# Patient Record
Sex: FEMALE | Race: BLACK OR AFRICAN AMERICAN | NOT HISPANIC OR LATINO | Employment: UNEMPLOYED | URBAN - METROPOLITAN AREA
[De-identification: names, ages, dates, MRNs, and addresses within clinical notes are randomized per-mention and may not be internally consistent; named-entity substitution may affect disease eponyms.]

---

## 2018-10-28 ENCOUNTER — HOSPITAL ENCOUNTER (EMERGENCY)
Facility: HOSPITAL | Age: 28
Discharge: HOME | End: 2018-10-28
Attending: EMERGENCY MEDICINE

## 2018-10-28 VITALS
BODY MASS INDEX: 53.92 KG/M2 | HEIGHT: 62 IN | OXYGEN SATURATION: 98 % | TEMPERATURE: 98 F | DIASTOLIC BLOOD PRESSURE: 72 MMHG | WEIGHT: 293 LBS | RESPIRATION RATE: 16 BRPM | SYSTOLIC BLOOD PRESSURE: 140 MMHG | HEART RATE: 86 BPM

## 2018-10-28 DIAGNOSIS — R06.00 DYSPNEA, UNSPECIFIED TYPE: ICD-10-CM

## 2018-10-28 DIAGNOSIS — R05.9 COUGH: ICD-10-CM

## 2018-10-28 DIAGNOSIS — K04.7 DENTAL ABSCESS: ICD-10-CM

## 2018-10-28 DIAGNOSIS — R60.9 EDEMA, UNSPECIFIED TYPE: Primary | ICD-10-CM

## 2018-10-28 LAB
ALBUMIN SERPL BCP-MCNC: 3.4 G/DL
ALP SERPL-CCNC: 61 U/L
ALT SERPL W/O P-5'-P-CCNC: 27 U/L
ANION GAP SERPL CALC-SCNC: 12 MMOL/L
AST SERPL-CCNC: 33 U/L
B-HCG UR QL: NEGATIVE
BASOPHILS # BLD AUTO: 0.03 K/UL
BASOPHILS NFR BLD: 0.3 %
BILIRUB SERPL-MCNC: 0.6 MG/DL
BNP SERPL-MCNC: 85 PG/ML
BUN SERPL-MCNC: 10 MG/DL
CALCIUM SERPL-MCNC: 8.9 MG/DL
CHLORIDE SERPL-SCNC: 99 MMOL/L
CO2 SERPL-SCNC: 25 MMOL/L
CREAT SERPL-MCNC: 0.7 MG/DL
DIFFERENTIAL METHOD: ABNORMAL
EOSINOPHIL # BLD AUTO: 0.1 K/UL
EOSINOPHIL NFR BLD: 1.2 %
ERYTHROCYTE [DISTWIDTH] IN BLOOD BY AUTOMATED COUNT: 16.4 %
EST. GFR  (AFRICAN AMERICAN): >60 ML/MIN/1.73 M^2
EST. GFR  (NON AFRICAN AMERICAN): >60 ML/MIN/1.73 M^2
GLUCOSE SERPL-MCNC: 146 MG/DL
HCT VFR BLD AUTO: 42.6 %
HGB BLD-MCNC: 13.1 G/DL
IMM GRANULOCYTES # BLD AUTO: 0.04 K/UL
IMM GRANULOCYTES NFR BLD AUTO: 0.5 %
LYMPHOCYTES # BLD AUTO: 1.7 K/UL
LYMPHOCYTES NFR BLD: 19.3 %
MCH RBC QN AUTO: 24.7 PG
MCHC RBC AUTO-ENTMCNC: 30.8 G/DL
MCV RBC AUTO: 80 FL
MONOCYTES # BLD AUTO: 0.8 K/UL
MONOCYTES NFR BLD: 8.8 %
NEUTROPHILS # BLD AUTO: 6 K/UL
NEUTROPHILS NFR BLD: 69.9 %
NRBC BLD-RTO: 0 /100 WBC
PLATELET # BLD AUTO: 343 K/UL
PMV BLD AUTO: 10.3 FL
POTASSIUM SERPL-SCNC: 3.9 MMOL/L
PROT SERPL-MCNC: 6.8 G/DL
RBC # BLD AUTO: 5.3 M/UL
SODIUM SERPL-SCNC: 136 MMOL/L
WBC # BLD AUTO: 8.62 K/UL

## 2018-10-28 PROCEDURE — 71046 X-RAY EXAM CHEST 2 VIEWS: CPT | Mod: TC,FY

## 2018-10-28 PROCEDURE — 81025 URINE PREGNANCY TEST: CPT

## 2018-10-28 PROCEDURE — 99284 EMERGENCY DEPT VISIT MOD MDM: CPT | Mod: 25

## 2018-10-28 PROCEDURE — 83880 ASSAY OF NATRIURETIC PEPTIDE: CPT

## 2018-10-28 PROCEDURE — 94640 AIRWAY INHALATION TREATMENT: CPT

## 2018-10-28 PROCEDURE — 80053 COMPREHEN METABOLIC PANEL: CPT

## 2018-10-28 PROCEDURE — 71046 X-RAY EXAM CHEST 2 VIEWS: CPT | Mod: 26,,, | Performed by: RADIOLOGY

## 2018-10-28 PROCEDURE — 85025 COMPLETE CBC W/AUTO DIFF WBC: CPT

## 2018-10-28 PROCEDURE — 25000242 PHARM REV CODE 250 ALT 637 W/ HCPCS: Performed by: NURSE PRACTITIONER

## 2018-10-28 RX ORDER — FUROSEMIDE 20 MG/1
20 TABLET ORAL DAILY
Qty: 14 TABLET | Refills: 0 | Status: SHIPPED | OUTPATIENT
Start: 2018-10-28 | End: 2019-06-23

## 2018-10-28 RX ORDER — AMOXICILLIN 875 MG/1
875 TABLET, FILM COATED ORAL 2 TIMES DAILY
Qty: 14 TABLET | Refills: 0 | Status: SHIPPED | OUTPATIENT
Start: 2018-10-28 | End: 2019-06-23

## 2018-10-28 RX ORDER — IPRATROPIUM BROMIDE AND ALBUTEROL SULFATE 2.5; .5 MG/3ML; MG/3ML
3 SOLUTION RESPIRATORY (INHALATION)
Status: COMPLETED | OUTPATIENT
Start: 2018-10-28 | End: 2018-10-28

## 2018-10-28 RX ADMIN — IPRATROPIUM BROMIDE AND ALBUTEROL SULFATE 3 ML: .5; 3 SOLUTION RESPIRATORY (INHALATION) at 01:10

## 2018-10-28 NOTE — ED PROVIDER NOTES
"CHIEF COMPLAINT  Chief Complaint   Patient presents with    Dental Pain    Back Pain    generalized edema     seen at [facility] [place] last week told she was ok, seen her MD this past week, she was not concerned       HPI  Michelle Mendoza a 28 y.o. female who presents who presents to the ED with multiple complaints.  has been having "fluid build up all over me" went to [facility] last week. [facility] also saw her PCP. Was given steroids and a breathing treatment.Does have a history of Asthma.  [facility] is seeing PCP for back pain and an Xray has been ordered  Also thinks she has a dental abscess in a top back tooth.      CURRENT MEDICATIONS  No current facility-administered medications on file prior to encounter.      Current Outpatient Medications on File Prior to Encounter   Medication Sig Dispense Refill    albuterol sulfate (VENTOLIN INHL) Inhale into the lungs.         ALLERGIES  Review of patient's allergies indicates:  No Known Allergies      There is no immunization history on file for this patient.    PAST MEDICAL HISTORY  Past Medical History:   Diagnosis Date    Asthma     Back pain        SURGICAL HISTORY  Past Surgical History:   Procedure Laterality Date    APPENDECTOMY       SECTION         SOCIAL HISTORY  Social History     Socioeconomic History    Marital status: Single     Spouse name: Not on file    Number of children: Not on file    Years of education: Not on file    Highest education level: Not on file   Social Needs    Financial resource strain: Not on file    Food insecurity - worry: Not on file    Food insecurity - inability: Not on file    Transportation needs - medical: Not on file    Transportation needs - non-medical: Not on file   Occupational History    Not on file   Tobacco Use    Smoking status: Current Every Day Smoker   Substance and Sexual Activity    Alcohol use: Yes     Frequency: Never     Comment: occ    Drug use: No    Sexual activity: Yes     Birth " "control/protection: None   Other Topics Concern    Not on file   Social History Narrative    Not on file       FAMILY HISTORY  History reviewed. No pertinent family history.    REVIEW OF SYSTEMS  Constitutional: No fever, chills, or weakness.  Eyes: No redness, pain, or discharge  HENT: No ear pain, no headache, no rhinorrhea, no throat pain, + dental pain  Respiratory: + cough, + wheezing + shortness of breath  Cardiovascular: No chest pain, palpitations or edema  GI: No abdominal pain, nausea, vomiting or diarrhea  Gu: No dysuria, no hematuria, or discharge  Musculoskeletal: No pain, full range of motion. Good sensation  Skin: No rash or abrasion  Neurologic: No focal weakness or sensory changes.  All systems otherwise negative except as noted in the Review of Systems and History of Present Illness      PHYSICAL EXAM  Reviewed Triage Note  VITAL SIGNS:   Patient Vitals for the past 24 hrs:   BP Temp Temp src Pulse Resp SpO2 Height Weight   10/28/18 1340 (!) 140/72 -- -- 86 16 98 % -- --   10/28/18 1322 -- 98.2 °F (36.8 °C) Oral -- -- -- -- --   10/28/18 1104 137/64 97.9 °F (36.6 °C) Oral 92 (!) 22 96 % 5' 2" (1.575 m) (!) 154.2 kg (340 lb)     Constitutional: Well developed, well nourished, Alert and oriented x3, No acute distress, non-toxic appearance.  HENT: Normocephalic, Atraumatic, Bilateral external ears normal, external nose negative, + right upper posterior gum red/erythematous. + swelling, tender to palpation  Eyes: PERRL, EOMI, Conjunctiva normal, No discharge.  Neck: Normal range of motion, no tenderness, supple, no carotid bruits  Respiratory: Bilateral scattered wheezes, no rales, no rhonchi.   Cardiovascular: Normal heart rate, normal rhythm, no murmurs, no rubs, no gallops.  Gi: Bowel sounds normal, soft, no tenderness, non-distended, no masses, no pulsatile masses.  Musculoskeletal: Trace edema bilateral LE. No tenderness, no cyanosis, no clubbing. Good range of motion in all major joints. No " tenderness to palpation or major deformities noted.   Integument: Warm, Dry, No erythema, no rash  Neurologic: Normal motor function, normal sensory function. No focal deficits noted. Intact distal pulses  Psychiatric: Affect normal, judgment normal, mood normal      LABS  Pertinent labs reviewed. (see chart for details)  Labs Reviewed   COMPREHENSIVE METABOLIC PANEL - Abnormal; Notable for the following components:       Result Value    Glucose 146 (*)     Albumin 3.4 (*)     All other components within normal limits   CBC W/ AUTO DIFFERENTIAL - Abnormal; Notable for the following components:    MCV 80 (*)     MCH 24.7 (*)     MCHC 30.8 (*)     RDW 16.4 (*)     All other components within normal limits   PREGNANCY TEST, URINE RAPID   B-TYPE NATRIURETIC PEPTIDE       RADIOLOGY  X-Ray Chest PA And Lateral   Final Result      Grossly no evidence of acute cardiopulmonary disease         Electronically signed by: Rober Buenrostro MD   Date:    10/28/2018   Time:    13:44        Images viewed    PROCEDURE  Procedures      ED COURSE & MEDICAL DECISION MAKING     MDM       Physical exam findings discussed with patient. No acute emergent medical condition identified at this time to warrant further testing. Will dispo home with instructions to follow up with PCP as needed, return to the ED for worsening condition. Pt agrees with plan of care.     DISPOSITION  Patient discharged in stable condition 10/28/2018  2:51 PM      CLINICAL IMPRESSION:  The primary encounter diagnosis was Edema, unspecified type. Diagnoses of Cough, Dyspnea, unspecified type, and Dental abscess were also pertinent to this visit.    Patient advised to follow-up with your PCP within 3 days for BP re-check if Blood Pressure was >120/80 without history of hypertension.         GABBI Beckford  10/28/18 6941

## 2018-10-28 NOTE — ED NOTES
Duo neb per resp. Patient education completed on diet needs and low back pain related to excess . Patient reverberates diet ideas. Follow up with her primary on dietary needs discussed.

## 2019-06-23 ENCOUNTER — HOSPITAL ENCOUNTER (EMERGENCY)
Facility: HOSPITAL | Age: 29
Discharge: HOME | End: 2019-06-23
Attending: INTERNAL MEDICINE
Payer: MEDICAID

## 2019-06-23 VITALS
SYSTOLIC BLOOD PRESSURE: 117 MMHG | DIASTOLIC BLOOD PRESSURE: 64 MMHG | TEMPERATURE: 98 F | HEIGHT: 62 IN | WEIGHT: 293 LBS | BODY MASS INDEX: 53.92 KG/M2 | RESPIRATION RATE: 20 BRPM | OXYGEN SATURATION: 96 % | HEART RATE: 101 BPM

## 2019-06-23 DIAGNOSIS — J45.31 MILD PERSISTENT ASTHMATIC BRONCHITIS WITH ACUTE EXACERBATION: Primary | ICD-10-CM

## 2019-06-23 PROCEDURE — 96372 THER/PROPH/DIAG INJ SC/IM: CPT

## 2019-06-23 PROCEDURE — 63600175 PHARM REV CODE 636 W HCPCS: Performed by: INTERNAL MEDICINE

## 2019-06-23 PROCEDURE — 99284 EMERGENCY DEPT VISIT MOD MDM: CPT | Mod: 25

## 2019-06-23 RX ORDER — AZITHROMYCIN 1 G/1
1 POWDER, FOR SUSPENSION ORAL ONCE
Qty: 1 PACKET | Refills: 0 | Status: SHIPPED | OUTPATIENT
Start: 2019-06-23 | End: 2019-06-23

## 2019-06-23 RX ORDER — BENZONATATE 100 MG/1
100 CAPSULE ORAL 3 TIMES DAILY PRN
Qty: 20 CAPSULE | Refills: 0 | Status: SHIPPED | OUTPATIENT
Start: 2019-06-23 | End: 2019-07-03

## 2019-06-23 RX ORDER — DEXAMETHASONE SODIUM PHOSPHATE 100 MG/10ML
10 INJECTION INTRAMUSCULAR; INTRAVENOUS
Status: COMPLETED | OUTPATIENT
Start: 2019-06-23 | End: 2019-06-23

## 2019-06-23 RX ADMIN — DEXAMETHASONE SODIUM PHOSPHATE 10 MG: 10 INJECTION INTRAMUSCULAR; INTRAVENOUS at 04:06

## 2019-06-23 NOTE — ED PROVIDER NOTES
Encounter Date: 2019       History     Chief Complaint   Patient presents with    Cough    Nasal Congestion    Headache     Patient with cough cold congestion and wheezing x1 week.  One to get checked out while her stuck it was here        Review of patient's allergies indicates:  No Known Allergies  Past Medical History:   Diagnosis Date    Asthma     Back pain     Diabetes mellitus     Morbid obesity      Past Surgical History:   Procedure Laterality Date    APPENDECTOMY       SECTION       History reviewed. No pertinent family history.  Social History     Tobacco Use    Smoking status: Current Every Day Smoker   Substance Use Topics    Alcohol use: Yes     Frequency: Never     Comment: occ    Drug use: No     Review of Systems   Constitutional: Negative for fever.   HENT: Positive for congestion, postnasal drip, rhinorrhea and sore throat.    Respiratory: Negative for shortness of breath.    Cardiovascular: Negative for chest pain.   Gastrointestinal: Negative for nausea.   Genitourinary: Negative for dysuria.   Musculoskeletal: Negative for back pain.   Skin: Negative for rash.   Neurological: Negative for weakness.   Hematological: Does not bruise/bleed easily.   All other systems reviewed and are negative.      Physical Exam     Initial Vitals [19 1536]   BP Pulse Resp Temp SpO2   117/64 101 20 98.1 °F (36.7 °C) 96 %      MAP       --         Physical Exam    Nursing note and vitals reviewed.  Constitutional: Vital signs are normal. She appears well-developed and well-nourished. She is active and cooperative.   HENT:   Head: Normocephalic and atraumatic.   Eyes: Conjunctivae, EOM and lids are normal. Pupils are equal, round, and reactive to light. Lids are everted and swept, no foreign bodies found.   Neck: Trachea normal, normal range of motion and full passive range of motion without pain. Neck supple.   Cardiovascular: Normal rate, regular rhythm, S1 normal, S2 normal, normal  heart sounds, intact distal pulses and normal pulses.  No extrasystoles are present.    Pulmonary/Chest: She has wheezes. She has rhonchi.   Abdominal: Soft. Normal appearance and bowel sounds are normal.   Musculoskeletal: Normal range of motion.   Neurological: She is alert. She has normal reflexes. GCS eye subscore is 4. GCS verbal subscore is 5. GCS motor subscore is 6.   Skin: Skin is warm, dry and intact. Capillary refill takes less than 2 seconds.   Psychiatric: She has a normal mood and affect. Her speech is normal and behavior is normal. Cognition and memory are normal.         ED Course   Procedures  Labs Reviewed - No data to display       Imaging Results    None          Medical Decision Making:   ED Management:  Patient with wheezing and rhonchi compatible with asthmatic bronchitis.  Given a shot of Decadron.  Z-Tej and Tessalon.                      Clinical Impression:       ICD-10-CM ICD-9-CM   1. Mild persistent asthmatic bronchitis with acute exacerbation J45.31 493.92         Disposition:   Disposition: Discharged  Condition: Stable                        Barber Upton MD  06/23/19 [id number]

## 2019-10-28 ENCOUNTER — HOSPITAL ENCOUNTER (EMERGENCY)
Facility: HOSPITAL | Age: 29
Discharge: HOME | End: 2019-10-28
Attending: FAMILY MEDICINE
Payer: MEDICAID

## 2019-10-28 VITALS
HEART RATE: 79 BPM | HEIGHT: 62 IN | WEIGHT: 293 LBS | TEMPERATURE: 99 F | RESPIRATION RATE: 16 BRPM | OXYGEN SATURATION: 95 % | BODY MASS INDEX: 53.92 KG/M2 | DIASTOLIC BLOOD PRESSURE: 107 MMHG | SYSTOLIC BLOOD PRESSURE: 149 MMHG

## 2019-10-28 DIAGNOSIS — R05.9 COUGH: ICD-10-CM

## 2019-10-28 DIAGNOSIS — J40 BRONCHITIS: Primary | ICD-10-CM

## 2019-10-28 LAB
DEPRECATED S PYO AG THROAT QL EIA: NEGATIVE
POCT GLUCOSE: 115 MG/DL (ref 70–110)

## 2019-10-28 PROCEDURE — 82962 GLUCOSE BLOOD TEST: CPT | Mod: 59

## 2019-10-28 PROCEDURE — 71046 X-RAY EXAM CHEST 2 VIEWS: CPT | Mod: 26,,, | Performed by: RADIOLOGY

## 2019-10-28 PROCEDURE — 87880 STREP A ASSAY W/OPTIC: CPT

## 2019-10-28 PROCEDURE — 99284 EMERGENCY DEPT VISIT MOD MDM: CPT | Mod: 25

## 2019-10-28 PROCEDURE — 71046 X-RAY EXAM CHEST 2 VIEWS: CPT | Mod: TC,FY

## 2019-10-28 PROCEDURE — 71046 XR CHEST PA AND LATERAL: ICD-10-PCS | Mod: 26,,, | Performed by: RADIOLOGY

## 2019-10-28 PROCEDURE — 87081 CULTURE SCREEN ONLY: CPT

## 2019-10-28 RX ORDER — AZITHROMYCIN 250 MG/1
250 TABLET, FILM COATED ORAL DAILY
Qty: 6 TABLET | Refills: 0 | Status: ON HOLD | OUTPATIENT
Start: 2019-10-28 | End: 2020-07-14 | Stop reason: HOSPADM

## 2019-10-28 RX ORDER — ALBUTEROL SULFATE 90 UG/1
1-2 AEROSOL, METERED RESPIRATORY (INHALATION) EVERY 6 HOURS PRN
Qty: 1 INHALER | Refills: 0 | Status: SHIPPED | OUTPATIENT
Start: 2019-10-28 | End: 2020-10-27

## 2019-10-28 NOTE — ED PROVIDER NOTES
Encounter Date: 10/28/2019       History   No chief complaint on file.    Michelle Wren is a 29 y.o female with PMHx including asthma, back pain, DM and morbid obesity. She presents to ED with complaint of cough, wheezing and body aches x 1 week    She is unsure of fever    No abdominal pain. No N/V/D. She is tolerating fluids well    Son with similar symptoms    She is a smoker    No fever, chills, chest pain, dysnea or rash        Review of patient's allergies indicates:  No Known Allergies  Past Medical History:   Diagnosis Date    Asthma     Back pain     Diabetes mellitus     Morbid obesity      Past Surgical History:   Procedure Laterality Date    APPENDECTOMY       SECTION       No family history on file.  Social History     Tobacco Use    Smoking status: Current Every Day Smoker   Substance Use Topics    Alcohol use: Yes     Frequency: Never     Comment: occ    Drug use: No     Review of Systems   Constitutional: Negative.  Negative for fever.   HENT: Positive for sore throat. Negative for congestion, ear discharge, ear pain, sinus pressure, sinus pain and sneezing.    Eyes: Negative.    Respiratory: Positive for cough and wheezing.    Cardiovascular: Negative.  Negative for chest pain.   Gastrointestinal: Negative.  Negative for nausea.   Endocrine: Negative.    Genitourinary: Negative.  Negative for dysuria.   Musculoskeletal: Negative.  Negative for back pain.   Skin: Negative for rash.   Allergic/Immunologic: Negative.    Neurological: Positive for headaches. Negative for weakness.   Hematological: Negative.  Does not bruise/bleed easily.   Psychiatric/Behavioral: Negative.    All other systems reviewed and are negative.      Physical Exam     Initial Vitals   BP Pulse Resp Temp SpO2   -- -- -- -- --      MAP       --         Physical Exam    Nursing note and vitals reviewed.  Constitutional: She appears well-developed and well-nourished.   HENT:   Head: Normocephalic.   Eyes:  Conjunctivae are normal.   Neck: Normal range of motion.   Cardiovascular: Normal rate.   Pulmonary/Chest: Breath sounds normal.   Musculoskeletal: Normal range of motion.   Neurological: She is alert and oriented to person, place, and time. GCS score is 15. GCS eye subscore is 4. GCS verbal subscore is 5. GCS motor subscore is 6.   Skin: Skin is warm. Capillary refill takes less than 2 seconds.   Psychiatric: She has a normal mood and affect. Her behavior is normal. Judgment and thought content normal.         ED Course   Procedures  Labs Reviewed - No data to display       Imaging Results    None          Medical Decision Making:   Initial Assessment:   Patient with complaint of cough, wheezing and body aches x 1 week    She is unsure of fever    No abdominal pain. No N/V/D. She is tolerating fluids well    Son with similar symptoms    She is a smoker    Differential Diagnosis:   URI, sinusitis, bronchitis, asthma exacerbation, pneumonia  ED Management:  CXR normal    Strep negative    Discussed physical exam findings with patient  No acute emergent medical condition identified at this time to warrant further testing/diagnostics  At this time, I believe the patient is clinically stable for discharge.   Patient to follow up with PCP in 1-2 days.  The patient acknowledges that close follow up with a MD is required after all ER visits  Pt given instructions; take all medications prescribed in the ER as directed.   Patient agrees to comply with all instruction and direction given in the ER  Pt agrees to return to ER if any symptoms reoccur                               Clinical Impression:       ICD-10-CM ICD-9-CM   1. Bronchitis J40 490   2. Cough R05 786.2                                Bruna Oakes NP  10/28/19 6914

## 2019-10-31 LAB — BACTERIA THROAT CULT: NORMAL

## 2020-06-22 ENCOUNTER — HOSPITAL ENCOUNTER (INPATIENT)
Facility: HOSPITAL | Age: 30
LOS: 1 days | Discharge: ANOTHER HOSPITAL | End: 2020-06-23
Attending: EMERGENCY MEDICINE | Admitting: FAMILY MEDICINE
Payer: MEDICAID

## 2020-06-22 DIAGNOSIS — E66.2 PICKWICKIAN SYNDROME: ICD-10-CM

## 2020-06-22 DIAGNOSIS — R09.02 HYPOXIA: ICD-10-CM

## 2020-06-22 DIAGNOSIS — J96.02 ACUTE RESPIRATORY FAILURE WITH HYPERCAPNIA: ICD-10-CM

## 2020-06-22 DIAGNOSIS — E66.01 MORBID OBESITY: ICD-10-CM

## 2020-06-22 DIAGNOSIS — J96.22 ACUTE ON CHRONIC RESPIRATORY FAILURE WITH HYPERCAPNIA: Primary | ICD-10-CM

## 2020-06-22 DIAGNOSIS — R06.02 SOB (SHORTNESS OF BREATH): ICD-10-CM

## 2020-06-22 PROBLEM — R73.01 ELEVATED FASTING GLUCOSE: Status: ACTIVE | Noted: 2020-06-22

## 2020-06-22 PROBLEM — I51.7 CARDIOMEGALY: Status: ACTIVE | Noted: 2020-06-22

## 2020-06-22 PROBLEM — J45.20 MILD INTERMITTENT ASTHMA: Status: ACTIVE | Noted: 2020-06-22

## 2020-06-22 PROBLEM — E66.813 CLASS 3 SEVERE OBESITY WITH SERIOUS COMORBIDITY AND BODY MASS INDEX (BMI) OF 60.0 TO 69.9 IN ADULT: Status: ACTIVE | Noted: 2020-06-22

## 2020-06-22 LAB
ALBUMIN SERPL BCP-MCNC: 3.4 G/DL (ref 3.5–5.2)
ALLENS TEST: ABNORMAL
ALP SERPL-CCNC: 66 U/L (ref 55–135)
ALT SERPL W/O P-5'-P-CCNC: 19 U/L (ref 10–44)
ANION GAP SERPL CALC-SCNC: 7 MMOL/L (ref 8–16)
AST SERPL-CCNC: 20 U/L (ref 10–40)
BASOPHILS # BLD AUTO: 0.02 K/UL (ref 0–0.2)
BASOPHILS NFR BLD: 0.2 % (ref 0–1.9)
BILIRUB SERPL-MCNC: 0.6 MG/DL (ref 0.1–1)
BNP SERPL-MCNC: 103 PG/ML (ref 0–99)
BUN SERPL-MCNC: 19 MG/DL (ref 6–20)
CALCIUM SERPL-MCNC: 8.6 MG/DL (ref 8.7–10.5)
CHLORIDE SERPL-SCNC: 100 MMOL/L (ref 95–110)
CO2 SERPL-SCNC: 31 MMOL/L (ref 23–29)
CREAT SERPL-MCNC: 0.7 MG/DL (ref 0.5–1.4)
DELSYS: ABNORMAL
DIFFERENTIAL METHOD: ABNORMAL
EOSINOPHIL # BLD AUTO: 0 K/UL (ref 0–0.5)
EOSINOPHIL NFR BLD: 0.3 % (ref 0–8)
EP: 6
ERYTHROCYTE [DISTWIDTH] IN BLOOD BY AUTOMATED COUNT: 16.2 % (ref 11.5–14.5)
ERYTHROCYTE [SEDIMENTATION RATE] IN BLOOD BY WESTERGREN METHOD: 20 MM/H
EST. GFR  (AFRICAN AMERICAN): >60 ML/MIN/1.73 M^2
EST. GFR  (NON AFRICAN AMERICAN): >60 ML/MIN/1.73 M^2
FIO2: 100
FIO2: 21
FIO2: 50
FLOW: 70
GLUCOSE SERPL-MCNC: 135 MG/DL (ref 70–110)
HCO3 UR-SCNC: 28.2 MMOL/L (ref 24–28)
HCO3 UR-SCNC: 31.7 MMOL/L (ref 24–28)
HCO3 UR-SCNC: 36.2 MMOL/L (ref 24–28)
HCT VFR BLD AUTO: 43 % (ref 37–48.5)
HGB BLD-MCNC: 13.1 G/DL (ref 12–16)
IMM GRANULOCYTES # BLD AUTO: 0.08 K/UL (ref 0–0.04)
IMM GRANULOCYTES NFR BLD AUTO: 0.7 % (ref 0–0.5)
IP: 15
LYMPHOCYTES # BLD AUTO: 2 K/UL (ref 1–4.8)
LYMPHOCYTES NFR BLD: 17.3 % (ref 18–48)
MCH RBC QN AUTO: 25.9 PG (ref 27–31)
MCHC RBC AUTO-ENTMCNC: 30.5 G/DL (ref 32–36)
MCV RBC AUTO: 85 FL (ref 82–98)
MODE: ABNORMAL
MONOCYTES # BLD AUTO: 1.2 K/UL (ref 0.3–1)
MONOCYTES NFR BLD: 10.3 % (ref 4–15)
NEUTROPHILS # BLD AUTO: 8.3 K/UL (ref 1.8–7.7)
NEUTROPHILS NFR BLD: 71.2 % (ref 38–73)
NRBC BLD-RTO: 0 /100 WBC
PCO2 BLDA: 56.7 MMHG (ref 35–45)
PCO2 BLDA: 61.1 MMHG (ref 35–45)
PCO2 BLDA: 83.6 MMHG (ref 35–45)
PEEP: 12
PH SMN: 7.24 [PH] (ref 7.35–7.45)
PH SMN: 7.3 [PH] (ref 7.35–7.45)
PH SMN: 7.32 [PH] (ref 7.35–7.45)
PIP: 43
PLATELET # BLD AUTO: 313 K/UL (ref 150–350)
PMV BLD AUTO: 10.6 FL (ref 9.2–12.9)
PO2 BLDA: 111 MMHG (ref 80–100)
PO2 BLDA: 53 MMHG (ref 80–100)
PO2 BLDA: 84 MMHG (ref 80–100)
POC BE: 2 MMOL/L
POC BE: 6 MMOL/L
POC BE: 9 MMOL/L
POC SATURATED O2: 83 % (ref 95–100)
POC SATURATED O2: 95 % (ref 95–100)
POC SATURATED O2: 97 % (ref 95–100)
POC TCO2: 30 MMOL/L (ref 23–27)
POC TCO2: 34 MMOL/L (ref 23–27)
POC TCO2: 39 MMOL/L (ref 23–27)
POCT GLUCOSE: 143 MG/DL (ref 70–110)
POTASSIUM SERPL-SCNC: 4.2 MMOL/L (ref 3.5–5.1)
PROT SERPL-MCNC: 7.4 G/DL (ref 6–8.4)
PS: 10
RBC # BLD AUTO: 5.06 M/UL (ref 4–5.4)
SAMPLE: ABNORMAL
SARS-COV-2 RDRP RESP QL NAA+PROBE: NEGATIVE
SITE: ABNORMAL
SODIUM SERPL-SCNC: 138 MMOL/L (ref 136–145)
SP02: 97
TROPONIN I SERPL DL<=0.01 NG/ML-MCNC: 0.03 NG/ML (ref 0.02–0.5)
VT: 550
WBC # BLD AUTO: 11.68 K/UL (ref 3.9–12.7)

## 2020-06-22 PROCEDURE — 80053 COMPREHEN METABOLIC PANEL: CPT

## 2020-06-22 PROCEDURE — 85025 COMPLETE CBC W/AUTO DIFF WBC: CPT

## 2020-06-22 PROCEDURE — 20000000 HC ICU ROOM

## 2020-06-22 PROCEDURE — 94660 CPAP INITIATION&MGMT: CPT

## 2020-06-22 PROCEDURE — 25500020 PHARM REV CODE 255: Performed by: FAMILY MEDICINE

## 2020-06-22 PROCEDURE — 27000190 HC CPAP FULL FACE MASK W/VALVE

## 2020-06-22 PROCEDURE — 83880 ASSAY OF NATRIURETIC PEPTIDE: CPT

## 2020-06-22 PROCEDURE — 36600 WITHDRAWAL OF ARTERIAL BLOOD: CPT

## 2020-06-22 PROCEDURE — 99900026 HC AIRWAY MAINTENANCE (STAT)

## 2020-06-22 PROCEDURE — 25000003 PHARM REV CODE 250: Performed by: FAMILY MEDICINE

## 2020-06-22 PROCEDURE — 82803 BLOOD GASES ANY COMBINATION: CPT

## 2020-06-22 PROCEDURE — 99223 1ST HOSP IP/OBS HIGH 75: CPT | Mod: ,,, | Performed by: FAMILY MEDICINE

## 2020-06-22 PROCEDURE — 94640 AIRWAY INHALATION TREATMENT: CPT

## 2020-06-22 PROCEDURE — 82962 GLUCOSE BLOOD TEST: CPT

## 2020-06-22 PROCEDURE — 25000242 PHARM REV CODE 250 ALT 637 W/ HCPCS

## 2020-06-22 PROCEDURE — 31500 INSERT EMERGENCY AIRWAY: CPT

## 2020-06-22 PROCEDURE — 94761 N-INVAS EAR/PLS OXIMETRY MLT: CPT

## 2020-06-22 PROCEDURE — 94002 VENT MGMT INPAT INIT DAY: CPT

## 2020-06-22 PROCEDURE — 93005 ELECTROCARDIOGRAM TRACING: CPT

## 2020-06-22 PROCEDURE — 71045 X-RAY EXAM CHEST 1 VIEW: CPT | Mod: 26,,, | Performed by: RADIOLOGY

## 2020-06-22 PROCEDURE — 96374 THER/PROPH/DIAG INJ IV PUSH: CPT | Mod: 59

## 2020-06-22 PROCEDURE — 63600175 PHARM REV CODE 636 W HCPCS

## 2020-06-22 PROCEDURE — 84484 ASSAY OF TROPONIN QUANT: CPT

## 2020-06-22 PROCEDURE — 71045 X-RAY EXAM CHEST 1 VIEW: CPT | Mod: TC,FY

## 2020-06-22 PROCEDURE — 71045 XR CHEST AP PORTABLE: ICD-10-PCS | Mod: 26,,, | Performed by: RADIOLOGY

## 2020-06-22 PROCEDURE — 99900035 HC TECH TIME PER 15 MIN (STAT)

## 2020-06-22 PROCEDURE — 99291 CRITICAL CARE FIRST HOUR: CPT | Mod: 25

## 2020-06-22 PROCEDURE — U0002 COVID-19 LAB TEST NON-CDC: HCPCS

## 2020-06-22 PROCEDURE — 63600175 PHARM REV CODE 636 W HCPCS: Performed by: EMERGENCY MEDICINE

## 2020-06-22 PROCEDURE — 94644 CONT INHLJ TX 1ST HOUR: CPT

## 2020-06-22 PROCEDURE — 27000221 HC OXYGEN, UP TO 24 HOURS

## 2020-06-22 PROCEDURE — 99223 PR INITIAL HOSPITAL CARE,LEVL III: ICD-10-PCS | Mod: ,,, | Performed by: FAMILY MEDICINE

## 2020-06-22 PROCEDURE — 25000242 PHARM REV CODE 250 ALT 637 W/ HCPCS: Performed by: EMERGENCY MEDICINE

## 2020-06-22 PROCEDURE — 71045 X-RAY EXAM CHEST 1 VIEW: CPT | Mod: 26,77,, | Performed by: RADIOLOGY

## 2020-06-22 PROCEDURE — 96375 TX/PRO/DX INJ NEW DRUG ADDON: CPT | Mod: 59

## 2020-06-22 PROCEDURE — 71045 XR CHEST AP PORTABLE: ICD-10-PCS | Mod: 26,77,, | Performed by: RADIOLOGY

## 2020-06-22 PROCEDURE — 63600175 PHARM REV CODE 636 W HCPCS: Performed by: FAMILY MEDICINE

## 2020-06-22 RX ORDER — PROPOFOL 10 MG/ML
200 VIAL (ML) INTRAVENOUS ONCE
Status: COMPLETED | OUTPATIENT
Start: 2020-06-22 | End: 2020-06-22

## 2020-06-22 RX ORDER — IPRATROPIUM BROMIDE AND ALBUTEROL SULFATE 2.5; .5 MG/3ML; MG/3ML
3 SOLUTION RESPIRATORY (INHALATION)
Status: COMPLETED | OUTPATIENT
Start: 2020-06-22 | End: 2020-06-22

## 2020-06-22 RX ORDER — SUCCINYLCHOLINE CHLORIDE 20 MG/ML
200 INJECTION INTRAMUSCULAR; INTRAVENOUS ONCE
Status: COMPLETED | OUTPATIENT
Start: 2020-06-22 | End: 2020-06-22

## 2020-06-22 RX ORDER — FENTANYL CITRATE-0.9 % NACL/PF 10 MCG/ML
PLASTIC BAG, INJECTION (ML) INTRAVENOUS CONTINUOUS
Status: DISCONTINUED | OUTPATIENT
Start: 2020-06-22 | End: 2020-06-23 | Stop reason: HOSPADM

## 2020-06-22 RX ORDER — METHYLPREDNISOLONE SOD SUCC 125 MG
125 VIAL (EA) INJECTION
Status: COMPLETED | OUTPATIENT
Start: 2020-06-22 | End: 2020-06-22

## 2020-06-22 RX ORDER — IPRATROPIUM BROMIDE AND ALBUTEROL SULFATE 2.5; .5 MG/3ML; MG/3ML
3 SOLUTION RESPIRATORY (INHALATION) EVERY 4 HOURS
Status: DISCONTINUED | OUTPATIENT
Start: 2020-06-22 | End: 2020-06-23 | Stop reason: HOSPADM

## 2020-06-22 RX ORDER — PROPOFOL 10 MG/ML
VIAL (ML) INTRAVENOUS
Status: DISPENSED
Start: 2020-06-22 | End: 2020-06-23

## 2020-06-22 RX ORDER — MIDAZOLAM HYDROCHLORIDE 5 MG/ML
5 INJECTION INTRAMUSCULAR; INTRAVENOUS ONCE
Status: DISCONTINUED | OUTPATIENT
Start: 2020-06-22 | End: 2020-06-23 | Stop reason: HOSPADM

## 2020-06-22 RX ORDER — MIDAZOLAM HYDROCHLORIDE 1 MG/ML
INJECTION, SOLUTION INTRAMUSCULAR; INTRAVENOUS
Status: DISPENSED
Start: 2020-06-22 | End: 2020-06-23

## 2020-06-22 RX ORDER — SODIUM CHLORIDE 0.9 % (FLUSH) 0.9 %
10 SYRINGE (ML) INJECTION
Status: DISCONTINUED | OUTPATIENT
Start: 2020-06-22 | End: 2020-06-23 | Stop reason: HOSPADM

## 2020-06-22 RX ORDER — FUROSEMIDE 10 MG/ML
40 INJECTION INTRAMUSCULAR; INTRAVENOUS DAILY
Status: DISCONTINUED | OUTPATIENT
Start: 2020-06-23 | End: 2020-06-23 | Stop reason: HOSPADM

## 2020-06-22 RX ORDER — FUROSEMIDE 10 MG/ML
40 INJECTION INTRAMUSCULAR; INTRAVENOUS
Status: COMPLETED | OUTPATIENT
Start: 2020-06-22 | End: 2020-06-22

## 2020-06-22 RX ORDER — ENOXAPARIN SODIUM 100 MG/ML
40 INJECTION SUBCUTANEOUS EVERY 24 HOURS
Status: DISCONTINUED | OUTPATIENT
Start: 2020-06-22 | End: 2020-06-23

## 2020-06-22 RX ORDER — PROPOFOL 10 MG/ML
5 INJECTION, EMULSION INTRAVENOUS CONTINUOUS
Status: DISCONTINUED | OUTPATIENT
Start: 2020-06-22 | End: 2020-06-23 | Stop reason: HOSPADM

## 2020-06-22 RX ORDER — SUCCINYLCHOLINE CHLORIDE 20 MG/ML
INJECTION INTRAMUSCULAR; INTRAVENOUS
Status: DISPENSED
Start: 2020-06-22 | End: 2020-06-23

## 2020-06-22 RX ORDER — PROPOFOL 10 MG/ML
INJECTION, EMULSION INTRAVENOUS
Status: COMPLETED
Start: 2020-06-22 | End: 2020-06-22

## 2020-06-22 RX ORDER — MORPHINE SULFATE 4 MG/ML
2 INJECTION, SOLUTION INTRAMUSCULAR; INTRAVENOUS EVERY 4 HOURS PRN
Status: DISCONTINUED | OUTPATIENT
Start: 2020-06-22 | End: 2020-06-23 | Stop reason: HOSPADM

## 2020-06-22 RX ORDER — METHYLPREDNISOLONE SOD SUCC 125 MG
125 VIAL (EA) INJECTION EVERY 8 HOURS
Status: DISCONTINUED | OUTPATIENT
Start: 2020-06-22 | End: 2020-06-23 | Stop reason: HOSPADM

## 2020-06-22 RX ORDER — BUDESONIDE 0.25 MG/2ML
INHALANT ORAL
Status: COMPLETED
Start: 2020-06-22 | End: 2020-06-22

## 2020-06-22 RX ORDER — IPRATROPIUM BROMIDE AND ALBUTEROL SULFATE 2.5; .5 MG/3ML; MG/3ML
SOLUTION RESPIRATORY (INHALATION)
Status: DISPENSED
Start: 2020-06-22 | End: 2020-06-22

## 2020-06-22 RX ORDER — BUDESONIDE 0.25 MG/2ML
0.25 INHALANT ORAL DAILY
Status: DISCONTINUED | OUTPATIENT
Start: 2020-06-23 | End: 2020-06-23 | Stop reason: HOSPADM

## 2020-06-22 RX ORDER — IPRATROPIUM BROMIDE AND ALBUTEROL SULFATE 2.5; .5 MG/3ML; MG/3ML
SOLUTION RESPIRATORY (INHALATION)
Status: DISPENSED
Start: 2020-06-22 | End: 2020-06-23

## 2020-06-22 RX ADMIN — BUDESONIDE 0.25 MG: 0.25 INHALANT RESPIRATORY (INHALATION) at 05:06

## 2020-06-22 RX ADMIN — IPRATROPIUM BROMIDE AND ALBUTEROL SULFATE 3 ML: .5; 3 SOLUTION RESPIRATORY (INHALATION) at 09:06

## 2020-06-22 RX ADMIN — METHYLPREDNISOLONE SODIUM SUCCINATE 125 MG: 125 INJECTION, POWDER, FOR SOLUTION INTRAMUSCULAR; INTRAVENOUS at 09:06

## 2020-06-22 RX ADMIN — PROPOFOL 200 MG: 10 INJECTION, EMULSION INTRAVENOUS at 04:06

## 2020-06-22 RX ADMIN — ENOXAPARIN SODIUM 40 MG: 100 INJECTION SUBCUTANEOUS at 05:06

## 2020-06-22 RX ADMIN — MORPHINE SULFATE 2 MG: 4 INJECTION INTRAVENOUS at 02:06

## 2020-06-22 RX ADMIN — PROPOFOL 30 MCG/KG/MIN: 10 INJECTION, EMULSION INTRAVENOUS at 11:06

## 2020-06-22 RX ADMIN — SULFUR HEXAFLUORIDE 5 ML: KIT at 03:06

## 2020-06-22 RX ADMIN — PROPOFOL: 10 INJECTION, EMULSION INTRAVENOUS at 05:06

## 2020-06-22 RX ADMIN — IPRATROPIUM BROMIDE AND ALBUTEROL SULFATE 3 ML: .5; 3 SOLUTION RESPIRATORY (INHALATION) at 11:06

## 2020-06-22 RX ADMIN — IPRATROPIUM BROMIDE AND ALBUTEROL SULFATE 3 ML: .5; 3 SOLUTION RESPIRATORY (INHALATION) at 05:06

## 2020-06-22 RX ADMIN — FUROSEMIDE 40 MG: 10 INJECTION, SOLUTION INTRAMUSCULAR; INTRAVENOUS at 09:06

## 2020-06-22 RX ADMIN — Medication 2500 MCG: at 07:06

## 2020-06-22 RX ADMIN — PROPOFOL 50 MCG/KG/MIN: 10 INJECTION, EMULSION INTRAVENOUS at 07:06

## 2020-06-22 RX ADMIN — PROPOFOL 50 MCG/KG/MIN: 10 INJECTION, EMULSION INTRAVENOUS at 05:06

## 2020-06-22 RX ADMIN — SUCCINYLCHOLINE CHLORIDE 200 MG: 20 INJECTION, SOLUTION INTRAMUSCULAR; INTRAVENOUS; PARENTERAL at 04:06

## 2020-06-22 NOTE — ASSESSMENT & PLAN NOTE
Admit to ICU with continuous oxygen monitoring  Likely secondary to obesity hypoventilation, no signs of infiltrate on chest x-ray.  She does have a history of asthma as well  continuous BiPAP for next 4 hr, repeat ABG then  Initial ABG with hypercapnia and hypoxia  DuoNebs q.4 hours scheduled  Solu-Medrol 125 mg IV x1 in the ER, will continue with Solu-Medrol IV 80 mg daily  Echo ordered

## 2020-06-22 NOTE — ASSESSMENT & PLAN NOTE
On CPAP at home, reports compliance  Will continue BiPAP on admission to ICU  BiPAP at night  Repeat ABG after 4 hr on BiPAP

## 2020-06-22 NOTE — NURSING
Patient taken off ventilator and manually ventilated to attempt to improve 02 sats. Dr. Shafer at bedside.

## 2020-06-22 NOTE — ED TRIAGE NOTES
"To er with c/o diff "catching my breath " pt is obese increasing sob over the last few days. Unable to walk for more than 10 mins.  Also hx of sleep apnea  "

## 2020-06-22 NOTE — NURSING
PT. Intubated at this time. Very difficult intubation, due to pt. Anatomy, pt. o2 sats. In the low eighties.  Otherwise hemodynamically stable. Will continue to closely monitor.

## 2020-06-22 NOTE — PLAN OF CARE
Problem: Noninvasive Ventilation Acute  Goal: Effective Unassisted Ventilation and Oxygenation  Outcome: Ongoing, Progressing

## 2020-06-22 NOTE — ASSESSMENT & PLAN NOTE
Patient with peripheral edema, elevated BNP though obesity could be a contributing factor to this.  Echo ordered.  Will follow-up  IV Lasix 40 mg x1 in ER  Continue IV Lasix 40 mg daily

## 2020-06-22 NOTE — HPI
Patient is a 30-year-old female with past medical history of asthma,?Diabetes, severe obesity who presents to the ER with worsening shortness of breath.  Patient reports that she has been feeling short of breath over the last 2-3 days but today has been unable to catch her breath.  She uses a CPAP nightly at home and reports compliance.  Denies any new fever cough.  She also reports increased swelling in her legs.  Today her shortness of breath became more severe so she came to the ER.  History obtained from ER physician and ER records as patient is on continuous BiPAP.  A patient agrees with history.  In the ER initial ABG 7.323/61/53/32 on room air, WBC within normal limits, like true lytes within normal limits.  She was given 125 mg IV Solu-Medrol, 3 DuoNeb treatments and IV 40 mg Lasix.  Hospital team called for admission for acute respiratory failure with hypoxia and hypercapnia

## 2020-06-22 NOTE — SUBJECTIVE & OBJECTIVE
Past Medical History:   Diagnosis Date    Asthma     Back pain     Diabetes mellitus     Morbid obesity     Obesity        Past Surgical History:   Procedure Laterality Date    APPENDECTOMY       SECTION         Review of patient's allergies indicates:  No Known Allergies    No current facility-administered medications on file prior to encounter.      Current Outpatient Medications on File Prior to Encounter   Medication Sig    albuterol (PROVENTIL/VENTOLIN HFA) 90 mcg/actuation inhaler Inhale 1-2 puffs into the lungs every 6 (six) hours as needed for Wheezing. Rescue    azithromycin (Z-JOSE ALBERTO) 250 MG tablet Take 1 tablet (250 mg total) by mouth once daily. Take first 2 tablets together, then 1 every day until finished.    metformin HCl (METFORMIN ORAL) Take by mouth.     Family History     None        Tobacco Use    Smoking status: Current Every Day Smoker   Substance and Sexual Activity    Alcohol use: Yes     Frequency: Never     Comment: occ    Drug use: No    Sexual activity: Yes     Birth control/protection: None     Review of Systems   Unable to perform ROS: Acuity of condition   Respiratory: Positive for shortness of breath.     Patient requiring continuous BiPAP  Objective:     Vital Signs (Most Recent):  Temp: 99.1 °F (37.3 °C) (20 0810)  Pulse: 86 (20 1046)  Resp: 15 (20 1046)  BP: (!) 135/93 (20 1046)  SpO2: 96 % (20 1046) Vital Signs (24h Range):  Temp:  [99.1 °F (37.3 °C)] 99.1 °F (37.3 °C)  Pulse:  [80-95] 86  Resp:  [14-30] 15  SpO2:  [92 %-100 %] 96 %  BP: (132-175)/() 135/93     Weight: (!) 153.3 kg (338 lb)  Body mass index is 61.82 kg/m².    Physical Exam  Vitals signs reviewed.   Constitutional:       Appearance: She is obese. She is ill-appearing. She is not toxic-appearing or diaphoretic.      Comments: On continuous BiPAP   HENT:      Head: Normocephalic and atraumatic.      Right Ear: External ear normal.      Left Ear: External ear  normal.      Nose: Nose normal.      Mouth/Throat:      Mouth: Mucous membranes are dry.   Eyes:      Conjunctiva/sclera: Conjunctivae normal.   Neck:      Comments: Full  Cardiovascular:      Rate and Rhythm: Normal rate.      Comments: Distant heart sounds, distal pulses intact  Pulmonary:      Effort: Respiratory distress present.      Breath sounds: No stridor.      Comments: Increased work of breathing, moderate air movement, no on BiPAP  Abdominal:      General: Bowel sounds are normal. There is distension.      Tenderness: There is no abdominal tenderness. There is no guarding.   Musculoskeletal:      Right lower leg: Edema present.      Left lower leg: Edema present.      Comments: 2+ pitting edema bilateral lower extremities to the knee   Skin:     General: Skin is warm and dry.   Neurological:      Comments: Unable to assess but she is answering questions appropriately             Significant Labs:   Recent Lab Results       06/22/20  0858   06/22/20  0836   06/22/20  0835   06/22/20  0830        Albumin 3.4           Alkaline Phosphatase 66           Allens Test   Pass         ALT 19           Anion Gap 7           AST 20           Baso # 0.02           Basophil% 0.2           BILIRUBIN TOTAL 0.6  Comment:  For infants and newborns, interpretation of results should be based  on gestational age, weight and in agreement with clinical  observations.  Premature Infant recommended reference ranges:  Up to 24 hours.............<8.0 mg/dL  Up to 48 hours............<12.0 mg/dL  3-5 days..................<15.0 mg/dL  6-29 days.................<15.0 mg/dL               Comment:  Values of less than 100 pg/ml are consistent with non-CHF populations.           Site   LR         BUN, Bld 19           Calcium 8.6           Chloride 100           CO2 31           Creatinine 0.7           DelSys   Room Air         Differential Method Automated           eGFR if  >60.0           eGFR if non African  American >60.0  Comment:  Calculation used to obtain the estimated glomerular filtration  rate (eGFR) is the CKD-EPI equation.              Eos # 0.0           Eosinophil% 0.3           FiO2   21         Glucose 135           Gran # (ANC) 8.3           Gran% 71.2           Hematocrit 43.0           Hemoglobin 13.1           Immature Grans (Abs) 0.08  Comment:  Mild elevation in immature granulocytes is non specific and   can be seen in a variety of conditions including stress response,   acute inflammation, trauma and pregnancy. Correlation with other   laboratory and clinical findings is essential.             Immature Granulocytes 0.7           Lymph # 2.0           Lymph% 17.3           MCH 25.9           MCHC 30.5           MCV 85           Mode   SPONT         Mono # 1.2           Mono% 10.3           MPV 10.6           nRBC 0           Platelets 313           POC BE   6         POC HCO3   31.7         POC PCO2   61.1         POC PH   7.323         POC PO2   53         POC SATURATED O2   83         POC TCO2   34         POCT Glucose       143     Potassium 4.2           PROTEIN TOTAL 7.4           RBC 5.06           RDW 16.2           Sample   ARTERIAL         SARS-CoV-2 RNA, Amplification, Qual     Negative  Comment:  This test utilizes isothermal nucleic acid amplification   technology to detect the SARS-CoV-2 RdRp nucleic acid segment.   The analytical sensitivity (limit of detection) is 125 genome   equivalents/mL.   A POSITIVE result implies infection with the SARS-CoV-2 virus;  the patient is presumed to be contagious.    A NEGATIVE result means that SARS-CoV-2 nucleic acids are not  present above the limit of detection. A NEGATIVE result should be   treated as presumptive. It does not rule out the possibility of   COVID-19 and should not be the sole basis for treatment decisions.   If COVID-19 is strongly suspected based on clinical and exposure   history, re-testing using an alternate molecular assay  should be   considered.   This test is only for use under the Food and Drug   Administration s Emergency Use Authorization (EUA).   Commercial kits are provided by [employer].   Performance characteristics of the EUA have been independently  verified by [facility] Department of  Pathology and Laboratory Medicine.   _________________________________________________________________  The ID NOW COVID-19 Letter of Authorization, along with the   authorized Fact Sheet for Healthcare Providers, the authorized Fact  Sheet for Patients, and authorized labeling are available on the FDA   website:  www.fda.gov/MedicalDevices/Safety/EmergencySituations/div235131.htm         Sodium 138           Troponin I 0.03           WBC 11.68               All pertinent labs within the past 24 hours have been reviewed.    Significant Imaging: I have reviewed all pertinent imaging results/findings within the past 24 hours.

## 2020-06-22 NOTE — H&P
[facility] - ED Hospital Medicine  History & Physical    Patient Name: Michelle Wren  MRN: [id number]  Admission Date: 2020  Attending Physician: Bruna Shafer MD   Primary Care Provider: Primary Doctor No         Patient information was obtained from patient and ER records.     Subjective:     Principal Problem:Acute on chronic respiratory failure with hypercapnia    Chief Complaint:   Chief Complaint   Patient presents with    cant catch breath        HPI: Patient is a 30-year-old female with past medical history of asthma,?Diabetes, severe obesity who presents to the ER with worsening shortness of breath.  Patient reports that she has been feeling short of breath over the last 2-3 days but today has been unable to catch her breath.  She uses a CPAP nightly at home and reports compliance.  Denies any new fever cough.  She also reports increased swelling in her legs.  Today her shortness of breath became more severe so she came to the ER.  History obtained from ER physician and ER records as patient is on continuous BiPAP.  A patient agrees with history.  In the ER initial ABG 7.323/61/53/32 on room air, WBC within normal limits, like true lytes within normal limits.  She was given 125 mg IV Solu-Medrol, 3 DuoNeb treatments and IV 40 mg Lasix.  Hospital team called for admission for acute respiratory failure with hypoxia and hypercapnia      Past Medical History:   Diagnosis Date    Asthma     Back pain     Diabetes mellitus     Morbid obesity     Obesity        Past Surgical History:   Procedure Laterality Date    APPENDECTOMY       SECTION         Review of patient's allergies indicates:  No Known Allergies    No current facility-administered medications on file prior to encounter.      Current Outpatient Medications on File Prior to Encounter   Medication Sig    albuterol (PROVENTIL/VENTOLIN HFA) 90 mcg/actuation inhaler Inhale 1-2 puffs into the lungs every 6  (six) hours as needed for Wheezing. Rescue    azithromycin (Z-JOSE ALBERTO) 250 MG tablet Take 1 tablet (250 mg total) by mouth once daily. Take first 2 tablets together, then 1 every day until finished.    metformin HCl (METFORMIN ORAL) Take by mouth.     Family History     None        Tobacco Use    Smoking status: Current Every Day Smoker   Substance and Sexual Activity    Alcohol use: Yes     Frequency: Never     Comment: occ    Drug use: No    Sexual activity: Yes     Birth control/protection: None     Review of Systems   Unable to perform ROS: Acuity of condition   Respiratory: Positive for shortness of breath.     Patient requiring continuous BiPAP  Objective:     Vital Signs (Most Recent):  Temp: 99.1 °F (37.3 °C) (06/22/20 0810)  Pulse: 86 (06/22/20 1046)  Resp: 15 (06/22/20 1046)  BP: (!) 135/93 (06/22/20 1046)  SpO2: 96 % (06/22/20 1046) Vital Signs (24h Range):  Temp:  [99.1 °F (37.3 °C)] 99.1 °F (37.3 °C)  Pulse:  [80-95] 86  Resp:  [14-30] 15  SpO2:  [92 %-100 %] 96 %  BP: (132-175)/() 135/93     Weight: (!) 153.3 kg (338 lb)  Body mass index is 61.82 kg/m².    Physical Exam  Vitals signs reviewed.   Constitutional:       Appearance: She is obese. She is ill-appearing. She is not toxic-appearing or diaphoretic.      Comments: On continuous BiPAP   HENT:      Head: Normocephalic and atraumatic.      Right Ear: External ear normal.      Left Ear: External ear normal.      Nose: Nose normal.      Mouth/Throat:      Mouth: Mucous membranes are dry.   Eyes:      Conjunctiva/sclera: Conjunctivae normal.   Neck:      Comments: Full  Cardiovascular:      Rate and Rhythm: Normal rate.      Comments: Distant heart sounds, distal pulses intact  Pulmonary:      Effort: Respiratory distress present.      Breath sounds: No stridor.      Comments: Increased work of breathing, moderate air movement, no on BiPAP  Abdominal:      General: Bowel sounds are normal. There is distension.      Tenderness: There is no  abdominal tenderness. There is no guarding.   Musculoskeletal:      Right lower leg: Edema present.      Left lower leg: Edema present.      Comments: 2+ pitting edema bilateral lower extremities to the knee   Skin:     General: Skin is warm and dry.   Neurological:      Comments: Unable to assess but she is answering questions appropriately             Significant Labs:   Recent Lab Results       06/22/20  0858   06/22/20  0836   06/22/20  0835   06/22/20  0830        Albumin 3.4           Alkaline Phosphatase 66           Allens Test   Pass         ALT 19           Anion Gap 7           AST 20           Baso # 0.02           Basophil% 0.2           BILIRUBIN TOTAL 0.6  Comment:  For infants and newborns, interpretation of results should be based  on gestational age, weight and in agreement with clinical  observations.  Premature Infant recommended reference ranges:  Up to 24 hours.............<8.0 mg/dL  Up to 48 hours............<12.0 mg/dL  3-5 days..................<15.0 mg/dL  6-29 days.................<15.0 mg/dL               Comment:  Values of less than 100 pg/ml are consistent with non-CHF populations.           Site   LR         BUN, Bld 19           Calcium 8.6           Chloride 100           CO2 31           Creatinine 0.7           DelSys   Room Air         Differential Method Automated           eGFR if  >60.0           eGFR if non  >60.0  Comment:  Calculation used to obtain the estimated glomerular filtration  rate (eGFR) is the CKD-EPI equation.              Eos # 0.0           Eosinophil% 0.3           FiO2   21         Glucose 135           Gran # (ANC) 8.3           Gran% 71.2           Hematocrit 43.0           Hemoglobin 13.1           Immature Grans (Abs) 0.08  Comment:  Mild elevation in immature granulocytes is non specific and   can be seen in a variety of conditions including stress response,   acute inflammation, trauma and pregnancy. Correlation  with other   laboratory and clinical findings is essential.             Immature Granulocytes 0.7           Lymph # 2.0           Lymph% 17.3           MCH 25.9           MCHC 30.5           MCV 85           Mode   SPONT         Mono # 1.2           Mono% 10.3           MPV 10.6           nRBC 0           Platelets 313           POC BE   6         POC HCO3   31.7         POC PCO2   61.1         POC PH   7.323         POC PO2   53         POC SATURATED O2   83         POC TCO2   34         POCT Glucose       143     Potassium 4.2           PROTEIN TOTAL 7.4           RBC 5.06           RDW 16.2           Sample   ARTERIAL         SARS-CoV-2 RNA, Amplification, Qual     Negative  Comment:  This test utilizes isothermal nucleic acid amplification   technology to detect the SARS-CoV-2 RdRp nucleic acid segment.   The analytical sensitivity (limit of detection) is 125 genome   equivalents/mL.   A POSITIVE result implies infection with the SARS-CoV-2 virus;  the patient is presumed to be contagious.    A NEGATIVE result means that SARS-CoV-2 nucleic acids are not  present above the limit of detection. A NEGATIVE result should be   treated as presumptive. It does not rule out the possibility of   COVID-19 and should not be the sole basis for treatment decisions.   If COVID-19 is strongly suspected based on clinical and exposure   history, re-testing using an alternate molecular assay should be   considered.   This test is only for use under the Food and Drug   Administration s Emergency Use Authorization (EUA).   Commercial kits are provided by [employer].   Performance characteristics of the EUA have been independently  verified by [facility] Department of  Pathology and Laboratory Medicine.   _________________________________________________________________  The ID NOW COVID-19 Letter of Authorization, along with the   authorized Fact Sheet for Healthcare Providers, the authorized Fact  Sheet for  Patients, and authorized labeling are available on the FDA   website:  www.fda.gov/MedicalDevices/Safety/EmergencySituations/kjj816093.htm         Sodium 138           Troponin I 0.03           WBC 11.68               All pertinent labs within the past 24 hours have been reviewed.    Significant Imaging: I have reviewed all pertinent imaging results/findings within the past 24 hours.    Assessment/Plan:     * Acute on chronic respiratory failure with hypercapnia  Admit to ICU with continuous oxygen monitoring  Likely secondary to obesity hypoventilation, no signs of infiltrate on chest x-ray.  She does have a history of asthma as well  continuous BiPAP for next 4 hr, repeat ABG then  Initial ABG with hypercapnia and hypoxia  DuoNebs q.4 hours scheduled  Solu-Medrol 125 mg IV x1 in the ER, will continue with Solu-Medrol IV 80 mg daily  Echo ordered        Mild intermittent asthma  Will continue DuoNebs q.4 hours scheduled  Continuous oxygen monitoring  She has possible features of asthma exacerbation though clinical picture is more consistent with obesity hypoventilation      Elevated fasting glucose  Follow-up A1c  Possible history of prediabetes?  Monitor glucose while inpatient      Cardiomegaly  Patient with peripheral edema, elevated BNP though obesity could be a contributing factor to this.  Echo ordered.  Will follow-up  IV Lasix 40 mg x1 in ER  Continue IV Lasix 40 mg daily      Obesity hypoventilation syndrome  On CPAP at home, reports compliance  Will continue BiPAP on admission to ICU  BiPAP at night  Repeat ABG after 4 hr on BiPAP      VTE Risk Mitigation (From admission, onward)         Ordered     enoxaparin injection 40 mg  Every 24 hours      06/22/20 1107     IP VTE HIGH RISK PATIENT  Once      06/22/20 1107     Place sequential compression device  Until discontinued      06/22/20 1107                   Bruna Shafer MD  Department of Hospital Medicine   [facility] - ED

## 2020-06-22 NOTE — ED PROVIDER NOTES
Encounter Date: 2020       History     Chief Complaint   Patient presents with    cant catch breath     30-year-old female with history of morbid obesity, asthma, chronic low back pain, borderline diabetes mellitus presents complaining of 2-3 days of gradual onset of shortness of breath.  Mostly with exertion.  Some with laying down flat.  Patient does have a history of sleep apnea and uses a CPAP nightly.  Denies chest pain or pain with breathing.  Denies fever.  Denies cough.  No other viral syndrome symptoms.  Patient reports she feels swollen to her feet legs abdomen and hands.  Chart review shows several visits for similar complaints dating back to 2018.  She currently only takes metformin.        Review of patient's allergies indicates:  No Known Allergies  Past Medical History:   Diagnosis Date    Asthma     Back pain     Diabetes mellitus     Morbid obesity     Obesity      Past Surgical History:   Procedure Laterality Date    APPENDECTOMY       SECTION       History reviewed. No pertinent family history.  Social History     Tobacco Use    Smoking status: Current Every Day Smoker   Substance Use Topics    Alcohol use: Yes     Frequency: Never     Comment: occ    Drug use: No     Review of Systems   Constitutional: Negative for fever.   HENT: Negative for sore throat.    Eyes: Negative for visual disturbance.   Respiratory: Positive for shortness of breath and wheezing. Negative for cough, choking, chest tightness and stridor.    Cardiovascular: Positive for leg swelling. Negative for chest pain and palpitations.   Gastrointestinal: Negative for abdominal pain, nausea and vomiting.   Genitourinary: Negative for difficulty urinating.   Musculoskeletal: Negative for back pain.   Skin: Negative for rash.   Neurological: Negative for light-headedness, numbness and headaches.       Physical Exam     Initial Vitals [20 0810]   BP Pulse Resp Temp SpO2   (!) 160/90 95 (!) 30  99.1 °F (37.3 °C) (!) 92 %      MAP       --         Physical Exam    Nursing note and vitals reviewed.  Constitutional: She appears well-developed and well-nourished.   Eyes: EOM are normal. Pupils are equal, round, and reactive to light.   Neck: Normal range of motion. Neck supple. No thyromegaly present. No JVD present.   Cardiovascular: Normal rate, regular rhythm, normal heart sounds and intact distal pulses. Exam reveals no gallop and no friction rub.    No murmur heard.  Pulmonary/Chest: Breath sounds normal.   Mild tachypnea and accessory muscle   Abdominal: Soft. Bowel sounds are normal. There is no abdominal tenderness.   Musculoskeletal: Normal range of motion. Edema present. No tenderness.      Comments: Brawny nonpitting edema to bilateral lower extremities.   Neurological: She is alert and oriented to person, place, and time. She has normal strength. GCS score is 15. GCS eye subscore is 4. GCS verbal subscore is 5. GCS motor subscore is 6.   Skin: Skin is warm and dry.         ED Course   Critical Care    Date/Time: 6/22/2020 10:59 AM  Performed by: Cristo Grier MD  Authorized by: Cristo Grier MD   Direct patient critical care time: 20 minutes  Additional history critical care time: 10 minutes  Ordering / reviewing critical care time: 10 minutes  Documentation critical care time: 10 minutes  Consulting other physicians critical care time: 5 minutes  Other critical care time: 8 (admission) minutes  Total critical care time (exclusive of procedural time) : 63 minutes  Critical care time was exclusive of separately billable procedures and treating other patients and teaching time.  Critical care was necessary to treat or prevent imminent or life-threatening deterioration of the following conditions: respiratory failure.  Critical care was time spent personally by me on the following activities: development of treatment plan with patient or surrogate, discussions with consultants,  interpretation of cardiac output measurements, evaluation of patient's response to treatment, examination of patient, obtaining history from patient or surrogate, ordering and performing treatments and interventions, ordering and review of laboratory studies, ordering and review of radiographic studies, pulse oximetry, re-evaluation of patient's condition, review of old charts and ventilator management.    Intubation    Date/Time: 6/22/2020 5:30 PM  Performed by: Cristo Grier MD  Authorized by: Bruna Shafer MD   Consent Done: Emergent Situation  Indications: respiratory failure  Intubation method: direct  Patient status: paralyzed (RSI)  Preoxygenation: bag valve mask  Sedatives: propofol  Paralytic: succinylcholine  Laryngoscope size: Mac 3  Tube size: 7.5 mm  Tube type: cuffed  Number of attempts: 1  Cricoid pressure: no  Cords visualized: no  Post-procedure assessment: chest rise and CO2 detector  Breath sounds: equal  Cuff inflated: yes  ETT to lip: 23 cm  Tube secured with: ETT lord  Chest x-ray interpreted by radiologist.  Chest x-ray findings: endotracheal tube in appropriate position  Patient tolerance: Patient tolerated the procedure well with no immediate complications  Comments: Called to the floor, patient failing bipap with CO2 rising. Patient verbally states consent for intubation. Attempted to visualize with Glide scope but too much redundant tissue to navigated. Quickly pulled and patient intubated on first attempt with paz 3 blade, however Patient desaturated extremely quickly with SaO2 reaching 45% within one minute after paralytic push.  Easily bagged back up to greater than 90% after intubation.         Labs Reviewed   CBC W/ AUTO DIFFERENTIAL - Abnormal; Notable for the following components:       Result Value    Mean Corpuscular Hemoglobin 25.9 (*)     Mean Corpuscular Hemoglobin Conc 30.5 (*)     RDW 16.2 (*)     Immature Granulocytes 0.7 (*)     Gran # (ANC) 8.3 (*)      Immature Grans (Abs) 0.08 (*)     Mono # 1.2 (*)     Lymph% 17.3 (*)     All other components within normal limits   COMPREHENSIVE METABOLIC PANEL - Abnormal; Notable for the following components:    CO2 31 (*)     Glucose 135 (*)     Calcium 8.6 (*)     Albumin 3.4 (*)     Anion Gap 7 (*)     All other components within normal limits   B-TYPE NATRIURETIC PEPTIDE - Abnormal; Notable for the following components:     (*)     All other components within normal limits   POCT GLUCOSE - Abnormal; Notable for the following components:    POCT Glucose 143 (*)     All other components within normal limits   ISTAT PROCEDURE - Abnormal; Notable for the following components:    POC PH 7.323 (*)     POC PCO2 61.1 (*)     POC PO2 53 (*)     POC HCO3 31.7 (*)     POC SATURATED O2 83 (*)     POC TCO2 34 (*)     All other components within normal limits   TROPONIN I   SARS-COV-2 RNA AMPLIFICATION, QUAL   HEMOGLOBIN A1C     EKG Readings: (Independently Interpreted)   Initial Reading: No STEMI. Rhythm: Normal Sinus Rhythm. Heart Rate: 86. Ectopy: No Ectopy. Conduction: Normal. ST Segments: Normal ST Segments. T Waves: Normal.     ECG Results          EKG 12-lead (Final result)  Result time 06/23/20 10:10:48    Final result by Interface, Lab In [facility] (06/23/20 10:10:48)                 Narrative:    Test Reason : R06.02,    Vent. Rate : 086 BPM     Atrial Rate : 086 BPM     P-R Int : 154 ms          QRS Dur : 076 ms      QT Int : 374 ms       P-R-T Axes : 045 017 043 degrees     QTc Int : 447 ms    Normal sinus rhythm  Normal ECG  No previous ECGs available  Confirmed by Haider Fang MD ([id number]) on 6/23/2020 10:10:37 AM    Referred By: [email or web address]   SELF           Confirmed By:Haider Fang MD                            Imaging Results          X-Ray Chest AP Portable (Final result)  Result time 06/22/20 09:01:43    Final result by Cyrus Phillips Jr., MD (06/22/20 09:01:43)                 Impression:      Mild cardiomegaly.   Hypoventilation.      Electronically signed by: Cyrus Phillips MD  Date:    06/22/2020  Time:    09:01             Narrative:    EXAMINATION:  XR CHEST AP PORTABLE    CLINICAL HISTORY:  SOB;    TECHNIQUE:  Single frontal view of the chest was performed.    COMPARISON:  Chest of October 28, 2019.    FINDINGS:  There is hypoventilation on this portable film. There is mild cardiomegaly. An intrapulmonary mass or infiltrate is not seen. No pneumothorax is pleural effusion is noted.                              X-Rays:   Independently Interpreted Readings:   Chest X-Ray: No infiltrates. Cardiomegaly present. No pulmonary edema     Patient has combination reactive airway disease, Pickwickian syndrome, and possible underlying undiagnosed diastolic heart failure.  Or presented hypoxic and hypercapnic.  When patient fell asleep her oxygen saturations dropped into 40s.  Will admit on BiPAP, nebulizer treatments and steroids, diuretics.  Will obtain a 2D cardiac echo.    e                             Clinical Impression:       ICD-10-CM ICD-9-CM   1. Acute on chronic respiratory failure with hypercapnia  J96.22 518.84   2. SOB (shortness of breath)  R06.02 786.05   3. Hypoxia  R09.02 799.02   4. Acute respiratory failure with hypercapnia  J96.02 518.81   5. Morbid obesity  E66.01 278.01   6. Pickwickian syndrome  E66.2 278.03             ED Disposition Condition    Admit                           Cristo Grier MD  06/22/20 1059       Cristo Grier MD  06/23/20 0550

## 2020-06-22 NOTE — ASSESSMENT & PLAN NOTE
Will continue DuoNebs q.4 hours scheduled  Continuous oxygen monitoring  She has possible features of asthma exacerbation though clinical picture is more consistent with obesity hypoventilation

## 2020-06-22 NOTE — NURSING
Patient oxygen level is improving at this time. Pt. 02 SATS. 96% AT THIS TIME. HEMODYNAMICALLY STABLE.

## 2020-06-22 NOTE — NURSING
Pt. Arrived from the ER to ICU, via stretcher. Pt. Was able to ambulate to the bathroom with supplemental oxygen, with mild resp. Distress. Pt. Was then placed on bipap, while resting comfortably in the bed. Vitals wnl, call bell; within reach. Dr. Shafer notified. Will continue to closely monitor.

## 2020-06-23 ENCOUNTER — ANESTHESIA EVENT (OUTPATIENT)
Dept: INTENSIVE CARE | Facility: HOSPITAL | Age: 30
End: 2020-06-23
Payer: MEDICAID

## 2020-06-23 ENCOUNTER — HOSPITAL ENCOUNTER (INPATIENT)
Facility: HOSPITAL | Age: 30
LOS: 21 days | Discharge: HOME | End: 2020-07-14
Attending: INTERNAL MEDICINE | Admitting: INTERNAL MEDICINE
Payer: MEDICAID

## 2020-06-23 ENCOUNTER — ANESTHESIA (OUTPATIENT)
Dept: INTENSIVE CARE | Facility: HOSPITAL | Age: 30
End: 2020-06-23

## 2020-06-23 ENCOUNTER — ANESTHESIA (OUTPATIENT)
Dept: INTENSIVE CARE | Facility: HOSPITAL | Age: 30
End: 2020-06-23
Payer: MEDICAID

## 2020-06-23 ENCOUNTER — ANESTHESIA EVENT (OUTPATIENT)
Dept: INTENSIVE CARE | Facility: HOSPITAL | Age: 30
End: 2020-06-23

## 2020-06-23 VITALS
SYSTOLIC BLOOD PRESSURE: 109 MMHG | OXYGEN SATURATION: 96 % | WEIGHT: 293 LBS | HEART RATE: 92 BPM | TEMPERATURE: 99 F | BODY MASS INDEX: 53.92 KG/M2 | RESPIRATION RATE: 19 BRPM | HEIGHT: 62 IN | DIASTOLIC BLOOD PRESSURE: 57 MMHG

## 2020-06-23 DIAGNOSIS — J15.20 STAPHYLOCOCCAL PNEUMONIA: ICD-10-CM

## 2020-06-23 DIAGNOSIS — J96.91 RESPIRATORY FAILURE WITH HYPOXIA: ICD-10-CM

## 2020-06-23 DIAGNOSIS — J96.01 ACUTE RESPIRATORY FAILURE WITH HYPOXIA AND HYPERCARBIA: Primary | ICD-10-CM

## 2020-06-23 DIAGNOSIS — I27.20 PULMONARY HYPERTENSION: ICD-10-CM

## 2020-06-23 DIAGNOSIS — J96.02 ACUTE RESPIRATORY FAILURE WITH HYPOXIA AND HYPERCARBIA: Primary | ICD-10-CM

## 2020-06-23 DIAGNOSIS — E66.01 MORBID OBESITY: ICD-10-CM

## 2020-06-23 DIAGNOSIS — R65.20 SEVERE SEPSIS: ICD-10-CM

## 2020-06-23 DIAGNOSIS — G47.33 OBSTRUCTIVE SLEEP APNEA SYNDROME: ICD-10-CM

## 2020-06-23 DIAGNOSIS — A41.9 SEVERE SEPSIS: ICD-10-CM

## 2020-06-23 DIAGNOSIS — E66.2 OBESITY HYPOVENTILATION SYNDROME: ICD-10-CM

## 2020-06-23 PROBLEM — F17.200 NICOTINE DEPENDENCE: Status: ACTIVE | Noted: 2020-06-23

## 2020-06-23 PROBLEM — J18.9 BILATERAL PNEUMONIA: Status: ACTIVE | Noted: 2020-06-23

## 2020-06-23 LAB
ALBUMIN SERPL BCP-MCNC: 3.4 G/DL (ref 3.5–5.2)
ALLENS TEST: ABNORMAL
ALP SERPL-CCNC: 56 U/L (ref 55–135)
ALT SERPL W/O P-5'-P-CCNC: 18 U/L (ref 10–44)
ANION GAP SERPL CALC-SCNC: 11 MMOL/L (ref 8–16)
AORTIC ROOT ANNULUS: 3.37 CM
AORTIC VALVE CUSP SEPERATION: 1.6 CM
AST SERPL-CCNC: 18 U/L (ref 10–40)
AV INDEX (PROSTH): 0.88
AV MEAN GRADIENT: 5 MMHG
AV PEAK GRADIENT: 9 MMHG
AV VALVE AREA: 3.45 CM2
AV VELOCITY RATIO: 0.87
BASOPHILS NFR BLD: 0 % (ref 0–1.9)
BILIRUB SERPL-MCNC: 0.6 MG/DL (ref 0.1–1)
BSA FOR ECHO PROCEDURE: 2.68 M2
BUN SERPL-MCNC: 21 MG/DL (ref 6–20)
CALCIUM SERPL-MCNC: 8.8 MG/DL (ref 8.7–10.5)
CHLORIDE SERPL-SCNC: 96 MMOL/L (ref 95–110)
CO2 SERPL-SCNC: 30 MMOL/L (ref 23–29)
CREAT SERPL-MCNC: 0.8 MG/DL (ref 0.5–1.4)
CV ECHO LV RWT: 0.44 CM
D DIMER PPP FEU-MCNC: 176 NG/ML (ref 100–400)
DELSYS: ABNORMAL
DIFFERENTIAL METHOD: ABNORMAL
DOP CALC AO PEAK VEL: 1.5 M/S
DOP CALC AO VTI: 32.43 CM
DOP CALC LVOT AREA: 3.9 CM2
DOP CALC LVOT DIAMETER: 2.24 CM
DOP CALC LVOT PEAK VEL: 1.31 M/S
DOP CALC LVOT STROKE VOLUME: 111.94 CM3
DOP CALCLVOT PEAK VEL VTI: 28.42 CM
E WAVE DECELERATION TIME: 207.3 MSEC
E/A RATIO: 1.26
E/E' RATIO: 12.32 M/S
ECHO LV POSTERIOR WALL: 1.05 CM (ref 0.6–1.1)
EOSINOPHIL NFR BLD: 0 % (ref 0–8)
ERYTHROCYTE [DISTWIDTH] IN BLOOD BY AUTOMATED COUNT: 16.4 % (ref 11.5–14.5)
ERYTHROCYTE [SEDIMENTATION RATE] IN BLOOD BY WESTERGREN METHOD: 20 MM/H
EST. GFR  (AFRICAN AMERICAN): >60 ML/MIN/1.73 M^2
EST. GFR  (NON AFRICAN AMERICAN): >60 ML/MIN/1.73 M^2
ESTIMATED AVG GLUCOSE: 131 MG/DL (ref 68–131)
FIO2: 100
FLOW: 70
FRACTIONAL SHORTENING: 39 % (ref 28–44)
GLUCOSE SERPL-MCNC: 201 MG/DL (ref 70–110)
HBA1C MFR BLD HPLC: 6.2 % (ref 4.5–6.2)
HCO3 UR-SCNC: 33.1 MMOL/L (ref 24–28)
HCO3 UR-SCNC: 33.4 MMOL/L (ref 24–28)
HCO3 UR-SCNC: 33.5 MMOL/L (ref 24–28)
HCO3 UR-SCNC: 34.4 MMOL/L (ref 24–28)
HCT VFR BLD AUTO: 43.6 % (ref 37–48.5)
HGB BLD-MCNC: 13.5 G/DL (ref 12–16)
IMM GRANULOCYTES # BLD AUTO: ABNORMAL K/UL (ref 0–0.04)
IMM GRANULOCYTES NFR BLD AUTO: ABNORMAL % (ref 0–0.5)
INTERVENTRICULAR SEPTUM: 0.97 CM (ref 0.6–1.1)
IVRT: 87.54 MSEC
LA MAJOR: 4.27 CM
LA MINOR: 3.08 CM
LACTATE SERPL-SCNC: 2 MMOL/L (ref 0.5–2.2)
LACTATE SERPL-SCNC: 2.4 MMOL/L (ref 0.5–2.2)
LEFT ATRIUM SIZE: 4.32 CM
LEFT INTERNAL DIMENSION IN SYSTOLE: 2.91 CM (ref 2.1–4)
LEFT VENTRICLE DIASTOLIC VOLUME INDEX: 42.49 ML/M2
LEFT VENTRICLE DIASTOLIC VOLUME: 104.61 ML
LEFT VENTRICLE MASS INDEX: 69 G/M2
LEFT VENTRICLE SYSTOLIC VOLUME INDEX: 13.2 ML/M2
LEFT VENTRICLE SYSTOLIC VOLUME: 32.39 ML
LEFT VENTRICULAR INTERNAL DIMENSION IN DIASTOLE: 4.74 CM (ref 3.5–6)
LEFT VENTRICULAR MASS: 169.01 G
LV LATERAL E/E' RATIO: 11.7 M/S
LV SEPTAL E/E' RATIO: 13 M/S
LYMPHOCYTES NFR BLD: 3 % (ref 18–48)
MCH RBC QN AUTO: 25.9 PG (ref 27–31)
MCHC RBC AUTO-ENTMCNC: 31 G/DL (ref 32–36)
MCV RBC AUTO: 84 FL (ref 82–98)
METAMYELOCYTES NFR BLD MANUAL: 1 %
MODE: ABNORMAL
MONOCYTES NFR BLD: 3 % (ref 4–15)
MV PEAK A VEL: 0.93 M/S
MV PEAK E VEL: 1.17 M/S
MV PEAK GRADIENT: 12 MMHG
MV STENOSIS PRESSURE HALF TIME: 60.12 MS
MV VALVE AREA P 1/2 METHOD: 3.66 CM2
NEUTROPHILS NFR BLD: 83 % (ref 38–73)
NEUTS BAND NFR BLD MANUAL: 10 %
NRBC BLD-RTO: 0 /100 WBC
PCO2 BLDA: 45.5 MMHG (ref 35–45)
PCO2 BLDA: 47.5 MMHG (ref 35–45)
PCO2 BLDA: 48.3 MMHG (ref 35–45)
PCO2 BLDA: 51.2 MMHG (ref 35–45)
PEEP: 12
PEEP: 15
PH SMN: 7.42 [PH] (ref 7.35–7.45)
PH SMN: 7.45 [PH] (ref 7.35–7.45)
PH SMN: 7.47 [PH] (ref 7.35–7.45)
PH SMN: 7.47 [PH] (ref 7.35–7.45)
PIP: 39
PIP: 39
PIP: 40
PIP: 43
PISA MRMAX VEL: 0.02 M/S
PISA TR MAX VEL: 1.36 M/S
PLATELET # BLD AUTO: 341 K/UL (ref 150–350)
PLATELET BLD QL SMEAR: ABNORMAL
PMV BLD AUTO: 10.8 FL (ref 9.2–12.9)
PO2 BLDA: 62 MMHG (ref 80–100)
PO2 BLDA: 63 MMHG (ref 80–100)
PO2 BLDA: 65 MMHG (ref 80–100)
PO2 BLDA: 70 MMHG (ref 80–100)
POC BE: 10 MMOL/L
POC BE: 11 MMOL/L
POC BE: 9 MMOL/L
POC BE: 9 MMOL/L
POC SATURATED O2: 92 % (ref 95–100)
POC SATURATED O2: 95 % (ref 95–100)
POC TCO2: 35 MMOL/L (ref 23–27)
POC TCO2: 36 MMOL/L (ref 23–27)
POCT GLUCOSE: 161 MG/DL (ref 70–110)
POTASSIUM SERPL-SCNC: 4 MMOL/L (ref 3.5–5.1)
PROCALCITONIN SERPL IA-MCNC: 0.02 NG/ML
PROT SERPL-MCNC: 7.4 G/DL (ref 6–8.4)
PS: 10
PS: 10
PV MEAN GRADIENT: 2 MMHG
PV PEAK VELOCITY: 0.93 CM/S
RA MAJOR: 4.38 CM
RA WIDTH: 2 CM
RBC # BLD AUTO: 5.22 M/UL (ref 4–5.4)
RIGHT VENTRICULAR END-DIASTOLIC DIMENSION: 3.15 CM
SAMPLE: ABNORMAL
SITE: ABNORMAL
SODIUM SERPL-SCNC: 137 MMOL/L (ref 136–145)
SP02: 97
SP02: 99
SP02: 99
TDI LATERAL: 0.1 M/S
TDI SEPTAL: 0.09 M/S
TDI: 0.1 M/S
TR MAX PG: 7 MMHG
TRICUSPID ANNULAR PLANE SYSTOLIC EXCURSION: 2.42 CM
VT: 550
WBC # BLD AUTO: 23.88 K/UL (ref 3.9–12.7)

## 2020-06-23 PROCEDURE — 25000242 PHARM REV CODE 250 ALT 637 W/ HCPCS: Performed by: EMERGENCY MEDICINE

## 2020-06-23 PROCEDURE — 94003 VENT MGMT INPAT SUBQ DAY: CPT

## 2020-06-23 PROCEDURE — 87186 SC STD MICRODIL/AGAR DIL: CPT

## 2020-06-23 PROCEDURE — 25000003 PHARM REV CODE 250: Performed by: NURSE PRACTITIONER

## 2020-06-23 PROCEDURE — 25000003 PHARM REV CODE 250: Performed by: FAMILY MEDICINE

## 2020-06-23 PROCEDURE — 87449 NOS EACH ORGANISM AG IA: CPT

## 2020-06-23 PROCEDURE — 36556 INSERT NON-TUNNEL CV CATH: CPT | Mod: 59,,, | Performed by: ANESTHESIOLOGY

## 2020-06-23 PROCEDURE — 63600175 PHARM REV CODE 636 W HCPCS: Performed by: FAMILY MEDICINE

## 2020-06-23 PROCEDURE — 36600 WITHDRAWAL OF ARTERIAL BLOOD: CPT

## 2020-06-23 PROCEDURE — 83605 ASSAY OF LACTIC ACID: CPT | Mod: 91

## 2020-06-23 PROCEDURE — 63600175 PHARM REV CODE 636 W HCPCS: Performed by: INTERNAL MEDICINE

## 2020-06-23 PROCEDURE — 87040 BLOOD CULTURE FOR BACTERIA: CPT

## 2020-06-23 PROCEDURE — 82803 BLOOD GASES ANY COMBINATION: CPT

## 2020-06-23 PROCEDURE — 99900035 HC TECH TIME PER 15 MIN (STAT)

## 2020-06-23 PROCEDURE — 36415 COLL VENOUS BLD VENIPUNCTURE: CPT

## 2020-06-23 PROCEDURE — 84145 PROCALCITONIN (PCT): CPT | Mod: 91

## 2020-06-23 PROCEDURE — 87070 CULTURE OTHR SPECIMN AEROBIC: CPT

## 2020-06-23 PROCEDURE — 83036 HEMOGLOBIN GLYCOSYLATED A1C: CPT

## 2020-06-23 PROCEDURE — 99900026 HC AIRWAY MAINTENANCE (STAT)

## 2020-06-23 PROCEDURE — 36556 INSERT NON-TUNNEL CV CATH: CPT

## 2020-06-23 PROCEDURE — 20000000 HC ICU ROOM

## 2020-06-23 PROCEDURE — 25000242 PHARM REV CODE 250 ALT 637 W/ HCPCS: Performed by: FAMILY MEDICINE

## 2020-06-23 PROCEDURE — 84145 PROCALCITONIN (PCT): CPT

## 2020-06-23 PROCEDURE — 63600175 PHARM REV CODE 636 W HCPCS: Performed by: NURSE PRACTITIONER

## 2020-06-23 PROCEDURE — 27200966 HC CLOSED SUCTION SYSTEM

## 2020-06-23 PROCEDURE — 87077 CULTURE AEROBIC IDENTIFY: CPT

## 2020-06-23 PROCEDURE — 25000003 PHARM REV CODE 250: Performed by: INTERNAL MEDICINE

## 2020-06-23 PROCEDURE — 63600175 PHARM REV CODE 636 W HCPCS

## 2020-06-23 PROCEDURE — 80053 COMPREHEN METABOLIC PANEL: CPT

## 2020-06-23 PROCEDURE — 94761 N-INVAS EAR/PLS OXIMETRY MLT: CPT

## 2020-06-23 PROCEDURE — 99239 PR HOSPITAL DISCHARGE DAY,>30 MIN: ICD-10-PCS | Mod: ,,, | Performed by: FAMILY MEDICINE

## 2020-06-23 PROCEDURE — 94002 VENT MGMT INPAT INIT DAY: CPT

## 2020-06-23 PROCEDURE — 85027 COMPLETE CBC AUTOMATED: CPT

## 2020-06-23 PROCEDURE — 99239 HOSP IP/OBS DSCHRG MGMT >30: CPT | Mod: ,,, | Performed by: FAMILY MEDICINE

## 2020-06-23 PROCEDURE — 94640 AIRWAY INHALATION TREATMENT: CPT

## 2020-06-23 PROCEDURE — 85379 FIBRIN DEGRADATION QUANT: CPT

## 2020-06-23 PROCEDURE — 36556 CENTRAL LINE: ICD-10-PCS | Mod: 59,,, | Performed by: ANESTHESIOLOGY

## 2020-06-23 PROCEDURE — 25000242 PHARM REV CODE 250 ALT 637 W/ HCPCS

## 2020-06-23 PROCEDURE — 27000221 HC OXYGEN, UP TO 24 HOURS

## 2020-06-23 PROCEDURE — 85007 BL SMEAR W/DIFF WBC COUNT: CPT

## 2020-06-23 PROCEDURE — 83605 ASSAY OF LACTIC ACID: CPT

## 2020-06-23 PROCEDURE — 87205 SMEAR GRAM STAIN: CPT

## 2020-06-23 RX ORDER — LEVOFLOXACIN 5 MG/ML
750 INJECTION, SOLUTION INTRAVENOUS
Status: DISCONTINUED | OUTPATIENT
Start: 2020-06-24 | End: 2020-06-26

## 2020-06-23 RX ORDER — ENOXAPARIN SODIUM 100 MG/ML
40 INJECTION SUBCUTANEOUS EVERY 12 HOURS
Status: DISCONTINUED | OUTPATIENT
Start: 2020-06-24 | End: 2020-06-26

## 2020-06-23 RX ORDER — INSULIN ASPART 100 [IU]/ML
1-10 INJECTION, SOLUTION INTRAVENOUS; SUBCUTANEOUS EVERY 6 HOURS PRN
Status: DISCONTINUED | OUTPATIENT
Start: 2020-06-23 | End: 2020-07-14 | Stop reason: HOSPADM

## 2020-06-23 RX ORDER — PROPOFOL 10 MG/ML
INJECTION, EMULSION INTRAVENOUS
Status: COMPLETED
Start: 2020-06-23 | End: 2020-06-23

## 2020-06-23 RX ORDER — IPRATROPIUM BROMIDE AND ALBUTEROL SULFATE 2.5; .5 MG/3ML; MG/3ML
SOLUTION RESPIRATORY (INHALATION)
Status: COMPLETED
Start: 2020-06-23 | End: 2020-06-23

## 2020-06-23 RX ORDER — GLUCAGON 1 MG
1 KIT INJECTION
Status: DISCONTINUED | OUTPATIENT
Start: 2020-06-23 | End: 2020-07-14 | Stop reason: HOSPADM

## 2020-06-23 RX ORDER — IPRATROPIUM BROMIDE AND ALBUTEROL SULFATE 2.5; .5 MG/3ML; MG/3ML
3 SOLUTION RESPIRATORY (INHALATION)
Status: DISCONTINUED | OUTPATIENT
Start: 2020-06-23 | End: 2020-06-23 | Stop reason: HOSPADM

## 2020-06-23 RX ORDER — PANTOPRAZOLE SODIUM 40 MG/10ML
40 INJECTION, POWDER, LYOPHILIZED, FOR SOLUTION INTRAVENOUS DAILY
Status: DISCONTINUED | OUTPATIENT
Start: 2020-06-24 | End: 2020-06-24

## 2020-06-23 RX ORDER — PROPOFOL 10 MG/ML
5 INJECTION, EMULSION INTRAVENOUS CONTINUOUS
Status: DISCONTINUED | OUTPATIENT
Start: 2020-06-23 | End: 2020-06-26

## 2020-06-23 RX ORDER — SODIUM CHLORIDE 9 MG/ML
INJECTION, SOLUTION INTRAVENOUS CONTINUOUS
Status: DISCONTINUED | OUTPATIENT
Start: 2020-06-23 | End: 2020-06-23 | Stop reason: HOSPADM

## 2020-06-23 RX ORDER — IPRATROPIUM BROMIDE AND ALBUTEROL SULFATE 2.5; .5 MG/3ML; MG/3ML
3 SOLUTION RESPIRATORY (INHALATION) EVERY 4 HOURS
Status: DISCONTINUED | OUTPATIENT
Start: 2020-06-24 | End: 2020-07-11

## 2020-06-23 RX ORDER — BUDESONIDE 0.25 MG/2ML
0.25 INHALANT ORAL DAILY
Status: DISCONTINUED | OUTPATIENT
Start: 2020-06-24 | End: 2020-07-04

## 2020-06-23 RX ORDER — FUROSEMIDE 10 MG/ML
40 INJECTION INTRAMUSCULAR; INTRAVENOUS DAILY
Status: DISCONTINUED | OUTPATIENT
Start: 2020-06-24 | End: 2020-06-24

## 2020-06-23 RX ORDER — SODIUM CHLORIDE 9 MG/ML
INJECTION, SOLUTION INTRAVENOUS CONTINUOUS
Status: DISCONTINUED | OUTPATIENT
Start: 2020-06-23 | End: 2020-06-27

## 2020-06-23 RX ORDER — LEVOFLOXACIN 5 MG/ML
750 INJECTION, SOLUTION INTRAVENOUS
Status: DISCONTINUED | OUTPATIENT
Start: 2020-06-23 | End: 2020-06-23 | Stop reason: HOSPADM

## 2020-06-23 RX ORDER — ENOXAPARIN SODIUM 100 MG/ML
1 INJECTION SUBCUTANEOUS
Status: DISCONTINUED | OUTPATIENT
Start: 2020-06-23 | End: 2020-06-23

## 2020-06-23 RX ORDER — SODIUM CHLORIDE 0.9 % (FLUSH) 0.9 %
10 SYRINGE (ML) INJECTION
Status: DISCONTINUED | OUTPATIENT
Start: 2020-06-23 | End: 2020-07-14 | Stop reason: HOSPADM

## 2020-06-23 RX ORDER — PROPOFOL 10 MG/ML
INJECTION, EMULSION INTRAVENOUS
Status: DISCONTINUED
Start: 2020-06-23 | End: 2020-06-23 | Stop reason: HOSPADM

## 2020-06-23 RX ORDER — METHYLPREDNISOLONE SOD SUCC 125 MG
125 VIAL (EA) INJECTION EVERY 8 HOURS
Status: DISCONTINUED | OUTPATIENT
Start: 2020-06-23 | End: 2020-06-24

## 2020-06-23 RX ADMIN — SODIUM CHLORIDE: 0.9 INJECTION, SOLUTION INTRAVENOUS at 08:06

## 2020-06-23 RX ADMIN — PROPOFOL 5 MCG/KG/MIN: 10 INJECTION, EMULSION INTRAVENOUS at 05:06

## 2020-06-23 RX ADMIN — IPRATROPIUM BROMIDE AND ALBUTEROL SULFATE 3 ML: .5; 3 SOLUTION RESPIRATORY (INHALATION) at 05:06

## 2020-06-23 RX ADMIN — PROPOFOL 50 MCG/KG/MIN: 10 INJECTION, EMULSION INTRAVENOUS at 10:06

## 2020-06-23 RX ADMIN — Medication 80 MCG/HR: at 09:06

## 2020-06-23 RX ADMIN — VANCOMYCIN HYDROCHLORIDE 2000 MG: 1 INJECTION, POWDER, LYOPHILIZED, FOR SOLUTION INTRAVENOUS at 11:06

## 2020-06-23 RX ADMIN — LEVOFLOXACIN 750 MG: 750 INJECTION, SOLUTION INTRAVENOUS at 01:06

## 2020-06-23 RX ADMIN — PROPOFOL 50 MCG/KG/MIN: 10 INJECTION, EMULSION INTRAVENOUS at 03:06

## 2020-06-23 RX ADMIN — PROPOFOL 40 MCG/KG/MIN: 10 INJECTION, EMULSION INTRAVENOUS at 07:06

## 2020-06-23 RX ADMIN — SODIUM CHLORIDE: 0.9 INJECTION, SOLUTION INTRAVENOUS at 04:06

## 2020-06-23 RX ADMIN — Medication 80 MCG/HR: at 08:06

## 2020-06-23 RX ADMIN — ENOXAPARIN SODIUM 160 MG: 80 INJECTION SUBCUTANEOUS at 11:06

## 2020-06-23 RX ADMIN — IPRATROPIUM BROMIDE AND ALBUTEROL SULFATE 3 ML: .5; 3 SOLUTION RESPIRATORY (INHALATION) at 03:06

## 2020-06-23 RX ADMIN — IPRATROPIUM BROMIDE AND ALBUTEROL SULFATE 3 ML: .5; 3 SOLUTION RESPIRATORY (INHALATION) at 07:06

## 2020-06-23 RX ADMIN — FENTANYL CITRATE 80 MCG/HR: 50 INJECTION INTRAVENOUS at 09:06

## 2020-06-23 RX ADMIN — PROPOFOL 50 MCG/KG/MIN: 10 INJECTION, EMULSION INTRAVENOUS at 01:06

## 2020-06-23 RX ADMIN — PROPOFOL 5 MCG/KG/MIN: 10 INJECTION, EMULSION INTRAVENOUS at 02:06

## 2020-06-23 RX ADMIN — BUDESONIDE 0.25 MG: 0.25 INHALANT RESPIRATORY (INHALATION) at 07:06

## 2020-06-23 RX ADMIN — FUROSEMIDE 40 MG: 10 INJECTION, SOLUTION INTRAMUSCULAR; INTRAVENOUS at 08:06

## 2020-06-23 RX ADMIN — PROPOFOL 30 MCG/KG/MIN: 10 INJECTION, EMULSION INTRAVENOUS at 02:06

## 2020-06-23 RX ADMIN — VANCOMYCIN HYDROCHLORIDE 1500 MG: 1 INJECTION, POWDER, LYOPHILIZED, FOR SOLUTION INTRAVENOUS at 01:06

## 2020-06-23 RX ADMIN — PROPOFOL 30 MCG/KG/MIN: 10 INJECTION, EMULSION INTRAVENOUS at 01:06

## 2020-06-23 RX ADMIN — PIPERACILLIN AND TAZOBACTAM 4.5 G: 4; .5 INJECTION, POWDER, LYOPHILIZED, FOR SOLUTION INTRAVENOUS; PARENTERAL at 01:06

## 2020-06-23 RX ADMIN — METHYLPREDNISOLONE SODIUM SUCCINATE 125 MG: 125 INJECTION, POWDER, FOR SOLUTION INTRAMUSCULAR; INTRAVENOUS at 08:06

## 2020-06-23 RX ADMIN — PROPOFOL 5 MCG/KG/MIN: 10 INJECTION, EMULSION INTRAVENOUS at 04:06

## 2020-06-23 RX ADMIN — PROPOFOL 50 MCG/KG/MIN: 10 INJECTION, EMULSION INTRAVENOUS at 11:06

## 2020-06-23 RX ADMIN — PROPOFOL 50 MCG/KG/MIN: 10 INJECTION, EMULSION INTRAVENOUS at 06:06

## 2020-06-23 RX ADMIN — PROPOFOL 50 MCG/KG/MIN: 10 INJECTION, EMULSION INTRAVENOUS at 08:06

## 2020-06-23 RX ADMIN — PIPERACILLIN AND TAZOBACTAM 4.5 G: 4; .5 INJECTION, POWDER, FOR SOLUTION INTRAVENOUS at 09:06

## 2020-06-23 RX ADMIN — PROPOFOL 50 MCG/KG/MIN: 10 INJECTION, EMULSION INTRAVENOUS at 05:06

## 2020-06-23 RX ADMIN — METHYLPREDNISOLONE SODIUM SUCCINATE 125 MG: 125 INJECTION, POWDER, FOR SOLUTION INTRAMUSCULAR; INTRAVENOUS at 09:06

## 2020-06-23 RX ADMIN — METHYLPREDNISOLONE SODIUM SUCCINATE 125 MG: 125 INJECTION, POWDER, FOR SOLUTION INTRAMUSCULAR; INTRAVENOUS at 01:06

## 2020-06-23 NOTE — PLAN OF CARE
Outside Transfer Acceptance Note        Patients name/MRN:   Michelle Wren, MRN: [id number]      Referring Physician or Mid-Level provider giving report:  Bruna Shafer MD       Referral Facility: [facility]     Date/Time of Acceptance:     6/23/20 at 12pm     Accepting Physician for admission to hospital: KEL Medina MD ()     Accepting facility:     [facility] ICU     Consulting Physicians from [facility] involved in case:    Keegan Gentile MD, Pulmonary at [facility]  Dr. Barrios, Hospitalist at [facility]    Reason for transfer request:      COVID NEGATIVE 6/22/20 AT [place]    Ms. Wren is a 29yo lady with a past medical history of asthma, DM2, severe obesity, who came to the ED on with complaints of 2-3 days of SOB.  She routinely uses CPAP at night at home (got this from a friend, so unsure of settings).  When she arrived in the ED she had an ABG 7.323/61/53/32 on room air.  At that time she was treated with 125 mg IV Solu-Medrol, 3 DuoNeb treatments and IV 40 mg Lasix.  She was immediately placed on BiPAP and admitted to the MICU.  CXR at admit showed there is hypoventilation on this portable film. There is mild cardiomegaly. An intrapulmonary mass or infiltrate is not seen. No pneumothorax is pleural effusion is noted.  Unfortunately, she developed saturations in the 80s and had to be intubated on 6/22 around 7pm.      Today she appears worse.  She was saturating in the mid-90s.  Today her D-dimer is negative.  Her ABG today shows 7.46/47.5/62/34/92% on FIO2 100%, SIMV 20, , PiP 39, PEEP 12.  Dr. Gentile recommended increasing the PEEP some on consult.     She no arterial line in place.  She has a right IJ central line in place.    To Do List upon arrival:     Consult Pulmonary (Dr. Gentile aware of her)  NS CT felt they would likely be able to accommodate her weight, if CT was deemed necessary    Vital signs:   /69   Pulse 98   Temp 98.8 °F (37.1 °C) (Axillary)   Resp  "20   Ht 5' 2" (1.575 m)   Wt (!) 164.6 kg (362 lb 12.8 oz)   SpO2 95%   Breastfeeding No   BMI 66.36 kg/m²    Last 24 hours:  Temp:  [98.2 °F (36.8 °C)-98.8 °F (37.1 °C)] 98.8 °F (37.1 °C)  Pulse:  [] 98  Resp:  [12-27] 20  SpO2:  [86 %-99 %] 95 %  BP: ()/(40-94) 122/69    Past Medical History:   Diagnosis Date    Asthma     Back pain     Diabetes mellitus     Morbid obesity     Obesity      Review of patient's allergies indicates:  No Known Allergies    Current Facility-Administered Medications:     albuterol-ipratropium 2.5 mg-0.5 mg/3 mL nebulizer solution 3 mL, 3 mL, Nebulization, Q4H     albuterol-ipratropium 2.5 mg-0.5 mg/3 mL nebulizer solution 3 mL, 3 mL, Nebulization, Q2H PRN     budesonide nebulizer solution 0.25 mg, 0.25 mg, Nebulization, Daily     fentaNYL 2500 mcg in 0.9% sodium chloride 250 mL infusion premix (titrating),Intravenous     furosemide injection 40 mg, 40 mg, Intravenous, Daily, Bruna Shafer MD     methylPREDNISolone sodium succinate injection 125 mg, 125 mg, Intravenous, Q8H     midazolam (VERSED) 5 mg/mL injection 5 mg, 5 mg, Intravenous, Once     morphine injection 2 mg, 2 mg, Intravenous, Q4H PRN     propofol (DIPRIVAN) 10 mg/mL infusion, 5 mcg/kg/min, Intravenous, Continuous     sodium chloride 0.9% flush 10 mL, 10 mL, Intravenous, PRN     LABS:  Recent Results (from the past 24 hour(s))   Echo Color Flow Doppler? Yes    Collection Time: 06/22/20  3:11 PM   Result Value Ref Range    AV mean gradient 5 mmHg    Ao peak ravi 1.50 m/s    Ao VTI 32.43 cm    IVRT 87.54 msec    IVS 0.97 0.6 - 1.1 cm    LA size 4.32 cm    Left Atrium Major Axis 4.27 cm    Left Atrium Minor Axis 3.08 cm    LVIDD 4.74 3.5 - 6.0 cm    LVIDS 2.91 2.1 - 4.0 cm    LVOT diameter 2.24 cm    LVOT peak VTI 28.42 cm    PW 1.05 0.6 - 1.1 cm    MV Peak A Ravi 0.93 m/s    E wave decelartion time 207.30 msec    MV Peak E Ravi 1.17 m/s    RA Major [place] 4.38 cm    RVDD 3.15 cm    TAPSE 2.42 " cm    TR Max Juan Jose 1.36 m/s    Ao root annulus 3.37 cm    AORTIC VALVE CUSP SEPERATION 1.60 cm    PV PEAK VELOCITY 0.93 cm/s    MV stenosis pressure 1/2 time 60.12 ms    LV Diastolic Volume 104.61 mL    LV Systolic Volume 32.39 mL    LVOT peak juan jose 1.31 m/s    Mr max juan jose 0.02 m/s    FS 39 %    LV mass 169.01 g    Left Ventricle Relative Wall Thickness 0.44 cm    AV valve area 3.45 cm2    AV Velocity Ratio 0.87     AV index (prosthetic) 0.88     MV valve area p 1/2 method 3.66 cm2    E/A ratio 1.26     LVOT area 3.9 cm2    LVOT stroke volume 111.94 cm3    AV peak gradient 9 mmHg    LV Systolic Volume Index 13.2 mL/m2    LV Diastolic Volume Index 42.49 mL/m2    LV Mass Index 69 g/m2    Triscuspid Valve Regurgitation Peak Gradient 7 mmHg    BSA 2.68 m2    TDI SEPTAL 0.09 m/s    LV LATERAL E/E' RATIO 11.70 m/s    LV SEPTAL E/E' RATIO 13.00 m/s    TDI LATERAL 0.10 m/s    Mean e' 0.10 m/s    MV peak gradient 12 mmHg    PV mean gradient 2 mmHg    E/E' ratio 12.32 m/s    RA Width 2.00 cm   ISTAT PROCEDURE    Collection Time: 06/22/20  3:36 PM   Result Value Ref Range    POC PH 7.244 (LL) 7.35 - 7.45    POC PCO2 83.6 (HH) 35 - 45 mmHg    POC PO2 111 (H) 80 - 100 mmHg    POC HCO3 36.2 (H) 24 - 28 mmol/L    POC BE 9 -2 to 2 mmol/L    POC SATURATED O2 97 95 - 100 %    POC TCO2 39 (H) 23 - 27 mmol/L    Sample ARTERIAL     Site LR     Allens Test Pass     DelSys CPAP/BiPAP     Mode BiPAP     FiO2 50     IP 15     EP 6    ISTAT PROCEDURE    Collection Time: 06/22/20  8:03 PM   Result Value Ref Range    POC PH 7.305 (L) 7.35 - 7.45    POC PCO2 56.7 (HH) 35 - 45 mmHg    POC PO2 84 80 - 100 mmHg    POC HCO3 28.2 (H) 24 - 28 mmol/L    POC BE 2 -2 to 2 mmol/L    POC SATURATED O2 95 95 - 100 %    POC TCO2 30 (H) 23 - 27 mmol/L    Rate 20     Sample ARTERIAL     Site RR     Allens Test Pass     DelSys Adult Vent     Mode SIMV     Vt 550     PEEP 12     PS 10     Flow 70     PiP 43     FiO2 100     Sp02 97    ISTAT PROCEDURE    Collection  Time: 06/23/20  4:46 AM   Result Value Ref Range    POC PH 7.474 (H) 7.35 - 7.45    POC PCO2 45.5 (H) 35 - 45 mmHg    POC PO2 70 (L) 80 - 100 mmHg    POC HCO3 33.4 (H) 24 - 28 mmol/L    POC BE 10 -2 to 2 mmol/L    POC SATURATED O2 95 95 - 100 %    POC TCO2 35 (H) 23 - 27 mmol/L    Rate 20     Sample ARTERIAL     Site RR     Allens Test Pass     DelSys Adult Vent     Mode SIMV     Vt 550     PEEP 12     PS 10     Flow 70     PiP 43     FiO2 100    CBC auto differential    Collection Time: 06/23/20  7:15 AM   Result Value Ref Range    WBC 23.88 (H) 3.90 - 12.70 K/uL    RBC 5.22 4.00 - 5.40 M/uL    Hemoglobin 13.5 12.0 - 16.0 g/dL    Hematocrit 43.6 37.0 - 48.5 %    Mean Corpuscular Volume 84 82 - 98 fL    Mean Corpuscular Hemoglobin 25.9 (L) 27.0 - 31.0 pg    Mean Corpuscular Hemoglobin Conc 31.0 (L) 32.0 - 36.0 g/dL    RDW 16.4 (H) 11.5 - 14.5 %    Platelets 341 150 - 350 K/uL    MPV 10.8 9.2 - 12.9 fL    Immature Granulocytes Test Not Performed 0.0 - 0.5 %    Immature Grans (Abs) Test Not Performed 0.00 - 0.04 K/uL    nRBC 0 0 /100 WBC    Gran% 83.0 (H) 38.0 - 73.0 %    Lymph% 3.0 (L) 18.0 - 48.0 %    Mono% 3.0 (L) 4.0 - 15.0 %    Eosinophil% 0.0 0.0 - 8.0 %    Basophil% 0.0 0.0 - 1.9 %    Bands 10.0 %    Metamyelocytes 1.0 %    Platelet Estimate Appears normal     Differential Method Manual    Comprehensive metabolic panel    Collection Time: 06/23/20  7:15 AM   Result Value Ref Range    Sodium 137 136 - 145 mmol/L    Potassium 4.0 3.5 - 5.1 mmol/L    Chloride 96 95 - 110 mmol/L    CO2 30 (H) 23 - 29 mmol/L    Glucose 201 (H) 70 - 110 mg/dL    BUN, Bld 21 (H) 6 - 20 mg/dL    Creatinine 0.8 0.5 - 1.4 mg/dL    Calcium 8.8 8.7 - 10.5 mg/dL    Total Protein 7.4 6.0 - 8.4 g/dL    Albumin 3.4 (L) 3.5 - 5.2 g/dL    Total Bilirubin 0.6 0.1 - 1.0 mg/dL    Alkaline Phosphatase 56 55 - 135 U/L    AST 18 10 - 40 U/L    ALT 18 10 - 44 U/L    Anion Gap 11 8 - 16 mmol/L    eGFR if African American >60.0 >60 mL/min/1.73 m^2     eGFR if non African American >60.0 >60 mL/min/1.73 m^2   Hemoglobin A1C    Collection Time: 06/23/20  7:15 AM   Result Value Ref Range    Hemoglobin A1C 6.2 4.5 - 6.2 %    Estimated Avg Glucose 131 68 - 131 mg/dL   D dimer, quantitative    Collection Time: 06/23/20  7:15 AM   Result Value Ref Range    D-Dimer 176 100 - 400 ng/mL   ISTAT PROCEDURE    Collection Time: 06/23/20  8:47 AM   Result Value Ref Range    POC PH 7.418 7.35 - 7.45    POC PCO2 51.2 (H) 35 - 45 mmHg    POC PO2 65 (L) 80 - 100 mmHg    POC HCO3 33.1 (H) 24 - 28 mmol/L    POC BE 9 -2 to 2 mmol/L    POC SATURATED O2 92 (L) 95 - 100 %    POC TCO2 35 (H) 23 - 27 mmol/L    Rate 20     Sample ARTERIAL     Site RB     Allens Test Pass     DelSys Adult Vent     Mode SIMV     Vt 550     PEEP 12     Flow 70     PiP 39     FiO2 100     Sp02 99    ISTAT PROCEDURE    Collection Time: 06/23/20  9:44 AM   Result Value Ref Range    POC PH 7.467 (H) 7.35 - 7.45    POC PCO2 47.5 (H) 35 - 45 mmHg    POC PO2 62 (L) 80 - 100 mmHg    POC HCO3 34.4 (H) 24 - 28 mmol/L    POC BE 11 -2 to 2 mmol/L    POC SATURATED O2 92 (L) 95 - 100 %    POC TCO2 36 (H) 23 - 27 mmol/L    Rate 20     Sample ARTERIAL     Site RB     Allens Test Pass     DelSys Adult Vent     Mode SIMV     Vt 550     PEEP 12     Flow 70     PiP 39     FiO2 100     Sp02 99    Lactic acid, plasma    Collection Time: 06/23/20 12:46 PM   Result Value Ref Range    Lactate (Lactic Acid) 2.4 (H) 0.5 - 2.2 mmol/L      KEL Medina MD  Department of Hospital Medicine  [facility]/   [phone]

## 2020-06-23 NOTE — PLAN OF CARE
Pt tolerating vent and iv drips.  Labs sent but were hemolyzed.   Will try to get central line this am st on tele, still on 100% o2 via ventilator.

## 2020-06-23 NOTE — NURSING
Spoke with Dr. Shafer re: PEPE's  Pt to remain on settings as presently on.  Will continue to monitolr

## 2020-06-23 NOTE — DISCHARGE SUMMARY
[facility] - ICU  Critical Care - Medicine  Discharge Summary      Patient Name: Michelle Wren  MRN: [id number]  Admission Date: 6/22/2020  Hospital Length of Stay: 1 days  Discharge Date and Time:  06/23/2020 3:38 PM  Attending Physician: Bruna Shafer MD   Discharging Provider: Bruna Shafer MD  Primary Care Provider: Primary Doctor No  Reason for Admission: Acute respiratory failure    HPI:   Patient is a 30-year-old female with past medical history of asthma,?Diabetes, severe obesity who presents to the ER with worsening shortness of breath.  Patient reports that she has been feeling short of breath over the last 2-3 days but today has been unable to catch her breath.  She uses a CPAP nightly at home and reports compliance.  Denies any new fever cough.  She also reports increased swelling in her legs.  Today her shortness of breath became more severe so she came to the ER.  History obtained from ER physician and ER records as patient is on continuous BiPAP.  A patient agrees with history.  In the ER initial ABG 7.323/61/53/32 on room air, WBC within normal limits, like true lytes within normal limits.  She was given 125 mg IV Solu-Medrol, 3 DuoNeb treatments and IV 40 mg Lasix.  Hospital team called for admission for acute respiratory failure with hypoxia and hypercapnia        Indwelling Lines/Drains at Time of Discharge:   Lines/Drains/Airways     Central Venous Catheter Line            Percutaneous Central Line Insertion/Assessment - Triple Lumen  06/23/20 1138 less than 1 day          Drain                 Urethral Catheter 06/22/20 1830 Latex 16 Fr. less than 1 day          Airway                 Airway - Non-Surgical 06/22/20 1625 Endotracheal Tube less than 1 day                Hospital Course:  Patient was admitted and continued on BiPAP for approximately 5 hr.  Repeat ABG at that time showed worsening acidosis and hypercapnia though improved oxygenation.  At that time  she was becoming more altered and somnolent and the decision was made to intubate for worsening hypercapnia.  She had a right mainstem intubation with complications of transient hypoxia on intubation.  Tube was repositioned appropriately though repeat ABG in a.m. showed worsening hypoxemia.  ABG was reordered as patient had been on IMV 20, peep 12, FiO2 100% overnight, tidal volume 550 and was having 100% O2 saturations on pulse ox so we presumed her oxygenation would have improved.  On repeat ABG, she again had worsening hypoxemia.  D-dimer ordered for concern for PE, returned low normal.  Chest x-ray not suggestive of infiltrates.  She was started on empiric antibiotics.  Lactic acid and procalcitonin ordered.  On admission, echo was ordered which showed normal heart function.  She was given Lasix IV for peripheral edema.  She maintained her blood pressure and has not required pressors throughout her hospital course.  Due to worsening hypoxia without apparent cause, transfer was initiated for higher level of care.  Patient accepted at [place].  After speaking with pulmonology, we increased PEEP to 15 for transfer.  Central line placed in the right IJ.  We appreciate the recommendations of the consulting Pulmonologist and Critical care physicians in assisting with this transfer.        Consults (From admission, onward)        Status Ordering Provider     Pharmacy to dose Vancomycin consult  Once     Provider:  (Not yet assigned)    Acknowledged AZEB MERCEDES          Significant Labs:  A1C:   Recent Labs   Lab 06/23/20  0715   HGBA1C 6.2     ABGs:   Recent Labs   Lab 06/23/20  0944   PH 7.467*   PCO2 47.5*   HCO3 34.4*   POCSATURATED 92*   BE 11     BMP:   Recent Labs   Lab 06/23/20  0715   *      K 4.0   CL 96   CO2 30*   BUN 21*   CREATININE 0.8   CALCIUM 8.8     CMP:   Recent Labs   Lab 06/22/20  0858 06/23/20  0715    137   K 4.2 4.0    96   CO2 31* 30*   * 201*   BUN  19 21*   CREATININE 0.7 0.8   CALCIUM 8.6* 8.8   PROT 7.4 7.4   ALBUMIN 3.4* 3.4*   BILITOT 0.6 0.6   ALKPHOS 66 56   AST 20 18   ALT 19 18   ANIONGAP 7* 11   EGFRNONAA >60.0 >60.0     Cardiac Markers: No results for input(s): CKMB, TROPONINT, MYOGLOBIN in the last 48 hours.  Coagulation: No results for input(s): PT, INR, APTT in the last 48 hours.  Lactic Acid:   Recent Labs   Lab 06/23/20  1246   LACTATE 2.4*     POCT Glucose:   Recent Labs   Lab 06/22/20  0830   POCTGLUCOSE 143*     Troponin:   Recent Labs   Lab 06/22/20  0858   TROPONINI 0.03     D-dimer normal    Urine Studies: No results for input(s): COLORU, APPEARANCEUA, PHUR, SPECGRAV, PROTEINUA, GLUCUA, KETONESU, BILIRUBINUA, OCCULTUA, NITRITE, UROBILINOGEN, LEUKOCYTESUR, RBCUA, WBCUA, BACTERIA, SQUAMEPITHEL, HYALINECASTS in the last 48 hours.    Invalid input(s): WRIGHTSUR  All pertinent labs within the past 24 hours have been reviewed.    Significant Imaging:  X-Ray Chest 1 View  Order: [id number]  Status:  Final result   Visible to patient:  No (not released)   Next appt:  None  Details    Reading Physician Reading Date Result Priority   Navin Mortensen MD  [phone] 6/23/2020 STAT      Narrative & Impression     EXAMINATION:  XR CHEST 1 VIEW     CLINICAL HISTORY:  LINE PLACEMENT;     TECHNIQUE:  Single frontal view of the chest was performed.     COMPARISON:  06/23/2020.     FINDINGS:  There is persistent pulmonary hypoinflation.  There are bilateral pulmonary infiltrates which are stable to slightly increased over the interval likely representing pulmonary edema.  Small bilateral pleural effusions with bibasilar dependent atelectasis.     Heart size is enlarged.  Mediastinal contours unremarkable.  Trachea midline.     Endotracheal tube remains in satisfactory position.  Interval placement of right IJ catheter which terminates in the distal SVC.     Impression:     1. Pulmonary hypoinflation.  2. Findings consistent with congestive heart failure  which is stable to slightly increased over the interval with small bilateral pleural effusions.  3. Satisfactory indwelling life-support devices.             I have reviewed all pertinent imaging results/findings within the past 24 hours.    Pending Diagnostic Studies:     Procedure Component Value Units Date/Time    Procalcitonin [[id number]] Collected: 06/23/20 1246    Order Status: Sent Lab Status: In process Updated: 06/23/20 1255    Specimen: Blood         Final Active Diagnoses:    Diagnosis Date Noted POA    PRINCIPAL PROBLEM:  Acute on chronic respiratory failure with hypercapnia [J96.22] 06/22/2020 Yes    Class 3 severe obesity with serious comorbidity and body mass index (BMI) of 60.0 to 69.9 in adult [E66.01, Z68.44] 06/22/2020 Not Applicable    Obesity hypoventilation syndrome [E66.2] 06/22/2020 Yes    Cardiomegaly [I51.7] 06/22/2020 Yes    Elevated fasting glucose [R73.01] 06/22/2020 Yes    Mild intermittent asthma [J45.20] 06/22/2020 Yes      Problems Resolved During this Admission:       Discharged Condition: critical    Disposition: Discharged to Other Facility [facility] for higher level of care    Follow Up:    Patient Instructions:   No discharge procedures on file.  Medications:  Transfer Medications (for Discharge Readmit only):   Current Facility-Administered Medications   Medication Dose Route Frequency Provider Last Rate Last Dose    albuterol-ipratropium 2.5 mg-0.5 mg/3 mL nebulizer solution 3 mL  3 mL Nebulization Q4H Cristo Grier MD   3 mL at 06/23/20 1536    albuterol-ipratropium 2.5 mg-0.5 mg/3 mL nebulizer solution 3 mL  3 mL Nebulization Q2H PRN Bruna Shafer MD        budesonide nebulizer solution 0.25 mg  0.25 mg Nebulization Daily Bruna Shafer MD   0.25 mg at 06/23/20 0729    fentaNYL 2500 mcg in 0.9% sodium chloride 250 mL infusion premix (titrating)   Intravenous Continuous Bruna Shafer MD 8 mL/hr at 06/23/20 0800 80 mcg/hr at 06/23/20  0800    furosemide injection 40 mg  40 mg Intravenous Daily Bruna Shafer MD   40 mg at 06/23/20 0841    levoFLOXacin 750 mg/150 mL IVPB 750 mg  750 mg Intravenous Q24H Bruna Shafer  mL/hr at 06/23/20 1355 750 mg at 06/23/20 1355    methylPREDNISolone sodium succinate injection 125 mg  125 mg Intravenous Q8H Bruna Shafer MD   125 mg at 06/23/20 1355    midazolam (VERSED) 5 mg/mL injection 5 mg  5 mg Intravenous Once Bruna Shafer MD        morphine injection 2 mg  2 mg Intravenous Q4H PRN Bruna Shafer MD   2 mg at 06/22/20 1422    piperacillin-tazobactam 4.5 g in dextrose 5 % 100 mL IVPB (ready to mix system)  4.5 g Intravenous Q8H Bruna Shafer MD 25 mL/hr at 06/23/20 1355 4.5 g at 06/23/20 1355    propofol (DIPRIVAN) 10 mg/mL infusion  5 mcg/kg/min Intravenous Continuous Bruna Shafer MD 49.4 mL/hr at 06/23/20 1516 50 mcg/kg/min at 06/23/20 1516    sodium chloride 0.9% flush 10 mL  10 mL Intravenous PRN Cristo Grier MD        vancomycin (VANCOCIN) 1,500 mg in dextrose 5 % 250 mL IVPB  1,500 mg Intravenous Q8H Bruna Shafer .7 mL/hr at 06/23/20 1355 1,500 mg at 06/23/20 1355    vancomycin - pharmacy to dose   Intravenous pharmacy to manage frequency MD Bruna Gomez MD  Critical Care - Medicine  [facility] - ICU

## 2020-06-23 NOTE — PLAN OF CARE
06/23/20 1311   Discharge Assessment   Assessment Type Discharge Planning Assessment   Confirmed/corrected address and phone number on facesheet? Yes   Assessment information obtained from? Other  (Pts sister in law provided information obtained. Pt is on vent)   Expected Length of Stay (days) 4   Prior to hospitilization cognitive status: Alert/Oriented   Prior to hospitalization functional status: Independent   Current cognitive status: Unable to Assess   Current Functional Status:   (Unable to assess)   Facility Arrived From: Home   Lives With child(selena), dependent;parent(s)  (Pt living with her brother's father that she considers her father.)   Is patient able to care for self after discharge? Unable to determine at this time (comments)  (Pt on ventilator and not medically stable at this time.)   Who are your caregiver(s) and their phone number(s)? Pt caring for self prior to discharge. Brother is Francisco [phone]; Sister in law Alexandra [phone]   Patient's perception of discharge disposition   (Unable to determine at this time.)   Readmission Within the Last 30 Days no previous admission in last 30 days   Patient currently being followed by outpatient case management? No   Patient currently receives any other outside agency services? No   Equipment Currently Used at Home CPAP  (CPAP is only DME known by the sister in law)   Do you have any problems affording any of your prescribed medications? No  (Medicaid has provided all medications thus far)   Is the patient taking medications as prescribed? yes   Does the patient have transportation home? Yes   Transportation Anticipated car, drives self;family or friend will provide   Dialysis Name and Scheduled days n/a   Does the patient receive services at the [facility]? No   Discharge Plan A Other  (Unable to determine at this time)   DME Needed Upon Discharge  other (see comments)  (Unable to determine at this time.)   Patient/Family in Agreement with  Plan unable to assess     Pt is on ventilator. Unable to contact pts brother for assessment. Pts sister in law provided information documented above. 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 Family reports pt has this same situation approx. one year ago and was in [facility] for 3 weeks. She recently has issues with sleep apnea, but was able to obtain an CPAP machine and was doing well.     Sister in law reports a cousin has the pts 6 year old child at this time. They are planning to get the child this day and care for him while to pt is ill.     SW will follow pt during this admission and assist as appropriate with discharge planning.

## 2020-06-23 NOTE — PROGRESS NOTES
Pharmacokinetic Initial Assessment: IV Vancomycin    Assessment/Plan:    Initiate intravenous vancomycin with loading dose of 1500 mg once followed by a maintenance dose of vancomycin 1500 mg IV every 8 hours  Desired empiric serum trough concentration is 15 to 20 mcg/mL  Draw vancomycin trough level 30 min prior to fourth dose on 06/24/2020 at approximately 1330  Pharmacy will continue to follow and monitor vancomycin.      Please contact pharmacy at extension [phone] with any questions regarding this assessment.     Thank you for the consult,   Tom Ignacio       Patient brief summary:  Michelle Wren is a 30 y.o. female initiated on antimicrobial therapy with IV Vancomycin for treatment of suspected lower respiratory infection    Drug Allergies:   Review of patient's allergies indicates:  No Known Allergies    Actual Body Weight:   160.6 kg    Renal Function:   Estimated Creatinine Clearance: 153.1 mL/min (based on SCr of 0.8 mg/dL).,     Dialysis Method (if applicable):  N/A    CBC (last 72 hours):  Recent Labs   Lab Result Units 06/22/20  0858 06/23/20  0715   WBC K/uL 11.68 23.88*   Hemoglobin g/dL 13.1 13.5   Hemoglobin A1C %  --  6.2   Hematocrit % 43.0 43.6   Platelets K/uL 313 341   Gran% % 71.2 83.0*   Lymph% % 17.3* 3.0*   Mono% % 10.3 3.0*   Eosinophil% % 0.3 0.0   Basophil% % 0.2 0.0   Differential Method  Automated Manual       Metabolic Panel (last 72 hours):  Recent Labs   Lab Result Units 06/22/20  0858 06/23/20  0715   Sodium mmol/L 138 137   Potassium mmol/L 4.2 4.0   Chloride mmol/L 100 96   CO2 mmol/L 31* 30*   Glucose mg/dL 135* 201*   BUN, Bld mg/dL 19 21*   Creatinine mg/dL 0.7 0.8   Albumin g/dL 3.4* 3.4*   Total Bilirubin mg/dL 0.6 0.6   Alkaline Phosphatase U/L 66 56   AST U/L 20 18   ALT U/L 19 18       Drug levels (last 3 results):  No results for input(s): VANCOMYCINRA, VANCOMYCINPE, VANCOMYCINTR in the last 72 hours.    Microbiologic Results:  Microbiology Results (last 7 days)        ** No results found for the last 168 hours. **

## 2020-06-24 PROBLEM — E11.9 TYPE 2 DIABETES MELLITUS: Status: ACTIVE | Noted: 2020-06-24

## 2020-06-24 LAB
ALBUMIN SERPL BCP-MCNC: 2.7 G/DL (ref 3.5–5.2)
ALLENS TEST: ABNORMAL
ALP SERPL-CCNC: 59 U/L (ref 55–135)
ALT SERPL W/O P-5'-P-CCNC: 15 U/L (ref 10–44)
ANION GAP SERPL CALC-SCNC: 10 MMOL/L (ref 8–16)
AST SERPL-CCNC: 17 U/L (ref 10–40)
BASOPHILS # BLD AUTO: 0.03 K/UL (ref 0–0.2)
BASOPHILS NFR BLD: 0.1 % (ref 0–1.9)
BILIRUB SERPL-MCNC: 0.3 MG/DL (ref 0.1–1)
BUN SERPL-MCNC: 17 MG/DL (ref 6–20)
CALCIUM SERPL-MCNC: 9 MG/DL (ref 8.7–10.5)
CHLORIDE SERPL-SCNC: 99 MMOL/L (ref 95–110)
CO2 SERPL-SCNC: 31 MMOL/L (ref 23–29)
CREAT SERPL-MCNC: 0.9 MG/DL (ref 0.5–1.4)
DELSYS: ABNORMAL
DIFFERENTIAL METHOD: ABNORMAL
EOSINOPHIL # BLD AUTO: 0 K/UL (ref 0–0.5)
EOSINOPHIL NFR BLD: 0 % (ref 0–8)
ERYTHROCYTE [DISTWIDTH] IN BLOOD BY AUTOMATED COUNT: 16.5 % (ref 11.5–14.5)
ERYTHROCYTE [SEDIMENTATION RATE] IN BLOOD BY WESTERGREN METHOD: 20 MM/H
ERYTHROCYTE [SEDIMENTATION RATE] IN BLOOD BY WESTERGREN METHOD: 30 MM/H
ERYTHROCYTE [SEDIMENTATION RATE] IN BLOOD BY WESTERGREN METHOD: 30 MM/H
EST. GFR  (AFRICAN AMERICAN): >60 ML/MIN/1.73 M^2
EST. GFR  (NON AFRICAN AMERICAN): >60 ML/MIN/1.73 M^2
ETCO2: 40
ETCO2: 40
FIO2: 100
FIO2: 70
FIO2: 80
FLOW: 70
GLUCOSE SERPL-MCNC: 154 MG/DL (ref 70–110)
HCO3 UR-SCNC: 33.8 MMOL/L (ref 24–28)
HCO3 UR-SCNC: 33.9 MMOL/L (ref 24–28)
HCO3 UR-SCNC: 34 MMOL/L (ref 24–28)
HCT VFR BLD AUTO: 41.6 % (ref 37–48.5)
HGB BLD-MCNC: 12.7 G/DL (ref 12–16)
IMM GRANULOCYTES # BLD AUTO: 0.35 K/UL (ref 0–0.04)
IMM GRANULOCYTES NFR BLD AUTO: 1.3 % (ref 0–0.5)
LYMPHOCYTES # BLD AUTO: 1 K/UL (ref 1–4.8)
LYMPHOCYTES NFR BLD: 3.9 % (ref 18–48)
MAGNESIUM SERPL-MCNC: 2.2 MG/DL (ref 1.6–2.6)
MCH RBC QN AUTO: 25.6 PG (ref 27–31)
MCHC RBC AUTO-ENTMCNC: 30.5 G/DL (ref 32–36)
MCV RBC AUTO: 84 FL (ref 82–98)
MODE: ABNORMAL
MONOCYTES # BLD AUTO: 1.9 K/UL (ref 0.3–1)
MONOCYTES NFR BLD: 7.2 % (ref 4–15)
NEUTROPHILS # BLD AUTO: 22.8 K/UL (ref 1.8–7.7)
NEUTROPHILS NFR BLD: 87.5 % (ref 38–73)
NRBC BLD-RTO: 0 /100 WBC
PCO2 BLDA: 43.2 MMHG (ref 35–45)
PCO2 BLDA: 49.5 MMHG (ref 35–45)
PCO2 BLDA: 52.5 MMHG (ref 35–45)
PEEP: 12
PEEP: 17
PEEP: 17
PH SMN: 7.42 [PH] (ref 7.35–7.45)
PH SMN: 7.44 [PH] (ref 7.35–7.45)
PH SMN: 7.5 [PH] (ref 7.35–7.45)
PHOSPHATE SERPL-MCNC: 2.6 MG/DL (ref 2.7–4.5)
PIP: 37
PLATELET # BLD AUTO: 353 K/UL (ref 150–350)
PMV BLD AUTO: 11.4 FL (ref 9.2–12.9)
PO2 BLDA: 48 MMHG (ref 80–100)
PO2 BLDA: 54 MMHG (ref 80–100)
PO2 BLDA: 57 MMHG (ref 80–100)
POC BE: 10 MMOL/L
POC BE: 11 MMOL/L
POC BE: 9 MMOL/L
POC SATURATED O2: 84 % (ref 95–100)
POC SATURATED O2: 88 % (ref 95–100)
POC SATURATED O2: 91 % (ref 95–100)
POC TCO2: 35 MMOL/L (ref 23–27)
POC TCO2: 35 MMOL/L (ref 23–27)
POC TCO2: 36 MMOL/L (ref 23–27)
POCT GLUCOSE: 128 MG/DL (ref 70–110)
POCT GLUCOSE: 137 MG/DL (ref 70–110)
POCT GLUCOSE: 149 MG/DL (ref 70–110)
POCT GLUCOSE: 155 MG/DL (ref 70–110)
POCT GLUCOSE: 180 MG/DL (ref 70–110)
POTASSIUM SERPL-SCNC: 4.4 MMOL/L (ref 3.5–5.1)
PROCALCITONIN SERPL IA-MCNC: 0.02 NG/ML
PROT SERPL-MCNC: 6.6 G/DL (ref 6–8.4)
PS: 10
RBC # BLD AUTO: 4.96 M/UL (ref 4–5.4)
SAMPLE: ABNORMAL
SITE: ABNORMAL
SODIUM SERPL-SCNC: 140 MMOL/L (ref 136–145)
SP02: 93
SP02: 97
SP02: 97
VANCOMYCIN TROUGH SERPL-MCNC: 11.2 UG/ML (ref 10–22)
VT: 400
VT: 400
VT: 550
WBC # BLD AUTO: 26.11 K/UL (ref 3.9–12.7)

## 2020-06-24 PROCEDURE — 36600 WITHDRAWAL OF ARTERIAL BLOOD: CPT

## 2020-06-24 PROCEDURE — 84100 ASSAY OF PHOSPHORUS: CPT

## 2020-06-24 PROCEDURE — 99291 PR CRITICAL CARE, E/M 30-74 MINUTES: ICD-10-PCS | Mod: ,,, | Performed by: INTERNAL MEDICINE

## 2020-06-24 PROCEDURE — U0003 INFECTIOUS AGENT DETECTION BY NUCLEIC ACID (DNA OR RNA); SEVERE ACUTE RESPIRATORY SYNDROME CORONAVIRUS 2 (SARS-COV-2) (CORONAVIRUS DISEASE [COVID-19]), AMPLIFIED PROBE TECHNIQUE, MAKING USE OF HIGH THROUGHPUT TECHNOLOGIES AS DESCRIBED BY CMS-2020-01-R: HCPCS

## 2020-06-24 PROCEDURE — 80202 ASSAY OF VANCOMYCIN: CPT

## 2020-06-24 PROCEDURE — 94761 N-INVAS EAR/PLS OXIMETRY MLT: CPT

## 2020-06-24 PROCEDURE — 94770 HC EXHALED C02 TEST: CPT

## 2020-06-24 PROCEDURE — 25000242 PHARM REV CODE 250 ALT 637 W/ HCPCS: Performed by: NURSE PRACTITIONER

## 2020-06-24 PROCEDURE — 20000000 HC ICU ROOM

## 2020-06-24 PROCEDURE — 99291 CRITICAL CARE FIRST HOUR: CPT | Mod: ,,, | Performed by: INTERNAL MEDICINE

## 2020-06-24 PROCEDURE — 82803 BLOOD GASES ANY COMBINATION: CPT

## 2020-06-24 PROCEDURE — 94640 AIRWAY INHALATION TREATMENT: CPT

## 2020-06-24 PROCEDURE — C9113 INJ PANTOPRAZOLE SODIUM, VIA: HCPCS | Performed by: INTERNAL MEDICINE

## 2020-06-24 PROCEDURE — 83735 ASSAY OF MAGNESIUM: CPT

## 2020-06-24 PROCEDURE — 25000003 PHARM REV CODE 250: Performed by: NURSE PRACTITIONER

## 2020-06-24 PROCEDURE — 63600175 PHARM REV CODE 636 W HCPCS: Performed by: INTERNAL MEDICINE

## 2020-06-24 PROCEDURE — 36415 COLL VENOUS BLD VENIPUNCTURE: CPT

## 2020-06-24 PROCEDURE — 36430 TRANSFUSION BLD/BLD COMPNT: CPT

## 2020-06-24 PROCEDURE — 94003 VENT MGMT INPAT SUBQ DAY: CPT

## 2020-06-24 PROCEDURE — 99900035 HC TECH TIME PER 15 MIN (STAT)

## 2020-06-24 PROCEDURE — 37799 UNLISTED PX VASCULAR SURGERY: CPT

## 2020-06-24 PROCEDURE — 63600175 PHARM REV CODE 636 W HCPCS: Performed by: NURSE PRACTITIONER

## 2020-06-24 PROCEDURE — 27000221 HC OXYGEN, UP TO 24 HOURS

## 2020-06-24 PROCEDURE — 97802 MEDICAL NUTRITION INDIV IN: CPT

## 2020-06-24 PROCEDURE — 25000003 PHARM REV CODE 250: Performed by: INTERNAL MEDICINE

## 2020-06-24 PROCEDURE — 80053 COMPREHEN METABOLIC PANEL: CPT

## 2020-06-24 PROCEDURE — 85025 COMPLETE CBC W/AUTO DIFF WBC: CPT

## 2020-06-24 PROCEDURE — 99900026 HC AIRWAY MAINTENANCE (STAT)

## 2020-06-24 RX ORDER — METHYLPREDNISOLONE SOD SUCC 125 MG
80 VIAL (EA) INJECTION DAILY
Status: DISCONTINUED | OUTPATIENT
Start: 2020-06-25 | End: 2020-06-26

## 2020-06-24 RX ORDER — PANTOPRAZOLE SODIUM 40 MG/10ML
40 INJECTION, POWDER, LYOPHILIZED, FOR SOLUTION INTRAVENOUS DAILY
Status: DISCONTINUED | OUTPATIENT
Start: 2020-06-24 | End: 2020-07-12

## 2020-06-24 RX ADMIN — PANTOPRAZOLE SODIUM 40 MG: 40 INJECTION, POWDER, LYOPHILIZED, FOR SOLUTION INTRAVENOUS at 09:06

## 2020-06-24 RX ADMIN — PROPOFOL 50 MCG/KG/MIN: 10 INJECTION, EMULSION INTRAVENOUS at 09:06

## 2020-06-24 RX ADMIN — LEVOFLOXACIN 750 MG: 750 INJECTION, SOLUTION INTRAVENOUS at 04:06

## 2020-06-24 RX ADMIN — SODIUM CHLORIDE: 0.9 INJECTION, SOLUTION INTRAVENOUS at 01:06

## 2020-06-24 RX ADMIN — PROPOFOL 50 MCG/KG/MIN: 10 INJECTION, EMULSION INTRAVENOUS at 06:06

## 2020-06-24 RX ADMIN — IPRATROPIUM BROMIDE AND ALBUTEROL SULFATE 3 ML: .5; 3 SOLUTION RESPIRATORY (INHALATION) at 12:06

## 2020-06-24 RX ADMIN — IPRATROPIUM BROMIDE AND ALBUTEROL SULFATE 3 ML: .5; 3 SOLUTION RESPIRATORY (INHALATION) at 04:06

## 2020-06-24 RX ADMIN — BUDESONIDE 0.25 MG: 0.25 SUSPENSION RESPIRATORY (INHALATION) at 08:06

## 2020-06-24 RX ADMIN — PIPERACILLIN AND TAZOBACTAM 4.5 G: 4; .5 INJECTION, POWDER, FOR SOLUTION INTRAVENOUS at 05:06

## 2020-06-24 RX ADMIN — ENOXAPARIN SODIUM 40 MG: 40 INJECTION SUBCUTANEOUS at 08:06

## 2020-06-24 RX ADMIN — IPRATROPIUM BROMIDE AND ALBUTEROL SULFATE 3 ML: .5; 3 SOLUTION RESPIRATORY (INHALATION) at 03:06

## 2020-06-24 RX ADMIN — IPRATROPIUM BROMIDE AND ALBUTEROL SULFATE 3 ML: .5; 3 SOLUTION RESPIRATORY (INHALATION) at 11:06

## 2020-06-24 RX ADMIN — INSULIN ASPART 1 UNITS: 100 INJECTION, SOLUTION INTRAVENOUS; SUBCUTANEOUS at 12:06

## 2020-06-24 RX ADMIN — IPRATROPIUM BROMIDE AND ALBUTEROL SULFATE 3 ML: .5; 3 SOLUTION RESPIRATORY (INHALATION) at 08:06

## 2020-06-24 RX ADMIN — PROPOFOL 5 MCG/KG/MIN: 10 INJECTION, EMULSION INTRAVENOUS at 10:06

## 2020-06-24 RX ADMIN — PROPOFOL 50 MCG/KG/MIN: 10 INJECTION, EMULSION INTRAVENOUS at 03:06

## 2020-06-24 RX ADMIN — PROPOFOL 50 MCG/KG/MIN: 10 INJECTION, EMULSION INTRAVENOUS at 11:06

## 2020-06-24 RX ADMIN — METHYLPREDNISOLONE SODIUM SUCCINATE 125 MG: 125 INJECTION, POWDER, FOR SOLUTION INTRAMUSCULAR; INTRAVENOUS at 05:06

## 2020-06-24 RX ADMIN — PROPOFOL 50 MCG/KG/MIN: 10 INJECTION, EMULSION INTRAVENOUS at 04:06

## 2020-06-24 RX ADMIN — PROPOFOL 50 MCG/KG/MIN: 10 INJECTION, EMULSION INTRAVENOUS at 01:06

## 2020-06-24 RX ADMIN — VANCOMYCIN HYDROCHLORIDE 2000 MG: 1 INJECTION, POWDER, LYOPHILIZED, FOR SOLUTION INTRAVENOUS at 12:06

## 2020-06-24 RX ADMIN — ENOXAPARIN SODIUM 40 MG: 40 INJECTION SUBCUTANEOUS at 09:06

## 2020-06-24 RX ADMIN — IPRATROPIUM BROMIDE AND ALBUTEROL SULFATE 3 ML: .5; 3 SOLUTION RESPIRATORY (INHALATION) at 07:06

## 2020-06-24 RX ADMIN — SODIUM PHOSPHATE, MONOBASIC, MONOHYDRATE 30 MMOL: 276; 142 INJECTION, SOLUTION INTRAVENOUS at 07:06

## 2020-06-24 RX ADMIN — PROPOFOL 50 MCG/KG/MIN: 10 INJECTION, EMULSION INTRAVENOUS at 02:06

## 2020-06-24 RX ADMIN — INSULIN ASPART 2 UNITS: 100 INJECTION, SOLUTION INTRAVENOUS; SUBCUTANEOUS at 05:06

## 2020-06-24 RX ADMIN — PROPOFOL 50 MCG/KG/MIN: 10 INJECTION, EMULSION INTRAVENOUS at 07:06

## 2020-06-24 RX ADMIN — PIPERACILLIN AND TAZOBACTAM 4.5 G: 4; .5 INJECTION, POWDER, FOR SOLUTION INTRAVENOUS at 04:06

## 2020-06-24 RX ADMIN — PROPOFOL 50 MCG/KG/MIN: 10 INJECTION, EMULSION INTRAVENOUS at 05:06

## 2020-06-24 RX ADMIN — PIPERACILLIN AND TAZOBACTAM 4.5 G: 4; .5 INJECTION, POWDER, FOR SOLUTION INTRAVENOUS at 09:06

## 2020-06-24 RX ADMIN — PROPOFOL 50 MCG/KG/MIN: 10 INJECTION, EMULSION INTRAVENOUS at 12:06

## 2020-06-24 NOTE — ASSESSMENT & PLAN NOTE
This patient does have evidence of infective focus  My overall impression is severe sepsis.  Antibiotics given-   Antibiotics (From admission, onward)    Start     Stop Route Frequency Ordered    06/24/20 1400  levoFLOXacin 750 mg/150 mL IVPB 750 mg      -- IV Every 24 hours (non-standard times) 06/23/20 2045 06/24/20 0000  vancomycin (VANCOCIN) 2,000 mg in dextrose 5 % 500 mL IVPB      -- IV Every 12 hours (non-standard times) 06/23/20 2129 06/23/20 2200  piperacillin-tazobactam 4.5 g in dextrose 5 % 100 mL IVPB (ready to mix system)      -- IV Every 8 hours (non-standard times) 06/23/20 2045 06/23/20 2144  vancomycin - pharmacy to dose  (vancomycin IVPB)      -- IV pharmacy to manage frequency 06/23/20 2045          Severe Sepsis only-  Latest labs reviewed, they are-  Recent Labs   Lab 06/23/20  0715   BILITOT 0.6     Recent Labs   Lab 06/23/20  1246 06/23/20  2201   LACTATE 2.4* 2.0     Recent Labs   Lab 06/23/20 2152   PH 7.449   PO2 63*   PCO2 48.3*   HCO3 33.5*   BE 9       Organ dysfunction indicated by Acute respiratory failure      Temp Readings from Last 1 Encounters:   06/23/20 98.7 °F (37.1 °C) (Axillary)     BP Readings from Last 1 Encounters:   06/23/20 (!) 140/89     Pulse Readings from Last 1 Encounters:   06/23/20 85

## 2020-06-24 NOTE — ASSESSMENT & PLAN NOTE
Body mass index is 66.53 kg/m². Morbid obesity complicates all aspects of disease management from diagnostic modalities to treatment. Weight loss encouraged and health benefits explained to patient.

## 2020-06-24 NOTE — HPI
Michelle Wren is a 31 y/o female with a past medical history significant for asthma, type 2 diabetes, and severe obesity who is transferred from [facility] to [facility] for pulmonary consultation.  Patient presented to the ED at [facility] yesterday with complaints of 2-3 days of shortness of breath.  It is reported that she uses CPAP at night but she got this from a friend so unsure of settings.  She was placed on BiPAP and given IV Solu-Medrol, bronchodilator treatments, and IV Lasix.  She was admitted to the ICU at [facility].  Patient continued to decline and underwent intubation yesterday around 7:00 p.m.  per report, today patient appeared to be worse.  Her saturations were in the mid 90s on FiO2 of 100%.  A D-dimer was checked and was negative.  Per review of labs which were drawn earlier today, her WBC count was 23,000 and her lactic acid was 2.4.  She was placed on IV Zosyn and IV vancomycin.  Upon arrival to [facility], patient is in no acute distress.  She is intubated and sedated.  Vital signs are stable.  Lactic acid will be repeated now as patient meets sepsis criteria and will check a procalcitonin.  She will be monitored closely in the ICU.

## 2020-06-24 NOTE — CONSULTS
"  [facility]  Adult Nutrition  Consult Note    SUMMARY    Intervention: collaboration with care providers    Recommendation:   1) Advance diet to goal of DM 1500 kcal when extubated     2) If diet not advanced in < 5 days consider nutrition support Peptamen VHP trickel feed @ 10 ml/hr ( until propofol weaning) then advancing to goal of 55 ml/hr + min 30 ml flush q 4 hr   ( 1320 kcal ( 100% EEN), 121 g protein ( 93% EPN), and 1108 ml free water)     3) Weigh pt weekly   4) Nutrition education on diabetic diet once pt appropriate    Goals: 1) PO diet advanced or nutrition support initiates in < 5 days  Nutrition Goal Status: new  Communication of RD Recs: reviewed with physician(POC, sticky note)    Reason for Assessment    Reason For Assessment: consult  Diagnosis: (severe sepsis)  Relevant Medical History: asthma, DM2, severe obesity  Interdisciplinary Rounds: attended    General Information Comments: 29 y/o female admitted with sepsis, bilateral pneumonia. + vent, on high dose propofol no pressor. NFPE done 6/24/20, no wasting seen pt appears obese. NPO x 1 day. Glucose WNL, of note with hx. of DM 2 on steriods.    Nutrition Discharge Planning: to be determined- DM 1500 kcal diet    Nutrition Risk Screen    Nutrition Risk Screen: no indicators present    Nutrition/Diet History    Typical Food/Fluid Intake: unable to obtain  Spiritual, Cultural Beliefs, [employer] Practices, Values that Affect Care: no  Food Allergies: NKFA  Factors Affecting Nutritional Intake: NPO, on mechanical ventilation    Anthropometrics    Temp: 98.7 °F (37.1 °C)  Height Method: Stated  Height: 5' 2"  Height (inches): 62 in  Weight Method: Bed Scale  Weight: (!) 165 kg (363 lb 12.1 oz)  Weight (lb): (!) 363.76 lb  Ideal Body Weight (IBW), Female: 110 lb  % Ideal Body Weight, Female (lb): 330.69 %  BMI (Calculated): 66.5  BMI Grade: greater than 40 - morbid obesity       Lab/Procedures/Meds    Pertinent Labs Reviewed: " reviewed  BMP  Lab Results   Component Value Date     06/24/2020    K 4.4 06/24/2020    CL 99 06/24/2020    CO2 31 (H) 06/24/2020    BUN 17 06/24/2020    CREATININE 0.9 06/24/2020    CALCIUM 9.0 06/24/2020    ANIONGAP 10 06/24/2020    ESTGFRAFRICA >60 06/24/2020    EGFRNONAA >60 06/24/2020     POCT Glucose   Date Value Ref Range Status   06/24/2020 149 (H) 70 - 110 mg/dL Final   06/24/2020 180 (H) 70 - 110 mg/dL Final   06/23/2020 161 (H) 70 - 110 mg/dL Final   06/22/2020 143 (H) 70 - 110 mg/dL Final     Lab Results   Component Value Date    HGBA1C 6.2 06/23/2020       Pertinent Medications Reviewed: reviewed  Pertinent Medications Comments: methylprednisolone, NS @ 75 ml/hr, propofol @ 49.4 ml/hr    Estimated/Assessed Needs    Weight Used For Calorie Calculations: (!) 165 kg (363 lb 12.1 oz)  Energy Calorie Requirements (kcal): 22-25 kcal/kg ( IBW, BMI > 50 kg/m2) = 4547-7326 kcal  Energy Need Method: Kcal/kg  Protein Requirements: 130 g protein  Weight Used For Protein Calculations: 52.2 kg (115 lb 1.3 oz)(IBW ( 2.5 g protein/kg))  Fluid Requirements (mL): 1300 ml or per MD  Estimated Fluid Requirement Method: RDA Method  CHO Requirement: 153-170 g      Nutrition Prescription Ordered    Current Diet Order: NPO x 1 day    Evaluation of Received Nutrient/Fluid Intake    Energy Calories Required: not meeting needs  Protein Required: meeting needs  Fluid Required: not meeting needs  Total Fluid Intake (mL/kg): NS @ 75 ml/hr  Tolerance: other (see comments)(NPO)  % Intake of Estimated Energy Needs: 0%  % Meal Intake: 0%    Nutrition Risk    Level of Risk/Frequency of Follow-up: moderate 2 x weekly    Assessment and Plan    Inadequate energy intake  R/t NPO  As evidenced by PO intakes < 50% EEN x 1-2 days  Intervention: above  new     Altered nutrition related lab values  R/t medication side effects and altered absorption of nutrients  As evidenced by elevated A1C and glucose  Intervention: above  new    Monitor  and Evaluation    Food and Nutrient Intake: energy intake, food and beverage intake  Food and Nutrient Adminstration: diet order  Anthropometric Measurements: weight  Biochemical Data, Medical Tests and Procedures: electrolyte and renal panel, gastrointestinal profile, glucose/endocrine profile  Nutrition-Focused Physical Findings: overall appearance     Malnutrition Assessment     Skin (Micronutrient): (Maxi = 14)   Micronutrient Evaluation: no deficiencies               Edema (Fluid Accumulation): (2-3)             Nutrition Follow-Up    RD Follow-up?: Yes

## 2020-06-24 NOTE — ASSESSMENT & PLAN NOTE
IV zosyn, IV vancomycin-de-escalate as appropriate  Blood and sputum cultures-follow  Monitor clinical response

## 2020-06-24 NOTE — ASSESSMENT & PLAN NOTE
Body mass index is 66.41 kg/m². Morbid obesity complicates all aspects of disease management from diagnostic modalities to treatment. Weight loss encouraged and health benefits explained to patient.

## 2020-06-24 NOTE — SUBJECTIVE & OBJECTIVE
Past Medical History:   Diagnosis Date    Asthma     Back pain     Diabetes mellitus     Morbid obesity     Obesity        Past Surgical History:   Procedure Laterality Date    APPENDECTOMY       SECTION         Review of patient's allergies indicates:  No Known Allergies    Current Facility-Administered Medications on File Prior to Encounter   Medication    [] albuterol-ipratropium (DUO-NEB) 2.5 mg-0.5 mg/3 mL nebulizer solution    [COMPLETED] albuterol-ipratropium (DUO-NEB) 2.5 mg-0.5 mg/3 mL nebulizer solution    [] midazolam (VERSED) 1 mg/mL injection    [] succinylcholine (ANECTINE) 20 mg/mL injection    [] succinylcholine (ANECTINE) 20 mg/mL injection    [DISCONTINUED] 0.9%  NaCl infusion    [DISCONTINUED] albuterol-ipratropium 2.5 mg-0.5 mg/3 mL nebulizer solution 3 mL    [DISCONTINUED] albuterol-ipratropium 2.5 mg-0.5 mg/3 mL nebulizer solution 3 mL    [DISCONTINUED] budesonide nebulizer solution 0.25 mg    [DISCONTINUED] enoxaparin injection 160 mg    [DISCONTINUED] enoxaparin injection 40 mg    [DISCONTINUED] fentaNYL 2500 mcg in 0.9% sodium chloride 250 mL infusion premix (titrating)    [DISCONTINUED] furosemide injection 40 mg    [DISCONTINUED] levoFLOXacin 750 mg/150 mL IVPB 750 mg    [DISCONTINUED] methylPREDNISolone sodium succinate injection 125 mg    [DISCONTINUED] midazolam (VERSED) 5 mg/mL injection 5 mg    [DISCONTINUED] morphine injection 2 mg    [DISCONTINUED] piperacillin-tazobactam 4.5 g in dextrose 5 % 100 mL IVPB (ready to mix system)    [DISCONTINUED] propofol (DIPRIVAN) 10 mg/mL infusion    [DISCONTINUED] propofoL (DIPRIVAN) 10 mg/mL infusion    [DISCONTINUED] sodium chloride 0.9% flush 10 mL    [DISCONTINUED] vancomycin (VANCOCIN) 1,500 mg in dextrose 5 % 250 mL IVPB    [DISCONTINUED] vancomycin - pharmacy to dose     Current Outpatient Medications on File Prior to Encounter   Medication Sig    albuterol  (PROVENTIL/VENTOLIN HFA) 90 mcg/actuation inhaler Inhale 1-2 puffs into the lungs every 6 (six) hours as needed for Wheezing. Rescue    azithromycin (Z-JOSE ALBERTO) 250 MG tablet Take 1 tablet (250 mg total) by mouth once daily. Take first 2 tablets together, then 1 every day until finished.    metformin HCl (METFORMIN ORAL) Take by mouth.     Family History     Family history is unknown by patient.        Tobacco Use    Smoking status: Current Every Day Smoker   Substance and Sexual Activity    Alcohol use: Yes     Frequency: Never     Comment: occ    Drug use: No    Sexual activity: Yes     Birth control/protection: None     Review of Systems   Unable to perform ROS: Intubated     Objective:     Vital Signs (Most Recent):  Temp: 98.7 °F (37.1 °C) (06/23/20 2024)  Pulse: 85 (06/23/20 2215)  Resp: 20 (06/23/20 2215)  BP: (!) 140/89 (06/23/20 2024)  SpO2: 97 % (06/23/20 2215) Vital Signs (24h Range):  Temp:  [98.2 °F (36.8 °C)-98.8 °F (37.1 °C)] 98.7 °F (37.1 °C)  Pulse:  [] 85  Resp:  [19-34] 20  SpO2:  [91 %-99 %] 97 %  BP: (106-140)/(52-89) 140/89     Weight: (!) 164.7 kg (363 lb 1.6 oz)  Body mass index is 66.41 kg/m².    Physical Exam  Vitals signs and nursing note reviewed.   Constitutional:       Appearance: She is well-developed. She is obese.      Interventions: She is sedated and intubated.   HENT:      Head: Normocephalic and atraumatic.   Eyes:      Conjunctiva/sclera: Conjunctivae normal.      Pupils: Pupils are equal, round, and reactive to light.   Neck:      Musculoskeletal: Normal range of motion and neck supple.   Cardiovascular:      Rate and Rhythm: Normal rate and regular rhythm.      Pulses: Normal pulses.      Heart sounds: Normal heart sounds.   Pulmonary:      Effort: Pulmonary effort is normal. She is intubated.      Breath sounds: Decreased breath sounds present.   Abdominal:      General: Bowel sounds are normal.      Palpations: Abdomen is soft. There is no mass.      Tenderness:  There is no abdominal tenderness.   Genitourinary:     Comments: Vazquez catheter in place    Musculoskeletal:         General: No swelling or deformity.   Skin:     General: Skin is warm and dry.      Capillary Refill: Capillary refill takes 2 to 3 seconds.   Neurological:      Comments: Unable to assess as pt is on mechanical ventilation with sedation     Psychiatric:      Comments: Sedated            CRANIAL NERVES     CN III, IV, VI   Pupils are equal, round, and reactive to light.       Significant Labs:   CBC:   Recent Labs   Lab 06/22/20  0858 06/23/20  0715   WBC 11.68 23.88*   HGB 13.1 13.5   HCT 43.0 43.6    341     CMP:   Recent Labs   Lab 06/22/20  0858 06/23/20  0715    137   K 4.2 4.0    96   CO2 31* 30*   * 201*   BUN 19 21*   CREATININE 0.7 0.8   CALCIUM 8.6* 8.8   PROT 7.4 7.4   ALBUMIN 3.4* 3.4*   BILITOT 0.6 0.6   ALKPHOS 66 56   AST 20 18   ALT 19 18   ANIONGAP 7* 11   EGFRNONAA >60.0 >60.0     Lactic Acid:   Recent Labs   Lab 06/23/20  1246 06/23/20  2201   LACTATE 2.4* 2.0       Significant Imaging: CXR:   1. Pulmonary hypoinflation.  2. Findings consistent with congestive heart failure which is stable to slightly increased over the interval with small bilateral pleural effusions.  3. Satisfactory indwelling life-support devices.

## 2020-06-24 NOTE — EICU
EICU ADMIT NOTE:    HISTORY: Intubated. Acute resp failure    CAMERA ASSESSMENT: Yes     TELEMETRY: Reviewed     NOTES: Reviewed    LABS: Reviewed     IMAGING: Personally reviewed.     DISCUSSED with bedside nurse    ASSESSMENT AND PLAN:    -- Acute Respiratory Failure. Severe hypoxia. Multifactorial, pulmonary edema? Pneumonia cannot be ruled out. Underlying Asthma Obesity hypoventilation syndrome.  Vent settings reviewed. Hypercapnia is better. Follow ABG. FiO2 is 100%. PEEP 15 cms. Continue Abx. Lasix as tolerated. Follow ABG. Follow cultures. Not stable to go for CT  --Continue steroids and bronchodilators  --Follow lactic acid  --Supportive care    Prophylaxis  - DVT:  SQ Lovenox  - Stress Ulcer:  PPI      Mynor Gonzalez MD  [facility]  [phone]

## 2020-06-24 NOTE — NURSING
[facility] present for transport; attempted to call family, no answer. Message left on brother's phone ([phone]).

## 2020-06-24 NOTE — EICU
Rounding (Video Assessment):  No    Intervention Initiated From:  Bedside    Rosalie Communicated with Bedside Nurse regarding:  Respiratory    Nurse Notified:  No    Doctor Notified:  Yes    Comments: Resp therapist elert to give md abg results. Informed dr. Gonzalez. md is going into room now.   22:08-resp therapist elert again to clarify order placed by Dr. Gonzalez. Informed Dr. Gonzalez. He is going into room now.

## 2020-06-24 NOTE — CARE UPDATE
06/24/20 0809   Patient Assessment/Suction   Level of Consciousness (AVPU) responds to pain   THANG Breath Sounds wheezes, expiratory   LLL Breath Sounds diminished   RUL Breath Sounds clear   RML Breath Sounds diminished   RLL Breath Sounds diminished   Secretions Amount scant   Secretions Color white   Secretions Characteristics thick   PRE-TX-O2   O2 Device (Oxygen Therapy) ventilator   $ Is the patient on Low Flow Oxygen? Yes   Oxygen Concentration (%) 100   SpO2 98 %   Pulse Oximetry Type Continuous   $ Pulse Oximetry - Multiple Charge Pulse Oximetry - Multiple   Pulse 69   Resp 20   Aerosol Therapy   $ Aerosol Therapy Charges Aerosol Treatment   Respiratory Treatment Status (SVN) given   Treatment Route (SVN) in-line   Patient Position (SVN) semi-Howard's   Post Treatment Assessment (SVN) breath sounds unchanged   Signs of Intolerance (SVN) none   Breath Sounds Post-Respiratory Treatment   Throughout All Fields Post-Treatment All Fields   Throughout All Fields Post-Treatment no change   Post-treatment Heart Rate (beats/min) 70   Post-treatment Resp Rate (breaths/min) 28        Airway - Non-Surgical 06/22/20 1625 Endotracheal Tube   Placement Date/Time: 06/22/20 1625   Present Prior to Hospital Arrival?: No  Method of Intubation: Direct laryngoscopy  Inserted by: MD  Airway Device: Endotracheal Tube  Mask Ventilation: Easy  Blade: Ramon #3  Airway Device Size: 7.5  Style: Cuffed...   Secured at 25 cm   Measured At Lips   Secured Location Center   Bite Block center   Status Intact;Secured;Patent   Vent Select   Conventional Vent Y   $ Ventilator Subsequent 1   Charged w/in last 24h YES   Preset Conventional Ventilator Settings   Vent Type    Ventilation Type VC   Vent Mode SIMV   Humidity HME   Set Rate 20 BPM   Vt Set 550 mL   PEEP/CPAP 12 cmH20   Pressure Support 10 cmH20   Waveform RAMP   Peak Flow 70 L/min   Set Inspiratory Pressure 0 cmH20   Insp Time 0 Sec(s)   Plateau Set/Insp. Hold (sec) 0   Insp  Rise Time  100 %   Trigger Sensitivity Flow/I-Trigger 2 L/min   P High 0 cm H2O   P Low 0 cm H2O   T High 0 sec   T Low 0 sec   Patient Ventilator Parameters   Resp Rate Total 20 br/min   Peak Airway Pressure 37 cmH2O   Mean Airway Pressure 19 cmH20   Plateau Pressure 0 cmH20   Exhaled Vt 589 mL   Total Ve 11.8 mL   Spont Ve 0 L   I:E Ratio Measured 1:2.50   Conventional Ventilator Alarms   Alarms On Y   Ve High Alarm 24 L/min   Resp Rate High Alarm 40 br/min   Press High Alarm 50 cmH2O   Apnea Rate 20   Apnea Volume (mL) 550 mL   Apnea Oxygen Concentration  100   Apnea Flow Rate (L/min) 70   T Apnea 20 sec(s)   Ready to Wean/Extubation Screen   FIO2<=50 (chart decimal) (!) 1   MV<16L (chart vol.) 11.8   PEEP <=8 (chart #) (!) 12   Ready to Wean Parameters   F/VT Ratio<105 (RSBI) (!) 33.96   , Duo Q4, Pulmicort QD, tolerated tx well, vitals as charted, Dr Gentile changed vent settings, will get ABG around 1000.

## 2020-06-24 NOTE — ASSESSMENT & PLAN NOTE
Patient with Hypercapnic and Hypoxic Respiratory failure which is Acute.  she is not on home oxygen. Supplemental ventilation was provided and noted- Vent Mode: SIMV  Oxygen Concentration (%):  [100] 100  Resp Rate Total:  [19 br/min-27 br/min] 20 br/min  Vt Set:  [550 mL] 550 mL  PEEP/CPAP:  [12 cmH20-15 cmH20] 12 cmH20  Pressure Support:  [10 cmH20] 10 cmH20  Mean Airway Pressure:  [19 ycD15-50 cmH20] 19 cmH20 and oxygen saturations 97%. Differential diagnosis includes - COPD, CHF, Obesity Hypoventilation, Interstitial lung disease and Pneumonia Labs and images were reviewed. Will treat underlying causes.   Consult pulmonology.

## 2020-06-24 NOTE — SUBJECTIVE & OBJECTIVE
Interval History:  Patient is in intensive care unit with respiratory failure, on mechanical ventilation after getting transferred from [place], [place].  Presently requiring PEEP 12 cm of water.  Patient is afebrile.  No acute distress noted.    Review of Systems   Unable to perform ROS: Intubated     Objective:     Vital Signs (Most Recent):  Temp: 98.7 °F (37.1 °C) (06/24/20 0745)  Pulse: 69 (06/24/20 0809)  Resp: 20 (06/24/20 0809)  BP: (!) 108/56 (06/24/20 0745)  SpO2: 98 % (06/24/20 0809) Vital Signs (24h Range):  Temp:  [98.2 °F (36.8 °C)-99.1 °F (37.3 °C)] 98.7 °F (37.1 °C)  Pulse:  [] 69  Resp:  [19-34] 20  SpO2:  [95 %-98 %] 98 %  BP: (102-140)/(51-89) 108/56     Weight: (!) 165 kg (363 lb 12.1 oz)  Body mass index is 66.53 kg/m².    Intake/Output Summary (Last 24 hours) at 6/24/2020 0829  Last data filed at 6/24/2020 0758  Gross per 24 hour   Intake 2061.27 ml   Output 2325 ml   Net -263.73 ml      Physical Exam  Vitals signs and nursing note reviewed.   Constitutional:       Appearance: She is well-developed. She is obese.      Interventions: She is sedated and intubated.   HENT:      Head: Normocephalic and atraumatic.   Eyes:      Conjunctiva/sclera: Conjunctivae normal.      Pupils: Pupils are equal, round, and reactive to light.   Neck:      Musculoskeletal: Normal range of motion and neck supple.   Cardiovascular:      Rate and Rhythm: Normal rate and regular rhythm.      Pulses: Normal pulses.      Heart sounds: Normal heart sounds.   Pulmonary:      Effort: Pulmonary effort is normal. She is intubated.      Breath sounds: Decreased breath sounds present.   Abdominal:      General: Bowel sounds are normal.      Palpations: Abdomen is soft. There is no mass.      Tenderness: There is no abdominal tenderness.   Genitourinary:     Comments: Vazquez catheter in place    Musculoskeletal:         General: No swelling or deformity.   Skin:     General: Skin is warm and dry.       Capillary Refill: Capillary refill takes 2 to 3 seconds.   Neurological:      Comments: Unable to assess as pt is on mechanical ventilation with sedation     Psychiatric:      Comments: Sedated          Significant Labs:   CBC:   Recent Labs   Lab 06/22/20  0858 06/23/20  0715 06/24/20  0423   WBC 11.68 23.88* 26.11*   HGB 13.1 13.5 12.7   HCT 43.0 43.6 41.6    341 353*     CMP:   Recent Labs   Lab 06/22/20  0858 06/23/20  0715 06/24/20  0423    137 140   K 4.2 4.0 4.4    96 99   CO2 31* 30* 31*   * 201* 154*   BUN 19 21* 17   CREATININE 0.7 0.8 0.9   CALCIUM 8.6* 8.8 9.0   PROT 7.4 7.4 6.6   ALBUMIN 3.4* 3.4* 2.7*   BILITOT 0.6 0.6 0.3   ALKPHOS 66 56 59   AST 20 18 17   ALT 19 18 15   ANIONGAP 7* 11 10   EGFRNONAA >60.0 >60.0 >60     Lactic Acid:   Recent Labs   Lab 06/23/20  1246 06/23/20 2201   LACTATE 2.4* 2.0     Microbiology Results (last 7 days)     Procedure Component Value Units Date/Time    Culture, Respiratory with Gram Stain [[id number]] Collected: 06/23/20 2152    Order Status: Completed Specimen: Respiratory from Tracheal Aspirate Updated: 06/24/20 0626     Gram Stain (Respiratory) <10 epithelial cells per low power field.     Gram Stain (Respiratory) Few WBC's     Gram Stain (Respiratory) Rare Gram positive cocci    Blood culture [[id number]] Collected: 06/23/20 2201    Order Status: Sent Specimen: Blood Updated: 06/24/20 0107        Significant Imaging:   ECHO:  · Concentric left ventricular remodeling.  · Normal left ventricular systolic function. The estimated ejection fraction is 69%.  · Normal LV diastolic function.  · No wall motion abnormalities.  · Normal right ventricular systolic function.  · Mild right atrial enlargement.  · Trivial pericardial effusion.  · Mild left atrial enlargement.    CXR: Mild cardiomegaly.  Hypoventilation.    CXR:   1. Interval placement of endotracheal tube with the tip in the proximal right mainstem bronchus.  This should be withdrawn  several cm.  2. The study is motion degraded.  Indistinctness of the left hemidiaphragm could be related to patient motion with atelectasis and/or pleural effusion not completely excluded.    CXR:  1. Limited evaluation secondary to technique.  2. Findings suspicious for congestive heart failure which does not appear significantly changed over the interval.  3. Small bilateral pleural effusions with bibasilar dependent atelectasis.  4. Endotracheal tube.    CXR:  Stable positioning of support devices.  Unchanged bibasilar consolidation/atelectasis.

## 2020-06-24 NOTE — ASSESSMENT & PLAN NOTE
This patient does have evidence of infective focus  My overall impression is severe sepsis.  Antibiotics given-   Antibiotics (From admission, onward)    Start     Stop Route Frequency Ordered    06/24/20 1400  levoFLOXacin 750 mg/150 mL IVPB 750 mg      -- IV Every 24 hours (non-standard times) 06/23/20 2045 06/24/20 0000  vancomycin (VANCOCIN) 2,000 mg in dextrose 5 % 500 mL IVPB      -- IV Every 12 hours (non-standard times) 06/23/20 2129 06/23/20 2200  piperacillin-tazobactam 4.5 g in dextrose 5 % 100 mL IVPB (ready to mix system)      -- IV Every 8 hours (non-standard times) 06/23/20 2045 06/23/20 2144  vancomycin - pharmacy to dose  (vancomycin IVPB)      -- IV pharmacy to manage frequency 06/23/20 2045          Severe Sepsis only-  Latest labs reviewed, they are-  Recent Labs   Lab 06/24/20  0423   BILITOT 0.3     Recent Labs   Lab 06/23/20  1246 06/23/20  2201   LACTATE 2.4* 2.0     Recent Labs   Lab 06/24/20  0511   PH 7.502*   PO2 57*   PCO2 43.2   HCO3 33.8*   BE 11       Organ dysfunction indicated by Acute respiratory failure      Temp Readings from Last 1 Encounters:   06/24/20 98.7 °F (37.1 °C) (Axillary)     BP Readings from Last 1 Encounters:   06/24/20 (!) 108/56     Pulse Readings from Last 1 Encounters:   06/24/20 69

## 2020-06-24 NOTE — PLAN OF CARE
Able to wean pt's Peep to 12. Vent setting SIMV 20    FIO2 100%  Peep 12.Consult to Dr Gentile will be called this am. On propofol at 50mcg/ Fentanyl at 80mcg. Does respond when talked to. Able to nod. Oral care done. Lungs with decrease BS bilateral. HR 76. Sinus. Vazquez output large amount. Temp 99.1 ax. Sputum asp sent to lab last night.On soft wrist restraint. Safety and fall prevention maintain.

## 2020-06-24 NOTE — RESPIRATORY THERAPY
06/23/20 2013   PRE-TX-O2   Oxygen Concentration (%) 100   Pulse 84   Resp (!) 34   Vent Select   Charged w/in last 24h NO   Preset Conventional Ventilator Settings   Ventilation Type VC   Vent Mode SIMV   Set Rate 20 BPM   Vt Set 550 mL   PEEP/CPAP 15 cmH20   Pressure Support 10 cmH20   Waveform RAMP   Peak Flow 70 L/min   Set Inspiratory Pressure 0 cmH20   Insp Time 0 Sec(s)   Plateau Set/Insp. Hold (sec) 0   Insp Rise Time  100 %   Trigger Sensitivity Flow/I-Trigger 3 L/min   P High 0 cm H2O   P Low 0 cm H2O   T High 0 sec   T Low 0 sec   Patient Ventilator Parameters   Resp Rate Total 20 br/min   Peak Airway Pressure 47 cmH2O   Mean Airway Pressure 23 cmH20   Plateau Pressure 0 cmH20   Exhaled Vt 565 mL   Total Ve 9.48 mL   Spont Ve 0 L   I:E Ratio Measured 1:2.50   Conventional Ventilator Alarms   Ve High Alarm 22 L/min   Resp Rate High Alarm 0 br/min   Press High Alarm 50 cmH2O   Apnea Rate 20   Apnea Volume (mL) 550 mL   Apnea Oxygen Concentration  100   Apnea Flow Rate (L/min) 70   T Apnea 20 sec(s)   Ready to Wean/Extubation Screen   FIO2<=50 (chart decimal) (!) 1   MV<16L (chart vol.) 9.48   PEEP <=8 (chart #) (!) 15   Ready to Wean Parameters   F/VT Ratio<105 (RSBI) (!) 60.18   PT.  Arrived from [place] intubated with a 7.5 ett, 25 at the lips. She was placed on a vent with the settings reported from previous hospital

## 2020-06-24 NOTE — ASSESSMENT & PLAN NOTE
Not on chronic medication.  A1c 6.2%.  Accuchecks ac/hs with SSI coverage as needed.  Nutrition consult as pt NPO on vent.

## 2020-06-24 NOTE — ASSESSMENT & PLAN NOTE
Patient with peripheral edema, mildly elevated BNP.  IV lasix was initiated at [facility].    Echo was completed today with results as follows:  · Concentric left ventricular remodeling.  · Normal left ventricular systolic function. The estimated ejection fraction is 69%.  · Normal LV diastolic function.  · No wall motion abnormalities.  · Normal right ventricular systolic function.  · Mild right atrial enlargement.  · Trivial pericardial effusion.  · Mild left atrial enlargement.    IV lasix discontinued as no indication of heart failure.

## 2020-06-24 NOTE — ASSESSMENT & PLAN NOTE
Patient with Hypercapnic and Hypoxic Respiratory failure which is Acute.  she is not on home oxygen. Supplemental ventilation was provided and noted- Vent Mode: SIMV  Oxygen Concentration (%):  [100] 100  Resp Rate Total:  [20 br/min-23 br/min] 20 br/min  Vt Set:  [550 mL] 550 mL  PEEP/CPAP:  [12 cmH20-15 cmH20] 12 cmH20  Pressure Support:  [10 cmH20] 10 cmH20  Mean Airway Pressure:  [19 ozG05-29 cmH20] 19 cmH20 and oxygen saturations 97%. Differential diagnosis includes - COPD, CHF, Obesity Hypoventilation, Interstitial lung disease and Pneumonia Labs and images were reviewed. Will treat underlying causes.   Follow pulmonary recommendations.

## 2020-06-24 NOTE — H&P
[facility] Hospital Medicine  History & Physical    Patient Name: Michelle Wren  MRN: [id number]  Admission Date: 2020  Attending Physician: Peg Stewart MD   Primary Care Provider: Primary Doctor No         Patient information was obtained from past medical records and ER records.     Subjective:     Principal Problem:Severe sepsis    Chief Complaint:   Chief Complaint   Patient presents with    Shortness of Breath        HPI: Michelle Wren is a 31 y/o female with a past medical history significant for asthma, type 2 diabetes, and severe obesity who is transferred from [facility] to [facility] for pulmonary consultation.  Patient presented to the ED at [facility] yesterday with complaints of 2-3 days of shortness of breath.  It is reported that she uses CPAP at night but she got this from a friend so unsure of settings.  She was placed on BiPAP and given IV Solu-Medrol, bronchodilator treatments, and IV Lasix.  She was admitted to the ICU at [facility].  Patient continued to decline and underwent intubation yesterday around 7:00 p.m.  per report, today patient appeared to be worse.  Her saturations were in the mid 90s on FiO2 of 100%.  A D-dimer was checked and was negative.  Per review of labs which were drawn earlier today, her WBC count was 23,000 and her lactic acid was 2.4.  She was placed on IV Zosyn and IV vancomycin.  Upon arrival to [facility], patient is in no acute distress.  She is intubated and sedated.  Vital signs are stable.  Lactic acid will be repeated now as patient meets sepsis criteria and will check a procalcitonin.  She will be monitored closely in the ICU.           Past Medical History:   Diagnosis Date    Asthma     Back pain     Diabetes mellitus     Morbid obesity     Obesity        Past Surgical History:   Procedure Laterality Date    APPENDECTOMY       SECTION         Review of patient's allergies indicates:  No  Known Allergies    Current Facility-Administered Medications on File Prior to Encounter   Medication    [] albuterol-ipratropium (DUO-NEB) 2.5 mg-0.5 mg/3 mL nebulizer solution    [COMPLETED] albuterol-ipratropium (DUO-NEB) 2.5 mg-0.5 mg/3 mL nebulizer solution    [] midazolam (VERSED) 1 mg/mL injection    [] succinylcholine (ANECTINE) 20 mg/mL injection    [] succinylcholine (ANECTINE) 20 mg/mL injection    [DISCONTINUED] 0.9%  NaCl infusion    [DISCONTINUED] albuterol-ipratropium 2.5 mg-0.5 mg/3 mL nebulizer solution 3 mL    [DISCONTINUED] albuterol-ipratropium 2.5 mg-0.5 mg/3 mL nebulizer solution 3 mL    [DISCONTINUED] budesonide nebulizer solution 0.25 mg    [DISCONTINUED] enoxaparin injection 160 mg    [DISCONTINUED] enoxaparin injection 40 mg    [DISCONTINUED] fentaNYL 2500 mcg in 0.9% sodium chloride 250 mL infusion premix (titrating)    [DISCONTINUED] furosemide injection 40 mg    [DISCONTINUED] levoFLOXacin 750 mg/150 mL IVPB 750 mg    [DISCONTINUED] methylPREDNISolone sodium succinate injection 125 mg    [DISCONTINUED] midazolam (VERSED) 5 mg/mL injection 5 mg    [DISCONTINUED] morphine injection 2 mg    [DISCONTINUED] piperacillin-tazobactam 4.5 g in dextrose 5 % 100 mL IVPB (ready to mix system)    [DISCONTINUED] propofol (DIPRIVAN) 10 mg/mL infusion    [DISCONTINUED] propofoL (DIPRIVAN) 10 mg/mL infusion    [DISCONTINUED] sodium chloride 0.9% flush 10 mL    [DISCONTINUED] vancomycin (VANCOCIN) 1,500 mg in dextrose 5 % 250 mL IVPB    [DISCONTINUED] vancomycin - pharmacy to dose     Current Outpatient Medications on File Prior to Encounter   Medication Sig    albuterol (PROVENTIL/VENTOLIN HFA) 90 mcg/actuation inhaler Inhale 1-2 puffs into the lungs every 6 (six) hours as needed for Wheezing. Rescue    azithromycin (Z-JOSE ALBERTO) 250 MG tablet Take 1 tablet (250 mg total) by mouth once daily. Take first 2 tablets together, then 1 every day until finished.     metformin HCl (METFORMIN ORAL) Take by mouth.     Family History     Family history is unknown by patient.        Tobacco Use    Smoking status: Current Every Day Smoker   Substance and Sexual Activity    Alcohol use: Yes     Frequency: Never     Comment: occ    Drug use: No    Sexual activity: Yes     Birth control/protection: None     Review of Systems   Unable to perform ROS: Intubated     Objective:     Vital Signs (Most Recent):  Temp: 98.7 °F (37.1 °C) (06/23/20 2024)  Pulse: 85 (06/23/20 2215)  Resp: 20 (06/23/20 2215)  BP: (!) 140/89 (06/23/20 2024)  SpO2: 97 % (06/23/20 2215) Vital Signs (24h Range):  Temp:  [98.2 °F (36.8 °C)-98.8 °F (37.1 °C)] 98.7 °F (37.1 °C)  Pulse:  [] 85  Resp:  [19-34] 20  SpO2:  [91 %-99 %] 97 %  BP: (106-140)/(52-89) 140/89     Weight: (!) 164.7 kg (363 lb 1.6 oz)  Body mass index is 66.41 kg/m².    Physical Exam  Vitals signs and nursing note reviewed.   Constitutional:       Appearance: She is well-developed. She is obese.      Interventions: She is sedated and intubated.   HENT:      Head: Normocephalic and atraumatic.   Eyes:      Conjunctiva/sclera: Conjunctivae normal.      Pupils: Pupils are equal, round, and reactive to light.   Neck:      Musculoskeletal: Normal range of motion and neck supple.   Cardiovascular:      Rate and Rhythm: Normal rate and regular rhythm.      Pulses: Normal pulses.      Heart sounds: Normal heart sounds.   Pulmonary:      Effort: Pulmonary effort is normal. She is intubated.      Breath sounds: Decreased breath sounds present.   Abdominal:      General: Bowel sounds are normal.      Palpations: Abdomen is soft. There is no mass.      Tenderness: There is no abdominal tenderness.   Genitourinary:     Comments: Vazquez catheter in place    Musculoskeletal:         General: No swelling or deformity.   Skin:     General: Skin is warm and dry.      Capillary Refill: Capillary refill takes 2 to 3 seconds.   Neurological:      Comments: Unable  to assess as pt is on mechanical ventilation with sedation     Psychiatric:      Comments: Sedated            CRANIAL NERVES     CN III, IV, VI   Pupils are equal, round, and reactive to light.       Significant Labs:   CBC:   Recent Labs   Lab 06/22/20  0858 06/23/20  0715   WBC 11.68 23.88*   HGB 13.1 13.5   HCT 43.0 43.6    341     CMP:   Recent Labs   Lab 06/22/20  0858 06/23/20  0715    137   K 4.2 4.0    96   CO2 31* 30*   * 201*   BUN 19 21*   CREATININE 0.7 0.8   CALCIUM 8.6* 8.8   PROT 7.4 7.4   ALBUMIN 3.4* 3.4*   BILITOT 0.6 0.6   ALKPHOS 66 56   AST 20 18   ALT 19 18   ANIONGAP 7* 11   EGFRNONAA >60.0 >60.0     Lactic Acid:   Recent Labs   Lab 06/23/20  1246 06/23/20  2201   LACTATE 2.4* 2.0       Significant Imaging: CXR:   1. Pulmonary hypoinflation.  2. Findings consistent with congestive heart failure which is stable to slightly increased over the interval with small bilateral pleural effusions.  3. Satisfactory indwelling life-support devices.    Assessment/Plan:     * Severe sepsis  This patient does have evidence of infective focus  My overall impression is severe sepsis.  Antibiotics given-   Antibiotics (From admission, onward)    Start     Stop Route Frequency Ordered    06/24/20 1400  levoFLOXacin 750 mg/150 mL IVPB 750 mg      -- IV Every 24 hours (non-standard times) 06/23/20 2045 06/24/20 0000  vancomycin (VANCOCIN) 2,000 mg in dextrose 5 % 500 mL IVPB      -- IV Every 12 hours (non-standard times) 06/23/20 2129 06/23/20 2200  piperacillin-tazobactam 4.5 g in dextrose 5 % 100 mL IVPB (ready to mix system)      -- IV Every 8 hours (non-standard times) 06/23/20 2045 06/23/20 2144  vancomycin - pharmacy to dose  (vancomycin IVPB)      -- IV pharmacy to manage frequency 06/23/20 2045          Severe Sepsis only-  Latest labs reviewed, they are-  Recent Labs   Lab 06/23/20  0715   BILITOT 0.6     Recent Labs   Lab 06/23/20  1246 06/23/20  2201   LACTATE 2.4*  2.0     Recent Labs   Lab 06/23/20  2152   PH 7.449   PO2 63*   PCO2 48.3*   HCO3 33.5*   BE 9       Organ dysfunction indicated by Acute respiratory failure      Temp Readings from Last 1 Encounters:   06/23/20 98.7 °F (37.1 °C) (Axillary)     BP Readings from Last 1 Encounters:   06/23/20 (!) 140/89     Pulse Readings from Last 1 Encounters:   06/23/20 85           Acute respiratory failure with hypoxia and hypercarbia  Patient with Hypercapnic and Hypoxic Respiratory failure which is Acute.  she is not on home oxygen. Supplemental ventilation was provided and noted- Vent Mode: SIMV  Oxygen Concentration (%):  [100] 100  Resp Rate Total:  [19 br/min-27 br/min] 20 br/min  Vt Set:  [550 mL] 550 mL  PEEP/CPAP:  [12 cmH20-15 cmH20] 12 cmH20  Pressure Support:  [10 cmH20] 10 cmH20  Mean Airway Pressure:  [19 ieS66-76 cmH20] 19 cmH20 and oxygen saturations 97%. Differential diagnosis includes - COPD, CHF, Obesity Hypoventilation, Interstitial lung disease and Pneumonia Labs and images were reviewed. Will treat underlying causes.   Consult pulmonology.      Bilateral pneumonia  IV zosyn, IV vancomycin-de-escalate as appropriate  Blood and sputum cultures-follow  Monitor clinical response      Nicotine dependence  Health hazards associated with cigarette smoking should be reviewed with patient and cessation encouraged when pt is able to participate.       Mild intermittent asthma  Chronic; rescue inhaler only noted to home meds, no controller.  Continue bronchodilators q4h.  Currently intubated on mechanical ventilation.  Pulmonary consult.      Cardiomegaly  Patient with peripheral edema, mildly elevated BNP.  IV lasix was initiated at [facility].    Echo was completed today with results as follows:  · Concentric left ventricular remodeling.  · Normal left ventricular systolic function. The estimated ejection fraction is 69%.  · Normal LV diastolic function.  · No wall motion abnormalities.  · Normal right ventricular systolic  function.  · Mild right atrial enlargement.  · Trivial pericardial effusion.  · Mild left atrial enlargement.    IV lasix discontinued as no indication of heart failure.    Obesity hypoventilation syndrome  On cpap at home.  Currently requiring IPPV.        Class 3 severe obesity with serious comorbidity and body mass index (BMI) of 60.0 to 69.9 in adult  Body mass index is 66.41 kg/m². Morbid obesity complicates all aspects of disease management from diagnostic modalities to treatment. Weight loss encouraged and health benefits explained to patient.    Type 2 diabetes mellitus  Not on chronic medication.  A1c 6.2%.  Accuchecks ac/hs with SSI coverage as needed.  Nutrition consult as pt NPO on vent.        VTE Risk Mitigation (From admission, onward)         Ordered     enoxaparin injection 40 mg  Every 12 hours      06/23/20 2327     IP VTE HIGH RISK PATIENT  Once      06/23/20 2055     Place sequential compression device  Until discontinued      06/23/20 2055              Critical care time spent on the evaluation and treatment of severe organ dysfunction, review of pertinent labs and imaging studies, discussions with consulting providers and discussions with patient/family: 55 minutes.     GABBI Davis  Department of Hospital Medicine   [facility]

## 2020-06-24 NOTE — CONSULTS
"  2020      Admit Date: 2020  Michelle Wren  New Patient Consult    Chief Complaint   Patient presents with    Shortness of Breath       History of Present Illness: poor history, uses cpap at home?   Pt presented to [place] with cough/wheezes2019, 2019 - then presented  with resp failure.  Pt was intubated after failing niv.  Pt reported increase sob 2-3 days pta with increased leg swelling.  rm air pao2 was  53 with co2 61- abg just 7 hrs later had 02 111 with niv and co2 84 on 50%, pt was intubated with right mainstem on initial cxr, subsequently had need for high 02, tidal vol was 550 (400 was 8 cc/kg pbw)- peep was between 12-15 with need for 100%.  Pt was dosed steroids with procal 0.02, d dimer was wnl, wbc initially 11.7 going to 24-26 post steroids.  Echo  was good.      Pt transported to [facility] yest pm.  Pt on lovenox 40 bid.        PFSH:  Past Medical History:   Diagnosis Date    Asthma     Back pain     Diabetes mellitus     Morbid obesity     Obesity      Past Surgical History:   Procedure Laterality Date    APPENDECTOMY       SECTION       Social History     Tobacco Use    Smoking status: Current Every Day Smoker   Substance Use Topics    Alcohol use: Yes     Frequency: Never     Comment: occ    Drug use: No     Family History   Family history unknown: Yes     Review of patient's allergies indicates:  No Known Allergies       Review of Systems:  due to neurologic status/impairments a Review of Systems could not be obtained       Exam:Comprehensive exam done. BP (!) 108/56   Pulse 69   Temp 98.7 °F (37.1 °C) (Axillary)   Resp 20   Ht 5' 2" (1.575 m)   Wt (!) 165 kg (363 lb 12.1 oz)   SpO2 98%   Breastfeeding No   BMI 66.53 kg/m²   Exam included Vitals as listed, and patient's appearance and affect and alertness and mood, oral exam for yeast and hygiene and pharynx lesions and Mallapatti (M) score, neck with inspection for jvd and masses and " thyroid abnormalities and lymph nodes (supraclavicular and infraclavicular nodes also examined and noted if abn), chest exam included symmetry and effort and fremitus and percussion and auscultation, cardiac exam included rhythm and gallops and murmur and rubs and jvd and edema, abdominal exam for mass and hepatosplenomegaly and tenderness and hernias and bowel sounds, Musculoskeletal exam with muscle tone and posture and mobility/gait and  strenght, and skin for rashes and cyanosis and pallor and turgor, extremity for clubbing.  Findings were normal except as listed below:   morbid obese, oral et tube, chest is symmetric, no distress, normal percussion, normal fremitus and decreased breath sounds, no edema, distent cor.      Radiographs reviewed: view by direct vision initial cxr clear, right jeff stem with intubation, vague ggo/infiltrates/atelectasis  Results for orders placed during the hospital encounter of 06/22/20   X-Ray Chest 1 View    Narrative EXAMINATION:  XR CHEST 1 VIEW    CLINICAL HISTORY:  LINE PLACEMENT;    TECHNIQUE:  Single frontal view of the chest was performed.    COMPARISON:  06/23/2020.    FINDINGS:  There is persistent pulmonary hypoinflation.  There are bilateral pulmonary infiltrates which are stable to slightly increased over the interval likely representing pulmonary edema.  Small bilateral pleural effusions with bibasilar dependent atelectasis.    Heart size is enlarged.  Mediastinal contours unremarkable.  Trachea midline.    Endotracheal tube remains in satisfactory position.  Interval placement of right IJ catheter which terminates in the distal SVC.      Impression 1. Pulmonary hypoinflation.  2. Findings consistent with congestive heart failure which is stable to slightly increased over the interval with small bilateral pleural effusions.  3. Satisfactory indwelling life-support devices.      Electronically signed by: Navin Mortensen  Date:    06/23/2020  Time:    11:58    ]    Labs     Recent Labs   Lab 06/24/20  0423   WBC 26.11*   HGB 12.7   HCT 41.6   *     Recent Labs   Lab 06/23/20 2201 06/24/20  0423   NA  --  140   K  --  4.4   CL  --  99   CO2  --  31*   BUN  --  17   CREATININE  --  0.9   GLU  --  154*   CALCIUM  --  9.0   MG  --  2.2   PHOS  --  2.6*   AST  --  17   ALT  --  15   ALKPHOS  --  59   BILITOT  --  0.3   PROT  --  6.6   ALBUMIN  --  2.7*   PROCAL 0.02  --    LACTATE 2.0  --      Recent Labs   Lab 06/24/20  0511   PH 7.502*   PCO2 43.2   PO2 57*   HCO3 33.8*     Microbiology Results (last 7 days)     Procedure Component Value Units Date/Time    Culture, Respiratory with Gram Stain [[id number]] Collected: 06/23/20 2152    Order Status: Completed Specimen: Respiratory from Tracheal Aspirate Updated: 06/24/20 0626     Gram Stain (Respiratory) <10 epithelial cells per low power field.     Gram Stain (Respiratory) Few WBC's     Gram Stain (Respiratory) Rare Gram positive cocci    Blood culture [[id number]] Collected: 06/23/20 2201    Order Status: Sent Specimen: Blood Updated: 06/24/20 0107          Impression:  Active Hospital Problems    Diagnosis  POA    *Severe sepsis [A41.9, R65.20]  Yes    Type 2 diabetes mellitus [E11.9]  Yes    Nicotine dependence [F17.200]  Yes    Bilateral pneumonia [J18.9]  Yes    Acute respiratory failure with hypoxia and hypercarbia [J96.01, J96.02]  Yes    Obesity hypoventilation syndrome [E66.2]  Yes    Mild intermittent asthma [J45.20]  Yes    Class 3 severe obesity with serious comorbidity and body mass index (BMI) of 60.0 to 69.9 in adult [E66.01, Z68.44]  Not Applicable    Cardiomegaly [I51.7]  Yes      Resolved Hospital Problems   No resolved problems to display.               Plan: dx not clear.  Will do cta in am if stable.   Continue support and abx/asthma rx.  [place] guarded.    The following were evaluated and adjusted as needed: mechanical ventilator settings and weaning status, sedation and neurologic  status, antibiotics, hemodynamics, support tubes and access lines and invasive monitoring, acid base balance and oxygenation needs and input and output and renal status       Critical Care  - THE PATIENT HAS A HIGH PROBABILITY OF IMMINENT OR LIFE THREATENING DETERIORATION.  Over 50%time of encounter was in direct care at bedside.  Time was 30 to 74 minutes required for patient care.  Time needed for all of the above totaled 45 minutes.

## 2020-06-24 NOTE — NURSING
Arrived from [facility]  Accompanied by Gwyn NORRIS. Pt intubated with Propofol at 50mcg, Fentanyl at 80mcg and NS at 75ml. ET 7.5 at 25cm lip. Vent setting SIMV 20  PS10 Peep15 FIO2 100%.Nodded when talked to. Explain to her that she's transferred to [facility] in [place]. OGT #16 placed without difficulty. Placement verified. Bile color output obtain. Vazquez clear dana urine.

## 2020-06-24 NOTE — PLAN OF CARE
Intervention: collaboration with care providers     Recommendation:   1) Advance diet to goal of DM 1500 kcal when extubated      2) If diet not advanced in < 5 days consider nutrition support Peptamen VHP trickel feed @ 10 ml/hr ( until propofol weaning) then advancing to goal of 55 ml/hr + min 30 ml flush q 4 hr   ( 1320 kcal ( 100% EEN), 121 g protein ( 93% EPN), and 1108 ml free water)      3) Weigh pt weekly   4) Nutrition education on diabetic diet once pt appropriate     Goals: 1) PO diet advanced or nutrition support initiates in < 5 days  Nutrition Goal Status: new  Communication of RD Recs: reviewed with physician(POC, sticky note)

## 2020-06-24 NOTE — ASSESSMENT & PLAN NOTE
Patient with peripheral edema, mildly elevated BNP.  IV lasix was initiated at [facility].    Echo was completed today with results as follows:  · Concentric left ventricular remodeling.  · Normal left ventricular systolic function. The estimated ejection fraction is 69%.  · Normal LV diastolic function.  · No wall motion abnormalities.  · Normal right ventricular systolic function.  · Mild right atrial enlargement.  · Trivial pericardial effusion.  · Mild left atrial enlargement.    IV lasix discontinued as no indication of heart failure.

## 2020-06-24 NOTE — PROGRESS NOTES
[facility] Hospital Medicine  Progress Note    Patient Name: Michelle Wren  MRN: [id number]  Patient Class: IP- Inpatient   Admission Date: 6/23/2020  Length of Stay: 1 days  Attending Physician: Adriana Barrios MD  Primary Care Provider: Primary Doctor No        Subjective:     Principal Problem:Severe sepsis    HPI:  Michelle Wren is a 29 y/o female with a past medical history significant for asthma, type 2 diabetes, and severe obesity who is transferred from [facility] to [facility] for pulmonary consultation.  Patient presented to the ED at [facility] yesterday with complaints of 2-3 days of shortness of breath.  It is reported that she uses CPAP at night but she got this from a friend so unsure of settings.  She was placed on BiPAP and given IV Solu-Medrol, bronchodilator treatments, and IV Lasix.  She was admitted to the ICU at [facility].  Patient continued to decline and underwent intubation yesterday around 7:00 p.m.  per report, today patient appeared to be worse.  Her saturations were in the mid 90s on FiO2 of 100%.  A D-dimer was checked and was negative.  Per review of labs which were drawn earlier today, her WBC count was 23,000 and her lactic acid was 2.4.  She was placed on IV Zosyn and IV vancomycin.  Upon arrival to [facility], patient is in no acute distress.  She is intubated and sedated.  Vital signs are stable.  Lactic acid will be repeated now as patient meets sepsis criteria and will check a procalcitonin.  She will be monitored closely in the ICU.           Overview/Hospital Course:  No notes on file    Interval History:  Patient is in intensive care unit with respiratory failure, on mechanical ventilation after getting transferred from [place], [place].  Presently requiring PEEP 12 cm of water.  Patient is afebrile.  No acute distress noted.  COVID-19 droplet/airborne/contact precautions underway.  Repeat COVID -19  results pending.    Review of Systems   Unable to perform ROS: Intubated     Objective:     Vital Signs (Most Recent):  Temp: 98.7 °F (37.1 °C) (06/24/20 0745)  Pulse: 69 (06/24/20 0809)  Resp: 20 (06/24/20 0809)  BP: (!) 108/56 (06/24/20 0745)  SpO2: 98 % (06/24/20 0809) Vital Signs (24h Range):  Temp:  [98.2 °F (36.8 °C)-99.1 °F (37.3 °C)] 98.7 °F (37.1 °C)  Pulse:  [] 69  Resp:  [19-34] 20  SpO2:  [95 %-98 %] 98 %  BP: (102-140)/(51-89) 108/56     Weight: (!) 165 kg (363 lb 12.1 oz)  Body mass index is 66.53 kg/m².    Intake/Output Summary (Last 24 hours) at 6/24/2020 0829  Last data filed at 6/24/2020 0758  Gross per 24 hour   Intake 2061.27 ml   Output 2325 ml   Net -263.73 ml      Physical Exam  Vitals signs and nursing note reviewed.   Constitutional:       Appearance: She is well-developed. She is obese.      Interventions: She is sedated and intubated.   HENT:      Head: Normocephalic and atraumatic.   Eyes:      Conjunctiva/sclera: Conjunctivae normal.      Pupils: Pupils are equal, round, and reactive to light.   Neck:      Musculoskeletal: Normal range of motion and neck supple.   Cardiovascular:      Rate and Rhythm: Normal rate and regular rhythm.      Pulses: Normal pulses.      Heart sounds: Normal heart sounds.   Pulmonary:      Effort: Pulmonary effort is normal. She is intubated.      Breath sounds: Decreased breath sounds present.   Abdominal:      General: Bowel sounds are normal.      Palpations: Abdomen is soft. There is no mass.      Tenderness: There is no abdominal tenderness.   Genitourinary:     Comments: Vazquez catheter in place    Musculoskeletal:         General: No swelling or deformity.   Skin:     General: Skin is warm and dry.      Capillary Refill: Capillary refill takes 2 to 3 seconds.   Neurological:      Comments: Unable to assess as pt is on mechanical ventilation with sedation     Psychiatric:      Comments: Sedated          Significant Labs:   CBC:   Recent Labs   Lab  06/22/20  0858 06/23/20  0715 06/24/20  0423   WBC 11.68 23.88* 26.11*   HGB 13.1 13.5 12.7   HCT 43.0 43.6 41.6    341 353*     CMP:   Recent Labs   Lab 06/22/20  0858 06/23/20  0715 06/24/20  0423    137 140   K 4.2 4.0 4.4    96 99   CO2 31* 30* 31*   * 201* 154*   BUN 19 21* 17   CREATININE 0.7 0.8 0.9   CALCIUM 8.6* 8.8 9.0   PROT 7.4 7.4 6.6   ALBUMIN 3.4* 3.4* 2.7*   BILITOT 0.6 0.6 0.3   ALKPHOS 66 56 59   AST 20 18 17   ALT 19 18 15   ANIONGAP 7* 11 10   EGFRNONAA >60.0 >60.0 >60     Lactic Acid:   Recent Labs   Lab 06/23/20  1246 06/23/20 2201   LACTATE 2.4* 2.0     Microbiology Results (last 7 days)     Procedure Component Value Units Date/Time    Culture, Respiratory with Gram Stain [[id number]] Collected: 06/23/20 2152    Order Status: Completed Specimen: Respiratory from Tracheal Aspirate Updated: 06/24/20 0626     Gram Stain (Respiratory) <10 epithelial cells per low power field.     Gram Stain (Respiratory) Few WBC's     Gram Stain (Respiratory) Rare Gram positive cocci    Blood culture [[id number]] Collected: 06/23/20 2201    Order Status: Sent Specimen: Blood Updated: 06/24/20 0107        Significant Imaging:   ECHO:  · Concentric left ventricular remodeling.  · Normal left ventricular systolic function. The estimated ejection fraction is 69%.  · Normal LV diastolic function.  · No wall motion abnormalities.  · Normal right ventricular systolic function.  · Mild right atrial enlargement.  · Trivial pericardial effusion.  · Mild left atrial enlargement.    CXR: Mild cardiomegaly.  Hypoventilation.    CXR:   1. Interval placement of endotracheal tube with the tip in the proximal right mainstem bronchus.  This should be withdrawn several cm.  2. The study is motion degraded.  Indistinctness of the left hemidiaphragm could be related to patient motion with atelectasis and/or pleural effusion not completely excluded.    CXR:  1. Limited evaluation secondary to technique.  2.  Findings suspicious for congestive heart failure which does not appear significantly changed over the interval.  3. Small bilateral pleural effusions with bibasilar dependent atelectasis.  4. Endotracheal tube.    CXR:  Stable positioning of support devices.  Unchanged bibasilar consolidation/atelectasis.      Assessment/Plan:      * Severe sepsis  This patient does have evidence of infective focus  My overall impression is severe sepsis.  Antibiotics given-   Antibiotics (From admission, onward)    Start     Stop Route Frequency Ordered    06/24/20 1400  levoFLOXacin 750 mg/150 mL IVPB 750 mg      -- IV Every 24 hours (non-standard times) 06/23/20 2045 06/24/20 0000  vancomycin (VANCOCIN) 2,000 mg in dextrose 5 % 500 mL IVPB      -- IV Every 12 hours (non-standard times) 06/23/20 2129 06/23/20 2200  piperacillin-tazobactam 4.5 g in dextrose 5 % 100 mL IVPB (ready to mix system)      -- IV Every 8 hours (non-standard times) 06/23/20 2045 06/23/20 2144  vancomycin - pharmacy to dose  (vancomycin IVPB)      -- IV pharmacy to manage frequency 06/23/20 2045          Severe Sepsis only-  Latest labs reviewed, they are-  Recent Labs   Lab 06/24/20  0423   BILITOT 0.3     Recent Labs   Lab 06/23/20  1246 06/23/20  2201   LACTATE 2.4* 2.0     Recent Labs   Lab 06/24/20  0511   PH 7.502*   PO2 57*   PCO2 43.2   HCO3 33.8*   BE 11       Organ dysfunction indicated by Acute respiratory failure      Temp Readings from Last 1 Encounters:   06/24/20 98.7 °F (37.1 °C) (Axillary)     BP Readings from Last 1 Encounters:   06/24/20 (!) 108/56     Pulse Readings from Last 1 Encounters:   06/24/20 69           Type 2 diabetes mellitus  Not on chronic medication.  A1c 6.2%.  Accuchecks ac/hs with SSI coverage as needed.  Nutrition consult as pt NPO on vent.        Acute respiratory failure with hypoxia and hypercarbia  Patient with Hypercapnic and Hypoxic Respiratory failure which is Acute.  she is not on home oxygen.  Supplemental ventilation was provided and noted- Vent Mode: SIMV  Oxygen Concentration (%):  [100] 100  Resp Rate Total:  [20 br/min-23 br/min] 20 br/min  Vt Set:  [550 mL] 550 mL  PEEP/CPAP:  [12 cmH20-15 cmH20] 12 cmH20  Pressure Support:  [10 cmH20] 10 cmH20  Mean Airway Pressure:  [19 oiK63-61 cmH20] 19 cmH20 and oxygen saturations 97%. Differential diagnosis includes - COPD, CHF, Obesity Hypoventilation, Interstitial lung disease and Pneumonia Labs and images were reviewed. Will treat underlying causes.   Follow pulmonary recommendations.   Repeat COVID -19 results pending    Bilateral pneumonia  IV zosyn, IV vancomycin-de-escalate as appropriate  Blood and sputum cultures-follow  Monitor clinical response      Nicotine dependence  Health hazards associated with cigarette smoking should be reviewed with patient and cessation encouraged when pt is able to participate.       Mild intermittent asthma  Chronic; rescue inhaler only noted to home meds, no controller.  Continue bronchodilators q4h.  Currently intubated on mechanical ventilation.  Follow pulmonary recommendations. On IV Solumedrol 125 mg q 8 hrs.      Cardiomegaly  Patient with peripheral edema, mildly elevated BNP.  IV lasix was initiated at [facility].    Echo was completed today with results as follows:  · Concentric left ventricular remodeling.  · Normal left ventricular systolic function. The estimated ejection fraction is 69%.  · Normal LV diastolic function.  · No wall motion abnormalities.  · Normal right ventricular systolic function.  · Mild right atrial enlargement.  · Trivial pericardial effusion.  · Mild left atrial enlargement.    IV lasix discontinued as no indication of heart failure.    Obesity hypoventilation syndrome  On cpap at home.  Currently requiring IPPV.        Class 3 severe obesity with serious comorbidity and body mass index (BMI) of 60.0 to 69.9 in adult  Body mass index is 66.53 kg/m². Morbid obesity complicates all aspects of  disease management from diagnostic modalities to treatment. Weight loss encouraged and health benefits explained to patient.      Left VM for Tiffany Elena (Brother).      VTE Risk Mitigation (From admission, onward)         Ordered     enoxaparin injection 40 mg  Every 12 hours      06/23/20 2327     IP VTE HIGH RISK PATIENT  Once      06/23/20 2055     Place sequential compression device  Until discontinued      06/23/20 2055                Critical care time spent on the evaluation and treatment of severe organ dysfunction, review of pertinent labs and imaging studies, discussions with consulting providers and discussions with patient/family: 36 minutes.      Peg Stewart MD  Department of Hospital Medicine   [facility]

## 2020-06-24 NOTE — ASSESSMENT & PLAN NOTE
Health hazards associated with cigarette smoking should be reviewed with patient and cessation encouraged when pt is able to participate.

## 2020-06-24 NOTE — PLAN OF CARE
Patient transfer from [facility].  Patient on vent.  SW completed assessment from medical record review and spoke to patient's sister in law Nydia [phone] via telephone to confirm.         06/24/20 1248   Discharge Assessment   Assessment Type Discharge Planning Assessment   Confirmed/corrected address and phone number on facesheet? Yes   Assessment information obtained from? Other  (Nydia (sister))   Prior to hospitilization cognitive status: Alert/Oriented   Prior to hospitalization functional status: Independent   Current cognitive status:   (unable to assess)   Current Functional Status:   (unable to assess)   Facility Arrived From: [facility]   Lives With child(selena), dependent;parent(s)   Able to Return to Prior Arrangements yes   Is patient able to care for self after discharge? Unable to determine at this time (comments)   Patient's perception of discharge disposition home or selfcare   Readmission Within the Last 30 Days planned readmission   If yes, most recent facility name: [facility]   Patient currently being followed by outpatient case management? Unable to determine (comments)   Patient currently receives any other outside agency services? No   Equipment Currently Used at Home CPAP   Do you have any problems affording any of your prescribed medications? TBD   Does the patient have transportation home?   (unable to assess)   Transportation Anticipated family or friend will provide   Discharge Plan A   (TBD)   Discharge Plan B   (TBD)   DME Needed Upon Discharge  other (see comments)  (unable to assess)   Patient/Family in Agreement with Plan yes

## 2020-06-24 NOTE — CONSULTS
Pharmacokinetic Initial Assessment: IV Vancomycin    Assessment/Plan:  Initiated intravenous vancomycin with a dose of 1500 mg in previous facility.  Pt will be started with a maintenance dose of vancomycin 2000 mg IV every 12 hours  Desired empiric serum trough concentration is 15 to 20 mcg/mL  Draw vancomycin trough level 30 min prior to fourth dose on 2300 at approximately 6/24  Pharmacy will continue to follow and monitor vancomycin.      Please contact pharmacy at extension [phone] with any questions regarding this assessment.     Thank you for the consult,   Margaret Barrera       Patient brief summary:  Michelle Wren is a 30 y.o. female initiated on antimicrobial therapy with IV Vancomycin for treatment of suspected lower respiratory infection    Drug Allergies:   Review of patient's allergies indicates:  No Known Allergies    Actual Body Weight:   164.7    Renal Function:   Estimated Creatinine Clearance: 155.7 mL/min (based on SCr of 0.8 mg/dL).,     Dialysis Method (if applicable):  N/A    CBC (last 72 hours):  Recent Labs   Lab Result Units 06/22/20  0858 06/23/20  0715   WBC K/uL 11.68 23.88*   Hemoglobin g/dL 13.1 13.5   Hemoglobin A1C %  --  6.2   Hematocrit % 43.0 43.6   Platelets K/uL 313 341   Gran% % 71.2 83.0*   Lymph% % 17.3* 3.0*   Mono% % 10.3 3.0*   Eosinophil% % 0.3 0.0   Basophil% % 0.2 0.0   Differential Method  Automated Manual       Metabolic Panel (last 72 hours):  Recent Labs   Lab Result Units 06/22/20  0858 06/23/20  0715   Sodium mmol/L 138 137   Potassium mmol/L 4.2 4.0   Chloride mmol/L 100 96   CO2 mmol/L 31* 30*   Glucose mg/dL 135* 201*   BUN, Bld mg/dL 19 21*   Creatinine mg/dL 0.7 0.8   Albumin g/dL 3.4* 3.4*   Total Bilirubin mg/dL 0.6 0.6   Alkaline Phosphatase U/L 66 56   AST U/L 20 18   ALT U/L 19 18       Drug levels (last 3 results):  No results for input(s): VANCOMYCINRA, VANCOMYCINPE, VANCOMYCINTR in the last 72 hours.    Microbiologic Results:  Microbiology Results  (last 7 days)       ** No results found for the last 168 hours. **

## 2020-06-24 NOTE — NURSING
Spoke to pt's brother  Francisco. Aware pt's room number. Password obtain.  Attempt to call Dr Gentile's office no answer. Will try later.

## 2020-06-24 NOTE — PROGRESS NOTES
The enoxaparin dose has been adjusted for Michelle Wren [id number] according to the pharmacy practice protocol.      Based on the patient's Estimated Creatinine Clearance: 155.7 mL/min (based on SCr of 0.8 mg/dL)., Body mass index is 66.41 kg/m²., and weight= (!) 164.7 kg (363 lb 1.6 oz), the enoxaparin dose has been adjusted 40 mg every 12 hours for CrCl =/>30 and BMI=/>40.    Thank you,  Rober Alva

## 2020-06-24 NOTE — ASSESSMENT & PLAN NOTE
Chronic; rescue inhaler only noted to home meds, no controller.  Continue bronchodilators q4h.  Currently intubated on mechanical ventilation.  Pulmonary consult.

## 2020-06-24 NOTE — ASSESSMENT & PLAN NOTE
Chronic; rescue inhaler only noted to home meds, no controller.  Continue bronchodilators q4h.  Currently intubated on mechanical ventilation.  Follow pulmonary recommendations. On IV Solumedrol 125 mg q 8 hrs.

## 2020-06-25 LAB
ALBUMIN SERPL BCP-MCNC: 2.6 G/DL (ref 3.5–5.2)
ALLENS TEST: ABNORMAL
ALP SERPL-CCNC: 50 U/L (ref 55–135)
ALT SERPL W/O P-5'-P-CCNC: 12 U/L (ref 10–44)
ANION GAP SERPL CALC-SCNC: 8 MMOL/L (ref 8–16)
AST SERPL-CCNC: 17 U/L (ref 10–40)
BASOPHILS # BLD AUTO: ABNORMAL K/UL (ref 0–0.2)
BASOPHILS NFR BLD: 0 % (ref 0–1.9)
BILIRUB SERPL-MCNC: 0.2 MG/DL (ref 0.1–1)
BUN SERPL-MCNC: 20 MG/DL (ref 6–20)
CALCIUM SERPL-MCNC: 8.3 MG/DL (ref 8.7–10.5)
CHLORIDE SERPL-SCNC: 102 MMOL/L (ref 95–110)
CO2 SERPL-SCNC: 29 MMOL/L (ref 23–29)
CREAT SERPL-MCNC: 0.8 MG/DL (ref 0.5–1.4)
DELSYS: ABNORMAL
DIFFERENTIAL METHOD: ABNORMAL
EOSINOPHIL # BLD AUTO: ABNORMAL K/UL (ref 0–0.5)
EOSINOPHIL NFR BLD: 0 % (ref 0–8)
ERYTHROCYTE [DISTWIDTH] IN BLOOD BY AUTOMATED COUNT: 16.6 % (ref 11.5–14.5)
ERYTHROCYTE [SEDIMENTATION RATE] IN BLOOD BY WESTERGREN METHOD: 30 MM/H
EST. GFR  (AFRICAN AMERICAN): >60 ML/MIN/1.73 M^2
EST. GFR  (NON AFRICAN AMERICAN): >60 ML/MIN/1.73 M^2
ETCO2: 36
FIO2: 80
FLOW: 70
GLUCOSE SERPL-MCNC: 122 MG/DL (ref 70–110)
HCO3 UR-SCNC: 31 MMOL/L (ref 24–28)
HCT VFR BLD AUTO: 39.1 % (ref 37–48.5)
HGB BLD-MCNC: 12.2 G/DL (ref 12–16)
HYPOCHROMIA BLD QL SMEAR: ABNORMAL
IMM GRANULOCYTES # BLD AUTO: ABNORMAL K/UL (ref 0–0.04)
IMM GRANULOCYTES NFR BLD AUTO: ABNORMAL % (ref 0–0.5)
LYMPHOCYTES # BLD AUTO: ABNORMAL K/UL (ref 1–4.8)
LYMPHOCYTES NFR BLD: 9 % (ref 18–48)
MAGNESIUM SERPL-MCNC: 2.3 MG/DL (ref 1.6–2.6)
MCH RBC QN AUTO: 26 PG (ref 27–31)
MCHC RBC AUTO-ENTMCNC: 31.2 G/DL (ref 32–36)
MCV RBC AUTO: 83 FL (ref 82–98)
MODE: ABNORMAL
MONOCYTES # BLD AUTO: ABNORMAL K/UL (ref 0.3–1)
MONOCYTES NFR BLD: 11 % (ref 4–15)
NEUTROPHILS NFR BLD: 78 % (ref 38–73)
NEUTS BAND NFR BLD MANUAL: 2 %
NRBC BLD-RTO: 0 /100 WBC
PCO2 BLDA: 45.6 MMHG (ref 35–45)
PEEP: 17
PH SMN: 7.44 [PH] (ref 7.35–7.45)
PHOSPHATE SERPL-MCNC: 4.3 MG/DL (ref 2.7–4.5)
PIP: 41
PLATELET # BLD AUTO: 333 K/UL (ref 150–350)
PLATELET BLD QL SMEAR: ABNORMAL
PMV BLD AUTO: 10.7 FL (ref 9.2–12.9)
PO2 BLDA: 79 MMHG (ref 80–100)
POC BE: 7 MMOL/L
POC SATURATED O2: 96 % (ref 95–100)
POC TCO2: 32 MMOL/L (ref 23–27)
POCT GLUCOSE: 104 MG/DL (ref 70–110)
POCT GLUCOSE: 104 MG/DL (ref 70–110)
POCT GLUCOSE: 120 MG/DL (ref 70–110)
POCT GLUCOSE: 123 MG/DL (ref 70–110)
POLYCHROMASIA BLD QL SMEAR: ABNORMAL
POTASSIUM SERPL-SCNC: 3.7 MMOL/L (ref 3.5–5.1)
PROT SERPL-MCNC: 6.4 G/DL (ref 6–8.4)
PS: 10
RBC # BLD AUTO: 4.7 M/UL (ref 4–5.4)
SAMPLE: ABNORMAL
SARS-COV-2 RNA RESP QL NAA+PROBE: NOT DETECTED
SITE: ABNORMAL
SODIUM SERPL-SCNC: 139 MMOL/L (ref 136–145)
SP02: 100
VT: 400
WBC # BLD AUTO: 17.19 K/UL (ref 3.9–12.7)

## 2020-06-25 PROCEDURE — 85007 BL SMEAR W/DIFF WBC COUNT: CPT

## 2020-06-25 PROCEDURE — 25000242 PHARM REV CODE 250 ALT 637 W/ HCPCS: Performed by: NURSE PRACTITIONER

## 2020-06-25 PROCEDURE — 63600175 PHARM REV CODE 636 W HCPCS: Performed by: NURSE PRACTITIONER

## 2020-06-25 PROCEDURE — 94770 HC EXHALED C02 TEST: CPT

## 2020-06-25 PROCEDURE — 63600175 PHARM REV CODE 636 W HCPCS: Performed by: INTERNAL MEDICINE

## 2020-06-25 PROCEDURE — 99900035 HC TECH TIME PER 15 MIN (STAT)

## 2020-06-25 PROCEDURE — 27000221 HC OXYGEN, UP TO 24 HOURS

## 2020-06-25 PROCEDURE — 80053 COMPREHEN METABOLIC PANEL: CPT

## 2020-06-25 PROCEDURE — 84100 ASSAY OF PHOSPHORUS: CPT

## 2020-06-25 PROCEDURE — 25000003 PHARM REV CODE 250: Performed by: INTERNAL MEDICINE

## 2020-06-25 PROCEDURE — C9113 INJ PANTOPRAZOLE SODIUM, VIA: HCPCS | Performed by: INTERNAL MEDICINE

## 2020-06-25 PROCEDURE — 20000000 HC ICU ROOM

## 2020-06-25 PROCEDURE — 94003 VENT MGMT INPAT SUBQ DAY: CPT

## 2020-06-25 PROCEDURE — 37799 UNLISTED PX VASCULAR SURGERY: CPT

## 2020-06-25 PROCEDURE — 25000003 PHARM REV CODE 250: Performed by: NURSE PRACTITIONER

## 2020-06-25 PROCEDURE — 99233 PR SUBSEQUENT HOSPITAL CARE,LEVL III: ICD-10-PCS | Mod: ,,, | Performed by: INTERNAL MEDICINE

## 2020-06-25 PROCEDURE — 94761 N-INVAS EAR/PLS OXIMETRY MLT: CPT

## 2020-06-25 PROCEDURE — 82803 BLOOD GASES ANY COMBINATION: CPT

## 2020-06-25 PROCEDURE — 99900026 HC AIRWAY MAINTENANCE (STAT)

## 2020-06-25 PROCEDURE — 83735 ASSAY OF MAGNESIUM: CPT

## 2020-06-25 PROCEDURE — 94640 AIRWAY INHALATION TREATMENT: CPT

## 2020-06-25 PROCEDURE — 36415 COLL VENOUS BLD VENIPUNCTURE: CPT

## 2020-06-25 PROCEDURE — 99233 SBSQ HOSP IP/OBS HIGH 50: CPT | Mod: ,,, | Performed by: INTERNAL MEDICINE

## 2020-06-25 PROCEDURE — 85027 COMPLETE CBC AUTOMATED: CPT

## 2020-06-25 RX ADMIN — VANCOMYCIN HYDROCHLORIDE 2000 MG: 1 INJECTION, POWDER, LYOPHILIZED, FOR SOLUTION INTRAVENOUS at 12:06

## 2020-06-25 RX ADMIN — SODIUM CHLORIDE: 0.9 INJECTION, SOLUTION INTRAVENOUS at 06:06

## 2020-06-25 RX ADMIN — IPRATROPIUM BROMIDE AND ALBUTEROL SULFATE 3 ML: .5; 3 SOLUTION RESPIRATORY (INHALATION) at 11:06

## 2020-06-25 RX ADMIN — PROPOFOL 50 MCG/KG/MIN: 10 INJECTION, EMULSION INTRAVENOUS at 08:06

## 2020-06-25 RX ADMIN — Medication 100 MCG/HR: at 01:06

## 2020-06-25 RX ADMIN — IPRATROPIUM BROMIDE AND ALBUTEROL SULFATE 3 ML: .5; 3 SOLUTION RESPIRATORY (INHALATION) at 12:06

## 2020-06-25 RX ADMIN — PROPOFOL 50 MCG/KG/MIN: 10 INJECTION, EMULSION INTRAVENOUS at 05:06

## 2020-06-25 RX ADMIN — PIPERACILLIN AND TAZOBACTAM 4.5 G: 4; .5 INJECTION, POWDER, FOR SOLUTION INTRAVENOUS at 01:06

## 2020-06-25 RX ADMIN — PANTOPRAZOLE SODIUM 40 MG: 40 INJECTION, POWDER, LYOPHILIZED, FOR SOLUTION INTRAVENOUS at 09:06

## 2020-06-25 RX ADMIN — IPRATROPIUM BROMIDE AND ALBUTEROL SULFATE 3 ML: .5; 3 SOLUTION RESPIRATORY (INHALATION) at 04:06

## 2020-06-25 RX ADMIN — LEVOFLOXACIN 750 MG: 750 INJECTION, SOLUTION INTRAVENOUS at 01:06

## 2020-06-25 RX ADMIN — SODIUM CHLORIDE: 0.9 INJECTION, SOLUTION INTRAVENOUS at 05:06

## 2020-06-25 RX ADMIN — PIPERACILLIN AND TAZOBACTAM 4.5 G: 4; .5 INJECTION, POWDER, FOR SOLUTION INTRAVENOUS at 05:06

## 2020-06-25 RX ADMIN — ENOXAPARIN SODIUM 40 MG: 40 INJECTION SUBCUTANEOUS at 09:06

## 2020-06-25 RX ADMIN — PIPERACILLIN AND TAZOBACTAM 4.5 G: 4; .5 INJECTION, POWDER, FOR SOLUTION INTRAVENOUS at 09:06

## 2020-06-25 RX ADMIN — PROPOFOL 50 MCG/KG/MIN: 10 INJECTION, EMULSION INTRAVENOUS at 06:06

## 2020-06-25 RX ADMIN — IPRATROPIUM BROMIDE AND ALBUTEROL SULFATE 3 ML: .5; 3 SOLUTION RESPIRATORY (INHALATION) at 07:06

## 2020-06-25 RX ADMIN — PROPOFOL 50 MCG/KG/MIN: 10 INJECTION, EMULSION INTRAVENOUS at 03:06

## 2020-06-25 RX ADMIN — PROPOFOL 50 MCG/KG/MIN: 10 INJECTION, EMULSION INTRAVENOUS at 01:06

## 2020-06-25 RX ADMIN — PROPOFOL 50 MCG/KG/MIN: 10 INJECTION, EMULSION INTRAVENOUS at 11:06

## 2020-06-25 RX ADMIN — VANCOMYCIN HYDROCHLORIDE 2000 MG: 1 INJECTION, POWDER, LYOPHILIZED, FOR SOLUTION INTRAVENOUS at 11:06

## 2020-06-25 RX ADMIN — VANCOMYCIN HYDROCHLORIDE 2000 MG: 1 INJECTION, POWDER, LYOPHILIZED, FOR SOLUTION INTRAVENOUS at 01:06

## 2020-06-25 RX ADMIN — ENOXAPARIN SODIUM 40 MG: 40 INJECTION SUBCUTANEOUS at 08:06

## 2020-06-25 RX ADMIN — METHYLPREDNISOLONE SODIUM SUCCINATE 80 MG: 125 INJECTION, POWDER, FOR SOLUTION INTRAMUSCULAR; INTRAVENOUS at 09:06

## 2020-06-25 RX ADMIN — BUDESONIDE 0.25 MG: 0.25 SUSPENSION RESPIRATORY (INHALATION) at 07:06

## 2020-06-25 RX ADMIN — PROPOFOL 50 MCG/KG/MIN: 10 INJECTION, EMULSION INTRAVENOUS at 09:06

## 2020-06-25 RX ADMIN — IPRATROPIUM BROMIDE AND ALBUTEROL SULFATE 3 ML: .5; 3 SOLUTION RESPIRATORY (INHALATION) at 03:06

## 2020-06-25 NOTE — ASSESSMENT & PLAN NOTE
Patient with Hypercapnic and Hypoxic Respiratory failure which is Acute.  she is not on home oxygen. Supplemental ventilation was provided and noted- Vent Mode: SIMV  Oxygen Concentration (%):  [80] 80  Resp Rate Total:  [30 br/min-53 br/min] 30 br/min  Vt Set:  [400 mL] 400 mL  PEEP/CPAP:  [17 cmH20] 17 cmH20  Pressure Support:  [10 cmH20] 10 cmH20  Mean Airway Pressure:  [24 zvK47-54 cmH20] 24 cmH20 and oxygen saturations 97%. Differential diagnosis includes - COPD, CHF, Obesity Hypoventilation, Interstitial lung disease and Pneumonia Labs and images were reviewed. Will treat underlying causes.   Follow pulmonary recommendations.  Follow CTA chest results.

## 2020-06-25 NOTE — PLAN OF CARE
Remains on vent FIO2 80% pt's Sat 100%. Sinus rhythm . Temp 98.2 ax. Able to nod when talked to. Moves all ext. On soft wrist restraint. Fentanyl at 100mcg, Propofol at 50mcg. Art line positional. NGT light brown color total 150ml for the night.Vazquez 1300 out. On airborne/contact/droplet precaution. Safety/fall prevention maintain.

## 2020-06-25 NOTE — PLAN OF CARE
On leak test patient had good air movement around ETT at 12 of pressure on the cuff. Pt on VT of 400 and averaged 300 with cuff fully deflated.

## 2020-06-25 NOTE — PLAN OF CARE
Remains on vent, changes done earlier by Dr Gentile and kashif well, suctioned PRN oral care given X3, Left radial A-Line placed per RT, good waveform, zeroed and flushed, johnson patent draining clear yellow urine, NG put out 250ml dark brown secretions, NSR on monitor,update to family X2 today, phosphate replacement ordered and will start now.

## 2020-06-25 NOTE — PROGRESS NOTES
Pharmacokinetic Assessment Follow Up: IV Vancomycin    Vancomycin serum concentration assessment(s):    The trough level was drawn correctly and can be used to guide therapy at this time. The measurement is below the desired definitive target range of 15 to 20 mcg/mL. However, the first dose was only 1500 mg.    Vancomycin Regimen Plan:    Continue regimen to Vancomycin 2000 mg IV every 12 hours with next serum trough concentration measured at 1100 prior to 4th dose on 6/26    Drug levels (last 3 results):  Recent Labs   Lab Result Units 06/24/20  2304   Vancomycin-Trough ug/mL 11.2       Pharmacy will continue to follow and monitor vancomycin.    Please contact pharmacy at extension [phone] for questions regarding this assessment.    Thank you for the consult,   Nidhi Farias       Patient brief summary:  Michelle Wren is a 30 y.o. female initiated on antimicrobial therapy with IV Vancomycin for treatment of  PNA/Sepsis    The patient's current regimen is 2000 mg every 12 hours    Drug Allergies:   Review of patient's allergies indicates:  No Known Allergies    Actual Body Weight:   165 kg    Renal Function:   Estimated Creatinine Clearance: 138.7 mL/min (based on SCr of 0.9 mg/dL).,       CBC (last 72 hours):  Recent Labs   Lab Result Units 06/22/20  0858 06/23/20  0715 06/24/20  0423   WBC K/uL 11.68 23.88* 26.11*   Hemoglobin g/dL 13.1 13.5 12.7   Hemoglobin A1C %  --  6.2  --    Hematocrit % 43.0 43.6 41.6   Platelets K/uL 313 341 353*   Gran% % 71.2 83.0* 87.5*   Lymph% % 17.3* 3.0* 3.9*   Mono% % 10.3 3.0* 7.2   Eosinophil% % 0.3 0.0 0.0   Basophil% % 0.2 0.0 0.1   Differential Method  Automated Manual Automated       Metabolic Panel (last 72 hours):  Recent Labs   Lab Result Units 06/22/20  0858 06/23/20  0715 06/24/20  0423   Sodium mmol/L 138 137 140   Potassium mmol/L 4.2 4.0 4.4   Chloride mmol/L 100 96 99   CO2 mmol/L 31* 30* 31*   Glucose mg/dL 135* 201* 154*   BUN, Bld mg/dL 19 21* 17   Creatinine  mg/dL 0.7 0.8 0.9   Albumin g/dL 3.4* 3.4* 2.7*   Total Bilirubin mg/dL 0.6 0.6 0.3   Alkaline Phosphatase U/L 66 56 59   AST U/L 20 18 17   ALT U/L 19 18 15   Magnesium mg/dL  --   --  2.2   Phosphorus mg/dL  --   --  2.6*       Vancomycin Administrations:  vancomycin given in the last 96 hours                     vancomycin (VANCOCIN) 2,000 mg in dextrose 5 % 500 mL IVPB (mg) 2,000 mg New Bag 06/25/20 0008     2,000 mg New Bag 06/24/20 1259     2,000 mg New Bag 06/23/20 2346    vancomycin (VANCOCIN) 1,500 mg in dextrose 5 % 250 mL IVPB (mg) 1,500 mg New Bag 06/23/20 1355                    Microbiologic Results:  Microbiology Results (last 7 days)       Procedure Component Value Units Date/Time    Blood culture [[id number]] Collected: 06/23/20 2201    Order Status: Completed Specimen: Blood Updated: 06/24/20 0915     Blood Culture, Routine No Growth to date    Culture, Respiratory with Gram Stain [[id number]] Collected: 06/23/20 2152    Order Status: Completed Specimen: Respiratory from Tracheal Aspirate Updated: 06/24/20 0626     Gram Stain (Respiratory) <10 epithelial cells per low power field.     Gram Stain (Respiratory) Few WBC's     Gram Stain (Respiratory) Rare Gram positive cocci

## 2020-06-25 NOTE — NURSING
CT coming to look at patient to see if she is ok to fit into CT machine. Patient unable to be laid flat currently but may be able to have CTA tomorrow.

## 2020-06-25 NOTE — ASSESSMENT & PLAN NOTE
IV zosyn, IV vancomycin-de-escalate as appropriate.  Follow Pulmonary recommendations  Blood and sputum cultures-follow  Monitor clinical response

## 2020-06-25 NOTE — PROGRESS NOTES
[facility] Hospital Medicine  Progress Note    Patient Name: Michelle Wren  MRN: [id number]  Patient Class: IP- Inpatient   Admission Date: 6/23/2020  Length of Stay: 2 days  Attending Physician: Peg Stewart MD  Primary Care Provider: Primary Doctor No        Subjective:     Principal Problem:Severe sepsis        HPI:  Michelle Wren is a 31 y/o female with a past medical history significant for asthma, type 2 diabetes, and severe obesity who is transferred from [facility] to [facility] for pulmonary consultation.  Patient presented to the ED at [facility] yesterday with complaints of 2-3 days of shortness of breath.  It is reported that she uses CPAP at night but she got this from a friend so unsure of settings.  She was placed on BiPAP and given IV Solu-Medrol, bronchodilator treatments, and IV Lasix.  She was admitted to the ICU at [facility].  Patient continued to decline and underwent intubation yesterday around 7:00 p.m.  per report, today patient appeared to be worse.  Her saturations were in the mid 90s on FiO2 of 100%.  A D-dimer was checked and was negative.  Per review of labs which were drawn earlier today, her WBC count was 23,000 and her lactic acid was 2.4.  She was placed on IV Zosyn and IV vancomycin.  Upon arrival to [facility], patient is in no acute distress.  She is intubated and sedated.  Vital signs are stable.  Lactic acid will be repeated now as patient meets sepsis criteria and will check a procalcitonin.  She will be monitored closely in the ICU.           Overview/Hospital Course:  No notes on file    Interval History:  Patient is in intensive care unit with respiratory failure, on mechanical ventilation after getting transferred from [place], [place].  Presently requiring PEEP 12 cm of water.  T-max 99.7 degree F. white blood cell count improving.  Patient is afebrile.  No acute distress noted.    Review of Systems    Unable to perform ROS: Intubated     Objective:     Vital Signs (Most Recent):  Temp: 98.4 °F (36.9 °C) (06/25/20 0615)  Pulse: 60 (06/25/20 0721)  Resp: (!) 30 (06/25/20 0721)  BP: (!) 121/56 (06/24/20 1430)  SpO2: 100 % (06/25/20 0721) Vital Signs (24h Range):  Temp:  [98.2 °F (36.8 °C)-99.7 °F (37.6 °C)] 98.4 °F (36.9 °C)  Pulse:  [59-93] 60  Resp:  [29-34] 30  SpO2:  [96 %-100 %] 100 %  BP: (118-140)/(56-67) 121/56  Arterial Line BP: (0-148)/(0-100) 114/69     Weight: (!) 162.3 kg (357 lb 12.9 oz)  Body mass index is 65.44 kg/m².    Intake/Output Summary (Last 24 hours) at 6/25/2020 1005  Last data filed at 6/25/2020 0555  Gross per 24 hour   Intake 4476.38 ml   Output 2160 ml   Net 2316.38 ml      Physical Exam  Vitals signs and nursing note reviewed.   Constitutional:       Appearance: She is well-developed. She is obese.      Interventions: She is sedated and intubated.   HENT:      Head: Normocephalic and atraumatic.   Eyes:      Conjunctiva/sclera: Conjunctivae normal.      Pupils: Pupils are equal, round, and reactive to light.   Neck:      Musculoskeletal: Normal range of motion and neck supple.   Cardiovascular:      Rate and Rhythm: Normal rate and regular rhythm.      Pulses: Normal pulses.      Heart sounds: Normal heart sounds.   Pulmonary:      Effort: Pulmonary effort is normal. She is intubated.      Breath sounds: Decreased breath sounds present.   Abdominal:      General: Bowel sounds are normal.      Palpations: Abdomen is soft. There is no mass.      Tenderness: There is no abdominal tenderness.   Genitourinary:     Comments: Vazquez catheter in place    Musculoskeletal:         General: No swelling or deformity.   Skin:     General: Skin is warm and dry.      Capillary Refill: Capillary refill takes 2 to 3 seconds.   Neurological:      Comments: Unable to assess as pt is on mechanical ventilation with sedation     Psychiatric:      Comments: Sedated          Significant Labs:   CBC:   Recent  Labs   Lab 06/24/20  0423 06/25/20  0330   WBC 26.11* 17.19*   HGB 12.7 12.2   HCT 41.6 39.1   * 333     CMP:   Recent Labs   Lab 06/24/20  0423 06/25/20  0330    139   K 4.4 3.7   CL 99 102   CO2 31* 29   * 122*   BUN 17 20   CREATININE 0.9 0.8   CALCIUM 9.0 8.3*   PROT 6.6 6.4   ALBUMIN 2.7* 2.6*   BILITOT 0.3 0.2   ALKPHOS 59 50*   AST 17 17   ALT 15 12   ANIONGAP 10 8   EGFRNONAA >60 >60     Lactic Acid:   Recent Labs   Lab 06/23/20  1246 06/23/20 2201   LACTATE 2.4* 2.0     Microbiology Results (last 7 days)     Procedure Component Value Units Date/Time    Blood culture [[id number]] Collected: 06/23/20 2201    Order Status: Completed Specimen: Blood Updated: 06/25/20 0613     Blood Culture, Routine No Growth to date      No Growth to date    Culture, Respiratory with Gram Stain [[id number]] Collected: 06/23/20 2152    Order Status: Completed Specimen: Respiratory from Tracheal Aspirate Updated: 06/24/20 0626     Gram Stain (Respiratory) <10 epithelial cells per low power field.     Gram Stain (Respiratory) Few WBC's     Gram Stain (Respiratory) Rare Gram positive cocci        Significant Imaging:   ECHO:  · Concentric left ventricular remodeling.  · Normal left ventricular systolic function. The estimated ejection fraction is 69%.  · Normal LV diastolic function.  · No wall motion abnormalities.  · Normal right ventricular systolic function.  · Mild right atrial enlargement.  · Trivial pericardial effusion.  · Mild left atrial enlargement.    CXR: Mild cardiomegaly.  Hypoventilation.    CXR:   1. Interval placement of endotracheal tube with the tip in the proximal right mainstem bronchus.  This should be withdrawn several cm.  2. The study is motion degraded.  Indistinctness of the left hemidiaphragm could be related to patient motion with atelectasis and/or pleural effusion not completely excluded.    CXR:  1. Limited evaluation secondary to technique.  2. Findings suspicious for congestive  heart failure which does not appear significantly changed over the interval.  3. Small bilateral pleural effusions with bibasilar dependent atelectasis.  4. Endotracheal tube.    CXR:  Stable positioning of support devices.  Unchanged bibasilar consolidation/atelectasis.    CXR: Bibasilar opacification not significantly changed compared to the prior exam.  Positions of lines and tubes described      Assessment/Plan:      * Severe sepsis  This patient does have evidence of infective focus  My overall impression is severe sepsis.  Antibiotics given-   Antibiotics (From admission, onward)    Start     Stop Route Frequency Ordered    06/24/20 1400  levoFLOXacin 750 mg/150 mL IVPB 750 mg      -- IV Every 24 hours (non-standard times) 06/23/20 2045 06/24/20 0000  vancomycin (VANCOCIN) 2,000 mg in dextrose 5 % 500 mL IVPB      -- IV Every 12 hours (non-standard times) 06/23/20 2129 06/23/20 2200  piperacillin-tazobactam 4.5 g in dextrose 5 % 100 mL IVPB (ready to mix system)      -- IV Every 8 hours (non-standard times) 06/23/20 2045 06/23/20 2144  vancomycin - pharmacy to dose  (vancomycin IVPB)      -- IV pharmacy to manage frequency 06/23/20 2045          Severe Sepsis only-  Latest labs reviewed, they are-  Recent Labs   Lab 06/25/20  0330   BILITOT 0.2     No results for input(s): LACTATE in the last 24 hours.  Recent Labs   Lab 06/25/20  0401   PH 7.440   PO2 79*   PCO2 45.6*   HCO3 31.0*   BE 7       Organ dysfunction indicated by Acute respiratory failure      Temp Readings from Last 1 Encounters:   06/25/20 98.4 °F (36.9 °C) (Axillary)     BP Readings from Last 1 Encounters:   06/24/20 (!) 121/56     Pulse Readings from Last 1 Encounters:   06/25/20 60           Type 2 diabetes mellitus  Not on chronic medication.  A1c 6.2%.  Accuchecks ac/hs with SSI coverage as needed.  Nutrition consult as pt NPO on vent.        Acute respiratory failure with hypoxia and hypercarbia  Patient with Hypercapnic and  Hypoxic Respiratory failure which is Acute.  she is not on home oxygen. Supplemental ventilation was provided and noted- Vent Mode: SIMV  Oxygen Concentration (%):  [80] 80  Resp Rate Total:  [30 br/min-53 br/min] 30 br/min  Vt Set:  [400 mL] 400 mL  PEEP/CPAP:  [17 cmH20] 17 cmH20  Pressure Support:  [10 cmH20] 10 cmH20  Mean Airway Pressure:  [24 kfG67-33 cmH20] 24 cmH20 and oxygen saturations 97%. Differential diagnosis includes - COPD, CHF, Obesity Hypoventilation, Interstitial lung disease and Pneumonia Labs and images were reviewed. Will treat underlying causes.   Follow pulmonary recommendations.  Follow CTA chest results.      Bilateral pneumonia  IV zosyn, IV vancomycin-de-escalate as appropriate.  Follow Pulmonary recommendations  Blood and sputum cultures-follow  Monitor clinical response      Nicotine dependence  Health hazards associated with cigarette smoking should be reviewed with patient and cessation encouraged when pt is able to participate.       Mild intermittent asthma  Chronic; rescue inhaler only noted to home meds, no controller.  Continue bronchodilators q4h.  Currently intubated on mechanical ventilation.  Follow pulmonary recommendations. On IV Solumedrol 125 mg q 8 hrs.      Cardiomegaly  Patient with peripheral edema, mildly elevated BNP.  IV lasix was initiated at [facility].    Echo was completed today with results as follows:  · Concentric left ventricular remodeling.  · Normal left ventricular systolic function. The estimated ejection fraction is 69%.  · Normal LV diastolic function.  · No wall motion abnormalities.  · Normal right ventricular systolic function.  · Mild right atrial enlargement.  · Trivial pericardial effusion.  · Mild left atrial enlargement.    IV lasix discontinued as no indication of heart failure.    Obesity hypoventilation syndrome  On cpap at home.  Currently requiring IPPV.        Class 3 severe obesity with serious comorbidity and body mass index (BMI) of 60.0 to  69.9 in adult  Body mass index is 66.53 kg/m². Morbid obesity complicates all aspects of disease management from diagnostic modalities to treatment. Weight loss encouraged and health benefits explained to patient.            Discussed with miss Stafford, answered all questions, I updated her regarding patient's present medical condition.    VTE Risk Mitigation (From admission, onward)         Ordered     enoxaparin injection 40 mg  Every 12 hours      06/23/20 2327     IP VTE HIGH RISK PATIENT  Once      06/23/20 2055     Place sequential compression device  Until discontinued      06/23/20 2055                Critical care time spent on the evaluation and treatment of severe organ dysfunction, review of pertinent labs and imaging studies, discussions with consulting providers and discussions with patient/family: 33 minutes.      Peg Stewart MD  Department of Hospital Medicine   [facility]

## 2020-06-25 NOTE — NURSING
Patient currently on PEEP of 17 and FiO2 of 100%. Unable to go down and lay flat for CTA at this time will hopefully be able to have CTA done tomorrow.

## 2020-06-25 NOTE — PLAN OF CARE
06/24/20 1932   Patient Assessment/Suction   Level of Consciousness (AVPU) responds to pain   Respiratory Effort Normal;Unlabored   Expansion/Accessory Muscles/Retractions no retractions;no use of accessory muscles   All Lung Fields Breath Sounds diminished;Anterior:;Lateral:   Rhythm/Pattern, Respiratory assisted mechanically   Cough Frequency with stimulation   $ Suction Charges Inline Suction Procedure Stat Charge   Secretions Amount small   Secretions Color tan   Secretions Characteristics thick   PRE-TX-O2   O2 Device (Oxygen Therapy) ventilator   $ Is the patient on Low Flow Oxygen? Yes   Oxygen Concentration (%) 80   SpO2 100 %   Pulse Oximetry Type Continuous   $ Pulse Oximetry - Multiple Charge Pulse Oximetry - Multiple   Pulse 80   Resp (!) 30   ETCO2   $ ETCO2 Charge Exhaled CO2 Monitoring   $ ETCO2 Usage Currently wearing   ETCO2 (mmHg) 40 mmHg   ETCO2 Device Type Bedside Monitor   Aerosol Therapy   $ Aerosol Therapy Charges Aerosol Treatment   Daily Review of Necessity (SVN) completed   Respiratory Treatment Status (SVN) given   Treatment Route (SVN) in-line   Patient Position (SVN) HOB elevated   Post Treatment Assessment (SVN) breath sounds unchanged   Signs of Intolerance (SVN) none   Breath Sounds Post-Respiratory Treatment   Throughout All Fields Post-Treatment All Fields   Throughout All Fields Post-Treatment no change   Post-treatment Heart Rate (beats/min) 81   Post-treatment Resp Rate (breaths/min) 30   Wound Care   $ Wound Care Tech Time 15 min   Area of Concern Upper lip;Lower lip   Skin Color/Characteristics without discoloration   Skin Temperature warm   Was wound care notified? No        Airway - Non-Surgical 06/22/20 1625 Endotracheal Tube   Placement Date/Time: 06/22/20 1625   Present Prior to Hospital Arrival?: No  Method of Intubation: Direct laryngoscopy  Inserted by: MD  Airway Device: Endotracheal Tube  Mask Ventilation: Easy  Blade: Ramon #3  Airway Device Size: 7.5  Style:  Cuffed...   Secured at 25 cm   Measured At Lips   Secured Location Right   Secured by Commercial tube lord   Bite Block right   Site Condition Cool;Dry   Status Intact;Secured;Patent   Site Assessment Clean;Dry;No bleeding;No drainage   Cuff Pressure 30 cm H2O   Vent Select   Conventional Vent Y   Charged w/in last 24h YES   Preset Conventional Ventilator Settings   Vent Type    Ventilation Type VC   Vent Mode SIMV   Humidity HME   Set Rate 30 BPM   Vt Set 400 mL   PEEP/CPAP 17 cmH20   Pressure Support 10 cmH20   Waveform RAMP   Peak Flow 70 L/min   Set Inspiratory Pressure 0 cmH20   Insp Time 0 Sec(s)   Plateau Set/Insp. Hold (sec) 0   Insp Rise Time  100 %   Trigger Sensitivity Flow/I-Trigger 2 L/min   P High 0 cm H2O   P Low 0 cm H2O   T High 0 sec   T Low 0 sec   Patient Ventilator Parameters   Resp Rate Total 30 br/min   Peak Airway Pressure 40 cmH2O   Mean Airway Pressure 24 cmH20   Plateau Pressure 0 cmH20   Exhaled Vt 376 mL   Total Ve 11.3 mL   Spont Ve 0 L   I:E Ratio Measured 1:2.20   Conventional Ventilator Alarms   Alarms On Y   Ve High Alarm 24 L/min   Resp Rate High Alarm 40 br/min   Press High Alarm 60 cmH2O   Apnea Rate 20   Apnea Volume (mL) 550 mL   Apnea Oxygen Concentration  100   Apnea Flow Rate (L/min) 70   T Apnea 20 sec(s)   Ready to Wean/Extubation Screen   FIO2<=50 (chart decimal) (!) 0.8   MV<16L (chart vol.) 11.3   PEEP <=8 (chart #) (!) 17   Ready to Wean Parameters   F/VT Ratio<105 (RSBI) (!) 79.79

## 2020-06-25 NOTE — ASSESSMENT & PLAN NOTE
This patient does have evidence of infective focus  My overall impression is severe sepsis.  Antibiotics given-   Antibiotics (From admission, onward)    Start     Stop Route Frequency Ordered    06/24/20 1400  levoFLOXacin 750 mg/150 mL IVPB 750 mg      -- IV Every 24 hours (non-standard times) 06/23/20 2045 06/24/20 0000  vancomycin (VANCOCIN) 2,000 mg in dextrose 5 % 500 mL IVPB      -- IV Every 12 hours (non-standard times) 06/23/20 2129 06/23/20 2200  piperacillin-tazobactam 4.5 g in dextrose 5 % 100 mL IVPB (ready to mix system)      -- IV Every 8 hours (non-standard times) 06/23/20 2045 06/23/20 2144  vancomycin - pharmacy to dose  (vancomycin IVPB)      -- IV pharmacy to manage frequency 06/23/20 2045          Severe Sepsis only-  Latest labs reviewed, they are-  Recent Labs   Lab 06/25/20  0330   BILITOT 0.2     No results for input(s): LACTATE in the last 24 hours.  Recent Labs   Lab 06/25/20  0401   PH 7.440   PO2 79*   PCO2 45.6*   HCO3 31.0*   BE 7       Organ dysfunction indicated by Acute respiratory failure      Temp Readings from Last 1 Encounters:   06/25/20 98.4 °F (36.9 °C) (Axillary)     BP Readings from Last 1 Encounters:   06/24/20 (!) 121/56     Pulse Readings from Last 1 Encounters:   06/25/20 60

## 2020-06-25 NOTE — HOSPITAL COURSE
Patient is being managed in intensive care unit, requiring mechanical ventilation under supervision of pulmonary medicine.  Repeat COVID -19 test has been negative.  Patient is receiving intravenous antibiotics for bilateral pneumonia.  Sputum cultures grew MSSA species.  Patient is being followed by infectious disease specialist.  Patient tested negative for COVID -19 antibody.  Patient remained intubated for prolonged period of time given her severe intrinsic lung disease as well as obesity hypoventilation.  She was extubated on 07/08, and did well post extubation.  She was initially on BiPAP and was weaned slowly to nasal cannula which she tolerated.

## 2020-06-25 NOTE — PROGRESS NOTES
Progress Note  PULMONARY    Admit Date: 6/23/2020 06/25/2020      SUBJECTIVE:     June 25th-patient is sedated, no complaints, intubated.      PFSH and Allergies reviewed.    OBJECTIVE:     Vitals (Most recent):  Vitals:    06/25/20 0721   BP:    Pulse: 60   Resp: (!) 30   Temp:        Vitals (24 hour range):  Temp:  [98.2 °F (36.8 °C)-99.7 °F (37.6 °C)]   Pulse:  [59-93]   Resp:  [20-34]   BP: (107-140)/(53-86)   SpO2:  [93 %-100 %]   Arterial Line BP: (0-148)/(0-100)       Intake/Output Summary (Last 24 hours) at 6/25/2020 0749  Last data filed at 6/25/2020 0555  Gross per 24 hour   Intake 4476.38 ml   Output 2485 ml   Net 1991.38 ml          Physical Exam:  The patient's neuro status (alertness,orientation,cognitive function,motor skills,), pharyngeal exam (oral lesions, hygiene, abn dentition,), Neck (jvd,mass,thyroid,nodes in neck and above/below clavicle),RESPIRATORY(symmetry,effort,fremitus,percussion,auscultation),  Cor(rhythm,heart tones including gallops,perfusion,edema)ABD(distention,hepatic&splenomegaly,tenderness,masses), Skin(rash,cyanosis),Psyc(affect,judgement,).  Exam negative except for these pertinent findings:    Morbid obesity, intubated, anterior diffuse inspiratory and slightly expiratory wheezes, no distress, symmetric, no edema, soft abdomen, distant heart-lips and tongue to be significantly swollen on June 25th-protruding from the mouth    Radiographs reviewed: view by direct vision   Chest x-ray June 25th suggest left lower lung collapse, there is some increased interstitial type markings in the right lower lung also.    Results for orders placed during the hospital encounter of 06/22/20   X-Ray Chest 1 View    Narrative EXAMINATION:  XR CHEST 1 VIEW    CLINICAL HISTORY:  LINE PLACEMENT;    TECHNIQUE:  Single frontal view of the chest was performed.    COMPARISON:  06/23/2020.    FINDINGS:  There is persistent pulmonary hypoinflation.  There are bilateral pulmonary infiltrates which are  stable to slightly increased over the interval likely representing pulmonary edema.  Small bilateral pleural effusions with bibasilar dependent atelectasis.    Heart size is enlarged.  Mediastinal contours unremarkable.  Trachea midline.    Endotracheal tube remains in satisfactory position.  Interval placement of right IJ catheter which terminates in the distal SVC.      Impression 1. Pulmonary hypoinflation.  2. Findings consistent with congestive heart failure which is stable to slightly increased over the interval with small bilateral pleural effusions.  3. Satisfactory indwelling life-support devices.      Electronically signed by: Navin Mortensen  Date:    06/23/2020  Time:    11:58   ]    Labs     Recent Labs   Lab 06/25/20  0330   WBC 17.19*   HGB 12.2   HCT 39.1      BAND 2.0     Recent Labs   Lab 06/25/20  0330      K 3.7      CO2 29   BUN 20   CREATININE 0.8   *   CALCIUM 8.3*   MG 2.3   PHOS 4.3   AST 17   ALT 12   ALKPHOS 50*   BILITOT 0.2   PROT 6.4   ALBUMIN 2.6*     Recent Labs   Lab 06/25/20  0401   PH 7.440   PCO2 45.6*   PO2 79*   HCO3 31.0*     Microbiology Results (last 7 days)     Procedure Component Value Units Date/Time    Blood culture [[id number]] Collected: 06/23/20 2201    Order Status: Completed Specimen: Blood Updated: 06/25/20 0613     Blood Culture, Routine No Growth to date      No Growth to date    Culture, Respiratory with Gram Stain [[id number]] Collected: 06/23/20 2152    Order Status: Completed Specimen: Respiratory from Tracheal Aspirate Updated: 06/24/20 0626     Gram Stain (Respiratory) <10 epithelial cells per low power field.     Gram Stain (Respiratory) Few WBC's     Gram Stain (Respiratory) Rare Gram positive cocci          Impression:  Active Hospital Problems    Diagnosis  POA    *Severe sepsis [A41.9, R65.20]  Yes    Type 2 diabetes mellitus [E11.9]  Yes    Nicotine dependence [F17.200]  Yes    Bilateral pneumonia [J18.9]  Yes    Acute  respiratory failure with hypoxia and hypercarbia [J96.01, J96.02]  Yes    Obesity hypoventilation syndrome [E66.2]  Yes    Mild intermittent asthma [J45.20]  Yes    Class 3 severe obesity with serious comorbidity and body mass index (BMI) of 60.0 to 69.9 in adult [E66.01, Z68.44]  Not Applicable    Cardiomegaly [I51.7]  Yes      Resolved Hospital Problems   No resolved problems to display.               Plan:   June 25- swollen lips, gas exchange poor peep 17 79/0.7, cxr not impressive- wbc 17.2 from 24 on 23rd.  2nd covid neg.  Cta lungs to be done.  Expect some degree of atelectasis.  Wheezes are present    Leak test and wean 02.                                        .

## 2020-06-25 NOTE — PLAN OF CARE
06/25/20 0721   Patient Assessment/Suction   Respiratory Effort Unlabored   Expansion/Accessory Muscles/Retractions no retractions   All Lung Fields Breath Sounds Anterior:;Lateral:;diminished   Rhythm/Pattern, Respiratory assisted mechanically   Cough Frequency with stimulation   Cough Type good   Secretions Amount small   Secretions Color tan   Secretions Characteristics thick   PRE-TX-O2   O2 Device (Oxygen Therapy) ventilator   $ Is the patient on Low Flow Oxygen? Yes   Oxygen Concentration (%) 80   SpO2 100 %   Pulse Oximetry Type Continuous   Pulse 60   Resp (!) 30   ETCO2   $ ETCO2 Charge Exhaled CO2 Monitoring   $ ETCO2 Usage Currently wearing   ETCO2 (mmHg) 34 mmHg   ETCO2 Device Type Bedside Monitor   Aerosol Therapy   $ Aerosol Therapy Charges Aerosol Treatment   Respiratory Treatment Status (SVN) given   Treatment Route (SVN) in-line   Patient Position (SVN) HOB elevated   Post Treatment Assessment (SVN) breath sounds unchanged   Signs of Intolerance (SVN) none   Breath Sounds Post-Respiratory Treatment   Throughout All Fields Post-Treatment All Fields   Throughout All Fields Post-Treatment no change   Post-treatment Heart Rate (beats/min) 68   Post-treatment Resp Rate (breaths/min) 30   Vent Select   Conventional Vent Y   Charged w/in last 24h YES   Preset Conventional Ventilator Settings   Vent Type    Ventilation Type VC   Vent Mode SIMV   Humidity HME   Set Rate 30 BPM   Vt Set 400 mL   PEEP/CPAP 17 cmH20   Pressure Support 10 cmH20   Waveform RAMP   Peak Flow 70 L/min   Set Inspiratory Pressure 0 cmH20   Insp Time 0 Sec(s)   Plateau Set/Insp. Hold (sec) 0   Insp Rise Time  100 %   Trigger Sensitivity Flow/I-Trigger 2 L/min   P High 0 cm H2O   P Low 0 cm H2O   T High 0 sec   T Low 0 sec   Patient Ventilator Parameters   Resp Rate Total 30 br/min   Peak Airway Pressure 42 cmH2O   Mean Airway Pressure 24 cmH20   Plateau Pressure 0 cmH20   Exhaled Vt 408 mL   Total Ve 12.3 mL   Spont Ve 0 L    I:E Ratio Measured 1:2.20   Conventional Ventilator Alarms   Alarms On Y   Ve High Alarm 24 L/min   Resp Rate High Alarm 40 br/min   Press High Alarm 60 cmH2O   Apnea Rate 20   Apnea Volume (mL) 550 mL   Apnea Oxygen Concentration  100   Apnea Flow Rate (L/min) 70   T Apnea 20 sec(s)   Ready to Wean/Extubation Screen   FIO2<=50 (chart decimal) (!) 0.8   MV<16L (chart vol.) 12.3   PEEP <=8 (chart #) (!) 17   Ready to Wean Parameters   F/VT Ratio<105 (RSBI) (!) 73.53

## 2020-06-25 NOTE — NURSING
Patient unable to be laid flat but CT tech believes patient will be able to have CT done once resp status is slightly improved potentially in am.

## 2020-06-26 PROBLEM — J15.20 STAPHYLOCOCCAL PNEUMONIA: Status: ACTIVE | Noted: 2020-06-26

## 2020-06-26 LAB
ALBUMIN SERPL BCP-MCNC: 2.4 G/DL (ref 3.5–5.2)
ALLENS TEST: ABNORMAL
ALP SERPL-CCNC: 44 U/L (ref 55–135)
ALT SERPL W/O P-5'-P-CCNC: 11 U/L (ref 10–44)
ANION GAP SERPL CALC-SCNC: 7 MMOL/L (ref 8–16)
AST SERPL-CCNC: 15 U/L (ref 10–40)
BACTERIA SPEC AEROBE CULT: ABNORMAL
BACTERIA SPEC AEROBE CULT: ABNORMAL
BASOPHILS # BLD AUTO: 0.01 K/UL (ref 0–0.2)
BASOPHILS NFR BLD: 0.1 % (ref 0–1.9)
BILIRUB SERPL-MCNC: 0.3 MG/DL (ref 0.1–1)
BUN SERPL-MCNC: 17 MG/DL (ref 6–20)
CALCIUM SERPL-MCNC: 8.3 MG/DL (ref 8.7–10.5)
CHLORIDE SERPL-SCNC: 105 MMOL/L (ref 95–110)
CO2 SERPL-SCNC: 27 MMOL/L (ref 23–29)
CREAT SERPL-MCNC: 0.8 MG/DL (ref 0.5–1.4)
D DIMER PPP IA.FEU-MCNC: 0.81 MG/L FEU
DELSYS: ABNORMAL
DIFFERENTIAL METHOD: ABNORMAL
EOSINOPHIL # BLD AUTO: 0 K/UL (ref 0–0.5)
EOSINOPHIL NFR BLD: 0 % (ref 0–8)
ERYTHROCYTE [DISTWIDTH] IN BLOOD BY AUTOMATED COUNT: 15.8 % (ref 11.5–14.5)
ERYTHROCYTE [SEDIMENTATION RATE] IN BLOOD BY WESTERGREN METHOD: 25 MM/H
ERYTHROCYTE [SEDIMENTATION RATE] IN BLOOD BY WESTERGREN METHOD: 30 MM/H
EST. GFR  (AFRICAN AMERICAN): >60 ML/MIN/1.73 M^2
EST. GFR  (NON AFRICAN AMERICAN): >60 ML/MIN/1.73 M^2
ETCO2: 29
ETCO2: 34
ETCO2: 37
ETCO2: 37
ETCO2: 40
FIO2: 100
FIO2: 100
FIO2: 75
FLOW: 2
FLOW: 2
FLOW: 70
GLUCOSE SERPL-MCNC: 120 MG/DL (ref 70–110)
GRAM STN SPEC: ABNORMAL
HCO3 UR-SCNC: 27.9 MMOL/L (ref 24–28)
HCO3 UR-SCNC: 28.3 MMOL/L (ref 24–28)
HCO3 UR-SCNC: 28.7 MMOL/L (ref 24–28)
HCO3 UR-SCNC: 29.9 MMOL/L (ref 24–28)
HCO3 UR-SCNC: 7.2 MMOL/L (ref 24–28)
HCT VFR BLD AUTO: 40 % (ref 37–48.5)
HGB BLD-MCNC: 12.3 G/DL (ref 12–16)
IMM GRANULOCYTES # BLD AUTO: 0.1 K/UL (ref 0–0.04)
IMM GRANULOCYTES NFR BLD AUTO: 0.6 % (ref 0–0.5)
L PNEUMO AG UR QL IA: NOT DETECTED
LACTATE SERPL-SCNC: 1 MMOL/L (ref 0.5–2.2)
LYMPHOCYTES # BLD AUTO: 2 K/UL (ref 1–4.8)
LYMPHOCYTES NFR BLD: 12.7 % (ref 18–48)
MAGNESIUM SERPL-MCNC: 2.5 MG/DL (ref 1.6–2.6)
MCH RBC QN AUTO: 25.9 PG (ref 27–31)
MCHC RBC AUTO-ENTMCNC: 30.8 G/DL (ref 32–36)
MCV RBC AUTO: 84 FL (ref 82–98)
MODE: ABNORMAL
MONOCYTES # BLD AUTO: 1.9 K/UL (ref 0.3–1)
MONOCYTES NFR BLD: 12 % (ref 4–15)
NEUTROPHILS # BLD AUTO: 11.5 K/UL (ref 1.8–7.7)
NEUTROPHILS NFR BLD: 74.6 % (ref 38–73)
NRBC BLD-RTO: 0 /100 WBC
PCO2 BLDA: 13.4 MMHG (ref 35–45)
PCO2 BLDA: 41.6 MMHG (ref 35–45)
PCO2 BLDA: 46.2 MMHG (ref 35–45)
PCO2 BLDA: 49.6 MMHG (ref 35–45)
PCO2 BLDA: 51.8 MMHG (ref 35–45)
PEEP: 17
PEEP: 17
PEEP: 5
PH SMN: 7.34 [PH] (ref 7.35–7.45)
PH SMN: 7.34 [PH] (ref 7.35–7.45)
PH SMN: 7.39 [PH] (ref 7.35–7.45)
PH SMN: 7.4 [PH] (ref 7.35–7.45)
PH SMN: 7.44 [PH] (ref 7.35–7.45)
PHOSPHATE SERPL-MCNC: 3.8 MG/DL (ref 2.7–4.5)
PIP: 33
PIP: 40
PIP: 41
PIP: 42
PIP: 42
PLATELET # BLD AUTO: 326 K/UL (ref 150–350)
PMV BLD AUTO: 10.6 FL (ref 9.2–12.9)
PO2 BLDA: 109 MMHG (ref 80–100)
PO2 BLDA: 54 MMHG (ref 80–100)
PO2 BLDA: 64 MMHG (ref 80–100)
PO2 BLDA: 82 MMHG (ref 80–100)
PO2 BLDA: 89 MMHG (ref 80–100)
POC BE: -19 MMOL/L
POC BE: 2 MMOL/L
POC BE: 4 MMOL/L
POC BE: 4 MMOL/L
POC BE: 5 MMOL/L
POC SATURATED O2: 87 % (ref 95–100)
POC SATURATED O2: 90 % (ref 95–100)
POC SATURATED O2: 96 % (ref 95–100)
POC SATURATED O2: 97 % (ref 95–100)
POC SATURATED O2: 98 % (ref 95–100)
POC TCO2: 29 MMOL/L (ref 23–27)
POC TCO2: 30 MMOL/L (ref 23–27)
POC TCO2: 30 MMOL/L (ref 23–27)
POC TCO2: 31 MMOL/L (ref 23–27)
POC TCO2: 8 MMOL/L (ref 23–27)
POCT GLUCOSE: 108 MG/DL (ref 70–110)
POCT GLUCOSE: 129 MG/DL (ref 70–110)
POCT GLUCOSE: 156 MG/DL (ref 70–110)
POCT GLUCOSE: 164 MG/DL (ref 70–110)
POTASSIUM SERPL-SCNC: 4.3 MMOL/L (ref 3.5–5.1)
PROT SERPL-MCNC: 6.3 G/DL (ref 6–8.4)
PS: 10
PS: 30
RBC # BLD AUTO: 4.75 M/UL (ref 4–5.4)
SAMPLE: ABNORMAL
SITE: ABNORMAL
SODIUM SERPL-SCNC: 139 MMOL/L (ref 136–145)
SP02: 88
SP02: 93
SP02: 94
SP02: 95
SP02: 97
VANCOMYCIN TROUGH SERPL-MCNC: 11.7 UG/ML (ref 10–22)
VT: 400
VT: 450
WBC # BLD AUTO: 15.43 K/UL (ref 3.9–12.7)

## 2020-06-26 PROCEDURE — 25000003 PHARM REV CODE 250: Performed by: INTERNAL MEDICINE

## 2020-06-26 PROCEDURE — 25000003 PHARM REV CODE 250

## 2020-06-26 PROCEDURE — 99291 CRITICAL CARE FIRST HOUR: CPT | Mod: ,,, | Performed by: INTERNAL MEDICINE

## 2020-06-26 PROCEDURE — 84100 ASSAY OF PHOSPHORUS: CPT

## 2020-06-26 PROCEDURE — 94770 HC EXHALED C02 TEST: CPT

## 2020-06-26 PROCEDURE — 83735 ASSAY OF MAGNESIUM: CPT

## 2020-06-26 PROCEDURE — 25000242 PHARM REV CODE 250 ALT 637 W/ HCPCS: Performed by: NURSE PRACTITIONER

## 2020-06-26 PROCEDURE — 85025 COMPLETE CBC W/AUTO DIFF WBC: CPT

## 2020-06-26 PROCEDURE — 63600175 PHARM REV CODE 636 W HCPCS: Performed by: INTERNAL MEDICINE

## 2020-06-26 PROCEDURE — 80202 ASSAY OF VANCOMYCIN: CPT

## 2020-06-26 PROCEDURE — 37799 UNLISTED PX VASCULAR SURGERY: CPT

## 2020-06-26 PROCEDURE — 83605 ASSAY OF LACTIC ACID: CPT

## 2020-06-26 PROCEDURE — 94640 AIRWAY INHALATION TREATMENT: CPT

## 2020-06-26 PROCEDURE — 82803 BLOOD GASES ANY COMBINATION: CPT

## 2020-06-26 PROCEDURE — 94003 VENT MGMT INPAT SUBQ DAY: CPT

## 2020-06-26 PROCEDURE — 80053 COMPREHEN METABOLIC PANEL: CPT

## 2020-06-26 PROCEDURE — 99900035 HC TECH TIME PER 15 MIN (STAT)

## 2020-06-26 PROCEDURE — 99900026 HC AIRWAY MAINTENANCE (STAT)

## 2020-06-26 PROCEDURE — 27000221 HC OXYGEN, UP TO 24 HOURS

## 2020-06-26 PROCEDURE — 20000000 HC ICU ROOM

## 2020-06-26 PROCEDURE — 25000003 PHARM REV CODE 250: Performed by: NURSE PRACTITIONER

## 2020-06-26 PROCEDURE — 87040 BLOOD CULTURE FOR BACTERIA: CPT

## 2020-06-26 PROCEDURE — C9113 INJ PANTOPRAZOLE SODIUM, VIA: HCPCS | Performed by: INTERNAL MEDICINE

## 2020-06-26 PROCEDURE — 94761 N-INVAS EAR/PLS OXIMETRY MLT: CPT

## 2020-06-26 PROCEDURE — 63600175 PHARM REV CODE 636 W HCPCS

## 2020-06-26 PROCEDURE — 63600175 PHARM REV CODE 636 W HCPCS: Performed by: NURSE PRACTITIONER

## 2020-06-26 PROCEDURE — 36415 COLL VENOUS BLD VENIPUNCTURE: CPT

## 2020-06-26 PROCEDURE — 85379 FIBRIN DEGRADATION QUANT: CPT

## 2020-06-26 PROCEDURE — 99291 PR CRITICAL CARE, E/M 30-74 MINUTES: ICD-10-PCS | Mod: ,,, | Performed by: INTERNAL MEDICINE

## 2020-06-26 RX ORDER — MIDAZOLAM HYDROCHLORIDE 1 MG/ML
5 INJECTION INTRAMUSCULAR; INTRAVENOUS EVERY 12 HOURS PRN
Status: DISCONTINUED | OUTPATIENT
Start: 2020-06-26 | End: 2020-06-26

## 2020-06-26 RX ORDER — ENOXAPARIN SODIUM 150 MG/ML
165 INJECTION SUBCUTANEOUS
Status: DISCONTINUED | OUTPATIENT
Start: 2020-06-27 | End: 2020-06-26

## 2020-06-26 RX ORDER — MIDAZOLAM HYDROCHLORIDE 1 MG/ML
5 INJECTION INTRAMUSCULAR; INTRAVENOUS
Status: DISCONTINUED | OUTPATIENT
Start: 2020-06-26 | End: 2020-06-26

## 2020-06-26 RX ORDER — ENOXAPARIN SODIUM 150 MG/ML
1 INJECTION SUBCUTANEOUS
Status: DISCONTINUED | OUTPATIENT
Start: 2020-06-26 | End: 2020-06-26

## 2020-06-26 RX ORDER — CHLORHEXIDINE GLUCONATE ORAL RINSE 1.2 MG/ML
15 SOLUTION DENTAL 2 TIMES DAILY
Status: DISCONTINUED | OUTPATIENT
Start: 2020-06-26 | End: 2020-07-14 | Stop reason: HOSPADM

## 2020-06-26 RX ORDER — ENOXAPARIN SODIUM 150 MG/ML
165 INJECTION SUBCUTANEOUS
Status: DISCONTINUED | OUTPATIENT
Start: 2020-06-27 | End: 2020-07-05

## 2020-06-26 RX ORDER — MORPHINE SULFATE 2 MG/ML
INJECTION, SOLUTION INTRAMUSCULAR; INTRAVENOUS
Status: DISPENSED
Start: 2020-06-26 | End: 2020-06-26

## 2020-06-26 RX ORDER — MORPHINE SULFATE 2 MG/ML
INJECTION, SOLUTION INTRAMUSCULAR; INTRAVENOUS
Status: COMPLETED
Start: 2020-06-26 | End: 2020-06-26

## 2020-06-26 RX ORDER — MIDAZOLAM HYDROCHLORIDE 1 MG/ML
2 INJECTION INTRAMUSCULAR; INTRAVENOUS EVERY 12 HOURS PRN
Status: DISCONTINUED | OUTPATIENT
Start: 2020-06-26 | End: 2020-06-26

## 2020-06-26 RX ORDER — MORPHINE SULFATE 2 MG/ML
5 INJECTION, SOLUTION INTRAMUSCULAR; INTRAVENOUS ONCE
Status: COMPLETED | OUTPATIENT
Start: 2020-06-26 | End: 2020-06-26

## 2020-06-26 RX ORDER — ACETAMINOPHEN 650 MG/20.3ML
650 LIQUID ORAL EVERY 6 HOURS PRN
Status: DISCONTINUED | OUTPATIENT
Start: 2020-06-26 | End: 2020-07-14 | Stop reason: HOSPADM

## 2020-06-26 RX ORDER — MIDAZOLAM HYDROCHLORIDE 5 MG/ML
2 INJECTION INTRAMUSCULAR; INTRAVENOUS EVERY 12 HOURS PRN
Status: DISCONTINUED | OUTPATIENT
Start: 2020-06-26 | End: 2020-06-26 | Stop reason: SDUPTHER

## 2020-06-26 RX ORDER — DEXMEDETOMIDINE HYDROCHLORIDE 4 UG/ML
0.2 INJECTION, SOLUTION INTRAVENOUS CONTINUOUS
Status: DISCONTINUED | OUTPATIENT
Start: 2020-06-26 | End: 2020-07-08

## 2020-06-26 RX ORDER — MIDAZOLAM HYDROCHLORIDE 5 MG/ML
5 INJECTION INTRAMUSCULAR; INTRAVENOUS EVERY 12 HOURS PRN
Status: DISCONTINUED | OUTPATIENT
Start: 2020-06-26 | End: 2020-06-26 | Stop reason: SDUPTHER

## 2020-06-26 RX ORDER — MIDAZOLAM HYDROCHLORIDE 1 MG/ML
2 INJECTION INTRAMUSCULAR; INTRAVENOUS
Status: DISCONTINUED | OUTPATIENT
Start: 2020-06-26 | End: 2020-07-08

## 2020-06-26 RX ORDER — ACETAMINOPHEN 325 MG/1
TABLET ORAL
Status: COMPLETED
Start: 2020-06-26 | End: 2020-06-26

## 2020-06-26 RX ADMIN — SODIUM CHLORIDE: 0.9 INJECTION, SOLUTION INTRAVENOUS at 09:06

## 2020-06-26 RX ADMIN — DEXMEDETOMIDINE HYDROCHLORIDE 0.7 MCG/KG/HR: 100 INJECTION, SOLUTION, CONCENTRATE INTRAVENOUS at 06:06

## 2020-06-26 RX ADMIN — PROPOFOL 50 MCG/KG/MIN: 10 INJECTION, EMULSION INTRAVENOUS at 04:06

## 2020-06-26 RX ADMIN — DEXMEDETOMIDINE HYDROCHLORIDE 1 MCG/KG/HR: 100 INJECTION, SOLUTION, CONCENTRATE INTRAVENOUS at 08:06

## 2020-06-26 RX ADMIN — METHYLPREDNISOLONE SODIUM SUCCINATE 80 MG: 125 INJECTION, POWDER, FOR SOLUTION INTRAMUSCULAR; INTRAVENOUS at 08:06

## 2020-06-26 RX ADMIN — PIPERACILLIN AND TAZOBACTAM 4.5 G: 4; .5 INJECTION, POWDER, FOR SOLUTION INTRAVENOUS at 05:06

## 2020-06-26 RX ADMIN — OXACILLIN SODIUM 2 G: 2 INJECTION, POWDER, FOR SOLUTION INTRAMUSCULAR; INTRAVENOUS at 11:06

## 2020-06-26 RX ADMIN — MORPHINE SULFATE 5 MG: 2 INJECTION, SOLUTION INTRAMUSCULAR; INTRAVENOUS at 11:06

## 2020-06-26 RX ADMIN — BUDESONIDE 0.25 MG: 0.25 SUSPENSION RESPIRATORY (INHALATION) at 07:06

## 2020-06-26 RX ADMIN — SODIUM CHLORIDE: 0.9 INJECTION, SOLUTION INTRAVENOUS at 07:06

## 2020-06-26 RX ADMIN — MIDAZOLAM HYDROCHLORIDE 5 MG: 1 INJECTION, SOLUTION INTRAMUSCULAR; INTRAVENOUS at 10:06

## 2020-06-26 RX ADMIN — DEXMEDETOMIDINE HYDROCHLORIDE 1 MCG/KG/HR: 100 INJECTION, SOLUTION, CONCENTRATE INTRAVENOUS at 11:06

## 2020-06-26 RX ADMIN — Medication 100 MCG/HR: at 01:06

## 2020-06-26 RX ADMIN — DEXMEDETOMIDINE HYDROCHLORIDE 0.7 MCG/KG/HR: 100 INJECTION, SOLUTION, CONCENTRATE INTRAVENOUS at 04:06

## 2020-06-26 RX ADMIN — IPRATROPIUM BROMIDE AND ALBUTEROL SULFATE 3 ML: .5; 3 SOLUTION RESPIRATORY (INHALATION) at 03:06

## 2020-06-26 RX ADMIN — OXACILLIN SODIUM 2 G: 2 INJECTION, POWDER, FOR SOLUTION INTRAMUSCULAR; INTRAVENOUS at 04:06

## 2020-06-26 RX ADMIN — CHLORHEXIDINE GLUCONATE 0.12% ORAL RINSE 15 ML: 1.2 LIQUID ORAL at 09:06

## 2020-06-26 RX ADMIN — CHLORHEXIDINE GLUCONATE 0.12% ORAL RINSE 15 ML: 1.2 LIQUID ORAL at 08:06

## 2020-06-26 RX ADMIN — ENOXAPARIN SODIUM 40 MG: 40 INJECTION SUBCUTANEOUS at 08:06

## 2020-06-26 RX ADMIN — OXACILLIN SODIUM 2 G: 2 INJECTION, POWDER, FOR SOLUTION INTRAMUSCULAR; INTRAVENOUS at 08:06

## 2020-06-26 RX ADMIN — PANTOPRAZOLE SODIUM 40 MG: 40 INJECTION, POWDER, LYOPHILIZED, FOR SOLUTION INTRAVENOUS at 08:06

## 2020-06-26 RX ADMIN — ENOXAPARIN SODIUM 165 MG: 150 INJECTION SUBCUTANEOUS at 04:06

## 2020-06-26 RX ADMIN — MORPHINE SULFATE 5 MG: 2 INJECTION, SOLUTION INTRAMUSCULAR; INTRAVENOUS at 10:06

## 2020-06-26 RX ADMIN — IPRATROPIUM BROMIDE AND ALBUTEROL SULFATE 3 ML: .5; 3 SOLUTION RESPIRATORY (INHALATION) at 12:06

## 2020-06-26 RX ADMIN — ACETAMINOPHEN 650 MG: 325 TABLET ORAL at 06:06

## 2020-06-26 RX ADMIN — MIDAZOLAM 1 MG/HR: 5 INJECTION INTRAMUSCULAR; INTRAVENOUS at 11:06

## 2020-06-26 RX ADMIN — MIDAZOLAM HYDROCHLORIDE 2 MG: 1 INJECTION, SOLUTION INTRAMUSCULAR; INTRAVENOUS at 09:06

## 2020-06-26 RX ADMIN — IPRATROPIUM BROMIDE AND ALBUTEROL SULFATE 3 ML: .5; 3 SOLUTION RESPIRATORY (INHALATION) at 07:06

## 2020-06-26 RX ADMIN — IPRATROPIUM BROMIDE AND ALBUTEROL SULFATE 3 ML: .5; 3 SOLUTION RESPIRATORY (INHALATION) at 04:06

## 2020-06-26 RX ADMIN — ACETAMINOPHEN 650 MG: 160 SOLUTION ORAL at 09:06

## 2020-06-26 RX ADMIN — PROPOFOL 50 MCG/KG/MIN: 10 INJECTION, EMULSION INTRAVENOUS at 02:06

## 2020-06-26 RX ADMIN — DEXMEDETOMIDINE HYDROCHLORIDE 1.4 MCG/KG/HR: 100 INJECTION, SOLUTION, CONCENTRATE INTRAVENOUS at 10:06

## 2020-06-26 NOTE — SUBJECTIVE & OBJECTIVE
Interval History:  Patient is in intensive care unit with respiratory failure, on mechanical ventilation after getting transferred from [place], [place].  Presently requiring PEEP 12 cm of water.  T-max 99.2 degree F. white blood cell count improving.  Patient is afebrile.  No acute distress noted.    Review of Systems   Unable to perform ROS: Intubated     Objective:     Vital Signs (Most Recent):  Temp: 98.2 °F (36.8 °C) (06/26/20 0400)  Pulse: (!) 55 (06/26/20 0705)  Resp: (!) 30 (06/26/20 0705)  BP: (!) 99/50 (06/26/20 0500)  SpO2: (!) 94 % (06/26/20 0705) Vital Signs (24h Range):  Temp:  [98.2 °F (36.8 °C)-99.2 °F (37.3 °C)] 98.2 °F (36.8 °C)  Pulse:  [50-77] 55  Resp:  [29-43] 30  SpO2:  [87 %-100 %] 94 %  BP: ()/(50-71) 99/50  Arterial Line BP: ()/(39-96) 104/63     Weight: (!) 165.5 kg (364 lb 13.8 oz)  Body mass index is 66.73 kg/m².    Intake/Output Summary (Last 24 hours) at 6/26/2020 0814  Last data filed at 6/26/2020 0507  Gross per 24 hour   Intake 4768.08 ml   Output 3500 ml   Net 1268.08 ml      Physical Exam  Vitals signs and nursing note reviewed.   Constitutional:       Appearance: She is well-developed. She is obese.      Interventions: She is sedated and intubated.   HENT:      Head: Normocephalic and atraumatic.   Eyes:      Conjunctiva/sclera: Conjunctivae normal.      Pupils: Pupils are equal, round, and reactive to light.   Neck:      Musculoskeletal: Normal range of motion and neck supple.   Cardiovascular:      Rate and Rhythm: Normal rate and regular rhythm.      Pulses: Normal pulses.      Heart sounds: Normal heart sounds.   Pulmonary:      Effort: Pulmonary effort is normal. She is intubated.      Breath sounds: Decreased breath sounds present.   Abdominal:      General: Bowel sounds are normal.      Palpations: Abdomen is soft. There is no mass.      Tenderness: There is no abdominal tenderness.   Genitourinary:     Comments: Vazquez catheter in  place    Musculoskeletal:         General: No swelling or deformity.   Skin:     General: Skin is warm and dry.      Capillary Refill: Capillary refill takes 2 to 3 seconds.   Neurological:      Comments: Unable to assess as pt is on mechanical ventilation with sedation     Psychiatric:      Comments: Sedated          Significant Labs:   CBC:   Recent Labs   Lab 06/25/20  0330 06/26/20 0312   WBC 17.19* 15.43*   HGB 12.2 12.3   HCT 39.1 40.0    326     CMP:   Recent Labs   Lab 06/25/20  0330 06/26/20 0312    139   K 3.7 4.3    105   CO2 29 27   * 120*   BUN 20 17   CREATININE 0.8 0.8   CALCIUM 8.3* 8.3*   PROT 6.4 6.3   ALBUMIN 2.6* 2.4*   BILITOT 0.2 0.3   ALKPHOS 50* 44*   AST 17 15   ALT 12 11   ANIONGAP 8 7*   EGFRNONAA >60 >60     Lactic Acid:   No results for input(s): LACTATE in the last 48 hours.  Microbiology Results (last 7 days)     Procedure Component Value Units Date/Time    Blood culture [[id number]] Collected: 06/23/20 2201    Order Status: Completed Specimen: Blood Updated: 06/26/20 0613     Blood Culture, Routine No Growth to date      No Growth to date      No Growth to date    Culture, Respiratory with Gram Stain [[id number]]  (Abnormal) Collected: 06/23/20 2152    Order Status: Completed Specimen: Respiratory from Tracheal Aspirate Updated: 06/25/20 1043     Respiratory Culture STAPHYLOCOCCUS AUREUS  Moderate  Susceptibility pending  Normal respiratory estephanie also present       Gram Stain (Respiratory) <10 epithelial cells per low power field.     Gram Stain (Respiratory) Few WBC's     Gram Stain (Respiratory) Rare Gram positive cocci        Significant Imaging:   ECHO:  · Concentric left ventricular remodeling.  · Normal left ventricular systolic function. The estimated ejection fraction is 69%.  · Normal LV diastolic function.  · No wall motion abnormalities.  · Normal right ventricular systolic function.  · Mild right atrial enlargement.  · Trivial pericardial  effusion.  · Mild left atrial enlargement.    CXR: Mild cardiomegaly.  Hypoventilation.    CXR:   1. Interval placement of endotracheal tube with the tip in the proximal right mainstem bronchus.  This should be withdrawn several cm.  2. The study is motion degraded.  Indistinctness of the left hemidiaphragm could be related to patient motion with atelectasis and/or pleural effusion not completely excluded.    CXR:  1. Limited evaluation secondary to technique.  2. Findings suspicious for congestive heart failure which does not appear significantly changed over the interval.  3. Small bilateral pleural effusions with bibasilar dependent atelectasis.  4. Endotracheal tube.    CXR:  Stable positioning of support devices.  Unchanged bibasilar consolidation/atelectasis.    CXR: Bibasilar opacification not significantly changed compared to the prior exam.  Positions of lines and tubes described.    CXR:   Continued bibasilar lung infiltrates.  Mild cardiomegaly.  Endotracheal tube, NG tube and central lines in position.

## 2020-06-26 NOTE — PROGRESS NOTES
[facility] Hospital Medicine  Progress Note    Patient Name: Michelle Wren  MRN: [id number]  Patient Class: IP- Inpatient   Admission Date: 6/23/2020  Length of Stay: 3 days  Attending Physician: Peg Stewart MD  Primary Care Provider: Primary Doctor No        Subjective:     Principal Problem:Severe sepsis    HPI:  Michelle Wren is a 31 y/o female with a past medical history significant for asthma, type 2 diabetes, and severe obesity who is transferred from [facility] to [facility] for pulmonary consultation.  Patient presented to the ED at [facility] yesterday with complaints of 2-3 days of shortness of breath.  It is reported that she uses CPAP at night but she got this from a friend so unsure of settings.  She was placed on BiPAP and given IV Solu-Medrol, bronchodilator treatments, and IV Lasix.  She was admitted to the ICU at [facility].  Patient continued to decline and underwent intubation yesterday around 7:00 p.m.  per report, today patient appeared to be worse.  Her saturations were in the mid 90s on FiO2 of 100%.  A D-dimer was checked and was negative.  Per review of labs which were drawn earlier today, her WBC count was 23,000 and her lactic acid was 2.4.  She was placed on IV Zosyn and IV vancomycin.  Upon arrival to [facility], patient is in no acute distress.  She is intubated and sedated.  Vital signs are stable.  Lactic acid will be repeated now as patient meets sepsis criteria and will check a procalcitonin.  She will be monitored closely in the ICU.           Overview/Hospital Course:  Patient is being managed in intensive care unit, requiring mechanical ventilation under supervision of pulmonary medicine.  Repeat COVID -19 test has been negative.  Patient is receiving intravenous antibiotics for bilateral pneumonia.  Patient is getting CTA of chest for further evaluation.    Interval History:  Patient is in intensive care unit with respiratory  failure, on mechanical ventilation after getting transferred from [place], [place].  Presently requiring PEEP 12 cm of water.  T-max 99.2 degree F. white blood cell count improving.  Patient is afebrile.  No acute distress noted.    Review of Systems   Unable to perform ROS: Intubated     Objective:     Vital Signs (Most Recent):  Temp: 98.2 °F (36.8 °C) (06/26/20 0400)  Pulse: (!) 55 (06/26/20 0705)  Resp: (!) 30 (06/26/20 0705)  BP: (!) 99/50 (06/26/20 0500)  SpO2: (!) 94 % (06/26/20 0705) Vital Signs (24h Range):  Temp:  [98.2 °F (36.8 °C)-99.2 °F (37.3 °C)] 98.2 °F (36.8 °C)  Pulse:  [50-77] 55  Resp:  [29-43] 30  SpO2:  [87 %-100 %] 94 %  BP: ()/(50-71) 99/50  Arterial Line BP: ()/(39-96) 104/63     Weight: (!) 165.5 kg (364 lb 13.8 oz)  Body mass index is 66.73 kg/m².    Intake/Output Summary (Last 24 hours) at 6/26/2020 0814  Last data filed at 6/26/2020 0507  Gross per 24 hour   Intake 4768.08 ml   Output 3500 ml   Net 1268.08 ml      Physical Exam  Vitals signs and nursing note reviewed.   Constitutional:       Appearance: She is well-developed. She is obese.      Interventions: She is sedated and intubated.   HENT:      Head: Normocephalic and atraumatic.   Eyes:      Conjunctiva/sclera: Conjunctivae normal.      Pupils: Pupils are equal, round, and reactive to light.   Neck:      Musculoskeletal: Normal range of motion and neck supple.   Cardiovascular:      Rate and Rhythm: Normal rate and regular rhythm.      Pulses: Normal pulses.      Heart sounds: Normal heart sounds.   Pulmonary:      Effort: Pulmonary effort is normal. She is intubated.      Breath sounds: Decreased breath sounds present.   Abdominal:      General: Bowel sounds are normal.      Palpations: Abdomen is soft. There is no mass.      Tenderness: There is no abdominal tenderness.   Genitourinary:     Comments: Vazquez catheter in place    Musculoskeletal:         General: No swelling or deformity.   Skin:      General: Skin is warm and dry.      Capillary Refill: Capillary refill takes 2 to 3 seconds.   Neurological:      Comments: Unable to assess as pt is on mechanical ventilation with sedation     Psychiatric:      Comments: Sedated          Significant Labs:   CBC:   Recent Labs   Lab 06/25/20  0330 06/26/20 0312   WBC 17.19* 15.43*   HGB 12.2 12.3   HCT 39.1 40.0    326     CMP:   Recent Labs   Lab 06/25/20  0330 06/26/20  0312    139   K 3.7 4.3    105   CO2 29 27   * 120*   BUN 20 17   CREATININE 0.8 0.8   CALCIUM 8.3* 8.3*   PROT 6.4 6.3   ALBUMIN 2.6* 2.4*   BILITOT 0.2 0.3   ALKPHOS 50* 44*   AST 17 15   ALT 12 11   ANIONGAP 8 7*   EGFRNONAA >60 >60     Lactic Acid:   No results for input(s): LACTATE in the last 48 hours.  Microbiology Results (last 7 days)     Procedure Component Value Units Date/Time    Blood culture [[id number]] Collected: 06/23/20 2201    Order Status: Completed Specimen: Blood Updated: 06/26/20 0613     Blood Culture, Routine No Growth to date      No Growth to date      No Growth to date    Culture, Respiratory with Gram Stain [[id number]]  (Abnormal) Collected: 06/23/20 2152    Order Status: Completed Specimen: Respiratory from Tracheal Aspirate Updated: 06/25/20 1043     Respiratory Culture STAPHYLOCOCCUS AUREUS  Moderate  Susceptibility pending  Normal respiratory estephanie also present       Gram Stain (Respiratory) <10 epithelial cells per low power field.     Gram Stain (Respiratory) Few WBC's     Gram Stain (Respiratory) Rare Gram positive cocci        Significant Imaging:   ECHO:  · Concentric left ventricular remodeling.  · Normal left ventricular systolic function. The estimated ejection fraction is 69%.  · Normal LV diastolic function.  · No wall motion abnormalities.  · Normal right ventricular systolic function.  · Mild right atrial enlargement.  · Trivial pericardial effusion.  · Mild left atrial enlargement.    CXR: Mild cardiomegaly.   Hypoventilation.    CXR:   1. Interval placement of endotracheal tube with the tip in the proximal right mainstem bronchus.  This should be withdrawn several cm.  2. The study is motion degraded.  Indistinctness of the left hemidiaphragm could be related to patient motion with atelectasis and/or pleural effusion not completely excluded.    CXR:  1. Limited evaluation secondary to technique.  2. Findings suspicious for congestive heart failure which does not appear significantly changed over the interval.  3. Small bilateral pleural effusions with bibasilar dependent atelectasis.  4. Endotracheal tube.    CXR:  Stable positioning of support devices.  Unchanged bibasilar consolidation/atelectasis.    CXR: Bibasilar opacification not significantly changed compared to the prior exam.  Positions of lines and tubes described.    CXR:   Continued bibasilar lung infiltrates.  Mild cardiomegaly.  Endotracheal tube, NG tube and central lines in position.      Assessment/Plan:      * Severe sepsis  This patient does have evidence of infective focus  My overall impression is severe sepsis.  Antibiotics given-   Antibiotics (From admission, onward)    Start     Stop Route Frequency Ordered    06/24/20 1400  levoFLOXacin 750 mg/150 mL IVPB 750 mg      -- IV Every 24 hours (non-standard times) 06/23/20 2045 06/24/20 0000  vancomycin (VANCOCIN) 2,000 mg in dextrose 5 % 500 mL IVPB      -- IV Every 12 hours (non-standard times) 06/23/20 2129    06/23/20 2200  piperacillin-tazobactam 4.5 g in dextrose 5 % 100 mL IVPB (ready to mix system)      -- IV Every 8 hours (non-standard times) 06/23/20 2045 06/23/20 2144  vancomycin - pharmacy to dose  (vancomycin IVPB)      -- IV pharmacy to manage frequency 06/23/20 2045          Severe Sepsis only-  Latest labs reviewed, they are-  Recent Labs   Lab 06/26/20  0312   BILITOT 0.3     No results for input(s): LACTATE in the last 24 hours.  Recent Labs   Lab 06/26/20  0355   PH 7.441    PO2 89   PCO2 41.6   HCO3 28.3*   BE 4       Organ dysfunction indicated by Acute respiratory failure      Temp Readings from Last 1 Encounters:   06/26/20 98.2 °F (36.8 °C) (Axillary)     BP Readings from Last 1 Encounters:   06/26/20 (!) 99/50     Pulse Readings from Last 1 Encounters:   06/26/20 (!) 55           Type 2 diabetes mellitus  Not on chronic medication.  A1c 6.2%.  Accuchecks ac/hs with SSI coverage as needed.  Nutrition consult as pt NPO on vent.        Acute respiratory failure with hypoxia and hypercarbia  Patient with Hypercapnic and Hypoxic Respiratory failure which is Acute.  she is not on home oxygen. Supplemental ventilation was provided and noted- Vent Mode: SIMV  Oxygen Concentration (%):  [80] 80  Resp Rate Total:  [30 br/min-53 br/min] 30 br/min  Vt Set:  [400 mL] 400 mL  PEEP/CPAP:  [17 cmH20] 17 cmH20  Pressure Support:  [10 cmH20] 10 cmH20  Mean Airway Pressure:  [24 bgO43-54 cmH20] 24 cmH20 and oxygen saturations 97%. Differential diagnosis includes - COPD, CHF, Obesity Hypoventilation, Interstitial lung disease and Pneumonia Labs and images were reviewed. Will treat underlying causes.   Follow pulmonary recommendations.  Follow CTA chest results.  Discussed with Dr. Gentile.      Bilateral pneumonia  Will discontinue IV zosyn and Levaquin, continue IV vancomycin since patient is growing Staph aureus species in sputum.  Follow Pulmonary recommendations  Blood and sputum cultures-follow.  Sputum culture growing staph aureus species.  Monitor clinical response      Nicotine dependence  Health hazards associated with cigarette smoking should be reviewed with patient and cessation encouraged when pt is able to participate.       Mild intermittent asthma  Chronic; rescue inhaler only noted to home meds, no controller.  Continue bronchodilators q4h.  Currently intubated on mechanical ventilation.  Follow pulmonary recommendations. On IV Solumedrol 125 mg q 8 hrs.      Cardiomegaly  Patient  with peripheral edema, mildly elevated BNP.  IV lasix was initiated at [facility].    Echo was completed today with results as follows:  · Concentric left ventricular remodeling.  · Normal left ventricular systolic function. The estimated ejection fraction is 69%.  · Normal LV diastolic function.  · No wall motion abnormalities.  · Normal right ventricular systolic function.  · Mild right atrial enlargement.  · Trivial pericardial effusion.  · Mild left atrial enlargement.    IV lasix discontinued as no indication of heart failure.    Obesity hypoventilation syndrome  On cpap at home.  Currently requiring IPPV.        Class 3 severe obesity with serious comorbidity and body mass index (BMI) of 60.0 to 69.9 in adult  Body mass index is 66.53 kg/m². Morbid obesity complicates all aspects of disease management from diagnostic modalities to treatment. Weight loss encouraged and health benefits explained to patient.            VTE Risk Mitigation (From admission, onward)         Ordered     enoxaparin injection 40 mg  Every 12 hours      06/23/20 2327     IP VTE HIGH RISK PATIENT  Once      06/23/20 2055     Place sequential compression device  Until discontinued      06/23/20 2055                Critical care time spent on the evaluation and treatment of severe organ dysfunction, review of pertinent labs and imaging studies, discussions with consulting providers and discussions with patient/family: 35 minutes.      Peg Stewart MD  Department of Hospital Medicine   [facility]

## 2020-06-26 NOTE — ASSESSMENT & PLAN NOTE
IV zosyn, IV vancomycin-de-escalate as appropriate.  Follow Pulmonary recommendations  Blood and sputum cultures-follow.  Sputum culture growing staph aureus species.  Monitor clinical response

## 2020-06-26 NOTE — PLAN OF CARE
06/25/20 1907   Patient Assessment/Suction   Level of Consciousness (AVPU) responds to pain   Respiratory Effort Normal;Unlabored   Expansion/Accessory Muscles/Retractions no retractions;no use of accessory muscles   All Lung Fields Breath Sounds Anterior:;Lateral:   THANG Breath Sounds wheezes, expiratory   LLL Breath Sounds diminished   RUL Breath Sounds wheezes, expiratory   RML Breath Sounds diminished   RLL Breath Sounds diminished   Rhythm/Pattern, Respiratory depth regular;pattern regular;unlabored   Cough Frequency with stimulation   $ Suction Charges Inline Suction Procedure Stat Charge   Secretions Amount small   Secretions Color tan   Secretions Characteristics thick   PRE-TX-O2   O2 Device (Oxygen Therapy) ventilator   $ Is the patient on Low Flow Oxygen? Yes   Oxygen Concentration (%) 75   SpO2 (!) 91 %   Pulse Oximetry Type Continuous   $ Pulse Oximetry - Multiple Charge Pulse Oximetry - Multiple   Pulse (!) 58   Resp (!) 30   ETCO2   $ ETCO2 Charge Exhaled CO2 Monitoring   $ ETCO2 Usage Currently wearing   ETCO2 (mmHg) 37 mmHg   ETCO2 Device Type Bedside Monitor   Aerosol Therapy   $ Aerosol Therapy Charges Aerosol Treatment   Daily Review of Necessity (SVN) completed   Respiratory Treatment Status (SVN) given   Treatment Route (SVN) in-line   Patient Position (SVN) HOB elevated   Post Treatment Assessment (SVN) breath sounds unchanged   Signs of Intolerance (SVN) none   Breath Sounds Post-Respiratory Treatment   Throughout All Fields Post-Treatment All Fields   Throughout All Fields Post-Treatment no change   Post-treatment Heart Rate (beats/min) 59   Post-treatment Resp Rate (breaths/min) 30   Wound Care   $ Wound Care Tech Time 15 min   Area of Concern Left;Right;Cheek   Skin Color/Characteristics without discoloration   Skin Temperature warm   Was wound care notified? No        Airway - Non-Surgical 06/22/20 1625 Endotracheal Tube   Placement Date/Time: 06/22/20 1625   Present Prior to Hospital  Arrival?: No  Method of Intubation: Direct laryngoscopy  Inserted by: MD  Airway Device: Endotracheal Tube  Mask Ventilation: Easy  Blade: Ramon #3  Airway Device Size: 7.5  Style: Cuffed...   Secured at 25 cm   Measured At Lips   Secured Location Right   Secured by Commercial tube lord   Bite Block right;secure and patent   Site Condition Cool;Dry   Status Intact;Secured;Patent   Site Assessment Clean;Dry;No bleeding;No drainage   Cuff Pressure 30 cm H2O   Vent Select   Conventional Vent Y   Charged w/in last 24h YES   Preset Conventional Ventilator Settings   Vent Type    Ventilation Type VC   Vent Mode SIMV   Humidity HME   Set Rate 30 BPM   Vt Set 400 mL   PEEP/CPAP 17 cmH20   Pressure Support 10 cmH20   Waveform RAMP   Peak Flow 70 L/min   Set Inspiratory Pressure 0 cmH20   Insp Time 0 Sec(s)   Plateau Set/Insp. Hold (sec) 0   Insp Rise Time  100 %   Trigger Sensitivity Flow/I-Trigger 2 L/min   P High 0 cm H2O   P Low 0 cm H2O   T High 0 sec   T Low 0 sec   Patient Ventilator Parameters   Resp Rate Total 30 br/min   Peak Airway Pressure 40 cmH2O   Mean Airway Pressure 25 cmH20   Plateau Pressure 0 cmH20   Exhaled Vt 411 mL   Total Ve 12.3 mL   Spont Ve 0 L   I:E Ratio Measured 1:2.20   Conventional Ventilator Alarms   Alarms On Y   Ve High Alarm 24 L/min   Resp Rate High Alarm 40 br/min   Press High Alarm 60 cmH2O   Apnea Rate 20   Apnea Volume (mL) 550 mL   Apnea Oxygen Concentration  100   Apnea Flow Rate (L/min) 70   T Apnea 20 sec(s)   Ready to Wean/Extubation Screen   FIO2<=50 (chart decimal) (!) 0.75   MV<16L (chart vol.) 12.3   PEEP <=8 (chart #) (!) 17   Ready to Wean Parameters   F/VT Ratio<105 (RSBI) (!) 72.99   Airway Safety   Ambu bag with the patient? Yes, Adult Ambu   Is mask with the patient? Yes, Adult Mask   Suction set is at the bedside? Yes   ETT Size 7.5

## 2020-06-26 NOTE — NURSING
Per pharmacy's direction from Dr Gentile, administer additional 165 mg of lovenox in addition to 40 mg of lovenox that was given this morning.

## 2020-06-26 NOTE — PLAN OF CARE
Able to wean FIO2 to 65%. Sat 94%. Sinus dulce 56. On propofol at 45mcg, Fentanyl at 100mcg. Frequent oral care done. No BM noted. Generalized edema. Art line positional. Continue to monitor. Soft wrist restraint on. Safety and fall prevention maintain.

## 2020-06-26 NOTE — PLAN OF CARE
Results for MICH NASH (MRN [id number]) as of 6/26/2020 11:23   Ref. Range 6/26/2020 09:33   POC PH Latest Ref Range: 7.35 - 7.45  7.402   POC PCO2 Latest Ref Range: 35 - 45 mmHg 46.2 (H)   POC PO2 Latest Ref Range: 80 - 100 mmHg 82   POC BE Latest Ref Range: -2 to 2 mmol/L 4   POC HCO3 Latest Ref Range: 24 - 28 mmol/L 28.7 (H)   POC SATURATED O2 Latest Ref Range: 95 - 100 % 96   POC TCO2 Latest Ref Range: 23 - 27 mmol/L 30 (H)   FiO2 Unknown 75   Vt Unknown 400   PiP Unknown 42   PEEP Unknown 5   Sample Unknown ARTERIAL   DelSys Unknown Adult Vent   Allens Test Unknown N/A   Site Unknown Oriana/UAC   Mode Unknown SIMV   Rate Unknown 25   ETCO2 Unknown 37   PS Unknown 30   Sp02 Unknown 94

## 2020-06-26 NOTE — PROGRESS NOTES
Progress Note  PULMONARY    Admit Date: 6/23/2020 06/26/2020      SUBJECTIVE:     June 25th-patient is sedated, no complaints, intubated.  6/26 intubated.      PFSH and Allergies reviewed.    OBJECTIVE:     Vitals (Most recent):  Vitals:    06/26/20 1115   BP:    Pulse: 68   Resp: (!) 30   Temp: 98.2 °F (36.8 °C)       Vitals (24 hour range):  Temp:  [98.2 °F (36.8 °C)-99.2 °F (37.3 °C)]   Pulse:  [50-77]   Resp:  [23-43]   BP: ()/(50-93)   SpO2:  [87 %-97 %]   Arterial Line BP: ()/(58-97)       Intake/Output Summary (Last 24 hours) at 6/26/2020 1126  Last data filed at 6/26/2020 0923  Gross per 24 hour   Intake 4768.08 ml   Output 3525 ml   Net 1243.08 ml          Physical Exam:  The patient's neuro status (alertness,orientation,cognitive function,motor skills,), pharyngeal exam (oral lesions, hygiene, abn dentition,), Neck (jvd,mass,thyroid,nodes in neck and above/below clavicle),RESPIRATORY(symmetry,effort,fremitus,percussion,auscultation),  Cor(rhythm,heart tones including gallops,perfusion,edema)ABD(distention,hepatic&splenomegaly,tenderness,masses), Skin(rash,cyanosis),Psyc(affect,judgement,).  Exam negative except for these pertinent findings:    Morbid obesity, intubated, anterior diffuse inspiratory and slightly expiratory wheezes, no distress, symmetric, no edema, soft abdomen, distant heart-lips and tongue to be significantly swollen on June 25th-protruding from the mouth    Radiographs reviewed: view by direct vision   Chest x-ray June 25th suggest left lower lung collapse, there is some increased interstitial type markings in the right lower lung also.    Results for orders placed during the hospital encounter of 06/22/20   X-Ray Chest 1 View    Narrative EXAMINATION:  XR CHEST 1 VIEW    CLINICAL HISTORY:  LINE PLACEMENT;    TECHNIQUE:  Single frontal view of the chest was performed.    COMPARISON:  06/23/2020.    FINDINGS:  There is persistent pulmonary hypoinflation.  There are bilateral  pulmonary infiltrates which are stable to slightly increased over the interval likely representing pulmonary edema.  Small bilateral pleural effusions with bibasilar dependent atelectasis.    Heart size is enlarged.  Mediastinal contours unremarkable.  Trachea midline.    Endotracheal tube remains in satisfactory position.  Interval placement of right IJ catheter which terminates in the distal SVC.      Impression 1. Pulmonary hypoinflation.  2. Findings consistent with congestive heart failure which is stable to slightly increased over the interval with small bilateral pleural effusions.  3. Satisfactory indwelling life-support devices.      Electronically signed by: Navin Mortensen  Date:    06/23/2020  Time:    11:58   ]    Labs     Recent Labs   Lab 06/26/20 0312   WBC 15.43*   HGB 12.3   HCT 40.0        Recent Labs   Lab 06/26/20 0312 06/26/20  0945     --    K 4.3  --      --    CO2 27  --    BUN 17  --    CREATININE 0.8  --    *  --    CALCIUM 8.3*  --    MG 2.5  --    PHOS 3.8  --    AST 15  --    ALT 11  --    ALKPHOS 44*  --    BILITOT 0.3  --    PROT 6.3  --    ALBUMIN 2.4*  --    LACTATE  --  1.0     Recent Labs   Lab 06/26/20  1037   PH 7.339*   PCO2 51.8*   PO2 64*   HCO3 27.9     Microbiology Results (last 7 days)     Procedure Component Value Units Date/Time    Culture, Respiratory with Gram Stain [[id number]]  (Abnormal)  (Susceptibility) Collected: 06/23/20 2152    Order Status: Completed Specimen: Respiratory from Tracheal Aspirate Updated: 06/26/20 1047     Respiratory Culture No Pseudomonas isolated.      STAPHYLOCOCCUS AUREUS  Moderate  Normal respiratory estephanie also present       Gram Stain (Respiratory) <10 epithelial cells per low power field.     Gram Stain (Respiratory) Few WBC's     Gram Stain (Respiratory) Rare Gram positive cocci    Blood culture [[id number]] Collected: 06/23/20 2201    Order Status: Completed Specimen: Blood Updated: 06/26/20 0613     Blood  Culture, Routine No Growth to date      No Growth to date      No Growth to date          Impression:  Active Hospital Problems    Diagnosis  POA    *Severe sepsis [A41.9, R65.20]  Yes    Staphylococcal pneumonia [J15.20]  Yes    Type 2 diabetes mellitus [E11.9]  Yes    Nicotine dependence [F17.200]  Yes    Bilateral pneumonia [J18.9]  Yes    Acute respiratory failure with hypoxia and hypercarbia [J96.01, J96.02]  Yes    Obesity hypoventilation syndrome [E66.2]  Yes    Mild intermittent asthma [J45.20]  Yes    Class 3 severe obesity with serious comorbidity and body mass index (BMI) of 60.0 to 69.9 in adult [E66.01, Z68.44]  Not Applicable    Cardiomegaly [I51.7]  Yes      Resolved Hospital Problems   No resolved problems to display.               Plan:   June 25- swollen lips, gas exchange poor peep 17 79/0.7, cxr not impressive- wbc 17.2 from 24 on 23rd.  2nd covid neg.  Cta lungs to be done.  Expect some degree of atelectasis.  Wheezes are present    Leak test and wean 02. 6/26 ratio 89/0.7 on peep 17,   Wbc now 15k, no ct done.  Solumedrol 80/d    Pt initially bradycardic, abg with large neg base excess new from am lytes, propofol stopped for fear propofol infusion syndrome (about 40).  Fu abg with clearing acidosis.      Pt had peep decreased from 17 to 5 with sat rising from 95- to 97.  Pt  Is obstructed on vol time curve.      Pt developed resp distress off propofol on precedex.  Will give prn morphine with versed drip.    Ct not done with high peep/body size.  Could have ild but asthma should be likely dx.     mssa in sputum - staph pneumonia present at admit likely , steroids not good.   Will go to oxacillin 2 q 4 from vanc.  Cut steroids to 40/d, f/u procal-  Was 0.02 at presentation.      The following were evaluated and adjusted as needed: mechanical ventilator settings and weaning status, intravenous fluids and nutritional status, sedation and neurologic status, antibiotics, hemodynamics,  support tubes and access lines and invasive monitoring and acid base balance and oxygenation needs       Critical Care  - THE PATIENT HAS A HIGH PROBABILITY OF IMMINENT OR LIFE THREATENING DETERIORATION.  Over 50%time of encounter was in direct care at bedside.  Time was 30 to 74 minutes required for patient care.  Time needed for all of the above totaled 67 minutes.    Addendum pt seemed to improve on lower levels peep but off propofol developed resp distress and  Desat.  Will check d dimer again, increase loveonx to therapeutic, and check leg studies.                      .

## 2020-06-26 NOTE — PLAN OF CARE
06/26/20 0704   Patient Assessment/Suction   Respiratory Effort Unlabored   Expansion/Accessory Muscles/Retractions no use of accessory muscles   All Lung Fields Breath Sounds Anterior:;Lateral:;diminished   Rhythm/Pattern, Respiratory assisted mechanically   Cough Frequency with stimulation   Cough Type good   Secretions Amount small   Secretions Color tan   Secretions Characteristics thick   PRE-TX-O2   O2 Device (Oxygen Therapy) ventilator   $ Is the patient on Low Flow Oxygen? Yes   Oxygen Concentration (%) 65   SpO2 (!) 94 %   Pulse Oximetry Type Continuous   Pulse (!) 53   Resp (!) 30   ETCO2   $ ETCO2 Charge Exhaled CO2 Monitoring   $ ETCO2 Usage Currently wearing   ETCO2 (mmHg) 33 mmHg   ETCO2 Device Type Bedside Monitor   Aerosol Therapy   $ Aerosol Therapy Charges Aerosol Treatment   Daily Review of Necessity (SVN) completed   Respiratory Treatment Status (SVN) given   Treatment Route (SVN) in-line   Patient Position (SVN) HOB elevated   Post Treatment Assessment (SVN) breath sounds unchanged   Signs of Intolerance (SVN) none   Breath Sounds Post-Respiratory Treatment   Throughout All Fields Post-Treatment All Fields   Throughout All Fields Post-Treatment no change   Post-treatment Heart Rate (beats/min) 54   Post-treatment Resp Rate (breaths/min) 30   Wound Care   $ Wound Care Tech Time 15 min   Area of Concern Cheek;Lower lip;Upper lip;Corner lip   Skin Color/Characteristics without discoloration   Skin Temperature warm        Airway - Non-Surgical 06/22/20 1625 Endotracheal Tube   Placement Date/Time: 06/22/20 1625   Present Prior to Hospital Arrival?: No  Method of Intubation: Direct laryngoscopy  Inserted by: MD  Airway Device: Endotracheal Tube  Mask Ventilation: Easy  Blade: Ramon #3  Airway Device Size: 7.5  Style: Cuffed...   Secured at 25 cm   Measured At Lips   Secured Location Center  (moved to right)   Secured by Commercial tube lord   Bite Block secure and patent   Site Condition Dry    Status Intact   Site Assessment Dry;Clean   Cuff Pressure 30 cm H2O   Vent Select   Conventional Vent Y   Charged w/in last 24h YES   Preset Conventional Ventilator Settings   Vent Type    Ventilation Type VC   Vent Mode SIMV   Humidity HME   Set Rate 30 BPM   Vt Set 400 mL   PEEP/CPAP 17 cmH20   Pressure Support 10 cmH20   Waveform RAMP   Peak Flow 70 L/min   Set Inspiratory Pressure 0 cmH20   Insp Time 0 Sec(s)   Plateau Set/Insp. Hold (sec) 0   Insp Rise Time  100 %   Trigger Sensitivity Flow/I-Trigger 2 L/min   P High 0 cm H2O   P Low 0 cm H2O   T High 0 sec   T Low 0 sec   Patient Ventilator Parameters   Resp Rate Total 91 br/min   Peak Airway Pressure 53 cmH2O   Mean Airway Pressure 13 cmH20   Plateau Pressure 34 cmH20   Exhaled Vt 80 mL   Total Ve 0.9 mL   Spont Ve 0 L   I:E Ratio Measured 1:4.10   Conventional Ventilator Alarms   Alarms On Y   Ve High Alarm 24 L/min   Resp Rate High Alarm 40 br/min   Press High Alarm 60 cmH2O   Apnea Rate 20   Apnea Volume (mL) 550 mL   Apnea Oxygen Concentration  100   Apnea Flow Rate (L/min) 70   T Apnea 20 sec(s)   Ready to Wean/Extubation Screen   FIO2<=50 (chart decimal) (!) 0.65   MV<16L (chart vol.) 0.9   PEEP <=8 (chart #) (!) 17   Ready to Wean Parameters   F/VT Ratio<105 (RSBI) 375

## 2020-06-26 NOTE — ASSESSMENT & PLAN NOTE
This patient does have evidence of infective focus  My overall impression is severe sepsis.  Antibiotics given-   Antibiotics (From admission, onward)    Start     Stop Route Frequency Ordered    06/24/20 1400  levoFLOXacin 750 mg/150 mL IVPB 750 mg      -- IV Every 24 hours (non-standard times) 06/23/20 2045 06/24/20 0000  vancomycin (VANCOCIN) 2,000 mg in dextrose 5 % 500 mL IVPB      -- IV Every 12 hours (non-standard times) 06/23/20 2129 06/23/20 2200  piperacillin-tazobactam 4.5 g in dextrose 5 % 100 mL IVPB (ready to mix system)      -- IV Every 8 hours (non-standard times) 06/23/20 2045 06/23/20 2144  vancomycin - pharmacy to dose  (vancomycin IVPB)      -- IV pharmacy to manage frequency 06/23/20 2045          Severe Sepsis only-  Latest labs reviewed, they are-  Recent Labs   Lab 06/26/20  0312   BILITOT 0.3     No results for input(s): LACTATE in the last 24 hours.  Recent Labs   Lab 06/26/20  0355   PH 7.441   PO2 89   PCO2 41.6   HCO3 28.3*   BE 4       Organ dysfunction indicated by Acute respiratory failure      Temp Readings from Last 1 Encounters:   06/26/20 98.2 °F (36.8 °C) (Axillary)     BP Readings from Last 1 Encounters:   06/26/20 (!) 99/50     Pulse Readings from Last 1 Encounters:   06/26/20 (!) 55

## 2020-06-26 NOTE — CARE UPDATE
Leak test performed. There is some air movement around the cuff. When leak is inflated exxhaled tidal volumes are on average 425. When cuff is deflated exhaled tidal volumes are 320.

## 2020-06-26 NOTE — PROGRESS NOTES
Michelle Wren [id number] is a 30 y.o. female who had been consulted for vancomycin dosing.    Vancomycin has been discontinued.  Pharmacy consult for vancomycin dosing in no longer required.      Thank you for allowing us to participate in this patient's care.     Margaret Barrera

## 2020-06-26 NOTE — PROGRESS NOTES
Blood gas repeated after showing Dr. Gentile the results of this ABG due to possible erroneous results. Following ABG results @09:33 were more accurate to expected results.      Results for MICH NASH (MRN [id number]) as of 6/26/2020 11:37   Ref. Range 6/26/2020 09:17   POC PH Latest Ref Range: 7.35 - 7.45  7.341 (L)   POC PCO2 Latest Ref Range: 35 - 45 mmHg 13.4 (LL)   POC PO2 Latest Ref Range: 80 - 100 mmHg 109 (H)   POC BE Latest Ref Range: -2 to 2 mmol/L -19   POC HCO3 Latest Ref Range: 24 - 28 mmol/L 7.2 (L)   POC SATURATED O2 Latest Ref Range: 95 - 100 % 98   POC TCO2 Latest Ref Range: 23 - 27 mmol/L 8 (L)   FiO2 Unknown 75   Vt Unknown 400   PiP Unknown 40   PEEP Unknown 5   Sample Unknown ARTERIAL   DelSys Unknown Adult Vent   Allens Test Unknown N/A   Site Unknown Oriana/UAC   Mode Unknown SIMV   Rate Unknown 25   ETCO2 Unknown 37   PS Unknown 30   Sp02 Unknown 93

## 2020-06-26 NOTE — PLAN OF CARE
Results for MICH NASH (MRN [id number]) as of 6/26/2020 18:30   Ref. Range 6/26/2020 12:50   POC PH Latest Ref Range: 7.35 - 7.45  7.388   POC PCO2 Latest Ref Range: 35 - 45 mmHg 49.6 (H)   POC PO2 Latest Ref Range: 80 - 100 mmHg 54 (LL)   POC BE Latest Ref Range: -2 to 2 mmol/L 5   POC HCO3 Latest Ref Range: 24 - 28 mmol/L 29.9 (H)   POC SATURATED O2 Latest Ref Range: 95 - 100 % 87 (L)   POC TCO2 Latest Ref Range: 23 - 27 mmol/L 31 (H)   FiO2 Unknown 100   Vt Unknown 450   PiP Unknown 33   PEEP Unknown 5   Flow Unknown 2   Sample Unknown ARTERIAL   DelSys Unknown Adult Vent   Allens Test Unknown N/A   Site Unknown Oriana/UAC   Mode Unknown SIMV   Rate Unknown 25

## 2020-06-26 NOTE — PLAN OF CARE
Results for MICH NSAH (MRN [id number]) as of 6/26/2020 11:23   Ref. Range 6/26/2020 10:37   POC PH Latest Ref Range: 7.35 - 7.45  7.339 (L)   POC PCO2 Latest Ref Range: 35 - 45 mmHg 51.8 (H)   POC PO2 Latest Ref Range: 80 - 100 mmHg 64 (L)   POC BE Latest Ref Range: -2 to 2 mmol/L 2   POC HCO3 Latest Ref Range: 24 - 28 mmol/L 27.9   POC SATURATED O2 Latest Ref Range: 95 - 100 % 90 (L)   POC TCO2 Latest Ref Range: 23 - 27 mmol/L 29 (H)   FiO2 Unknown 100   Vt Unknown 400   PiP Unknown 41   PEEP Unknown 17   Flow Unknown 2   Sample Unknown ARTERIAL   DelSys Unknown Adult Vent   Allens Test Unknown N/A   Site Unknown Oriana/UAC   Mode Unknown SIMV   Rate Unknown 25   ETCO2 Unknown 40   PS Unknown 30   Sp02 Unknown 95

## 2020-06-26 NOTE — PLAN OF CARE
Remains on vent, , rate 30, PEEP 17, Fio2 75%, suctioned PRN, ETT secretions thick beige/brown and oral secretions thick creamy colored, diminished breath sounds, O2 sat 92-94% on 75% FIO2, johnson drained 1650ml clear yellow urine, NGT drained 200ml yellow bile to canister,Covid came back negative and isolation stopped, update to family by Dr Stewart today.

## 2020-06-27 LAB
ALBUMIN SERPL BCP-MCNC: 2.7 G/DL (ref 3.5–5.2)
ALLENS TEST: ABNORMAL
ALLENS TEST: ABNORMAL
ALP SERPL-CCNC: 52 U/L (ref 55–135)
ALT SERPL W/O P-5'-P-CCNC: 17 U/L (ref 10–44)
ANION GAP SERPL CALC-SCNC: 9 MMOL/L (ref 8–16)
AST SERPL-CCNC: 21 U/L (ref 10–40)
BASOPHILS # BLD AUTO: 0.02 K/UL (ref 0–0.2)
BASOPHILS NFR BLD: 0.1 % (ref 0–1.9)
BILIRUB SERPL-MCNC: 0.7 MG/DL (ref 0.1–1)
BILIRUB UR QL STRIP: NEGATIVE
BUN SERPL-MCNC: 25 MG/DL (ref 6–20)
CALCIUM SERPL-MCNC: 8.8 MG/DL (ref 8.7–10.5)
CHLORIDE SERPL-SCNC: 104 MMOL/L (ref 95–110)
CLARITY UR: CLEAR
CO2 SERPL-SCNC: 25 MMOL/L (ref 23–29)
COLOR UR: YELLOW
CREAT SERPL-MCNC: 0.9 MG/DL (ref 0.5–1.4)
DELSYS: ABNORMAL
DELSYS: ABNORMAL
DIFFERENTIAL METHOD: ABNORMAL
EOSINOPHIL # BLD AUTO: 0 K/UL (ref 0–0.5)
EOSINOPHIL NFR BLD: 0 % (ref 0–8)
ERYTHROCYTE [DISTWIDTH] IN BLOOD BY AUTOMATED COUNT: 15.6 % (ref 11.5–14.5)
ERYTHROCYTE [SEDIMENTATION RATE] IN BLOOD BY WESTERGREN METHOD: 10 MM/H
ERYTHROCYTE [SEDIMENTATION RATE] IN BLOOD BY WESTERGREN METHOD: 25 MM/H
EST. GFR  (AFRICAN AMERICAN): >60 ML/MIN/1.73 M^2
EST. GFR  (NON AFRICAN AMERICAN): >60 ML/MIN/1.73 M^2
ETCO2: 38
FIO2: 80
FIO2: 80
FLOW: 70
GLUCOSE SERPL-MCNC: 141 MG/DL (ref 70–110)
GLUCOSE SERPL-MCNC: 146 MG/DL (ref 70–110)
GLUCOSE UR QL STRIP: NEGATIVE
HCO3 UR-SCNC: 27 MMOL/L (ref 24–28)
HCO3 UR-SCNC: 30.9 MMOL/L (ref 24–28)
HCT VFR BLD AUTO: 44.9 % (ref 37–48.5)
HCT VFR BLD CALC: 44 %PCV (ref 36–54)
HGB BLD-MCNC: 13.6 G/DL (ref 12–16)
HGB UR QL STRIP: NEGATIVE
IMM GRANULOCYTES # BLD AUTO: 0.16 K/UL (ref 0–0.04)
IMM GRANULOCYTES NFR BLD AUTO: 0.9 % (ref 0–0.5)
KETONES UR QL STRIP: NEGATIVE
LEUKOCYTE ESTERASE UR QL STRIP: NEGATIVE
LYMPHOCYTES # BLD AUTO: 3.1 K/UL (ref 1–4.8)
LYMPHOCYTES NFR BLD: 17.7 % (ref 18–48)
MAGNESIUM SERPL-MCNC: 2.3 MG/DL (ref 1.6–2.6)
MCH RBC QN AUTO: 25.7 PG (ref 27–31)
MCHC RBC AUTO-ENTMCNC: 30.3 G/DL (ref 32–36)
MCV RBC AUTO: 85 FL (ref 82–98)
MODE: ABNORMAL
MODE: ABNORMAL
MONOCYTES # BLD AUTO: 2.5 K/UL (ref 0.3–1)
MONOCYTES NFR BLD: 13.9 % (ref 4–15)
NEUTROPHILS # BLD AUTO: 12 K/UL (ref 1.8–7.7)
NEUTROPHILS NFR BLD: 67.4 % (ref 38–73)
NITRITE UR QL STRIP: NEGATIVE
NRBC BLD-RTO: 0 /100 WBC
PCO2 BLDA: 43.7 MMHG (ref 35–45)
PCO2 BLDA: 51.5 MMHG (ref 35–45)
PEEP: 15
PEEP: 15
PH SMN: 7.38 [PH] (ref 7.35–7.45)
PH SMN: 7.4 [PH] (ref 7.35–7.45)
PH UR STRIP: 6 [PH] (ref 5–8)
PHOSPHATE SERPL-MCNC: 3.5 MG/DL (ref 2.7–4.5)
PIP: 37
PLATELET # BLD AUTO: 344 K/UL (ref 150–350)
PMV BLD AUTO: 11 FL (ref 9.2–12.9)
PO2 BLDA: 78 MMHG (ref 80–100)
PO2 BLDA: 81 MMHG (ref 80–100)
POC BE: 2 MMOL/L
POC BE: 6 MMOL/L
POC IONIZED CALCIUM: 1.23 MMOL/L (ref 1.06–1.42)
POC SATURATED O2: 95 % (ref 95–100)
POC SATURATED O2: 95 % (ref 95–100)
POC TCO2: 28 MMOL/L (ref 23–27)
POC TCO2: 32 MMOL/L (ref 23–27)
POCT GLUCOSE: 110 MG/DL (ref 70–110)
POCT GLUCOSE: 126 MG/DL (ref 70–110)
POCT GLUCOSE: 139 MG/DL (ref 70–110)
POCT GLUCOSE: 191 MG/DL (ref 70–110)
POTASSIUM BLD-SCNC: 4.6 MMOL/L (ref 3.5–5.1)
POTASSIUM SERPL-SCNC: 4.7 MMOL/L (ref 3.5–5.1)
PROCALCITONIN SERPL IA-MCNC: 0.05 NG/ML
PROT SERPL-MCNC: 7.1 G/DL (ref 6–8.4)
PROT UR QL STRIP: NEGATIVE
PS: 25
PS: 25
RBC # BLD AUTO: 5.29 M/UL (ref 4–5.4)
SAMPLE: ABNORMAL
SAMPLE: ABNORMAL
SITE: ABNORMAL
SITE: ABNORMAL
SODIUM BLD-SCNC: 135 MMOL/L (ref 136–145)
SODIUM SERPL-SCNC: 138 MMOL/L (ref 136–145)
SP GR UR STRIP: 1.02 (ref 1–1.03)
SP02: 96
URN SPEC COLLECT METH UR: NORMAL
UROBILINOGEN UR STRIP-ACNC: NEGATIVE EU/DL
VT: 450
VT: 450
WBC # BLD AUTO: 17.75 K/UL (ref 3.9–12.7)

## 2020-06-27 PROCEDURE — 87040 BLOOD CULTURE FOR BACTERIA: CPT

## 2020-06-27 PROCEDURE — 63600175 PHARM REV CODE 636 W HCPCS: Performed by: INTERNAL MEDICINE

## 2020-06-27 PROCEDURE — 99233 SBSQ HOSP IP/OBS HIGH 50: CPT | Mod: ,,, | Performed by: INTERNAL MEDICINE

## 2020-06-27 PROCEDURE — 94761 N-INVAS EAR/PLS OXIMETRY MLT: CPT

## 2020-06-27 PROCEDURE — 36415 COLL VENOUS BLD VENIPUNCTURE: CPT

## 2020-06-27 PROCEDURE — 80053 COMPREHEN METABOLIC PANEL: CPT

## 2020-06-27 PROCEDURE — 87205 SMEAR GRAM STAIN: CPT

## 2020-06-27 PROCEDURE — 84145 PROCALCITONIN (PCT): CPT

## 2020-06-27 PROCEDURE — 99900035 HC TECH TIME PER 15 MIN (STAT)

## 2020-06-27 PROCEDURE — 87070 CULTURE OTHR SPECIMN AEROBIC: CPT

## 2020-06-27 PROCEDURE — 82803 BLOOD GASES ANY COMBINATION: CPT

## 2020-06-27 PROCEDURE — 87106 FUNGI IDENTIFICATION YEAST: CPT

## 2020-06-27 PROCEDURE — 25000003 PHARM REV CODE 250: Performed by: INTERNAL MEDICINE

## 2020-06-27 PROCEDURE — 87086 URINE CULTURE/COLONY COUNT: CPT

## 2020-06-27 PROCEDURE — 27000221 HC OXYGEN, UP TO 24 HOURS

## 2020-06-27 PROCEDURE — 25000242 PHARM REV CODE 250 ALT 637 W/ HCPCS: Performed by: NURSE PRACTITIONER

## 2020-06-27 PROCEDURE — 36600 WITHDRAWAL OF ARTERIAL BLOOD: CPT

## 2020-06-27 PROCEDURE — 83735 ASSAY OF MAGNESIUM: CPT

## 2020-06-27 PROCEDURE — 85014 HEMATOCRIT: CPT

## 2020-06-27 PROCEDURE — 85025 COMPLETE CBC W/AUTO DIFF WBC: CPT

## 2020-06-27 PROCEDURE — 99900026 HC AIRWAY MAINTENANCE (STAT)

## 2020-06-27 PROCEDURE — C9113 INJ PANTOPRAZOLE SODIUM, VIA: HCPCS | Performed by: INTERNAL MEDICINE

## 2020-06-27 PROCEDURE — 99233 PR SUBSEQUENT HOSPITAL CARE,LEVL III: ICD-10-PCS | Mod: ,,, | Performed by: INTERNAL MEDICINE

## 2020-06-27 PROCEDURE — 82330 ASSAY OF CALCIUM: CPT

## 2020-06-27 PROCEDURE — 20000000 HC ICU ROOM

## 2020-06-27 PROCEDURE — 82565 ASSAY OF CREATININE: CPT

## 2020-06-27 PROCEDURE — 94003 VENT MGMT INPAT SUBQ DAY: CPT

## 2020-06-27 PROCEDURE — 84295 ASSAY OF SERUM SODIUM: CPT

## 2020-06-27 PROCEDURE — 81003 URINALYSIS AUTO W/O SCOPE: CPT

## 2020-06-27 PROCEDURE — 84100 ASSAY OF PHOSPHORUS: CPT

## 2020-06-27 PROCEDURE — 84132 ASSAY OF SERUM POTASSIUM: CPT

## 2020-06-27 PROCEDURE — 94640 AIRWAY INHALATION TREATMENT: CPT

## 2020-06-27 PROCEDURE — 94770 HC EXHALED C02 TEST: CPT

## 2020-06-27 RX ORDER — FUROSEMIDE 10 MG/ML
60 INJECTION INTRAMUSCULAR; INTRAVENOUS
Status: DISCONTINUED | OUTPATIENT
Start: 2020-06-27 | End: 2020-07-09

## 2020-06-27 RX ORDER — LEVOFLOXACIN 5 MG/ML
500 INJECTION, SOLUTION INTRAVENOUS
Status: DISCONTINUED | OUTPATIENT
Start: 2020-06-27 | End: 2020-06-29

## 2020-06-27 RX ADMIN — DEXMEDETOMIDINE HYDROCHLORIDE 1 MCG/KG/HR: 100 INJECTION, SOLUTION, CONCENTRATE INTRAVENOUS at 06:06

## 2020-06-27 RX ADMIN — CHLORHEXIDINE GLUCONATE 0.12% ORAL RINSE 15 ML: 1.2 LIQUID ORAL at 09:06

## 2020-06-27 RX ADMIN — PANTOPRAZOLE SODIUM 40 MG: 40 INJECTION, POWDER, LYOPHILIZED, FOR SOLUTION INTRAVENOUS at 09:06

## 2020-06-27 RX ADMIN — FUROSEMIDE 60 MG: 10 INJECTION, SOLUTION INTRAVENOUS at 11:06

## 2020-06-27 RX ADMIN — IPRATROPIUM BROMIDE AND ALBUTEROL SULFATE 3 ML: .5; 3 SOLUTION RESPIRATORY (INHALATION) at 03:06

## 2020-06-27 RX ADMIN — OXACILLIN SODIUM 2 G: 2 INJECTION, POWDER, FOR SOLUTION INTRAMUSCULAR; INTRAVENOUS at 05:06

## 2020-06-27 RX ADMIN — OXACILLIN SODIUM 2 G: 2 INJECTION, POWDER, FOR SOLUTION INTRAMUSCULAR; INTRAVENOUS at 07:06

## 2020-06-27 RX ADMIN — DEXMEDETOMIDINE HYDROCHLORIDE 1 MCG/KG/HR: 100 INJECTION, SOLUTION, CONCENTRATE INTRAVENOUS at 09:06

## 2020-06-27 RX ADMIN — OXACILLIN SODIUM 2 G: 2 INJECTION, POWDER, FOR SOLUTION INTRAMUSCULAR; INTRAVENOUS at 04:06

## 2020-06-27 RX ADMIN — CHLORHEXIDINE GLUCONATE 0.12% ORAL RINSE 15 ML: 1.2 LIQUID ORAL at 08:06

## 2020-06-27 RX ADMIN — BUDESONIDE 0.25 MG: 0.25 SUSPENSION RESPIRATORY (INHALATION) at 07:06

## 2020-06-27 RX ADMIN — OXACILLIN SODIUM 2 G: 2 INJECTION, POWDER, FOR SOLUTION INTRAMUSCULAR; INTRAVENOUS at 08:06

## 2020-06-27 RX ADMIN — ENOXAPARIN SODIUM 165 MG: 120 INJECTION SUBCUTANEOUS at 03:06

## 2020-06-27 RX ADMIN — LEVOFLOXACIN 500 MG: 500 INJECTION, SOLUTION INTRAVENOUS at 02:06

## 2020-06-27 RX ADMIN — METHYLPREDNISOLONE SODIUM SUCCINATE 40 MG: 40 INJECTION, POWDER, FOR SOLUTION INTRAMUSCULAR; INTRAVENOUS at 09:06

## 2020-06-27 RX ADMIN — OXACILLIN SODIUM 2 G: 2 INJECTION, POWDER, FOR SOLUTION INTRAMUSCULAR; INTRAVENOUS at 11:06

## 2020-06-27 RX ADMIN — DEXMEDETOMIDINE HYDROCHLORIDE 1 MCG/KG/HR: 100 INJECTION, SOLUTION, CONCENTRATE INTRAVENOUS at 07:06

## 2020-06-27 RX ADMIN — IPRATROPIUM BROMIDE AND ALBUTEROL SULFATE 3 ML: .5; 3 SOLUTION RESPIRATORY (INHALATION) at 12:06

## 2020-06-27 RX ADMIN — IPRATROPIUM BROMIDE AND ALBUTEROL SULFATE 3 ML: .5; 3 SOLUTION RESPIRATORY (INHALATION) at 11:06

## 2020-06-27 RX ADMIN — INSULIN ASPART 2 UNITS: 100 INJECTION, SOLUTION INTRAVENOUS; SUBCUTANEOUS at 11:06

## 2020-06-27 RX ADMIN — DEXMEDETOMIDINE HYDROCHLORIDE 0.08 MCG/KG/HR: 100 INJECTION, SOLUTION, CONCENTRATE INTRAVENOUS at 11:06

## 2020-06-27 RX ADMIN — IPRATROPIUM BROMIDE AND ALBUTEROL SULFATE 3 ML: .5; 3 SOLUTION RESPIRATORY (INHALATION) at 07:06

## 2020-06-27 RX ADMIN — OXACILLIN SODIUM 2 G: 2 INJECTION, POWDER, FOR SOLUTION INTRAMUSCULAR; INTRAVENOUS at 12:06

## 2020-06-27 RX ADMIN — DEXMEDETOMIDINE HYDROCHLORIDE 1 MCG/KG/HR: 100 INJECTION, SOLUTION, CONCENTRATE INTRAVENOUS at 01:06

## 2020-06-27 RX ADMIN — MIDAZOLAM 2 MG/HR: 5 INJECTION INTRAMUSCULAR; INTRAVENOUS at 07:06

## 2020-06-27 NOTE — PLAN OF CARE
Pt remains intubated and sedated on versed and Precedex drips. Temp max 101.9 placed on cooling blanket. Sr on monitor. BP stable. Good urine output. Blood sugars WNL. Tube feeds slow increase with residuals. 150cc at 10 cc hr. Safety measures in place.

## 2020-06-27 NOTE — PLAN OF CARE
Patient remains in ICU on vent sedated with Versed at 1 mg/hr and Precedex at 0.9. Patient calm, follow commands, able to communicate through writing. Remains on cooling blanket. Urine culture and resp culture done. Received 60 mg of IV Lasix. Voided 4.5 L this shift. Repeat K + 4.6. Safety maintained.

## 2020-06-27 NOTE — RESPIRATORY THERAPY
06/26/20 1917   Patient Assessment/Suction   Level of Consciousness (AVPU) responds to voice   Respiratory Effort Mild;Labored  (prolonged expiratory)   Expansion/Accessory Muscles/Retractions abdominal muscle use   All Lung Fields Breath Sounds coarse   THANG Breath Sounds wheezes, expiratory   LLL Breath Sounds wheezes, expiratory   RUL Breath Sounds wheezes, expiratory   RML Breath Sounds wheezes, expiratory;diminished   RLL Breath Sounds wheezes, expiratory;diminished   Rhythm/Pattern, Respiratory prolonged expiratory phase;assisted mechanically   Suction Method oral   $ Suction Charges Inline Suction Procedure Stat Charge   Secretions Amount moderate   Secretions Color tan   Secretions Characteristics thick   PRE-TX-O2   O2 Device (Oxygen Therapy) ventilator   $ Is the patient on Low Flow Oxygen? Yes   Oxygen Concentration (%) 80   SpO2 (!) 92 %   Pulse Oximetry Type Continuous   $ Pulse Oximetry - Multiple Charge Pulse Oximetry - Multiple   Pulse 74   Resp (!) 25   BP (!) 157/96   ETCO2   $ ETCO2 Charge Exhaled CO2 Monitoring   $ ETCO2 Usage Currently wearing   ETCO2 (mmHg) 41 mmHg   ETCO2 Device Type Bedside Monitor   Aerosol Therapy   $ Aerosol Therapy Charges Aerosol Treatment   Respiratory Treatment Status (SVN) given   Treatment Route (SVN) in-line   Patient Position (SVN) HOB elevated   Post Treatment Assessment (SVN) breath sounds unchanged   Signs of Intolerance (SVN) none   Breath Sounds Post-Respiratory Treatment   Throughout All Fields Post-Treatment no change   Post-treatment Heart Rate (beats/min) 85   Post-treatment Resp Rate (breaths/min) 28        Airway - Non-Surgical 06/22/20 1625 Endotracheal Tube   Placement Date/Time: 06/22/20 1625   Present Prior to Hospital Arrival?: No  Method of Intubation: Direct laryngoscopy  Inserted by: MD  Airway Device: Endotracheal Tube  Mask Ventilation: Easy  Blade: Ramon #3  Airway Device Size: 7.5  Style: Cuffed...   Secured at 24 cm  (at top lip)    Measured At Lips   Secured Location Left  (moved from right)   Secured by Commercial tube lord   Bite Block left;secure and patent   Site Condition Dry   Status Intact;Secured;Patent   Cuff Pressure 32 cm H2O   Vent Select   Conventional Vent Y   Charged w/in last 24h YES   Preset Conventional Ventilator Settings   Vent Type    Ventilation Type VC   Vent Mode SIMV   Humidity HME   Set Rate 25 BPM   Vt Set 450 mL   PEEP/CPAP 15 cmH20   Pressure Support 25 cmH20   Waveform RAMP   Peak Flow 70 L/min   Set Inspiratory Pressure 0 cmH20   Insp Time 0 Sec(s)   Plateau Set/Insp. Hold (sec) 0   Insp Rise Time  100 %   Trigger Sensitivity Flow/I-Trigger 2 L/min   P High 0 cm H2O   P Low 0 cm H2O   T High 0 sec   T Low 0 sec   Patient Ventilator Parameters   Resp Rate Total 25 br/min   Peak Airway Pressure 41 cmH2O   Mean Airway Pressure 22 cmH20   Plateau Pressure 34 cmH20   Exhaled Vt 436 mL   Total Ve 11.3 mL   Spont Ve 0 L   I:E Ratio Measured 1:2.40   Conventional Ventilator Alarms   Alarms On Y   Ve High Alarm 24 L/min   Resp Rate High Alarm 40 br/min   Press High Alarm 69 cmH2O   Apnea Rate 20   Apnea Volume (mL) 550 mL   Apnea Oxygen Concentration  100   Apnea Flow Rate (L/min) 70   T Apnea 20 sec(s)   Ready to Wean/Extubation Screen   FIO2<=50 (chart decimal) (!) 0.8   MV<16L (chart vol.) 11.3   PEEP <=8 (chart #) (!) 15   Ready to Wean Parameters   F/VT Ratio<105 (RSBI) (!) 57.34     Received pt on above documented settings. ambu is at the hob all vent alarms are set and working. Aero tx's given inline, bilateral prolonged exp wheezing throughout. No resp distress noted at this time. Will do am abg on pt, possible to hold am cpap trial due to Po2 this pm, high fio2 and high peep.

## 2020-06-27 NOTE — ASSESSMENT & PLAN NOTE
Patient with Hypercapnic and Hypoxic Respiratory failure which is Acute.  she is not on home oxygen. Supplemental ventilation was provided and noted- Vent Mode: SIMV  Oxygen Concentration (%):  [] 80  Resp Rate Total:  [22 br/min-33 br/min] 25 br/min  Vt Set:  [400 mL-450 mL] 450 mL  PEEP/CPAP:  [5 cmH20-15 cmH20] 15 cmH20  Pressure Support:  [15 lqC20-72 cmH20] 25 cmH20  Mean Airway Pressure:  [9.3 vpY55-73 cmH20] 23 cmH20 and oxygen saturations 97%. Differential diagnosis includes - COPD, CHF, Obesity Hypoventilation, Interstitial lung disease and Pneumonia Labs and images were reviewed. Will treat underlying causes.   Follow pulmonary recommendations.  Follow CTA chest results.    Stop fluids add Lasix 60 BID

## 2020-06-27 NOTE — ASSESSMENT & PLAN NOTE
Body mass index is 67.58 kg/m². Morbid obesity complicates all aspects of disease management from diagnostic modalities to treatment. Weight loss encouraged and health benefits explained to patient.

## 2020-06-27 NOTE — PROGRESS NOTES
[facility] Hospital Medicine  Progress Note    Patient Name: Michelle Wren  MRN: [id number]  Patient Class: IP- Inpatient   Admission Date: 6/23/2020  Length of Stay: 4 days  Attending Physician: Peg Stewart MD  Primary Care Provider: Primary Doctor No        Subjective:     Principal Problem:Severe sepsis        HPI:  Michelle Wren is a 29 y/o female with a past medical history significant for asthma, type 2 diabetes, and severe obesity who is transferred from [facility] to [facility] for pulmonary consultation.  Patient presented to the ED at [facility] yesterday with complaints of 2-3 days of shortness of breath.  It is reported that she uses CPAP at night but she got this from a friend so unsure of settings.  She was placed on BiPAP and given IV Solu-Medrol, bronchodilator treatments, and IV Lasix.  She was admitted to the ICU at [facility].  Patient continued to decline and underwent intubation yesterday around 7:00 p.m.  per report, today patient appeared to be worse.  Her saturations were in the mid 90s on FiO2 of 100%.  A D-dimer was checked and was negative.  Per review of labs which were drawn earlier today, her WBC count was 23,000 and her lactic acid was 2.4.  She was placed on IV Zosyn and IV vancomycin.  Upon arrival to [facility], patient is in no acute distress.  She is intubated and sedated.  Vital signs are stable.  Lactic acid will be repeated now as patient meets sepsis criteria and will check a procalcitonin.  She will be monitored closely in the ICU.           Overview/Hospital Course:  Patient is being managed in intensive care unit, requiring mechanical ventilation under supervision of pulmonary medicine.  Repeat COVID -19 test has been negative.  Patient is receiving intravenous antibiotics for bilateral pneumonia.  Patient is getting CTA of chest for further evaluation.    Interval History: Patient seen and examined.  Remains on  mechanical ventilation.  Relatively stable hemodynamically but remains with high support on vent.     Review of Systems   Unable to perform ROS: Intubated     Objective:     Vital Signs (Most Recent):  Temp: 99.8 °F (37.7 °C) (06/27/20 0908)  Pulse: 71 (06/27/20 0900)  Resp: (!) 25 (06/27/20 0900)  BP: 134/67 (06/27/20 0900)  SpO2: 97 % (06/27/20 0900) Vital Signs (24h Range):  Temp:  [98.2 °F (36.8 °C)-101.6 °F (38.7 °C)] 99.8 °F (37.7 °C)  Pulse:  [62-91] 71  Resp:  [20-39] 25  SpO2:  [87 %-97 %] 97 %  BP: (130-163)/() 134/67  Arterial Line BP: (120-166)/() 159/111     Weight: (!) 167.6 kg (369 lb 7.9 oz)  Body mass index is 67.58 kg/m².    Intake/Output Summary (Last 24 hours) at 6/27/2020 1036  Last data filed at 6/27/2020 0857  Gross per 24 hour   Intake 2979.13 ml   Output 2175 ml   Net 804.13 ml      Physical Exam  Constitutional:       General: She is not in acute distress.     Appearance: She is well-developed.   HENT:      Head: Normocephalic and atraumatic.   Eyes:      Pupils: Pupils are equal, round, and reactive to light.   Neck:      Musculoskeletal: Neck supple.      Thyroid: No thyromegaly.   Cardiovascular:      Rate and Rhythm: Normal rate and regular rhythm.      Heart sounds: No murmur. No friction rub. No gallop.    Pulmonary:      Effort: Pulmonary effort is normal. No respiratory distress.      Breath sounds: Normal breath sounds. No wheezing.   Abdominal:      General: Bowel sounds are normal. There is no distension.      Palpations: Abdomen is soft.      Tenderness: There is no abdominal tenderness. There is no guarding.   Musculoskeletal: Normal range of motion.   Skin:     General: Skin is warm and dry.      Findings: No erythema.         Significant Labs:   CBC:   Recent Labs   Lab 06/26/20  0312 06/27/20  0416   WBC 15.43* 17.75*   HGB 12.3 13.6   HCT 40.0 44.9    344       Significant Imaging: I have reviewed all pertinent imaging results/findings within the past 24  hours.      Assessment/Plan:      * Severe sepsis  This patient does have evidence of infective focus  My overall impression is severe sepsis.  Antibiotics given-   Antibiotics (From admission, onward)    Start     Stop Route Frequency Ordered    06/26/20 1230  oxacillin 2 g in sodium chloride 0.9 %  100 mL IVPB (ready to mix system)      -- IV Every 4 hours (non-standard times) 06/26/20 1122          Severe Sepsis only-  Latest labs reviewed, they are-  Recent Labs   Lab 06/27/20  0416   BILITOT 0.7     No results for input(s): LACTATE in the last 24 hours.  Recent Labs   Lab 06/27/20  0411   PH 7.399   PO2 78*   PCO2 43.7   HCO3 27.0   BE 2       Organ dysfunction indicated by Acute respiratory failure      Temp Readings from Last 1 Encounters:   06/27/20 99.8 °F (37.7 °C) (Rectal)     BP Readings from Last 1 Encounters:   06/27/20 136/77     Pulse Readings from Last 1 Encounters:   06/27/20 64           Type 2 diabetes mellitus  Not on chronic medication.  A1c 6.2%.  Accuchecks ac/hs with SSI coverage as needed.  Nutrition consult as pt NPO on vent.        Acute respiratory failure with hypoxia and hypercarbia  Patient with Hypercapnic and Hypoxic Respiratory failure which is Acute.  she is not on home oxygen. Supplemental ventilation was provided and noted- Vent Mode: SIMV  Oxygen Concentration (%):  [] 80  Resp Rate Total:  [22 br/min-33 br/min] 25 br/min  Vt Set:  [400 mL-450 mL] 450 mL  PEEP/CPAP:  [5 cmH20-15 cmH20] 15 cmH20  Pressure Support:  [15 uxA98-87 cmH20] 25 cmH20  Mean Airway Pressure:  [9.3 shF86-90 cmH20] 23 cmH20 and oxygen saturations 97%. Differential diagnosis includes - COPD, CHF, Obesity Hypoventilation, Interstitial lung disease and Pneumonia Labs and images were reviewed. Will treat underlying causes.   Follow pulmonary recommendations.  Follow CTA chest results.    Stop fluids add Lasix 60 BID    Bilateral pneumonia  IV zosyn, IV vancomycin-de-escalate as appropriate.  Follow  Pulmonary recommendations  Blood and sputum cultures-follow.  Sputum culture growing staph aureus species.  Monitor clinical response      Nicotine dependence  Health hazards associated with cigarette smoking should be reviewed with patient and cessation encouraged when pt is able to participate.       Mild intermittent asthma  Chronic; rescue inhaler only noted to home meds, no controller.  Continue bronchodilators q4h.  Currently intubated on mechanical ventilation.  Follow pulmonary recommendations. On IV Solumedrol 125 mg q 8 hrs.      Cardiomegaly  Patient with peripheral edema, mildly elevated BNP.  IV lasix was initiated at [facility].    Echo was completed today with results as follows:  · Concentric left ventricular remodeling.  · Normal left ventricular systolic function. The estimated ejection fraction is 69%.  · Normal LV diastolic function.  · No wall motion abnormalities.  · Normal right ventricular systolic function.  · Mild right atrial enlargement.  · Trivial pericardial effusion.  · Mild left atrial enlargement.    IV lasix discontinued as no indication of heart failure.    Obesity hypoventilation syndrome  Intubated.       Class 3 severe obesity with serious comorbidity and body mass index (BMI) of 60.0 to 69.9 in adult  Body mass index is 67.58 kg/m². Morbid obesity complicates all aspects of disease management from diagnostic modalities to treatment. Weight loss encouraged and health benefits explained to patient.            VTE Risk Mitigation (From admission, onward)         Ordered     enoxaparin injection 165 mg  Every 12 hours (non-standard times)      06/26/20 1703     IP VTE HIGH RISK PATIENT  Once      06/23/20 2055     Place sequential compression device  Until discontinued      06/23/20 2055                Critical care time spent on the evaluation and treatment of severe organ dysfunction, review of pertinent labs and imaging studies, discussions with consulting providers and discussions  with patient/family: 25 minutes.      Olegario Diaz MD  Department of Hospital Medicine   [facility]

## 2020-06-27 NOTE — PROGRESS NOTES
Progress Note  PULMONARY    Admit Date: 6/23/2020 06/27/2020      SUBJECTIVE:     June 25th-patient is sedated, no complaints, intubated.  6/26 intubated.  6/27=intubated and alert, denies sob    PFSH and Allergies reviewed.    OBJECTIVE:     Vitals (Most recent):  Vitals:    06/27/20 1215   BP: 139/63   Pulse: 80   Resp: (!) 24   Temp:        Vitals (24 hour range):  Temp:  [98.8 °F (37.1 °C)-101.6 °F (38.7 °C)]   Pulse:  [64-96]   Resp:  [20-39]   BP: (126-178)/()   SpO2:  [88 %-97 %]   Arterial Line BP: (121-166)/(101-158)       Intake/Output Summary (Last 24 hours) at 6/27/2020 1342  Last data filed at 6/27/2020 1226  Gross per 24 hour   Intake 3416.53 ml   Output 4435 ml   Net -1018.47 ml          Physical Exam:  The patient's neuro status (alertness,orientation,cognitive function,motor skills,), pharyngeal exam (oral lesions, hygiene, abn dentition,), Neck (jvd,mass,thyroid,nodes in neck and above/below clavicle),RESPIRATORY(symmetry,effort,fremitus,percussion,auscultation),  Cor(rhythm,heart tones including gallops,perfusion,edema)ABD(distention,hepatic&splenomegaly,tenderness,masses), Skin(rash,cyanosis),Psyc(affect,judgement,).  Exam negative except for these pertinent findings:    Morbid obesity, intubated, anterior diffuse inspiratory and slightly expiratory wheezes, no distress, symmetric, no edema, soft abdomen, diaphoretic    Radiographs reviewed: view by direct vision   Chest x-ray June 25th suggest left lower lung collapse, there is some increased interstitial type markings in the right lower lung also.  6/27 bilat haze/opacification  Results for orders placed during the hospital encounter of 06/22/20   X-Ray Chest 1 View    Narrative EXAMINATION:  XR CHEST 1 VIEW    CLINICAL HISTORY:  LINE PLACEMENT;    TECHNIQUE:  Single frontal view of the chest was performed.    COMPARISON:  06/23/2020.    FINDINGS:  There is persistent pulmonary hypoinflation.  There are bilateral pulmonary infiltrates  which are stable to slightly increased over the interval likely representing pulmonary edema.  Small bilateral pleural effusions with bibasilar dependent atelectasis.    Heart size is enlarged.  Mediastinal contours unremarkable.  Trachea midline.    Endotracheal tube remains in satisfactory position.  Interval placement of right IJ catheter which terminates in the distal SVC.      Impression 1. Pulmonary hypoinflation.  2. Findings consistent with congestive heart failure which is stable to slightly increased over the interval with small bilateral pleural effusions.  3. Satisfactory indwelling life-support devices.      Electronically signed by: Navin Mortensen  Date:    06/23/2020  Time:    11:58   ]    Labs     Recent Labs   Lab 06/27/20 0416   WBC 17.75*   HGB 13.6   HCT 44.9        Recent Labs   Lab 06/27/20 0416      K 4.7      CO2 25   BUN 25*   CREATININE 0.9   *   CALCIUM 8.8   MG 2.3   PHOS 3.5   AST 21   ALT 17   ALKPHOS 52*   BILITOT 0.7   PROT 7.1   ALBUMIN 2.7*   PROCAL 0.05     Recent Labs   Lab 06/27/20 0411   PH 7.399   PCO2 43.7   PO2 78*   HCO3 27.0     Microbiology Results (last 7 days)     Procedure Component Value Units Date/Time    Blood culture [[id number]] Collected: 06/26/20 1914    Order Status: Completed Specimen: Blood Updated: 06/27/20 0915     Blood Culture, Routine No Growth to date    Narrative:      X2 sticks, one from central line, one peripheral    Blood culture [[id number]] Collected: 06/26/20 1914    Order Status: Completed Specimen: Blood from Antecubital, Left Arm Updated: 06/27/20 0915     Blood Culture, Routine No Growth to date    Blood culture [[id number]] Collected: 06/23/20 2201    Order Status: Completed Specimen: Blood Updated: 06/27/20 0612     Blood Culture, Routine No Growth to date      No Growth to date      No Growth to date      No Growth to date    Culture, Respiratory with Gram Stain [[id number]]  (Abnormal)  (Susceptibility) Collected:  06/23/20 2152    Order Status: Completed Specimen: Respiratory from Tracheal Aspirate Updated: 06/26/20 1047     Respiratory Culture No Pseudomonas isolated.      STAPHYLOCOCCUS AUREUS  Moderate  Normal respiratory estephanie also present       Gram Stain (Respiratory) <10 epithelial cells per low power field.     Gram Stain (Respiratory) Few WBC's     Gram Stain (Respiratory) Rare Gram positive cocci          Impression:  Active Hospital Problems    Diagnosis  POA    *Severe sepsis [A41.9, R65.20]  Yes    Staphylococcal pneumonia [J15.20]  Yes    Type 2 diabetes mellitus [E11.9]  Yes    Nicotine dependence [F17.200]  Yes    Bilateral pneumonia [J18.9]  Yes    Acute respiratory failure with hypoxia and hypercarbia [J96.01, J96.02]  Yes    Obesity hypoventilation syndrome [E66.2]  Yes    Mild intermittent asthma [J45.20]  Yes    Class 3 severe obesity with serious comorbidity and body mass index (BMI) of 60.0 to 69.9 in adult [E66.01, Z68.44]  Not Applicable    Cardiomegaly [I51.7]  Yes      Resolved Hospital Problems   No resolved problems to display.               Plan:   June 25- swollen lips, gas exchange poor peep 17 79/0.7, cxr not impressive- wbc 17.2 from 24 on 23rd.  2nd covid neg.  Cta lungs to be done.  Expect some degree of atelectasis.  Wheezes are present    Leak test and wean 02. 6/26 ratio 89/0.7 on peep 17,   Wbc now 15k, no ct done.  Solumedrol 80/d    Pt initially bradycardic, abg with large neg base excess new from am lytes, propofol stopped for fear propofol infusion syndrome (about 40).  Fu abg with clearing acidosis.      Pt had peep decreased from 17 to 5 with sat rising from 95- to 97.  Pt  Is obstructed on vol time curve.      Pt developed resp distress off propofol on precedex.  Will give prn morphine with versed drip.    Ct not done with high peep/body size.  Could have ild but asthma should be likely dx.     mssa in sputum - staph pneumonia present at admit likely , steroids not  good.   Will go to oxacillin 2 q 4 from [facility].  Cut steroids to 40/d, f/u procal-  Was 0.02 at presentation.      The following were evaluated and adjusted as needed: mechanical ventilator settings and weaning status, intravenous fluids and nutritional status, sedation and neurologic status, antibiotics, hemodynamics, support tubes and access lines and invasive monitoring and acid base balance and oxygenation needs       Critical Care  - THE PATIENT HAS A HIGH PROBABILITY OF IMMINENT OR LIFE THREATENING DETERIORATION.  Over 50%time of encounter was in direct care at bedside.  Time was 30 to 74 minutes required for patient care.  Time needed for all of the above totaled 67 minutes.    Addendum pt seemed to improve on lower levels peep but off propofol developed resp distress and  Desat.  Will check d dimer again, increase loveonx to therapeutic, and check leg studies.                      .  Progress Note  PULMONARY    Admit Date: 6/23/2020 06/27/2020      SUBJECTIVE:     June 25th-patient is sedated, no complaints, intubated.  6/26 intubated.      PFSH and Allergies reviewed.    OBJECTIVE:     Vitals (Most recent):  Vitals:    06/27/20 1215   BP: 139/63   Pulse: 80   Resp: (!) 24   Temp:        Vitals (24 hour range):  Temp:  [98.8 °F (37.1 °C)-101.6 °F (38.7 °C)]   Pulse:  [64-96]   Resp:  [20-39]   BP: (126-178)/()   SpO2:  [88 %-97 %]   Arterial Line BP: (121-166)/(101-158)       Intake/Output Summary (Last 24 hours) at 6/27/2020 1345  Last data filed at 6/27/2020 1226  Gross per 24 hour   Intake 3416.53 ml   Output 4435 ml   Net -1018.47 ml          Physical Exam:  The patient's neuro status (alertness,orientation,cognitive function,motor skills,), pharyngeal exam (oral lesions, hygiene, abn dentition,), Neck (jvd,mass,thyroid,nodes in neck and above/below clavicle),RESPIRATORY(symmetry,effort,fremitus,percussion,auscultation),  Cor(rhythm,heart tones including  gallops,perfusion,edema)ABD(distention,hepatic&splenomegaly,tenderness,masses), Skin(rash,cyanosis),Psyc(affect,judgement,).  Exam negative except for these pertinent findings:    Morbid obesity, intubated, anterior diffuse inspiratory and slightly expiratory wheezes, no distress, symmetric, no edema, soft abdomen, distant heart-lips and tongue to be significantly swollen on June 25th-protruding from the mouth    Radiographs reviewed: view by direct vision   Chest x-ray June 25th suggest left lower lung collapse, there is some increased interstitial type markings in the right lower lung also.    Results for orders placed during the hospital encounter of 06/22/20   X-Ray Chest 1 View    Narrative EXAMINATION:  XR CHEST 1 VIEW    CLINICAL HISTORY:  LINE PLACEMENT;    TECHNIQUE:  Single frontal view of the chest was performed.    COMPARISON:  06/23/2020.    FINDINGS:  There is persistent pulmonary hypoinflation.  There are bilateral pulmonary infiltrates which are stable to slightly increased over the interval likely representing pulmonary edema.  Small bilateral pleural effusions with bibasilar dependent atelectasis.    Heart size is enlarged.  Mediastinal contours unremarkable.  Trachea midline.    Endotracheal tube remains in satisfactory position.  Interval placement of right IJ catheter which terminates in the distal SVC.      Impression 1. Pulmonary hypoinflation.  2. Findings consistent with congestive heart failure which is stable to slightly increased over the interval with small bilateral pleural effusions.  3. Satisfactory indwelling life-support devices.      Electronically signed by: Navin Mortensen  Date:    06/23/2020  Time:    11:58   ]    Labs     Recent Labs   Lab 06/27/20  0416   WBC 17.75*   HGB 13.6   HCT 44.9        Recent Labs   Lab 06/27/20  0416      K 4.7      CO2 25   BUN 25*   CREATININE 0.9   *   CALCIUM 8.8   MG 2.3   PHOS 3.5   AST 21   ALT 17   ALKPHOS 52*    BILITOT 0.7   PROT 7.1   ALBUMIN 2.7*   PROCAL 0.05     Recent Labs   Lab 06/27/20  0411   PH 7.399   PCO2 43.7   PO2 78*   HCO3 27.0     Microbiology Results (last 7 days)     Procedure Component Value Units Date/Time    Blood culture [[id number]] Collected: 06/26/20 1914    Order Status: Completed Specimen: Blood Updated: 06/27/20 0915     Blood Culture, Routine No Growth to date    Narrative:      X2 sticks, one from central line, one peripheral    Blood culture [[id number]] Collected: 06/26/20 1914    Order Status: Completed Specimen: Blood from Antecubital, Left Arm Updated: 06/27/20 0915     Blood Culture, Routine No Growth to date    Blood culture [[id number]] Collected: 06/23/20 2201    Order Status: Completed Specimen: Blood Updated: 06/27/20 0612     Blood Culture, Routine No Growth to date      No Growth to date      No Growth to date      No Growth to date    Culture, Respiratory with Gram Stain [[id number]]  (Abnormal)  (Susceptibility) Collected: 06/23/20 2152    Order Status: Completed Specimen: Respiratory from Tracheal Aspirate Updated: 06/26/20 1047     Respiratory Culture No Pseudomonas isolated.      STAPHYLOCOCCUS AUREUS  Moderate  Normal respiratory estephanie also present       Gram Stain (Respiratory) <10 epithelial cells per low power field.     Gram Stain (Respiratory) Few WBC's     Gram Stain (Respiratory) Rare Gram positive cocci          Impression:  Active Hospital Problems    Diagnosis  POA    *Severe sepsis [A41.9, R65.20]  Yes    Staphylococcal pneumonia [J15.20]  Yes    Type 2 diabetes mellitus [E11.9]  Yes    Nicotine dependence [F17.200]  Yes    Bilateral pneumonia [J18.9]  Yes    Acute respiratory failure with hypoxia and hypercarbia [J96.01, J96.02]  Yes    Obesity hypoventilation syndrome [E66.2]  Yes    Mild intermittent asthma [J45.20]  Yes    Class 3 severe obesity with serious comorbidity and body mass index (BMI) of 60.0 to 69.9 in adult [E66.01, Z68.44]  Not Applicable     Cardiomegaly [I51.7]  Yes      Resolved Hospital Problems   No resolved problems to display.               Plan:   June 25- swollen lips, gas exchange poor peep 17 79/0.7, cxr not impressive- wbc 17.2 from 24 on 23rd.  2nd covid neg.  Cta lungs to be done.  Expect some degree of atelectasis.  Wheezes are present    Leak test and wean 02.      6/26 ratio 89/0.7 on peep 17,   Wbc now 15k, no ct done.  Solumedrol 80/d    Pt initially bradycardic, abg with large neg base excess new from am lytes, propofol stopped for fear propofol infusion syndrome (about 40).  Fu abg with clearing acidosis.      Pt had peep decreased from 17 to 5 with sat rising from 95- to 97.  Pt  Is obstructed on vol time curve.      Pt developed resp distress off propofol on precedex.  Will give prn morphine with versed drip.    Ct not done with high peep/body size.  Could have ild but asthma should be likely dx.     mssa in sputum - staph pneumonia present at admit likely , steroids not good.   Will go to oxacillin 2 q 4 from [facility].  Cut steroids to 40/d, f/u procal-  Was 0.02 at presentation.      The following were evaluated and adjusted as needed: mechanical ventilator settings and weaning status, intravenous fluids and nutritional status, sedation and neurologic status, antibiotics, hemodynamics, support tubes and access lines and invasive monitoring and acid base balance and oxygenation needs       Critical Care  - THE PATIENT HAS A HIGH PROBABILITY OF IMMINENT OR LIFE THREATENING DETERIORATION.  Over 50%time of encounter was in direct care at bedside.  Time was 30 to 74 minutes required for patient care.  Time needed for all of the above totaled 67 minutes.    Addendum pt seemed to improve on lower levels peep but off propofol developed resp distress and  Desat.  Will check d dimer again, increase loveonx to therapeutic, and check leg studies.                      .  Progress Note  PULMONARY    Admit Date: 6/23/2020    06/27/2020      SUBJECTIVE:     June 25th-patient is sedated, no complaints, intubated.  6/26 intubated.      PFSH and Allergies reviewed.    OBJECTIVE:     Vitals (Most recent):  Vitals:    06/27/20 1215   BP: 139/63   Pulse: 80   Resp: (!) 24   Temp:        Vitals (24 hour range):  Temp:  [98.8 °F (37.1 °C)-101.6 °F (38.7 °C)]   Pulse:  [64-96]   Resp:  [20-39]   BP: (126-178)/()   SpO2:  [88 %-97 %]   Arterial Line BP: (121-166)/(101-158)       Intake/Output Summary (Last 24 hours) at 6/27/2020 1345  Last data filed at 6/27/2020 1226  Gross per 24 hour   Intake 3416.53 ml   Output 4435 ml   Net -1018.47 ml          Physical Exam:  The patient's neuro status (alertness,orientation,cognitive function,motor skills,), pharyngeal exam (oral lesions, hygiene, abn dentition,), Neck (jvd,mass,thyroid,nodes in neck and above/below clavicle),RESPIRATORY(symmetry,effort,fremitus,percussion,auscultation),  Cor(rhythm,heart tones including gallops,perfusion,edema)ABD(distention,hepatic&splenomegaly,tenderness,masses), Skin(rash,cyanosis),Psyc(affect,judgement,).  Exam negative except for these pertinent findings:    Morbid obesity, intubated, anterior diffuse inspiratory and slightly expiratory wheezes, no distress, symmetric, no edema, soft abdomen, distant heart-lips and tongue to be significantly swollen on June 25th-protruding from the mouth    Radiographs reviewed: view by direct vision   Chest x-ray June 25th suggest left lower lung collapse, there is some increased interstitial type markings in the right lower lung also.    Results for orders placed during the hospital encounter of 06/22/20   X-Ray Chest 1 View    Narrative EXAMINATION:  XR CHEST 1 VIEW    CLINICAL HISTORY:  LINE PLACEMENT;    TECHNIQUE:  Single frontal view of the chest was performed.    COMPARISON:  06/23/2020.    FINDINGS:  There is persistent pulmonary hypoinflation.  There are bilateral pulmonary infiltrates which are stable to slightly  increased over the interval likely representing pulmonary edema.  Small bilateral pleural effusions with bibasilar dependent atelectasis.    Heart size is enlarged.  Mediastinal contours unremarkable.  Trachea midline.    Endotracheal tube remains in satisfactory position.  Interval placement of right IJ catheter which terminates in the distal SVC.      Impression 1. Pulmonary hypoinflation.  2. Findings consistent with congestive heart failure which is stable to slightly increased over the interval with small bilateral pleural effusions.  3. Satisfactory indwelling life-support devices.      Electronically signed by: Navin Mortensen  Date:    06/23/2020  Time:    11:58   ]    Labs     Recent Labs   Lab 06/27/20 0416   WBC 17.75*   HGB 13.6   HCT 44.9        Recent Labs   Lab 06/27/20 0416      K 4.7      CO2 25   BUN 25*   CREATININE 0.9   *   CALCIUM 8.8   MG 2.3   PHOS 3.5   AST 21   ALT 17   ALKPHOS 52*   BILITOT 0.7   PROT 7.1   ALBUMIN 2.7*   PROCAL 0.05     Recent Labs   Lab 06/27/20 0411   PH 7.399   PCO2 43.7   PO2 78*   HCO3 27.0     Microbiology Results (last 7 days)     Procedure Component Value Units Date/Time    Blood culture [[id number]] Collected: 06/26/20 1914    Order Status: Completed Specimen: Blood Updated: 06/27/20 0915     Blood Culture, Routine No Growth to date    Narrative:      X2 sticks, one from central line, one peripheral    Blood culture [[id number]] Collected: 06/26/20 1914    Order Status: Completed Specimen: Blood from Antecubital, Left Arm Updated: 06/27/20 0915     Blood Culture, Routine No Growth to date    Blood culture [[id number]] Collected: 06/23/20 2201    Order Status: Completed Specimen: Blood Updated: 06/27/20 0612     Blood Culture, Routine No Growth to date      No Growth to date      No Growth to date      No Growth to date    Culture, Respiratory with Gram Stain [[id number]]  (Abnormal)  (Susceptibility) Collected: 06/23/20 2152    Order  Status: Completed Specimen: Respiratory from Tracheal Aspirate Updated: 06/26/20 1047     Respiratory Culture No Pseudomonas isolated.      STAPHYLOCOCCUS AUREUS  Moderate  Normal respiratory estephanie also present       Gram Stain (Respiratory) <10 epithelial cells per low power field.     Gram Stain (Respiratory) Few WBC's     Gram Stain (Respiratory) Rare Gram positive cocci          Impression:  Active Hospital Problems    Diagnosis  POA    *Severe sepsis [A41.9, R65.20]  Yes    Staphylococcal pneumonia [J15.20]  Yes    Type 2 diabetes mellitus [E11.9]  Yes    Nicotine dependence [F17.200]  Yes    Bilateral pneumonia [J18.9]  Yes    Acute respiratory failure with hypoxia and hypercarbia [J96.01, J96.02]  Yes    Obesity hypoventilation syndrome [E66.2]  Yes    Mild intermittent asthma [J45.20]  Yes    Class 3 severe obesity with serious comorbidity and body mass index (BMI) of 60.0 to 69.9 in adult [E66.01, Z68.44]  Not Applicable    Cardiomegaly [I51.7]  Yes      Resolved Hospital Problems   No resolved problems to display.               Plan:   June 25- swollen lips, gas exchange poor peep 17 79/0.7, cxr not impressive- wbc 17.2 from 24 on 23rd.  2nd covid neg.  Cta lungs to be done.  Expect some degree of atelectasis.  Wheezes are present    Leak test and wean 02. 6/26 ratio 89/0.7 on peep 17,   Wbc now 15k, no ct done.  Solumedrol 80/d    Pt initially bradycardic, abg with large neg base excess new from am lytes, propofol stopped for fear propofol infusion syndrome (about 40).  Fu abg with clearing acidosis.      Pt had peep decreased from 17 to 5 with sat rising from 95- to 97.  Pt  Is obstructed on vol time curve.      Pt developed resp distress off propofol on precedex.  Will give prn morphine with versed drip.    Ct not done with high peep/body size.  Could have ild but asthma should be likely dx.     mssa in sputum - staph pneumonia present at admit likely , steroids not good.   Will go to  oxacillin 2 q 4 from [facility].  Cut steroids to 40/d, f/u procal-  Was 0.02 at presentation.          Addendum pt seemed to improve on lower levels peep but off propofol developed resp distress and  Desat.  Will check d dimer again, increase loveonx to therapeutic, and check leg studies.      6/27 abx tapered to oxacillin with temp going to over 101.  Will resume levaquin, culture, check abd for tenderness and mouth for ulces.  Culture.  Try decrease imv - gas exchange poor.  No leg study                .

## 2020-06-27 NOTE — ASSESSMENT & PLAN NOTE
This patient does have evidence of infective focus  My overall impression is severe sepsis.  Antibiotics given-   Antibiotics (From admission, onward)    Start     Stop Route Frequency Ordered    06/26/20 1230  oxacillin 2 g in sodium chloride 0.9 %  100 mL IVPB (ready to mix system)      -- IV Every 4 hours (non-standard times) 06/26/20 1122          Severe Sepsis only-  Latest labs reviewed, they are-  Recent Labs   Lab 06/27/20  0416   BILITOT 0.7     No results for input(s): LACTATE in the last 24 hours.  Recent Labs   Lab 06/27/20  0411   PH 7.399   PO2 78*   PCO2 43.7   HCO3 27.0   BE 2       Organ dysfunction indicated by Acute respiratory failure      Temp Readings from Last 1 Encounters:   06/27/20 99.8 °F (37.7 °C) (Rectal)     BP Readings from Last 1 Encounters:   06/27/20 136/77     Pulse Readings from Last 1 Encounters:   06/27/20 64

## 2020-06-27 NOTE — SUBJECTIVE & OBJECTIVE
Interval History: Patient seen and examined.  Remains on mechanical ventilation.  Relatively stable hemodynamically but remains with high support on vent.     Review of Systems   Unable to perform ROS: Intubated     Objective:     Vital Signs (Most Recent):  Temp: 99.8 °F (37.7 °C) (06/27/20 0908)  Pulse: 71 (06/27/20 0900)  Resp: (!) 25 (06/27/20 0900)  BP: 134/67 (06/27/20 0900)  SpO2: 97 % (06/27/20 0900) Vital Signs (24h Range):  Temp:  [98.2 °F (36.8 °C)-101.6 °F (38.7 °C)] 99.8 °F (37.7 °C)  Pulse:  [62-91] 71  Resp:  [20-39] 25  SpO2:  [87 %-97 %] 97 %  BP: (130-163)/() 134/67  Arterial Line BP: (120-166)/() 159/111     Weight: (!) 167.6 kg (369 lb 7.9 oz)  Body mass index is 67.58 kg/m².    Intake/Output Summary (Last 24 hours) at 6/27/2020 1036  Last data filed at 6/27/2020 0857  Gross per 24 hour   Intake 2979.13 ml   Output 2175 ml   Net 804.13 ml      Physical Exam  Constitutional:       General: She is not in acute distress.     Appearance: She is well-developed.   HENT:      Head: Normocephalic and atraumatic.   Eyes:      Pupils: Pupils are equal, round, and reactive to light.   Neck:      Musculoskeletal: Neck supple.      Thyroid: No thyromegaly.   Cardiovascular:      Rate and Rhythm: Normal rate and regular rhythm.      Heart sounds: No murmur. No friction rub. No gallop.    Pulmonary:      Effort: Pulmonary effort is normal. No respiratory distress.      Breath sounds: Normal breath sounds. No wheezing.   Abdominal:      General: Bowel sounds are normal. There is no distension.      Palpations: Abdomen is soft.      Tenderness: There is no abdominal tenderness. There is no guarding.   Musculoskeletal: Normal range of motion.   Skin:     General: Skin is warm and dry.      Findings: No erythema.         Significant Labs:   CBC:   Recent Labs   Lab 06/26/20  0312 06/27/20  0416   WBC 15.43* 17.75*   HGB 12.3 13.6   HCT 40.0 44.9    344       Significant Imaging: I have reviewed  all pertinent imaging results/findings within the past 24 hours.

## 2020-06-27 NOTE — ASSESSMENT & PLAN NOTE
Patient with peripheral edema, mildly elevated BNP.  IV lasix was initiated at [facility].    Echo was completed today with results as follows:  · Concentric left ventricular remodeling.  · Normal left ventricular systolic function. The estimated ejection fraction is 69%.  · Normal LV diastolic function.  · No wall motion abnormalities.  · Normal right ventricular systolic function.  · Mild right atrial enlargement.  · Trivial pericardial effusion.  · Mild left atrial enlargement.    IV lasix discontinued as no indication of heart failure.

## 2020-06-27 NOTE — CARE UPDATE
06/27/20 0719   Preset Conventional Ventilator Settings   Vent Type    Ventilation Type VC   Vent Mode SIMV   Humidity HME   Set Rate 25 BPM   Vt Set 450 mL   PEEP/CPAP 15 cmH20   Pressure Support 25 cmH20   Waveform RAMP   Peak Flow 70 L/min   Set Inspiratory Pressure 0 cmH20   Insp Time 0 Sec(s)   FIO2 80%, alarms on and functioning.

## 2020-06-28 PROBLEM — T78.3XXA ANGIOEDEMA: Status: ACTIVE | Noted: 2020-06-28

## 2020-06-28 PROBLEM — G47.33 OBSTRUCTIVE SLEEP APNEA SYNDROME: Status: ACTIVE | Noted: 2020-06-28

## 2020-06-28 LAB
ALBUMIN SERPL BCP-MCNC: 2.8 G/DL (ref 3.5–5.2)
ALLENS TEST: ABNORMAL
ALLENS TEST: ABNORMAL
ALP SERPL-CCNC: 48 U/L (ref 55–135)
ALT SERPL W/O P-5'-P-CCNC: 19 U/L (ref 10–44)
ANION GAP SERPL CALC-SCNC: 10 MMOL/L (ref 8–16)
AST SERPL-CCNC: 19 U/L (ref 10–40)
BASOPHILS NFR BLD: 0 % (ref 0–1.9)
BILIRUB SERPL-MCNC: 0.9 MG/DL (ref 0.1–1)
BUN SERPL-MCNC: 21 MG/DL (ref 6–20)
C3 SERPL-MCNC: 222 MG/DL (ref 50–180)
C4 SERPL-MCNC: 36 MG/DL (ref 11–44)
CALCIUM SERPL-MCNC: 9.4 MG/DL (ref 8.7–10.5)
CCP AB SER IA-ACNC: <0.5 U/ML
CHLORIDE SERPL-SCNC: 98 MMOL/L (ref 95–110)
CO2 SERPL-SCNC: 29 MMOL/L (ref 23–29)
CREAT SERPL-MCNC: 0.7 MG/DL (ref 0.5–1.4)
DELSYS: ABNORMAL
DELSYS: ABNORMAL
DIFFERENTIAL METHOD: ABNORMAL
EOSINOPHIL NFR BLD: 2 % (ref 0–8)
ERYTHROCYTE [DISTWIDTH] IN BLOOD BY AUTOMATED COUNT: 15.9 % (ref 11.5–14.5)
ERYTHROCYTE [SEDIMENTATION RATE] IN BLOOD BY WESTERGREN METHOD: 10 MM/H
ERYTHROCYTE [SEDIMENTATION RATE] IN BLOOD BY WESTERGREN METHOD: 17 MM/H
EST. GFR  (AFRICAN AMERICAN): >60 ML/MIN/1.73 M^2
EST. GFR  (NON AFRICAN AMERICAN): >60 ML/MIN/1.73 M^2
ETCO2: 42
ETCO2: 48
FIO2: 100
FIO2: 80
FLOW: 70
FLOW: 70
GLUCOSE SERPL-MCNC: 133 MG/DL (ref 70–110)
HCO3 UR-SCNC: 31.5 MMOL/L (ref 24–28)
HCO3 UR-SCNC: 33 MMOL/L (ref 24–28)
HCT VFR BLD AUTO: 44.2 % (ref 37–48.5)
HGB BLD-MCNC: 13.8 G/DL (ref 12–16)
HYPOCHROMIA BLD QL SMEAR: ABNORMAL
IMM GRANULOCYTES # BLD AUTO: ABNORMAL K/UL
IMM GRANULOCYTES NFR BLD AUTO: ABNORMAL %
LYMPHOCYTES NFR BLD: 22 % (ref 18–48)
MAGNESIUM SERPL-MCNC: 2.1 MG/DL (ref 1.6–2.6)
MCH RBC QN AUTO: 25.7 PG (ref 27–31)
MCHC RBC AUTO-ENTMCNC: 31.2 G/DL (ref 32–36)
MCV RBC AUTO: 83 FL (ref 82–98)
MODE: ABNORMAL
MODE: ABNORMAL
MONOCYTES NFR BLD: 12 % (ref 4–15)
NEUTROPHILS NFR BLD: 64 % (ref 38–73)
NRBC BLD-RTO: 0 /100 WBC
PCO2 BLDA: 51.4 MMHG (ref 35–45)
PCO2 BLDA: 53.5 MMHG (ref 35–45)
PEEP: 15
PEEP: 17
PH SMN: 7.38 [PH] (ref 7.35–7.45)
PH SMN: 7.42 [PH] (ref 7.35–7.45)
PHOSPHATE SERPL-MCNC: 3.9 MG/DL (ref 2.7–4.5)
PIP: 34
PIP: 44
PLATELET # BLD AUTO: 351 K/UL (ref 150–350)
PLATELET BLD QL SMEAR: ABNORMAL
PMV BLD AUTO: 10.8 FL (ref 9.2–12.9)
PO2 BLDA: 55 MMHG (ref 80–100)
PO2 BLDA: 70 MMHG (ref 80–100)
POC BE: 6 MMOL/L
POC BE: 8 MMOL/L
POC SATURATED O2: 88 % (ref 95–100)
POC SATURATED O2: 93 % (ref 95–100)
POC TCO2: 33 MMOL/L (ref 23–27)
POC TCO2: 35 MMOL/L (ref 23–27)
POCT GLUCOSE: 116 MG/DL (ref 70–110)
POCT GLUCOSE: 130 MG/DL (ref 70–110)
POCT GLUCOSE: 139 MG/DL (ref 70–110)
POCT GLUCOSE: 151 MG/DL (ref 70–110)
POTASSIUM SERPL-SCNC: 3.8 MMOL/L (ref 3.5–5.1)
PROT SERPL-MCNC: 7.4 G/DL (ref 6–8.4)
PS: 25
PS: 30
RBC # BLD AUTO: 5.36 M/UL (ref 4–5.4)
RHEUMATOID FACT SERPL-ACNC: 16 IU/ML (ref 0–15)
SAMPLE: ABNORMAL
SAMPLE: ABNORMAL
SITE: ABNORMAL
SITE: ABNORMAL
SODIUM SERPL-SCNC: 137 MMOL/L (ref 136–145)
SP02: 86
SP02: 94
VT: 450
VT: 450
WBC # BLD AUTO: 17.96 K/UL (ref 3.9–12.7)

## 2020-06-28 PROCEDURE — 87040 BLOOD CULTURE FOR BACTERIA: CPT

## 2020-06-28 PROCEDURE — 86431 RHEUMATOID FACTOR QUANT: CPT

## 2020-06-28 PROCEDURE — 86160 COMPLEMENT ANTIGEN: CPT | Mod: 59

## 2020-06-28 PROCEDURE — 86200 CCP ANTIBODY: CPT

## 2020-06-28 PROCEDURE — C9113 INJ PANTOPRAZOLE SODIUM, VIA: HCPCS | Performed by: INTERNAL MEDICINE

## 2020-06-28 PROCEDURE — 83735 ASSAY OF MAGNESIUM: CPT

## 2020-06-28 PROCEDURE — 82803 BLOOD GASES ANY COMBINATION: CPT

## 2020-06-28 PROCEDURE — 83883 ASSAY NEPHELOMETRY NOT SPEC: CPT

## 2020-06-28 PROCEDURE — 99900026 HC AIRWAY MAINTENANCE (STAT)

## 2020-06-28 PROCEDURE — 84100 ASSAY OF PHOSPHORUS: CPT

## 2020-06-28 PROCEDURE — 27200966 HC CLOSED SUCTION SYSTEM

## 2020-06-28 PROCEDURE — 94770 HC EXHALED C02 TEST: CPT

## 2020-06-28 PROCEDURE — 36415 COLL VENOUS BLD VENIPUNCTURE: CPT

## 2020-06-28 PROCEDURE — 85007 BL SMEAR W/DIFF WBC COUNT: CPT

## 2020-06-28 PROCEDURE — 63600175 PHARM REV CODE 636 W HCPCS: Performed by: INTERNAL MEDICINE

## 2020-06-28 PROCEDURE — 94640 AIRWAY INHALATION TREATMENT: CPT

## 2020-06-28 PROCEDURE — 99291 CRITICAL CARE FIRST HOUR: CPT | Mod: ,,, | Performed by: INTERNAL MEDICINE

## 2020-06-28 PROCEDURE — 25000003 PHARM REV CODE 250: Performed by: INTERNAL MEDICINE

## 2020-06-28 PROCEDURE — 94003 VENT MGMT INPAT SUBQ DAY: CPT

## 2020-06-28 PROCEDURE — 94761 N-INVAS EAR/PLS OXIMETRY MLT: CPT

## 2020-06-28 PROCEDURE — 99291 PR CRITICAL CARE, E/M 30-74 MINUTES: ICD-10-PCS | Mod: ,,, | Performed by: INTERNAL MEDICINE

## 2020-06-28 PROCEDURE — 85027 COMPLETE CBC AUTOMATED: CPT

## 2020-06-28 PROCEDURE — 25000242 PHARM REV CODE 250 ALT 637 W/ HCPCS: Performed by: NURSE PRACTITIONER

## 2020-06-28 PROCEDURE — 86255 FLUORESCENT ANTIBODY SCREEN: CPT

## 2020-06-28 PROCEDURE — 37799 UNLISTED PX VASCULAR SURGERY: CPT

## 2020-06-28 PROCEDURE — 86038 ANTINUCLEAR ANTIBODIES: CPT

## 2020-06-28 PROCEDURE — 99900035 HC TECH TIME PER 15 MIN (STAT)

## 2020-06-28 PROCEDURE — 80053 COMPREHEN METABOLIC PANEL: CPT

## 2020-06-28 PROCEDURE — 20000000 HC ICU ROOM

## 2020-06-28 PROCEDURE — 86160 COMPLEMENT ANTIGEN: CPT

## 2020-06-28 PROCEDURE — 27000221 HC OXYGEN, UP TO 24 HOURS

## 2020-06-28 PROCEDURE — 63600175 PHARM REV CODE 636 W HCPCS: Performed by: NURSE PRACTITIONER

## 2020-06-28 RX ORDER — MAGNESIUM SULFATE HEPTAHYDRATE 40 MG/ML
2 INJECTION, SOLUTION INTRAVENOUS
Status: DISCONTINUED | OUTPATIENT
Start: 2020-06-28 | End: 2020-07-08

## 2020-06-28 RX ORDER — POLYETHYLENE GLYCOL 3350 17 G/17G
17 POWDER, FOR SOLUTION ORAL 3 TIMES DAILY
Status: DISCONTINUED | OUTPATIENT
Start: 2020-06-28 | End: 2020-06-28

## 2020-06-28 RX ORDER — POLYETHYLENE GLYCOL 3350 17 G/17G
17 POWDER, FOR SOLUTION ORAL 3 TIMES DAILY
Status: DISCONTINUED | OUTPATIENT
Start: 2020-06-28 | End: 2020-07-14 | Stop reason: HOSPADM

## 2020-06-28 RX ORDER — POTASSIUM CHLORIDE 29.8 MG/ML
40 INJECTION INTRAVENOUS
Status: DISCONTINUED | OUTPATIENT
Start: 2020-06-28 | End: 2020-07-08

## 2020-06-28 RX ORDER — POTASSIUM CHLORIDE 29.8 MG/ML
80 INJECTION INTRAVENOUS
Status: DISCONTINUED | OUTPATIENT
Start: 2020-06-28 | End: 2020-07-08

## 2020-06-28 RX ORDER — METOCLOPRAMIDE HYDROCHLORIDE 5 MG/ML
10 INJECTION INTRAMUSCULAR; INTRAVENOUS EVERY 8 HOURS
Status: DISCONTINUED | OUTPATIENT
Start: 2020-06-28 | End: 2020-06-28

## 2020-06-28 RX ORDER — MAGNESIUM SULFATE HEPTAHYDRATE 40 MG/ML
4 INJECTION, SOLUTION INTRAVENOUS
Status: DISCONTINUED | OUTPATIENT
Start: 2020-06-28 | End: 2020-07-08

## 2020-06-28 RX ORDER — METOCLOPRAMIDE HYDROCHLORIDE 5 MG/ML
10 INJECTION INTRAMUSCULAR; INTRAVENOUS EVERY 8 HOURS
Status: DISCONTINUED | OUTPATIENT
Start: 2020-06-28 | End: 2020-07-14 | Stop reason: HOSPADM

## 2020-06-28 RX ORDER — POTASSIUM CHLORIDE 14.9 MG/ML
60 INJECTION INTRAVENOUS
Status: DISCONTINUED | OUTPATIENT
Start: 2020-06-28 | End: 2020-07-08

## 2020-06-28 RX ADMIN — IPRATROPIUM BROMIDE AND ALBUTEROL SULFATE 3 ML: .5; 3 SOLUTION RESPIRATORY (INHALATION) at 03:06

## 2020-06-28 RX ADMIN — DEXMEDETOMIDINE HYDROCHLORIDE 1 MCG/KG/HR: 100 INJECTION, SOLUTION, CONCENTRATE INTRAVENOUS at 10:06

## 2020-06-28 RX ADMIN — IPRATROPIUM BROMIDE AND ALBUTEROL SULFATE 3 ML: .5; 3 SOLUTION RESPIRATORY (INHALATION) at 12:06

## 2020-06-28 RX ADMIN — FUROSEMIDE 60 MG: 10 INJECTION, SOLUTION INTRAVENOUS at 11:06

## 2020-06-28 RX ADMIN — OXACILLIN SODIUM 2 G: 2 INJECTION, POWDER, FOR SOLUTION INTRAMUSCULAR; INTRAVENOUS at 07:06

## 2020-06-28 RX ADMIN — LEVOFLOXACIN 500 MG: 500 INJECTION, SOLUTION INTRAVENOUS at 03:06

## 2020-06-28 RX ADMIN — CHLORHEXIDINE GLUCONATE 0.12% ORAL RINSE 15 ML: 1.2 LIQUID ORAL at 09:06

## 2020-06-28 RX ADMIN — OXACILLIN SODIUM 2 G: 2 INJECTION, POWDER, FOR SOLUTION INTRAMUSCULAR; INTRAVENOUS at 11:06

## 2020-06-28 RX ADMIN — METOCLOPRAMIDE 10 MG: 5 INJECTION, SOLUTION INTRAMUSCULAR; INTRAVENOUS at 09:06

## 2020-06-28 RX ADMIN — IPRATROPIUM BROMIDE AND ALBUTEROL SULFATE 3 ML: .5; 3 SOLUTION RESPIRATORY (INHALATION) at 11:06

## 2020-06-28 RX ADMIN — OXACILLIN SODIUM 2 G: 2 INJECTION, POWDER, FOR SOLUTION INTRAMUSCULAR; INTRAVENOUS at 05:06

## 2020-06-28 RX ADMIN — IPRATROPIUM BROMIDE AND ALBUTEROL SULFATE 3 ML: .5; 3 SOLUTION RESPIRATORY (INHALATION) at 07:06

## 2020-06-28 RX ADMIN — DEXMEDETOMIDINE HYDROCHLORIDE 1 MCG/KG/HR: 100 INJECTION, SOLUTION, CONCENTRATE INTRAVENOUS at 12:06

## 2020-06-28 RX ADMIN — METHYLPREDNISOLONE SODIUM SUCCINATE 40 MG: 40 INJECTION, POWDER, FOR SOLUTION INTRAMUSCULAR; INTRAVENOUS at 09:06

## 2020-06-28 RX ADMIN — POLYETHYLENE GLYCOL (3350) 17 G: 17 POWDER, FOR SOLUTION ORAL at 10:06

## 2020-06-28 RX ADMIN — DEXMEDETOMIDINE HYDROCHLORIDE 1 MCG/KG/HR: 100 INJECTION, SOLUTION, CONCENTRATE INTRAVENOUS at 05:06

## 2020-06-28 RX ADMIN — ENOXAPARIN SODIUM 165 MG: 120 INJECTION SUBCUTANEOUS at 03:06

## 2020-06-28 RX ADMIN — POLYETHYLENE GLYCOL (3350) 17 G: 17 POWDER, FOR SOLUTION ORAL at 03:06

## 2020-06-28 RX ADMIN — INSULIN ASPART 2 UNITS: 100 INJECTION, SOLUTION INTRAVENOUS; SUBCUTANEOUS at 11:06

## 2020-06-28 RX ADMIN — DEXMEDETOMIDINE HYDROCHLORIDE 1 MCG/KG/HR: 100 INJECTION, SOLUTION, CONCENTRATE INTRAVENOUS at 02:06

## 2020-06-28 RX ADMIN — BUDESONIDE 0.25 MG: 0.25 SUSPENSION RESPIRATORY (INHALATION) at 07:06

## 2020-06-28 RX ADMIN — DEXMEDETOMIDINE HYDROCHLORIDE 1 MCG/KG/HR: 100 INJECTION, SOLUTION, CONCENTRATE INTRAVENOUS at 07:06

## 2020-06-28 RX ADMIN — OXACILLIN SODIUM 2 G: 2 INJECTION, POWDER, FOR SOLUTION INTRAMUSCULAR; INTRAVENOUS at 12:06

## 2020-06-28 RX ADMIN — PANTOPRAZOLE SODIUM 40 MG: 40 INJECTION, POWDER, LYOPHILIZED, FOR SOLUTION INTRAVENOUS at 09:06

## 2020-06-28 RX ADMIN — POTASSIUM CHLORIDE 40 MEQ: 400 INJECTION, SOLUTION INTRAVENOUS at 06:06

## 2020-06-28 RX ADMIN — DEXMEDETOMIDINE HYDROCHLORIDE 1 MCG/KG/HR: 100 INJECTION, SOLUTION, CONCENTRATE INTRAVENOUS at 11:06

## 2020-06-28 RX ADMIN — OXACILLIN SODIUM 2 G: 2 INJECTION, POWDER, FOR SOLUTION INTRAMUSCULAR; INTRAVENOUS at 08:06

## 2020-06-28 RX ADMIN — METOCLOPRAMIDE 10 MG: 5 INJECTION, SOLUTION INTRAMUSCULAR; INTRAVENOUS at 10:06

## 2020-06-28 RX ADMIN — POLYETHYLENE GLYCOL (3350) 17 G: 17 POWDER, FOR SOLUTION ORAL at 09:06

## 2020-06-28 RX ADMIN — DEXMEDETOMIDINE HYDROCHLORIDE 0.8 MCG/KG/HR: 100 INJECTION, SOLUTION, CONCENTRATE INTRAVENOUS at 10:06

## 2020-06-28 RX ADMIN — OXACILLIN SODIUM 2 G: 2 INJECTION, POWDER, FOR SOLUTION INTRAMUSCULAR; INTRAVENOUS at 04:06

## 2020-06-28 NOTE — RESPIRATORY THERAPY
All air removed from  balloon for leak test with no air noted around ETT. With 200 cc change in EVt.

## 2020-06-28 NOTE — ASSESSMENT & PLAN NOTE
Patient with Hypercapnic and Hypoxic Respiratory failure which is Acute.  she is not on home oxygen. Supplemental ventilation was provided and noted- Vent Mode: SIMV  Oxygen Concentration (%):  [80] 80  Resp Rate Total:  [14 br/min-35 br/min] 16 br/min  Vt Set:  [450 mL] 450 mL  PEEP/CPAP:  [15 cmH20] 15 cmH20  Pressure Support:  [25 cmH20] 25 cmH20  Mean Airway Pressure:  [20 lqG23-45 cmH20] 20 cmH20 and oxygen saturations 97%. Differential diagnosis includes - COPD, CHF, Obesity Hypoventilation, Interstitial lung disease and Pneumonia Labs and images were reviewed. Will treat underlying causes.   Follow pulmonary recommendations.  Follow CTA chest results.    Stop fluids add Lasix 60 BID

## 2020-06-28 NOTE — CARE UPDATE
06/28/20 0715   Preset Conventional Ventilator Settings   Vent Type    Ventilation Type VC   Vent Mode SIMV   Humidity HME   Set Rate 10 BPM   Vt Set 450 mL   PEEP/CPAP 15 cmH20   Pressure Support 25 cmH20   Waveform RAMP   Peak Flow 70 L/min   Set Inspiratory Pressure 0 cmH20   Insp Time 0 Sec(s)   FIO2 80%, aerosol txs Q4 hrs with SX PRN

## 2020-06-28 NOTE — ASSESSMENT & PLAN NOTE
Body mass index is 65.44 kg/m². Morbid obesity complicates all aspects of disease management from diagnostic modalities to treatment. Weight loss encouraged and health benefits explained to patient.

## 2020-06-28 NOTE — CARE UPDATE
06/28/20 0957   Preset Conventional Ventilator Settings   Ventilation Type VC   Vent Mode SIMV   Set Rate 10 BPM   Vt Set 450 mL   PEEP/CPAP 5 cmH20   Pressure Support 30 cmH20   Waveform RAMP   Peak Flow 70 L/min   Set Inspiratory Pressure 0 cmH20   Insp Time 0 Sec(s)   vent changes per Dr. Gentile

## 2020-06-28 NOTE — SUBJECTIVE & OBJECTIVE
Interval History: Patient seen and examined. No acute events overnight.  REmains intubated and sedated    Review of Systems   Unable to perform ROS: Intubated     Objective:     Vital Signs (Most Recent):  Temp: 98.3 °F (36.8 °C) (06/28/20 0734)  Pulse: 73 (06/28/20 0734)  Resp: (!) 49 (06/28/20 0734)  BP: (!) 112/56 (06/28/20 0734)  SpO2: (!) 92 % (06/28/20 0734) Vital Signs (24h Range):  Temp:  [97.6 °F (36.4 °C)-99.6 °F (37.6 °C)] 98.3 °F (36.8 °C)  Pulse:  [59-96] 73  Resp:  [14-49] 49  SpO2:  [88 %-99 %] 92 %  BP: (110-178)/() 112/56  Arterial Line BP: (101-171)/(62-96) 125/62     Weight: (!) 162.3 kg (357 lb 12.9 oz)  Body mass index is 65.44 kg/m².    Intake/Output Summary (Last 24 hours) at 6/28/2020 0953  Last data filed at 6/28/2020 0919  Gross per 24 hour   Intake 2143.88 ml   Output 8355 ml   Net -6211.12 ml      Physical Exam  Constitutional:       General: She is not in acute distress.     Appearance: She is well-developed.   HENT:      Head: Normocephalic and atraumatic.   Eyes:      Pupils: Pupils are equal, round, and reactive to light.   Neck:      Musculoskeletal: Neck supple.      Thyroid: No thyromegaly.   Cardiovascular:      Rate and Rhythm: Normal rate and regular rhythm.      Heart sounds: No murmur. No friction rub. No gallop.    Pulmonary:      Effort: Pulmonary effort is normal. No respiratory distress.      Breath sounds: Normal breath sounds. No wheezing.   Abdominal:      General: Bowel sounds are normal. There is no distension.      Palpations: Abdomen is soft.      Tenderness: There is no abdominal tenderness. There is no guarding.   Skin:     General: Skin is warm and dry.      Findings: No erythema.         Significant Labs:   CBC:   Recent Labs   Lab 06/27/20  0416 06/27/20  1636 06/28/20  0342   WBC 17.75*  --  17.96*   HGB 13.6  --  13.8   HCT 44.9 44 44.2     --  351*       Significant Imaging: I have reviewed all pertinent imaging results/findings within the past  24 hours.

## 2020-06-28 NOTE — PROGRESS NOTES
.  Progress Note  PULMONARY    Admit Date: 6/23/2020 06/28/2020      SUBJECTIVE:     June 25th-patient is sedated, no complaints, intubated.  6/26 intubated.  6/27 no c/o intubated.  6/28 no new c/o,intubated, arouses      PFSH and Allergies reviewed.    OBJECTIVE:     Vitals (Most recent):  Vitals:    06/28/20 0734   BP: (!) 112/56   Pulse: 73   Resp: (!) 49   Temp: 98.3 °F (36.8 °C)       Vitals (24 hour range):  Temp:  [97.6 °F (36.4 °C)-99.6 °F (37.6 °C)]   Pulse:  [59-96]   Resp:  [14-49]   BP: (110-178)/()   SpO2:  [88 %-99 %]   Arterial Line BP: (101-171)/(62-96)       Intake/Output Summary (Last 24 hours) at 6/28/2020 1004  Last data filed at 6/28/2020 0919  Gross per 24 hour   Intake 2033.88 ml   Output 8170 ml   Net -6136.12 ml          Physical Exam:  The patient's neuro status (alertness,orientation,cognitive function,motor skills,), pharyngeal exam (oral lesions, hygiene, abn dentition,), Neck (jvd,mass,thyroid,nodes in neck and above/below clavicle),RESPIRATORY(symmetry,effort,fremitus,percussion,auscultation),  Cor(rhythm,heart tones including gallops,perfusion,edema)ABD(distention,hepatic&splenomegaly,tenderness,masses), Skin(rash,cyanosis),Psyc(affect,judgement,).  Exam negative except for these pertinent findings:    Morbid obesity, intubated, anterior diffuse inspiratory and slightly expiratory wheezes, no distress, symmetric, no edema, soft abdomen, distant heart-lips and tongue to be significantly swollen on June 28th-protruding from the mouth    Radiographs reviewed: view by direct vision   Chest x-ray June 25th suggest left lower lung collapse, there is some increased interstitial type markings in the right lower lung also.    Results for orders placed during the hospital encounter of 06/22/20   X-Ray Chest 1 View    Narrative EXAMINATION:  XR CHEST 1 VIEW    CLINICAL HISTORY:  LINE PLACEMENT;    TECHNIQUE:  Single frontal view of the chest was  performed.    COMPARISON:  06/23/2020.    FINDINGS:  There is persistent pulmonary hypoinflation.  There are bilateral pulmonary infiltrates which are stable to slightly increased over the interval likely representing pulmonary edema.  Small bilateral pleural effusions with bibasilar dependent atelectasis.    Heart size is enlarged.  Mediastinal contours unremarkable.  Trachea midline.    Endotracheal tube remains in satisfactory position.  Interval placement of right IJ catheter which terminates in the distal SVC.      Impression 1. Pulmonary hypoinflation.  2. Findings consistent with congestive heart failure which is stable to slightly increased over the interval with small bilateral pleural effusions.  3. Satisfactory indwelling life-support devices.      Electronically signed by: Navin Mortensen  Date:    06/23/2020  Time:    11:58   ]    Labs     Recent Labs   Lab 06/28/20  0342   WBC 17.96*   HGB 13.8   HCT 44.2   *     Recent Labs   Lab 06/28/20  0342      K 3.8   CL 98   CO2 29   BUN 21*   CREATININE 0.7   *   CALCIUM 9.4   MG 2.1   PHOS 3.9   AST 19   ALT 19   ALKPHOS 48*   BILITOT 0.9   PROT 7.4   ALBUMIN 2.8*     Recent Labs   Lab 06/28/20  0406   PH 7.378   PCO2 53.5*   PO2 70*   HCO3 31.5*     Microbiology Results (last 7 days)     Procedure Component Value Units Date/Time    Blood culture [[id number]] Collected: 06/28/20 0824    Order Status: Sent Specimen: Blood from Antecubital, Right Arm Updated: 06/28/20 0824    Blood culture [[id number]] Collected: 06/26/20 1914    Order Status: Completed Specimen: Blood Updated: 06/28/20 0613     Blood Culture, Routine No Growth to date      No Growth to date    Narrative:      X2 sticks, one from central line, one peripheral    Blood culture [[id number]] Collected: 06/26/20 1914    Order Status: Completed Specimen: Blood from Antecubital, Left Arm Updated: 06/28/20 0613     Blood Culture, Routine No Growth to date      No Growth to date    Blood  culture [[id number]] Collected: 06/23/20 2201    Order Status: Completed Specimen: Blood Updated: 06/28/20 0612     Blood Culture, Routine No Growth to date      No Growth to date      No Growth to date      No Growth to date      No Growth to date    Culture, Respiratory with Gram Stain [[id number]] Collected: 06/27/20 1444    Order Status: Completed Specimen: Respiratory from Tracheal Aspirate Updated: 06/28/20 0432     Gram Stain (Respiratory) <10 epithelial cells per low power field.     Gram Stain (Respiratory) Rare WBC's     Gram Stain (Respiratory) No organisms seen    Blood culture [[id number]] Collected: 06/27/20 0550    Order Status: Sent Specimen: Blood from Line, Central Updated: 06/27/20 2227    Urine Culture High Risk [[id number]] Collected: 06/27/20 1437    Order Status: Sent Specimen: Urine, Catheterized Updated: 06/27/20 2222    Culture, Respiratory with Gram Stain [[id number]]  (Abnormal)  (Susceptibility) Collected: 06/23/20 2152    Order Status: Completed Specimen: Respiratory from Tracheal Aspirate Updated: 06/26/20 1047     Respiratory Culture No Pseudomonas isolated.      STAPHYLOCOCCUS AUREUS  Moderate  Normal respiratory estephanie also present       Gram Stain (Respiratory) <10 epithelial cells per low power field.     Gram Stain (Respiratory) Few WBC's     Gram Stain (Respiratory) Rare Gram positive cocci        Echo 6/22/2020  · Concentric left ventricular remodeling.  · Normal left ventricular systolic function. The estimated ejection fraction is 69%.  · Normal LV diastolic function.  · No wall motion abnormalities.  · Normal right ventricular systolic function.  · Mild right atrial enlargement.  · Trivial pericardial effusion.  · Mild left atrial enlargement.       Impression:  Active Hospital Problems    Diagnosis  POA    *Severe sepsis [A41.9, R65.20]  Yes    Angioedema [T78.3XXA]  No    Obstructive sleep apnea syndrome [G47.33]  Yes    Staphylococcal pneumonia [J15.20]  Yes    Type 2  diabetes mellitus [E11.9]  Yes    Nicotine dependence [F17.200]  Yes    Bilateral pneumonia [J18.9]  Yes    Acute respiratory failure with hypoxia and hypercarbia [J96.01, J96.02]  Yes    Obesity hypoventilation syndrome [E66.2]  Yes    Mild intermittent asthma [J45.20]  Yes    Class 3 severe obesity with serious comorbidity and body mass index (BMI) of 60.0 to 69.9 in adult [E66.01, Z68.44]  Not Applicable    Cardiomegaly [I51.7]  Yes      Resolved Hospital Problems   No resolved problems to display.               Plan:   June 25- swollen lips, gas exchange poor peep 17 79/0.7, cxr not impressive- wbc 17.2 from 24 on 23rd.  2nd covid neg.  Cta lungs to be done.  Expect some degree of atelectasis.  Wheezes are present    Leak test and wean 02.      6/26 ratio 89/0.7 on peep 17,   Wbc now 15k, no ct done.  Solumedrol 80/d    Pt initially bradycardic, abg with large neg base excess new from am lytes, propofol stopped for fear propofol infusion syndrome (about 40).  Fu abg with clearing acidosis.      Pt had peep decreased from 17 to 5 with sat rising from 95- to 97.  Pt  Is obstructed on vol time curve.      Pt developed resp distress off propofol on precedex.  Will give prn morphine with versed drip.    Ct not done with high peep/body size.  Could have ild but asthma should be likely dx.     mssa in sputum - staph pneumonia present at admit likely , steroids not good.   Will go to oxacillin 2 q 4 from [facility].  Cut steroids to 40/d, f/u procal-  Was 0.02 at presentation.          Addendum pt seemed to improve on lower levels peep but off propofol developed resp distress and  Desat.  Will check d dimer again, increase loveonx to therapeutic, and check leg studies.      6/27 abx tapered to oxacillin with temp going to over 101.  Will resume levaquin, culture, check abd for tenderness and mouth for ulces.  Culture.  Try decrease imv - gas exchange poor.  No leg study    6/28 temp down now.  Discussed with  relative,daughter in law.  Pt was at [facility] last yr with vent for a wk, 4 wks in hosp, no pneumonia but had infection, back to nl at [place], has osas. 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  Has had intermittent face/tongue/eye swelling ppt er visit at [place].          Will screen for hereditary angioedema/lupus/ra/wegener's   Cut peep to 5 with desat 89 on 89%   Get records from [place]    Need to follow leak test.  Airway should be marginal given osas and morbid obesity- with history would dx angioedema - cause not clear.       Continue full anticoagg.  Consider id f/u once records, cultures, and above screens return?    The following were evaluated and adjusted as needed: mechanical ventilator settings and weaning status, intravenous fluids and nutritional status, sedation and neurologic status, antibiotics, hemodynamics, support tubes and access lines and invasive monitoring and acid base balance and oxygenation needs       Critical Care  - THE PATIENT HAS A HIGH PROBABILITY OF IMMINENT OR LIFE THREATENING DETERIORATION.  Over 50%time of encounter was in direct care at bedside.  Time was 30 to 74 minutes required for patient care.  Time needed for all of the above totaled 46 minutes.          .

## 2020-06-28 NOTE — ASSESSMENT & PLAN NOTE
This patient does have evidence of infective focus  My overall impression is severe sepsis.  Antibiotics given-   Antibiotics (From admission, onward)    Start     Stop Route Frequency Ordered    06/27/20 1500  levoFLOXacin 500 mg/100 mL IVPB 500 mg      -- IV Every 24 hours (non-standard times) 06/27/20 1358    06/26/20 1230  oxacillin 2 g in sodium chloride 0.9 %  100 mL IVPB (ready to mix system)      -- IV Every 4 hours (non-standard times) 06/26/20 1122          Severe Sepsis only-  Latest labs reviewed, they are-  Recent Labs   Lab 06/28/20  0342   BILITOT 0.9     No results for input(s): LACTATE in the last 24 hours.  Recent Labs   Lab 06/28/20  0406   PH 7.378   PO2 70*   PCO2 53.5*   HCO3 31.5*   BE 6       Organ dysfunction indicated by Acute respiratory failure      Temp Readings from Last 1 Encounters:   06/28/20 98.3 °F (36.8 °C) (Rectal)     BP Readings from Last 1 Encounters:   06/28/20 (!) 112/56     Pulse Readings from Last 1 Encounters:   06/28/20 73

## 2020-06-28 NOTE — ASSESSMENT & PLAN NOTE
Patient with peripheral edema, mildly elevated BNP.  IV lasix was initiated at [facility].    Echo was completed today with results as follows:  · Concentric left ventricular remodeling.  · Normal left ventricular systolic function. The estimated ejection fraction is 69%.  · Normal LV diastolic function.  · No wall motion abnormalities.  · Normal right ventricular systolic function.  · Mild right atrial enlargement.  · Trivial pericardial effusion.  · Mild left atrial enlargement.    IV lasix discontinued as no indication of heart failure.

## 2020-06-28 NOTE — PLAN OF CARE
Patient remains in ICU on ventilator at 100% FiO2 with PEEP of 17. Sedated with Versed at 1 mg/H and Precedex at 1 mcg/kg/hr, Patient awakens to voice and able to communicate with writing. Was not tolerating tube feeds, started Reglan today. Able to increase tube feed rate from 10 ml to 30 ml/hr. Mother visited today. Safety maintained.

## 2020-06-28 NOTE — PROGRESS NOTES
[facility] Hospital Medicine  Progress Note    Patient Name: Michelle Wren  MRN: [id number]  Patient Class: IP- Inpatient   Admission Date: 6/23/2020  Length of Stay: 5 days  Attending Physician: Peg Stewart MD  Primary Care Provider: Primary Doctor No        Subjective:     Principal Problem:Severe sepsis        HPI:  Michelle Wren is a 29 y/o female with a past medical history significant for asthma, type 2 diabetes, and severe obesity who is transferred from [facility] to [facility] for pulmonary consultation.  Patient presented to the ED at [facility] yesterday with complaints of 2-3 days of shortness of breath.  It is reported that she uses CPAP at night but she got this from a friend so unsure of settings.  She was placed on BiPAP and given IV Solu-Medrol, bronchodilator treatments, and IV Lasix.  She was admitted to the ICU at [facility].  Patient continued to decline and underwent intubation yesterday around 7:00 p.m.  per report, today patient appeared to be worse.  Her saturations were in the mid 90s on FiO2 of 100%.  A D-dimer was checked and was negative.  Per review of labs which were drawn earlier today, her WBC count was 23,000 and her lactic acid was 2.4.  She was placed on IV Zosyn and IV vancomycin.  Upon arrival to [facility], patient is in no acute distress.  She is intubated and sedated.  Vital signs are stable.  Lactic acid will be repeated now as patient meets sepsis criteria and will check a procalcitonin.  She will be monitored closely in the ICU.           Overview/Hospital Course:  Patient is being managed in intensive care unit, requiring mechanical ventilation under supervision of pulmonary medicine.  Repeat COVID -19 test has been negative.  Patient is receiving intravenous antibiotics for bilateral pneumonia.  Patient is getting CTA of chest for further evaluation.    Interval History: Patient seen and examined. No acute events  overnight.  REmains intubated and sedated    Review of Systems   Unable to perform ROS: Intubated     Objective:     Vital Signs (Most Recent):  Temp: 98.3 °F (36.8 °C) (06/28/20 0734)  Pulse: 73 (06/28/20 0734)  Resp: (!) 49 (06/28/20 0734)  BP: (!) 112/56 (06/28/20 0734)  SpO2: (!) 92 % (06/28/20 0734) Vital Signs (24h Range):  Temp:  [97.6 °F (36.4 °C)-99.6 °F (37.6 °C)] 98.3 °F (36.8 °C)  Pulse:  [59-96] 73  Resp:  [14-49] 49  SpO2:  [88 %-99 %] 92 %  BP: (110-178)/() 112/56  Arterial Line BP: (101-171)/(62-96) 125/62     Weight: (!) 162.3 kg (357 lb 12.9 oz)  Body mass index is 65.44 kg/m².    Intake/Output Summary (Last 24 hours) at 6/28/2020 0953  Last data filed at 6/28/2020 0919  Gross per 24 hour   Intake 2143.88 ml   Output 8355 ml   Net -6211.12 ml      Physical Exam  Constitutional:       General: She is not in acute distress.     Appearance: She is well-developed.   HENT:      Head: Normocephalic and atraumatic.   Eyes:      Pupils: Pupils are equal, round, and reactive to light.   Neck:      Musculoskeletal: Neck supple.      Thyroid: No thyromegaly.   Cardiovascular:      Rate and Rhythm: Normal rate and regular rhythm.      Heart sounds: No murmur. No friction rub. No gallop.    Pulmonary:      Effort: Pulmonary effort is normal. No respiratory distress.      Breath sounds: Normal breath sounds. No wheezing.   Abdominal:      General: Bowel sounds are normal. There is no distension.      Palpations: Abdomen is soft.      Tenderness: There is no abdominal tenderness. There is no guarding.   Skin:     General: Skin is warm and dry.      Findings: No erythema.         Significant Labs:   CBC:   Recent Labs   Lab 06/27/20  0416 06/27/20  1636 06/28/20  0342   WBC 17.75*  --  17.96*   HGB 13.6  --  13.8   HCT 44.9 44 44.2     --  351*       Significant Imaging: I have reviewed all pertinent imaging results/findings within the past 24 hours.      Assessment/Plan:      * Severe sepsis  This  patient does have evidence of infective focus  My overall impression is severe sepsis.  Antibiotics given-   Antibiotics (From admission, onward)    Start     Stop Route Frequency Ordered    06/27/20 1500  levoFLOXacin 500 mg/100 mL IVPB 500 mg      -- IV Every 24 hours (non-standard times) 06/27/20 1358    06/26/20 1230  oxacillin 2 g in sodium chloride 0.9 %  100 mL IVPB (ready to mix system)      -- IV Every 4 hours (non-standard times) 06/26/20 1122          Severe Sepsis only-  Latest labs reviewed, they are-  Recent Labs   Lab 06/28/20  0342   BILITOT 0.9     No results for input(s): LACTATE in the last 24 hours.  Recent Labs   Lab 06/28/20  0406   PH 7.378   PO2 70*   PCO2 53.5*   HCO3 31.5*   BE 6       Organ dysfunction indicated by Acute respiratory failure      Temp Readings from Last 1 Encounters:   06/28/20 98.3 °F (36.8 °C) (Rectal)     BP Readings from Last 1 Encounters:   06/28/20 (!) 112/56     Pulse Readings from Last 1 Encounters:   06/28/20 73           Staphylococcal pneumonia  MSSA    Type 2 diabetes mellitus  Not on chronic medication.  A1c 6.2%.  Accuchecks ac/hs with SSI coverage as needed.  Nutrition consult as pt NPO on vent.        Acute respiratory failure with hypoxia and hypercarbia  Patient with Hypercapnic and Hypoxic Respiratory failure which is Acute.  she is not on home oxygen. Supplemental ventilation was provided and noted- Vent Mode: SIMV  Oxygen Concentration (%):  [80] 80  Resp Rate Total:  [14 br/min-35 br/min] 16 br/min  Vt Set:  [450 mL] 450 mL  PEEP/CPAP:  [15 cmH20] 15 cmH20  Pressure Support:  [25 cmH20] 25 cmH20  Mean Airway Pressure:  [20 yeY72-73 cmH20] 20 cmH20 and oxygen saturations 97%. Differential diagnosis includes - COPD, CHF, Obesity Hypoventilation, Interstitial lung disease and Pneumonia Labs and images were reviewed. Will treat underlying causes.   Follow pulmonary recommendations.  Follow CTA chest results.    Stop fluids add Lasix 60 BID    Bilateral  pneumonia  IV zosyn, IV vancomycin-de-escalate as appropriate.  Follow Pulmonary recommendations  Blood and sputum cultures-follow.  Sputum culture growing staph aureus species.  Monitor clinical response      Nicotine dependence  Health hazards associated with cigarette smoking should be reviewed with patient and cessation encouraged when pt is able to participate.       Mild intermittent asthma  Chronic; rescue inhaler only noted to home meds, no controller.  Continue bronchodilators q4h.  Currently intubated on mechanical ventilation.  Follow pulmonary recommendations. On IV Solumedrol 125 mg q 8 hrs.      Cardiomegaly  Patient with peripheral edema, mildly elevated BNP.  IV lasix was initiated at [facility].    Echo was completed today with results as follows:  · Concentric left ventricular remodeling.  · Normal left ventricular systolic function. The estimated ejection fraction is 69%.  · Normal LV diastolic function.  · No wall motion abnormalities.  · Normal right ventricular systolic function.  · Mild right atrial enlargement.  · Trivial pericardial effusion.  · Mild left atrial enlargement.    IV lasix discontinued as no indication of heart failure.    Obesity hypoventilation syndrome  Intubated.       Class 3 severe obesity with serious comorbidity and body mass index (BMI) of 60.0 to 69.9 in adult  Body mass index is 65.44 kg/m². Morbid obesity complicates all aspects of disease management from diagnostic modalities to treatment. Weight loss encouraged and health benefits explained to patient.            VTE Risk Mitigation (From admission, onward)         Ordered     enoxaparin injection 165 mg  Every 12 hours (non-standard times)      06/26/20 1703     IP VTE HIGH RISK PATIENT  Once      06/23/20 2055     Place sequential compression device  Until discontinued      06/23/20 2055                Critical care time spent on the evaluation and treatment of severe organ dysfunction, review of pertinent labs and  imaging studies, discussions with consulting providers and discussions with patient/family: 25 minutes.      Olegario Diaz MD  Department of Hospital Medicine   [facility]

## 2020-06-28 NOTE — RESPIRATORY THERAPY
06/27/20 1904   Patient Assessment/Suction   Level of Consciousness (AVPU) alert   Respiratory Effort Unlabored   All Lung Fields Breath Sounds coarse   Rhythm/Pattern, Respiratory assisted mechanically   Cough Frequency with stimulation   Cough Type productive   Secretions Amount small   Secretions Color pink;white   Secretions Characteristics thick   PRE-TX-O2   O2 Device (Oxygen Therapy) ventilator   $ Is the patient on Low Flow Oxygen? Yes   Oxygen Concentration (%) 80   SpO2 98 %   Pulse Oximetry Type Continuous   $ Pulse Oximetry - Multiple Charge Pulse Oximetry - Multiple   Pulse 66   Resp 15   ETCO2   $ ETCO2 Charge Exhaled CO2 Monitoring   $ ETCO2 Usage Currently wearing   ETCO2 (mmHg) 44 mmHg   ETCO2 Device Type Bedside Monitor   Aerosol Therapy   $ Aerosol Therapy Charges Aerosol Treatment   Respiratory Treatment Status (SVN) given   Treatment Route (SVN) in-line   Patient Position (SVN) HOB elevated   Post Treatment Assessment (SVN) breath sounds unchanged   Signs of Intolerance (SVN) none   Breath Sounds Post-Respiratory Treatment   Throughout All Fields Post-Treatment no change   Post-treatment Heart Rate (beats/min) 62   Post-treatment Resp Rate (breaths/min) 29        Airway - Non-Surgical 06/22/20 1625 Endotracheal Tube   Placement Date/Time: 06/22/20 1625   Present Prior to Hospital Arrival?: No  Method of Intubation: Direct laryngoscopy  Inserted by: MD  Airway Device: Endotracheal Tube  Mask Ventilation: Easy  Blade: Ramon #3  Airway Device Size: 7.5  Style: Cuffed...   Secured at 24 cm   Measured At Lips   Secured Location Center   Secured by Commercial tube lord   Bite Block center;secure and patent   Site Condition Dry   Status Intact;Secured;Patent   Cuff Pressure 30 cm H2O  (with audible leak)   Vent Select   Conventional Vent Y   Charged w/in last 24h YES   Preset Conventional Ventilator Settings   Vent Type    Ventilation Type VC   Vent Mode SIMV   Humidity HME   Set Rate 10  BPM   Vt Set 450 mL   PEEP/CPAP 15 cmH20   Pressure Support 25 cmH20   Waveform RAMP   Peak Flow 70 L/min   Set Inspiratory Pressure 0 cmH20   Insp Time 0 Sec(s)   Plateau Set/Insp. Hold (sec) 0   Insp Rise Time  100 %   Trigger Sensitivity Flow/I-Trigger 2 L/min   P High 0 cm H2O   P Low 0 cm H2O   T High 0 sec   T Low 0 sec   Patient Ventilator Parameters   Resp Rate Total 16 br/min   Peak Airway Pressure 46 cmH2O   Mean Airway Pressure 20 cmH20   Plateau Pressure 34 cmH20   Exhaled Vt 518 mL   Total Ve 6.84 mL   Spont Ve 2.87 L   I:E Ratio Measured 1:5.10   Conventional Ventilator Alarms   Alarms On Y   Ve High Alarm 24 L/min   Resp Rate High Alarm 40 br/min   Press High Alarm 69 cmH2O   Apnea Rate 20   Apnea Volume (mL) 450 mL   Apnea Oxygen Concentration  100   Apnea Flow Rate (L/min) 70   T Apnea 20 sec(s)   Ready to Wean/Extubation Screen   FIO2<=50 (chart decimal) (!) 0.8   MV<16L (chart vol.) 6.84   PEEP <=8 (chart #) (!) 15   Ready to Wean Parameters   F/VT Ratio<105 (RSBI) (!) 28.96     ambu is at the hob all vent alarms are on and working. Received pt on above settings, aero tx. Inline. Am abg to be drawn. Pt is awake and requesting suction, remote, volume etc. Following commands good, and responding

## 2020-06-28 NOTE — CARE UPDATE
06/28/20 1000   Preset Conventional Ventilator Settings   PEEP/CPAP 15 cmH20   changes per Dr. Gentile

## 2020-06-28 NOTE — PLAN OF CARE
Pt temp max 99.9 rectal. Remains on vent at 80% with 15 peep. Remains on precedex and versed for sedation. Awakens nodding head yes and no. Tube feeds increased to 15 cc hr but residuals remain 150 for  4 hours decreased to trickle feed. Urine output increased with Lasix as ordered. Potassium replaced this am. Bath and linen change done. Safety measures in place.

## 2020-06-29 ENCOUNTER — OUTSIDE PLACE OF SERVICE (OUTPATIENT)
Dept: INFECTIOUS DISEASES | Facility: CLINIC | Age: 30
End: 2020-06-29
Payer: MEDICAID

## 2020-06-29 LAB
ALBUMIN SERPL BCP-MCNC: 2.8 G/DL (ref 3.5–5.2)
ALLENS TEST: ABNORMAL
ALP SERPL-CCNC: 49 U/L (ref 55–135)
ALT SERPL W/O P-5'-P-CCNC: 18 U/L (ref 10–44)
ANION GAP SERPL CALC-SCNC: 10 MMOL/L (ref 8–16)
AST SERPL-CCNC: 15 U/L (ref 10–40)
BACTERIA BLD CULT: NORMAL
BACTERIA UR CULT: NO GROWTH
BASOPHILS NFR BLD: 0 % (ref 0–1.9)
BILIRUB SERPL-MCNC: 0.8 MG/DL (ref 0.1–1)
BUN SERPL-MCNC: 25 MG/DL (ref 6–20)
CALCIUM SERPL-MCNC: 9.7 MG/DL (ref 8.7–10.5)
CHLORIDE SERPL-SCNC: 98 MMOL/L (ref 95–110)
CO2 SERPL-SCNC: 30 MMOL/L (ref 23–29)
CREAT SERPL-MCNC: 0.8 MG/DL (ref 0.5–1.4)
DELSYS: ABNORMAL
DIFFERENTIAL METHOD: ABNORMAL
EOSINOPHIL NFR BLD: 0 % (ref 0–8)
ERYTHROCYTE [DISTWIDTH] IN BLOOD BY AUTOMATED COUNT: 15.8 % (ref 11.5–14.5)
ERYTHROCYTE [SEDIMENTATION RATE] IN BLOOD BY WESTERGREN METHOD: 10 MM/H
EST. GFR  (AFRICAN AMERICAN): >60 ML/MIN/1.73 M^2
EST. GFR  (NON AFRICAN AMERICAN): >60 ML/MIN/1.73 M^2
ETCO2: 36
FIO2: 100
FLOW: 70
GLUCOSE SERPL-MCNC: 137 MG/DL (ref 70–110)
HCO3 UR-SCNC: 31 MMOL/L (ref 24–28)
HCT VFR BLD AUTO: 44 % (ref 37–48.5)
HGB BLD-MCNC: 13.8 G/DL (ref 12–16)
IMM GRANULOCYTES # BLD AUTO: ABNORMAL K/UL
IMM GRANULOCYTES NFR BLD AUTO: ABNORMAL %
LYMPHOCYTES NFR BLD: 19 % (ref 18–48)
MAGNESIUM SERPL-MCNC: 2.2 MG/DL (ref 1.6–2.6)
MCH RBC QN AUTO: 25.8 PG (ref 27–31)
MCHC RBC AUTO-ENTMCNC: 31.4 G/DL (ref 32–36)
MCV RBC AUTO: 82 FL (ref 82–98)
MODE: ABNORMAL
MONOCYTES NFR BLD: 7 % (ref 4–15)
NEUTROPHILS NFR BLD: 74 % (ref 38–73)
NRBC BLD-RTO: 0 /100 WBC
PCO2 BLDA: 46.8 MMHG (ref 35–45)
PEEP: 17
PH SMN: 7.43 [PH] (ref 7.35–7.45)
PHOSPHATE SERPL-MCNC: 3.6 MG/DL (ref 2.7–4.5)
PIP: 38
PLATELET # BLD AUTO: 364 K/UL (ref 150–350)
PLATELET BLD QL SMEAR: ABNORMAL
PMV BLD AUTO: 11.1 FL (ref 9.2–12.9)
PO2 BLDA: 59 MMHG (ref 80–100)
POC BE: 7 MMOL/L
POC SATURATED O2: 91 % (ref 95–100)
POC TCO2: 32 MMOL/L (ref 23–27)
POCT GLUCOSE: 125 MG/DL (ref 70–110)
POCT GLUCOSE: 152 MG/DL (ref 70–110)
POCT GLUCOSE: 157 MG/DL (ref 70–110)
POCT GLUCOSE: 161 MG/DL (ref 70–110)
POTASSIUM SERPL-SCNC: 3.6 MMOL/L (ref 3.5–5.1)
PROT SERPL-MCNC: 7.6 G/DL (ref 6–8.4)
PS: 30
RBC # BLD AUTO: 5.34 M/UL (ref 4–5.4)
SAMPLE: ABNORMAL
SITE: ABNORMAL
SODIUM SERPL-SCNC: 138 MMOL/L (ref 136–145)
SP02: 96
VT: 450
WBC # BLD AUTO: 19.45 K/UL (ref 3.9–12.7)

## 2020-06-29 PROCEDURE — 80053 COMPREHEN METABOLIC PANEL: CPT

## 2020-06-29 PROCEDURE — 25000003 PHARM REV CODE 250: Performed by: INTERNAL MEDICINE

## 2020-06-29 PROCEDURE — C9113 INJ PANTOPRAZOLE SODIUM, VIA: HCPCS | Performed by: INTERNAL MEDICINE

## 2020-06-29 PROCEDURE — 20000000 HC ICU ROOM

## 2020-06-29 PROCEDURE — 99233 PR SUBSEQUENT HOSPITAL CARE,LEVL III: ICD-10-PCS | Mod: S$GLB,,, | Performed by: INTERNAL MEDICINE

## 2020-06-29 PROCEDURE — 99900026 HC AIRWAY MAINTENANCE (STAT)

## 2020-06-29 PROCEDURE — 99233 PR SUBSEQUENT HOSPITAL CARE,LEVL III: ICD-10-PCS | Mod: ,,, | Performed by: INTERNAL MEDICINE

## 2020-06-29 PROCEDURE — 94761 N-INVAS EAR/PLS OXIMETRY MLT: CPT

## 2020-06-29 PROCEDURE — 82803 BLOOD GASES ANY COMBINATION: CPT

## 2020-06-29 PROCEDURE — 27000221 HC OXYGEN, UP TO 24 HOURS

## 2020-06-29 PROCEDURE — 99233 SBSQ HOSP IP/OBS HIGH 50: CPT | Mod: S$GLB,,, | Performed by: INTERNAL MEDICINE

## 2020-06-29 PROCEDURE — 94640 AIRWAY INHALATION TREATMENT: CPT

## 2020-06-29 PROCEDURE — 85007 BL SMEAR W/DIFF WBC COUNT: CPT

## 2020-06-29 PROCEDURE — 99900035 HC TECH TIME PER 15 MIN (STAT)

## 2020-06-29 PROCEDURE — 94770 HC EXHALED C02 TEST: CPT

## 2020-06-29 PROCEDURE — 25000242 PHARM REV CODE 250 ALT 637 W/ HCPCS: Performed by: NURSE PRACTITIONER

## 2020-06-29 PROCEDURE — 94003 VENT MGMT INPAT SUBQ DAY: CPT

## 2020-06-29 PROCEDURE — 63600175 PHARM REV CODE 636 W HCPCS: Performed by: NURSE PRACTITIONER

## 2020-06-29 PROCEDURE — 99233 SBSQ HOSP IP/OBS HIGH 50: CPT | Mod: ,,, | Performed by: INTERNAL MEDICINE

## 2020-06-29 PROCEDURE — 97803 MED NUTRITION INDIV SUBSEQ: CPT

## 2020-06-29 PROCEDURE — 84100 ASSAY OF PHOSPHORUS: CPT

## 2020-06-29 PROCEDURE — 63600175 PHARM REV CODE 636 W HCPCS: Performed by: INTERNAL MEDICINE

## 2020-06-29 PROCEDURE — 83735 ASSAY OF MAGNESIUM: CPT

## 2020-06-29 PROCEDURE — 36600 WITHDRAWAL OF ARTERIAL BLOOD: CPT

## 2020-06-29 PROCEDURE — 36415 COLL VENOUS BLD VENIPUNCTURE: CPT

## 2020-06-29 PROCEDURE — 85027 COMPLETE CBC AUTOMATED: CPT

## 2020-06-29 RX ORDER — LEVOFLOXACIN 5 MG/ML
750 INJECTION, SOLUTION INTRAVENOUS
Status: DISCONTINUED | OUTPATIENT
Start: 2020-06-29 | End: 2020-07-05

## 2020-06-29 RX ADMIN — OXACILLIN SODIUM 2 G: 2 INJECTION, POWDER, FOR SOLUTION INTRAMUSCULAR; INTRAVENOUS at 08:06

## 2020-06-29 RX ADMIN — FUROSEMIDE 60 MG: 10 INJECTION, SOLUTION INTRAVENOUS at 10:06

## 2020-06-29 RX ADMIN — ENOXAPARIN SODIUM 165 MG: 120 INJECTION SUBCUTANEOUS at 03:06

## 2020-06-29 RX ADMIN — INSULIN ASPART 2 UNITS: 100 INJECTION, SOLUTION INTRAVENOUS; SUBCUTANEOUS at 01:06

## 2020-06-29 RX ADMIN — OXACILLIN SODIUM 2 G: 2 INJECTION, POWDER, FOR SOLUTION INTRAMUSCULAR; INTRAVENOUS at 11:06

## 2020-06-29 RX ADMIN — MIDAZOLAM 4 MG/HR: 5 INJECTION INTRAMUSCULAR; INTRAVENOUS at 10:06

## 2020-06-29 RX ADMIN — METOCLOPRAMIDE 10 MG: 5 INJECTION, SOLUTION INTRAMUSCULAR; INTRAVENOUS at 05:06

## 2020-06-29 RX ADMIN — DEXMEDETOMIDINE HYDROCHLORIDE 1 MCG/KG/HR: 100 INJECTION, SOLUTION, CONCENTRATE INTRAVENOUS at 05:06

## 2020-06-29 RX ADMIN — DEXMEDETOMIDINE HYDROCHLORIDE 1 MCG/KG/HR: 100 INJECTION, SOLUTION, CONCENTRATE INTRAVENOUS at 11:06

## 2020-06-29 RX ADMIN — LEVOFLOXACIN 750 MG: 750 INJECTION, SOLUTION INTRAVENOUS at 05:06

## 2020-06-29 RX ADMIN — IPRATROPIUM BROMIDE AND ALBUTEROL SULFATE 3 ML: .5; 3 SOLUTION RESPIRATORY (INHALATION) at 03:06

## 2020-06-29 RX ADMIN — CHLORHEXIDINE GLUCONATE 0.12% ORAL RINSE 15 ML: 1.2 LIQUID ORAL at 08:06

## 2020-06-29 RX ADMIN — POTASSIUM CHLORIDE 40 MEQ: 400 INJECTION, SOLUTION INTRAVENOUS at 05:06

## 2020-06-29 RX ADMIN — BUDESONIDE 0.25 MG: 0.25 SUSPENSION RESPIRATORY (INHALATION) at 07:06

## 2020-06-29 RX ADMIN — DEXMEDETOMIDINE HYDROCHLORIDE 0.2 MCG/KG/HR: 100 INJECTION, SOLUTION, CONCENTRATE INTRAVENOUS at 08:06

## 2020-06-29 RX ADMIN — IPRATROPIUM BROMIDE AND ALBUTEROL SULFATE 3 ML: .5; 3 SOLUTION RESPIRATORY (INHALATION) at 11:06

## 2020-06-29 RX ADMIN — DEXMEDETOMIDINE HYDROCHLORIDE 1 MCG/KG/HR: 100 INJECTION, SOLUTION, CONCENTRATE INTRAVENOUS at 03:06

## 2020-06-29 RX ADMIN — DEXMEDETOMIDINE HYDROCHLORIDE 0.9 MCG/KG/HR: 100 INJECTION, SOLUTION, CONCENTRATE INTRAVENOUS at 05:06

## 2020-06-29 RX ADMIN — IPRATROPIUM BROMIDE AND ALBUTEROL SULFATE 3 ML: .5; 3 SOLUTION RESPIRATORY (INHALATION) at 12:06

## 2020-06-29 RX ADMIN — DEXMEDETOMIDINE HYDROCHLORIDE 0.9 MCG/KG/HR: 100 INJECTION, SOLUTION, CONCENTRATE INTRAVENOUS at 08:06

## 2020-06-29 RX ADMIN — DEXMEDETOMIDINE HYDROCHLORIDE 0.2 MCG/KG/HR: 100 INJECTION, SOLUTION, CONCENTRATE INTRAVENOUS at 10:06

## 2020-06-29 RX ADMIN — DEXMEDETOMIDINE HYDROCHLORIDE 0.9 MCG/KG/HR: 100 INJECTION, SOLUTION, CONCENTRATE INTRAVENOUS at 01:06

## 2020-06-29 RX ADMIN — DEXMEDETOMIDINE HYDROCHLORIDE 1 MCG/KG/HR: 100 INJECTION, SOLUTION, CONCENTRATE INTRAVENOUS at 01:06

## 2020-06-29 RX ADMIN — IPRATROPIUM BROMIDE AND ALBUTEROL SULFATE 3 ML: .5; 3 SOLUTION RESPIRATORY (INHALATION) at 06:06

## 2020-06-29 RX ADMIN — ENOXAPARIN SODIUM 165 MG: 120 INJECTION SUBCUTANEOUS at 07:06

## 2020-06-29 RX ADMIN — METHYLPREDNISOLONE SODIUM SUCCINATE 40 MG: 40 INJECTION, POWDER, FOR SOLUTION INTRAMUSCULAR; INTRAVENOUS at 08:06

## 2020-06-29 RX ADMIN — METOCLOPRAMIDE 10 MG: 5 INJECTION, SOLUTION INTRAMUSCULAR; INTRAVENOUS at 09:06

## 2020-06-29 RX ADMIN — POLYETHYLENE GLYCOL (3350) 17 G: 17 POWDER, FOR SOLUTION ORAL at 08:06

## 2020-06-29 RX ADMIN — FUROSEMIDE 60 MG: 10 INJECTION, SOLUTION INTRAVENOUS at 11:06

## 2020-06-29 RX ADMIN — OXACILLIN SODIUM 2 G: 2 INJECTION, POWDER, FOR SOLUTION INTRAMUSCULAR; INTRAVENOUS at 05:06

## 2020-06-29 RX ADMIN — OXACILLIN SODIUM 2 G: 2 INJECTION, POWDER, FOR SOLUTION INTRAMUSCULAR; INTRAVENOUS at 03:06

## 2020-06-29 RX ADMIN — POLYETHYLENE GLYCOL (3350) 17 G: 17 POWDER, FOR SOLUTION ORAL at 05:06

## 2020-06-29 RX ADMIN — METOCLOPRAMIDE 10 MG: 5 INJECTION, SOLUTION INTRAMUSCULAR; INTRAVENOUS at 01:06

## 2020-06-29 RX ADMIN — PANTOPRAZOLE SODIUM 40 MG: 40 INJECTION, POWDER, LYOPHILIZED, FOR SOLUTION INTRAVENOUS at 08:06

## 2020-06-29 RX ADMIN — IPRATROPIUM BROMIDE AND ALBUTEROL SULFATE 3 ML: .5; 3 SOLUTION RESPIRATORY (INHALATION) at 07:06

## 2020-06-29 NOTE — SUBJECTIVE & OBJECTIVE
Interval History:  Patient is in intensive care unit with respiratory failure, on mechanical ventilation after getting transferred from [place], [place].  Patient is requiring 80% FiO2.  Patient is afebrile.  No acute distress noted.    Review of Systems   Unable to perform ROS: Intubated     Objective:     Vital Signs (Most Recent):  Temp: 99.2 °F (37.3 °C) (06/29/20 1400)  Pulse: 70 (06/29/20 1400)  Resp: 17 (06/29/20 1400)  BP: 129/76 (06/29/20 1400)  SpO2: (!) 92 % (06/29/20 1400) Vital Signs (24h Range):  Temp:  [98.2 °F (36.8 °C)-100.4 °F (38 °C)] 99.2 °F (37.3 °C)  Pulse:  [56-78] 70  Resp:  [14-34] 17  SpO2:  [86 %-99 %] 92 %  BP: (108-139)/(57-80) 129/76  Arterial Line BP: (120-159)/(58-85) 135/69     Weight: (!) 156.4 kg (344 lb 12.8 oz)  Body mass index is 63.06 kg/m².    Intake/Output Summary (Last 24 hours) at 6/29/2020 1430  Last data filed at 6/29/2020 1400  Gross per 24 hour   Intake 1884.21 ml   Output 6410 ml   Net -4525.79 ml      Physical Exam  Vitals signs and nursing note reviewed.   Constitutional:       Appearance: She is well-developed. She is obese.      Interventions: She is sedated and intubated.   HENT:      Head: Normocephalic and atraumatic.   Eyes:      Conjunctiva/sclera: Conjunctivae normal.      Pupils: Pupils are equal, round, and reactive to light.   Neck:      Musculoskeletal: Normal range of motion and neck supple.   Cardiovascular:      Rate and Rhythm: Normal rate and regular rhythm.      Pulses: Normal pulses.      Heart sounds: Normal heart sounds.   Pulmonary:      Effort: Pulmonary effort is normal. She is intubated.      Breath sounds: Decreased breath sounds present.   Abdominal:      General: Bowel sounds are normal.      Palpations: Abdomen is soft. There is no mass.      Tenderness: There is no abdominal tenderness.   Genitourinary:     Comments: Vazquez catheter in place    Musculoskeletal:         General: No swelling or deformity.   Skin:      General: Skin is warm and dry.      Capillary Refill: Capillary refill takes 2 to 3 seconds.   Neurological:      Comments: Unable to assess as pt is on mechanical ventilation with sedation     Psychiatric:      Comments: Sedated          Significant Labs:   CBC:   Recent Labs   Lab 06/27/20  1636 06/28/20  0342 06/29/20  0316   WBC  --  17.96* 19.45*   HGB  --  13.8 13.8   HCT 44 44.2 44.0   PLT  --  351* 364*     CMP:   Recent Labs   Lab 06/28/20  0342 06/29/20  0316    138   K 3.8 3.6   CL 98 98   CO2 29 30*   * 137*   BUN 21* 25*   CREATININE 0.7 0.8   CALCIUM 9.4 9.7   PROT 7.4 7.6   ALBUMIN 2.8* 2.8*   BILITOT 0.9 0.8   ALKPHOS 48* 49*   AST 19 15   ALT 19 18   ANIONGAP 10 10   EGFRNONAA >60 >60     Lactic Acid:   No results for input(s): LACTATE in the last 48 hours.  Microbiology Results (last 7 days)     Procedure Component Value Units Date/Time    Culture, Respiratory with Gram Stain [[id number]]  (Abnormal) Collected: 06/27/20 1444    Order Status: Completed Specimen: Respiratory from Tracheal Aspirate Updated: 06/29/20 0806     Respiratory Culture YEAST   Rare  Identification pending  Normal respiratory estephanie also present       Gram Stain (Respiratory) <10 epithelial cells per low power field.     Gram Stain (Respiratory) Rare WBC's     Gram Stain (Respiratory) No organisms seen    Blood culture [[id number]] Collected: 06/27/20 0550    Order Status: Completed Specimen: Blood from Line, Central Updated: 06/29/20 0613     Blood Culture, Routine No Growth to date      No Growth to date    Blood culture [[id number]] Collected: 06/26/20 1914    Order Status: Completed Specimen: Blood Updated: 06/29/20 0613     Blood Culture, Routine No Growth to date      No Growth to date      No Growth to date    Narrative:      X2 sticks, one from central line, one peripheral    Blood culture [[id number]] Collected: 06/26/20 1914    Order Status: Completed Specimen: Blood from Antecubital, Left Arm Updated:  06/29/20 0613     Blood Culture, Routine No Growth to date      No Growth to date      No Growth to date    Blood culture [[id number]] Collected: 06/23/20 2201    Order Status: Completed Specimen: Blood Updated: 06/29/20 0612     Blood Culture, Routine No growth after 5 days.    Blood culture [[id number]] Collected: 06/28/20 0824    Order Status: Completed Specimen: Blood from Antecubital, Right Arm Updated: 06/29/20 0515     Blood Culture, Routine No Growth to date    Urine Culture High Risk [[id number]] Collected: 06/27/20 1437    Order Status: Completed Specimen: Urine, Catheterized Updated: 06/29/20 0220     Urine Culture, Routine No growth    Narrative:      Indicated criteria for high risk culture:->Other  Other (specify):->icu    Culture, Respiratory with Gram Stain [[id number]]  (Abnormal)  (Susceptibility) Collected: 06/23/20 2152    Order Status: Completed Specimen: Respiratory from Tracheal Aspirate Updated: 06/26/20 1047     Respiratory Culture No Pseudomonas isolated.      STAPHYLOCOCCUS AUREUS  Moderate  Normal respiratory estephanie also present       Gram Stain (Respiratory) <10 epithelial cells per low power field.     Gram Stain (Respiratory) Few WBC's     Gram Stain (Respiratory) Rare Gram positive cocci        Significant Imaging:   ECHO:  · Concentric left ventricular remodeling.  · Normal left ventricular systolic function. The estimated ejection fraction is 69%.  · Normal LV diastolic function.  · No wall motion abnormalities.  · Normal right ventricular systolic function.  · Mild right atrial enlargement.  · Trivial pericardial effusion.  · Mild left atrial enlargement.    CXR: Mild cardiomegaly.  Hypoventilation.    CXR:   1. Interval placement of endotracheal tube with the tip in the proximal right mainstem bronchus.  This should be withdrawn several cm.  2. The study is motion degraded.  Indistinctness of the left hemidiaphragm could be related to patient motion with atelectasis and/or pleural  effusion not completely excluded.    CXR:  1. Limited evaluation secondary to technique.  2. Findings suspicious for congestive heart failure which does not appear significantly changed over the interval.  3. Small bilateral pleural effusions with bibasilar dependent atelectasis.  4. Endotracheal tube.    CXR:  Stable positioning of support devices.  Unchanged bibasilar consolidation/atelectasis.    CXR: Bibasilar opacification not significantly changed compared to the prior exam.  Positions of lines and tubes described.    CXR:   Continued bibasilar lung infiltrates.  Mild cardiomegaly.  Endotracheal tube, NG tube and central lines in position.    CXR:   Mild decrease in right basilar infiltrate.  Continued left basilar infiltrate.  Cardiomegaly.  Tubes in position.

## 2020-06-29 NOTE — PLAN OF CARE
Intervention: collaboration with care providers, enteral nutrition therapy\  Current Intake: TF Peptamen VHP @ 15 ml/hr + 30 ml flush q 4 hr  (provides 360 kcal, 33 g protein, and 302 ml free water)     Recommendation:   1) Advance diet to goal of DM 1500 kcal when extubated      2) Continue Peptamen VHP trickel feed @ 15 ml/hr add beneprotein 9-10 packets beneprotein   advance slowly as pt tolerates to goal of 55 ml/hr + min 30 ml flush q 4 hr   ( 1320 kcal ( 100% EEN), 121 g protein ( 93% EPN), and 1108 ml free water)   -if pt does not tolerate trial impact peptide 1.5 formula, 0g fiber     3) Weigh pt weekly   4) Nutrition education on diabetic diet once pt appropriate     Goals: 1) PO diet advanced or nutrition support initiates in < 5 days 2) meet 50% of needs with nutrition support or PO diet advanced at f/u  Nutrition Goal Status: met/ new  Communication of RD Recs: reviewed with RN, POC, sticky note

## 2020-06-29 NOTE — PROGRESS NOTES
[facility]  Adult Nutrition  Progress Note    SUMMARY     SUMMARY    Intervention: collaboration with care providers, enteral nutrition therapy\  Current Intake: TF Peptamen VHP @ 15 ml/hr + 30 ml flush q 4 hr  (provides 360 kcal, 33 g protein, and 302 ml free water)     Recommendation:   1) Advance diet to goal of DM 1500 kcal when extubated      2) Continue Peptamen VHP trickel feed @ 15 ml/hr add beneprotein 9-10 packets beneprotein   advance slowly as pt tolerates to goal of 55 ml/hr + min 30 ml flush q 4 hr   ( 1320 kcal ( 100% EEN), 121 g protein ( 93% EPN), and 1108 ml free water)   -if pt does not tolerate trial impact peptide 1.5 formula, 0g fiber     3) Weigh pt weekly   4) Nutrition education on diabetic diet once pt appropriate     Goals: 1) PO diet advanced or nutrition support initiates in < 5 days 2) meet 50% of needs with nutrition support or PO diet advanced at f/u  Nutrition Goal Status: met/ new  Communication of RD Recs: reviewed with RN, POC, brian note     Reason for Assessment     Reason For Assessment: follow up  Diagnosis: (severe sepsis)  Relevant Medical History: asthma, DM2, severe obesity  Interdisciplinary Rounds: did not attend     General Information Comments: 31 y/o female admitted with sepsis, bilateral pneumonia. + vent, on high dose propofol no pressor. NFPE done 6/24/20, no wasting seen pt appears obese. NPO x 1 day. Glucose WNL, of note with hx. of DM 2 on steriods.  6/29/20 TF at 30 ml/hr yesterday but pt did not tolerate, held. Reglan started and pt had BM per RN so TF restarted this morning, at 15 ml/hr. 130 ml residual noted. Plan for pt to continue reglan and slowly advance TF as able.     Nutrition Discharge Planning: to be determined- DM 1500 kcal diet     Nutrition Risk Screen     Nutrition Risk Screen: no indicators present     Nutrition/Diet History     Typical Food/Fluid Intake: unable to obtain  Spiritual, Cultural Beliefs, [employer] Practices,  "Values that Affect Care: no  Food Allergies: NKFA  Factors Affecting Nutritional Intake: NPO, on mechanical ventilation     Anthropometrics  Height Method: Stated  Height: 5' 2"  Height (inches): 62 in  Weight Method: Bed Scale  Weight: (!) 156.4 kg 6/29, 165 kg (admission)  Weight (lb): (!) 344 lb ( on lasix)  Ideal Body Weight (IBW), Female: 110 lb  % Ideal Body Weight, Female (lb): 330.69 %  BMI (Calculated): 63 kg/ m2  BMI Grade: greater than 40 - morbid obesity        Lab/Procedures/Meds     Pertinent Labs Reviewed: reviewed  BMP  Lab Results   Component Value Date     06/29/2020    K 3.6 06/29/2020    CL 98 06/29/2020    CO2 30 (H) 06/29/2020    BUN 25 (H) 06/29/2020    CREATININE 0.8 06/29/2020    CALCIUM 9.7 06/29/2020    ANIONGAP 10 06/29/2020    ESTGFRAFRICA >60 06/29/2020    EGFRNONAA >60 06/29/2020     Recent Labs   Lab 06/29/20  0457   POCTGLUCOSE 152*       Pertinent Medications Reviewed: reviewed  Insulin, mg sulfate, kcl, methylprednisolone, lasix, no propofol     Estimated/Assessed Needs     Weight Used For Calorie Calculations: 52.2 kg IBW  Energy Calorie Requirements (kcal): 22-25 kcal/kg ( IBW, BMI > 50 kg/m2) = 4729-8757 kcal  Energy Need Method: Kcal/kg  Protein Requirements: 130 g protein  Weight Used For Protein Calculations: 52.2 kg (115 lb 1.3 oz)(IBW ( 2.5 g protein/kg))  Fluid Requirements (mL): 1300 ml or per MD  Estimated Fluid Requirement Method: RDA Method  CHO Requirement: 153-170 g        Nutrition Prescription Ordered     Current Diet Order: TF above        Evaluation of Received Nutrient/Fluid Intake     Energy Calories Required: not meeting needs  Protein Required: not meeting needs  Fluid Required: not meeting needs  Tolerance: high reisduals  % Intake of Estimated Energy Needs:  % Meal Intake: NPO + TF     Nutrition Risk     Level of Risk/Frequency of Follow-up: moderate 2 x weekly     Assessment and Plan     Inadequate energy intake  R/t NPO  As evidenced by PO intakes < " 50% EEN x 1-2 days  Intervention: above  continues     Altered nutrition related lab values  R/t medication side effects and altered absorption of nutrients  As evidenced by elevated A1C and glucose  Intervention: above  improving     Monitor and Evaluation     Food and Nutrient Intake: energy intake  Food and Nutrient Adminstration: diet order, enteral nutrition order  Anthropometric Measurements: weight  Biochemical Data, Medical Tests and Procedures: electrolyte and renal panel, gastrointestinal profile, glucose/endocrine profile  Nutrition-Focused Physical Findings: overall appearance      Malnutrition Assessment     Skin (Micronutrient): (Maxi = 12)   Micronutrient Evaluation: no deficiencies               Edema (Fluid Accumulation): (3)              Nutrition Follow-Up     RD Follow-up?: Yes

## 2020-06-29 NOTE — RESPIRATORY THERAPY
06/29/20 0705   Patient Assessment/Suction   Level of Consciousness (AVPU) responds to pain   Respiratory Effort Normal   Expansion/Accessory Muscles/Retractions no use of accessory muscles   All Lung Fields Breath Sounds diminished   Rhythm/Pattern, Respiratory pattern regular;assisted mechanically   Cough Frequency infrequent;with stimulation   Cough Type good   Suction Method tracheal   $ Suction Charges Inline Suction Procedure Stat Charge   Secretions Amount small;moderate   Secretions Color white;yellow   Secretions Characteristics thick   $ Swab or suction? Suction   Aspirate Toleration JUVENCIO (no adverse reactions)   PRE-TX-O2   O2 Device (Oxygen Therapy) ventilator   $ Is the patient on Low Flow Oxygen? Yes   Oxygen Concentration (%) 100   SpO2 95 %   Pulse Oximetry Type Continuous   $ Pulse Oximetry - Multiple Charge Pulse Oximetry - Multiple   Pulse 73   Resp (!) 28   ETCO2   $ ETCO2 Charge Exhaled CO2 Monitoring   $ ETCO2 Usage Currently wearing   ETCO2 (mmHg) 46 mmHg   ETCO2 Device Type Ventilator;Artificial Airway   Aerosol Therapy   $ Aerosol Therapy Charges Aerosol Treatment  (Pulmocort)   Respiratory Treatment Status (SVN) given   Treatment Route (SVN) in-line;endotracheal tube;ventilator;oxygen   Patient Position (SVN) HOB elevated   Post Treatment Assessment (SVN) breath sounds unchanged   Signs of Intolerance (SVN) none   Breath Sounds Post-Respiratory Treatment   Throughout All Fields Post-Treatment All Fields   Throughout All Fields Post-Treatment no change   Post-treatment Heart Rate (beats/min) 70   Post-treatment Resp Rate (breaths/min) 15        Airway - Non-Surgical 06/22/20 1625 Endotracheal Tube   Placement Date/Time: 06/22/20 1625   Present Prior to Hospital Arrival?: No  Method of Intubation: Direct laryngoscopy  Inserted by: MD  Airway Device: Endotracheal Tube  Mask Ventilation: Easy  Blade: Ramon #3  Airway Device Size: 7.5  Style: Cuffed...   Secured at 24 cm   Measured At Lips    Secured Location Right   Secured by Commercial tube lord   Bite Block right;secure and patent   Site Condition Cool;Dry   Status Intact;Secured;Patent   Site Assessment Clean;Dry;No bleeding;No drainage   Cuff Pressure 34 cm H2O   Ready to Wean/Extubation Screen   FIO2<=50 (chart decimal) (!) 1   Remains secure. Aerosol tx given inline ETT secure and move to rt @ 24 @ lips

## 2020-06-29 NOTE — ASSESSMENT & PLAN NOTE
Body mass index is 63.06 kg/m². Morbid obesity complicates all aspects of disease management from diagnostic modalities to treatment. Weight loss encouraged and health benefits explained to patient.

## 2020-06-29 NOTE — ASSESSMENT & PLAN NOTE
Chronic; rescue inhaler only noted to home meds, no controller.  Continue bronchodilators q4h.  Currently intubated on mechanical ventilation.  Follow pulmonary recommendations. On IV Solumedrol 40 mg q 24 hrs.

## 2020-06-29 NOTE — ASSESSMENT & PLAN NOTE
Patient with Hypercapnic and Hypoxic Respiratory failure which is Acute.  she is not on home oxygen. Supplemental ventilation was provided and noted- Vent Mode: SIMV  Oxygen Concentration (%):  [] 90  Resp Rate Total:  [14 br/min-34 br/min] 17 br/min  Vt Set:  [450 mL] 450 mL  PEEP/CPAP:  [17 cmH20] 17 cmH20  Pressure Support:  [30 cmH20] 30 cmH20  Mean Airway Pressure:  [22 naK05-01 cmH20] 22 cmH20 and oxygen saturations 97%. Differential diagnosis includes - COPD, CHF, Obesity Hypoventilation, Interstitial lung disease and Pneumonia Labs and images were reviewed. Will treat underlying causes.   Follow pulmonary recommendations.

## 2020-06-29 NOTE — CONSULTS
Consult Note  Infectious Disease    Reason for Consult:  ID gabino    HPI: Michelle Wren is a  30 y.o. female with a history of asthma, type 2 diabetes, smoking and severe obesity (344 lb) who transferred from [facility] on 06/22 for pulmonary consultation.  She presented to their emergency room the day prior to this admission with a 2-3 day history of shortness of breath.  She was using a friend's CPAP and then required BiPAP and Solu-Medrol, bronchodilator treatments and Lasix.  She required intubation on 06/21 for progressive hypercapnic hypoxemic respiratory failure and was transferred on the ventilator.  She was given vancomycin, Levaquin and Zosyn empirically.  Initial chest x-rays look like pulmonary edema, BNP was mildly elevated but echocardiogram showed normal ejection fraction.  Some concern regarding angioedema on the 2nd hospital day due to swelling of her lips.  Endotracheal aspirate grew MSSA and vancomycin and Zosyn were changed to oxacillin, Levaquin was resumed when fever worsened as steroids were decreased.  Continuing to run low-grade temperature with repeat sputum culture only normal estephanie from the 27th.  Continues on steroids without bronchospasm.  White blood cells remain elevated No eosinophilia on arrival to [place].  Procalcitonin normal x3.  Rheumatoid factor borderline with normal C CP.  COVID negative, Legionella urine antigen negative.  No renal issues are proteinuria to consider granulomatous disease.  Has not been able to get a CT scan of the chest to try to clarify pattern of infiltrates due to habitus. Requiring high PEEP.   Plain films of initially showed lower lobe lobe alveolar infiltrates but these have faded and there may now just be bilateral pleural effusions.  KIRA, Anca, C1 esterase inhibitor antigen are pending.    Review of patient's allergies indicates:  No Known Allergies  Past Medical History:   Diagnosis Date    Asthma     Back pain     Diabetes  mellitus     Morbid obesity     Obesity     Obesity hypoventilation syndrome  He intubated for 1 week at [facility] 2019  Smoker    Past Surgical History:   Procedure Laterality Date    APPENDECTOMY       SECTION       Social History     Socioeconomic History    Marital status: Single     Spouse name: Not on file    Number of children: Not on file    Years of education: Not on file    Highest education level: Not on file   Occupational History    Not on file   Social Needs    Financial resource strain: Not on file    Food insecurity     Worry: Not on file     Inability: Not on file    Transportation needs     Medical: Not on file     Non-medical: Not on file   Tobacco Use    Smoking status: Current Every Day Smoker   Substance and Sexual Activity    Alcohol use: Yes     Frequency: Never     Comment: occ    Drug use: No    Sexual activity: Yes     Birth control/protection: None   Lifestyle    Physical activity     Days per week: Not on file     Minutes per session: Not on file    Stress: Not on file   Relationships    Social connections     Talks on phone: Not on file     Gets together: Not on file     Attends Hindu service: Not on file     Active member of club or organization: Not on file     Attends meetings of clubs or organizations: Not on file     Relationship status: Not on file   Other Topics Concern    Not on file   Social History Narrative    Not on file     Family History   Family history unknown: Yes       Pertinent medications noted:     Review of Systems: very limited due to intubation/sedation    EXAM & DIAGNOSTICS REVIEWED:   Vitals:     Temp:  [97.9 °F (36.6 °C)-100.4 °F (38 °C)]   Temp: 97.9 °F (36.6 °C) (20 1600)  Pulse: 62 (20 1700)  Resp: (!) 21 (20 1700)  BP: 123/72 (20 1700)  SpO2: (!) 93 % (20 1700)    Intake/Output Summary (Last 24 hours) at 2020 1856  Last data filed at 2020 1600  Gross per 24 hour   Intake  1517.51 ml   Output 6085 ml   Net -4567.49 ml     Vent Mode: SIMV  Oxygen Concentration (%):  [] 90  Resp Rate Total:  [14 br/min-34 br/min] 20 br/min  Vt Set:  [450 mL] 450 mL  PEEP/CPAP:  [17 cmH20] 17 cmH20  Pressure Support:  [30 cmH20] 30 cmH20  Mean Airway Pressure:  [22 cbW35-13 cmH20] 23 cmH20      General:  In NAD.arousable,  cooperative, comfortable  Eyes:  Anicteric, PERRL, EOMI  ENT:  No ulcers, exudates, thrush, nares patent, dentition is good,   Neck:  supple, no masses or adenopathy appreciated  Lungs: LLL crackles, no wheezes  Heart:  RRR, no gallop/murmur/rub noted  Abd:  Soft, morbidly obese, NT, ND, normal BS, no masses or organomegaly appreciated.  :   Vazquez, urine clear, no flank tenderness  Musc:  Joints without effusion, swelling, erythema, synovitis, muscle wasting.   Skin:  No rashes. No palmar or plantar lesions. No subungual petechiae  Wound:   Neuro: Arousable, attends, follows commands. face symmetric, moves all extremities, no focal weakness. Ambulatory  Psych:  Calm, cooperative  Lymphatic:     Exam limited by habitus  Extrem: No edema, erythema, phlebitis, cellulitis, warm and well perfused  VAD:  RIJ TLC     Isolation:  none    Lines/Tubes/Drains:    General Labs reviewed:  Recent Labs   Lab 06/27/20  0416 06/27/20  1636 06/28/20  0342 06/29/20  0316   WBC 17.75*  --  17.96* 19.45*   HGB 13.6  --  13.8 13.8   HCT 44.9 44 44.2 44.0     --  351* 364*       Recent Labs   Lab 06/27/20  0416 06/28/20  0342 06/29/20  0316    137 138   K 4.7 3.8 3.6    98 98   CO2 25 29 30*   BUN 25* 21* 25*   CREATININE 0.9 0.7 0.8   CALCIUM 8.8 9.4 9.7   PROT 7.1 7.4 7.6   BILITOT 0.7 0.9 0.8   ALKPHOS 52* 48* 49*   ALT 17 19 18   AST 21 19 15           Micro:  Microbiology Results (last 7 days)     Procedure Component Value Units Date/Time    Culture, Respiratory with Gram Stain [[id number]]  (Abnormal) Collected: 06/27/20 1444    Order Status: Completed Specimen: Respiratory  from Tracheal Aspirate Updated: 06/29/20 1441     Respiratory Culture KENROY LUSITANIAE  Rare  Normal respiratory estephanie also present       Gram Stain (Respiratory) <10 epithelial cells per low power field.     Gram Stain (Respiratory) Rare WBC's     Gram Stain (Respiratory) No organisms seen    Blood culture [[id number]] Collected: 06/27/20 0550    Order Status: Completed Specimen: Blood from Line, Central Updated: 06/29/20 0613     Blood Culture, Routine No Growth to date      No Growth to date    Blood culture [[id number]] Collected: 06/26/20 1914    Order Status: Completed Specimen: Blood Updated: 06/29/20 0613     Blood Culture, Routine No Growth to date      No Growth to date      No Growth to date    Narrative:      X2 sticks, one from central line, one peripheral    Blood culture [[id number]] Collected: 06/26/20 1914    Order Status: Completed Specimen: Blood from Antecubital, Left Arm Updated: 06/29/20 0613     Blood Culture, Routine No Growth to date      No Growth to date      No Growth to date    Blood culture [[id number]] Collected: 06/23/20 2201    Order Status: Completed Specimen: Blood Updated: 06/29/20 0612     Blood Culture, Routine No growth after 5 days.    Blood culture [[id number]] Collected: 06/28/20 0824    Order Status: Completed Specimen: Blood from Antecubital, Right Arm Updated: 06/29/20 0515     Blood Culture, Routine No Growth to date    Urine Culture High Risk [[id number]] Collected: 06/27/20 1437    Order Status: Completed Specimen: Urine, Catheterized Updated: 06/29/20 0220     Urine Culture, Routine No growth    Narrative:      Indicated criteria for high risk culture:->Other  Other (specify):->icu    Culture, Respiratory with Gram Stain [[id number]]  (Abnormal)  (Susceptibility) Collected: 06/23/20 2152    Order Status: Completed Specimen: Respiratory from Tracheal Aspirate Updated: 06/26/20 1047     Respiratory Culture No Pseudomonas isolated.      STAPHYLOCOCCUS AUREUS  Moderate  Normal  respiratory estephanie also present       Gram Stain (Respiratory) <10 epithelial cells per low power field.     Gram Stain (Respiratory) Few WBC's     Gram Stain (Respiratory) Rare Gram positive cocci        Imaging Reviewed:   CXR      Cardiology:echo    IMPRESSION & PLAN   1. Hypercapneic, hypoxemic, respiratory failure. High PEEP requirement.   2. MSSA in sputum, but minimal infiltrates, normal procalcitonin  3. Morbid obesity with LAURIE  4. History of asthma, requiring a vent in the past      Recommendations:  Agree with current antibiotics    It would seem that if she had an inflammatory pneumonitis so severe as to require this much support that her CXR would be a white out..  I have no objection to a trial of higher dose steroids, with the understanding that she would be at higher risk for nosocomial infection.     thanks

## 2020-06-29 NOTE — PROGRESS NOTES
[facility] Hospital Medicine  Progress Note    Patient Name: Michelle Wren  MRN: [id number]  Patient Class: IP- Inpatient   Admission Date: 6/23/2020  Length of Stay: 6 days  Attending Physician: Peg Stewart MD  Primary Care Provider: Primary Doctor No        Subjective:     Principal Problem:Severe sepsis        HPI:  Michelle Wren is a 31 y/o female with a past medical history significant for asthma, type 2 diabetes, and severe obesity who is transferred from [facility] to [facility] for pulmonary consultation.  Patient presented to the ED at [facility] yesterday with complaints of 2-3 days of shortness of breath.  It is reported that she uses CPAP at night but she got this from a friend so unsure of settings.  She was placed on BiPAP and given IV Solu-Medrol, bronchodilator treatments, and IV Lasix.  She was admitted to the ICU at [facility].  Patient continued to decline and underwent intubation yesterday around 7:00 p.m.  per report, today patient appeared to be worse.  Her saturations were in the mid 90s on FiO2 of 100%.  A D-dimer was checked and was negative.  Per review of labs which were drawn earlier today, her WBC count was 23,000 and her lactic acid was 2.4.  She was placed on IV Zosyn and IV vancomycin.  Upon arrival to [facility], patient is in no acute distress.  She is intubated and sedated.  Vital signs are stable.  Lactic acid will be repeated now as patient meets sepsis criteria and will check a procalcitonin.  She will be monitored closely in the ICU.           Overview/Hospital Course:  Patient is being managed in intensive care unit, requiring mechanical ventilation under supervision of pulmonary medicine.  Repeat COVID -19 test has been negative.  Patient is receiving intravenous antibiotics for bilateral pneumonia.  Patient is getting CTA of chest for further evaluation.    Interval History:  Patient is in intensive care unit with  respiratory failure, on mechanical ventilation after getting transferred from [place], [place].  Patient is requiring 80% FiO2.  Patient is afebrile.  No acute distress noted.    Review of Systems   Unable to perform ROS: Intubated     Objective:     Vital Signs (Most Recent):  Temp: 99.2 °F (37.3 °C) (06/29/20 1400)  Pulse: 70 (06/29/20 1400)  Resp: 17 (06/29/20 1400)  BP: 129/76 (06/29/20 1400)  SpO2: (!) 92 % (06/29/20 1400) Vital Signs (24h Range):  Temp:  [98.2 °F (36.8 °C)-100.4 °F (38 °C)] 99.2 °F (37.3 °C)  Pulse:  [56-78] 70  Resp:  [14-34] 17  SpO2:  [86 %-99 %] 92 %  BP: (108-139)/(57-80) 129/76  Arterial Line BP: (120-159)/(58-85) 135/69     Weight: (!) 156.4 kg (344 lb 12.8 oz)  Body mass index is 63.06 kg/m².    Intake/Output Summary (Last 24 hours) at 6/29/2020 1430  Last data filed at 6/29/2020 1400  Gross per 24 hour   Intake 1884.21 ml   Output 6410 ml   Net -4525.79 ml      Physical Exam  Vitals signs and nursing note reviewed.   Constitutional:       Appearance: She is well-developed. She is obese.      Interventions: She is sedated and intubated.   HENT:      Head: Normocephalic and atraumatic.   Eyes:      Conjunctiva/sclera: Conjunctivae normal.      Pupils: Pupils are equal, round, and reactive to light.   Neck:      Musculoskeletal: Normal range of motion and neck supple.   Cardiovascular:      Rate and Rhythm: Normal rate and regular rhythm.      Pulses: Normal pulses.      Heart sounds: Normal heart sounds.   Pulmonary:      Effort: Pulmonary effort is normal. She is intubated.      Breath sounds: Decreased breath sounds present.   Abdominal:      General: Bowel sounds are normal.      Palpations: Abdomen is soft. There is no mass.      Tenderness: There is no abdominal tenderness.   Genitourinary:     Comments: Vazquez catheter in place    Musculoskeletal:         General: No swelling or deformity.   Skin:     General: Skin is warm and dry.      Capillary Refill:  Capillary refill takes 2 to 3 seconds.   Neurological:      Comments: Unable to assess as pt is on mechanical ventilation with sedation     Psychiatric:      Comments: Sedated          Significant Labs:   CBC:   Recent Labs   Lab 06/27/20  1636 06/28/20  0342 06/29/20 0316   WBC  --  17.96* 19.45*   HGB  --  13.8 13.8   HCT 44 44.2 44.0   PLT  --  351* 364*     CMP:   Recent Labs   Lab 06/28/20  0342 06/29/20  0316    138   K 3.8 3.6   CL 98 98   CO2 29 30*   * 137*   BUN 21* 25*   CREATININE 0.7 0.8   CALCIUM 9.4 9.7   PROT 7.4 7.6   ALBUMIN 2.8* 2.8*   BILITOT 0.9 0.8   ALKPHOS 48* 49*   AST 19 15   ALT 19 18   ANIONGAP 10 10   EGFRNONAA >60 >60     Lactic Acid:   No results for input(s): LACTATE in the last 48 hours.  Microbiology Results (last 7 days)     Procedure Component Value Units Date/Time    Culture, Respiratory with Gram Stain [[id number]]  (Abnormal) Collected: 06/27/20 1444    Order Status: Completed Specimen: Respiratory from Tracheal Aspirate Updated: 06/29/20 0806     Respiratory Culture YEAST   Rare  Identification pending  Normal respiratory estephanie also present       Gram Stain (Respiratory) <10 epithelial cells per low power field.     Gram Stain (Respiratory) Rare WBC's     Gram Stain (Respiratory) No organisms seen    Blood culture [[id number]] Collected: 06/27/20 0550    Order Status: Completed Specimen: Blood from Line, Central Updated: 06/29/20 0613     Blood Culture, Routine No Growth to date      No Growth to date    Blood culture [[id number]] Collected: 06/26/20 1914    Order Status: Completed Specimen: Blood Updated: 06/29/20 0613     Blood Culture, Routine No Growth to date      No Growth to date      No Growth to date    Narrative:      X2 sticks, one from central line, one peripheral    Blood culture [[id number]] Collected: 06/26/20 1914    Order Status: Completed Specimen: Blood from Antecubital, Left Arm Updated: 06/29/20 0613     Blood Culture, Routine No Growth to date       No Growth to date      No Growth to date    Blood culture [[id number]] Collected: 06/23/20 2201    Order Status: Completed Specimen: Blood Updated: 06/29/20 0612     Blood Culture, Routine No growth after 5 days.    Blood culture [[id number]] Collected: 06/28/20 0824    Order Status: Completed Specimen: Blood from Antecubital, Right Arm Updated: 06/29/20 0515     Blood Culture, Routine No Growth to date    Urine Culture High Risk [[id number]] Collected: 06/27/20 1437    Order Status: Completed Specimen: Urine, Catheterized Updated: 06/29/20 0220     Urine Culture, Routine No growth    Narrative:      Indicated criteria for high risk culture:->Other  Other (specify):->icu    Culture, Respiratory with Gram Stain [[id number]]  (Abnormal)  (Susceptibility) Collected: 06/23/20 2152    Order Status: Completed Specimen: Respiratory from Tracheal Aspirate Updated: 06/26/20 1047     Respiratory Culture No Pseudomonas isolated.      STAPHYLOCOCCUS AUREUS  Moderate  Normal respiratory estephanie also present       Gram Stain (Respiratory) <10 epithelial cells per low power field.     Gram Stain (Respiratory) Few WBC's     Gram Stain (Respiratory) Rare Gram positive cocci        Significant Imaging:   ECHO:  · Concentric left ventricular remodeling.  · Normal left ventricular systolic function. The estimated ejection fraction is 69%.  · Normal LV diastolic function.  · No wall motion abnormalities.  · Normal right ventricular systolic function.  · Mild right atrial enlargement.  · Trivial pericardial effusion.  · Mild left atrial enlargement.    CXR: Mild cardiomegaly.  Hypoventilation.    CXR:   1. Interval placement of endotracheal tube with the tip in the proximal right mainstem bronchus.  This should be withdrawn several cm.  2. The study is motion degraded.  Indistinctness of the left hemidiaphragm could be related to patient motion with atelectasis and/or pleural effusion not completely excluded.    CXR:  1. Limited  evaluation secondary to technique.  2. Findings suspicious for congestive heart failure which does not appear significantly changed over the interval.  3. Small bilateral pleural effusions with bibasilar dependent atelectasis.  4. Endotracheal tube.    CXR:  Stable positioning of support devices.  Unchanged bibasilar consolidation/atelectasis.    CXR: Bibasilar opacification not significantly changed compared to the prior exam.  Positions of lines and tubes described.    CXR:   Continued bibasilar lung infiltrates.  Mild cardiomegaly.  Endotracheal tube, NG tube and central lines in position.    CXR:   Mild decrease in right basilar infiltrate.  Continued left basilar infiltrate.  Cardiomegaly.  Tubes in position.       Assessment/Plan:      * Severe sepsis  This patient does have evidence of infective focus  My overall impression is severe sepsis.  Antibiotics given-   Antibiotics (From admission, onward)    Start     Stop Route Frequency Ordered    06/29/20 1500  levoFLOXacin 750 mg/150 mL IVPB 750 mg      -- IV Every 24 hours (non-standard times) 06/29/20 1231    06/26/20 1230  oxacillin 2 g in sodium chloride 0.9 %  100 mL IVPB (ready to mix system)      -- IV Every 4 hours (non-standard times) 06/26/20 1122          Severe Sepsis only-  Latest labs reviewed, they are-  Recent Labs   Lab 06/29/20  0316   BILITOT 0.8     No results for input(s): LACTATE in the last 24 hours.  Recent Labs   Lab 06/29/20  0454   PH 7.429   PO2 59*   PCO2 46.8*   HCO3 31.0*   BE 7       Organ dysfunction indicated by Acute respiratory failure      Temp Readings from Last 1 Encounters:   06/29/20 99.2 °F (37.3 °C) (Core Rectal)     BP Readings from Last 1 Encounters:   06/29/20 129/76     Pulse Readings from Last 1 Encounters:   06/29/20 70           Obstructive sleep apnea syndrome  On mechanical ventilation.      Staphylococcal pneumonia  MSSA.  Continue oxacillin and intravenous Levaquin therapy.  Follow Pulmonary  recommendations.    Type 2 diabetes mellitus  Not on chronic medication.  A1c 6.2%.  Accuchecks ac/hs with SSI coverage as needed.  Nutrition consult as pt NPO on vent.        Acute respiratory failure with hypoxia and hypercarbia  Patient with Hypercapnic and Hypoxic Respiratory failure which is Acute.  she is not on home oxygen. Supplemental ventilation was provided and noted- Vent Mode: SIMV  Oxygen Concentration (%):  [] 90  Resp Rate Total:  [14 br/min-34 br/min] 17 br/min  Vt Set:  [450 mL] 450 mL  PEEP/CPAP:  [17 cmH20] 17 cmH20  Pressure Support:  [30 cmH20] 30 cmH20  Mean Airway Pressure:  [22 gsU58-06 cmH20] 22 cmH20 and oxygen saturations 97%. Differential diagnosis includes - COPD, CHF, Obesity Hypoventilation, Interstitial lung disease and Pneumonia Labs and images were reviewed. Will treat underlying causes.   Follow pulmonary recommendations.       Bilateral pneumonia  IV zosyn, IV vancomycin-de-escalate as appropriate.  Follow Pulmonary recommendations  Blood and sputum cultures-follow.  Sputum culture growing staph aureus species.  Monitor clinical response.  Today's chest x-ray shows slight improvement in right lower lobe infiltrate.      Nicotine dependence  Health hazards associated with cigarette smoking should be reviewed with patient and cessation encouraged when pt is able to participate.       Mild intermittent asthma  Chronic; rescue inhaler only noted to home meds, no controller.  Continue bronchodilators q4h.  Currently intubated on mechanical ventilation.  Follow pulmonary recommendations. On IV Solumedrol 40 mg q 24 hrs.      Cardiomegaly  Patient with peripheral edema, mildly elevated BNP.  IV lasix was initiated at [facility].    Echo was completed today with results as follows:  · Concentric left ventricular remodeling.  · Normal left ventricular systolic function. The estimated ejection fraction is 69%.  · Normal LV diastolic function.  · No wall motion abnormalities.  · Normal right  ventricular systolic function.  · Mild right atrial enlargement.  · Trivial pericardial effusion.  · Mild left atrial enlargement.    IV lasix discontinued as no indication of heart failure.    Obesity hypoventilation syndrome  Intubated.       Class 3 severe obesity with serious comorbidity and body mass index (BMI) of 60.0 to 69.9 in adult  Body mass index is 63.06 kg/m². Morbid obesity complicates all aspects of disease management from diagnostic modalities to treatment. Weight loss encouraged and health benefits explained to patient.            VTE Risk Mitigation (From admission, onward)         Ordered     enoxaparin injection 165 mg  Every 12 hours (non-standard times)      06/26/20 1703     IP VTE HIGH RISK PATIENT  Once      06/23/20 2055     Place sequential compression device  Until discontinued      06/23/20 2055            Discussed with Miss Stafford, I updated her with patient's medical condition. I answered all the questions.     Critical care time spent on the evaluation and treatment of severe organ dysfunction, review of pertinent labs and imaging studies, discussions with consulting providers and discussions with patient/family: 35 minutes.      Peg Stewart MD  Department of Hospital Medicine   [facility]

## 2020-06-29 NOTE — PLAN OF CARE
Per MDR, patient may discharge to LTAC due to conditions once medically cleared.  CM will continue to follow for discharge planning needs.       06/29/20 2580   Discharge Reassessment   Assessment Type Discharge Planning Reassessment   Discharge Plan A Long-term acute care facility (LTAC)   Discharge Plan B Home with family

## 2020-06-29 NOTE — RESPIRATORY THERAPY
Leak test performed, all air extracted from cuff, no leak detected and volume volume stayed in mid 300's, cuff refilled to 34cm H2O press

## 2020-06-29 NOTE — PROGRESS NOTES
Pharmacist Renal Dose Adjustment Note    Michelle Wren is a 30 y.o. female being treated with the medication Levaquin.     Patient Data:    Vital Signs (Most Recent):  Temp: 99.7 °F (37.6 °C) (06/29/20 1200)  Pulse: 75 (06/29/20 1200)  Resp: 16 (06/29/20 1200)  BP: 128/78 (06/29/20 1200)  SpO2: (!) 88 % (06/29/20 1200)   Vital Signs (72h Range):  Temp:  [97.6 °F (36.4 °C)-101.6 °F (38.7 °C)]   Pulse:  [56-96]   Resp:  [14-49]   BP: (108-178)/()   SpO2:  [85 %-99 %]   Arterial Line BP: (101-171)/()      Recent Labs   Lab 06/27/20  0416 06/28/20  0342 06/29/20  0316   CREATININE 0.9 0.7 0.8     Serum creatinine: 0.8 mg/dL 06/29/20 0316  Estimated creatinine clearance: 150.3 mL/min    Levaquin 500 mg q24h will be changed to Levaquin 750 mg q24h.     Pharmacist's Name: Micky Salazar  Pharmacist's Extension: [phone]

## 2020-06-29 NOTE — ASSESSMENT & PLAN NOTE
Patient with peripheral edema, mildly elevated BNP.  IV lasix was initiated at [facility].    Echo was completed today with results as follows:  · Concentric left ventricular remodeling.  · Normal left ventricular systolic function. The estimated ejection fraction is 69%.  · Normal LV diastolic function.  · No wall motion abnormalities.  · Normal right ventricular systolic function.  · Mild right atrial enlargement.  · Trivial pericardial effusion.  · Mild left atrial enlargement.    IV lasix discontinued as no indication of heart failure.

## 2020-06-29 NOTE — ASSESSMENT & PLAN NOTE
IV zosyn, IV vancomycin-de-escalate as appropriate.  Follow Pulmonary recommendations  Blood and sputum cultures-follow.  Sputum culture growing staph aureus species.  Monitor clinical response.  Today's chest x-ray shows slight improvement in right lower lobe infiltrate.

## 2020-06-29 NOTE — PROGRESS NOTES
Radiology called to inform that they are unable to do the KUB portable due to patient wt. Dr. Stewart made aware.

## 2020-06-29 NOTE — PROGRESS NOTES
Residual checked at 0800 was 50 ml and was given back to patient.   1106: checked residuals, 200 ml out. The first 20 ml was Tube feeding, followed by a brown liquid with specks (130 ml), the last 50 started brown, then noted pink tinged gastric fluids. Did not give this back to patient, flushed NGT with cool water. Will recheck. Tube feeding on hold.

## 2020-06-29 NOTE — PROGRESS NOTES
.  Progress Note  PULMONARY    Admit Date: 6/23/2020 06/29/2020      SUBJECTIVE:     June 25th-patient is sedated, no complaints, intubated.  6/26 intubated.  6/27 no c/o intubated.  6/28 no new c/o,intubated, arouses  6/29- no new c/o, intubated,     PFSH and Allergies reviewed.    OBJECTIVE:     Vitals (Most recent):  Vitals:    06/29/20 0705   BP:    Pulse: 73   Resp: (!) 28   Temp:        Vitals (24 hour range):  Temp:  [99.8 °F (37.7 °C)-100.4 °F (38 °C)]   Pulse:  [56-82]   Resp:  [16-47]   BP: (108-141)/(57-80)   SpO2:  [85 %-99 %]   Arterial Line BP: (113-168)/(52-92)       Intake/Output Summary (Last 24 hours) at 6/29/2020 0735  Last data filed at 6/29/2020 0528  Gross per 24 hour   Intake 2179.02 ml   Output 5435 ml   Net -3255.98 ml          Physical Exam:  The patient's neuro status (alertness,orientation,cognitive function,motor skills,), pharyngeal exam (oral lesions, hygiene, abn dentition,), Neck (jvd,mass,thyroid,nodes in neck and above/below clavicle),RESPIRATORY(symmetry,effort,fremitus,percussion,auscultation),  Cor(rhythm,heart tones including gallops,perfusion,edema)ABD(distention,hepatic&splenomegaly,tenderness,masses), Skin(rash,cyanosis),Psyc(affect,judgement,).  Exam negative except for these pertinent findings:    Morbid obesity, intubated, anterior diffuse inspiratory and slightly expiratory wheezes, no distress, symmetric, no edema, soft abdomen, distant heart-lips and tongue to be significantly swollen on June 28th-protruding from the mouth    Radiographs reviewed: view by direct vision   Chest x-ray June 25th suggest left lower lung collapse, there is some increased interstitial type markings in the right lower lung also.    Results for orders placed during the hospital encounter of 06/22/20   X-Ray Chest 1 View    Narrative EXAMINATION:  XR CHEST 1 VIEW    CLINICAL HISTORY:  LINE PLACEMENT;    TECHNIQUE:  Single frontal view of the chest was  performed.    COMPARISON:  06/23/2020.    FINDINGS:  There is persistent pulmonary hypoinflation.  There are bilateral pulmonary infiltrates which are stable to slightly increased over the interval likely representing pulmonary edema.  Small bilateral pleural effusions with bibasilar dependent atelectasis.    Heart size is enlarged.  Mediastinal contours unremarkable.  Trachea midline.    Endotracheal tube remains in satisfactory position.  Interval placement of right IJ catheter which terminates in the distal SVC.      Impression 1. Pulmonary hypoinflation.  2. Findings consistent with congestive heart failure which is stable to slightly increased over the interval with small bilateral pleural effusions.  3. Satisfactory indwelling life-support devices.      Electronically signed by: Navin Mortensen  Date:    06/23/2020  Time:    11:58   ]    Labs     Recent Labs   Lab 06/29/20 0316   WBC 19.45*   HGB 13.8   HCT 44.0   *     Recent Labs   Lab 06/29/20 0316      K 3.6   CL 98   CO2 30*   BUN 25*   CREATININE 0.8   *   CALCIUM 9.7   MG 2.2   PHOS 3.6   AST 15   ALT 18   ALKPHOS 49*   BILITOT 0.8   PROT 7.6   ALBUMIN 2.8*     Recent Labs   Lab 06/29/20  0454   PH 7.429   PCO2 46.8*   PO2 59*   HCO3 31.0*     Microbiology Results (last 7 days)     Procedure Component Value Units Date/Time    Blood culture [[id number]] Collected: 06/27/20 0550    Order Status: Completed Specimen: Blood from Line, Central Updated: 06/29/20 0613     Blood Culture, Routine No Growth to date      No Growth to date    Blood culture [[id number]] Collected: 06/26/20 1914    Order Status: Completed Specimen: Blood Updated: 06/29/20 0613     Blood Culture, Routine No Growth to date      No Growth to date      No Growth to date    Narrative:      X2 sticks, one from central line, one peripheral    Blood culture [[id number]] Collected: 06/26/20 1914    Order Status: Completed Specimen: Blood from Antecubital, Left Arm Updated:  06/29/20 0613     Blood Culture, Routine No Growth to date      No Growth to date      No Growth to date    Blood culture [[id number]] Collected: 06/23/20 2201    Order Status: Completed Specimen: Blood Updated: 06/29/20 0612     Blood Culture, Routine No growth after 5 days.    Blood culture [[id number]] Collected: 06/28/20 0824    Order Status: Completed Specimen: Blood from Antecubital, Right Arm Updated: 06/29/20 0515     Blood Culture, Routine No Growth to date    Urine Culture High Risk [[id number]] Collected: 06/27/20 1437    Order Status: Completed Specimen: Urine, Catheterized Updated: 06/29/20 0220     Urine Culture, Routine No growth    Narrative:      Indicated criteria for high risk culture:->Other  Other (specify):->icu    Culture, Respiratory with Gram Stain [[id number]] Collected: 06/27/20 1444    Order Status: Completed Specimen: Respiratory from Tracheal Aspirate Updated: 06/28/20 0432     Gram Stain (Respiratory) <10 epithelial cells per low power field.     Gram Stain (Respiratory) Rare WBC's     Gram Stain (Respiratory) No organisms seen    Culture, Respiratory with Gram Stain [[id number]]  (Abnormal)  (Susceptibility) Collected: 06/23/20 2152    Order Status: Completed Specimen: Respiratory from Tracheal Aspirate Updated: 06/26/20 1047     Respiratory Culture No Pseudomonas isolated.      STAPHYLOCOCCUS AUREUS  Moderate  Normal respiratory estephanie also present       Gram Stain (Respiratory) <10 epithelial cells per low power field.     Gram Stain (Respiratory) Few WBC's     Gram Stain (Respiratory) Rare Gram positive cocci        Echo 6/22/2020  · Concentric left ventricular remodeling.  · Normal left ventricular systolic function. The estimated ejection fraction is 69%.  · Normal LV diastolic function.  · No wall motion abnormalities.  · Normal right ventricular systolic function.  · Mild right atrial enlargement.  · Trivial pericardial effusion.  · Mild left atrial enlargement.        Impression:  Active Hospital Problems    Diagnosis  POA    *Severe sepsis [A41.9, R65.20]  Yes    Angioedema [T78.3XXA]  No    Obstructive sleep apnea syndrome [G47.33]  Yes    Staphylococcal pneumonia [J15.20]  Yes    Type 2 diabetes mellitus [E11.9]  Yes    Nicotine dependence [F17.200]  Yes    Bilateral pneumonia [J18.9]  Yes    Acute respiratory failure with hypoxia and hypercarbia [J96.01, J96.02]  Yes    Obesity hypoventilation syndrome [E66.2]  Yes    Mild intermittent asthma [J45.20]  Yes    Class 3 severe obesity with serious comorbidity and body mass index (BMI) of 60.0 to 69.9 in adult [E66.01, Z68.44]  Not Applicable    Cardiomegaly [I51.7]  Yes      Resolved Hospital Problems   No resolved problems to display.               Plan:   June 25- swollen lips, gas exchange poor peep 17 79/0.7, cxr not impressive- wbc 17.2 from 24 on 23rd.  2nd covid neg.  Cta lungs to be done.  Expect some degree of atelectasis.  Wheezes are present    Leak test and wean 02.      6/26 ratio 89/0.7 on peep 17,   Wbc now 15k, no ct done.  Solumedrol 80/d    Pt initially bradycardic, abg with large neg base excess new from am lytes, propofol stopped for fear propofol infusion syndrome (about 40).  Fu abg with clearing acidosis.      Pt had peep decreased from 17 to 5 with sat rising from 95- to 97.  Pt  Is obstructed on vol time curve.      Pt developed resp distress off propofol on precedex.  Will give prn morphine with versed drip.    Ct not done with high peep/body size.  Could have ild but asthma should be likely dx.     mssa in sputum - staph pneumonia present at admit likely , steroids not good.   Will go to oxacillin 2 q 4 from vanc.  Cut steroids to 40/d, f/u procal-  Was 0.02 at presentation.          Addendum pt seemed to improve on lower levels peep but off propofol developed resp distress and  Desat.  Will check d dimer again, increase loveonx to therapeutic, and check leg studies.      6/27 abx  tapered to oxacillin with temp going to over 101.  Will resume levaquin, culture, check abd for tenderness and mouth for ulces.  Culture.  Try decrease imv - gas exchange poor.  No leg study    6/28 temp down now.  Discussed with relative,daughter in law.  Pt was at [facility] last yr with vent for a wk, 4 wks in hosp, no pneumonia but had infection, back to nl at [place], has osas. 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  Has had intermittent face/tongue/eye swelling ppt er visit at [place].          Will screen for hereditary angioedema/lupus/ra/wegener's   Cut peep to 5 with desat 89 on 89%   Get records from [place]    Need to follow leak test.  Airway should be marginal given osas and morbid obesity- with history would dx angioedema - cause not clear.       Continue full anticoagg.  Consider id f/u once records, cultures, and above screens return?  6/29- tried to cut peep yesterday and ended up on peep 17 100%- pa02 59 this am (59/1.00)- cxr - hard to read bilat lower lung infiltrates intermittently seen    Tm 100.4, oxacillin and levaquin- mssa in sputum 6/23 with nl estephanie on 27th.  Wbc 19 on steroids. No wheezes,  [facility] records na,     Leak was none today, 200 on 28th, 300 on 25th, 320 on 26th, tongue/lips seem smaller today? Pt intubated 23rd or so.    rf 16 with neg ccp, complement levels not low.      With no clear working dx - support.  If no improvement will re attempt to get ct but doubtful will give clear picture.  Will monitor/support.  .  Addendum-  Will ask id to see, dose high dose steroids if infection felt to covered?

## 2020-06-29 NOTE — RESPIRATORY THERAPY
06/28/20 1918   Patient Assessment/Suction   Level of Consciousness (AVPU) responds to voice   Respiratory Effort Unlabored   All Lung Fields Breath Sounds coarse;diminished   Rhythm/Pattern, Respiratory assisted mechanically   $ Suction Charges Inline Suction Procedure Stat Charge   Secretions Amount small   Secretions Color tan;yellow   Secretions Characteristics thick   PRE-TX-O2   O2 Device (Oxygen Therapy) ventilator   $ Is the patient on Low Flow Oxygen? Yes   Oxygen Concentration (%) 100   SpO2 95 %   Pulse Oximetry Type Continuous   $ Pulse Oximetry - Multiple Charge Pulse Oximetry - Multiple   Pulse 68   Resp 20   ETCO2   $ ETCO2 Charge Exhaled CO2 Monitoring   $ ETCO2 Usage Currently wearing   ETCO2 (mmHg) 39 mmHg   ETCO2 Device Type Bedside Monitor   Aerosol Therapy   $ Aerosol Therapy Charges Aerosol Treatment   Respiratory Treatment Status (SVN) given   Treatment Route (SVN) in-line   Patient Position (SVN) HOB elevated   Post Treatment Assessment (SVN) breath sounds unchanged   Signs of Intolerance (SVN) none   Breath Sounds Post-Respiratory Treatment   Throughout All Fields Post-Treatment All Fields   Throughout All Fields Post-Treatment no change   Post-treatment Heart Rate (beats/min) 67   Post-treatment Resp Rate (breaths/min) 22   Vent Select   Conventional Vent Y   Charged w/in last 24h YES   Preset Conventional Ventilator Settings   Vent Type    Ventilation Type VC   Vent Mode SIMV   Humidity HME   Set Rate 10 BPM   Vt Set 450 mL   PEEP/CPAP 17 cmH20   Pressure Support 30 cmH20   Waveform RAMP   Peak Flow 70 L/min   I:E Ratio Set 1:2.7   Insp Rise Time  100 %   Trigger Sensitivity Flow/I-Trigger 1 L/min   Expiratory Sensitivity/E-Cycle (%) 0 %   Patient Ventilator Parameters   Resp Rate Total 22 br/min   Peak Airway Pressure 37 cmH2O   Mean Airway Pressure 23 cmH20   Plateau Pressure 31 cmH20   Exhaled Vt 432 mL   Total Ve 9.04 mL   Tubing ID (mm) 7.5 mm   Tube Type ET   Conventional  Ventilator Alarms   Alarms On Y   Ve High Alarm 24 L/min   Ve Low Alarm 2.8 L/min   Vt High Alarm 1270 mL   Vt Low Alarm 200 mL   Resp Rate High Alarm 40 br/min   Press High Alarm 57 cmH2O   Apnea Rate 20   Ready to Wean/Extubation Screen   FIO2<=50 (chart decimal) (!) 1   MV<16L (chart vol.) 9.04   PEEP <=8 (chart #) (!) 17   Ready to Wean Parameters   F/VT Ratio<105 (RSBI) (!) 46.3     Received pt on above documented settings. glen well. Aero tx inline abg for the am ambu is at the [facility]. All vent alarms are set and working. Vent capsule not transmitting at this time.

## 2020-06-29 NOTE — ASSESSMENT & PLAN NOTE
This patient does have evidence of infective focus  My overall impression is severe sepsis.  Antibiotics given-   Antibiotics (From admission, onward)    Start     Stop Route Frequency Ordered    06/29/20 1500  levoFLOXacin 750 mg/150 mL IVPB 750 mg      -- IV Every 24 hours (non-standard times) 06/29/20 1231    06/26/20 1230  oxacillin 2 g in sodium chloride 0.9 %  100 mL IVPB (ready to mix system)      -- IV Every 4 hours (non-standard times) 06/26/20 1122          Severe Sepsis only-  Latest labs reviewed, they are-  Recent Labs   Lab 06/29/20  0316   BILITOT 0.8     No results for input(s): LACTATE in the last 24 hours.  Recent Labs   Lab 06/29/20  0454   PH 7.429   PO2 59*   PCO2 46.8*   HCO3 31.0*   BE 7       Organ dysfunction indicated by Acute respiratory failure      Temp Readings from Last 1 Encounters:   06/29/20 99.2 °F (37.3 °C) (Core Rectal)     BP Readings from Last 1 Encounters:   06/29/20 129/76     Pulse Readings from Last 1 Encounters:   06/29/20 70

## 2020-06-29 NOTE — PLAN OF CARE
Pt remains intubated and sedated on vent with precedex and versed . Remains on 100% with peep 17. Temp max 100.4 rectal . On turning to clean sats decreased to upper 80's and took pt time to recover. BP stable. Receiving Lasix q 12 hours and effective. Potassium rider replacement this am. Tube feeds 130 residuals. At 15 cc hour. Receiving reglan IV. Safety measures in place.

## 2020-06-30 LAB
ALBUMIN SERPL BCP-MCNC: 2.8 G/DL (ref 3.5–5.2)
ALLENS TEST: ABNORMAL
ALP SERPL-CCNC: 54 U/L (ref 55–135)
ALT SERPL W/O P-5'-P-CCNC: 17 U/L (ref 10–44)
ANA SER QL IF: NORMAL
ANION GAP SERPL CALC-SCNC: 14 MMOL/L (ref 8–16)
AST SERPL-CCNC: 12 U/L (ref 10–40)
BACTERIA SPEC AEROBE CULT: ABNORMAL
BACTERIA SPEC AEROBE CULT: ABNORMAL
BASOPHILS # BLD AUTO: 0.05 K/UL (ref 0–0.2)
BASOPHILS NFR BLD: 0.3 % (ref 0–1.9)
BILIRUB SERPL-MCNC: 0.8 MG/DL (ref 0.1–1)
BUN SERPL-MCNC: 26 MG/DL (ref 6–20)
CALCIUM SERPL-MCNC: 9.9 MG/DL (ref 8.7–10.5)
CHLORIDE SERPL-SCNC: 98 MMOL/L (ref 95–110)
CO2 SERPL-SCNC: 27 MMOL/L (ref 23–29)
CREAT SERPL-MCNC: 0.8 MG/DL (ref 0.5–1.4)
DELSYS: ABNORMAL
DIFFERENTIAL METHOD: ABNORMAL
EOSINOPHIL # BLD AUTO: 0.2 K/UL (ref 0–0.5)
EOSINOPHIL NFR BLD: 0.9 % (ref 0–8)
ERYTHROCYTE [DISTWIDTH] IN BLOOD BY AUTOMATED COUNT: 15.9 % (ref 11.5–14.5)
ERYTHROCYTE [SEDIMENTATION RATE] IN BLOOD BY WESTERGREN METHOD: 17 MM/H
ERYTHROCYTE [SEDIMENTATION RATE] IN BLOOD BY WESTERGREN METHOD: 6 MM/H
EST. GFR  (AFRICAN AMERICAN): >60 ML/MIN/1.73 M^2
EST. GFR  (NON AFRICAN AMERICAN): >60 ML/MIN/1.73 M^2
ETCO2: 35
ETCO2: 36
ETCO2: 36
ETCO2: 42
FERRITIN SERPL-MCNC: 394 NG/ML (ref 20–300)
FIO2: 85
FIO2: 90
FLOW: 70
GLUCOSE SERPL-MCNC: 134 MG/DL (ref 70–110)
GRAM STN SPEC: ABNORMAL
HCO3 UR-SCNC: 14.4 MMOL/L (ref 24–28)
HCO3 UR-SCNC: 30.1 MMOL/L (ref 24–28)
HCO3 UR-SCNC: 30.6 MMOL/L (ref 24–28)
HCO3 UR-SCNC: 33.7 MMOL/L (ref 24–28)
HCT VFR BLD AUTO: 46.3 % (ref 37–48.5)
HGB BLD-MCNC: 14.4 G/DL (ref 12–16)
IMM GRANULOCYTES # BLD AUTO: 0.48 K/UL (ref 0–0.04)
IMM GRANULOCYTES NFR BLD AUTO: 2.5 % (ref 0–0.5)
LYMPHOCYTES # BLD AUTO: 2.3 K/UL (ref 1–4.8)
LYMPHOCYTES NFR BLD: 12.1 % (ref 18–48)
MAGNESIUM SERPL-MCNC: 2.2 MG/DL (ref 1.6–2.6)
MCH RBC QN AUTO: 26.2 PG (ref 27–31)
MCHC RBC AUTO-ENTMCNC: 31.1 G/DL (ref 32–36)
MCV RBC AUTO: 84 FL (ref 82–98)
MODE: ABNORMAL
MONOCYTES # BLD AUTO: 2.5 K/UL (ref 0.3–1)
MONOCYTES NFR BLD: 13.2 % (ref 4–15)
NEUTROPHILS # BLD AUTO: 13.6 K/UL (ref 1.8–7.7)
NEUTROPHILS NFR BLD: 71 % (ref 38–73)
NRBC BLD-RTO: 0 /100 WBC
PCO2 BLDA: 25.2 MMHG (ref 35–45)
PCO2 BLDA: 45 MMHG (ref 35–45)
PCO2 BLDA: 45.2 MMHG (ref 35–45)
PCO2 BLDA: 51.5 MMHG (ref 35–45)
PEEP: 17
PEEP: 20
PH SMN: 7.37 [PH] (ref 7.35–7.45)
PH SMN: 7.42 [PH] (ref 7.35–7.45)
PH SMN: 7.43 [PH] (ref 7.35–7.45)
PH SMN: 7.44 [PH] (ref 7.35–7.45)
PHOSPHATE SERPL-MCNC: 4 MG/DL (ref 2.7–4.5)
PIP: 36
PIP: 62
PLATELET # BLD AUTO: 371 K/UL (ref 150–350)
PMV BLD AUTO: 10.3 FL (ref 9.2–12.9)
PO2 BLDA: 52 MMHG (ref 80–100)
PO2 BLDA: 59 MMHG (ref 80–100)
PO2 BLDA: 66 MMHG (ref 80–100)
PO2 BLDA: 72 MMHG (ref 80–100)
POC BE: -11 MMOL/L
POC BE: 6 MMOL/L
POC BE: 6 MMOL/L
POC BE: 9 MMOL/L
POC SATURATED O2: 87 % (ref 95–100)
POC SATURATED O2: 91 % (ref 95–100)
POC SATURATED O2: 93 % (ref 95–100)
POC SATURATED O2: 94 % (ref 95–100)
POC TCO2: 15 MMOL/L (ref 23–27)
POC TCO2: 31 MMOL/L (ref 23–27)
POC TCO2: 32 MMOL/L (ref 23–27)
POC TCO2: 35 MMOL/L (ref 23–27)
POCT GLUCOSE: 121 MG/DL (ref 70–110)
POCT GLUCOSE: 128 MG/DL (ref 70–110)
POCT GLUCOSE: 135 MG/DL (ref 70–110)
POCT GLUCOSE: 143 MG/DL (ref 70–110)
POTASSIUM SERPL-SCNC: 3.8 MMOL/L (ref 3.5–5.1)
PROCALCITONIN SERPL IA-MCNC: 0.06 NG/ML
PROT SERPL-MCNC: 8 G/DL (ref 6–8.4)
PS: 30
RBC # BLD AUTO: 5.5 M/UL (ref 4–5.4)
SAMPLE: ABNORMAL
SITE: ABNORMAL
SODIUM SERPL-SCNC: 139 MMOL/L (ref 136–145)
SP02: 88
SP02: 91
SP02: 91
SP02: 93
VT: 482
WBC # BLD AUTO: 19.14 K/UL (ref 3.9–12.7)

## 2020-06-30 PROCEDURE — 94640 AIRWAY INHALATION TREATMENT: CPT

## 2020-06-30 PROCEDURE — 80053 COMPREHEN METABOLIC PANEL: CPT

## 2020-06-30 PROCEDURE — 99900026 HC AIRWAY MAINTENANCE (STAT)

## 2020-06-30 PROCEDURE — 63600175 PHARM REV CODE 636 W HCPCS: Performed by: INTERNAL MEDICINE

## 2020-06-30 PROCEDURE — 99291 CRITICAL CARE FIRST HOUR: CPT | Mod: ,,, | Performed by: INTERNAL MEDICINE

## 2020-06-30 PROCEDURE — 25000003 PHARM REV CODE 250: Performed by: INTERNAL MEDICINE

## 2020-06-30 PROCEDURE — 63600175 PHARM REV CODE 636 W HCPCS: Performed by: NURSE PRACTITIONER

## 2020-06-30 PROCEDURE — 82803 BLOOD GASES ANY COMBINATION: CPT

## 2020-06-30 PROCEDURE — 36415 COLL VENOUS BLD VENIPUNCTURE: CPT

## 2020-06-30 PROCEDURE — 86703 HIV-1/HIV-2 1 RESULT ANTBDY: CPT

## 2020-06-30 PROCEDURE — 99231 SBSQ HOSP IP/OBS SF/LOW 25: CPT | Mod: S$GLB,,, | Performed by: INTERNAL MEDICINE

## 2020-06-30 PROCEDURE — 82728 ASSAY OF FERRITIN: CPT

## 2020-06-30 PROCEDURE — 94770 HC EXHALED C02 TEST: CPT

## 2020-06-30 PROCEDURE — 27000221 HC OXYGEN, UP TO 24 HOURS

## 2020-06-30 PROCEDURE — 37799 UNLISTED PX VASCULAR SURGERY: CPT

## 2020-06-30 PROCEDURE — 83735 ASSAY OF MAGNESIUM: CPT

## 2020-06-30 PROCEDURE — 84145 PROCALCITONIN (PCT): CPT

## 2020-06-30 PROCEDURE — 94003 VENT MGMT INPAT SUBQ DAY: CPT

## 2020-06-30 PROCEDURE — C9113 INJ PANTOPRAZOLE SODIUM, VIA: HCPCS | Performed by: INTERNAL MEDICINE

## 2020-06-30 PROCEDURE — 86769 SARS-COV-2 COVID-19 ANTIBODY: CPT

## 2020-06-30 PROCEDURE — 99291 PR CRITICAL CARE, E/M 30-74 MINUTES: ICD-10-PCS | Mod: ,,, | Performed by: INTERNAL MEDICINE

## 2020-06-30 PROCEDURE — 25000242 PHARM REV CODE 250 ALT 637 W/ HCPCS: Performed by: NURSE PRACTITIONER

## 2020-06-30 PROCEDURE — 25500020 PHARM REV CODE 255

## 2020-06-30 PROCEDURE — 94761 N-INVAS EAR/PLS OXIMETRY MLT: CPT

## 2020-06-30 PROCEDURE — 99231 PR SUBSEQUENT HOSPITAL CARE,LEVL I: ICD-10-PCS | Mod: S$GLB,,, | Performed by: INTERNAL MEDICINE

## 2020-06-30 PROCEDURE — 99900035 HC TECH TIME PER 15 MIN (STAT)

## 2020-06-30 PROCEDURE — 85025 COMPLETE CBC W/AUTO DIFF WBC: CPT

## 2020-06-30 PROCEDURE — 20000000 HC ICU ROOM

## 2020-06-30 PROCEDURE — 84100 ASSAY OF PHOSPHORUS: CPT

## 2020-06-30 RX ADMIN — IOHEXOL 100 ML: 350 INJECTION, SOLUTION INTRAVENOUS at 10:06

## 2020-06-30 RX ADMIN — CHLORHEXIDINE GLUCONATE 0.12% ORAL RINSE 15 ML: 1.2 LIQUID ORAL at 08:06

## 2020-06-30 RX ADMIN — POLYETHYLENE GLYCOL (3350) 17 G: 17 POWDER, FOR SOLUTION ORAL at 03:06

## 2020-06-30 RX ADMIN — POTASSIUM CHLORIDE 40 MEQ: 400 INJECTION, SOLUTION INTRAVENOUS at 06:06

## 2020-06-30 RX ADMIN — IPRATROPIUM BROMIDE AND ALBUTEROL SULFATE 3 ML: .5; 3 SOLUTION RESPIRATORY (INHALATION) at 07:06

## 2020-06-30 RX ADMIN — LEVOFLOXACIN 750 MG: 750 INJECTION, SOLUTION INTRAVENOUS at 03:06

## 2020-06-30 RX ADMIN — OXACILLIN SODIUM 2 G: 2 INJECTION, POWDER, FOR SOLUTION INTRAMUSCULAR; INTRAVENOUS at 11:06

## 2020-06-30 RX ADMIN — PANTOPRAZOLE SODIUM 40 MG: 40 INJECTION, POWDER, LYOPHILIZED, FOR SOLUTION INTRAVENOUS at 08:06

## 2020-06-30 RX ADMIN — DEXMEDETOMIDINE HYDROCHLORIDE 0.9 MCG/KG/HR: 100 INJECTION, SOLUTION, CONCENTRATE INTRAVENOUS at 11:06

## 2020-06-30 RX ADMIN — IPRATROPIUM BROMIDE AND ALBUTEROL SULFATE 3 ML: .5; 3 SOLUTION RESPIRATORY (INHALATION) at 04:06

## 2020-06-30 RX ADMIN — OXACILLIN SODIUM 2 G: 2 INJECTION, POWDER, FOR SOLUTION INTRAMUSCULAR; INTRAVENOUS at 04:06

## 2020-06-30 RX ADMIN — DEXMEDETOMIDINE HYDROCHLORIDE 0.8 MCG/KG/HR: 100 INJECTION, SOLUTION, CONCENTRATE INTRAVENOUS at 01:06

## 2020-06-30 RX ADMIN — DEXMEDETOMIDINE HYDROCHLORIDE 1.4 MCG/KG/HR: 100 INJECTION, SOLUTION, CONCENTRATE INTRAVENOUS at 08:06

## 2020-06-30 RX ADMIN — POLYETHYLENE GLYCOL (3350) 17 G: 17 POWDER, FOR SOLUTION ORAL at 08:06

## 2020-06-30 RX ADMIN — METOCLOPRAMIDE 10 MG: 5 INJECTION, SOLUTION INTRAMUSCULAR; INTRAVENOUS at 09:06

## 2020-06-30 RX ADMIN — METOCLOPRAMIDE 10 MG: 5 INJECTION, SOLUTION INTRAMUSCULAR; INTRAVENOUS at 01:06

## 2020-06-30 RX ADMIN — IPRATROPIUM BROMIDE AND ALBUTEROL SULFATE 3 ML: .5; 3 SOLUTION RESPIRATORY (INHALATION) at 03:06

## 2020-06-30 RX ADMIN — IPRATROPIUM BROMIDE AND ALBUTEROL SULFATE 3 ML: .5; 3 SOLUTION RESPIRATORY (INHALATION) at 12:06

## 2020-06-30 RX ADMIN — FUROSEMIDE 60 MG: 10 INJECTION, SOLUTION INTRAVENOUS at 11:06

## 2020-06-30 RX ADMIN — DEXMEDETOMIDINE HYDROCHLORIDE 1 MCG/KG/HR: 100 INJECTION, SOLUTION, CONCENTRATE INTRAVENOUS at 08:06

## 2020-06-30 RX ADMIN — DEXMEDETOMIDINE HYDROCHLORIDE 1.4 MCG/KG/HR: 100 INJECTION, SOLUTION, CONCENTRATE INTRAVENOUS at 06:06

## 2020-06-30 RX ADMIN — DEXMEDETOMIDINE HYDROCHLORIDE 1.4 MCG/KG/HR: 100 INJECTION, SOLUTION, CONCENTRATE INTRAVENOUS at 10:06

## 2020-06-30 RX ADMIN — METHYLPREDNISOLONE SODIUM SUCCINATE 40 MG: 40 INJECTION, POWDER, FOR SOLUTION INTRAMUSCULAR; INTRAVENOUS at 03:06

## 2020-06-30 RX ADMIN — DEXMEDETOMIDINE HYDROCHLORIDE 1 MCG/KG/HR: 100 INJECTION, SOLUTION, CONCENTRATE INTRAVENOUS at 02:06

## 2020-06-30 RX ADMIN — OXACILLIN SODIUM 2 G: 2 INJECTION, POWDER, FOR SOLUTION INTRAMUSCULAR; INTRAVENOUS at 05:06

## 2020-06-30 RX ADMIN — OXACILLIN SODIUM 2 G: 2 INJECTION, POWDER, FOR SOLUTION INTRAMUSCULAR; INTRAVENOUS at 08:06

## 2020-06-30 RX ADMIN — DEXMEDETOMIDINE HYDROCHLORIDE 1 MCG/KG/HR: 100 INJECTION, SOLUTION, CONCENTRATE INTRAVENOUS at 05:06

## 2020-06-30 RX ADMIN — BUDESONIDE 0.25 MG: 0.25 SUSPENSION RESPIRATORY (INHALATION) at 07:06

## 2020-06-30 RX ADMIN — MIDAZOLAM 4 MG/HR: 5 INJECTION INTRAMUSCULAR; INTRAVENOUS at 03:06

## 2020-06-30 RX ADMIN — ENOXAPARIN SODIUM 165 MG: 120 INJECTION SUBCUTANEOUS at 08:06

## 2020-06-30 RX ADMIN — METOCLOPRAMIDE 10 MG: 5 INJECTION, SOLUTION INTRAMUSCULAR; INTRAVENOUS at 06:06

## 2020-06-30 RX ADMIN — IPRATROPIUM BROMIDE AND ALBUTEROL SULFATE 3 ML: .5; 3 SOLUTION RESPIRATORY (INHALATION) at 11:06

## 2020-06-30 NOTE — PROGRESS NOTES
.  Progress Note  PULMONARY    Admit Date: 6/23/2020 07/01/2020      SUBJECTIVE:     June 25th-patient is sedated, no complaints, intubated.  6/26 intubated.  6/27 no c/o intubated.  6/28 no new c/o,intubated, arouses  6/29- no new c/o, intubated,   6/30 no new c/o          PFSH and Allergies reviewed.    OBJECTIVE:     Vitals (Most recent):  Vitals:    07/01/20 0355   BP:    Pulse: 76   Resp: 12   Temp:        Vitals (24 hour range):  Temp:  [97.7 °F (36.5 °C)-100.1 °F (37.8 °C)]   Pulse:  [72-89]   Resp:  [10-43]   BP: (111-140)/(62-89)   SpO2:  [79 %-98 %]   Arterial Line BP: (106-149)/(54-89)       Intake/Output Summary (Last 24 hours) at 7/1/2020 0752  Last data filed at 7/1/2020 0600  Gross per 24 hour   Intake 3132.49 ml   Output 4230 ml   Net -1097.51 ml          Physical Exam:  The patient's neuro status (alertness,orientation,cognitive function,motor skills,), pharyngeal exam (oral lesions, hygiene, abn dentition,), Neck (jvd,mass,thyroid,nodes in neck and above/below clavicle),RESPIRATORY(symmetry,effort,fremitus,percussion,auscultation),  Cor(rhythm,heart tones including gallops,perfusion,edema)ABD(distention,hepatic&splenomegaly,tenderness,masses), Skin(rash,cyanosis),Psyc(affect,judgement,).  Exam negative except for these pertinent findings:    Morbid obesity, intubated, anterior diffuse inspiratory and slightly expiratory wheezes, no distress, symmetric, no edema, soft abdomen, distant heart-lips and tongue to be significantly swollen on June30th but now within mouth      Radiographs reviewed: view by direct vision   Ct chest 6/30 - impressive posterior lung consodation, no pe, some ggo, huge pulm artery  c/w pulm hypertension  Chest x-ray June 25th suggest left lower lung collapse, there is some increased interstitial type markings in the right lower lung also.  cxr 6/30 lower lung infiltrates seem very likely by appearance even with obesity      Results for orders placed during the  hospital encounter of 06/22/20   X-Ray Chest 1 View    Narrative EXAMINATION:  XR CHEST 1 VIEW    CLINICAL HISTORY:  LINE PLACEMENT;    TECHNIQUE:  Single frontal view of the chest was performed.    COMPARISON:  06/23/2020.    FINDINGS:  There is persistent pulmonary hypoinflation.  There are bilateral pulmonary infiltrates which are stable to slightly increased over the interval likely representing pulmonary edema.  Small bilateral pleural effusions with bibasilar dependent atelectasis.    Heart size is enlarged.  Mediastinal contours unremarkable.  Trachea midline.    Endotracheal tube remains in satisfactory position.  Interval placement of right IJ catheter which terminates in the distal SVC.      Impression 1. Pulmonary hypoinflation.  2. Findings consistent with congestive heart failure which is stable to slightly increased over the interval with small bilateral pleural effusions.  3. Satisfactory indwelling life-support devices.      Electronically signed by: Navin Mortensen  Date:    06/23/2020  Time:    11:58   ]    Labs     Recent Labs   Lab 07/01/20  0308   WBC 18.99*   HGB 14.1   HCT 46.1   *   METAMYELOCYT 1.0     Recent Labs   Lab 07/01/20  0308      K 4.2   CL 99   CO2 27   BUN 29*   CREATININE 1.0   *   CALCIUM 10.1   MG 2.3   PHOS 4.1   AST 14   ALT 17   ALKPHOS 57   BILITOT 0.7   PROT 8.0   ALBUMIN 2.8*     Recent Labs   Lab 07/01/20  0400   PH 7.397   PCO2 48.7*   PO2 87   HCO3 30.0*     Microbiology Results (last 7 days)     Procedure Component Value Units Date/Time    Blood culture [[id number]] Collected: 06/27/20 0550    Order Status: Completed Specimen: Blood from Line, Central Updated: 07/01/20 0613     Blood Culture, Routine No Growth to date      No Growth to date      No Growth to date      No Growth to date    Blood culture [[id number]] Collected: 06/26/20 1914    Order Status: Completed Specimen: Blood Updated: 07/01/20 0612     Blood Culture, Routine No Growth to date       No Growth to date      No Growth to date      No Growth to date      No Growth to date    Narrative:      X2 sticks, one from central line, one peripheral    Blood culture [[id number]] Collected: 06/26/20 1914    Order Status: Completed Specimen: Blood from Antecubital, Left Arm Updated: 07/01/20 0612     Blood Culture, Routine No Growth to date      No Growth to date      No Growth to date      No Growth to date      No Growth to date    Blood culture [[id number]] Collected: 06/28/20 0824    Order Status: Completed Specimen: Blood from Antecubital, Right Arm Updated: 06/30/20 2212     Blood Culture, Routine No Growth to date      No Growth to date      No Growth to date    Respiratory Infection Panel (PCR), Nasopharyngeal [[id number]]     Order Status: No result Specimen: Nasopharyngeal Swab     Culture, Respiratory with Gram Stain [[id number]]  (Abnormal) Collected: 06/27/20 1444    Order Status: Completed Specimen: Respiratory from Tracheal Aspirate Updated: 06/30/20 0718     Respiratory Culture No S aureus or Pseudomonas isolated.      KENROY LUSITANIAE  Rare  Normal respiratory estephanie also present       Gram Stain (Respiratory) <10 epithelial cells per low power field.     Gram Stain (Respiratory) Rare WBC's     Gram Stain (Respiratory) No organisms seen    Blood culture [[id number]] Collected: 06/23/20 2201    Order Status: Completed Specimen: Blood Updated: 06/29/20 0612     Blood Culture, Routine No growth after 5 days.    Urine Culture High Risk [[id number]] Collected: 06/27/20 1437    Order Status: Completed Specimen: Urine, Catheterized Updated: 06/29/20 0220     Urine Culture, Routine No growth    Narrative:      Indicated criteria for high risk culture:->Other  Other (specify):->icu    Culture, Respiratory with Gram Stain [[id number]]  (Abnormal)  (Susceptibility) Collected: 06/23/20 2152    Order Status: Completed Specimen: Respiratory from Tracheal Aspirate Updated: 06/26/20 1047     Respiratory Culture  No Pseudomonas isolated.      STAPHYLOCOCCUS AUREUS  Moderate  Normal respiratory estephanie also present       Gram Stain (Respiratory) <10 epithelial cells per low power field.     Gram Stain (Respiratory) Few WBC's     Gram Stain (Respiratory) Rare Gram positive cocci        Echo 6/22/2020  · Concentric left ventricular remodeling.  · Normal left ventricular systolic function. The estimated ejection fraction is 69%.  · Normal LV diastolic function.  · No wall motion abnormalities.  · Normal right ventricular systolic function.  · Mild right atrial enlargement.  · Trivial pericardial effusion.  · Mild left atrial enlargement.       Impression:  Active Hospital Problems    Diagnosis  POA    *Severe sepsis [A41.9, R65.20]  Yes    Pulmonary hypertension [I27.20]  Yes     By pulmonary artery size on ct chest 6/30      Obstructive sleep apnea syndrome [G47.33]  Yes    Staphylococcal pneumonia [J15.20]  Yes    Type 2 diabetes mellitus [E11.9]  Yes    Nicotine dependence [F17.200]  Yes    Bilateral pneumonia [J18.9]  Yes    Acute respiratory failure with hypoxia and hypercarbia [J96.01, J96.02]  Yes    Obesity hypoventilation syndrome [E66.2]  Yes    Mild intermittent asthma [J45.20]  Yes    Class 3 severe obesity with serious comorbidity and body mass index (BMI) of 60.0 to 69.9 in adult [E66.01, Z68.44]  Not Applicable    Cardiomegaly [I51.7]  Yes      Resolved Hospital Problems   No resolved problems to display.               Plan:   June 25- swollen lips, gas exchange poor peep 17 79/0.7, cxr not impressive- wbc 17.2 from 24 on 23rd.  2nd covid neg.  Cta lungs to be done.  Expect some degree of atelectasis.  Wheezes are present    Leak test and wean 02. 6/26 ratio 89/0.7 on peep 17,   Wbc now 15k, no ct done.  Solumedrol 80/d    Pt initially bradycardic, abg with large neg base excess new from am lytes, propofol stopped for fear propofol infusion syndrome (about 40).  Fu abg with clearing acidosis.       Pt had peep decreased from 17 to 5 with sat rising from 95- to 97.  Pt  Is obstructed on vol time curve.      Pt developed resp distress off propofol on precedex.  Will give prn morphine with versed drip.    Ct not done with high peep/body size.  Could have ild but asthma should be likely dx.     mssa in sputum - staph pneumonia present at admit likely , steroids not good.   Will go to oxacillin 2 q 4 from [facility].  Cut steroids to 40/d, f/u procal-  Was 0.02 at presentation.          Addendum pt seemed to improve on lower levels peep but off propofol developed resp distress and  Desat.  Will check d dimer again, increase loveonx to therapeutic, and check leg studies.      6/27 abx tapered to oxacillin with temp going to over 101.  Will resume levaquin, culture, check abd for tenderness and mouth for ulces.  Culture.  Try decrease imv - gas exchange poor.  No leg study    6/28 temp down now.  Discussed with relative,daughter in law.  Pt was at [facility] last yr with vent for a wk, 4 wks in hosp, no pneumonia but had infection, back to nl at [place], has osas. 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  Has had intermittent face/tongue/eye swelling ppt er visit at [facility].          Will screen for hereditary angioedema/lupus/ra/wegener's   Cut peep to 5 with desat 89 on 89%   Get records from [facility]    Need to follow leak test.  Airway should be marginal given osas and morbid obesity- with history would dx angioedema - cause not clear.       Continue full anticoagg.  Consider id f/u once records, cultures, and above screens return?  6/29- tried to cut peep yesterday and ended up on peep 17 100%- pa02 59 this am (59/1.00)- cxr - hard to read bilat lower lung infiltrates intermittently seen    Tm 100.4, oxacillin and levaquin- mssa in sputum 6/23 with nl estephanie on 27th.  Wbc 19 on steroids. No wheezes,  [facility] records na,     Leak was none today, 200 on 28th, 300 on 25th, 320 on 26th, tongue/lips seem  smaller today? Pt intubated 23rd or so.    rf 16 with neg ccp, complement levels not low.      With no clear working dx - support.  If no improvement will re attempt to get ct but doubtful will give clear picture.  Will monitor/support.  .  Addendum-  Will ask id to see, dose high dose steroids if infection felt to covered?      6/30 remains marginal, attempted decrease peep yesterday with dramatic worsening.  Will increase steroids to 125 q8, records from [place] from 11 day stay in Jan 2019 were not suggestive of ild???     Ct chest done, above, discussed with dr Schulz.  Picture looks more like collapse lung than pneumonia or covid.  Will decrease steroids, use high peep/pcv.    Discussed with family - father/brother,sis in law- picture not clear but loooks like asthma with collapse lower lungs.  Angioedema not likely initially significant but may complicate later.  rx presumed pe, pneumonia, asthma- increase rx for atelectasis. May need trach.  Pt could die  With mishap and will be at high risk.      Will decrease steroids back to 40/d.  Appreciate ID.  Vent adjusted.   The following were evaluated and adjusted as needed: mechanical ventilator settings and weaning status, intravenous fluids and nutritional status, sedation and neurologic status, antibiotics, hemodynamics, support tubes and access lines and invasive monitoring, acid base balance and oxygenation needs, input and output and renal status and CODE STATUS/OUTLOOK DISCUSSED WITH AVAILABLE NEXT OF  KIN       Critical Care  - THE PATIENT HAS A HIGH PROBABILITY OF IMMINENT OR LIFE THREATENING DETERIORATION.  Over 50%time of encounter was in direct care at bedside.  Time was 30 to 74 minutes required for patient care.  Time needed for all of the above totaled 61 minutes.

## 2020-06-30 NOTE — PLAN OF CARE
Results for MICH NASH (MRN [id number]) as of 6/30/2020 17:53   Ref. Range 6/30/2020 16:40   POC PH Latest Ref Range: 7.35 - 7.45  7.430   POC PCO2 Latest Ref Range: 35 - 45 mmHg 45.2 (H)   POC PO2 Latest Ref Range: 80 - 100 mmHg 59 (LL)   POC BE Latest Ref Range: -2 to 2 mmol/L 6   POC HCO3 Latest Ref Range: 24 - 28 mmol/L 30.1 (H)   POC SATURATED O2 Latest Ref Range: 95 - 100 % 91 (L)   POC TCO2 Latest Ref Range: 23 - 27 mmol/L 31 (H)   FiO2 Unknown 85   PiP Unknown 62   PEEP Unknown 20   Sample Unknown ARTERIAL   DelSys Unknown Adult Vent   Allens Test Unknown N/A   Site Unknown Oriana/UAC   Mode Unknown SIMV   Rate Unknown 6   ETCO2 Unknown 36   PS Unknown 30   Sp02 Unknown 91

## 2020-06-30 NOTE — RESPIRATORY THERAPY
06/29/20 1912   Patient Assessment/Suction   Level of Consciousness (AVPU) alert   Respiratory Effort Unlabored   All Lung Fields Breath Sounds coarse   THANG Breath Sounds diminished   LLL Breath Sounds diminished   RUL Breath Sounds coarse   RML Breath Sounds coarse   RLL Breath Sounds diminished;coarse   Rhythm/Pattern, Respiratory assisted mechanically   Suction Method oral   $ Suction Charges Inline Suction Procedure Stat Charge   Secretions Amount small   Secretions Color tan   Secretions Characteristics thick   PRE-TX-O2   O2 Device (Oxygen Therapy) ventilator   $ Is the patient on Low Flow Oxygen? Yes   Oxygen Concentration (%) 90   SpO2 96 %   Pulse Oximetry Type Continuous   $ Pulse Oximetry - Multiple Charge Pulse Oximetry - Multiple   Pulse 67   Resp 20   ETCO2   $ ETCO2 Charge Exhaled CO2 Monitoring   $ ETCO2 Usage Currently wearing   ETCO2 (mmHg) 34 mmHg   ETCO2 Device Type Bedside Monitor   Aerosol Therapy   $ Aerosol Therapy Charges Aerosol Treatment   Respiratory Treatment Status (SVN) given   Treatment Route (SVN) in-line   Patient Position (SVN) HOB elevated   Post Treatment Assessment (SVN) breath sounds unchanged   Signs of Intolerance (SVN) none   Breath Sounds Post-Respiratory Treatment   Throughout All Fields Post-Treatment no change   Post-treatment Heart Rate (beats/min) 67   Post-treatment Resp Rate (breaths/min) 20        Airway - Non-Surgical 06/22/20 1625 Endotracheal Tube   Placement Date/Time: 06/22/20 1625   Present Prior to Hospital Arrival?: No  Method of Intubation: Direct laryngoscopy  Inserted by: MD  Airway Device: Endotracheal Tube  Mask Ventilation: Easy  Blade: Ramon #3  Airway Device Size: 7.5  Style: Cuffed...   Secured at 24 cm   Measured At Lips   Secured Location Right   Secured by Commercial tube lord   Bite Block right   Site Condition Dry   Status Intact;Secured;Patent   Site Assessment Dry   Cuff Pressure 32 cm H2O   Vent Select   Conventional Vent Y   Charged  w/in last 24h YES   Preset Conventional Ventilator Settings   Vent Type    Ventilation Type VC   Vent Mode SIMV   Humidity HME   Set Rate 10 BPM   Vt Set 450 mL   PEEP/CPAP 17 cmH20   Pressure Support 30 cmH20   Waveform RAMP   Peak Flow 70 L/min   Set Inspiratory Pressure 0 cmH20   Insp Time 0 Sec(s)   Plateau Set/Insp. Hold (sec) 0   Insp Rise Time  100 %   Trigger Sensitivity Flow/I-Trigger 1 L/min   P High 0 cm H2O   P Low 0 cm H2O   T High 0 sec   T Low 0 sec   Patient Ventilator Parameters   Resp Rate Total 21 br/min   Peak Airway Pressure 52 cmH2O   Mean Airway Pressure 23 cmH20   Plateau Pressure 35 cmH20   Exhaled Vt 428 mL   Total Ve 10.8 mL   Spont Ve 6.34 L   I:E Ratio Measured 1:3.10   Conventional Ventilator Alarms   Ve High Alarm 24 L/min   Resp Rate High Alarm 40 br/min   Press High Alarm 57 cmH2O   Apnea Rate 20   Apnea Volume (mL) 450 mL   Apnea Oxygen Concentration  100   Apnea Flow Rate (L/min) 70   T Apnea 20 sec(s)   Ready to Wean/Extubation Screen   FIO2<=50 (chart decimal) (!) 0.9   MV<16L (chart vol.) 10.8   PEEP <=8 (chart #) (!) 17   Ready to Wean Parameters   F/VT Ratio<105 (RSBI) (!) 46.73

## 2020-06-30 NOTE — PROGRESS NOTES
[facility] Medicine  Progress Note    Patient Name: Michelle Wren  MRN: [id number]  Patient Class: IP- Inpatient   Admission Date: 6/23/2020  Length of Stay: 7 days  Attending Physician: Peg Stewart MD  Primary Care Provider: Primary Doctor No        Subjective:     Principal Problem:Severe sepsis    HPI:  Michelle Wren is a 31 y/o female with a past medical history significant for asthma, type 2 diabetes, and severe obesity who is transferred from [facility] to [facility] for pulmonary consultation.  Patient presented to the ED at [facility] yesterday with complaints of 2-3 days of shortness of breath.  It is reported that she uses CPAP at night but she got this from a friend so unsure of settings.  She was placed on BiPAP and given IV Solu-Medrol, bronchodilator treatments, and IV Lasix.  She was admitted to the ICU at [facility].  Patient continued to decline and underwent intubation yesterday around 7:00 p.m.  per report, today patient appeared to be worse.  Her saturations were in the mid 90s on FiO2 of 100%.  A D-dimer was checked and was negative.  Per review of labs which were drawn earlier today, her WBC count was 23,000 and her lactic acid was 2.4.  She was placed on IV Zosyn and IV vancomycin.  Upon arrival to [facility], patient is in no acute distress.  She is intubated and sedated.  Vital signs are stable.  Lactic acid will be repeated now as patient meets sepsis criteria and will check a procalcitonin.  She will be monitored closely in the ICU.           Overview/Hospital Course:  Patient is being managed in intensive care unit, requiring mechanical ventilation under supervision of pulmonary medicine.  Repeat COVID -19 test has been negative.  Patient is receiving intravenous antibiotics for bilateral pneumonia.  Patient is getting CTA of chest for further evaluation.    Interval History:  Patient is in intensive care unit with respiratory  failure, on mechanical ventilation after getting transferred from [place], [place].  Patient is requiring 90% FiO2.  Patient is afebrile.  No acute distress noted.  Patient is following commands.    Review of Systems   Unable to perform ROS: Intubated     Objective:     Vital Signs (Most Recent):  Temp: 99.1 °F (37.3 °C) (06/30/20 1230)  Pulse: 78 (06/30/20 1230)  Resp: (!) 25 (06/30/20 1230)  BP: 138/89 (06/30/20 1230)  SpO2: (!) 92 % (06/30/20 1230) Vital Signs (24h Range):  Temp:  [97.7 °F (36.5 °C)-99.2 °F (37.3 °C)] 99.1 °F (37.3 °C)  Pulse:  [57-86] 78  Resp:  [16-55] 25  SpO2:  [79 %-97 %] 92 %  BP: (111-140)/(62-89) 138/89  Arterial Line BP: (106-147)/(53-77) 147/77     Weight: (!) 156.4 kg (344 lb 12.8 oz)  Body mass index is 63.06 kg/m².    Intake/Output Summary (Last 24 hours) at 6/30/2020 1313  Last data filed at 6/30/2020 0532  Gross per 24 hour   Intake 793.45 ml   Output 4075 ml   Net -3281.55 ml      Physical Exam  Vitals signs and nursing note reviewed.   Constitutional:       Appearance: She is well-developed. She is obese.      Interventions: She is sedated and intubated.   HENT:      Head: Normocephalic and atraumatic.   Eyes:      Conjunctiva/sclera: Conjunctivae normal.      Pupils: Pupils are equal, round, and reactive to light.   Neck:      Musculoskeletal: Normal range of motion and neck supple.   Cardiovascular:      Rate and Rhythm: Normal rate and regular rhythm.      Pulses: Normal pulses.      Heart sounds: Normal heart sounds.   Pulmonary:      Effort: Pulmonary effort is normal. She is intubated.      Breath sounds: Decreased breath sounds present.   Abdominal:      General: Bowel sounds are normal.      Palpations: Abdomen is soft. There is no mass.      Tenderness: There is no abdominal tenderness.   Genitourinary:     Comments: Vazquez catheter in place    Musculoskeletal:         General: No swelling or deformity.   Skin:     General: Skin is warm and dry.       Capillary Refill: Capillary refill takes 2 to 3 seconds.   Neurological:      Comments:  Patient is following commands.     Psychiatric:      Comments: Sedated          Significant Labs:   CBC:   Recent Labs   Lab 06/29/20 0316 06/30/20  0334   WBC 19.45* 19.14*   HGB 13.8 14.4   HCT 44.0 46.3   * 371*     CMP:   Recent Labs   Lab 06/29/20 0316 06/30/20  0334    139   K 3.6 3.8   CL 98 98   CO2 30* 27   * 134*   BUN 25* 26*   CREATININE 0.8 0.8   CALCIUM 9.7 9.9   PROT 7.6 8.0   ALBUMIN 2.8* 2.8*   BILITOT 0.8 0.8   ALKPHOS 49* 54*   AST 15 12   ALT 18 17   ANIONGAP 10 14   EGFRNONAA >60 >60     Lactic Acid:   No results for input(s): LACTATE in the last 48 hours.  Microbiology Results (last 7 days)     Procedure Component Value Units Date/Time    Culture, Respiratory with Gram Stain [[id number]]  (Abnormal) Collected: 06/27/20 1444    Order Status: Completed Specimen: Respiratory from Tracheal Aspirate Updated: 06/30/20 0718     Respiratory Culture No S aureus or Pseudomonas isolated.      KENROY LUSITANIAE  Rare  Normal respiratory estephanie also present       Gram Stain (Respiratory) <10 epithelial cells per low power field.     Gram Stain (Respiratory) Rare WBC's     Gram Stain (Respiratory) No organisms seen    Blood culture [[id number]] Collected: 06/27/20 0550    Order Status: Completed Specimen: Blood from Line, Central Updated: 06/30/20 0613     Blood Culture, Routine No Growth to date      No Growth to date      No Growth to date    Blood culture [[id number]] Collected: 06/26/20 1914    Order Status: Completed Specimen: Blood Updated: 06/30/20 0612     Blood Culture, Routine No Growth to date      No Growth to date      No Growth to date      No Growth to date    Narrative:      X2 sticks, one from central line, one peripheral    Blood culture [[id number]] Collected: 06/26/20 1914    Order Status: Completed Specimen: Blood from Antecubital, Left Arm Updated: 06/30/20 0612     Blood Culture,  Routine No Growth to date      No Growth to date      No Growth to date      No Growth to date    Blood culture [[id number]] Collected: 06/28/20 0824    Order Status: Completed Specimen: Blood from Antecubital, Right Arm Updated: 06/29/20 2212     Blood Culture, Routine No Growth to date      No Growth to date    Blood culture [[id number]] Collected: 06/23/20 2201    Order Status: Completed Specimen: Blood Updated: 06/29/20 0612     Blood Culture, Routine No growth after 5 days.    Urine Culture High Risk [[id number]] Collected: 06/27/20 1437    Order Status: Completed Specimen: Urine, Catheterized Updated: 06/29/20 0220     Urine Culture, Routine No growth    Narrative:      Indicated criteria for high risk culture:->Other  Other (specify):->icu    Culture, Respiratory with Gram Stain [[id number]]  (Abnormal)  (Susceptibility) Collected: 06/23/20 2152    Order Status: Completed Specimen: Respiratory from Tracheal Aspirate Updated: 06/26/20 1047     Respiratory Culture No Pseudomonas isolated.      STAPHYLOCOCCUS AUREUS  Moderate  Normal respiratory estephanie also present       Gram Stain (Respiratory) <10 epithelial cells per low power field.     Gram Stain (Respiratory) Few WBC's     Gram Stain (Respiratory) Rare Gram positive cocci        Significant Imaging:   ECHO:  · Concentric left ventricular remodeling.  · Normal left ventricular systolic function. The estimated ejection fraction is 69%.  · Normal LV diastolic function.  · No wall motion abnormalities.  · Normal right ventricular systolic function.  · Mild right atrial enlargement.  · Trivial pericardial effusion.  · Mild left atrial enlargement.    CXR: Mild cardiomegaly.  Hypoventilation.    CXR:   1. Interval placement of endotracheal tube with the tip in the proximal right mainstem bronchus.  This should be withdrawn several cm.  2. The study is motion degraded.  Indistinctness of the left hemidiaphragm could be related to patient motion with atelectasis and/or  pleural effusion not completely excluded.    CXR:  1. Limited evaluation secondary to technique.  2. Findings suspicious for congestive heart failure which does not appear significantly changed over the interval.  3. Small bilateral pleural effusions with bibasilar dependent atelectasis.  4. Endotracheal tube.    CXR:  Stable positioning of support devices.  Unchanged bibasilar consolidation/atelectasis.    CXR: Bibasilar opacification not significantly changed compared to the prior exam.  Positions of lines and tubes described.    CXR:   Continued bibasilar lung infiltrates.  Mild cardiomegaly.  Endotracheal tube, NG tube and central lines in position.    CXR:   Mild decrease in right basilar infiltrate.  Continued left basilar infiltrate.  Cardiomegaly.  Tubes in position.     CTA chest:  Limited evaluation for small peripheral luminal filling defects with the streaky artifact but no large central luminal filling defects are seen in pulmonary artery suggesting pulmonary artery embolism   Bilateral infiltrates with features somewhat suspicious for later changes of COVID-19 pneumonia      Assessment/Plan:      * Severe sepsis  This patient does have evidence of infective focus  My overall impression is severe sepsis.  Antibiotics given-   Antibiotics (From admission, onward)    Start     Stop Route Frequency Ordered    06/29/20 1500  levoFLOXacin 750 mg/150 mL IVPB 750 mg      -- IV Every 24 hours (non-standard times) 06/29/20 1231    06/26/20 1230  oxacillin 2 g in sodium chloride 0.9 %  100 mL IVPB (ready to mix system)      -- IV Every 4 hours (non-standard times) 06/26/20 1122          Severe Sepsis only-  Latest labs reviewed, they are-  Recent Labs   Lab 06/29/20  0316   BILITOT 0.8     No results for input(s): LACTATE in the last 24 hours.  Recent Labs   Lab 06/29/20  0454   PH 7.429   PO2 59*   PCO2 46.8*   HCO3 31.0*   BE 7       Organ dysfunction indicated by Acute respiratory failure      Temp Readings  from Last 1 Encounters:   06/29/20 99.2 °F (37.3 °C) (Core Rectal)     BP Readings from Last 1 Encounters:   06/29/20 129/76     Pulse Readings from Last 1 Encounters:   06/29/20 70           Obstructive sleep apnea syndrome  On mechanical ventilation.      Staphylococcal pneumonia  MSSA.  Continue oxacillin and intravenous Levaquin therapy.  Follow Pulmonary recommendations.  Follow ID recommendations.    Type 2 diabetes mellitus  Not on chronic medication.  A1c 6.2%.  Accuchecks ac/hs with SSI coverage as needed.  Nutrition consult as pt NPO on vent.        Acute respiratory failure with hypoxia and hypercarbia  Patient with Hypercapnic and Hypoxic Respiratory failure which is Acute.  she is not on home oxygen. Supplemental ventilation was provided and noted- Vent Mode: SIMV  Oxygen Concentration (%):  [] 90  Resp Rate Total:  [14 br/min-34 br/min] 17 br/min  Vt Set:  [450 mL] 450 mL  PEEP/CPAP:  [17 cmH20] 17 cmH20  Pressure Support:  [30 cmH20] 30 cmH20  Mean Airway Pressure:  [22 syH23-84 cmH20] 22 cmH20 and oxygen saturations 97%. Differential diagnosis includes - COPD, CHF, Obesity Hypoventilation, Interstitial lung disease and Pneumonia Labs and images were reviewed. Will treat underlying causes.   Follow pulmonary recommendations.  CTA suggestive of late changes of COVID -19 infection but patient has been tested negative x2 for COVID-19.      Bilateral pneumonia  IV zosyn, IV vancomycin-de-escalate as appropriate.  Follow Pulmonary recommendations  Blood and sputum cultures-follow.  Sputum culture growing staph aureus species.  Monitor clinical response.  Today's chest x-ray shows slight improvement in right lower lobe infiltrate.      Nicotine dependence  Health hazards associated with cigarette smoking should be reviewed with patient and cessation encouraged when pt is able to participate.       Mild intermittent asthma  Chronic; rescue inhaler only noted to home meds, no controller.  Continue  bronchodilators q4h.  Currently intubated on mechanical ventilation.  Follow pulmonary recommendations. On IV Solumedrol 40 mg q 24 hrs.      Cardiomegaly  Patient with peripheral edema, mildly elevated BNP.  IV lasix was initiated at [facility].    Echo was completed today with results as follows:  · Concentric left ventricular remodeling.  · Normal left ventricular systolic function. The estimated ejection fraction is 69%.  · Normal LV diastolic function.  · No wall motion abnormalities.  · Normal right ventricular systolic function.  · Mild right atrial enlargement.  · Trivial pericardial effusion.  · Mild left atrial enlargement.    IV lasix discontinued as no indication of heart failure.    Obesity hypoventilation syndrome  Intubated.       Class 3 severe obesity with serious comorbidity and body mass index (BMI) of 60.0 to 69.9 in adult  Body mass index is 63.06 kg/m². Morbid obesity complicates all aspects of disease management from diagnostic modalities to treatment. Weight loss encouraged and health benefits explained to patient.      Dr. Gentile has scheduled family meeting this afternoon.      VTE Risk Mitigation (From admission, onward)         Ordered     enoxaparin injection 165 mg  Every 12 hours (non-standard times)      06/26/20 1703     IP VTE HIGH RISK PATIENT  Once      06/23/20 2055     Place sequential compression device  Until discontinued      06/23/20 2055                Critical care time spent on the evaluation and treatment of severe organ dysfunction, review of pertinent labs and imaging studies, discussions with consulting providers and discussions with patient/family: 35 minutes.      Peg Stewart MD  Department of Hospital Medicine   [facility]

## 2020-06-30 NOTE — PROGRESS NOTES
CTA of chest completed. Patient did not tolerate well, SPO2 decreased into the 60's despite being on the portable ventilator. Patient is currently 88%.   Family notified of meeting that Dr. Gentile would like to have. 5 members will come and they have been informed of the one visitor only policy at this time. They understood. Patient also shook head yes to only her brother, Francisco to be her one visitor.

## 2020-06-30 NOTE — PLAN OF CARE
06/30/20 0707   Patient Assessment/Suction   Respiratory Effort Unlabored   Expansion/Accessory Muscles/Retractions no use of accessory muscles   All Lung Fields Breath Sounds Anterior:;Lateral:;coarse;diminished   Rhythm/Pattern, Respiratory assisted mechanically   Cough Frequency with stimulation   Cough Type good   Suction Method tracheal   Secretions Amount small   Secretions Color yellow   Secretions Characteristics thick   PRE-TX-O2   O2 Device (Oxygen Therapy) ventilator   $ Is the patient on Low Flow Oxygen? Yes   Oxygen Concentration (%) 90   SpO2 (!) 94 %   Pulse Oximetry Type Continuous   Pulse 70   Resp 17   ETCO2   $ ETCO2 Charge Exhaled CO2 Monitoring   $ ETCO2 Usage Currently wearing   ETCO2 (mmHg) 42 mmHg   ETCO2 Device Type Bedside Monitor;Ventilator   Aerosol Therapy   $ Aerosol Therapy Charges Aerosol Treatment   Respiratory Treatment Status (SVN) given   Treatment Route (SVN) in-line   Patient Position (SVN) HOB elevated   Post Treatment Assessment (SVN) breath sounds unchanged   Signs of Intolerance (SVN) none   Breath Sounds Post-Respiratory Treatment   Throughout All Fields Post-Treatment All Fields   Throughout All Fields Post-Treatment no change   Post-treatment Heart Rate (beats/min) 70   Post-treatment Resp Rate (breaths/min) 22        Airway - Non-Surgical 06/22/20 1625 Endotracheal Tube   Placement Date/Time: 06/22/20 1625   Present Prior to Hospital Arrival?: No  Method of Intubation: Direct laryngoscopy  Inserted by: MD  Airway Device: Endotracheal Tube  Mask Ventilation: Easy  Blade: Ramon #3  Airway Device Size: 7.5  Style: Cuffed...   Secured at 25 cm   Measured At Lips   Secured Location Center   Secured by Commercial tube lord   Bite Block center;secure and patent   Site Condition Dry   Status Intact;Secured;Patent   Site Assessment Clean;No bleeding;Dry;No drainage   Cuff Pressure 30 cm H2O   Vent Select   Conventional Vent Y   Charged w/in last 24h NO   Preset Conventional  Ventilator Settings   Vent Type    Ventilation Type VC   Vent Mode SIMV   Humidity HME   Set Rate 10 BPM   Vt Set 450 mL   PEEP/CPAP 17 cmH20   Pressure Support 30 cmH20   Waveform RAMP   Peak Flow 70 L/min   Set Inspiratory Pressure 0 cmH20   Insp Time 0 Sec(s)   Plateau Set/Insp. Hold (sec) 0   Insp Rise Time  100 %   Trigger Sensitivity Flow/I-Trigger 1 L/min   P High 0 cm H2O   P Low 0 cm H2O   T High 0 sec   T Low 0 sec   Patient Ventilator Parameters   Resp Rate Total 18 br/min   Peak Airway Pressure 39 cmH2O   Mean Airway Pressure 24 cmH20   Plateau Pressure 35 cmH20   Exhaled Vt 816 mL   Total Ve 10 mL   Spont Ve 5.38 L   I:E Ratio Measured 1:3.30   Conventional Ventilator Alarms   Alarms On Y   Ve High Alarm 24 L/min   Resp Rate High Alarm 40 br/min   Press High Alarm 57 cmH2O   Apnea Rate 20   Apnea Volume (mL) 450 mL   Apnea Oxygen Concentration  100   Apnea Flow Rate (L/min) 70   T Apnea 20 sec(s)   Ready to Wean/Extubation Screen   FIO2<=50 (chart decimal) (!) 0.9   MV<16L (chart vol.) 10   PEEP <=8 (chart #) (!) 17   Ready to Wean Parameters   F/VT Ratio<105 (RSBI) (!) 20.83

## 2020-06-30 NOTE — PROGRESS NOTES
Consult Note  Infectious Disease    Reason for Consult:  ID gabino    HPI: Michelle Wren is a  30 y.o. female with a history of asthma, type 2 diabetes, smoking and severe obesity (344 lb) who transferred from [facility] on 06/22 for pulmonary consultation.  She presented to their emergency room the day prior to this admission with a 2-3 day history of shortness of breath.  She was using a friend's CPAP and then required BiPAP and Solu-Medrol, bronchodilator treatments and Lasix.  She required intubation on 06/21 for progressive hypercapnic hypoxemic respiratory failure and was transferred on the ventilator.  She was given vancomycin, Levaquin and Zosyn empirically.  Initial chest x-rays look like pulmonary edema, BNP was mildly elevated but echocardiogram showed normal ejection fraction.  Some concern regarding angioedema on the 2nd hospital day due to swelling of her lips.  Endotracheal aspirate grew MSSA and vancomycin and Zosyn were changed to oxacillin, Levaquin was resumed when fever worsened as steroids were decreased.  Continuing to run low-grade temperature with repeat sputum culture only normal estephanie from the 27th.  Continues on steroids without bronchospasm.  White blood cells remain elevated No eosinophilia on arrival to [place].  Procalcitonin normal x3.  Rheumatoid factor borderline with normal C CP.  COVID negative, Legionella urine antigen negative.  No renal issues are proteinuria to consider granulomatous disease.  Has not been able to get a CT scan of the chest to try to clarify pattern of infiltrates due to habitus. Requiring high PEEP.   Plain films of initially showed lower lobe lobe alveolar infiltrates but these have faded and there may now just be bilateral pleural effusions.  KIRA, Anca, C1 esterase inhibitor antigen are pending.    6/30: Interim reviewed, discussed with nurse at bedside regarding trip to CT and family meeting. Ct chest reviewed and concur with Dr. Schulz re  appearance similar to COVID infiltrates.     EXAM & DIAGNOSTICS REVIEWED:   Vitals:     Temp:  [97.7 °F (36.5 °C)-100 °F (37.8 °C)]   Temp: 100 °F (37.8 °C) (06/30/20 1545)  Pulse: 81 (06/30/20 1700)  Resp: 15 (06/30/20 1700)  BP: 125/75 (06/30/20 1700)  SpO2: (!) 90 % (06/30/20 1700)    Intake/Output Summary (Last 24 hours) at 6/30/2020 1715  Last data filed at 6/30/2020 0532  Gross per 24 hour   Intake 793.45 ml   Output 2675 ml   Net -1881.55 ml     Vent Mode: SIMV  Oxygen Concentration (%):  [] 80  Resp Rate Total:  [13 br/min-46 br/min] 14 br/min  Vt Set:  [0 mL-450 mL] 0 mL  PEEP/CPAP:  [17 qzZ66-16 cmH20] 20 cmH20  Pressure Support:  [30 cmH20] 30 cmH20  Mean Airway Pressure:  [22 mcN29-07 cmH20] 25 cmH20    Exam 6/29  General:  In NAD.arousable,  cooperative, comfortable  Eyes:  Anicteric, PERRL, EOMI  ENT:  No ulcers, exudates, thrush, nares patent, dentition is good,   Neck:  supple, no masses or adenopathy appreciated  Lungs: LLL crackles, no wheezes  Heart:  RRR, no gallop/murmur/rub noted  Abd:  Soft, morbidly obese, NT, ND, normal BS, no masses or organomegaly appreciated.  :   Vazquez, urine clear, no flank tenderness  Musc:  Joints without effusion, swelling, erythema, synovitis, muscle wasting.   Skin:  No rashes. No palmar or plantar lesions. No subungual petechiae  Wound:   Neuro: Arousable, attends, follows commands. face symmetric, moves all extremities, no focal weakness. Ambulatory  Psych:  Calm, cooperative  Lymphatic:     Exam limited by habitus  Extrem: No edema, erythema, phlebitis, cellulitis, warm and well perfused  VAD:  RIJ TLC     Isolation:  none    Lines/Tubes/Drains:    General Labs reviewed:  Recent Labs   Lab 06/28/20  0342 06/29/20  0316 06/30/20  0334   WBC 17.96* 19.45* 19.14*   HGB 13.8 13.8 14.4   HCT 44.2 44.0 46.3   * 364* 371*       Recent Labs   Lab 06/28/20  0342 06/29/20 0316 06/30/20  0334    138 139   K 3.8 3.6 3.8   CL 98 98 98   CO2 29 30* 27    BUN 21* 25* 26*   CREATININE 0.7 0.8 0.8   CALCIUM 9.4 9.7 9.9   PROT 7.4 7.6 8.0   BILITOT 0.9 0.8 0.8   ALKPHOS 48* 49* 54*   ALT 19 18 17   AST 19 15 12           Micro:  Microbiology Results (last 7 days)     Procedure Component Value Units Date/Time    Culture, Respiratory with Gram Stain [[id number]]  (Abnormal) Collected: 06/27/20 1444    Order Status: Completed Specimen: Respiratory from Tracheal Aspirate Updated: 06/30/20 0718     Respiratory Culture No S aureus or Pseudomonas isolated.      KENROY LUSITANIAE  Rare  Normal respiratory estephanie also present       Gram Stain (Respiratory) <10 epithelial cells per low power field.     Gram Stain (Respiratory) Rare WBC's     Gram Stain (Respiratory) No organisms seen    Blood culture [[id number]] Collected: 06/27/20 0550    Order Status: Completed Specimen: Blood from Line, Central Updated: 06/30/20 0613     Blood Culture, Routine No Growth to date      No Growth to date      No Growth to date    Blood culture [[id number]] Collected: 06/26/20 1914    Order Status: Completed Specimen: Blood Updated: 06/30/20 0612     Blood Culture, Routine No Growth to date      No Growth to date      No Growth to date      No Growth to date    Narrative:      X2 sticks, one from central line, one peripheral    Blood culture [[id number]] Collected: 06/26/20 1914    Order Status: Completed Specimen: Blood from Antecubital, Left Arm Updated: 06/30/20 0612     Blood Culture, Routine No Growth to date      No Growth to date      No Growth to date      No Growth to date    Blood culture [[id number]] Collected: 06/28/20 0824    Order Status: Completed Specimen: Blood from Antecubital, Right Arm Updated: 06/29/20 2212     Blood Culture, Routine No Growth to date      No Growth to date    Blood culture [[id number]] Collected: 06/23/20 2201    Order Status: Completed Specimen: Blood Updated: 06/29/20 0612     Blood Culture, Routine No growth after 5 days.    Urine Culture High Risk [[id number]]  Collected: 06/27/20 1437    Order Status: Completed Specimen: Urine, Catheterized Updated: 06/29/20 0220     Urine Culture, Routine No growth    Narrative:      Indicated criteria for high risk culture:->Other  Other (specify):->icu    Culture, Respiratory with Gram Stain [[id number]]  (Abnormal)  (Susceptibility) Collected: 06/23/20 2152    Order Status: Completed Specimen: Respiratory from Tracheal Aspirate Updated: 06/26/20 1047     Respiratory Culture No Pseudomonas isolated.      STAPHYLOCOCCUS AUREUS  Moderate  Normal respiratory estephanie also present       Gram Stain (Respiratory) <10 epithelial cells per low power field.     Gram Stain (Respiratory) Few WBC's     Gram Stain (Respiratory) Rare Gram positive cocci        Imaging Reviewed:   CXR   CT chest      Cardiology:echo    IMPRESSION & PLAN   1. Hypercapneic, hypoxemic, respiratory failure. High PEEP requirement.      2. MSSA in sputum, but minimal infiltrates, normal procalcitonin  3. Morbid obesity with LAURIE  4. History of asthma, requiring a vent in the past      Recommendations:  Continue current antibiotics  Checking COVID IgG, ferritin and HIV Ab, respiratory viral panel   Would proning help her?    Will follow peripherally  thanks

## 2020-06-30 NOTE — PLAN OF CARE
Plan of care reviewed.  Pt intubated, sedated with precedex, versed, able to communicate on communication board. Asked for me to rinse her mouth with water, put fan closer.  ABGs complete. Lasix adm, johnson draining 2400 clear yellow urine.  TF at trickle feed, residuals of at least 50cc, resembling chocolate milk with brown specks.  Lateral rotation tolerated.  Bathed pt, oral care, safety maintained with nonviolent wrist restraints. KERRI

## 2020-06-30 NOTE — PLAN OF CARE
Results erroneous on this gas. Ran again and results were as expected.    Results for MICH NASH (MRN [id number]) as of 6/30/2020 17:53   Ref. Range 6/30/2020 16:26   POC PH Latest Ref Range: 7.35 - 7.45  7.366   POC PCO2 Latest Ref Range: 35 - 45 mmHg 25.2 (LL)   POC PO2 Latest Ref Range: 80 - 100 mmHg 66 (L)   POC BE Latest Ref Range: -2 to 2 mmol/L -11   POC HCO3 Latest Ref Range: 24 - 28 mmol/L 14.4 (L)   POC SATURATED O2 Latest Ref Range: 95 - 100 % 93 (L)   POC TCO2 Latest Ref Range: 23 - 27 mmol/L 15 (L)   FiO2 Unknown 85   PiP Unknown 62   PEEP Unknown 20   Sample Unknown ARTERIAL   DelSys Unknown Adult Vent   Allens Test Unknown N/A   Site Unknown Oriana/UAC   Mode Unknown SIMV   Rate Unknown 6   ETCO2 Unknown 36   PS Unknown 30   Sp02 Unknown 91

## 2020-06-30 NOTE — SUBJECTIVE & OBJECTIVE
Interval History:  Patient is in intensive care unit with respiratory failure, on mechanical ventilation after getting transferred from [place], [place].  Patient is requiring 90% FiO2.  Patient is afebrile.  No acute distress noted.  Patient is following commands.    Review of Systems   Unable to perform ROS: Intubated     Objective:     Vital Signs (Most Recent):  Temp: 99.1 °F (37.3 °C) (06/30/20 1230)  Pulse: 78 (06/30/20 1230)  Resp: (!) 25 (06/30/20 1230)  BP: 138/89 (06/30/20 1230)  SpO2: (!) 92 % (06/30/20 1230) Vital Signs (24h Range):  Temp:  [97.7 °F (36.5 °C)-99.2 °F (37.3 °C)] 99.1 °F (37.3 °C)  Pulse:  [57-86] 78  Resp:  [16-55] 25  SpO2:  [79 %-97 %] 92 %  BP: (111-140)/(62-89) 138/89  Arterial Line BP: (106-147)/(53-77) 147/77     Weight: (!) 156.4 kg (344 lb 12.8 oz)  Body mass index is 63.06 kg/m².    Intake/Output Summary (Last 24 hours) at 6/30/2020 1313  Last data filed at 6/30/2020 0532  Gross per 24 hour   Intake 793.45 ml   Output 4075 ml   Net -3281.55 ml      Physical Exam  Vitals signs and nursing note reviewed.   Constitutional:       Appearance: She is well-developed. She is obese.      Interventions: She is sedated and intubated.   HENT:      Head: Normocephalic and atraumatic.   Eyes:      Conjunctiva/sclera: Conjunctivae normal.      Pupils: Pupils are equal, round, and reactive to light.   Neck:      Musculoskeletal: Normal range of motion and neck supple.   Cardiovascular:      Rate and Rhythm: Normal rate and regular rhythm.      Pulses: Normal pulses.      Heart sounds: Normal heart sounds.   Pulmonary:      Effort: Pulmonary effort is normal. She is intubated.      Breath sounds: Decreased breath sounds present.   Abdominal:      General: Bowel sounds are normal.      Palpations: Abdomen is soft. There is no mass.      Tenderness: There is no abdominal tenderness.   Genitourinary:     Comments: Vazquez catheter in place    Musculoskeletal:         General: No  swelling or deformity.   Skin:     General: Skin is warm and dry.      Capillary Refill: Capillary refill takes 2 to 3 seconds.   Neurological:      Comments: Unable to assess as pt is on mechanical ventilation with sedation     Psychiatric:      Comments: Sedated          Significant Labs:   CBC:   Recent Labs   Lab 06/29/20 0316 06/30/20  0334   WBC 19.45* 19.14*   HGB 13.8 14.4   HCT 44.0 46.3   * 371*     CMP:   Recent Labs   Lab 06/29/20 0316 06/30/20  0334    139   K 3.6 3.8   CL 98 98   CO2 30* 27   * 134*   BUN 25* 26*   CREATININE 0.8 0.8   CALCIUM 9.7 9.9   PROT 7.6 8.0   ALBUMIN 2.8* 2.8*   BILITOT 0.8 0.8   ALKPHOS 49* 54*   AST 15 12   ALT 18 17   ANIONGAP 10 14   EGFRNONAA >60 >60     Lactic Acid:   No results for input(s): LACTATE in the last 48 hours.  Microbiology Results (last 7 days)     Procedure Component Value Units Date/Time    Culture, Respiratory with Gram Stain [[id number]]  (Abnormal) Collected: 06/27/20 1444    Order Status: Completed Specimen: Respiratory from Tracheal Aspirate Updated: 06/30/20 0718     Respiratory Culture No S aureus or Pseudomonas isolated.      KENROY LUSITANIAE  Rare  Normal respiratory estephanie also present       Gram Stain (Respiratory) <10 epithelial cells per low power field.     Gram Stain (Respiratory) Rare WBC's     Gram Stain (Respiratory) No organisms seen    Blood culture [[id number]] Collected: 06/27/20 0550    Order Status: Completed Specimen: Blood from Line, Central Updated: 06/30/20 0613     Blood Culture, Routine No Growth to date      No Growth to date      No Growth to date    Blood culture [[id number]] Collected: 06/26/20 1914    Order Status: Completed Specimen: Blood Updated: 06/30/20 0612     Blood Culture, Routine No Growth to date      No Growth to date      No Growth to date      No Growth to date    Narrative:      X2 sticks, one from central line, one peripheral    Blood culture [[id number]] Collected: 06/26/20 1914     Order Status: Completed Specimen: Blood from Antecubital, Left Arm Updated: 06/30/20 0612     Blood Culture, Routine No Growth to date      No Growth to date      No Growth to date      No Growth to date    Blood culture [[id number]] Collected: 06/28/20 0824    Order Status: Completed Specimen: Blood from Antecubital, Right Arm Updated: 06/29/20 2212     Blood Culture, Routine No Growth to date      No Growth to date    Blood culture [[id number]] Collected: 06/23/20 2201    Order Status: Completed Specimen: Blood Updated: 06/29/20 0612     Blood Culture, Routine No growth after 5 days.    Urine Culture High Risk [[id number]] Collected: 06/27/20 1437    Order Status: Completed Specimen: Urine, Catheterized Updated: 06/29/20 0220     Urine Culture, Routine No growth    Narrative:      Indicated criteria for high risk culture:->Other  Other (specify):->icu    Culture, Respiratory with Gram Stain [[id number]]  (Abnormal)  (Susceptibility) Collected: 06/23/20 2152    Order Status: Completed Specimen: Respiratory from Tracheal Aspirate Updated: 06/26/20 1047     Respiratory Culture No Pseudomonas isolated.      STAPHYLOCOCCUS AUREUS  Moderate  Normal respiratory estephanie also present       Gram Stain (Respiratory) <10 epithelial cells per low power field.     Gram Stain (Respiratory) Few WBC's     Gram Stain (Respiratory) Rare Gram positive cocci        Significant Imaging:   ECHO:  · Concentric left ventricular remodeling.  · Normal left ventricular systolic function. The estimated ejection fraction is 69%.  · Normal LV diastolic function.  · No wall motion abnormalities.  · Normal right ventricular systolic function.  · Mild right atrial enlargement.  · Trivial pericardial effusion.  · Mild left atrial enlargement.    CXR: Mild cardiomegaly.  Hypoventilation.    CXR:   1. Interval placement of endotracheal tube with the tip in the proximal right mainstem bronchus.  This should be withdrawn several cm.  2. The study is motion  degraded.  Indistinctness of the left hemidiaphragm could be related to patient motion with atelectasis and/or pleural effusion not completely excluded.    CXR:  1. Limited evaluation secondary to technique.  2. Findings suspicious for congestive heart failure which does not appear significantly changed over the interval.  3. Small bilateral pleural effusions with bibasilar dependent atelectasis.  4. Endotracheal tube.    CXR:  Stable positioning of support devices.  Unchanged bibasilar consolidation/atelectasis.    CXR: Bibasilar opacification not significantly changed compared to the prior exam.  Positions of lines and tubes described.    CXR:   Continued bibasilar lung infiltrates.  Mild cardiomegaly.  Endotracheal tube, NG tube and central lines in position.    CXR:   Mild decrease in right basilar infiltrate.  Continued left basilar infiltrate.  Cardiomegaly.  Tubes in position.     CTA chest:  Limited evaluation for small peripheral luminal filling defects with the streaky artifact but no large central luminal filling defects are seen in pulmonary artery suggesting pulmonary artery embolism   Bilateral infiltrates with features somewhat suspicious for later changes of COVID-19 pneumonia

## 2020-07-01 PROBLEM — I27.20 PULMONARY HYPERTENSION: Status: ACTIVE | Noted: 2020-07-01

## 2020-07-01 LAB
ADENOVIRUS: NOT DETECTED
ALBUMIN SERPL BCP-MCNC: 2.8 G/DL (ref 3.5–5.2)
ALLENS TEST: ABNORMAL
ALLENS TEST: ABNORMAL
ALP SERPL-CCNC: 57 U/L (ref 55–135)
ALT SERPL W/O P-5'-P-CCNC: 17 U/L (ref 10–44)
ANCA AB TITR SER IF: NORMAL TITER
ANION GAP SERPL CALC-SCNC: 14 MMOL/L (ref 8–16)
ANISOCYTOSIS BLD QL SMEAR: SLIGHT
AST SERPL-CCNC: 14 U/L (ref 10–40)
BASOPHILS NFR BLD: 0 % (ref 0–1.9)
BILIRUB SERPL-MCNC: 0.7 MG/DL (ref 0.1–1)
BORDETELLA PARAPERTUSSIS (IS1001): NOT DETECTED
BORDETELLA PERTUSSIS (PTXP): NOT DETECTED
BUN SERPL-MCNC: 29 MG/DL (ref 6–20)
C1INH SERPL-MCNC: 34 MG/DL (ref 19–37)
CALCIUM SERPL-MCNC: 10.1 MG/DL (ref 8.7–10.5)
CHLAMYDIA PNEUMONIAE: NOT DETECTED
CHLORIDE SERPL-SCNC: 99 MMOL/L (ref 95–110)
CO2 SERPL-SCNC: 27 MMOL/L (ref 23–29)
CORONAVIRUS 229E, COMMON COLD VIRUS: NOT DETECTED
CORONAVIRUS HKU1, COMMON COLD VIRUS: NOT DETECTED
CORONAVIRUS NL63, COMMON COLD VIRUS: NOT DETECTED
CORONAVIRUS OC43, COMMON COLD VIRUS: NOT DETECTED
CREAT SERPL-MCNC: 1 MG/DL (ref 0.5–1.4)
DELSYS: ABNORMAL
DELSYS: ABNORMAL
DIFFERENTIAL METHOD: ABNORMAL
EOSINOPHIL NFR BLD: 1 % (ref 0–8)
ERYTHROCYTE [DISTWIDTH] IN BLOOD BY AUTOMATED COUNT: 16.2 % (ref 11.5–14.5)
ERYTHROCYTE [SEDIMENTATION RATE] IN BLOOD BY WESTERGREN METHOD: 12 MM/H
ERYTHROCYTE [SEDIMENTATION RATE] IN BLOOD BY WESTERGREN METHOD: 16 MM/H
EST. GFR  (AFRICAN AMERICAN): >60 ML/MIN/1.73 M^2
EST. GFR  (NON AFRICAN AMERICAN): >60 ML/MIN/1.73 M^2
ETCO2: 44
FIO2: 70
FIO2: 90
FLUBV RNA NPH QL NAA+NON-PROBE: NOT DETECTED
GLUCOSE SERPL-MCNC: 147 MG/DL (ref 70–110)
HCO3 UR-SCNC: 30 MMOL/L (ref 24–28)
HCO3 UR-SCNC: 31.3 MMOL/L (ref 24–28)
HCT VFR BLD AUTO: 46.1 % (ref 37–48.5)
HGB BLD-MCNC: 14.1 G/DL (ref 12–16)
HIV1+2 IGG SERPL QL IA.RAPID: NORMAL
HPIV1 RNA NPH QL NAA+NON-PROBE: NOT DETECTED
HPIV2 RNA NPH QL NAA+NON-PROBE: NOT DETECTED
HPIV3 RNA NPH QL NAA+NON-PROBE: NOT DETECTED
HPIV4 RNA NPH QL NAA+NON-PROBE: NOT DETECTED
HUMAN METAPNEUMOVIRUS: NOT DETECTED
HYPOCHROMIA BLD QL SMEAR: ABNORMAL
IMM GRANULOCYTES # BLD AUTO: ABNORMAL K/UL
IMM GRANULOCYTES NFR BLD AUTO: ABNORMAL %
INFLUENZA A (SUBTYPES H1,H1-2009,H3): NOT DETECTED
IP: 35
IT: 0.7
LYMPHOCYTES NFR BLD: 18 % (ref 18–48)
MAGNESIUM SERPL-MCNC: 2.3 MG/DL (ref 1.6–2.6)
MAP: 26
MCH RBC QN AUTO: 25.3 PG (ref 27–31)
MCHC RBC AUTO-ENTMCNC: 30.6 G/DL (ref 32–36)
MCV RBC AUTO: 83 FL (ref 82–98)
METAMYELOCYTES NFR BLD MANUAL: 1 %
MODE: ABNORMAL
MODE: ABNORMAL
MONOCYTES NFR BLD: 10 % (ref 4–15)
MYCOPLASMA PNEUMONIAE: NOT DETECTED
NEUTROPHILS NFR BLD: 70 % (ref 38–73)
NRBC BLD-RTO: 0 /100 WBC
P-ANCA TITR SER IF: NORMAL TITER
PCO2 BLDA: 48.7 MMHG (ref 35–45)
PCO2 BLDA: 50 MMHG (ref 35–45)
PEEP: 20
PEEP: 20
PH SMN: 7.4 [PH] (ref 7.35–7.45)
PH SMN: 7.41 [PH] (ref 7.35–7.45)
PHOSPHATE SERPL-MCNC: 4.1 MG/DL (ref 2.7–4.5)
PIP: 62
PLATELET # BLD AUTO: 391 K/UL (ref 150–350)
PLATELET BLD QL SMEAR: ABNORMAL
PMV BLD AUTO: 11 FL (ref 9.2–12.9)
PO2 BLDA: 62 MMHG (ref 80–100)
PO2 BLDA: 87 MMHG (ref 80–100)
POC BE: 5 MMOL/L
POC BE: 7 MMOL/L
POC SATURATED O2: 91 % (ref 95–100)
POC SATURATED O2: 96 % (ref 95–100)
POC TCO2: 31 MMOL/L (ref 23–27)
POC TCO2: 33 MMOL/L (ref 23–27)
POCT GLUCOSE: 130 MG/DL (ref 70–110)
POCT GLUCOSE: 137 MG/DL (ref 70–110)
POCT GLUCOSE: 161 MG/DL (ref 70–110)
POCT GLUCOSE: 230 MG/DL (ref 70–110)
POTASSIUM SERPL-SCNC: 4.2 MMOL/L (ref 3.5–5.1)
PROT SERPL-MCNC: 8 G/DL (ref 6–8.4)
RBC # BLD AUTO: 5.58 M/UL (ref 4–5.4)
RESPIRATORY INFECTION PANEL SOURCE: NORMAL
RSV RNA NPH QL NAA+NON-PROBE: NOT DETECTED
RV+EV RNA NPH QL NAA+NON-PROBE: NOT DETECTED
SAMPLE: ABNORMAL
SAMPLE: ABNORMAL
SARS-COV-2 IGG SERPLBLD QL IA.RAPID: NEGATIVE
SITE: ABNORMAL
SITE: ABNORMAL
SODIUM SERPL-SCNC: 140 MMOL/L (ref 136–145)
SP02: 90
SP02: 98
VT: 500
VT: 898
WBC # BLD AUTO: 18.99 K/UL (ref 3.9–12.7)

## 2020-07-01 PROCEDURE — 63600175 PHARM REV CODE 636 W HCPCS: Performed by: INTERNAL MEDICINE

## 2020-07-01 PROCEDURE — 63600175 PHARM REV CODE 636 W HCPCS: Performed by: CLINIC/CENTER

## 2020-07-01 PROCEDURE — 94640 AIRWAY INHALATION TREATMENT: CPT

## 2020-07-01 PROCEDURE — C9113 INJ PANTOPRAZOLE SODIUM, VIA: HCPCS | Performed by: INTERNAL MEDICINE

## 2020-07-01 PROCEDURE — 36415 COLL VENOUS BLD VENIPUNCTURE: CPT

## 2020-07-01 PROCEDURE — 25000003 PHARM REV CODE 250: Performed by: INTERNAL MEDICINE

## 2020-07-01 PROCEDURE — 25000242 PHARM REV CODE 250 ALT 637 W/ HCPCS: Performed by: NURSE PRACTITIONER

## 2020-07-01 PROCEDURE — 99291 PR CRITICAL CARE, E/M 30-74 MINUTES: ICD-10-PCS | Mod: ,,, | Performed by: INTERNAL MEDICINE

## 2020-07-01 PROCEDURE — 85027 COMPLETE CBC AUTOMATED: CPT

## 2020-07-01 PROCEDURE — 37799 UNLISTED PX VASCULAR SURGERY: CPT

## 2020-07-01 PROCEDURE — 94761 N-INVAS EAR/PLS OXIMETRY MLT: CPT

## 2020-07-01 PROCEDURE — 87798 DETECT AGENT NOS DNA AMP: CPT

## 2020-07-01 PROCEDURE — 99900035 HC TECH TIME PER 15 MIN (STAT)

## 2020-07-01 PROCEDURE — 99233 PR SUBSEQUENT HOSPITAL CARE,LEVL III: ICD-10-PCS | Mod: S$GLB,,, | Performed by: INTERNAL MEDICINE

## 2020-07-01 PROCEDURE — 85007 BL SMEAR W/DIFF WBC COUNT: CPT

## 2020-07-01 PROCEDURE — 94003 VENT MGMT INPAT SUBQ DAY: CPT

## 2020-07-01 PROCEDURE — 99900026 HC AIRWAY MAINTENANCE (STAT)

## 2020-07-01 PROCEDURE — 87040 BLOOD CULTURE FOR BACTERIA: CPT

## 2020-07-01 PROCEDURE — 94770 HC EXHALED C02 TEST: CPT

## 2020-07-01 PROCEDURE — 99291 CRITICAL CARE FIRST HOUR: CPT | Mod: ,,, | Performed by: INTERNAL MEDICINE

## 2020-07-01 PROCEDURE — 83735 ASSAY OF MAGNESIUM: CPT

## 2020-07-01 PROCEDURE — 99233 SBSQ HOSP IP/OBS HIGH 50: CPT | Mod: S$GLB,,, | Performed by: INTERNAL MEDICINE

## 2020-07-01 PROCEDURE — 80053 COMPREHEN METABOLIC PANEL: CPT

## 2020-07-01 PROCEDURE — 82803 BLOOD GASES ANY COMBINATION: CPT

## 2020-07-01 PROCEDURE — 27000221 HC OXYGEN, UP TO 24 HOURS

## 2020-07-01 PROCEDURE — 84100 ASSAY OF PHOSPHORUS: CPT

## 2020-07-01 PROCEDURE — 20000000 HC ICU ROOM

## 2020-07-01 PROCEDURE — 97803 MED NUTRITION INDIV SUBSEQ: CPT

## 2020-07-01 RX ORDER — LINEZOLID 2 MG/ML
600 INJECTION, SOLUTION INTRAVENOUS
Status: DISCONTINUED | OUTPATIENT
Start: 2020-07-01 | End: 2020-07-14 | Stop reason: HOSPADM

## 2020-07-01 RX ORDER — MORPHINE SULFATE 4 MG/ML
4 INJECTION, SOLUTION INTRAMUSCULAR; INTRAVENOUS
Status: DISCONTINUED | OUTPATIENT
Start: 2020-07-01 | End: 2020-07-07

## 2020-07-01 RX ORDER — MORPHINE SULFATE 4 MG/ML
4 INJECTION, SOLUTION INTRAMUSCULAR; INTRAVENOUS ONCE
Status: COMPLETED | OUTPATIENT
Start: 2020-07-01 | End: 2020-07-01

## 2020-07-01 RX ORDER — MORPHINE SULFATE 2 MG/ML
2 INJECTION, SOLUTION INTRAMUSCULAR; INTRAVENOUS
Status: DISCONTINUED | OUTPATIENT
Start: 2020-07-01 | End: 2020-07-01

## 2020-07-01 RX ADMIN — DEXMEDETOMIDINE HYDROCHLORIDE 1.4 MCG/KG/HR: 100 INJECTION, SOLUTION, CONCENTRATE INTRAVENOUS at 06:07

## 2020-07-01 RX ADMIN — DEXMEDETOMIDINE HYDROCHLORIDE 1.4 MCG/KG/HR: 100 INJECTION, SOLUTION, CONCENTRATE INTRAVENOUS at 08:07

## 2020-07-01 RX ADMIN — DEXMEDETOMIDINE HYDROCHLORIDE 1.4 MCG/KG/HR: 100 INJECTION, SOLUTION, CONCENTRATE INTRAVENOUS at 01:07

## 2020-07-01 RX ADMIN — POLYETHYLENE GLYCOL (3350) 17 G: 17 POWDER, FOR SOLUTION ORAL at 03:07

## 2020-07-01 RX ADMIN — DEXMEDETOMIDINE HYDROCHLORIDE 1.4 MCG/KG/HR: 100 INJECTION, SOLUTION, CONCENTRATE INTRAVENOUS at 02:07

## 2020-07-01 RX ADMIN — CHLORHEXIDINE GLUCONATE 0.12% ORAL RINSE 15 ML: 1.2 LIQUID ORAL at 09:07

## 2020-07-01 RX ADMIN — FUROSEMIDE 60 MG: 10 INJECTION, SOLUTION INTRAVENOUS at 12:07

## 2020-07-01 RX ADMIN — METOCLOPRAMIDE 10 MG: 5 INJECTION, SOLUTION INTRAMUSCULAR; INTRAVENOUS at 06:07

## 2020-07-01 RX ADMIN — LINEZOLID 600 MG: 600 INJECTION, SOLUTION INTRAVENOUS at 08:07

## 2020-07-01 RX ADMIN — OXACILLIN SODIUM 2 G: 2 INJECTION, POWDER, FOR SOLUTION INTRAMUSCULAR; INTRAVENOUS at 12:07

## 2020-07-01 RX ADMIN — POLYETHYLENE GLYCOL (3350) 17 G: 17 POWDER, FOR SOLUTION ORAL at 09:07

## 2020-07-01 RX ADMIN — IPRATROPIUM BROMIDE AND ALBUTEROL SULFATE 3 ML: .5; 3 SOLUTION RESPIRATORY (INHALATION) at 08:07

## 2020-07-01 RX ADMIN — MIDAZOLAM HYDROCHLORIDE 2 MG/HR: 5 INJECTION, SOLUTION INTRAMUSCULAR; INTRAVENOUS at 11:07

## 2020-07-01 RX ADMIN — CHLORHEXIDINE GLUCONATE 0.12% ORAL RINSE 15 ML: 1.2 LIQUID ORAL at 08:07

## 2020-07-01 RX ADMIN — OXACILLIN SODIUM 2 G: 2 INJECTION, POWDER, FOR SOLUTION INTRAMUSCULAR; INTRAVENOUS at 09:07

## 2020-07-01 RX ADMIN — DEXMEDETOMIDINE HYDROCHLORIDE 0.8 MCG/KG/HR: 100 INJECTION, SOLUTION, CONCENTRATE INTRAVENOUS at 11:07

## 2020-07-01 RX ADMIN — IPRATROPIUM BROMIDE AND ALBUTEROL SULFATE 3 ML: .5; 3 SOLUTION RESPIRATORY (INHALATION) at 12:07

## 2020-07-01 RX ADMIN — MIDAZOLAM 8 MG/HR: 5 INJECTION INTRAMUSCULAR; INTRAVENOUS at 04:07

## 2020-07-01 RX ADMIN — IPRATROPIUM BROMIDE AND ALBUTEROL SULFATE 3 ML: .5; 3 SOLUTION RESPIRATORY (INHALATION) at 04:07

## 2020-07-01 RX ADMIN — DEXMEDETOMIDINE HYDROCHLORIDE 1.4 MCG/KG/HR: 100 INJECTION, SOLUTION, CONCENTRATE INTRAVENOUS at 10:07

## 2020-07-01 RX ADMIN — ACETAMINOPHEN 650 MG: 160 SOLUTION ORAL at 03:07

## 2020-07-01 RX ADMIN — MICAFUNGIN SODIUM 100 MG: 20 INJECTION, POWDER, LYOPHILIZED, FOR SOLUTION INTRAVENOUS at 09:07

## 2020-07-01 RX ADMIN — ENOXAPARIN SODIUM 165 MG: 120 INJECTION SUBCUTANEOUS at 08:07

## 2020-07-01 RX ADMIN — METOCLOPRAMIDE 10 MG: 5 INJECTION, SOLUTION INTRAMUSCULAR; INTRAVENOUS at 09:07

## 2020-07-01 RX ADMIN — OXACILLIN SODIUM 2 G: 2 INJECTION, POWDER, FOR SOLUTION INTRAMUSCULAR; INTRAVENOUS at 04:07

## 2020-07-01 RX ADMIN — POLYETHYLENE GLYCOL (3350) 17 G: 17 POWDER, FOR SOLUTION ORAL at 08:07

## 2020-07-01 RX ADMIN — INSULIN ASPART 2 UNITS: 100 INJECTION, SOLUTION INTRAVENOUS; SUBCUTANEOUS at 10:07

## 2020-07-01 RX ADMIN — DEXMEDETOMIDINE HYDROCHLORIDE 1 MCG/KG/HR: 100 INJECTION, SOLUTION, CONCENTRATE INTRAVENOUS at 05:07

## 2020-07-01 RX ADMIN — METHYLPREDNISOLONE SODIUM SUCCINATE 40 MG: 40 INJECTION, POWDER, FOR SOLUTION INTRAMUSCULAR; INTRAVENOUS at 09:07

## 2020-07-01 RX ADMIN — IPRATROPIUM BROMIDE AND ALBUTEROL SULFATE 3 ML: .5; 3 SOLUTION RESPIRATORY (INHALATION) at 07:07

## 2020-07-01 RX ADMIN — PANTOPRAZOLE SODIUM 40 MG: 40 INJECTION, POWDER, LYOPHILIZED, FOR SOLUTION INTRAVENOUS at 09:07

## 2020-07-01 RX ADMIN — DEXMEDETOMIDINE HYDROCHLORIDE 1 MCG/KG/HR: 100 INJECTION, SOLUTION, CONCENTRATE INTRAVENOUS at 08:07

## 2020-07-01 RX ADMIN — MORPHINE SULFATE 4 MG: 4 INJECTION, SOLUTION INTRAMUSCULAR; INTRAVENOUS at 02:07

## 2020-07-01 RX ADMIN — DEXMEDETOMIDINE HYDROCHLORIDE 1.4 MCG/KG/HR: 100 INJECTION, SOLUTION, CONCENTRATE INTRAVENOUS at 12:07

## 2020-07-01 RX ADMIN — IPRATROPIUM BROMIDE AND ALBUTEROL SULFATE 3 ML: .5; 3 SOLUTION RESPIRATORY (INHALATION) at 03:07

## 2020-07-01 RX ADMIN — MIDAZOLAM HYDROCHLORIDE 5 MG/HR: 5 INJECTION, SOLUTION INTRAMUSCULAR; INTRAVENOUS at 01:07

## 2020-07-01 RX ADMIN — ENOXAPARIN SODIUM 165 MG: 120 INJECTION SUBCUTANEOUS at 09:07

## 2020-07-01 RX ADMIN — METOCLOPRAMIDE 10 MG: 5 INJECTION, SOLUTION INTRAMUSCULAR; INTRAVENOUS at 01:07

## 2020-07-01 RX ADMIN — ACETAMINOPHEN 650 MG: 160 SOLUTION ORAL at 09:07

## 2020-07-01 RX ADMIN — FUROSEMIDE 60 MG: 10 INJECTION, SOLUTION INTRAVENOUS at 10:07

## 2020-07-01 RX ADMIN — DEXMEDETOMIDINE HYDROCHLORIDE 1.4 MCG/KG/HR: 100 INJECTION, SOLUTION, CONCENTRATE INTRAVENOUS at 04:07

## 2020-07-01 RX ADMIN — FUROSEMIDE 60 MG: 10 INJECTION, SOLUTION INTRAVENOUS at 11:07

## 2020-07-01 RX ADMIN — BUDESONIDE 0.25 MG: 0.25 SUSPENSION RESPIRATORY (INHALATION) at 08:07

## 2020-07-01 RX ADMIN — INSULIN ASPART 4 UNITS: 100 INJECTION, SOLUTION INTRAVENOUS; SUBCUTANEOUS at 04:07

## 2020-07-01 RX ADMIN — LEVOFLOXACIN 750 MG: 750 INJECTION, SOLUTION INTRAVENOUS at 03:07

## 2020-07-01 NOTE — PROGRESS NOTES
[facility]  Adult Nutrition  Progress Note    SUMMARY      Intervention: collaboration with care providers, enteral nutrition therapy  Current Intake: TF Peptamen VHP @ 10 ml/hr + 9 packets beneprotein+ 30 ml flush q 4 hr  (provides 240 kcal, 22g+ beneprotein (58% EPN), and 201 ml free water)     Recommendation:   1) Advance diet to goal of DM 1500 kcal when extubated      2) Continue Peptamen VHP trickel feed @10 advancing to 15 ml/hr add beneprotein 9-10 packets beneprotein   advance slowly as pt tolerates to goal of 55 ml/hr + min 30 ml flush q 4 hr   ( 1320 kcal ( 100% EEN), 121 g protein ( 93% EPN), and 1108 ml free water)   -if pt does not tolerate trial impact peptide 1.5 formula, 0g fiber     3) If pt does not tolerate TF initiate TPN via central ava in < 48 hours   Custom TPN D 15 AA 7 @ 65 ml/hr no lipids   ( provides 109 g protein ( 84% EPN), 1231 kcal ( 100% EEN)    4) Weigh pt weekly   5) Nutrition education on diabetic diet once pt appropriate     Goals: 1) PO diet advanced or nutrition support initiates in < 5 days 2) meet 50% of needs with nutrition support or PO diet advanced at f/u  Nutrition Goal Status: met/ not met-continues  Communication of RD Recs: reviewed with RN, TYREE, brian note     Reason for Assessment     Reason For Assessment: follow up  Diagnosis: (severe sepsis)  Relevant Medical History: asthma, DM2, severe obesity  Interdisciplinary Rounds: attended     General Information Comments: 31 y/o female admitted with sepsis, bilateral pneumonia. + vent, on high dose propofol no pressor. NFPE done 6/24/20, no wasting seen pt appears obese. NPO x 1 day. Glucose WNL, of note with hx. of DM 2 on steriods.  6/29/20 TF at 30 ml/hr yesterday but pt did not tolerate, held. Reglan started and pt had BM per RN so TF restarted this morning, at 15 ml/hr. 130 ml residual noted. Plan for pt to continue reglan and slowly advance TF as able.  7/1/20 TF held yesterday, restarted at 10  "ml/hr, still having 100 ml residual. Per RN will trial beneprotein and if unable to advance today will trial impact peptide.     Nutrition Discharge Planning: to be determined- DM 1500 kcal diet     Nutrition Risk Screen     Nutrition Risk Screen: no indicators present     Nutrition/Diet History     Typical Food/Fluid Intake: unable to obtain  Spiritual, Cultural Beliefs, Sikh Practices, Values that Affect Care: no  Food Allergies: NKFA  Factors Affecting Nutritional Intake: NPO, on mechanical ventilation     Anthropometrics  Height Method: Stated  Height: 5' 2"  Height (inches): 62 in  Weight Method: Bed Scale  Weight: (!) 146.4 kg 7/1/20, 156.4 kg 6/29, 165 kg (admission)  Weight (lb): (!) 322 lb ( on lasix)  Ideal Body Weight (IBW), Female: 110 lb  % Ideal Body Weight, Female (lb): 330.69 %  BMI (Calculated): 59 kg/m2  BMI Grade: greater than 40 - morbid obesity        Lab/Procedures/Meds     Pertinent Labs Reviewed: reviewed  BMP  Lab Results   Component Value Date     07/01/2020    K 4.2 07/01/2020    CL 99 07/01/2020    CO2 27 07/01/2020    BUN 29 (H) 07/01/2020    CREATININE 1.0 07/01/2020    CALCIUM 10.1 07/01/2020    ANIONGAP 14 07/01/2020    ESTGFRAFRICA >60 07/01/2020    EGFRNONAA >60 07/01/2020     Recent Labs   Lab 07/01/20  1049   POCTGLUCOSE 161*     Pertinent Medications Reviewed: reviewed  Insulin, mg sulfate, kcl, methylprednisolone, lasix, no propofol     Estimated/Assessed Needs     Weight Used For Calorie Calculations: 52.2 kg IBW  Energy Calorie Requirements (kcal): 22-25 kcal/kg ( IBW, BMI > 50 kg/m2) = 1646-9429 kcal  Energy Need Method: Kcal/kg  Protein Requirements: 130 g protein  Weight Used For Protein Calculations: 52.2 kg (115 lb 1.3 oz)(IBW ( 2.5 g protein/kg))  Fluid Requirements (mL): 1300 ml or per MD  Estimated Fluid Requirement Method: RDA Method  CHO Requirement: 153-170 g        Nutrition Prescription Ordered     Current Diet Order: TF above         Evaluation of " Received Nutrient/Fluid Intake     Energy Calories Required: not meeting needs  Protein Required: not meeting needs  Fluid Required: not meeting needs  Tolerance: high reisduals  % Intake of Estimated Energy Needs: 20%  % Meal Intake: NPO + TF     Nutrition Risk     Level of Risk/Frequency of Follow-up: moderate/ high 2-3 x weekly     Assessment and Plan     Inadequate energy intake  R/t NPO  As evidenced by PO intakes < 50% EEN x 1-2 days  Intervention: above  continues     Altered nutrition related lab values  R/t medication side effects and altered absorption of nutrients  As evidenced by elevated A1C and glucose  Intervention: above  improving     Monitor and Evaluation     Food and Nutrient Intake: energy intake  Food and Nutrient Adminstration: diet order, enteral / parenteral nutrition order  Anthropometric Measurements: weight  Biochemical Data, Medical Tests and Procedures: electrolyte and renal panel, gastrointestinal profile, glucose/endocrine profile  Nutrition-Focused Physical Findings: overall appearance      Malnutrition Assessment     Skin (Micronutrient): (Maxi = 12)   Micronutrient Evaluation: no deficiencies               Edema (Fluid Accumulation): (3)              Nutrition Follow-Up     RD Follow-up?: Yes

## 2020-07-01 NOTE — PLAN OF CARE
Patient remains in ICU intubated on ventilator. Vent changes made today per MD - see flowsheet. Tolerating low sedation and will follow commands and shake head yes/no. Cooling blanket placed today for tmax 102.7F. Fever resolved and cooling blanket turned off at this time. Vazquez remains intact with adequate uop. COVID PCR np swab done today and sent to lab per order. OGT remains in place with TF; tolerating 20cc/hr with less residuals this shift. Bilateral soft wrist restraints remain in place for patient safety. Bed alarm. Lateral rotation. Safety maintained.

## 2020-07-01 NOTE — SUBJECTIVE & OBJECTIVE
Interval History:  Patient is in intensive care unit with respiratory failure, on mechanical ventilation after getting transferred from [place], [place].  T-max 101.3 degree F.  Persisting leukocytosis around 18,000. Patient is requiring 65% FiO2.  No acute distress noted.  Patient is following commands.    Review of Systems   Unable to perform ROS: Intubated     Objective:     Vital Signs (Most Recent):  Temp: (!) 101.3 °F (38.5 °C) (07/01/20 1013)  Pulse: 77 (07/01/20 1015)  Resp: 12 (07/01/20 1015)  BP: (!) 111/57 (07/01/20 1000)  SpO2: 95 % (07/01/20 1015) Vital Signs (24h Range):  Temp:  [97.7 °F (36.5 °C)-101.6 °F (38.7 °C)] 101.3 °F (38.5 °C)  Pulse:  [73-89] 77  Resp:  [10-43] 12  SpO2:  [79 %-98 %] 95 %  BP: (109-140)/(56-89) 111/57  Arterial Line BP: (106-149)/(54-89) 135/73     Weight: (!) 146.4 kg (322 lb 12.1 oz)  Body mass index is 59.03 kg/m².    Intake/Output Summary (Last 24 hours) at 7/1/2020 1057  Last data filed at 7/1/2020 0800  Gross per 24 hour   Intake 3070.48 ml   Output 4080 ml   Net -1009.52 ml      Physical Exam  Vitals signs and nursing note reviewed.   Constitutional:       Appearance: She is well-developed. She is obese.      Interventions: She is sedated and intubated.   HENT:      Head: Normocephalic and atraumatic.   Eyes:      Conjunctiva/sclera: Conjunctivae normal.      Pupils: Pupils are equal, round, and reactive to light.   Neck:      Musculoskeletal: Normal range of motion and neck supple.   Cardiovascular:      Rate and Rhythm: Normal rate and regular rhythm.      Pulses: Normal pulses.      Heart sounds: Normal heart sounds.   Pulmonary:      Effort: Pulmonary effort is normal. She is intubated.      Breath sounds: Decreased breath sounds present.   Abdominal:      General: Bowel sounds are normal.      Palpations: Abdomen is soft. There is no mass.      Tenderness: There is no abdominal tenderness.   Genitourinary:     Comments: Vazquez catheter in  place    Musculoskeletal:         General: No swelling or deformity.   Skin:     General: Skin is warm and dry.      Capillary Refill: Capillary refill takes 2 to 3 seconds.   Neurological:      Comments: Unable to assess as pt is on mechanical ventilation with sedation     Psychiatric:      Comments: Sedated          Significant Labs:   CBC:   Recent Labs   Lab 06/30/20 0334 07/01/20  0308   WBC 19.14* 18.99*   HGB 14.4 14.1   HCT 46.3 46.1   * 391*     CMP:   Recent Labs   Lab 06/30/20 0334 07/01/20  0308    140   K 3.8 4.2   CL 98 99   CO2 27 27   * 147*   BUN 26* 29*   CREATININE 0.8 1.0   CALCIUM 9.9 10.1   PROT 8.0 8.0   ALBUMIN 2.8* 2.8*   BILITOT 0.8 0.7   ALKPHOS 54* 57   AST 12 14   ALT 17 17   ANIONGAP 14 14   EGFRNONAA >60 >60     Lactic Acid:   No results for input(s): LACTATE in the last 48 hours.  Microbiology Results (last 7 days)     Procedure Component Value Units Date/Time    Blood culture [[id number]] Collected: 06/27/20 0550    Order Status: Completed Specimen: Blood from Line, Central Updated: 07/01/20 0613     Blood Culture, Routine No Growth to date      No Growth to date      No Growth to date      No Growth to date    Blood culture [[id number]] Collected: 06/26/20 1914    Order Status: Completed Specimen: Blood Updated: 07/01/20 0612     Blood Culture, Routine No Growth to date      No Growth to date      No Growth to date      No Growth to date      No Growth to date    Narrative:      X2 sticks, one from central line, one peripheral    Blood culture [[id number]] Collected: 06/26/20 1914    Order Status: Completed Specimen: Blood from Antecubital, Left Arm Updated: 07/01/20 0612     Blood Culture, Routine No Growth to date      No Growth to date      No Growth to date      No Growth to date      No Growth to date    Blood culture [[id number]] Collected: 06/28/20 0824    Order Status: Completed Specimen: Blood from Antecubital, Right Arm Updated: 06/30/20 2212     Blood  Culture, Routine No Growth to date      No Growth to date      No Growth to date    Respiratory Infection Panel (PCR), Nasopharyngeal [[id number]]     Order Status: No result Specimen: Nasopharyngeal Swab     Culture, Respiratory with Gram Stain [[id number]]  (Abnormal) Collected: 06/27/20 1444    Order Status: Completed Specimen: Respiratory from Tracheal Aspirate Updated: 06/30/20 0718     Respiratory Culture No S aureus or Pseudomonas isolated.      KENROY LUSITANIAE  Rare  Normal respiratory estephanie also present       Gram Stain (Respiratory) <10 epithelial cells per low power field.     Gram Stain (Respiratory) Rare WBC's     Gram Stain (Respiratory) No organisms seen    Blood culture [[id number]] Collected: 06/23/20 2201    Order Status: Completed Specimen: Blood Updated: 06/29/20 0612     Blood Culture, Routine No growth after 5 days.    Urine Culture High Risk [[id number]] Collected: 06/27/20 1437    Order Status: Completed Specimen: Urine, Catheterized Updated: 06/29/20 0220     Urine Culture, Routine No growth    Narrative:      Indicated criteria for high risk culture:->Other  Other (specify):->icu    Culture, Respiratory with Gram Stain [[id number]]  (Abnormal)  (Susceptibility) Collected: 06/23/20 2152    Order Status: Completed Specimen: Respiratory from Tracheal Aspirate Updated: 06/26/20 1047     Respiratory Culture No Pseudomonas isolated.      STAPHYLOCOCCUS AUREUS  Moderate  Normal respiratory estephanie also present       Gram Stain (Respiratory) <10 epithelial cells per low power field.     Gram Stain (Respiratory) Few WBC's     Gram Stain (Respiratory) Rare Gram positive cocci        Significant Imaging:   ECHO:  · Concentric left ventricular remodeling.  · Normal left ventricular systolic function. The estimated ejection fraction is 69%.  · Normal LV diastolic function.  · No wall motion abnormalities.  · Normal right ventricular systolic function.  · Mild right atrial enlargement.  · Trivial pericardial  effusion.  · Mild left atrial enlargement.    CXR: Mild cardiomegaly.  Hypoventilation.    CXR:   1. Interval placement of endotracheal tube with the tip in the proximal right mainstem bronchus.  This should be withdrawn several cm.  2. The study is motion degraded.  Indistinctness of the left hemidiaphragm could be related to patient motion with atelectasis and/or pleural effusion not completely excluded.    CXR:  1. Limited evaluation secondary to technique.  2. Findings suspicious for congestive heart failure which does not appear significantly changed over the interval.  3. Small bilateral pleural effusions with bibasilar dependent atelectasis.  4. Endotracheal tube.    CXR:  Stable positioning of support devices.  Unchanged bibasilar consolidation/atelectasis.    CXR: Bibasilar opacification not significantly changed compared to the prior exam.  Positions of lines and tubes described.    CXR:   Continued bibasilar lung infiltrates.  Mild cardiomegaly.  Endotracheal tube, NG tube and central lines in position.    CXR:   Mild decrease in right basilar infiltrate.  Continued left basilar infiltrate.  Cardiomegaly.  Tubes in position.     CTA chest:  Limited evaluation for small peripheral luminal filling defects with the streaky artifact but no large central luminal filling defects are seen in pulmonary artery suggesting pulmonary artery embolism   Bilateral infiltrates with features somewhat suspicious for later changes of COVID-19 pneumonia.    CXR: Lines and tubes remain in place and bilateral infiltrates do not appear significantly changed

## 2020-07-01 NOTE — RESPIRATORY THERAPY
Rt had spoke with DR gentile this evening about pts events on the ventilator. Pt was increased max on versed and precedex, dr gentile gave verbal ok to give morphine pushes [id number]. Pt had another event at this time. And there was no orders for morphine, rt spoke with NP nolan Rangel and didn't want to order unless he spoke with Dr. Gentile. RT called EICU and morphine is now ordered to add to pt's regimen at this time. Pt is writing saying she is hurting and uncomfortable in her back and breathing.

## 2020-07-01 NOTE — NURSING
Call received from [employer] regarding patient's brother possibly needing to come home due to patient condition. Only information given was that patient is in ICU and is stable but critical at this time. Spoke with Andrew Pearson, whom asked what patient prognosis is. This nurse stated that would be a question for the doctor. This nurse called MD on case whom stated that patient is unable to wean from ventilator and that is all we are able to say regarding prognosis. This information relayed to Andrew. Callback number for Andrew is [phone]. Case #[id number]. Dr. Stewart aware of conversation. No personal information given.

## 2020-07-01 NOTE — PROGRESS NOTES
[facility] Hospital Medicine  Progress Note    Patient Name: Michelle Wren  MRN: [id number]  Patient Class: IP- Inpatient   Admission Date: 6/23/2020  Length of Stay: 8 days  Attending Physician: Peg Stewart MD  Primary Care Provider: Primary Doctor No        Subjective:     Principal Problem:Severe sepsis    HPI:  Michelle Wren is a 31 y/o female with a past medical history significant for asthma, type 2 diabetes, and severe obesity who is transferred from [facility] to [facility] for pulmonary consultation.  Patient presented to the ED at [facility] yesterday with complaints of 2-3 days of shortness of breath.  It is reported that she uses CPAP at night but she got this from a friend so unsure of settings.  She was placed on BiPAP and given IV Solu-Medrol, bronchodilator treatments, and IV Lasix.  She was admitted to the ICU at [facility].  Patient continued to decline and underwent intubation yesterday around 7:00 p.m.  per report, today patient appeared to be worse.  Her saturations were in the mid 90s on FiO2 of 100%.  A D-dimer was checked and was negative.  Per review of labs which were drawn earlier today, her WBC count was 23,000 and her lactic acid was 2.4.  She was placed on IV Zosyn and IV vancomycin.  Upon arrival to [facility], patient is in no acute distress.  She is intubated and sedated.  Vital signs are stable.  Lactic acid will be repeated now as patient meets sepsis criteria and will check a procalcitonin.  She will be monitored closely in the ICU.           Overview/Hospital Course:  Patient is being managed in intensive care unit, requiring mechanical ventilation under supervision of pulmonary medicine.  Repeat COVID -19 test has been negative.  Patient is receiving intravenous antibiotics for bilateral pneumonia.  Patient is getting CTA of chest for further evaluation.    Interval History:  Patient is in intensive care unit with respiratory  failure, on mechanical ventilation after getting transferred from [place], [place].  T-max 101.3 degree F.  Persisting leukocytosis around 18,000. Patient is requiring 65% FiO2.  No acute distress noted.  Patient is following commands.    Review of Systems   Unable to perform ROS: Intubated     Objective:     Vital Signs (Most Recent):  Temp: (!) 101.3 °F (38.5 °C) (07/01/20 1013)  Pulse: 77 (07/01/20 1015)  Resp: 12 (07/01/20 1015)  BP: (!) 111/57 (07/01/20 1000)  SpO2: 95 % (07/01/20 1015) Vital Signs (24h Range):  Temp:  [97.7 °F (36.5 °C)-101.6 °F (38.7 °C)] 101.3 °F (38.5 °C)  Pulse:  [73-89] 77  Resp:  [10-43] 12  SpO2:  [79 %-98 %] 95 %  BP: (109-140)/(56-89) 111/57  Arterial Line BP: (106-149)/(54-89) 135/73     Weight: (!) 146.4 kg (322 lb 12.1 oz)  Body mass index is 59.03 kg/m².    Intake/Output Summary (Last 24 hours) at 7/1/2020 1057  Last data filed at 7/1/2020 0800  Gross per 24 hour   Intake 3070.48 ml   Output 4080 ml   Net -1009.52 ml      Physical Exam  Vitals signs and nursing note reviewed.   Constitutional:       Appearance: She is well-developed. She is obese.      Interventions: She is sedated and intubated.   HENT:      Head: Normocephalic and atraumatic.   Eyes:      Conjunctiva/sclera: Conjunctivae normal.      Pupils: Pupils are equal, round, and reactive to light.   Neck:      Musculoskeletal: Normal range of motion and neck supple.   Cardiovascular:      Rate and Rhythm: Normal rate and regular rhythm.      Pulses: Normal pulses.      Heart sounds: Normal heart sounds.   Pulmonary:      Effort: Pulmonary effort is normal. She is intubated.      Breath sounds: Decreased breath sounds present.   Abdominal:      General: Bowel sounds are normal.      Palpations: Abdomen is soft. There is no mass.      Tenderness: There is no abdominal tenderness.   Genitourinary:     Comments: Vazquez catheter in place    Musculoskeletal:         General: No swelling or deformity.    Skin:     General: Skin is warm and dry.      Capillary Refill: Capillary refill takes 2 to 3 seconds.   Neurological:      Comments: Unable to assess as pt is on mechanical ventilation with sedation     Psychiatric:      Comments: Sedated          Significant Labs:   CBC:   Recent Labs   Lab 06/30/20 0334 07/01/20  0308   WBC 19.14* 18.99*   HGB 14.4 14.1   HCT 46.3 46.1   * 391*     CMP:   Recent Labs   Lab 06/30/20 0334 07/01/20  0308    140   K 3.8 4.2   CL 98 99   CO2 27 27   * 147*   BUN 26* 29*   CREATININE 0.8 1.0   CALCIUM 9.9 10.1   PROT 8.0 8.0   ALBUMIN 2.8* 2.8*   BILITOT 0.8 0.7   ALKPHOS 54* 57   AST 12 14   ALT 17 17   ANIONGAP 14 14   EGFRNONAA >60 >60     Lactic Acid:   No results for input(s): LACTATE in the last 48 hours.  Microbiology Results (last 7 days)     Procedure Component Value Units Date/Time    Blood culture [[id number]] Collected: 06/27/20 0550    Order Status: Completed Specimen: Blood from Line, Central Updated: 07/01/20 0613     Blood Culture, Routine No Growth to date      No Growth to date      No Growth to date      No Growth to date    Blood culture [[id number]] Collected: 06/26/20 1914    Order Status: Completed Specimen: Blood Updated: 07/01/20 0612     Blood Culture, Routine No Growth to date      No Growth to date      No Growth to date      No Growth to date      No Growth to date    Narrative:      X2 sticks, one from central line, one peripheral    Blood culture [[id number]] Collected: 06/26/20 1914    Order Status: Completed Specimen: Blood from Antecubital, Left Arm Updated: 07/01/20 0612     Blood Culture, Routine No Growth to date      No Growth to date      No Growth to date      No Growth to date      No Growth to date    Blood culture [[id number]] Collected: 06/28/20 0824    Order Status: Completed Specimen: Blood from Antecubital, Right Arm Updated: 06/30/20 2212     Blood Culture, Routine No Growth to date      No Growth to date      No  Growth to date    Respiratory Infection Panel (PCR), Nasopharyngeal [[id number]]     Order Status: No result Specimen: Nasopharyngeal Swab     Culture, Respiratory with Gram Stain [[id number]]  (Abnormal) Collected: 06/27/20 1444    Order Status: Completed Specimen: Respiratory from Tracheal Aspirate Updated: 06/30/20 0718     Respiratory Culture No S aureus or Pseudomonas isolated.      KENROY LUSITANIAE  Rare  Normal respiratory estephanie also present       Gram Stain (Respiratory) <10 epithelial cells per low power field.     Gram Stain (Respiratory) Rare WBC's     Gram Stain (Respiratory) No organisms seen    Blood culture [[id number]] Collected: 06/23/20 2201    Order Status: Completed Specimen: Blood Updated: 06/29/20 0612     Blood Culture, Routine No growth after 5 days.    Urine Culture High Risk [[id number]] Collected: 06/27/20 1437    Order Status: Completed Specimen: Urine, Catheterized Updated: 06/29/20 0220     Urine Culture, Routine No growth    Narrative:      Indicated criteria for high risk culture:->Other  Other (specify):->icu    Culture, Respiratory with Gram Stain [[id number]]  (Abnormal)  (Susceptibility) Collected: 06/23/20 2152    Order Status: Completed Specimen: Respiratory from Tracheal Aspirate Updated: 06/26/20 1047     Respiratory Culture No Pseudomonas isolated.      STAPHYLOCOCCUS AUREUS  Moderate  Normal respiratory estephanie also present       Gram Stain (Respiratory) <10 epithelial cells per low power field.     Gram Stain (Respiratory) Few WBC's     Gram Stain (Respiratory) Rare Gram positive cocci        Significant Imaging:   ECHO:  · Concentric left ventricular remodeling.  · Normal left ventricular systolic function. The estimated ejection fraction is 69%.  · Normal LV diastolic function.  · No wall motion abnormalities.  · Normal right ventricular systolic function.  · Mild right atrial enlargement.  · Trivial pericardial effusion.  · Mild left atrial enlargement.    CXR: Mild  cardiomegaly.  Hypoventilation.    CXR:   1. Interval placement of endotracheal tube with the tip in the proximal right mainstem bronchus.  This should be withdrawn several cm.  2. The study is motion degraded.  Indistinctness of the left hemidiaphragm could be related to patient motion with atelectasis and/or pleural effusion not completely excluded.    CXR:  1. Limited evaluation secondary to technique.  2. Findings suspicious for congestive heart failure which does not appear significantly changed over the interval.  3. Small bilateral pleural effusions with bibasilar dependent atelectasis.  4. Endotracheal tube.    CXR:  Stable positioning of support devices.  Unchanged bibasilar consolidation/atelectasis.    CXR: Bibasilar opacification not significantly changed compared to the prior exam.  Positions of lines and tubes described.    CXR:   Continued bibasilar lung infiltrates.  Mild cardiomegaly.  Endotracheal tube, NG tube and central lines in position.    CXR:   Mild decrease in right basilar infiltrate.  Continued left basilar infiltrate.  Cardiomegaly.  Tubes in position.     CTA chest:  Limited evaluation for small peripheral luminal filling defects with the streaky artifact but no large central luminal filling defects are seen in pulmonary artery suggesting pulmonary artery embolism   Bilateral infiltrates with features somewhat suspicious for later changes of COVID-19 pneumonia.    CXR: Lines and tubes remain in place and bilateral infiltrates do not appear significantly changed      Assessment/Plan:      * Severe sepsis  This patient does have evidence of infective focus  My overall impression is severe sepsis.  Antibiotics given-   Antibiotics (From admission, onward)    Start     Stop Route Frequency Ordered    06/29/20 1500  levoFLOXacin 750 mg/150 mL IVPB 750 mg      -- IV Every 24 hours (non-standard times) 06/29/20 1231    06/26/20 1230  oxacillin 2 g in sodium chloride 0.9 %  100 mL IVPB (ready  to mix system)      -- IV Every 4 hours (non-standard times) 06/26/20 1122          Severe Sepsis only-  Latest labs reviewed, they are-  Recent Labs   Lab 06/29/20  0316   BILITOT 0.8     No results for input(s): LACTATE in the last 24 hours.  Recent Labs   Lab 06/29/20  0454   PH 7.429   PO2 59*   PCO2 46.8*   HCO3 31.0*   BE 7       Organ dysfunction indicated by Acute respiratory failure      Temp Readings from Last 1 Encounters:   06/29/20 99.2 °F (37.3 °C) (Core Rectal)     BP Readings from Last 1 Encounters:   06/29/20 129/76     Pulse Readings from Last 1 Encounters:   06/29/20 70     Obstructive sleep apnea syndrome  On mechanical ventilation.      Staphylococcal pneumonia  MSSA.  Continue oxacillin and intravenous Levaquin therapy.  Follow Pulmonary recommendations.    Type 2 diabetes mellitus  Not on chronic medication.  A1c 6.2%.  Accuchecks ac/hs with SSI coverage as needed.  Nutrition consult as pt NPO on vent.        Acute respiratory failure with hypoxia and hypercarbia  Patient with Hypercapnic and Hypoxic Respiratory failure which is Acute.  she is not on home oxygen. Supplemental ventilation was provided and noted- Vent Mode: SIMV  Oxygen Concentration (%):  [] 90  Resp Rate Total:  [14 br/min-34 br/min] 17 br/min  Vt Set:  [450 mL] 450 mL  PEEP/CPAP:  [17 cmH20] 17 cmH20  Pressure Support:  [30 cmH20] 30 cmH20  Mean Airway Pressure:  [22 jpK82-96 cmH20] 22 cmH20 and oxygen saturations 97%. Differential diagnosis includes - COPD, CHF, Obesity Hypoventilation, Interstitial lung disease and Pneumonia Labs and images were reviewed. Will treat underlying causes.   Follow pulmonary recommendations.       Bilateral pneumonia  IV zosyn, IV vancomycin-de-escalate as appropriate.  Follow Pulmonary recommendations  Blood and sputum cultures-follow.  Sputum culture growing staph aureus species.  Monitor clinical response.  COVID antibody results are pending.      Nicotine dependence  Health hazards  associated with cigarette smoking should be reviewed with patient and cessation encouraged when pt is able to participate.       Mild intermittent asthma  Chronic; rescue inhaler only noted to home meds, no controller.  Continue bronchodilators q4h.  Currently intubated on mechanical ventilation.  Follow pulmonary recommendations. On IV Solumedrol 40 mg q 24 hrs.      Cardiomegaly  Patient with peripheral edema, mildly elevated BNP.  IV lasix was initiated at [facility].    Echo was completed today with results as follows:  · Concentric left ventricular remodeling.  · Normal left ventricular systolic function. The estimated ejection fraction is 69%.  · Normal LV diastolic function.  · No wall motion abnormalities.  · Normal right ventricular systolic function.  · Mild right atrial enlargement.  · Trivial pericardial effusion.  · Mild left atrial enlargement.    IV lasix discontinued as no indication of heart failure.    Obesity hypoventilation syndrome  Intubated.       Class 3 severe obesity with serious comorbidity and body mass index (BMI) of 60.0 to 69.9 in adult  Body mass index is 63.06 kg/m². Morbid obesity complicates all aspects of disease management from diagnostic modalities to treatment. Weight loss encouraged and health benefits explained to patient.    VTE Risk Mitigation (From admission, onward)         Ordered     enoxaparin injection 165 mg  Every 12 hours (non-standard times)      06/26/20 1703     IP VTE HIGH RISK PATIENT  Once      06/23/20 2055     Place sequential compression device  Until discontinued      06/23/20 2055            Critical care time spent on the evaluation and treatment of severe organ dysfunction, review of pertinent labs and imaging studies, discussions with consulting providers and discussions with patient/family: 36 minutes.      Peg Stewart MD  Department of Hospital Medicine   [facility]

## 2020-07-01 NOTE — NURSING
1200: Temp up to 102.7. prn tylenol given early for 101.6. Dr. Guerra at bedside states to place cooling blanket for now.   1215: Cooling blanket placed. Dr. Stewart at bedside and notified of temp.

## 2020-07-01 NOTE — NURSING
Patient with only 60cc residual at this time via NGT. TF rate increased to 20cc/hr per MD request. Will monitor tolerance.

## 2020-07-01 NOTE — PLAN OF CARE
Plan of care reviewed. Pt intubated, sedated with precedex, versed. Pt awake, communication by written notes. ABGs complete. TF peptamen @ 10ml/hr, residuals of up to 100ml,  water flushes 30ml, q4. Vazquez draining over 2L. Bathed pt, chgd linen, oral care. Safety maintained with frequent checks, nonviolent soft wrist restraints, no falls or injuries this shift. Blood glucose monitoring, SSI.

## 2020-07-01 NOTE — RESPIRATORY THERAPY
07/01/20 0758   Patient Assessment/Suction   All Lung Fields Breath Sounds diminished;coarse   $ Suction Charges Inline Suction Procedure Stat Charge   Secretions Amount scant   Secretions Color white   Secretions Characteristics thick   PRE-TX-O2   O2 Device (Oxygen Therapy) ventilator   $ Is the patient on Low Flow Oxygen? Yes   Oxygen Concentration (%) 65   SpO2 96 %   Pulse Oximetry Type Continuous   $ Pulse Oximetry - Multiple Charge Pulse Oximetry - Multiple   Pulse 75   Resp 13   /69   ETCO2   $ ETCO2 Charge Exhaled CO2 Monitoring   $ ETCO2 Usage Currently wearing   ETCO2 (mmHg) 40 mmHg   ETCO2 Device Type Ventilator   Aerosol Therapy   $ Aerosol Therapy Charges Aerosol Treatment   Respiratory Treatment Status (SVN) given   Treatment Route (SVN) in-line   Patient Position (SVN) HOB elevated   Signs of Intolerance (SVN) none   Breath Sounds Post-Respiratory Treatment   Post-treatment Heart Rate (beats/min) 79   Post-treatment Resp Rate (breaths/min) 10   Wound Care   $ Wound Care Tech Time 15 min   Area of Concern Upper lip   Skin Color/Characteristics without discoloration   Skin Temperature warm        Airway - Non-Surgical 06/22/20 1625 Endotracheal Tube   Placement Date/Time: 06/22/20 1625   Present Prior to Hospital Arrival?: No  Method of Intubation: Direct laryngoscopy  Inserted by: MD  Airway Device: Endotracheal Tube  Mask Ventilation: Easy  Blade: Ramon #3  Airway Device Size: 7.5  Style: Cuffed...   Secured at 24 cm   Measured At Lips   Secured Location Right   Secured by Commercial tube lord   Bite Block right;secure and patent   Site Condition Dry   Status Intact;Secured;Patent   Site Assessment Dry   Cuff Pressure 23 cm H2O   Vent Select   Conventional Vent Y   $ Ventilator Subsequent 1   Charged w/in last 24h YES   IHI Ventilator Associated Pneumonia Bundle   Oral Care Mouth swabbed;Mouth moisturizer;Mouth suctioned   Preset Conventional Ventilator Settings   Vent Type     Ventilation Type PC   Vent Mode SIMV   Humidity HME   Set Rate 6 BPM   Vt Set 0 mL   PEEP/CPAP 20 cmH20   Pressure Support 30 cmH20   Peak Flow 0 L/min   Set Inspiratory Pressure 35 cmH20   Insp Time 0.7 Sec(s)   Plateau Set/Insp. Hold (sec) 0   Insp Rise Time  100 %   Trigger Sensitivity Flow/I-Trigger 3 L/min   P High 0 cm H2O   P Low 0 cm H2O   T High 0 sec   T Low 0 sec   Patient Ventilator Parameters   Resp Rate Total 8.5 br/min   Peak Airway Pressure 61 cmH2O   Mean Airway Pressure 25 cmH20   Plateau Pressure 47 cmH20   Exhaled Vt 925 mL   Total Ve 7.92 mL   Spont Ve 2.4 L   I:E Ratio Measured 1:4.40   Conventional Ventilator Alarms   Alarms On Y   Ve High Alarm 24 L/min   Resp Rate High Alarm 40 br/min   Press High Alarm 65 cmH2O   Apnea Rate 20   Apnea Volume (mL) 450 mL   Apnea Oxygen Concentration  100   Apnea Flow Rate (L/min) 70   T Apnea 20 sec(s)     With pulmicort Q12

## 2020-07-01 NOTE — RESPIRATORY THERAPY
06/30/20 1901   Patient Assessment/Suction   Level of Consciousness (AVPU) alert   Respiratory Effort Unlabored   All Lung Fields Breath Sounds coarse   Rhythm/Pattern, Respiratory assisted mechanically   $ Suction Charges Inline Suction Procedure Stat Charge   Secretions Amount large   Secretions Color red-streaked   Secretions Characteristics thick   PRE-TX-O2   O2 Device (Oxygen Therapy) ventilator   $ Is the patient on Low Flow Oxygen? Yes   Oxygen Concentration (%) 90   SpO2 (!) 85 %   Pulse Oximetry Type Continuous   $ Pulse Oximetry - Multiple Charge Pulse Oximetry - Multiple   Pulse 88   Resp 20   ETCO2   $ ETCO2 Charge Exhaled CO2 Monitoring   $ ETCO2 Usage Currently wearing   ETCO2 (mmHg) 36 mmHg   ETCO2 Device Type Bedside Monitor   Aerosol Therapy   $ Aerosol Therapy Charges Aerosol Treatment   Respiratory Treatment Status (SVN) given   Treatment Route (SVN) in-line   Patient Position (SVN) HOB elevated   Post Treatment Assessment (SVN) breath sounds unchanged   Signs of Intolerance (SVN) none   Breath Sounds Post-Respiratory Treatment   Throughout All Fields Post-Treatment no change   Post-treatment Heart Rate (beats/min) 86   Post-treatment Resp Rate (breaths/min) 20        Airway - Non-Surgical 06/22/20 1625 Endotracheal Tube   Placement Date/Time: 06/22/20 1625   Present Prior to Hospital Arrival?: No  Method of Intubation: Direct laryngoscopy  Inserted by: MD  Airway Device: Endotracheal Tube  Mask Ventilation: Easy  Blade: Ramon #3  Airway Device Size: 7.5  Style: Cuffed...   Secured at 24 cm   Measured At Lips   Secured Location Center   Secured by Commercial tube lord   Bite Block secure and patent;center   Site Condition Dry   Status Intact;Secured;Patent   Cuff Pressure 30 cm H2O   Vent Select   Conventional Vent Y   Charged w/in last 24h YES   Preset Conventional Ventilator Settings   Vent Type    Ventilation Type PC   Vent Mode SIMV   Humidity HME   Set Rate 6 BPM   Vt Set 0 mL    PEEP/CPAP 20 cmH20   Pressure Support 30 cmH20   Peak Flow 0 L/min   Set Inspiratory Pressure 35 cmH20   Insp Time 0.7 Sec(s)   Plateau Set/Insp. Hold (sec) 0   Insp Rise Time  100 %   Trigger Sensitivity Flow/I-Trigger 3 L/min   P High 0 cm H2O   P Low 0 cm H2O   T High 0 sec   T Low 0 sec   Patient Ventilator Parameters   Resp Rate Total 19 br/min   Peak Airway Pressure 61 cmH2O   Mean Airway Pressure 28 cmH20   Plateau Pressure 47 cmH20   Exhaled Vt 831 mL   Total Ve 13.8 mL   Spont Ve 8.8 L   I:E Ratio Measured 1:3.10   Conventional Ventilator Alarms   Alarms On Y   Ve High Alarm 24 L/min   Resp Rate High Alarm 40 br/min   Press High Alarm 65 cmH2O   Apnea Rate 20   Apnea Volume (mL) 450 mL   Apnea Oxygen Concentration  100   Apnea Flow Rate (L/min) 70   T Apnea 20 sec(s)   Ready to Wean/Extubation Screen   FIO2<=50 (chart decimal) (!) 0.9   MV<16L (chart vol.) 13.8   PEEP <=8 (chart #) (!) 20   Ready to Wean Parameters   F/VT Ratio<105 (RSBI) (!) 24.07     Received pt on above settings. Pt is alert writing, Vent constantly alarming dues to PIP 60+, RR 40+, Ve 20.0+, pt Sats 85%. RT called dr hamilton to allow RN to increase sedation, No answer on 2 calls. RN increased versed and precedex to help decrease pt's drive on the vent. PIPs remain 58-60+, pt RR did come down to 22, sats remain 87% no improvement. Pt stating she cant breathe she is uncomfortable with the vent and how its making her feel. All vent alarms are set on the high side due to pts settings

## 2020-07-01 NOTE — PLAN OF CARE
Care plan reviewed. Safety maintained. Restraints monitored per protocol. Patient remains intubated and sedated. Tube feeding held for a short time today. Residuals not as much as yesterday. High temp today 100.0 axillary. Blood sugar monitored. No coverage this shift. One family member came today to visit for a short time. Patient seen by Dr. Gentile, Dr. Stewart, Dr. Bedoya.   Patient required increased sedation due to anxiety about the ET tube and OGT, writing it is choking her. Patient tearful, does not want to die. Emotional support given, patient eventually became more calm, relaxed facial features, no more tears.

## 2020-07-01 NOTE — NURSING
Patient's mother called for update but did not know password. Explained that someone has set up a password and to see if there was any family she could call to obtain password. She said she would call back.   Patient's father then called back stating that Francisco (brother) would not give them the password. This nurse explained HIPPA laws and that the hospital isn't allowed to give personal information without a password. Father stated frustration and then stated he would come see her Friday.

## 2020-07-01 NOTE — EICU
Intervention Initiated From:  Bedside    Rosaile Communicated with Bedside Nurse regarding:  Medication    Doctor Notified:  Yes--Dr Powers via Jabber    Comments:    Bedside RN requesting change in  Morphine order to prn  instead of scheduled q 1hr.

## 2020-07-01 NOTE — PLAN OF CARE
Intervention: collaboration with care providers, enteral nutrition therapy  Current Intake: TF Peptamen VHP @ 10 ml/hr + 9 packets beneprotein+ 30 ml flush q 4 hr  (provides 240 kcal, 22g+ beneprotein (58% EPN), and 201 ml free water)     Recommendation:   1) Advance diet to goal of DM 1500 kcal when extubated      2) Continue Peptamen VHP trickel feed @10 advancing to 15 ml/hr add beneprotein 9-10 packets beneprotein   advance slowly as pt tolerates to goal of 55 ml/hr + min 30 ml flush q 4 hr   ( 1320 kcal ( 100% EEN), 121 g protein ( 93% EPN), and 1108 ml free water)   -if pt does not tolerate trial impact peptide 1.5 formula, 0g fiber     3) If pt does not tolerate TF initiate TPN via central ava in < 48 hours   Custom TPN D 15 AA 7 @ 65 ml/hr no lipids   ( provides 109 g protein ( 84% EPN), 1231 kcal ( 100% EEN)    4) Weigh pt weekly   5) Nutrition education on diabetic diet once pt appropriate     Goals: 1) PO diet advanced or nutrition support initiates in < 5 days 2) meet 50% of needs with nutrition support or PO diet advanced at f/u  Nutrition Goal Status: met/ not met-continues  Communication of RD Recs: reviewed with RN, POC, sticky note

## 2020-07-01 NOTE — PROGRESS NOTES
Progress Note  PULMONARY    Admit Date: 6/23/2020 07/01/2020      SUBJECTIVE:     June 25th-patient is sedated, no complaints, intubated.  6/26 intubated.  6/27 no c/o intubated.  6/28 no new c/o,intubated, arouses  6/29- no new c/o, intubated,   6/30 no new c/o  7/1- remains intubated, on SIMV with peep 20 FiO2 65%. Febrile to 101.3 this am. Heavily sedated w/ versed 8mg/hr    PFSH and Allergies reviewed.    OBJECTIVE:     Vitals (Most recent):  Vitals:    07/01/20 1015   BP:    Pulse: 77   Resp: 12   Temp:        Vitals (24 hour range):  Temp:  [97.7 °F (36.5 °C)-101.6 °F (38.7 °C)]   Pulse:  [73-89]   Resp:  [10-43]   BP: (109-140)/(56-89)   SpO2:  [79 %-98 %]   Arterial Line BP: (106-149)/(54-89)       Intake/Output Summary (Last 24 hours) at 7/1/2020 1057  Last data filed at 7/1/2020 0800  Gross per 24 hour   Intake 3070.48 ml   Output 4080 ml   Net -1009.52 ml          Physical Exam:  The patient's neuro status (alertness,orientation,cognitive function,motor skills,), pharyngeal exam (oral lesions, hygiene, abn dentition,), Neck (jvd,mass,thyroid,nodes in neck and above/below clavicle),RESPIRATORY(symmetry,effort,fremitus,percussion,auscultation),  Cor(rhythm,heart tones including gallops,perfusion,edema)ABD(distention,hepatic&splenomegaly,tenderness,masses), Skin(rash,cyanosis),Psyc(affect,judgement,).  Exam negative except for these pertinent findings:    Morbid obesity, intubated  Lungs clear  Abdomen soft  No edema    Radiographs reviewed: view by direct vision   CXR 7/1/20- bibasilar airspace disease, opacification overlying left hemidiaphragm  CTA chest 6/30/20- no PE. Bilateral lower lobes with dense consolidations, patchy infiltrates mid and upper lobes as well. PA trunk enlarged    Labs     Recent Labs   Lab 07/01/20  0308   WBC 18.99*   HGB 14.1   HCT 46.1   *   METAMYELOCYT 1.0     Recent Labs   Lab 07/01/20 0308      K 4.2   CL 99   CO2 27   BUN 29*   CREATININE 1.0   *    CALCIUM 10.1   MG 2.3   PHOS 4.1   AST 14   ALT 17   ALKPHOS 57   BILITOT 0.7   PROT 8.0   ALBUMIN 2.8*     Recent Labs   Lab 07/01/20  0400   PH 7.397   PCO2 48.7*   PO2 87   HCO3 30.0*     Microbiology Results (last 7 days)     Procedure Component Value Units Date/Time    Blood culture [[id number]] Collected: 06/27/20 0550    Order Status: Completed Specimen: Blood from Line, Central Updated: 07/01/20 0613     Blood Culture, Routine No Growth to date      No Growth to date      No Growth to date      No Growth to date    Blood culture [[id number]] Collected: 06/26/20 1914    Order Status: Completed Specimen: Blood Updated: 07/01/20 0612     Blood Culture, Routine No Growth to date      No Growth to date      No Growth to date      No Growth to date      No Growth to date    Narrative:      X2 sticks, one from central line, one peripheral    Blood culture [[id number]] Collected: 06/26/20 1914    Order Status: Completed Specimen: Blood from Antecubital, Left Arm Updated: 07/01/20 0612     Blood Culture, Routine No Growth to date      No Growth to date      No Growth to date      No Growth to date      No Growth to date    Blood culture [[id number]] Collected: 06/28/20 0824    Order Status: Completed Specimen: Blood from Antecubital, Right Arm Updated: 06/30/20 2212     Blood Culture, Routine No Growth to date      No Growth to date      No Growth to date    Respiratory Infection Panel (PCR), Nasopharyngeal [[id number]]     Order Status: No result Specimen: Nasopharyngeal Swab     Culture, Respiratory with Gram Stain [[id number]]  (Abnormal) Collected: 06/27/20 1444    Order Status: Completed Specimen: Respiratory from Tracheal Aspirate Updated: 06/30/20 0718     Respiratory Culture No S aureus or Pseudomonas isolated.      KENROY LUSITANIAE  Rare  Normal respiratory estephanie also present       Gram Stain (Respiratory) <10 epithelial cells per low power field.     Gram Stain (Respiratory) Rare WBC's     Gram Stain  (Respiratory) No organisms seen    Blood culture [[id number]] Collected: 06/23/20 2201    Order Status: Completed Specimen: Blood Updated: 06/29/20 0612     Blood Culture, Routine No growth after 5 days.    Urine Culture High Risk [[id number]] Collected: 06/27/20 1437    Order Status: Completed Specimen: Urine, Catheterized Updated: 06/29/20 0220     Urine Culture, Routine No growth    Narrative:      Indicated criteria for high risk culture:->Other  Other (specify):->icu    Culture, Respiratory with Gram Stain [[id number]]  (Abnormal)  (Susceptibility) Collected: 06/23/20 2152    Order Status: Completed Specimen: Respiratory from Tracheal Aspirate Updated: 06/26/20 1047     Respiratory Culture No Pseudomonas isolated.      STAPHYLOCOCCUS AUREUS  Moderate  Normal respiratory estephanie also present       Gram Stain (Respiratory) <10 epithelial cells per low power field.     Gram Stain (Respiratory) Few WBC's     Gram Stain (Respiratory) Rare Gram positive cocci        Echo 6/22/2020  · Concentric left ventricular remodeling.  · Normal left ventricular systolic function. The estimated ejection fraction is 69%.  · Normal LV diastolic function.  · No wall motion abnormalities.  · Normal right ventricular systolic function.  · Mild right atrial enlargement.  · Trivial pericardial effusion.  · Mild left atrial enlargement.       Impression:  Active Hospital Problems    Diagnosis  POA    *Severe sepsis [A41.9, R65.20]  Yes    Pulmonary hypertension [I27.20]  Yes     By pulmonary artery size on ct chest 6/30      Obstructive sleep apnea syndrome [G47.33]  Yes    Staphylococcal pneumonia [J15.20]  Yes    Type 2 diabetes mellitus [E11.9]  Yes    Nicotine dependence [F17.200]  Yes    Bilateral pneumonia [J18.9]  Yes    Acute respiratory failure with hypoxia and hypercarbia [J96.01, J96.02]  Yes    Obesity hypoventilation syndrome [E66.2]  Yes    Mild intermittent asthma [J45.20]  Yes    Class 3 severe obesity with serious  comorbidity and body mass index (BMI) of 60.0 to 69.9 in adult [E66.01, Z68.44]  Not Applicable    Cardiomegaly [I51.7]  Yes      Resolved Hospital Problems   No resolved problems to display.               Plan:   Acute on chronic hypoxemic/hypercapneic resp failure  Asthma exacerbation  Bilateral pneumonia- MSSA on sputum cx  LAURIE/OHS  Morbid obesity    - vent adjusted back to volume assist control w/ high PEEP   - ABG in 1 hr  - suspect proning not feasible due to body habitus  - lighten sedation, change versed max to 5 mg/hr  - continue diuresis  - continue nebulized bronchodilators and budesonide  - continue solumedrol 40mg daily  - ID following  - continue antibiotics  - continues on therapeutic dose lovenox ??unclear reason    The following were evaluated and adjusted as needed: mechanical ventilator settings and weaning status, sedation and neurologic status and acid base balance and oxygenation needs       Critical Care  - THE PATIENT HAS A HIGH PROBABILITY OF IMMINENT OR LIFE THREATENING DETERIORATION.  Over 50%time of encounter was in direct care at bedside.  Time was 30 to 74 minutes required for patient care.  Time needed for all of the above totaled 35 minutes.    Keshia Guerra MD  Pulmonary & Critical Care Medicine

## 2020-07-02 LAB
ALBUMIN SERPL BCP-MCNC: 2.8 G/DL (ref 3.5–5.2)
ALLENS TEST: ABNORMAL
ALP SERPL-CCNC: 50 U/L (ref 55–135)
ALT SERPL W/O P-5'-P-CCNC: 16 U/L (ref 10–44)
ANION GAP SERPL CALC-SCNC: 12 MMOL/L (ref 8–16)
ANISOCYTOSIS BLD QL SMEAR: SLIGHT
AST SERPL-CCNC: 8 U/L (ref 10–40)
BACTERIA BLD CULT: NORMAL
BACTERIA BLD CULT: NORMAL
BASOPHILS NFR BLD: 0 % (ref 0–1.9)
BILIRUB SERPL-MCNC: 0.5 MG/DL (ref 0.1–1)
BUN SERPL-MCNC: 27 MG/DL (ref 6–20)
CALCIUM SERPL-MCNC: 9.9 MG/DL (ref 8.7–10.5)
CHLORIDE SERPL-SCNC: 100 MMOL/L (ref 95–110)
CO2 SERPL-SCNC: 29 MMOL/L (ref 23–29)
CREAT SERPL-MCNC: 0.8 MG/DL (ref 0.5–1.4)
DELSYS: ABNORMAL
DIFFERENTIAL METHOD: ABNORMAL
EOSINOPHIL NFR BLD: 1 % (ref 0–8)
ERYTHROCYTE [DISTWIDTH] IN BLOOD BY AUTOMATED COUNT: 16.2 % (ref 11.5–14.5)
ERYTHROCYTE [SEDIMENTATION RATE] IN BLOOD BY WESTERGREN METHOD: 16 MM/H
EST. GFR  (AFRICAN AMERICAN): >60 ML/MIN/1.73 M^2
EST. GFR  (NON AFRICAN AMERICAN): >60 ML/MIN/1.73 M^2
ETCO2: 47
FIO2: 93
GLUCOSE SERPL-MCNC: 143 MG/DL (ref 70–110)
HCO3 UR-SCNC: 31.8 MMOL/L (ref 24–28)
HCT VFR BLD AUTO: 45.7 % (ref 37–48.5)
HGB BLD-MCNC: 14.1 G/DL (ref 12–16)
IMM GRANULOCYTES # BLD AUTO: ABNORMAL K/UL
IMM GRANULOCYTES NFR BLD AUTO: ABNORMAL %
LYMPHOCYTES NFR BLD: 21 % (ref 18–48)
MAGNESIUM SERPL-MCNC: 2.3 MG/DL (ref 1.6–2.6)
MCH RBC QN AUTO: 26.3 PG (ref 27–31)
MCHC RBC AUTO-ENTMCNC: 30.9 G/DL (ref 32–36)
MCV RBC AUTO: 85 FL (ref 82–98)
MODE: ABNORMAL
MONOCYTES NFR BLD: 16 % (ref 4–15)
NEUTROPHILS NFR BLD: 62 % (ref 38–73)
NRBC BLD-RTO: 0 /100 WBC
PCO2 BLDA: 55.9 MMHG (ref 35–45)
PEEP: 20
PH SMN: 7.36 [PH] (ref 7.35–7.45)
PHOSPHATE SERPL-MCNC: 4.3 MG/DL (ref 2.7–4.5)
PLATELET # BLD AUTO: 356 K/UL (ref 150–350)
PLATELET BLD QL SMEAR: ABNORMAL
PMV BLD AUTO: 11.3 FL (ref 9.2–12.9)
PO2 BLDA: 72 MMHG (ref 80–100)
POC BE: 6 MMOL/L
POC SATURATED O2: 93 % (ref 95–100)
POC TCO2: 33 MMOL/L (ref 23–27)
POCT GLUCOSE: 136 MG/DL (ref 70–110)
POCT GLUCOSE: 162 MG/DL (ref 70–110)
POCT GLUCOSE: 180 MG/DL (ref 70–110)
POTASSIUM SERPL-SCNC: 3.8 MMOL/L (ref 3.5–5.1)
PROT SERPL-MCNC: 7.9 G/DL (ref 6–8.4)
RBC # BLD AUTO: 5.37 M/UL (ref 4–5.4)
SAMPLE: ABNORMAL
SITE: ABNORMAL
SODIUM SERPL-SCNC: 141 MMOL/L (ref 136–145)
SP02: 70
VT: 500
WBC # BLD AUTO: 16.68 K/UL (ref 3.9–12.7)

## 2020-07-02 PROCEDURE — 27200966 HC CLOSED SUCTION SYSTEM

## 2020-07-02 PROCEDURE — 63600175 PHARM REV CODE 636 W HCPCS: Performed by: INTERNAL MEDICINE

## 2020-07-02 PROCEDURE — 25000003 PHARM REV CODE 250: Performed by: INTERNAL MEDICINE

## 2020-07-02 PROCEDURE — 82803 BLOOD GASES ANY COMBINATION: CPT

## 2020-07-02 PROCEDURE — 99233 PR SUBSEQUENT HOSPITAL CARE,LEVL III: ICD-10-PCS | Mod: S$GLB,,, | Performed by: INTERNAL MEDICINE

## 2020-07-02 PROCEDURE — 99900026 HC AIRWAY MAINTENANCE (STAT)

## 2020-07-02 PROCEDURE — 20000000 HC ICU ROOM

## 2020-07-02 PROCEDURE — 85007 BL SMEAR W/DIFF WBC COUNT: CPT

## 2020-07-02 PROCEDURE — 63600175 PHARM REV CODE 636 W HCPCS: Performed by: CLINIC/CENTER

## 2020-07-02 PROCEDURE — 99233 SBSQ HOSP IP/OBS HIGH 50: CPT | Mod: S$GLB,,, | Performed by: INTERNAL MEDICINE

## 2020-07-02 PROCEDURE — 27000221 HC OXYGEN, UP TO 24 HOURS

## 2020-07-02 PROCEDURE — 94770 HC EXHALED C02 TEST: CPT

## 2020-07-02 PROCEDURE — 99291 PR CRITICAL CARE, E/M 30-74 MINUTES: ICD-10-PCS | Mod: ,,, | Performed by: INTERNAL MEDICINE

## 2020-07-02 PROCEDURE — 94003 VENT MGMT INPAT SUBQ DAY: CPT

## 2020-07-02 PROCEDURE — 63600175 PHARM REV CODE 636 W HCPCS: Performed by: NURSE PRACTITIONER

## 2020-07-02 PROCEDURE — 36415 COLL VENOUS BLD VENIPUNCTURE: CPT

## 2020-07-02 PROCEDURE — 94761 N-INVAS EAR/PLS OXIMETRY MLT: CPT

## 2020-07-02 PROCEDURE — 99291 CRITICAL CARE FIRST HOUR: CPT | Mod: ,,, | Performed by: INTERNAL MEDICINE

## 2020-07-02 PROCEDURE — 85027 COMPLETE CBC AUTOMATED: CPT

## 2020-07-02 PROCEDURE — 94640 AIRWAY INHALATION TREATMENT: CPT

## 2020-07-02 PROCEDURE — 25000242 PHARM REV CODE 250 ALT 637 W/ HCPCS: Performed by: NURSE PRACTITIONER

## 2020-07-02 PROCEDURE — 84100 ASSAY OF PHOSPHORUS: CPT

## 2020-07-02 PROCEDURE — 83735 ASSAY OF MAGNESIUM: CPT

## 2020-07-02 PROCEDURE — C9113 INJ PANTOPRAZOLE SODIUM, VIA: HCPCS | Performed by: INTERNAL MEDICINE

## 2020-07-02 PROCEDURE — 37799 UNLISTED PX VASCULAR SURGERY: CPT

## 2020-07-02 PROCEDURE — 80053 COMPREHEN METABOLIC PANEL: CPT

## 2020-07-02 PROCEDURE — 99900035 HC TECH TIME PER 15 MIN (STAT)

## 2020-07-02 RX ADMIN — FUROSEMIDE 60 MG: 10 INJECTION, SOLUTION INTRAVENOUS at 10:07

## 2020-07-02 RX ADMIN — INSULIN ASPART 2 UNITS: 100 INJECTION, SOLUTION INTRAVENOUS; SUBCUTANEOUS at 12:07

## 2020-07-02 RX ADMIN — POTASSIUM CHLORIDE 40 MEQ: 400 INJECTION, SOLUTION INTRAVENOUS at 06:07

## 2020-07-02 RX ADMIN — IPRATROPIUM BROMIDE AND ALBUTEROL SULFATE 3 ML: .5; 3 SOLUTION RESPIRATORY (INHALATION) at 07:07

## 2020-07-02 RX ADMIN — LINEZOLID 600 MG: 600 INJECTION, SOLUTION INTRAVENOUS at 10:07

## 2020-07-02 RX ADMIN — METOCLOPRAMIDE 10 MG: 5 INJECTION, SOLUTION INTRAMUSCULAR; INTRAVENOUS at 03:07

## 2020-07-02 RX ADMIN — CHLORHEXIDINE GLUCONATE 0.12% ORAL RINSE 15 ML: 1.2 LIQUID ORAL at 10:07

## 2020-07-02 RX ADMIN — MICAFUNGIN SODIUM 100 MG: 20 INJECTION, POWDER, LYOPHILIZED, FOR SOLUTION INTRAVENOUS at 08:07

## 2020-07-02 RX ADMIN — DEXMEDETOMIDINE HYDROCHLORIDE 1 MCG/KG/HR: 100 INJECTION, SOLUTION, CONCENTRATE INTRAVENOUS at 12:07

## 2020-07-02 RX ADMIN — MIDAZOLAM HYDROCHLORIDE 7 MG/HR: 5 INJECTION, SOLUTION INTRAMUSCULAR; INTRAVENOUS at 09:07

## 2020-07-02 RX ADMIN — DEXMEDETOMIDINE HYDROCHLORIDE 0.2 MCG/KG/HR: 100 INJECTION, SOLUTION, CONCENTRATE INTRAVENOUS at 05:07

## 2020-07-02 RX ADMIN — MORPHINE SULFATE 4 MG: 4 INJECTION, SOLUTION INTRAMUSCULAR; INTRAVENOUS at 10:07

## 2020-07-02 RX ADMIN — DEXMEDETOMIDINE HYDROCHLORIDE 1 MCG/KG/HR: 100 INJECTION, SOLUTION, CONCENTRATE INTRAVENOUS at 08:07

## 2020-07-02 RX ADMIN — MIDAZOLAM HYDROCHLORIDE 5 MG/HR: 5 INJECTION, SOLUTION INTRAMUSCULAR; INTRAVENOUS at 10:07

## 2020-07-02 RX ADMIN — METHYLPREDNISOLONE SODIUM SUCCINATE 40 MG: 40 INJECTION, POWDER, FOR SOLUTION INTRAMUSCULAR; INTRAVENOUS at 10:07

## 2020-07-02 RX ADMIN — METOCLOPRAMIDE 10 MG: 5 INJECTION, SOLUTION INTRAMUSCULAR; INTRAVENOUS at 06:07

## 2020-07-02 RX ADMIN — DEXMEDETOMIDINE HYDROCHLORIDE 0.9 MCG/KG/HR: 100 INJECTION, SOLUTION, CONCENTRATE INTRAVENOUS at 06:07

## 2020-07-02 RX ADMIN — CHLORHEXIDINE GLUCONATE 0.12% ORAL RINSE 15 ML: 1.2 LIQUID ORAL at 08:07

## 2020-07-02 RX ADMIN — IPRATROPIUM BROMIDE AND ALBUTEROL SULFATE 3 ML: .5; 3 SOLUTION RESPIRATORY (INHALATION) at 03:07

## 2020-07-02 RX ADMIN — DEXMEDETOMIDINE HYDROCHLORIDE 1 MCG/KG/HR: 100 INJECTION, SOLUTION, CONCENTRATE INTRAVENOUS at 10:07

## 2020-07-02 RX ADMIN — ASCORBIC ACID, VITAMIN A PALMITATE, CHOLECALCIFEROL, THIAMINE HYDROCHLORIDE, RIBOFLAVIN-5 PHOSPHATE SODIUM, PYRIDOXINE HYDROCHLORIDE, NIACINAMIDE, DEXPANTHENOL, ALPHA-TOCOPHEROL ACETATE, VITAMIN K1, FOLIC ACID, BIOTIN, CYANOCOBALAMIN: 200; 3300; 200; 6; 3.6; 6; 40; 15; 10; 150; 600; 60; 5 INJECTION, SOLUTION INTRAVENOUS at 10:07

## 2020-07-02 RX ADMIN — LINEZOLID 600 MG: 600 INJECTION, SOLUTION INTRAVENOUS at 08:07

## 2020-07-02 RX ADMIN — IPRATROPIUM BROMIDE AND ALBUTEROL SULFATE 3 ML: .5; 3 SOLUTION RESPIRATORY (INHALATION) at 12:07

## 2020-07-02 RX ADMIN — PANTOPRAZOLE SODIUM 40 MG: 40 INJECTION, POWDER, LYOPHILIZED, FOR SOLUTION INTRAVENOUS at 10:07

## 2020-07-02 RX ADMIN — METOCLOPRAMIDE 10 MG: 5 INJECTION, SOLUTION INTRAMUSCULAR; INTRAVENOUS at 09:07

## 2020-07-02 RX ADMIN — POLYETHYLENE GLYCOL (3350) 17 G: 17 POWDER, FOR SOLUTION ORAL at 03:07

## 2020-07-02 RX ADMIN — DEXMEDETOMIDINE HYDROCHLORIDE 1 MCG/KG/HR: 100 INJECTION, SOLUTION, CONCENTRATE INTRAVENOUS at 03:07

## 2020-07-02 RX ADMIN — POLYETHYLENE GLYCOL (3350) 17 G: 17 POWDER, FOR SOLUTION ORAL at 08:07

## 2020-07-02 RX ADMIN — IPRATROPIUM BROMIDE AND ALBUTEROL SULFATE 3 ML: .5; 3 SOLUTION RESPIRATORY (INHALATION) at 11:07

## 2020-07-02 RX ADMIN — DEXMEDETOMIDINE HYDROCHLORIDE 0.8 MCG/KG/HR: 100 INJECTION, SOLUTION, CONCENTRATE INTRAVENOUS at 03:07

## 2020-07-02 RX ADMIN — ENOXAPARIN SODIUM 165 MG: 120 INJECTION SUBCUTANEOUS at 08:07

## 2020-07-02 RX ADMIN — FUROSEMIDE 60 MG: 10 INJECTION, SOLUTION INTRAVENOUS at 11:07

## 2020-07-02 RX ADMIN — INSULIN ASPART 2 UNITS: 100 INJECTION, SOLUTION INTRAVENOUS; SUBCUTANEOUS at 05:07

## 2020-07-02 RX ADMIN — BUDESONIDE 0.25 MG: 0.25 SUSPENSION RESPIRATORY (INHALATION) at 07:07

## 2020-07-02 RX ADMIN — ENOXAPARIN SODIUM 165 MG: 120 INJECTION SUBCUTANEOUS at 09:07

## 2020-07-02 RX ADMIN — MORPHINE SULFATE 4 MG: 4 INJECTION, SOLUTION INTRAMUSCULAR; INTRAVENOUS at 04:07

## 2020-07-02 RX ADMIN — LEVOFLOXACIN 750 MG: 750 INJECTION, SOLUTION INTRAVENOUS at 03:07

## 2020-07-02 NOTE — PLAN OF CARE
ABG obtained and results given to Nurys NORRIS  Results for MICH NASH (MRN [id number]) as of 7/2/2020 05:31   Ref. Range 7/2/2020 05:03   POC PH Latest Ref Range: 7.35 - 7.45  7.363   POC PCO2 Latest Ref Range: 35 - 45 mmHg 55.9 (H)   POC PO2 Latest Ref Range: 80 - 100 mmHg 72 (L)   POC BE Latest Ref Range: -2 to 2 mmol/L 6   POC HCO3 Latest Ref Range: 24 - 28 mmol/L 31.8 (H)   POC SATURATED O2 Latest Ref Range: 95 - 100 % 93 (L)   POC TCO2 Latest Ref Range: 23 - 27 mmol/L 33 (H)   FiO2 Unknown 93   Vt Unknown 500   PEEP Unknown 20   Sample Unknown ARTERIAL   DelSys Unknown Adult Vent   Allens Test Unknown N/A   Site Unknown Oriana/UAC   Mode Unknown AC/PRVC   Rate Unknown 16

## 2020-07-02 NOTE — PROGRESS NOTES
Consult Note  Infectious Disease    Reason for Consult:  ID gabino    HPI: Michelle Wren is a  30 y.o. female with a history of asthma, type 2 diabetes, smoking and severe obesity (344 lb) who transferred from [facility] on 06/22 for pulmonary consultation.  She presented to their emergency room the day prior to this admission with a 2-3 day history of shortness of breath.  She was using a friend's CPAP and then required BiPAP and Solu-Medrol, bronchodilator treatments and Lasix.  She required intubation on 06/21 for progressive hypercapnic hypoxemic respiratory failure and was transferred on the ventilator.  She was given vancomycin, Levaquin and Zosyn empirically.  Initial chest x-rays look like pulmonary edema, BNP was mildly elevated but echocardiogram showed normal ejection fraction.  Some concern regarding angioedema on the 2nd hospital day due to swelling of her lips.  Endotracheal aspirate grew MSSA and vancomycin and Zosyn were changed to oxacillin, Levaquin was resumed when fever worsened as steroids were decreased.  Continuing to run low-grade temperature with repeat sputum culture only normal estephanie from the 27th.  Continues on steroids without bronchospasm.  White blood cells remain elevated No eosinophilia on arrival to [place].  Procalcitonin normal x3.  Rheumatoid factor borderline with normal C CP.  COVID negative, Legionella urine antigen negative.  No renal issues are proteinuria to consider granulomatous disease.  Has not been able to get a CT scan of the chest to try to clarify pattern of infiltrates due to habitus. Requiring high PEEP.   Plain films of initially showed lower lobe lobe alveolar infiltrates but these have faded and there may now just be bilateral pleural effusions.  KIRA, Anca, C1 esterase inhibitor antigen are pending.    6/30: Interim reviewed, discussed with nurse at bedside regarding trip to CT and family meeting. Ct chest reviewed and concur with Dr. Schulz re  appearance similar to COVID infiltrates.   7/1:  T-max 102.9° today.  Hemodynamically stable.  Up to 70% FiO2 on the ventilator.  White blood cells are about the same.  Tolerating tube feedings and had a soft bowel movement.  Sedated on the ventilator at this time.  HIV negative, respiratory viral panel negative, ferritin 394.  COVID IgG negative    EXAM & DIAGNOSTICS REVIEWED:   Vitals:     Temp:  [97 °F (36.1 °C)-102.7 °F (39.3 °C)]   Temp: 97 °F (36.1 °C) (07/01/20 2003)  Pulse: 64 (07/01/20 2003)  Resp: 16 (07/01/20 2003)  BP: 119/73 (07/01/20 2003)  SpO2: (!) 94 % (07/01/20 2003)    Intake/Output Summary (Last 24 hours) at 7/1/2020 2021  Last data filed at 7/1/2020 1819  Gross per 24 hour   Intake 3970.92 ml   Output 4075 ml   Net -104.08 ml     Vent Mode: A/C  Oxygen Concentration (%):  [65-90] 70  Resp Rate Total:  [7.1 br/min-22 br/min] 16 br/min  Vt Set:  [0 mL-500 mL] 500 mL  PEEP/CPAP:  [20 cmH20] 20 cmH20  Pressure Support:  [0 yxL15-10 cmH20] 0 cmH20  Mean Airway Pressure:  [25 ujJ62-41 cmH20] 26 cmH20      General:  In NAD.  Sedated comfortable  Eyes:  Anicteric,  ENT:  Oral cavity exam not obtainable today,   Neck:  supple,    Lungs: Decreased breath sounds throughout no wheezes  Heart:  RRR, no gallop/murmur/rub noted  Abd:  Soft, morbidly obese, NT, ND, normal BS, no masses or organomegaly appreciated.  Tolerating tube feeding  :   Vazquez, urine clear, no flank tenderness  Musc:  Joints without effusion, swelling, erythema, synovitis, muscle wasting.   Skin:  No rashes. No palmar or plantar lesions. No subungual petechiae  Wound:   Neuro: Sedated on the ventilator  Psych:  Sedated on the ventilator  Lymphatic:     Exam limited by habitus  Extrem: No edema, erythema, phlebitis, cellulitis, warm and well perfused  VAD:  RIJ TLC, right radial arterial line    Isolation:  none    Lines/Tubes/Drains:    General Labs reviewed:  Recent Labs   Lab 06/29/20  0316 06/30/20  0334 07/01/20  0308   WBC 19.45*  19.14* 18.99*   HGB 13.8 14.4 14.1   HCT 44.0 46.3 46.1   * 371* 391*       Recent Labs   Lab 06/29/20  0316 06/30/20  0334 07/01/20  0308    139 140   K 3.6 3.8 4.2   CL 98 98 99   CO2 30* 27 27   BUN 25* 26* 29*   CREATININE 0.8 0.8 1.0   CALCIUM 9.7 9.9 10.1   PROT 7.6 8.0 8.0   BILITOT 0.8 0.8 0.7   ALKPHOS 49* 54* 57   ALT 18 17 17   AST 15 12 14           Micro:  Microbiology Results (last 7 days)     Procedure Component Value Units Date/Time    IV catheter culture [[id number]]     Order Status: No result Specimen: Catheter Tip, Intrajugular     Blood culture [[id number]]     Order Status: Sent Specimen: Blood     Blood culture [[id number]]     Order Status: Sent Specimen: Blood     Respiratory Infection Panel (PCR), Nasopharyngeal [[id number]] Collected: 07/01/20 1400    Order Status: Completed Specimen: Nasopharyngeal Swab Updated: 07/01/20 1513     Respiratory Infection Panel Source NP Swab     Adenovirus Not Detected     Coronavirus 229E, Common Cold Virus Not Detected     Coronavirus HKU1, Common Cold Virus Not Detected     Coronavirus NL63, Common Cold Virus Not Detected     Coronavirus OC43, Common Cold Virus Not Detected     Comment: The Coronavirus strains detected in this test cause the common cold.  These strains are not the COVID-19 (novel Coronavirus)strain   associated with the respiratory disease outbreak.          Human Metapneumovirus Not Detected     Human Rhinovirus/Enterovirus Not Detected     Influenza A (subtypes H1, H1-2009,H3) Not Detected     Influenza B Not Detected     Parainfluenza Virus 1 Not Detected     Parainfluenza Virus 2 Not Detected     Parainfluenza Virus 3 Not Detected     Parainfluenza Virus 4 Not Detected     Respiratory Syncytial Virus Not Detected     Bordetella Parapertussis (IT8128) Not Detected     Bordetella pertussis (ptxP) Not Detected     Chlamydia pneumoniae Not Detected     Mycoplasma pneumoniae Not Detected     Comment: Respiratory Infection Panel  testing performed by Multiplex PCR.       Narrative:      For all other respiratory sources, order [id number] -  Respiratory Viral Panel by PCR    Blood culture [[id number]] Collected: 06/27/20 0550    Order Status: Completed Specimen: Blood from Line, Central Updated: 07/01/20 0613     Blood Culture, Routine No Growth to date      No Growth to date      No Growth to date      No Growth to date    Blood culture [[id number]] Collected: 06/26/20 1914    Order Status: Completed Specimen: Blood Updated: 07/01/20 0612     Blood Culture, Routine No Growth to date      No Growth to date      No Growth to date      No Growth to date      No Growth to date    Narrative:      X2 sticks, one from central line, one peripheral    Blood culture [[id number]] Collected: 06/26/20 1914    Order Status: Completed Specimen: Blood from Antecubital, Left Arm Updated: 07/01/20 0612     Blood Culture, Routine No Growth to date      No Growth to date      No Growth to date      No Growth to date      No Growth to date    Blood culture [[id number]] Collected: 06/28/20 0824    Order Status: Completed Specimen: Blood from Antecubital, Right Arm Updated: 06/30/20 2212     Blood Culture, Routine No Growth to date      No Growth to date      No Growth to date    Culture, Respiratory with Gram Stain [[id number]]  (Abnormal) Collected: 06/27/20 1444    Order Status: Completed Specimen: Respiratory from Tracheal Aspirate Updated: 06/30/20 0718     Respiratory Culture No S aureus or Pseudomonas isolated.      KENROY LUSITANIAE  Rare  Normal respiratory estephanie also present       Gram Stain (Respiratory) <10 epithelial cells per low power field.     Gram Stain (Respiratory) Rare WBC's     Gram Stain (Respiratory) No organisms seen    Blood culture [[id number]] Collected: 06/23/20 2201    Order Status: Completed Specimen: Blood Updated: 06/29/20 0612     Blood Culture, Routine No growth after 5 days.    Urine Culture High Risk [[id number]] Collected: 06/27/20  1437    Order Status: Completed Specimen: Urine, Catheterized Updated: 06/29/20 0220     Urine Culture, Routine No growth    Narrative:      Indicated criteria for high risk culture:->Other  Other (specify):->icu    Culture, Respiratory with Gram Stain [[id number]]  (Abnormal)  (Susceptibility) Collected: 06/23/20 2152    Order Status: Completed Specimen: Respiratory from Tracheal Aspirate Updated: 06/26/20 1047     Respiratory Culture No Pseudomonas isolated.      STAPHYLOCOCCUS AUREUS  Moderate  Normal respiratory estephanie also present       Gram Stain (Respiratory) <10 epithelial cells per low power field.     Gram Stain (Respiratory) Few WBC's     Gram Stain (Respiratory) Rare Gram positive cocci        Imaging Reviewed:   CXR   CT chest      Cardiology:echo    IMPRESSION & PLAN   1. Hypercapneic, hypoxemic, respiratory failure. High PEEP requirement.    COVID PCR negative on admission, COVID IgG negative  2. MSSA in sputum, but minimal infiltrates, normal procalcitonin x3  3. Morbid obesity with LAURIE  4. History of asthma, requiring a vent in the past      Recommendations:  Changing oxacillin to Zyvox an adding Mycamine  Blood culture from TLC an arterial line  Change TLC to PICC line tomorrow, culture tip    Will follow peripherally  thanks

## 2020-07-02 NOTE — PLAN OF CARE
07/01/20 2003   Patient Assessment/Suction   Level of Consciousness (AVPU) responds to voice   Respiratory Effort Unlabored   Expansion/Accessory Muscles/Retractions no use of accessory muscles   All Lung Fields Breath Sounds diminished   Rhythm/Pattern, Respiratory assisted mechanically   Cough Frequency with stimulation   Cough Type assisted   Suction Method oral;tracheal   $ Suction Charges Inline Suction Procedure Stat Charge   Secretions Amount small   Secretions Color white;yellow   Secretions Characteristics thick   PRE-TX-O2   O2 Device (Oxygen Therapy) ventilator   $ Is the patient on Low Flow Oxygen? Yes   Oxygen Concentration (%) 70   SpO2 (!) 94 %   Pulse Oximetry Type Continuous   $ Pulse Oximetry - Multiple Charge Pulse Oximetry - Multiple   Pulse 64   Resp 16   Temp 97 °F (36.1 °C)   /73   ETCO2   $ ETCO2 Charge Exhaled CO2 Monitoring   $ ETCO2 Usage Currently wearing   ETCO2 (mmHg) 40 mmHg   ETCO2 Device Type Bedside Monitor;Ventilator   Aerosol Therapy   $ Aerosol Therapy Charges Aerosol Treatment   Respiratory Treatment Status (SVN) given   Treatment Route (SVN) in-line   Patient Position (SVN) HOB elevated   Post Treatment Assessment (SVN) breath sounds unchanged   Signs of Intolerance (SVN) none   Breath Sounds Post-Respiratory Treatment   Throughout All Fields Post-Treatment All Fields   Throughout All Fields Post-Treatment no change   Post-treatment Heart Rate (beats/min) 64   Post-treatment Resp Rate (breaths/min) 16   Wound Care   $ Wound Care Tech Time 15 min   Area of Concern Cheek;Upper lip;Lower lip   Skin Color/Characteristics without discoloration   Skin Temperature warm        Airway - Non-Surgical 06/22/20 1625 Endotracheal Tube   Placement Date/Time: 06/22/20 1625   Present Prior to Hospital Arrival?: No  Method of Intubation: Direct laryngoscopy  Inserted by: MD  Airway Device: Endotracheal Tube  Mask Ventilation: Easy  Blade: Ramon #3  Airway Device Size: 7.5  Style:  Cuffed...   Secured at 24 cm   Measured At Lips   Secured Location Center   Secured by Commercial tube lord   Bite Block center   Site Condition Dry   Status Intact;Secured;Patent   Site Assessment Clean;Dry   Cuff Pressure 25 cm H2O   Vent Select   Conventional Vent Y   Charged w/in last 24h YES   Preset Conventional Ventilator Settings   Vent Type    Ventilation Type VC   Vent Mode A/C   Humidity HME   Set Rate 16 BPM   Vt Set 500 mL   PEEP/CPAP 20 cmH20   Pressure Support 0 cmH20   Waveform RAMP   Peak Flow 50 L/min   Set Inspiratory Pressure 0 cmH20   Insp Time 0 Sec(s)   Plateau Set/Insp. Hold (sec) 0   Insp Rise Time  0 %   Trigger Sensitivity Flow/I-Trigger 3 L/min   P High 0 cm H2O   P Low 0 cm H2O   T High 0 sec   T Low 0 sec   Patient Ventilator Parameters   Resp Rate Total 16 br/min   Peak Airway Pressure 38 cmH2O   Mean Airway Pressure 26 cmH20   Plateau Pressure 33 cmH20   Exhaled Vt 506 mL   Total Ve 8.13 mL   Spont Ve 0 L   I:E Ratio Measured 1:2.40   Conventional Ventilator Alarms   Alarms On Y   Ve High Alarm 24 L/min   Resp Rate High Alarm 40 br/min   Press High Alarm 65 cmH2O   Apnea Rate 20   Apnea Volume (mL) 450 mL   Apnea Oxygen Concentration  100   Apnea Flow Rate (L/min) 70   T Apnea 20 sec(s)   Ready to Wean/Extubation Screen   FIO2<=50 (chart decimal) (!) 0.7   MV<16L (chart vol.) 8.13   PEEP <=8 (chart #) (!) 20   Ready to Wean Parameters   F/VT Ratio<105 (RSBI) (!) 31.62     Pt is intubated with a 7.5 ETT, secured at the 24 cm lucas at the lips. Bite Block in place. Pt is on vent  and is on documented settings. AMBU at head of bed, alarms are on and working. Pt receives Q4 Duoneb aerosol treamtents. QD Pulmicort, Leak test QD. ANG QD and PRN

## 2020-07-02 NOTE — PLAN OF CARE
Plan of care reviewed.  Pt afebrile, intubated, sedated, awake and alert, communicating by writing notes. PICC to be consulted, and central line to be removed after PICC placed, per Dr. Bedoya.  TF @ 20ml/hr, minimal residuals. Vazquez draining excess of 2l, clear yellow urine.  BMX1, safety maintained with nonviolent soft wrist restraints, lateral rotation tolerated well, blood glucose monitoring. NADN.

## 2020-07-02 NOTE — PLAN OF CARE
Patient remains in ICU on ventilator. Versed and precedex as ordered. Turn assist on bed, as pt unable to be pronated during shift - see previous note. Awaiting PICC line insertion to remove current central line and culture tip; on schedule for today. TF tolerated @30cc/hr. Vazquez intact with adequate uop. Update given to family with password. Bilateral soft wrist restraints remain in place for safety. Bed alarm on.

## 2020-07-02 NOTE — CARE UPDATE
07/02/20 0711   Patient Assessment/Suction   Level of Consciousness (AVPU) alert   Respiratory Effort Normal;Unlabored   Expansion/Accessory Muscles/Retractions no use of accessory muscles;no retractions;expansion symmetric   All Lung Fields Breath Sounds coarse   Rhythm/Pattern, Respiratory assisted mechanically   Cough Frequency with stimulation   Cough Type assisted   Suction Method oral;tracheal   $ Suction Charges Inline Suction Procedure Stat Charge   Secretions Amount moderate   Secretions Color white;yellow   Secretions Characteristics thick   PRE-TX-O2   O2 Device (Oxygen Therapy) ventilator   $ Is the patient on Low Flow Oxygen? Yes   Oxygen Concentration (%) 70   SpO2 96 %   Pulse Oximetry Type Continuous   $ Pulse Oximetry - Multiple Charge Pulse Oximetry - Multiple   Pulse 74   Resp 17   Temp 98.4 °F (36.9 °C)   ETCO2   $ ETCO2 Charge Exhaled CO2 Monitoring   $ ETCO2 Usage Currently wearing   ETCO2 (mmHg) 46 mmHg   ETCO2 Device Type Bedside Monitor;Artificial Airway   Aerosol Therapy   $ Aerosol Therapy Charges Aerosol Treatment   Daily Review of Necessity (SVN) completed   Respiratory Treatment Status (SVN) given   Treatment Route (SVN) in-line   Patient Position (SVN) HOB elevated   Post Treatment Assessment (SVN) breath sounds unchanged   Signs of Intolerance (SVN) none   Breath Sounds Post-Respiratory Treatment   Throughout All Fields Post-Treatment All Fields   Throughout All Fields Post-Treatment no change   Post-treatment Heart Rate (beats/min) 74   Post-treatment Resp Rate (breaths/min) 17   Wound Care   $ Wound Care Tech Time 15 min   Area of Concern Cheek;Upper lip;Lower lip;Corner lip   Skin Color/Characteristics without discoloration   Skin Temperature warm        Airway - Non-Surgical 06/22/20 1625 Endotracheal Tube   Placement Date/Time: 06/22/20 1625   Present Prior to Hospital Arrival?: No  Method of Intubation: Direct laryngoscopy  Inserted by: MD  Airway Device: Endotracheal Tube   Mask Ventilation: Easy  Blade: Ramon #3  Airway Device Size: 7.5  Style: Cuffed...   Secured at 24 cm   Measured At Lips   Secured Location Right   Secured by Commercial tube lord   Bite Block right   Site Condition Cool;Dry   Status Intact;Secured;Patent   Site Assessment Clean;Dry;No bleeding;No drainage   Cuff Pressure 32 cm H2O   Vent Select   Conventional Vent Y   $ Ventilator Subsequent 1   Charged w/in last 24h YES   Preset Conventional Ventilator Settings   Vent Type    Ventilation Type VC   Vent Mode A/C   Humidity HME   Set Rate 16 BPM   Vt Set 500 mL   PEEP/CPAP 20 cmH20   Pressure Support 0 cmH20   Waveform RAMP   Peak Flow 50 L/min   Set Inspiratory Pressure 0 cmH20   Insp Time 0 Sec(s)   Plateau Set/Insp. Hold (sec) 0   Insp Rise Time  0 %   Trigger Sensitivity Flow/I-Trigger 3 L/min   P High 0 cm H2O   P Low 0 cm H2O   T High 0 sec   T Low 0 sec   Patient Ventilator Parameters   Resp Rate Total 16 br/min   Peak Airway Pressure 38 cmH2O   Mean Airway Pressure 26 cmH20   Plateau Pressure 33 cmH20   Exhaled Vt 507 mL   Total Ve 8 mL   Spont Ve 0 L   I:E Ratio Measured 1:2.40   Conventional Ventilator Alarms   Ve High Alarm 24 L/min   Resp Rate High Alarm 40 br/min   Press High Alarm 65 cmH2O   Apnea Rate 20   Apnea Volume (mL) 450 mL   Apnea Oxygen Concentration  100   Apnea Flow Rate (L/min) 70   T Apnea 20 sec(s)   Ready to Wean/Extubation Screen   FIO2<=50 (chart decimal) (!) 0.7   MV<16L (chart vol.) 8   PEEP <=8 (chart #) (!) 20   Ready to Wean Parameters   F/VT Ratio<105 (RSBI) (!) 33.53   Airway Safety   Ambu bag with the patient? Yes, Adult Ambu   Is mask with the patient? Yes, Adult Mask       Patient maintained on ventilator at documented settings. All alarms on and functioning properly. Aerosol treatments q4 with Duoneb and qday with Pulmicort.

## 2020-07-02 NOTE — PROGRESS NOTES
Progress Note  PULMONARY    Admit Date: 6/23/2020 07/02/2020      SUBJECTIVE:     June 25th-patient is sedated, no complaints, intubated.  6/26 intubated.  6/27 no c/o intubated.  6/28 no new c/o,intubated, arouses  6/29- no new c/o, intubated,   6/30 no new c/o  7/1- remains intubated, on SIMV with peep 20 FiO2 65%. Febrile to 101.3 this am. Heavily sedated w/ versed 8mg/hr  7/2- remains intubated, FiO2 70%. On versed 3mg/hr and awake, writing to communicate    PFSH and Allergies reviewed.    OBJECTIVE:     Vitals (Most recent):  Vitals:    07/02/20 0745   BP:    Pulse: 78   Resp: 20   Temp: 98.4 °F (36.9 °C)       Vitals (24 hour range):  Temp:  [96.1 °F (35.6 °C)-102.7 °F (39.3 °C)]   Pulse:  [55-89]   Resp:  [10-28]   BP: (109-130)/(56-87)   SpO2:  [88 %-99 %]   Arterial Line BP: (121-137)/(60-74)       Intake/Output Summary (Last 24 hours) at 7/2/2020 0807  Last data filed at 7/2/2020 0627  Gross per 24 hour   Intake 2860.43 ml   Output 3825 ml   Net -964.57 ml          Physical Exam:  The patient's neuro status (alertness,orientation,cognitive function,motor skills,), pharyngeal exam (oral lesions, hygiene, abn dentition,), Neck (jvd,mass,thyroid,nodes in neck and above/below clavicle),RESPIRATORY(symmetry,effort,fremitus,percussion,auscultation),  Cor(rhythm,heart tones including gallops,perfusion,edema)ABD(distention,hepatic&splenomegaly,tenderness,masses), Skin(rash,cyanosis),Psyc(affect,judgement,).  Exam negative except for these pertinent findings:    Morbid obesity, intubated  Awake, nods and writes to communicate  Breath sounds decreased bilaterally  Abdomen soft  No edema    Radiographs reviewed: view by direct vision   CXR 7/1/20- bibasilar airspace disease, opacification overlying left hemidiaphragm  CTA chest 6/30/20- no PE. Bilateral lower lobes with dense consolidations, patchy infiltrates mid and upper lobes as well. PA trunk enlarged    Labs     Recent Labs   Lab 07/02/20  3683    WBC 16.68*   HGB 14.1   HCT 45.7   *     Recent Labs   Lab 07/02/20  0319      K 3.8      CO2 29   BUN 27*   CREATININE 0.8   *   CALCIUM 9.9   MG 2.3   PHOS 4.3   AST 8*   ALT 16   ALKPHOS 50*   BILITOT 0.5   PROT 7.9   ALBUMIN 2.8*     Recent Labs   Lab 07/02/20  0503   PH 7.363   PCO2 55.9*   PO2 72*   HCO3 31.8*     Microbiology Results (last 7 days)     Procedure Component Value Units Date/Time    Blood culture [[id number]] Collected: 06/27/20 0550    Order Status: Completed Specimen: Blood from Line, Central Updated: 07/02/20 0612     Blood Culture, Routine No Growth to date      No Growth to date      No Growth to date      No Growth to date      No Growth to date    Blood culture [[id number]] Collected: 06/26/20 1914    Order Status: Completed Specimen: Blood Updated: 07/02/20 0612     Blood Culture, Routine No growth after 5 days.    Narrative:      X2 sticks, one from central line, one peripheral    Blood culture [[id number]] Collected: 06/26/20 1914    Order Status: Completed Specimen: Blood from Antecubital, Left Arm Updated: 07/02/20 0612     Blood Culture, Routine No growth after 5 days.    Blood culture [[id number]] Collected: 06/28/20 0824    Order Status: Completed Specimen: Blood from Antecubital, Right Arm Updated: 07/01/20 2212     Blood Culture, Routine No Growth to date      No Growth to date      No Growth to date      No Growth to date    Blood culture [[id number]] Collected: 07/01/20 2036    Order Status: Sent Specimen: Blood from Line, Central Neck Updated: 07/01/20 2036    Blood culture [[id number]] Collected: 07/01/20 2034    Order Status: Sent Specimen: Blood Updated: 07/01/20 2034    IV catheter culture [[id number]]     Order Status: No result Specimen: Catheter Tip, Intrajugular     Respiratory Infection Panel (PCR), Nasopharyngeal [[id number]] Collected: 07/01/20 1400    Order Status: Completed Specimen: Nasopharyngeal Swab Updated: 07/01/20 1513     Respiratory  Infection Panel Source NP Swab     Adenovirus Not Detected     Coronavirus 229E, Common Cold Virus Not Detected     Coronavirus HKU1, Common Cold Virus Not Detected     Coronavirus NL63, Common Cold Virus Not Detected     Coronavirus OC43, Common Cold Virus Not Detected     Comment: The Coronavirus strains detected in this test cause the common cold.  These strains are not the COVID-19 (novel Coronavirus)strain   associated with the respiratory disease outbreak.          Human Metapneumovirus Not Detected     Human Rhinovirus/Enterovirus Not Detected     Influenza A (subtypes H1, H1-2009,H3) Not Detected     Influenza B Not Detected     Parainfluenza Virus 1 Not Detected     Parainfluenza Virus 2 Not Detected     Parainfluenza Virus 3 Not Detected     Parainfluenza Virus 4 Not Detected     Respiratory Syncytial Virus Not Detected     Bordetella Parapertussis (IF0071) Not Detected     Bordetella pertussis (ptxP) Not Detected     Chlamydia pneumoniae Not Detected     Mycoplasma pneumoniae Not Detected     Comment: Respiratory Infection Panel testing performed by Multiplex PCR.       Narrative:      For all other respiratory sources, order [id number] -  Respiratory Viral Panel by PCR    Culture, Respiratory with Gram Stain [[id number]]  (Abnormal) Collected: 06/27/20 1444    Order Status: Completed Specimen: Respiratory from Tracheal Aspirate Updated: 06/30/20 0718     Respiratory Culture No S aureus or Pseudomonas isolated.      KENROY LUSITANIAE  Rare  Normal respiratory estephanie also present       Gram Stain (Respiratory) <10 epithelial cells per low power field.     Gram Stain (Respiratory) Rare WBC's     Gram Stain (Respiratory) No organisms seen    Blood culture [[id number]] Collected: 06/23/20 2201    Order Status: Completed Specimen: Blood Updated: 06/29/20 0612     Blood Culture, Routine No growth after 5 days.    Urine Culture High Risk [[id number]] Collected: 06/27/20 1437    Order Status: Completed Specimen: Urine,  Catheterized Updated: 06/29/20 0220     Urine Culture, Routine No growth    Narrative:      Indicated criteria for high risk culture:->Other  Other (specify):->icu    Culture, Respiratory with Gram Stain [[id number]]  (Abnormal)  (Susceptibility) Collected: 06/23/20 2152    Order Status: Completed Specimen: Respiratory from Tracheal Aspirate Updated: 06/26/20 1047     Respiratory Culture No Pseudomonas isolated.      STAPHYLOCOCCUS AUREUS  Moderate  Normal respiratory estephanie also present       Gram Stain (Respiratory) <10 epithelial cells per low power field.     Gram Stain (Respiratory) Few WBC's     Gram Stain (Respiratory) Rare Gram positive cocci        Echo 6/22/2020  · Concentric left ventricular remodeling.  · Normal left ventricular systolic function. The estimated ejection fraction is 69%.  · Normal LV diastolic function.  · No wall motion abnormalities.  · Normal right ventricular systolic function.  · Mild right atrial enlargement.  · Trivial pericardial effusion.  · Mild left atrial enlargement.       Impression:  Active Hospital Problems    Diagnosis  POA    *Severe sepsis [A41.9, R65.20]  Yes    Pulmonary hypertension [I27.20]  Yes     By pulmonary artery size on ct chest 6/30      Obstructive sleep apnea syndrome [G47.33]  Yes    Staphylococcal pneumonia [J15.20]  Yes    Type 2 diabetes mellitus [E11.9]  Yes    Nicotine dependence [F17.200]  Yes    Bilateral pneumonia [J18.9]  Yes    Acute respiratory failure with hypoxia and hypercarbia [J96.01, J96.02]  Yes    Obesity hypoventilation syndrome [E66.2]  Yes    Mild intermittent asthma [J45.20]  Yes    Class 3 severe obesity with serious comorbidity and body mass index (BMI) of 60.0 to 69.9 in adult [E66.01, Z68.44]  Not Applicable    Cardiomegaly [I51.7]  Yes      Resolved Hospital Problems   No resolved problems to display.               Plan:   Acute on chronic hypoxemic/hypercapneic resp failure  Asthma exacerbation  Bilateral pneumonia-  MSSA on sputum cx  LAURIE/OHS  Morbid obesity    - continue vent support w/ high PEEP   - there may be some component of atelectasis in the peripheral bases due to her body habitus w/ shunt causing severe hypoxemia- this is difficult to improve w/ her lying supine in bed  - attempt proning today   - weaning trials when oxygenation improves  - continue versed drip  - per Dr. Gentile had suspected propofol adverse reaction w/ bradycardia earlier in course  - may need more sedation when prone- can increase Versed and add fentanyl if needed  - continue diuresis  - continue nebulized bronchodilators and budesonide  - continue solumedrol 40mg daily  - ID following- -changed abx yesterday  - continue antibiotics  - continues on therapeutic dose lovenox- this is empiric due to her high risk for developing VTE    The following were evaluated and adjusted as needed: mechanical ventilator settings and weaning status, sedation and neurologic status and acid base balance and oxygenation needs       Critical Care  - THE PATIENT HAS A HIGH PROBABILITY OF IMMINENT OR LIFE THREATENING DETERIORATION.  Over 50%time of encounter was in direct care at bedside.  Time was 30 to 74 minutes required for patient care.  Time needed for all of the above totaled 33 minutes.    Keshia Guerra MD  Pulmonary & Critical Care Medicine

## 2020-07-02 NOTE — SUBJECTIVE & OBJECTIVE
Interval History:  Patient is in intensive care unit with respiratory failure, on mechanical ventilation after getting transferred from [place], [place].  T-max 102.7 degree F.  Persisting leukocytosis around 18,000. Patient is requiring 65% FiO2.  No acute distress noted.  Patient is following commands.    Review of Systems   Unable to perform ROS: Intubated     Objective:     Vital Signs (Most Recent):  Temp: 98.2 °F (36.8 °C) (07/02/20 0830)  Pulse: 77 (07/02/20 0830)  Resp: 18 (07/02/20 0830)  BP: 112/72 (07/02/20 0830)  SpO2: (!) 94 % (07/02/20 0830) Vital Signs (24h Range):  Temp:  [96.1 °F (35.6 °C)-102.7 °F (39.3 °C)] 98.2 °F (36.8 °C)  Pulse:  [55-89] 77  Resp:  [12-28] 18  SpO2:  [88 %-99 %] 94 %  BP: (109-130)/(56-87) 112/72  Arterial Line BP: (121-137)/(60-74) 127/60     Weight: (!) 146.4 kg (322 lb 12.1 oz)  Body mass index is 59.03 kg/m².    Intake/Output Summary (Last 24 hours) at 7/2/2020 0921  Last data filed at 7/2/2020 0627  Gross per 24 hour   Intake 2860.43 ml   Output 3825 ml   Net -964.57 ml      Physical Exam  Vitals signs and nursing note reviewed.   Constitutional:       Appearance: She is well-developed. She is obese.      Interventions: She is sedated and intubated.   HENT:      Head: Normocephalic and atraumatic.   Eyes:      Conjunctiva/sclera: Conjunctivae normal.      Pupils: Pupils are equal, round, and reactive to light.   Neck:      Musculoskeletal: Normal range of motion and neck supple.   Cardiovascular:      Rate and Rhythm: Normal rate and regular rhythm.      Pulses: Normal pulses.      Heart sounds: Normal heart sounds.   Pulmonary:      Effort: Pulmonary effort is normal. She is intubated.      Breath sounds: Decreased breath sounds present.   Abdominal:      General: Bowel sounds are normal.      Palpations: Abdomen is soft. There is no mass.      Tenderness: There is no abdominal tenderness.   Genitourinary:     Comments: Vazquez catheter in  place    Musculoskeletal:         General: No swelling or deformity.   Skin:     General: Skin is warm and dry.      Capillary Refill: Capillary refill takes 2 to 3 seconds.   Neurological:      Comments: Unable to assess as pt is on mechanical ventilation with sedation     Psychiatric:      Comments: Sedated          Significant Labs:   CBC:   Recent Labs   Lab 07/01/20 0308 07/02/20 0319   WBC 18.99* 16.68*   HGB 14.1 14.1   HCT 46.1 45.7   * 356*     CMP:   Recent Labs   Lab 07/01/20 0308 07/02/20 0319    141   K 4.2 3.8   CL 99 100   CO2 27 29   * 143*   BUN 29* 27*   CREATININE 1.0 0.8   CALCIUM 10.1 9.9   PROT 8.0 7.9   ALBUMIN 2.8* 2.8*   BILITOT 0.7 0.5   ALKPHOS 57 50*   AST 14 8*   ALT 17 16   ANIONGAP 14 12   EGFRNONAA >60 >60     Lactic Acid:   No results for input(s): LACTATE in the last 48 hours.  Microbiology Results (last 7 days)     Procedure Component Value Units Date/Time    Blood culture [[id number]] Collected: 06/27/20 0550    Order Status: Completed Specimen: Blood from Line, Central Updated: 07/02/20 0612     Blood Culture, Routine No Growth to date      No Growth to date      No Growth to date      No Growth to date      No Growth to date    Blood culture [[id number]] Collected: 06/26/20 1914    Order Status: Completed Specimen: Blood Updated: 07/02/20 0612     Blood Culture, Routine No growth after 5 days.    Narrative:      X2 sticks, one from central line, one peripheral    Blood culture [[id number]] Collected: 06/26/20 1914    Order Status: Completed Specimen: Blood from Antecubital, Left Arm Updated: 07/02/20 0612     Blood Culture, Routine No growth after 5 days.    Blood culture [[id number]] Collected: 06/28/20 0824    Order Status: Completed Specimen: Blood from Antecubital, Right Arm Updated: 07/01/20 2212     Blood Culture, Routine No Growth to date      No Growth to date      No Growth to date      No Growth to date    Blood culture [[id number]] Collected:  07/01/20 2036    Order Status: Sent Specimen: Blood from Line, Central Neck Updated: 07/01/20 2036    Blood culture [[id number]] Collected: 07/01/20 2034    Order Status: Sent Specimen: Blood Updated: 07/01/20 2034    IV catheter culture [[id number]]     Order Status: No result Specimen: Catheter Tip, Intrajugular     Respiratory Infection Panel (PCR), Nasopharyngeal [[id number]] Collected: 07/01/20 1400    Order Status: Completed Specimen: Nasopharyngeal Swab Updated: 07/01/20 1513     Respiratory Infection Panel Source NP Swab     Adenovirus Not Detected     Coronavirus 229E, Common Cold Virus Not Detected     Coronavirus HKU1, Common Cold Virus Not Detected     Coronavirus NL63, Common Cold Virus Not Detected     Coronavirus OC43, Common Cold Virus Not Detected     Comment: The Coronavirus strains detected in this test cause the common cold.  These strains are not the COVID-19 (novel Coronavirus)strain   associated with the respiratory disease outbreak.          Human Metapneumovirus Not Detected     Human Rhinovirus/Enterovirus Not Detected     Influenza A (subtypes H1, H1-2009,H3) Not Detected     Influenza B Not Detected     Parainfluenza Virus 1 Not Detected     Parainfluenza Virus 2 Not Detected     Parainfluenza Virus 3 Not Detected     Parainfluenza Virus 4 Not Detected     Respiratory Syncytial Virus Not Detected     Bordetella Parapertussis (VP6229) Not Detected     Bordetella pertussis (ptxP) Not Detected     Chlamydia pneumoniae Not Detected     Mycoplasma pneumoniae Not Detected     Comment: Respiratory Infection Panel testing performed by Multiplex PCR.       Narrative:      For all other respiratory sources, order [id number] -  Respiratory Viral Panel by PCR    Culture, Respiratory with Gram Stain [[id number]]  (Abnormal) Collected: 06/27/20 1444    Order Status: Completed Specimen: Respiratory from Tracheal Aspirate Updated: 06/30/20 0718     Respiratory Culture No S aureus or Pseudomonas isolated.       KNEROY LUSITANIAE  Rare  Normal respiratory estephanie also present       Gram Stain (Respiratory) <10 epithelial cells per low power field.     Gram Stain (Respiratory) Rare WBC's     Gram Stain (Respiratory) No organisms seen    Blood culture [[id number]] Collected: 06/23/20 2201    Order Status: Completed Specimen: Blood Updated: 06/29/20 0612     Blood Culture, Routine No growth after 5 days.    Urine Culture High Risk [[id number]] Collected: 06/27/20 1437    Order Status: Completed Specimen: Urine, Catheterized Updated: 06/29/20 0220     Urine Culture, Routine No growth    Narrative:      Indicated criteria for high risk culture:->Other  Other (specify):->icu    Culture, Respiratory with Gram Stain [[id number]]  (Abnormal)  (Susceptibility) Collected: 06/23/20 2152    Order Status: Completed Specimen: Respiratory from Tracheal Aspirate Updated: 06/26/20 1047     Respiratory Culture No Pseudomonas isolated.      STAPHYLOCOCCUS AUREUS  Moderate  Normal respiratory estephanie also present       Gram Stain (Respiratory) <10 epithelial cells per low power field.     Gram Stain (Respiratory) Few WBC's     Gram Stain (Respiratory) Rare Gram positive cocci        Significant Imaging:   ECHO:  · Concentric left ventricular remodeling.  · Normal left ventricular systolic function. The estimated ejection fraction is 69%.  · Normal LV diastolic function.  · No wall motion abnormalities.  · Normal right ventricular systolic function.  · Mild right atrial enlargement.  · Trivial pericardial effusion.  · Mild left atrial enlargement.    CXR: Mild cardiomegaly.  Hypoventilation.    CXR:   1. Interval placement of endotracheal tube with the tip in the proximal right mainstem bronchus.  This should be withdrawn several cm.  2. The study is motion degraded.  Indistinctness of the left hemidiaphragm could be related to patient motion with atelectasis and/or pleural effusion not completely excluded.    CXR:  1. Limited evaluation secondary to  technique.  2. Findings suspicious for congestive heart failure which does not appear significantly changed over the interval.  3. Small bilateral pleural effusions with bibasilar dependent atelectasis.  4. Endotracheal tube.    CXR:  Stable positioning of support devices.  Unchanged bibasilar consolidation/atelectasis.    CXR: Bibasilar opacification not significantly changed compared to the prior exam.  Positions of lines and tubes described.    CXR:   Continued bibasilar lung infiltrates.  Mild cardiomegaly.  Endotracheal tube, NG tube and central lines in position.    CXR:   Mild decrease in right basilar infiltrate.  Continued left basilar infiltrate.  Cardiomegaly.  Tubes in position.     CTA chest:  Limited evaluation for small peripheral luminal filling defects with the streaky artifact but no large central luminal filling defects are seen in pulmonary artery suggesting pulmonary artery embolism   Bilateral infiltrates with features somewhat suspicious for later changes of COVID-19 pneumonia.    CXR: Lines and tubes remain in place and bilateral infiltrates do not appear significantly changed

## 2020-07-02 NOTE — RESPIRATORY THERAPY
Leak test preformed this morning. Set tidal volume of 500ml, after cuff completely deflated tidal volume measured was 497ml.

## 2020-07-02 NOTE — PROGRESS NOTES
Consult Note  Infectious Disease    Reason for Consult:  ID gabino    HPI: Michelle Wren is a  30 y.o. female with a history of asthma, type 2 diabetes, smoking and severe obesity (344 lb) who transferred from [facility] on 06/22 for pulmonary consultation.  She presented to their emergency room the day prior to this admission with a 2-3 day history of shortness of breath.  She was using a friend's CPAP and then required BiPAP and Solu-Medrol, bronchodilator treatments and Lasix.  She required intubation on 06/21 for progressive hypercapnic hypoxemic respiratory failure and was transferred on the ventilator.  She was given vancomycin, Levaquin and Zosyn empirically.  Initial chest x-rays look like pulmonary edema, BNP was mildly elevated but echocardiogram showed normal ejection fraction.  Some concern regarding angioedema on the 2nd hospital day due to swelling of her lips.  Endotracheal aspirate grew MSSA and vancomycin and Zosyn were changed to oxacillin, Levaquin was resumed when fever worsened as steroids were decreased.  Continuing to run low-grade temperature with repeat sputum culture only normal estephanie from the 27th.  Continues on steroids without bronchospasm.  White blood cells remain elevated No eosinophilia on arrival to [place].  Procalcitonin normal x3.  Rheumatoid factor borderline with normal C CP.  COVID negative, Legionella urine antigen negative.  No renal issues are proteinuria to consider granulomatous disease.  Has not been able to get a CT scan of the chest to try to clarify pattern of infiltrates due to habitus. Requiring high PEEP.   Plain films of initially showed lower lobe lobe alveolar infiltrates but these have faded and there may now just be bilateral pleural effusions.  KIRA, Anca, C1 esterase inhibitor antigen are pending.    6/30: Interim reviewed, discussed with nurse at bedside regarding trip to [place] and family meeting. Ct chest reviewed and concur with Dr. Schulz re  appearance similar to COVID infiltrates.   7/1:  T-max 102.9° today.  Hemodynamically stable.  Up to 70% FiO2 on the ventilator.  White blood cells are about the same.  Tolerating tube feedings and had a soft bowel movement.  Sedated on the ventilator at this time.  HIV negative, respiratory viral panel negative, ferritin 394.  COVID IgG negative  7/2: temp down to less than 100. Could not be proned, due to hypoxemia, now being turned acutely. She is arousable despite sedation. Blood cultures negative. Awaiting picc line insertion to get TLC out    EXAM & DIAGNOSTICS REVIEWED:   Vitals:     Temp:  [96.1 °F (35.6 °C)-99.7 °F (37.6 °C)]   Temp: 99.7 °F (37.6 °C) (07/02/20 1645)  Pulse: 83 (07/02/20 1645)  Resp: 18 (07/02/20 1658)  BP: 123/69 (07/02/20 1630)  SpO2: 95 % (07/02/20 1645)    Intake/Output Summary (Last 24 hours) at 7/2/2020 1752  Last data filed at 7/2/2020 1659  Gross per 24 hour   Intake 2920.43 ml   Output 4145 ml   Net -1224.57 ml     Vent Mode: A/C  Oxygen Concentration (%):  [70] 70  Resp Rate Total:  [16 br/min-20 br/min] 17 br/min  Vt Set:  [500 mL] 500 mL  PEEP/CPAP:  [20 cmH20] 20 cmH20  Pressure Support:  [0 cmH20] 0 cmH20  Mean Airway Pressure:  [24 ymH98-26 cmH20] 24 cmH20      General:  In NAD.  Sedated arousable the comfortable  Eyes:  Anicteric,  ENT:  Oral cavity exam not obtainable today,   Neck:  supple,    Lungs: Decreased breath sounds throughout no wheezes  Heart:  RRR, no gallop/murmur/rub noted  Abd:  Soft, morbidly obese, NT, ND, normal BS, no masses or organomegaly appreciated.  Tolerating tube feeding  :   Vazquez, urine clear, no flank tenderness  Musc:  Joints without effusion, swelling, erythema, synovitis, muscle wasting.   Skin:  No rashes. No palmar or plantar lesions. No subungual petechiae  Wound:   Neuro: Sedated on the ventilator  Psych:  Sedated on the ventilator  Lymphatic:     Exam limited by habitus  Extrem: No edema, erythema, phlebitis, cellulitis, warm and well  perfused  VAD:  RIJ TLC, right radial arterial line    Isolation:  none    Lines/Tubes/Drains:    General Labs reviewed:  Recent Labs   Lab 06/30/20  0334 07/01/20  0308 07/02/20 0319   WBC 19.14* 18.99* 16.68*   HGB 14.4 14.1 14.1   HCT 46.3 46.1 45.7   * 391* 356*       Recent Labs   Lab 06/30/20  0334 07/01/20  0308 07/02/20 0319    140 141   K 3.8 4.2 3.8   CL 98 99 100   CO2 27 27 29   BUN 26* 29* 27*   CREATININE 0.8 1.0 0.8   CALCIUM 9.9 10.1 9.9   PROT 8.0 8.0 7.9   BILITOT 0.8 0.7 0.5   ALKPHOS 54* 57 50*   ALT 17 17 16   AST 12 14 8*           Micro:  Microbiology Results (last 7 days)     Procedure Component Value Units Date/Time    Blood culture [[id number]] Collected: 07/01/20 2036    Order Status: Completed Specimen: Blood from Line, Central Neck Updated: 07/02/20 1715     Blood Culture, Routine No Growth to date    Narrative:      From TLC    Blood culture [[id number]] Collected: 07/01/20 2034    Order Status: Completed Specimen: Blood Updated: 07/02/20 1715     Blood Culture, Routine No Growth to date    Narrative:      From arterial line    Blood culture [[id number]] Collected: 06/27/20 0550    Order Status: Completed Specimen: Blood from Line, Central Updated: 07/02/20 0612     Blood Culture, Routine No Growth to date      No Growth to date      No Growth to date      No Growth to date      No Growth to date    Blood culture [[id number]] Collected: 06/26/20 1914    Order Status: Completed Specimen: Blood Updated: 07/02/20 0612     Blood Culture, Routine No growth after 5 days.    Narrative:      X2 sticks, one from central line, one peripheral    Blood culture [[id number]] Collected: 06/26/20 1914    Order Status: Completed Specimen: Blood from Antecubital, Left Arm Updated: 07/02/20 0612     Blood Culture, Routine No growth after 5 days.    Blood culture [[id number]] Collected: 06/28/20 0824    Order Status: Completed Specimen: Blood from Antecubital, Right Arm Updated: 07/01/20 6465      Blood Culture, Routine No Growth to date      No Growth to date      No Growth to date      No Growth to date    IV catheter culture [[id number]]     Order Status: No result Specimen: Catheter Tip, Intrajugular     Respiratory Infection Panel (PCR), Nasopharyngeal [[id number]] Collected: 07/01/20 1400    Order Status: Completed Specimen: Nasopharyngeal Swab Updated: 07/01/20 1513     Respiratory Infection Panel Source NP Swab     Adenovirus Not Detected     Coronavirus 229E, Common Cold Virus Not Detected     Coronavirus HKU1, Common Cold Virus Not Detected     Coronavirus NL63, Common Cold Virus Not Detected     Coronavirus OC43, Common Cold Virus Not Detected     Comment: The Coronavirus strains detected in this test cause the common cold.  These strains are not the COVID-19 (novel Coronavirus)strain   associated with the respiratory disease outbreak.          Human Metapneumovirus Not Detected     Human Rhinovirus/Enterovirus Not Detected     Influenza A (subtypes H1, H1-2009,H3) Not Detected     Influenza B Not Detected     Parainfluenza Virus 1 Not Detected     Parainfluenza Virus 2 Not Detected     Parainfluenza Virus 3 Not Detected     Parainfluenza Virus 4 Not Detected     Respiratory Syncytial Virus Not Detected     Bordetella Parapertussis (EY7744) Not Detected     Bordetella pertussis (ptxP) Not Detected     Chlamydia pneumoniae Not Detected     Mycoplasma pneumoniae Not Detected     Comment: Respiratory Infection Panel testing performed by Multiplex PCR.       Narrative:      For all other respiratory sources, order [id number] -  Respiratory Viral Panel by PCR    Culture, Respiratory with Gram Stain [[id number]]  (Abnormal) Collected: 06/27/20 1444    Order Status: Completed Specimen: Respiratory from Tracheal Aspirate Updated: 06/30/20 0718     Respiratory Culture No S aureus or Pseudomonas isolated.      KENROY LUSITANIAE  Rare  Normal respiratory estephanie also present       Gram Stain (Respiratory) <10  epithelial cells per low power field.     Gram Stain (Respiratory) Rare WBC's     Gram Stain (Respiratory) No organisms seen    Blood culture [[id number]] Collected: 06/23/20 2201    Order Status: Completed Specimen: Blood Updated: 06/29/20 0612     Blood Culture, Routine No growth after 5 days.    Urine Culture High Risk [[id number]] Collected: 06/27/20 1437    Order Status: Completed Specimen: Urine, Catheterized Updated: 06/29/20 0220     Urine Culture, Routine No growth    Narrative:      Indicated criteria for high risk culture:->Other  Other (specify):->icu    Culture, Respiratory with Gram Stain [[id number]]  (Abnormal)  (Susceptibility) Collected: 06/23/20 2152    Order Status: Completed Specimen: Respiratory from Tracheal Aspirate Updated: 06/26/20 1047     Respiratory Culture No Pseudomonas isolated.      STAPHYLOCOCCUS AUREUS  Moderate  Normal respiratory estephanie also present       Gram Stain (Respiratory) <10 epithelial cells per low power field.     Gram Stain (Respiratory) Few WBC's     Gram Stain (Respiratory) Rare Gram positive cocci        Imaging Reviewed:   CXR   CT chest      Cardiology:echo    IMPRESSION & PLAN   1. Hypercapneic, hypoxemic, respiratory failure. High PEEP requirement.    COVID PCR negative on admission, and 6/30, COVID IgG negative  2. MSSA in sputum, but minimal infiltrates, normal procalcitonin x3  3. Morbid obesity with LAURIE  4. History of asthma, requiring a vent in the past      Recommendations:   Zyvox , levaquin and Mycamine  Blood culture from TLC an arterial line are in progress  Change TLC to PICC line today, culture tip    thanks

## 2020-07-02 NOTE — PROGRESS NOTES
[facility] Medicine  Progress Note    Patient Name: Michelle Wren  MRN: [id number]  Patient Class: IP- Inpatient   Admission Date: 6/23/2020  Length of Stay: 9 days  Attending Physician: Peg Stewart MD  Primary Care Provider: Primary Doctor No        Subjective:     Principal Problem:Severe sepsis        HPI:  Michelle Wren is a 31 y/o female with a past medical history significant for asthma, type 2 diabetes, and severe obesity who is transferred from [facility] to [facility] for pulmonary consultation.  Patient presented to the ED at [facility] yesterday with complaints of 2-3 days of shortness of breath.  It is reported that she uses CPAP at night but she got this from a friend so unsure of settings.  She was placed on BiPAP and given IV Solu-Medrol, bronchodilator treatments, and IV Lasix.  She was admitted to the ICU at [facility].  Patient continued to decline and underwent intubation yesterday around 7:00 p.m.  per report, today patient appeared to be worse.  Her saturations were in the mid 90s on FiO2 of 100%.  A D-dimer was checked and was negative.  Per review of labs which were drawn earlier today, her WBC count was 23,000 and her lactic acid was 2.4.  She was placed on IV Zosyn and IV vancomycin.  Upon arrival to [facility], patient is in no acute distress.  She is intubated and sedated.  Vital signs are stable.  Lactic acid will be repeated now as patient meets sepsis criteria and will check a procalcitonin.  She will be monitored closely in the ICU.           Overview/Hospital Course:  Patient is being managed in intensive care unit, requiring mechanical ventilation under supervision of pulmonary medicine.  Repeat COVID -19 test has been negative.  Patient is receiving intravenous antibiotics for bilateral pneumonia.  Patient is getting CTA of chest for further evaluation.    Interval History:  Patient is in intensive care unit with  respiratory failure, on mechanical ventilation after getting transferred from [place], [place].  T-max 102.7 degree F.  Persisting leukocytosis around 18,000. Patient is requiring 65% FiO2.  No acute distress noted.  Patient is following commands.    Review of Systems   Unable to perform ROS: Intubated     Objective:     Vital Signs (Most Recent):  Temp: 98.2 °F (36.8 °C) (07/02/20 0830)  Pulse: 77 (07/02/20 0830)  Resp: 18 (07/02/20 0830)  BP: 112/72 (07/02/20 0830)  SpO2: (!) 94 % (07/02/20 0830) Vital Signs (24h Range):  Temp:  [96.1 °F (35.6 °C)-102.7 °F (39.3 °C)] 98.2 °F (36.8 °C)  Pulse:  [55-89] 77  Resp:  [12-28] 18  SpO2:  [88 %-99 %] 94 %  BP: (109-130)/(56-87) 112/72  Arterial Line BP: (121-137)/(60-74) 127/60     Weight: (!) 146.4 kg (322 lb 12.1 oz)  Body mass index is 59.03 kg/m².    Intake/Output Summary (Last 24 hours) at 7/2/2020 0921  Last data filed at 7/2/2020 0627  Gross per 24 hour   Intake 2860.43 ml   Output 3825 ml   Net -964.57 ml      Physical Exam  Vitals signs and nursing note reviewed.   Constitutional:       Appearance: She is well-developed. She is obese.      Interventions: She is sedated and intubated.   HENT:      Head: Normocephalic and atraumatic.   Eyes:      Conjunctiva/sclera: Conjunctivae normal.      Pupils: Pupils are equal, round, and reactive to light.   Neck:      Musculoskeletal: Normal range of motion and neck supple.   Cardiovascular:      Rate and Rhythm: Normal rate and regular rhythm.      Pulses: Normal pulses.      Heart sounds: Normal heart sounds.   Pulmonary:      Effort: Pulmonary effort is normal. She is intubated.      Breath sounds: Decreased breath sounds present.   Abdominal:      General: Bowel sounds are normal.      Palpations: Abdomen is soft. There is no mass.      Tenderness: There is no abdominal tenderness.   Genitourinary:     Comments: Vazquez catheter in place    Musculoskeletal:         General: No swelling or deformity.    Skin:     General: Skin is warm and dry.      Capillary Refill: Capillary refill takes 2 to 3 seconds.   Neurological:      Comments: Unable to assess as pt is on mechanical ventilation with sedation     Psychiatric:      Comments: Sedated          Significant Labs:   CBC:   Recent Labs   Lab 07/01/20 0308 07/02/20 0319   WBC 18.99* 16.68*   HGB 14.1 14.1   HCT 46.1 45.7   * 356*     CMP:   Recent Labs   Lab 07/01/20 0308 07/02/20 0319    141   K 4.2 3.8   CL 99 100   CO2 27 29   * 143*   BUN 29* 27*   CREATININE 1.0 0.8   CALCIUM 10.1 9.9   PROT 8.0 7.9   ALBUMIN 2.8* 2.8*   BILITOT 0.7 0.5   ALKPHOS 57 50*   AST 14 8*   ALT 17 16   ANIONGAP 14 12   EGFRNONAA >60 >60     Lactic Acid:   No results for input(s): LACTATE in the last 48 hours.  Microbiology Results (last 7 days)     Procedure Component Value Units Date/Time    Blood culture [[id number]] Collected: 06/27/20 0550    Order Status: Completed Specimen: Blood from Line, Central Updated: 07/02/20 0612     Blood Culture, Routine No Growth to date      No Growth to date      No Growth to date      No Growth to date      No Growth to date    Blood culture [[id number]] Collected: 06/26/20 1914    Order Status: Completed Specimen: Blood Updated: 07/02/20 0612     Blood Culture, Routine No growth after 5 days.    Narrative:      X2 sticks, one from central line, one peripheral    Blood culture [[id number]] Collected: 06/26/20 1914    Order Status: Completed Specimen: Blood from Antecubital, Left Arm Updated: 07/02/20 0612     Blood Culture, Routine No growth after 5 days.    Blood culture [[id number]] Collected: 06/28/20 0824    Order Status: Completed Specimen: Blood from Antecubital, Right Arm Updated: 07/01/20 2212     Blood Culture, Routine No Growth to date      No Growth to date      No Growth to date      No Growth to date    Blood culture [[id number]] Collected: 07/01/20 2036    Order Status: Sent Specimen: Blood from Line, Central Neck  Updated: 07/01/20 2036    Blood culture [[id number]] Collected: 07/01/20 2034    Order Status: Sent Specimen: Blood Updated: 07/01/20 2034    IV catheter culture [[id number]]     Order Status: No result Specimen: Catheter Tip, Intrajugular     Respiratory Infection Panel (PCR), Nasopharyngeal [[id number]] Collected: 07/01/20 1400    Order Status: Completed Specimen: Nasopharyngeal Swab Updated: 07/01/20 1513     Respiratory Infection Panel Source NP Swab     Adenovirus Not Detected     Coronavirus 229E, Common Cold Virus Not Detected     Coronavirus HKU1, Common Cold Virus Not Detected     Coronavirus NL63, Common Cold Virus Not Detected     Coronavirus OC43, Common Cold Virus Not Detected     Comment: The Coronavirus strains detected in this test cause the common cold.  These strains are not the COVID-19 (novel Coronavirus)strain   associated with the respiratory disease outbreak.          Human Metapneumovirus Not Detected     Human Rhinovirus/Enterovirus Not Detected     Influenza A (subtypes H1, H1-2009,H3) Not Detected     Influenza B Not Detected     Parainfluenza Virus 1 Not Detected     Parainfluenza Virus 2 Not Detected     Parainfluenza Virus 3 Not Detected     Parainfluenza Virus 4 Not Detected     Respiratory Syncytial Virus Not Detected     Bordetella Parapertussis (OO5639) Not Detected     Bordetella pertussis (ptxP) Not Detected     Chlamydia pneumoniae Not Detected     Mycoplasma pneumoniae Not Detected     Comment: Respiratory Infection Panel testing performed by Multiplex PCR.       Narrative:      For all other respiratory sources, order [id number] -  Respiratory Viral Panel by PCR    Culture, Respiratory with Gram Stain [[id number]]  (Abnormal) Collected: 06/27/20 1444    Order Status: Completed Specimen: Respiratory from Tracheal Aspirate Updated: 06/30/20 0718     Respiratory Culture No S aureus or Pseudomonas isolated.      KENROY LUSITANIAE  Rare  Normal respiratory estephanie also present       Gram  Stain (Respiratory) <10 epithelial cells per low power field.     Gram Stain (Respiratory) Rare WBC's     Gram Stain (Respiratory) No organisms seen    Blood culture [[id number]] Collected: 06/23/20 2201    Order Status: Completed Specimen: Blood Updated: 06/29/20 0612     Blood Culture, Routine No growth after 5 days.    Urine Culture High Risk [[id number]] Collected: 06/27/20 1437    Order Status: Completed Specimen: Urine, Catheterized Updated: 06/29/20 0220     Urine Culture, Routine No growth    Narrative:      Indicated criteria for high risk culture:->Other  Other (specify):->icu    Culture, Respiratory with Gram Stain [[id number]]  (Abnormal)  (Susceptibility) Collected: 06/23/20 2152    Order Status: Completed Specimen: Respiratory from Tracheal Aspirate Updated: 06/26/20 1047     Respiratory Culture No Pseudomonas isolated.      STAPHYLOCOCCUS AUREUS  Moderate  Normal respiratory estephanie also present       Gram Stain (Respiratory) <10 epithelial cells per low power field.     Gram Stain (Respiratory) Few WBC's     Gram Stain (Respiratory) Rare Gram positive cocci        Significant Imaging:   ECHO:  · Concentric left ventricular remodeling.  · Normal left ventricular systolic function. The estimated ejection fraction is 69%.  · Normal LV diastolic function.  · No wall motion abnormalities.  · Normal right ventricular systolic function.  · Mild right atrial enlargement.  · Trivial pericardial effusion.  · Mild left atrial enlargement.    CXR: Mild cardiomegaly.  Hypoventilation.    CXR:   1. Interval placement of endotracheal tube with the tip in the proximal right mainstem bronchus.  This should be withdrawn several cm.  2. The study is motion degraded.  Indistinctness of the left hemidiaphragm could be related to patient motion with atelectasis and/or pleural effusion not completely excluded.    CXR:  1. Limited evaluation secondary to technique.  2. Findings suspicious for congestive heart failure which does  not appear significantly changed over the interval.  3. Small bilateral pleural effusions with bibasilar dependent atelectasis.  4. Endotracheal tube.    CXR:  Stable positioning of support devices.  Unchanged bibasilar consolidation/atelectasis.    CXR: Bibasilar opacification not significantly changed compared to the prior exam.  Positions of lines and tubes described.    CXR:   Continued bibasilar lung infiltrates.  Mild cardiomegaly.  Endotracheal tube, NG tube and central lines in position.    CXR:   Mild decrease in right basilar infiltrate.  Continued left basilar infiltrate.  Cardiomegaly.  Tubes in position.     CTA chest:  Limited evaluation for small peripheral luminal filling defects with the streaky artifact but no large central luminal filling defects are seen in pulmonary artery suggesting pulmonary artery embolism   Bilateral infiltrates with features somewhat suspicious for later changes of COVID-19 pneumonia.    CXR: Lines and tubes remain in place and bilateral infiltrates do not appear significantly changed      Assessment/Plan:      * Severe sepsis  This patient does have evidence of infective focus  My overall impression is severe sepsis.  Antibiotics given-   Antibiotics (From admission, onward)    Start     Stop Route Frequency Ordered    06/29/20 1500  levoFLOXacin 750 mg/150 mL IVPB 750 mg      -- IV Every 24 hours (non-standard times) 06/29/20 1231    06/26/20 1230  oxacillin 2 g in sodium chloride 0.9 %  100 mL IVPB (ready to mix system)      -- IV Every 4 hours (non-standard times) 06/26/20 1122          Severe Sepsis only-  Latest labs reviewed, they are-  Recent Labs   Lab 06/29/20  0316   BILITOT 0.8     No results for input(s): LACTATE in the last 24 hours.  Recent Labs   Lab 06/29/20  0454   PH 7.429   PO2 59*   PCO2 46.8*   HCO3 31.0*   BE 7       Organ dysfunction indicated by Acute respiratory failure      Temp Readings from Last 1 Encounters:   06/29/20 99.2 °F (37.3 °C) (Core  Rectal)     BP Readings from Last 1 Encounters:   06/29/20 129/76     Pulse Readings from Last 1 Encounters:   06/29/20 70           Obstructive sleep apnea syndrome  On mechanical ventilation.      Staphylococcal pneumonia  MSSA.  Continue oxacillin and intravenous Levaquin therapy.  Follow Pulmonary recommendations.    Type 2 diabetes mellitus  Not on chronic medication.  A1c 6.2%.  Accuchecks ac/hs with SSI coverage as needed.  Nutrition consult as pt NPO on vent.        Acute respiratory failure with hypoxia and hypercarbia  Patient with Hypercapnic and Hypoxic Respiratory failure which is Acute.  she is not on home oxygen. Supplemental ventilation was provided and noted- Vent Mode: SIMV  Oxygen Concentration (%):  [] 90  Resp Rate Total:  [14 br/min-34 br/min] 17 br/min  Vt Set:  [450 mL] 450 mL  PEEP/CPAP:  [17 cmH20] 17 cmH20  Pressure Support:  [30 cmH20] 30 cmH20  Mean Airway Pressure:  [22 saY81-48 cmH20] 22 cmH20 and oxygen saturations 97%. Differential diagnosis includes - COPD, CHF, Obesity Hypoventilation, Interstitial lung disease and Pneumonia Labs and images were reviewed. Will treat underlying causes.   Follow pulmonary recommendations.   Patient is being considered for prone position this afternoon.    Bilateral pneumonia  IV zosyn, IV vancomycin-de-escalate as appropriate.  Follow Pulmonary recommendations  Blood and sputum cultures-follow.  Sputum culture growing staph aureus species.  Monitor clinical response.  Today's chest x-ray shows slight improvement in right lower lobe infiltrate.      Nicotine dependence  Health hazards associated with cigarette smoking should be reviewed with patient and cessation encouraged when pt is able to participate.       Mild intermittent asthma  Chronic; rescue inhaler only noted to home meds, no controller.  Continue bronchodilators q4h.  Currently intubated on mechanical ventilation.  Follow pulmonary recommendations. On IV Solumedrol 40 mg q 24  hrs.      Cardiomegaly  Patient with peripheral edema, mildly elevated BNP.  [place] [facility]    Echo was completed today with results as follows:  · Concentric left ventricular remodeling.  · Normal left ventricular systolic function. The estimated ejection fraction is 69%.  · Normal LV diastolic function.  · No wall motion abnormalities.  · Normal right ventricular systolic function.  · Mild right atrial enlargement.  · Trivial pericardial effusion.  · Mild left atrial enlargement.    IV lasix discontinued as no indication of heart failure.    Obesity hypoventilation syndrome  Intubated.       Class 3 severe obesity with serious comorbidity and body mass index (BMI) of 60.0 to 69.9 in adult  Body mass index is 63.06 kg/m². Morbid obesity complicates all aspects of disease management from diagnostic modalities to treatment. Weight loss encouraged and health benefits explained to patient.            VTE Risk Mitigation (From admission, onward)         Ordered     enoxaparin injection 165 mg  Every 12 hours (non-standard times)      06/26/20 1703     IP VTE HIGH RISK PATIENT  Once      06/23/20 2055     Place sequential compression device  Until discontinued      06/23/20 2055            Critical care time spent on the evaluation and treatment of severe organ dysfunction, review of pertinent labs and imaging studies, discussions with consulting providers and discussions with patient/family: 36 minutes.    Peg Stewart MD  Department of Hospital Medicine   [facility]

## 2020-07-02 NOTE — NURSING
Consulted PICC nurse this morning regarding line placement. Stated a full schedule but does have patient on schedule for PICC placement today. Explained the need for line due to needing to culture current IJ triple lumen r/t fever yesterday. Awaiting new line prior to pulling current central line.

## 2020-07-02 NOTE — NURSING
Unable to prone today due to oxygen desaturation. Turn assist initiated on bed to rotate patient in hopes of expanding patient's lungs. Patient tolerating well at this time.

## 2020-07-03 LAB
ALBUMIN SERPL BCP-MCNC: 2.7 G/DL (ref 3.5–5.2)
ALLENS TEST: ABNORMAL
ALP SERPL-CCNC: 48 U/L (ref 55–135)
ALT SERPL W/O P-5'-P-CCNC: 11 U/L (ref 10–44)
ANION GAP SERPL CALC-SCNC: 13 MMOL/L (ref 8–16)
AST SERPL-CCNC: 8 U/L (ref 10–40)
BACTERIA BLD CULT: NORMAL
BACTERIA BLD CULT: NORMAL
BASOPHILS NFR BLD: 0 % (ref 0–1.9)
BILIRUB SERPL-MCNC: 0.5 MG/DL (ref 0.1–1)
BUN SERPL-MCNC: 30 MG/DL (ref 6–20)
CALCIUM SERPL-MCNC: 9.2 MG/DL (ref 8.7–10.5)
CHLORIDE SERPL-SCNC: 100 MMOL/L (ref 95–110)
CO2 SERPL-SCNC: 29 MMOL/L (ref 23–29)
CREAT SERPL-MCNC: 0.8 MG/DL (ref 0.5–1.4)
DELSYS: ABNORMAL
DIFFERENTIAL METHOD: ABNORMAL
EOSINOPHIL NFR BLD: 1 % (ref 0–8)
ERYTHROCYTE [DISTWIDTH] IN BLOOD BY AUTOMATED COUNT: 16.1 % (ref 11.5–14.5)
ERYTHROCYTE [SEDIMENTATION RATE] IN BLOOD BY WESTERGREN METHOD: 16 MM/H
EST. GFR  (AFRICAN AMERICAN): >60 ML/MIN/1.73 M^2
EST. GFR  (NON AFRICAN AMERICAN): >60 ML/MIN/1.73 M^2
ETCO2: 48
FIO2: 70
GLUCOSE SERPL-MCNC: 187 MG/DL (ref 70–110)
HCO3 UR-SCNC: 33.6 MMOL/L (ref 24–28)
HCT VFR BLD AUTO: 44.6 % (ref 37–48.5)
HGB BLD-MCNC: 13.3 G/DL (ref 12–16)
IMM GRANULOCYTES # BLD AUTO: ABNORMAL K/UL (ref 0–0.04)
IMM GRANULOCYTES NFR BLD AUTO: ABNORMAL % (ref 0–0.5)
LYMPHOCYTES NFR BLD: 14 % (ref 18–48)
MAGNESIUM SERPL-MCNC: 2.1 MG/DL (ref 1.6–2.6)
MCH RBC QN AUTO: 25.8 PG (ref 27–31)
MCHC RBC AUTO-ENTMCNC: 29.8 G/DL (ref 32–36)
MCV RBC AUTO: 87 FL (ref 82–98)
MIN VOL: 10.3
MODE: ABNORMAL
MONOCYTES NFR BLD: 3 % (ref 4–15)
NEUTROPHILS NFR BLD: 79 % (ref 38–73)
NEUTS BAND NFR BLD MANUAL: 3 %
NRBC BLD-RTO: 0 /100 WBC
PCO2 BLDA: 57 MMHG (ref 35–45)
PEEP: 20
PH SMN: 7.38 [PH] (ref 7.35–7.45)
PHOSPHATE SERPL-MCNC: 3.3 MG/DL (ref 2.7–4.5)
PIP: 30
PLATELET # BLD AUTO: 331 K/UL (ref 150–350)
PMV BLD AUTO: 11.8 FL (ref 9.2–12.9)
PO2 BLDA: 73 MMHG (ref 80–100)
POC BE: 8 MMOL/L
POC SATURATED O2: 94 % (ref 95–100)
POC TCO2: 35 MMOL/L (ref 23–27)
POCT GLUCOSE: 217 MG/DL (ref 70–110)
POCT GLUCOSE: 221 MG/DL (ref 70–110)
POCT GLUCOSE: 245 MG/DL (ref 70–110)
POTASSIUM SERPL-SCNC: 3.4 MMOL/L (ref 3.5–5.1)
PROT SERPL-MCNC: 7.4 G/DL (ref 6–8.4)
RBC # BLD AUTO: 5.15 M/UL (ref 4–5.4)
SAMPLE: ABNORMAL
SITE: ABNORMAL
SODIUM SERPL-SCNC: 142 MMOL/L (ref 136–145)
SP02: 93
VT: 500
WBC # BLD AUTO: 22.05 K/UL (ref 3.9–12.7)

## 2020-07-03 PROCEDURE — 85007 BL SMEAR W/DIFF WBC COUNT: CPT

## 2020-07-03 PROCEDURE — 25000003 PHARM REV CODE 250: Performed by: INTERNAL MEDICINE

## 2020-07-03 PROCEDURE — 99232 PR SUBSEQUENT HOSPITAL CARE,LEVL II: ICD-10-PCS | Mod: S$GLB,,, | Performed by: INTERNAL MEDICINE

## 2020-07-03 PROCEDURE — 99900035 HC TECH TIME PER 15 MIN (STAT)

## 2020-07-03 PROCEDURE — 94770 HC EXHALED C02 TEST: CPT

## 2020-07-03 PROCEDURE — 63600175 PHARM REV CODE 636 W HCPCS: Performed by: NURSE PRACTITIONER

## 2020-07-03 PROCEDURE — 94761 N-INVAS EAR/PLS OXIMETRY MLT: CPT

## 2020-07-03 PROCEDURE — 94640 AIRWAY INHALATION TREATMENT: CPT

## 2020-07-03 PROCEDURE — 25000242 PHARM REV CODE 250 ALT 637 W/ HCPCS: Performed by: NURSE PRACTITIONER

## 2020-07-03 PROCEDURE — 83735 ASSAY OF MAGNESIUM: CPT

## 2020-07-03 PROCEDURE — 85027 COMPLETE CBC AUTOMATED: CPT

## 2020-07-03 PROCEDURE — C9113 INJ PANTOPRAZOLE SODIUM, VIA: HCPCS | Performed by: INTERNAL MEDICINE

## 2020-07-03 PROCEDURE — 63600175 PHARM REV CODE 636 W HCPCS: Performed by: INTERNAL MEDICINE

## 2020-07-03 PROCEDURE — 20000000 HC ICU ROOM

## 2020-07-03 PROCEDURE — 99232 SBSQ HOSP IP/OBS MODERATE 35: CPT | Mod: S$GLB,,, | Performed by: INTERNAL MEDICINE

## 2020-07-03 PROCEDURE — 99900026 HC AIRWAY MAINTENANCE (STAT)

## 2020-07-03 PROCEDURE — 84100 ASSAY OF PHOSPHORUS: CPT

## 2020-07-03 PROCEDURE — 82803 BLOOD GASES ANY COMBINATION: CPT

## 2020-07-03 PROCEDURE — 27000221 HC OXYGEN, UP TO 24 HOURS

## 2020-07-03 PROCEDURE — 99291 CRITICAL CARE FIRST HOUR: CPT | Mod: ,,, | Performed by: INTERNAL MEDICINE

## 2020-07-03 PROCEDURE — 80053 COMPREHEN METABOLIC PANEL: CPT

## 2020-07-03 PROCEDURE — 94003 VENT MGMT INPAT SUBQ DAY: CPT

## 2020-07-03 PROCEDURE — 36415 COLL VENOUS BLD VENIPUNCTURE: CPT

## 2020-07-03 PROCEDURE — 99291 PR CRITICAL CARE, E/M 30-74 MINUTES: ICD-10-PCS | Mod: ,,, | Performed by: INTERNAL MEDICINE

## 2020-07-03 PROCEDURE — 37799 UNLISTED PX VASCULAR SURGERY: CPT

## 2020-07-03 RX ADMIN — DEXMEDETOMIDINE HYDROCHLORIDE 1 MCG/KG/HR: 100 INJECTION, SOLUTION, CONCENTRATE INTRAVENOUS at 05:07

## 2020-07-03 RX ADMIN — DEXMEDETOMIDINE HYDROCHLORIDE 1 MCG/KG/HR: 100 INJECTION, SOLUTION, CONCENTRATE INTRAVENOUS at 08:07

## 2020-07-03 RX ADMIN — DEXMEDETOMIDINE HYDROCHLORIDE 1 MCG/KG/HR: 100 INJECTION, SOLUTION, CONCENTRATE INTRAVENOUS at 12:07

## 2020-07-03 RX ADMIN — DEXMEDETOMIDINE HYDROCHLORIDE 1 MCG/KG/HR: 100 INJECTION, SOLUTION, CONCENTRATE INTRAVENOUS at 01:07

## 2020-07-03 RX ADMIN — METOCLOPRAMIDE 10 MG: 5 INJECTION, SOLUTION INTRAMUSCULAR; INTRAVENOUS at 10:07

## 2020-07-03 RX ADMIN — METOCLOPRAMIDE 10 MG: 5 INJECTION, SOLUTION INTRAMUSCULAR; INTRAVENOUS at 02:07

## 2020-07-03 RX ADMIN — MICAFUNGIN SODIUM 100 MG: 20 INJECTION, POWDER, LYOPHILIZED, FOR SOLUTION INTRAVENOUS at 08:07

## 2020-07-03 RX ADMIN — DEXMEDETOMIDINE HYDROCHLORIDE 1 MCG/KG/HR: 100 INJECTION, SOLUTION, CONCENTRATE INTRAVENOUS at 02:07

## 2020-07-03 RX ADMIN — FUROSEMIDE 60 MG: 10 INJECTION, SOLUTION INTRAVENOUS at 11:07

## 2020-07-03 RX ADMIN — METHYLPREDNISOLONE SODIUM SUCCINATE 40 MG: 40 INJECTION, POWDER, FOR SOLUTION INTRAMUSCULAR; INTRAVENOUS at 09:07

## 2020-07-03 RX ADMIN — IPRATROPIUM BROMIDE AND ALBUTEROL SULFATE 3 ML: .5; 3 SOLUTION RESPIRATORY (INHALATION) at 03:07

## 2020-07-03 RX ADMIN — ENOXAPARIN SODIUM 165 MG: 120 INJECTION SUBCUTANEOUS at 08:07

## 2020-07-03 RX ADMIN — POTASSIUM CHLORIDE 60 MEQ: 14.9 INJECTION, SOLUTION INTRAVENOUS at 06:07

## 2020-07-03 RX ADMIN — IPRATROPIUM BROMIDE AND ALBUTEROL SULFATE 3 ML: .5; 3 SOLUTION RESPIRATORY (INHALATION) at 04:07

## 2020-07-03 RX ADMIN — MIDAZOLAM HYDROCHLORIDE 7 MG/HR: 5 INJECTION, SOLUTION INTRAMUSCULAR; INTRAVENOUS at 08:07

## 2020-07-03 RX ADMIN — METOCLOPRAMIDE 10 MG: 5 INJECTION, SOLUTION INTRAMUSCULAR; INTRAVENOUS at 05:07

## 2020-07-03 RX ADMIN — IPRATROPIUM BROMIDE AND ALBUTEROL SULFATE 3 ML: .5; 3 SOLUTION RESPIRATORY (INHALATION) at 07:07

## 2020-07-03 RX ADMIN — IPRATROPIUM BROMIDE AND ALBUTEROL SULFATE 3 ML: .5; 3 SOLUTION RESPIRATORY (INHALATION) at 11:07

## 2020-07-03 RX ADMIN — DEXMEDETOMIDINE HYDROCHLORIDE 1 MCG/KG/HR: 100 INJECTION, SOLUTION, CONCENTRATE INTRAVENOUS at 03:07

## 2020-07-03 RX ADMIN — ENOXAPARIN SODIUM 165 MG: 120 INJECTION SUBCUTANEOUS at 09:07

## 2020-07-03 RX ADMIN — LEVOFLOXACIN 750 MG: 750 INJECTION, SOLUTION INTRAVENOUS at 02:07

## 2020-07-03 RX ADMIN — DEXMEDETOMIDINE HYDROCHLORIDE 1 MCG/KG/HR: 100 INJECTION, SOLUTION, CONCENTRATE INTRAVENOUS at 09:07

## 2020-07-03 RX ADMIN — DEXMEDETOMIDINE HYDROCHLORIDE 1 MCG/KG/HR: 100 INJECTION, SOLUTION, CONCENTRATE INTRAVENOUS at 10:07

## 2020-07-03 RX ADMIN — INSULIN ASPART 4 UNITS: 100 INJECTION, SOLUTION INTRAVENOUS; SUBCUTANEOUS at 12:07

## 2020-07-03 RX ADMIN — CHLORHEXIDINE GLUCONATE 0.12% ORAL RINSE 15 ML: 1.2 LIQUID ORAL at 08:07

## 2020-07-03 RX ADMIN — LINEZOLID 600 MG: 600 INJECTION, SOLUTION INTRAVENOUS at 10:07

## 2020-07-03 RX ADMIN — INSULIN ASPART 2 UNITS: 100 INJECTION, SOLUTION INTRAVENOUS; SUBCUTANEOUS at 11:07

## 2020-07-03 RX ADMIN — INSULIN ASPART 4 UNITS: 100 INJECTION, SOLUTION INTRAVENOUS; SUBCUTANEOUS at 05:07

## 2020-07-03 RX ADMIN — BUDESONIDE 0.25 MG: 0.25 SUSPENSION RESPIRATORY (INHALATION) at 07:07

## 2020-07-03 RX ADMIN — CHLORHEXIDINE GLUCONATE 0.12% ORAL RINSE 15 ML: 1.2 LIQUID ORAL at 09:07

## 2020-07-03 RX ADMIN — LINEZOLID 600 MG: 600 INJECTION, SOLUTION INTRAVENOUS at 09:07

## 2020-07-03 RX ADMIN — IPRATROPIUM BROMIDE AND ALBUTEROL SULFATE 3 ML: .5; 3 SOLUTION RESPIRATORY (INHALATION) at 12:07

## 2020-07-03 RX ADMIN — FUROSEMIDE 60 MG: 10 INJECTION, SOLUTION INTRAVENOUS at 12:07

## 2020-07-03 RX ADMIN — PANTOPRAZOLE SODIUM 40 MG: 40 INJECTION, POWDER, LYOPHILIZED, FOR SOLUTION INTRAVENOUS at 09:07

## 2020-07-03 NOTE — PROGRESS NOTES
Progress Note  PULMONARY    Admit Date: 6/23/2020 07/03/2020      SUBJECTIVE:     June 25th-patient is sedated, no complaints, intubated.  6/26 intubated.  6/27 no c/o intubated.  6/28 no new c/o,intubated, arouses  6/29- no new c/o, intubated,   6/30 no new c/o  7/1- remains intubated, on SIMV with peep 20 FiO2 65%. Febrile to 101.3 this am. Heavily sedated w/ versed 8mg/hr  7/2- remains intubated, FiO2 70%. On versed 3mg/hr and awake, writing to communicate  7/3- remains intubated, oxygenation w/o major change. afebrile    PFSH and Allergies reviewed.    OBJECTIVE:     Vitals (Most recent):  Vitals:    07/03/20 0715   BP: (!) 118/58   Pulse: 71   Resp: 16   Temp: 99.1 °F (37.3 °C)       Vitals (24 hour range):  Temp:  [98.4 °F (36.9 °C)-99.9 °F (37.7 °C)]   Pulse:  [67-86]   Resp:  [16-35]   BP: (108-126)/(53-75)   SpO2:  [91 %-96 %]   Arterial Line BP: (113-137)/(56-72)       Intake/Output Summary (Last 24 hours) at 7/3/2020 1026  Last data filed at 7/3/2020 0900  Gross per 24 hour   Intake 3341.55 ml   Output 4650 ml   Net -1308.45 ml          Physical Exam:  The patient's neuro status (alertness,orientation,cognitive function,motor skills,), pharyngeal exam (oral lesions, hygiene, abn dentition,), Neck (jvd,mass,thyroid,nodes in neck and above/below clavicle),RESPIRATORY(symmetry,effort,fremitus,percussion,auscultation),  Cor(rhythm,heart tones including gallops,perfusion,edema)ABD(distention,hepatic&splenomegaly,tenderness,masses), Skin(rash,cyanosis),Psyc(affect,judgement,).  Exam negative except for these pertinent findings:    Morbid obesity, intubated, sedated  Awake, nods and writes to communicate  Breath sounds decreased bilaterally  Abdomen soft, tender  Rectal tube w/ loose brown stool  No edema    Radiographs reviewed: view by direct vision   CXR 7/1/20- bibasilar airspace disease, opacification overlying left hemidiaphragm  CTA chest 6/30/20- no PE. Bilateral lower lobes with dense  consolidations, patchy infiltrates mid and upper lobes as well. PA trunk enlarged    Labs     Recent Labs   Lab 07/03/20 0315   WBC 22.05*   HGB 13.3   HCT 44.6      BAND 3.0     Recent Labs   Lab 07/03/20 0315      K 3.4*      CO2 29   BUN 30*   CREATININE 0.8   *   CALCIUM 9.2   MG 2.1   PHOS 3.3   AST 8*   ALT 11   ALKPHOS 48*   BILITOT 0.5   PROT 7.4   ALBUMIN 2.7*     Recent Labs   Lab 07/03/20 0444   PH 7.379   PCO2 57.0*   PO2 73*   HCO3 33.6*     Microbiology Results (last 7 days)     Procedure Component Value Units Date/Time    Blood culture [[id number]] Collected: 07/01/20 2034    Order Status: Completed Specimen: Blood Updated: 07/03/20 1012     Blood Culture, Routine No Growth to date      No Growth to date    Narrative:      From arterial line    Blood culture [[id number]] Collected: 07/01/20 2036    Order Status: Completed Specimen: Blood from Line, Central Neck Updated: 07/03/20 1012     Blood Culture, Routine No Growth to date      No Growth to date    Narrative:      From TLC    Blood culture [[id number]] Collected: 06/27/20 0550    Order Status: Completed Specimen: Blood from Line, Central Updated: 07/03/20 0612     Blood Culture, Routine No growth after 5 days.    Blood culture [[id number]] Collected: 06/28/20 0824    Order Status: Completed Specimen: Blood from Antecubital, Right Arm Updated: 07/02/20 2212     Blood Culture, Routine No Growth to date      No Growth to date      No Growth to date      No Growth to date      No Growth to date    Blood culture [[id number]] Collected: 06/26/20 1914    Order Status: Completed Specimen: Blood Updated: 07/02/20 0612     Blood Culture, Routine No growth after 5 days.    Narrative:      X2 sticks, one from central line, one peripheral    Blood culture [[id number]] Collected: 06/26/20 1914    Order Status: Completed Specimen: Blood from Antecubital, Left Arm Updated: 07/02/20 0612     Blood Culture, Routine No growth after 5 days.     IV catheter culture [[id number]]     Order Status: No result Specimen: Catheter Tip, Intrajugular     Respiratory Infection Panel (PCR), Nasopharyngeal [[id number]] Collected: 07/01/20 1400    Order Status: Completed Specimen: Nasopharyngeal Swab Updated: 07/01/20 1513     Respiratory Infection Panel Source NP Swab     Adenovirus Not Detected     Coronavirus 229E, Common Cold Virus Not Detected     Coronavirus HKU1, Common Cold Virus Not Detected     Coronavirus NL63, Common Cold Virus Not Detected     Coronavirus OC43, Common Cold Virus Not Detected     Comment: The Coronavirus strains detected in this test cause the common cold.  These strains are not the COVID-19 (novel Coronavirus)strain   associated with the respiratory disease outbreak.          Human Metapneumovirus Not Detected     Human Rhinovirus/Enterovirus Not Detected     Influenza A (subtypes H1, H1-2009,H3) Not Detected     Influenza B Not Detected     Parainfluenza Virus 1 Not Detected     Parainfluenza Virus 2 Not Detected     Parainfluenza Virus 3 Not Detected     Parainfluenza Virus 4 Not Detected     Respiratory Syncytial Virus Not Detected     Bordetella Parapertussis (DU0405) Not Detected     Bordetella pertussis (ptxP) Not Detected     Chlamydia pneumoniae Not Detected     Mycoplasma pneumoniae Not Detected     Comment: Respiratory Infection Panel testing performed by Multiplex PCR.       Narrative:      For all other respiratory sources, order [id number] -  Respiratory Viral Panel by PCR    Culture, Respiratory with Gram Stain [[id number]]  (Abnormal) Collected: 06/27/20 1444    Order Status: Completed Specimen: Respiratory from Tracheal Aspirate Updated: 06/30/20 0718     Respiratory Culture No S aureus or Pseudomonas isolated.      KENROY LUSITANIAE  Rare  Normal respiratory estephanie also present       Gram Stain (Respiratory) <10 epithelial cells per low power field.     Gram Stain (Respiratory) Rare WBC's     Gram Stain (Respiratory) No organisms  seen    Blood culture [[id number]] Collected: 06/23/20 2201    Order Status: Completed Specimen: Blood Updated: 06/29/20 0612     Blood Culture, Routine No growth after 5 days.    Urine Culture High Risk [[id number]] Collected: 06/27/20 1437    Order Status: Completed Specimen: Urine, Catheterized Updated: 06/29/20 0220     Urine Culture, Routine No growth    Narrative:      Indicated criteria for high risk culture:->Other  Other (specify):->icu    Culture, Respiratory with Gram Stain [[id number]]  (Abnormal)  (Susceptibility) Collected: 06/23/20 2152    Order Status: Completed Specimen: Respiratory from Tracheal Aspirate Updated: 06/26/20 1047     Respiratory Culture No Pseudomonas isolated.      STAPHYLOCOCCUS AUREUS  Moderate  Normal respiratory estephanie also present       Gram Stain (Respiratory) <10 epithelial cells per low power field.     Gram Stain (Respiratory) Few WBC's     Gram Stain (Respiratory) Rare Gram positive cocci        Echo 6/22/2020  · Concentric left ventricular remodeling.  · Normal left ventricular systolic function. The estimated ejection fraction is 69%.  · Normal LV diastolic function.  · No wall motion abnormalities.  · Normal right ventricular systolic function.  · Mild right atrial enlargement.  · Trivial pericardial effusion.  · Mild left atrial enlargement.       Impression:  Active Hospital Problems    Diagnosis  POA    *Severe sepsis [A41.9, R65.20]  Yes    Pulmonary hypertension [I27.20]  Yes     By pulmonary artery size on ct chest 6/30      Obstructive sleep apnea syndrome [G47.33]  Yes    Staphylococcal pneumonia [J15.20]  Yes    Type 2 diabetes mellitus [E11.9]  Yes    Nicotine dependence [F17.200]  Yes    Bilateral pneumonia [J18.9]  Yes    Acute respiratory failure with hypoxia and hypercarbia [J96.01, J96.02]  Yes    Obesity hypoventilation syndrome [E66.2]  Yes    Mild intermittent asthma [J45.20]  Yes    Class 3 severe obesity with serious comorbidity and body mass  index (BMI) of 60.0 to 69.9 in adult [E66.01, Z68.44]  Not Applicable    Cardiomegaly [I51.7]  Yes      Resolved Hospital Problems   No resolved problems to display.               Plan:   Acute on chronic hypoxemic/hypercapneic resp failure  Asthma exacerbation  Bilateral pneumonia- MSSA on sputum cx  LAURIE/OHS  Morbid obesity    - continue vent support w/ high PEEP   - oxygenation stagnant  - position changes in bed to lie on sides- may help improve shunting  - CXR in am  - weaning trials when oxygenation improves  - continue versed drip  - per Dr. Gentile had suspected propofol adverse reaction w/ bradycardia earlier in course  - continue diuresis  - continue nebulized bronchodilators and budesonide  - continue solumedrol 40mg daily  - ID following  - continue antibiotics  - continues on therapeutic dose lovenox- this is empiric due to her high risk for developing VTE  - stepmother and pt updated at bedside- stagnant oxygenation today, continue course w/ treatment of her pneumonia and should start to see improvement. If not consider trach for longer vent support    The following were evaluated and adjusted as needed: mechanical ventilator settings and weaning status, sedation and neurologic status and acid base balance and oxygenation needs       Critical Care  - THE PATIENT HAS A HIGH PROBABILITY OF IMMINENT OR LIFE THREATENING DETERIORATION.  Over 50%time of encounter was in direct care at bedside.  Time was 30 to 74 minutes required for patient care.  Time needed for all of the above totaled 38 minutes.    Keshia Guerra MD  Pulmonary & Critical Care Medicine

## 2020-07-03 NOTE — PROGRESS NOTES
[facility]  Adult Nutrition  Progress Note    SUMMARY      Intervention: collaboration with care providers, enteral nutrition therapy    Recommendation:   1) Continue TF of Peptamen VHP @ 50 mL/hr while intubated. Provides 1200 kcal (100% EEN), 110 g (85% EPN), 1008 mL free water. Water flushes per MD/NP.   2) Consider adding 2 pack beneprotein BID to meet 100% protein needs.   3) Replete K PRN.   4) When extubated order Diabetic, 1500 Garret Diet.  5) Nutrition education on diabetic diet once pt appropriate     Goals: % of EEN and EPN at RD f/u  Nutrition Goal Status: met  Communication of RD Recs: POC, Sticky     Reason for Assessment     Reason For Assessment: follow up  Diagnosis: (severe sepsis)  Relevant Medical History: asthma, DM2, severe obesity  Interdisciplinary Rounds: attended     General Information Comments: 31 y/o female admitted with sepsis, bilateral pneumonia. + vent, on high dose propofol no pressor. NFPE done 6/24/20, no wasting seen pt appears obese. NPO x 1 day. Glucose WNL, of note with hx. of DM 2 on steriods.  6/29/20 TF at 30 ml/hr yesterday but pt did not tolerate, held. Reglan started and pt had BM per RN so TF restarted this morning, at 15 ml/hr. 130 ml residual noted. Plan for pt to continue reglan and slowly advance TF as able.  7/1/20 TF held yesterday, restarted at 10 ml/hr, still having 100 ml residual. Per RN will trial beneprotein and if unable to advance today will trial impact peptide.  7/3/20 Pt remains ventilated and sedated.TPN was stopped 2' pt tolerating TF today per MD. She is currently resceiving TF of Peptamen VHP @ 50mL/hr. She was able to have a BM today. Nutrition related lab values noted; she is hypokalemic and her blood sugars remain elevated.      Nutrition Discharge Planning: to be determined- DM 1500 kcal diet     Nutrition Risk Screen     Nutrition Risk Screen: no indicators present     Nutrition/Diet History     Typical Food/Fluid  "Intake: unable to obtain  Spiritual, Cultural Beliefs, Taoism Practices, Values that Affect Care: no  Food Allergies: NKFA  Factors Affecting Nutritional Intake: NPO, on mechanical ventilation     Anthropometrics  Height Method: Stated  Height: 5' 2"  Height (inches): 62 in  Weight Method: Bed Scale  Weight: (!) 146.4 kg 7/1/20, 156.4 kg 6/29, 165 kg (admission)  Weight (lb): (!) 322 lb ( on lasix)  Ideal Body Weight (IBW), Female: 110 lb  % Ideal Body Weight, Female (lb): 330.69 %  BMI (Calculated): 59 kg/m2  BMI Grade: greater than 40 - morbid obesity        Lab/Procedures/Meds     Pertinent Labs Reviewed: reviewed  BMP  Lab Results   Component Value Date     07/03/2020    K 3.4 (L) 07/03/2020     07/03/2020    CO2 29 07/03/2020    BUN 30 (H) 07/03/2020    CREATININE 0.8 07/03/2020    CALCIUM 9.2 07/03/2020    ANIONGAP 13 07/03/2020    ESTGFRAFRICA >60 07/03/2020    EGFRNONAA >60 07/03/2020     Lab Results   Component Value Date    CALCIUM 9.2 07/03/2020    PHOS 3.3 07/03/2020       Recent Labs   Lab 07/03/20  0511   POCTGLUCOSE 217*     Pertinent Medications Reviewed: reviewed  Insulin, mg sulfate, kcl, methylprednisolone, lasix, no propofol     Estimated/Assessed Needs     Weight Used For Calorie Calculations: 52.2 kg IBW  Energy Calorie Requirements (kcal): 22-25 kcal/kg ( IBW, BMI > 50 kg/m2) = 2426-6689 kcal  Energy Need Method: Kcal/kg  Protein Requirements: 130 g protein  Weight Used For Protein Calculations: 52.2 kg (115 lb 1.3 oz)(IBW ( 2.5 g protein/kg))  Fluid Requirements (mL): 1300 ml or per MD  Estimated Fluid Requirement Method: RDA Method  CHO Requirement: 153-170 g        Nutrition Prescription Ordered     Current Diet Order: TF above         Evaluation of Received Nutrient/Fluid Intake     Energy Calories Required:   Protein Required: not meeting needs  Fluid Required: not meeting needs  Tolerance: high reisduals  % Intake of Estimated Energy Needs: 20%  % Meal Intake: NPO + " TF     Nutrition Risk     Level of Risk/Frequency of Follow-up: moderate/ high 2-3 x weekly     Assessment and Plan     Inadequate energy intake  R/t NPO  As evidenced by PO intakes < 50% EEN x 1-2 days  Intervention: above  resolved     Altered nutrition related lab values  R/t medication side effects and altered absorption of nutrients  As evidenced by elevated A1C and glucose  Intervention: above  improving     Monitor and Evaluation     Food and Nutrient Intake: energy intake  Food and Nutrient Adminstration: diet order, enteral / parenteral nutrition order  Anthropometric Measurements: weight  Biochemical Data, Medical Tests and Procedures: electrolyte and renal panel, gastrointestinal profile, glucose/endocrine profile  Nutrition-Focused Physical Findings: overall appearance      Malnutrition Assessment     Skin (Micronutrient): (Maxi = 12)   Micronutrient Evaluation: no deficiencies   Edema (Fluid Accumulation): (3)        Nutrition Follow-Up     RD Follow-up?: Yes

## 2020-07-03 NOTE — ASSESSMENT & PLAN NOTE
This patient does have evidence of infective focus  My overall impression is severe sepsis.  Antibiotics given-   Antibiotics (From admission, onward)    Start     Stop Route Frequency Ordered    07/01/20 2130  linezolid 600 mg/300 mL IVPB 600 mg      -- IV Every 12 hours (non-standard times) 07/01/20 2021 06/29/20 1500  levoFLOXacin 750 mg/150 mL IVPB 750 mg      -- IV Every 24 hours (non-standard times) 06/29/20 1231       And on intravenous Mycamine    Severe Sepsis only-  Latest labs reviewed, they are-  Recent Labs   Lab 07/03/20  0315   BILITOT 0.5     No results for input(s): LACTATE in the last 24 hours.  Recent Labs   Lab 07/03/20  0444   PH 7.379   PO2 73*   PCO2 57.0*   HCO3 33.6*   BE 8       Organ dysfunction indicated by Acute respiratory failure      Temp Readings from Last 1 Encounters:   07/03/20 99.3 °F (37.4 °C)     BP Readings from Last 1 Encounters:   07/03/20 (!) 118/58     Pulse Readings from Last 1 Encounters:   07/03/20 74

## 2020-07-03 NOTE — RESPIRATORY THERAPY
07/03/20 0712   Patient Assessment/Suction   Level of Consciousness (AVPU) responds to voice   All Lung Fields Breath Sounds diminished   Suction Method tracheal   Secretions Amount small   Secretions Color pale;yellow   Secretions Characteristics thick   PRE-TX-O2   O2 Device (Oxygen Therapy) ventilator   $ Is the patient on Low Flow Oxygen? Yes   Oxygen Concentration (%) 70   SpO2 96 %   Pulse Oximetry Type Continuous   $ Pulse Oximetry - Multiple Charge Pulse Oximetry - Multiple   Pulse 71   Resp 16   Temp 99.1 °F (37.3 °C)   ETCO2   $ ETCO2 Charge Exhaled CO2 Monitoring   $ ETCO2 Usage Currently wearing   ETCO2 (mmHg) 50 mmHg   Aerosol Therapy   $ Aerosol Therapy Charges Aerosol Treatment   Respiratory Treatment Status (SVN) given   Treatment Route (SVN) in-line   Patient Position (SVN) HOB elevated   Post Treatment Assessment (SVN) breath sounds unchanged   Signs of Intolerance (SVN) none   Breath Sounds Post-Respiratory Treatment   Post-treatment Heart Rate (beats/min) 76   Post-treatment Resp Rate (breaths/min) 17   Wound Care   $ Wound Care Tech Time 15 min   Area of Concern Upper lip;Lower lip;Corner lip   Skin Color/Characteristics without discoloration   Skin Temperature warm        Airway - Non-Surgical 06/22/20 1625 Endotracheal Tube   Placement Date/Time: 06/22/20 1625   Present Prior to Hospital Arrival?: No  Method of Intubation: Direct laryngoscopy  Inserted by: MD  Airway Device: Endotracheal Tube  Mask Ventilation: Easy  Blade: Ramon #3  Airway Device Size: 7.5  Style: Cuffed...   Secured at 24 cm   Measured At Lips   Secured Location Right   Secured by Commercial tube lord   Bite Block right   Site Condition Dry   Status Intact;Secured;Patent   Site Assessment Clean;Dry   Vent Select   Conventional Vent Y   Ventilator Initiated No   $ Ventilator Subsequent 1   Charged w/in last 24h YES   IHI Ventilator Associated Pneumonia Bundle   Head of Bed Elevated  HOB 30   Preset Conventional  Ventilator Settings   Vent Type    Ventilation Type VC   Vent Mode A/C   Humidity HME   Set Rate 16 BPM   Vt Set 500 mL   PEEP/CPAP 20 cmH20   Pressure Support 0 cmH20   Waveform RAMP   Peak Flow 50 L/min   Set Inspiratory Pressure 0 cmH20   Insp Time 0 Sec(s)   Plateau Set/Insp. Hold (sec) 0   Insp Rise Time  0 %   Trigger Sensitivity Flow/I-Trigger 3 L/min   P High 0 cm H2O   P Low 0 cm H2O   T High 0 sec   T Low 0 sec   Patient Ventilator Parameters   Resp Rate Total 16 br/min   Peak Airway Pressure 29 cmH2O   Mean Airway Pressure 23 cmH20   Plateau Pressure 33 cmH20   Exhaled Vt 505 mL   Total Ve 8.22 mL   Spont Ve 0 L   I:E Ratio Measured 1:2.40   Conventional Ventilator Alarms   Ve High Alarm 24 L/min   Resp Rate High Alarm 40 br/min   Press High Alarm 65 cmH2O   Apnea Rate 20   Apnea Volume (mL) 450 mL   Apnea Oxygen Concentration  100   Apnea Flow Rate (L/min) 70   T Apnea 20 sec(s)   Ready to Wean/Extubation Screen   FIO2<=50 (chart decimal) (!) 0.7   MV<16L (chart vol.) 8.22   PEEP <=8 (chart #) (!) 20   Ready to Wean Parameters   F/VT Ratio<105 (RSBI) (!) 31.68

## 2020-07-03 NOTE — PLAN OF CARE
07/02/20 1912   Patient Assessment/Suction   Level of Consciousness (AVPU) responds to voice   Respiratory Effort Normal;Unlabored   Expansion/Accessory Muscles/Retractions expansion symmetric   All Lung Fields Breath Sounds diminished;coarse   Rhythm/Pattern, Respiratory assisted mechanically   Cough Frequency with stimulation   Suction Method oral;tracheal   $ Suction Charges Inline Suction Procedure Stat Charge   Secretions Amount small   Secretions Color red-streaked;yellow   Secretions Characteristics thick   PRE-TX-O2   O2 Device (Oxygen Therapy) ventilator   Oxygen Concentration (%) 70   SpO2 95 %   Pulse Oximetry Type Continuous   Pulse 75   Resp 16   Temp 99.9 °F (37.7 °C)   ETCO2   $ ETCO2 Usage Refused to wear   ETCO2 (mmHg) 47 mmHg   Aerosol Therapy   $ Aerosol Therapy Charges Aerosol Treatment   Respiratory Treatment Status (SVN) given   Treatment Route (SVN) in-line   Patient Position (SVN) HOB elevated   Post Treatment Assessment (SVN) breath sounds unchanged   Signs of Intolerance (SVN) none   Breath Sounds Post-Respiratory Treatment   Post-treatment Heart Rate (beats/min) 77   Post-treatment Resp Rate (breaths/min) 18   Wound Care   $ Wound Care Tech Time 15 min   Area of Concern Upper lip;Cheek   Skin Color/Characteristics without discoloration   Skin Temperature warm        Airway - Non-Surgical 06/22/20 1625 Endotracheal Tube   Placement Date/Time: 06/22/20 1625   Present Prior to Hospital Arrival?: No  Method of Intubation: Direct laryngoscopy  Inserted by: MD  Airway Device: Endotracheal Tube  Mask Ventilation: Easy  Blade: Ramon #3  Airway Device Size: 7.5  Style: Cuffed...   Secured at 24 cm   Measured At Lips   Secured Location Center   Secured by Commercial tube lord   Bite Block center   Site Condition Dry   Status Intact;Secured   Site Assessment Clean;Dry   Vent Select   Conventional Vent Y   Charged w/in last 24h YES   IHI Ventilator Associated Pneumonia Bundle   Oral Care Mouth  suctioned   Additional VAP Prevention Documentation Clean equipment maintained   Preset Conventional Ventilator Settings   Vent Type    Ventilation Type VC   Vent Mode A/C   Humidity HME   Set Rate 16 BPM   Vt Set 500 mL   PEEP/CPAP 20 cmH20   Pressure Support 0 cmH20   Waveform RAMP   Peak Flow 50 L/min   Set Inspiratory Pressure 0 cmH20   Insp Time 0 Sec(s)   Plateau Set/Insp. Hold (sec) 0   Insp Rise Time  0 %   Trigger Sensitivity Flow/I-Trigger 3 L/min   P High 0 cm H2O   P Low 0 cm H2O   T High 0 sec   T Low 0 sec   Patient Ventilator Parameters   Resp Rate Total 16 br/min   Peak Airway Pressure 34 cmH2O   Mean Airway Pressure 25 cmH20   Plateau Pressure 33 cmH20   Exhaled Vt 500 mL   Total Ve 7.99 mL   Spont Ve 0 L   I:E Ratio Measured 1:2.40   Conventional Ventilator Alarms   Ve High Alarm 24 L/min   Resp Rate High Alarm 40 br/min   Press High Alarm 65 cmH2O   Apnea Rate 20   Apnea Volume (mL) 450 mL   Apnea Oxygen Concentration  100   Apnea Flow Rate (L/min) 70   T Apnea 20 sec(s)   Ready to Wean/Extubation Screen   FIO2<=50 (chart decimal) (!) 0.7   MV<16L (chart vol.) 7.99   PEEP <=8 (chart #) (!) 20   Ready to Wean Parameters   F/VT Ratio<105 (RSBI) (!) 32

## 2020-07-03 NOTE — ASSESSMENT & PLAN NOTE
MSSA.  Continue Zyvox and intravenous Levaquin therapy.  Also on intravenous Mycamine. Follow Pulmonary recommendations.

## 2020-07-03 NOTE — ASSESSMENT & PLAN NOTE
IV zosyn, IV Levaquin and Mycamine.  Follow Pulmonary and Infectious Disease recommendations  Blood and sputum cultures-follow.  Sputum culture growing staph aureus species.  Monitor clinical response.

## 2020-07-03 NOTE — PLAN OF CARE
Intervention: collaboration with care providers, enteral nutrition therapy    Recommendation:   1) Continue TF of Peptamen VHP @ 50 mL/hr while intubated. Provides 1200 kcal (100% EEN), 110 g (85% EPN), 1008 mL free water. Water flushes per MD/NP.   2) Consider adding 2 pack beneprotein BID to meet 100% protein needs.   3) Replete K PRN.   4) When extubated order Diabetic, 1500 Garret Diet.  5) Nutrition education on diabetic diet once pt appropriate     Goals: % of EEN and EPN at RD f/u  Nutrition Goal Status: met  Communication of RD Recs: POC, Sticky

## 2020-07-03 NOTE — SUBJECTIVE & OBJECTIVE
Interval History:  Patient is in intensive care unit with respiratory failure, on mechanical ventilation after getting transferred from [place], [place].  T-max 99.9 degree F.  Patient is requiring 70% FiO2 with PEEP 20 cm of water.  No acute distress noted.  Patient is following commands. Patient could not be proned due to hypoxia.    Review of Systems   Unable to perform ROS: Intubated     Objective:     Vital Signs (Most Recent):  Temp: 99.1 °F (37.3 °C) (07/03/20 0715)  Pulse: 71 (07/03/20 0715)  Resp: 16 (07/03/20 0715)  BP: (!) 118/58 (07/03/20 0715)  SpO2: 96 % (07/03/20 0715) Vital Signs (24h Range):  Temp:  [98.4 °F (36.9 °C)-99.9 °F (37.7 °C)] 99.1 °F (37.3 °C)  Pulse:  [67-86] 71  Resp:  [16-35] 16  SpO2:  [91 %-96 %] 96 %  BP: (108-126)/(53-75) 118/58  Arterial Line BP: (113-137)/(56-72) 123/65     Weight: (!) 146.4 kg (322 lb 12.1 oz)  Body mass index is 59.03 kg/m².    Intake/Output Summary (Last 24 hours) at 7/3/2020 1011  Last data filed at 7/3/2020 0900  Gross per 24 hour   Intake 3341.55 ml   Output 4650 ml   Net -1308.45 ml      Physical Exam  Vitals signs and nursing note reviewed.   Constitutional:       Appearance: She is well-developed. She is obese.      Interventions: She is sedated and intubated.   HENT:      Head: Normocephalic and atraumatic.   Eyes:      Conjunctiva/sclera: Conjunctivae normal.      Pupils: Pupils are equal, round, and reactive to light.   Neck:      Musculoskeletal: Normal range of motion and neck supple.   Cardiovascular:      Rate and Rhythm: Normal rate and regular rhythm.      Pulses: Normal pulses.      Heart sounds: Normal heart sounds.   Pulmonary:      Effort: Pulmonary effort is normal. She is intubated.      Breath sounds: Decreased breath sounds present.   Abdominal:      General: Bowel sounds are normal.      Palpations: Abdomen is soft. There is no mass.      Tenderness: There is no abdominal tenderness.   Genitourinary:     Comments:  Vazquez catheter in place    Musculoskeletal:         General: No swelling or deformity.   Skin:     General: Skin is warm and dry.      Capillary Refill: Capillary refill takes 2 to 3 seconds.   Neurological:      Comments: Unable to assess as pt is on mechanical ventilation with sedation     Psychiatric:      Comments: Sedated          Significant Labs:   CBC:   Recent Labs   Lab 07/02/20 0319 07/03/20 0315   WBC 16.68* 22.05*   HGB 14.1 13.3   HCT 45.7 44.6   * 331     CMP:   Recent Labs   Lab 07/02/20 0319 07/03/20 0315    142   K 3.8 3.4*    100   CO2 29 29   * 187*   BUN 27* 30*   CREATININE 0.8 0.8   CALCIUM 9.9 9.2   PROT 7.9 7.4   ALBUMIN 2.8* 2.7*   BILITOT 0.5 0.5   ALKPHOS 50* 48*   AST 8* 8*   ALT 16 11   ANIONGAP 12 13   EGFRNONAA >60 >60     Lactic Acid:   No results for input(s): LACTATE in the last 48 hours.  Microbiology Results (last 7 days)     Procedure Component Value Units Date/Time    Blood culture [[id number]] Collected: 06/27/20 0550    Order Status: Completed Specimen: Blood from Line, Central Updated: 07/03/20 0612     Blood Culture, Routine No growth after 5 days.    Blood culture [[id number]] Collected: 06/28/20 0824    Order Status: Completed Specimen: Blood from Antecubital, Right Arm Updated: 07/02/20 2212     Blood Culture, Routine No Growth to date      No Growth to date      No Growth to date      No Growth to date      No Growth to date    Blood culture [[id number]] Collected: 07/01/20 2036    Order Status: Completed Specimen: Blood from Line, Central Neck Updated: 07/02/20 1715     Blood Culture, Routine No Growth to date    Narrative:      From TLC    Blood culture [[id number]] Collected: 07/01/20 2034    Order Status: Completed Specimen: Blood Updated: 07/02/20 1715     Blood Culture, Routine No Growth to date    Narrative:      From arterial line    Blood culture [[id number]] Collected: 06/26/20 1914    Order Status: Completed Specimen: Blood Updated:  07/02/20 0612     Blood Culture, Routine No growth after 5 days.    Narrative:      X2 sticks, one from central line, one peripheral    Blood culture [[id number]] Collected: 06/26/20 1914    Order Status: Completed Specimen: Blood from Antecubital, Left Arm Updated: 07/02/20 0612     Blood Culture, Routine No growth after 5 days.    IV catheter culture [[id number]]     Order Status: No result Specimen: Catheter Tip, Intrajugular     Respiratory Infection Panel (PCR), Nasopharyngeal [[id number]] Collected: 07/01/20 1400    Order Status: Completed Specimen: Nasopharyngeal Swab Updated: 07/01/20 1513     Respiratory Infection Panel Source NP Swab     Adenovirus Not Detected     Coronavirus 229E, Common Cold Virus Not Detected     Coronavirus HKU1, Common Cold Virus Not Detected     Coronavirus NL63, Common Cold Virus Not Detected     Coronavirus OC43, Common Cold Virus Not Detected     Comment: The Coronavirus strains detected in this test cause the common cold.  These strains are not the COVID-19 (novel Coronavirus)strain   associated with the respiratory disease outbreak.          Human Metapneumovirus Not Detected     Human Rhinovirus/Enterovirus Not Detected     Influenza A (subtypes H1, H1-2009,H3) Not Detected     Influenza B Not Detected     Parainfluenza Virus 1 Not Detected     Parainfluenza Virus 2 Not Detected     Parainfluenza Virus 3 Not Detected     Parainfluenza Virus 4 Not Detected     Respiratory Syncytial Virus Not Detected     Bordetella Parapertussis (MI3169) Not Detected     Bordetella pertussis (ptxP) Not Detected     Chlamydia pneumoniae Not Detected     Mycoplasma pneumoniae Not Detected     Comment: Respiratory Infection Panel testing performed by Multiplex PCR.       Narrative:      For all other respiratory sources, order [id number] -  Respiratory Viral Panel by PCR    Culture, Respiratory with Gram Stain [[id number]]  (Abnormal) Collected: 06/27/20 1444    Order Status: Completed Specimen:  Respiratory from Tracheal Aspirate Updated: 06/30/20 0718     Respiratory Culture No S aureus or Pseudomonas isolated.      KENROY LUSITANIAE  Rare  Normal respiratory estephanie also present       Gram Stain (Respiratory) <10 epithelial cells per low power field.     Gram Stain (Respiratory) Rare WBC's     Gram Stain (Respiratory) No organisms seen    Blood culture [[id number]] Collected: 06/23/20 2201    Order Status: Completed Specimen: Blood Updated: 06/29/20 0612     Blood Culture, Routine No growth after 5 days.    Urine Culture High Risk [[id number]] Collected: 06/27/20 1437    Order Status: Completed Specimen: Urine, Catheterized Updated: 06/29/20 0220     Urine Culture, Routine No growth    Narrative:      Indicated criteria for high risk culture:->Other  Other (specify):->icu    Culture, Respiratory with Gram Stain [[id number]]  (Abnormal)  (Susceptibility) Collected: 06/23/20 2152    Order Status: Completed Specimen: Respiratory from Tracheal Aspirate Updated: 06/26/20 1047     Respiratory Culture No Pseudomonas isolated.      STAPHYLOCOCCUS AUREUS  Moderate  Normal respiratory estephanie also present       Gram Stain (Respiratory) <10 epithelial cells per low power field.     Gram Stain (Respiratory) Few WBC's     Gram Stain (Respiratory) Rare Gram positive cocci        Significant Imaging:   ECHO:  · Concentric left ventricular remodeling.  · Normal left ventricular systolic function. The estimated ejection fraction is 69%.  · Normal LV diastolic function.  · No wall motion abnormalities.  · Normal right ventricular systolic function.  · Mild right atrial enlargement.  · Trivial pericardial effusion.  · Mild left atrial enlargement.    CXR: Mild cardiomegaly.  Hypoventilation.    CXR:   1. Interval placement of endotracheal tube with the tip in the proximal right mainstem bronchus.  This should be withdrawn several cm.  2. The study is motion degraded.  Indistinctness of the left hemidiaphragm could be related to  patient motion with atelectasis and/or pleural effusion not completely excluded.    CXR:  1. Limited evaluation secondary to technique.  2. Findings suspicious for congestive heart failure which does not appear significantly changed over the interval.  3. Small bilateral pleural effusions with bibasilar dependent atelectasis.  4. Endotracheal tube.    CXR:  Stable positioning of support devices.  Unchanged bibasilar consolidation/atelectasis.    CXR: Bibasilar opacification not significantly changed compared to the prior exam.  Positions of lines and tubes described.    CXR:   Continued bibasilar lung infiltrates.  Mild cardiomegaly.  Endotracheal tube, NG tube and central lines in position.    CXR:   Mild decrease in right basilar infiltrate.  Continued left basilar infiltrate.  Cardiomegaly.  Tubes in position.     CTA chest:  Limited evaluation for small peripheral luminal filling defects with the streaky artifact but no large central luminal filling defects are seen in pulmonary artery suggesting pulmonary artery embolism   Bilateral infiltrates with features somewhat suspicious for later changes of COVID-19 pneumonia.    CXR: Lines and tubes remain in place and bilateral infiltrates do not appear significantly changed

## 2020-07-03 NOTE — PROGRESS NOTES
[facility] Medicine  Progress Note    Patient Name: Michelle Wren  MRN: [id number]  Patient Class: IP- Inpatient   Admission Date: 6/23/2020  Length of Stay: 10 days  Attending Physician: Peg Stewart MD  Primary Care Provider: Primary Doctor No        Subjective:     Principal Problem:Severe sepsis        HPI:  Michelle Wren is a 31 y/o female with a past medical history significant for asthma, type 2 diabetes, and severe obesity who is transferred from [facility] to [facility] for pulmonary consultation.  Patient presented to the ED at [facility] yesterday with complaints of 2-3 days of shortness of breath.  It is reported that she uses CPAP at night but she got this from a friend so unsure of settings.  She was placed on BiPAP and given IV Solu-Medrol, bronchodilator treatments, and IV Lasix.  She was admitted to the ICU at [facility].  Patient continued to decline and underwent intubation yesterday around 7:00 p.m.  per report, today patient appeared to be worse.  Her saturations were in the mid 90s on FiO2 of 100%.  A D-dimer was checked and was negative.  Per review of labs which were drawn earlier today, her WBC count was 23,000 and her lactic acid was 2.4.  She was placed on IV Zosyn and IV vancomycin.  Upon arrival to [facility], patient is in no acute distress.  She is intubated and sedated.  Vital signs are stable.  Lactic acid will be repeated now as patient meets sepsis criteria and will check a procalcitonin.  She will be monitored closely in the ICU.           Overview/Hospital Course:  Patient is being managed in intensive care unit, requiring mechanical ventilation under supervision of pulmonary medicine.  Repeat COVID -19 test has been negative.  Patient is receiving intravenous antibiotics for bilateral pneumonia.  Sputum cultures grew MSSA species.  Patient is being followed by infectious disease specialist.  Patient tested negative  for COVID -19 antibody.  Patient has not been able to get extubated, requiring high FiO2 and very high PEEP pressures.    Interval History:  Patient is in intensive care unit with respiratory failure, on mechanical ventilation after getting transferred from [place], [place].  T-max 99.9 degree F.  Patient is requiring 70% FiO2 with PEEP 20 cm of water.  No acute distress noted.  Patient is following commands. Patient could not be proned due to hypoxia.    Review of Systems   Unable to perform ROS: Intubated     Objective:     Vital Signs (Most Recent):  Temp: 99.1 °F (37.3 °C) (07/03/20 0715)  Pulse: 71 (07/03/20 0715)  Resp: 16 (07/03/20 0715)  BP: (!) 118/58 (07/03/20 0715)  SpO2: 96 % (07/03/20 0715) Vital Signs (24h Range):  Temp:  [98.4 °F (36.9 °C)-99.9 °F (37.7 °C)] 99.1 °F (37.3 °C)  Pulse:  [67-86] 71  Resp:  [16-35] 16  SpO2:  [91 %-96 %] 96 %  BP: (108-126)/(53-75) 118/58  Arterial Line BP: (113-137)/(56-72) 123/65     Weight: (!) 146.4 kg (322 lb 12.1 oz)  Body mass index is 59.03 kg/m².    Intake/Output Summary (Last 24 hours) at 7/3/2020 1011  Last data filed at 7/3/2020 0900  Gross per 24 hour   Intake 3341.55 ml   Output 4650 ml   Net -1308.45 ml      Physical Exam  Vitals signs and nursing note reviewed.   Constitutional:       Appearance: She is well-developed. She is obese.      Interventions: She is sedated and intubated.   HENT:      Head: Normocephalic and atraumatic.   Eyes:      Conjunctiva/sclera: Conjunctivae normal.      Pupils: Pupils are equal, round, and reactive to light.   Neck:      Musculoskeletal: Normal range of motion and neck supple.   Cardiovascular:      Rate and Rhythm: Normal rate and regular rhythm.      Pulses: Normal pulses.      Heart sounds: Normal heart sounds.   Pulmonary:      Effort: Pulmonary effort is normal. She is intubated.      Breath sounds: Decreased breath sounds present.   Abdominal:      General: Bowel sounds are normal.       Palpations: Abdomen is soft. There is no mass.      Tenderness: There is no abdominal tenderness.   Genitourinary:     Comments: Vazquez catheter in place    Musculoskeletal:         General: No swelling or deformity.   Skin:     General: Skin is warm and dry.      Capillary Refill: Capillary refill takes 2 to 3 seconds.   Neurological:      Comments: Unable to assess as pt is on mechanical ventilation with sedation     Psychiatric:      Comments: Sedated          Significant Labs:   CBC:   Recent Labs   Lab 07/02/20 0319 07/03/20 0315   WBC 16.68* 22.05*   HGB 14.1 13.3   HCT 45.7 44.6   * 331     CMP:   Recent Labs   Lab 07/02/20 0319 07/03/20 0315    142   K 3.8 3.4*    100   CO2 29 29   * 187*   BUN 27* 30*   CREATININE 0.8 0.8   CALCIUM 9.9 9.2   PROT 7.9 7.4   ALBUMIN 2.8* 2.7*   BILITOT 0.5 0.5   ALKPHOS 50* 48*   AST 8* 8*   ALT 16 11   ANIONGAP 12 13   EGFRNONAA >60 >60     Lactic Acid:   No results for input(s): LACTATE in the last 48 hours.  Microbiology Results (last 7 days)     Procedure Component Value Units Date/Time    Blood culture [[id number]] Collected: 06/27/20 0550    Order Status: Completed Specimen: Blood from Line, Central Updated: 07/03/20 0612     Blood Culture, Routine No growth after 5 days.    Blood culture [[id number]] Collected: 06/28/20 0824    Order Status: Completed Specimen: Blood from Antecubital, Right Arm Updated: 07/02/20 2212     Blood Culture, Routine No Growth to date      No Growth to date      No Growth to date      No Growth to date      No Growth to date    Blood culture [[id number]] Collected: 07/01/20 2036    Order Status: Completed Specimen: Blood from Line, Central Neck Updated: 07/02/20 1715     Blood Culture, Routine No Growth to date    Narrative:      From [facility]    Blood culture [[id number]] Collected: 07/01/20 2034    Order Status: Completed Specimen: Blood Updated: 07/02/20 1715     Blood Culture, Routine No Growth to date    Narrative:       From arterial line    Blood culture [[id number]] Collected: 06/26/20 1914    Order Status: Completed Specimen: Blood Updated: 07/02/20 0612     Blood Culture, Routine No growth after 5 days.    Narrative:      X2 sticks, one from central line, one peripheral    Blood culture [[id number]] Collected: 06/26/20 1914    Order Status: Completed Specimen: Blood from Antecubital, Left Arm Updated: 07/02/20 0612     Blood Culture, Routine No growth after 5 days.    IV catheter culture [[id number]]     Order Status: No result Specimen: Catheter Tip, Intrajugular     Respiratory Infection Panel (PCR), Nasopharyngeal [[id number]] Collected: 07/01/20 1400    Order Status: Completed Specimen: Nasopharyngeal Swab Updated: 07/01/20 1513     Respiratory Infection Panel Source NP Swab     Adenovirus Not Detected     Coronavirus 229E, Common Cold Virus Not Detected     Coronavirus HKU1, Common Cold Virus Not Detected     Coronavirus NL63, Common Cold Virus Not Detected     Coronavirus OC43, Common Cold Virus Not Detected     Comment: The Coronavirus strains detected in this test cause the common cold.  These strains are not the COVID-19 (novel Coronavirus)strain   associated with the respiratory disease outbreak.          Human Metapneumovirus Not Detected     Human Rhinovirus/Enterovirus Not Detected     Influenza A (subtypes H1, H1-2009,H3) Not Detected     Influenza B Not Detected     Parainfluenza Virus 1 Not Detected     Parainfluenza Virus 2 Not Detected     Parainfluenza Virus 3 Not Detected     Parainfluenza Virus 4 Not Detected     Respiratory Syncytial Virus Not Detected     Bordetella Parapertussis (SX5702) Not Detected     Bordetella pertussis (ptxP) Not Detected     Chlamydia pneumoniae Not Detected     Mycoplasma pneumoniae Not Detected     Comment: Respiratory Infection Panel testing performed by Multiplex PCR.       Narrative:      For all other respiratory sources, order [id number] -  Respiratory Viral Panel by PCR     Culture, Respiratory with Gram Stain [[id number]]  (Abnormal) Collected: 06/27/20 1444    Order Status: Completed Specimen: Respiratory from Tracheal Aspirate Updated: 06/30/20 0718     Respiratory Culture No S aureus or Pseudomonas isolated.      KENROY LUSITANIAE  Rare  Normal respiratory estephanie also present       Gram Stain (Respiratory) <10 epithelial cells per low power field.     Gram Stain (Respiratory) Rare WBC's     Gram Stain (Respiratory) No organisms seen    Blood culture [[id number]] Collected: 06/23/20 2201    Order Status: Completed Specimen: Blood Updated: 06/29/20 0612     Blood Culture, Routine No growth after 5 days.    Urine Culture High Risk [[id number]] Collected: 06/27/20 1437    Order Status: Completed Specimen: Urine, Catheterized Updated: 06/29/20 0220     Urine Culture, Routine No growth    Narrative:      Indicated criteria for high risk culture:->Other  Other (specify):->icu    Culture, Respiratory with Gram Stain [[id number]]  (Abnormal)  (Susceptibility) Collected: 06/23/20 2152    Order Status: Completed Specimen: Respiratory from Tracheal Aspirate Updated: 06/26/20 1047     Respiratory Culture No Pseudomonas isolated.      STAPHYLOCOCCUS AUREUS  Moderate  Normal respiratory estephanie also present       Gram Stain (Respiratory) <10 epithelial cells per low power field.     Gram Stain (Respiratory) Few WBC's     Gram Stain (Respiratory) Rare Gram positive cocci        Significant Imaging:   ECHO:  · Concentric left ventricular remodeling.  · Normal left ventricular systolic function. The estimated ejection fraction is 69%.  · Normal LV diastolic function.  · No wall motion abnormalities.  · Normal right ventricular systolic function.  · Mild right atrial enlargement.  · Trivial pericardial effusion.  · Mild left atrial enlargement.    CXR: Mild cardiomegaly.  Hypoventilation.    CXR:   1. Interval placement of endotracheal tube with the tip in the proximal right mainstem bronchus.  This should  be withdrawn several cm.  2. The study is motion degraded.  Indistinctness of the left hemidiaphragm could be related to patient motion with atelectasis and/or pleural effusion not completely excluded.    CXR:  1. Limited evaluation secondary to technique.  2. Findings suspicious for congestive heart failure which does not appear significantly changed over the interval.  3. Small bilateral pleural effusions with bibasilar dependent atelectasis.  4. Endotracheal tube.    CXR:  Stable positioning of support devices.  Unchanged bibasilar consolidation/atelectasis.    CXR: Bibasilar opacification not significantly changed compared to the prior exam.  Positions of lines and tubes described.    CXR:   Continued bibasilar lung infiltrates.  Mild cardiomegaly.  Endotracheal tube, NG tube and central lines in position.    CXR:   Mild decrease in right basilar infiltrate.  Continued left basilar infiltrate.  Cardiomegaly.  Tubes in position.     CTA chest:  Limited evaluation for small peripheral luminal filling defects with the streaky artifact but no large central luminal filling defects are seen in pulmonary artery suggesting pulmonary artery embolism   Bilateral infiltrates with features somewhat suspicious for later changes of COVID-19 pneumonia.    CXR: Lines and tubes remain in place and bilateral infiltrates do not appear significantly changed      Assessment/Plan:      * Severe sepsis  This patient does have evidence of infective focus  My overall impression is severe sepsis.  Antibiotics given-   Antibiotics (From admission, onward)    Start     Stop Route Frequency Ordered    07/01/20 2130  linezolid 600 mg/300 mL IVPB 600 mg      -- IV Every 12 hours (non-standard times) 07/01/20 2021    06/29/20 1500  levoFLOXacin 750 mg/150 mL IVPB 750 mg      -- IV Every 24 hours (non-standard times) 06/29/20 1231       And on intravenous Mycamine    Severe Sepsis only-  Latest labs reviewed, they are-  Recent Labs   Lab  07/03/20  0315   BILITOT 0.5     No results for input(s): LACTATE in the last 24 hours.  Recent Labs   Lab 07/03/20  0444   PH 7.379   PO2 73*   PCO2 57.0*   HCO3 33.6*   BE 8       Organ dysfunction indicated by Acute respiratory failure      Temp Readings from Last 1 Encounters:   07/03/20 99.3 °F (37.4 °C)     BP Readings from Last 1 Encounters:   07/03/20 (!) 118/58     Pulse Readings from Last 1 Encounters:   07/03/20 74           Obstructive sleep apnea syndrome  On mechanical ventilation.      Staphylococcal pneumonia  MSSA.  Continue Zyvox and intravenous Levaquin therapy.  Also on intravenous Mycamine. Follow Pulmonary recommendations.    Type 2 diabetes mellitus  Not on chronic medication.  A1c 6.2%.  Accuchecks ac/hs with SSI coverage as needed.  Nutrition consult as pt NPO on vent.        Acute respiratory failure with hypoxia and hypercarbia  Patient with Hypercapnic and Hypoxic Respiratory failure which is Acute.  she is not on home oxygen. Supplemental ventilation was provided and noted- Vent Mode: A/C  Oxygen Concentration (%):  [70] 70  Resp Rate Total:  [16 br/min-21 br/min] 18 br/min  Vt Set:  [500 mL] 500 mL  PEEP/CPAP:  [20 cmH20] 20 cmH20  Pressure Support:  [0 cmH20] 0 cmH20  Mean Airway Pressure:  [23 dnP89-07 cmH20] 25 cmH20 and oxygen saturations 97%. Differential diagnosis includes - COPD, CHF, Obesity Hypoventilation, Interstitial lung disease and Pneumonia Labs and images were reviewed. Will treat underlying causes.   Follow pulmonary recommendations.       Bilateral pneumonia  IV zosyn, IV Levaquin and Mycamine.  Follow Pulmonary and Infectious Disease recommendations  Blood and sputum cultures-follow.  Sputum culture growing staph aureus species.  Monitor clinical response.        Nicotine dependence  Health hazards associated with cigarette smoking should be reviewed with patient and cessation encouraged when pt is able to participate.       Mild intermittent asthma  Chronic; rescue  inhaler only noted to home meds, no controller.  Continue bronchodilators q4h.  Currently intubated on mechanical ventilation.  Follow pulmonary recommendations. On IV Solumedrol 40 mg q 24 hrs.      Cardiomegaly  Patient with peripheral edema, mildly elevated BNP.  IV lasix was initiated at [facility].    Echo was completed today with results as follows:  · Concentric left ventricular remodeling.  · Normal left ventricular systolic function. The estimated ejection fraction is 69%.  · Normal LV diastolic function.  · No wall motion abnormalities.  · Normal right ventricular systolic function.  · Mild right atrial enlargement.  · Trivial pericardial effusion.  · Mild left atrial enlargement.    IV lasix discontinued as no indication of heart failure.    Obesity hypoventilation syndrome  Intubated.       Class 3 severe obesity with serious comorbidity and body mass index (BMI) of 60.0 to 69.9 in adult  Body mass index is 63.06 kg/m². Morbid obesity complicates all aspects of disease management from diagnostic modalities to treatment. Weight loss encouraged and health benefits explained to patient.            VTE Risk Mitigation (From admission, onward)         Ordered     enoxaparin injection 165 mg  Every 12 hours (non-standard times)      06/26/20 1703     IP VTE HIGH RISK PATIENT  Once      06/23/20 2055     Place sequential compression device  Until discontinued      06/23/20 2055                Critical care time spent on the evaluation and treatment of severe organ dysfunction, review of pertinent labs and imaging studies, discussions with consulting providers and discussions with patient/family: 36 minutes.      Peg Stewart MD  Department of Hospital Medicine   [facility]

## 2020-07-03 NOTE — PROGRESS NOTES
Consult Note  Infectious Disease    Reason for Consult:  ID gabino    HPI: Michelle Wren is a  30 y.o. female with a history of asthma, type 2 diabetes, smoking and severe obesity (344 lb) who transferred from [facility] on 06/22 for pulmonary consultation.  She presented to their emergency room the day prior to this admission with a 2-3 day history of shortness of breath.  She was using a friend's CPAP and then required BiPAP and Solu-Medrol, bronchodilator treatments and Lasix.  She required intubation on 06/21 for progressive hypercapnic hypoxemic respiratory failure and was transferred on the ventilator.  She was given vancomycin, Levaquin and Zosyn empirically.  Initial chest x-rays look like pulmonary edema, BNP was mildly elevated but echocardiogram showed normal ejection fraction.  Some concern regarding angioedema on the 2nd hospital day due to swelling of her lips.  Endotracheal aspirate grew MSSA and vancomycin and Zosyn were changed to oxacillin, Levaquin was resumed when fever worsened as steroids were decreased.  Continuing to run low-grade temperature with repeat sputum culture only normal estephanie from the 27th.  Continues on steroids without bronchospasm.  White blood cells remain elevated No eosinophilia on arrival to [place].  Procalcitonin normal x3.  Rheumatoid factor borderline with normal C CP.  COVID negative, Legionella urine antigen negative.  No renal issues are proteinuria to consider granulomatous disease.  Has not been able to get a CT scan of the chest to try to clarify pattern of infiltrates due to habitus. Requiring high PEEP.   Plain films of initially showed lower lobe lobe alveolar infiltrates but these have faded and there may now just be bilateral pleural effusions.  KIRA, Anca, C1 esterase inhibitor antigen are pending.    6/30: Interim reviewed, discussed with nurse at bedside regarding trip to CT and family meeting. Ct chest reviewed and concur with Dr. Schulz re  appearance similar to COVID infiltrates.   7/1:  T-max 102.9° today.  Hemodynamically stable.  Up to 70% FiO2 on the ventilator.  White blood cells are about the same.  Tolerating tube feedings and had a soft bowel movement.  Sedated on the ventilator at this time.  HIV negative, respiratory viral panel negative, ferritin 394.  COVID IgG negative  7/2: temp down to less than 100. Could not be proned, due to hypoxemia, now being turned acutely. She is arousable despite sedation. Blood cultures negative. Awaiting picc line insertion to get TLC out  7/3:  Temps of stabilized temperatures have stabilized, white blood cells are higher, FiO2 is stools at 70% with 20 of PEEP.  PICC line has still not been exchanged, nor TLC removed.  Blood cultures remain negative from 07/01.    EXAM & DIAGNOSTICS REVIEWED:   Vitals:     Temp:  [98.4 °F (36.9 °C)-99.9 °F (37.7 °C)]   Temp: 99.3 °F (37.4 °C) (07/03/20 1154)  Pulse: 74 (07/03/20 1154)  Resp: 18 (07/03/20 1154)  BP: (!) 118/58 (07/03/20 0715)  SpO2: 95 % (07/03/20 1154)    Intake/Output Summary (Last 24 hours) at 7/3/2020 1401  Last data filed at 7/3/2020 0900  Gross per 24 hour   Intake 3341.55 ml   Output 3215 ml   Net 126.55 ml     Vent Mode: A/C  Oxygen Concentration (%):  [70] 70  Resp Rate Total:  [16 br/min-21 br/min] 18 br/min  Vt Set:  [500 mL] 500 mL  PEEP/CPAP:  [20 cmH20] 20 cmH20  Pressure Support:  [0 cmH20] 0 cmH20  Mean Airway Pressure:  [23 nsL92-63 cmH20] 25 cmH20      General:  In NAD.  Sedated    Eyes:  Anicteric,  ENT:  Oral cavity exam not obtainable today,   Neck:  supple,    Lungs: Decreased breath sounds throughout no wheezes  Heart:  RRR, no gallop/murmur/rub noted  Abd:  Soft, morbidly obese, NT, ND, normal BS, no masses or organomegaly appreciated.  Tolerating tube feeding rectal tube with liquid stool  :   Vazquez, urine clear, no flank tenderness  Musc:  Joints without effusion, swelling, erythema, synovitis, muscle wasting.   Skin:  No rashes.  No palmar or plantar lesions. No subungual petechiae  Wound:   Neuro: Sedated on the ventilator  Psych:  Sedated on the ventilator  Lymphatic:     Exam limited by habitus  Extrem: No edema, erythema, phlebitis, cellulitis, warm and well perfused  VAD:  RIJ TLC, right radial arterial line    Isolation:  none    Lines/Tubes/Drains:    General Labs reviewed:  Recent Labs   Lab 07/01/20  0308 07/02/20 0319 07/03/20  0315   WBC 18.99* 16.68* 22.05*   HGB 14.1 14.1 13.3   HCT 46.1 45.7 44.6   * 356* 331       Recent Labs   Lab 07/01/20  0308 07/02/20 0319 07/03/20 0315    141 142   K 4.2 3.8 3.4*   CL 99 100 100   CO2 27 29 29   BUN 29* 27* 30*   CREATININE 1.0 0.8 0.8   CALCIUM 10.1 9.9 9.2   PROT 8.0 7.9 7.4   BILITOT 0.7 0.5 0.5   ALKPHOS 57 50* 48*   ALT 17 16 11   AST 14 8* 8*           Micro:  Microbiology Results (last 7 days)     Procedure Component Value Units Date/Time    Blood culture [[id number]] Collected: 07/01/20 2034    Order Status: Completed Specimen: Blood Updated: 07/03/20 1012     Blood Culture, Routine No Growth to date      No Growth to date    Narrative:      From arterial line    Blood culture [[id number]] Collected: 07/01/20 2036    Order Status: Completed Specimen: Blood from Line, Central Neck Updated: 07/03/20 1012     Blood Culture, Routine No Growth to date      No Growth to date    Narrative:      From TLC    Blood culture [[id number]] Collected: 06/27/20 0550    Order Status: Completed Specimen: Blood from Line, Central Updated: 07/03/20 0612     Blood Culture, Routine No growth after 5 days.    Blood culture [[id number]] Collected: 06/28/20 0824    Order Status: Completed Specimen: Blood from Antecubital, Right Arm Updated: 07/02/20 2212     Blood Culture, Routine No Growth to date      No Growth to date      No Growth to date      No Growth to date      No Growth to date    Blood culture [[id number]] Collected: 06/26/20 1914    Order Status: Completed Specimen: Blood Updated:  07/02/20 0612     Blood Culture, Routine No growth after 5 days.    Narrative:      X2 sticks, one from central line, one peripheral    Blood culture [[id number]] Collected: 06/26/20 1914    Order Status: Completed Specimen: Blood from Antecubital, Left Arm Updated: 07/02/20 0612     Blood Culture, Routine No growth after 5 days.    IV catheter culture [[id number]]     Order Status: No result Specimen: Catheter Tip, Intrajugular     Respiratory Infection Panel (PCR), Nasopharyngeal [[id number]] Collected: 07/01/20 1400    Order Status: Completed Specimen: Nasopharyngeal Swab Updated: 07/01/20 1513     Respiratory Infection Panel Source NP Swab     Adenovirus Not Detected     Coronavirus 229E, Common Cold Virus Not Detected     Coronavirus HKU1, Common Cold Virus Not Detected     Coronavirus NL63, Common Cold Virus Not Detected     Coronavirus OC43, Common Cold Virus Not Detected     Comment: The Coronavirus strains detected in this test cause the common cold.  These strains are not the COVID-19 (novel Coronavirus)strain   associated with the respiratory disease outbreak.          Human Metapneumovirus Not Detected     Human Rhinovirus/Enterovirus Not Detected     Influenza A (subtypes H1, H1-2009,H3) Not Detected     Influenza B Not Detected     Parainfluenza Virus 1 Not Detected     Parainfluenza Virus 2 Not Detected     Parainfluenza Virus 3 Not Detected     Parainfluenza Virus 4 Not Detected     Respiratory Syncytial Virus Not Detected     Bordetella Parapertussis (VL1562) Not Detected     Bordetella pertussis (ptxP) Not Detected     Chlamydia pneumoniae Not Detected     Mycoplasma pneumoniae Not Detected     Comment: Respiratory Infection Panel testing performed by Multiplex PCR.       Narrative:      For all other respiratory sources, order [id number] -  Respiratory Viral Panel by PCR    Culture, Respiratory with Gram Stain [[id number]]  (Abnormal) Collected: 06/27/20 1444    Order Status: Completed Specimen:  Respiratory from Tracheal Aspirate Updated: 06/30/20 0718     Respiratory Culture No S aureus or Pseudomonas isolated.      KENROY LUSITANIAE  Rare  Normal respiratory estephanie also present       Gram Stain (Respiratory) <10 epithelial cells per low power field.     Gram Stain (Respiratory) Rare WBC's     Gram Stain (Respiratory) No organisms seen    Blood culture [[id number]] Collected: 06/23/20 2201    Order Status: Completed Specimen: Blood Updated: 06/29/20 0612     Blood Culture, Routine No growth after 5 days.    Urine Culture High Risk [[id number]] Collected: 06/27/20 1437    Order Status: Completed Specimen: Urine, Catheterized Updated: 06/29/20 0220     Urine Culture, Routine No growth    Narrative:      Indicated criteria for high risk culture:->Other  Other (specify):->icu        Imaging Reviewed:   CXR   CT chest      Cardiology:echo    IMPRESSION & PLAN   1. Hypercapneic, hypoxemic, respiratory failure. High PEEP requirement.    COVID PCR negative on admission, and 6/30, COVID IgG negative  2. MSSA in sputum, but minimal infiltrates, normal procalcitonin x3  3. Morbid obesity with LAURIE  4. History of asthma, requiring a vent in the past      Recommendations:   Zyvox , levaquin and Mycamine  Blood culture from TLC an arterial line are in progress  Change TLC to PICC line today, culture tip.  Discussed with nursing supervisor    thanks

## 2020-07-03 NOTE — PLAN OF CARE
POC reviewed with patient. Patient able to follow commands and write on notepad appropriately. No acute events overnight. Precedex & Versed infusing. O2 sat WNL. MAP at goal overnight. Vazquez in place, flexiseal placed due to continuous liquid BM. Skin intact. Unable to prone due to desaturation when turning. Lateral rotation turned on bed. Able to tolerate. Will continue to monitor. See frequent vital for full details.

## 2020-07-03 NOTE — NURSING
Talked with family member who called to check up on pt. She stated pt's parents were with her. Updated them on pt's progress after obtaining the password. Night RN stated pt had expressed concerns about her job. Relayed this info to the family who will take care of informing pt's fob that she is still in the hospital.

## 2020-07-03 NOTE — ASSESSMENT & PLAN NOTE
Patient with Hypercapnic and Hypoxic Respiratory failure which is Acute.  she is not on home oxygen. Supplemental ventilation was provided and noted- Vent Mode: A/C  Oxygen Concentration (%):  [70] 70  Resp Rate Total:  [16 br/min-21 br/min] 18 br/min  Vt Set:  [500 mL] 500 mL  PEEP/CPAP:  [20 cmH20] 20 cmH20  Pressure Support:  [0 cmH20] 0 cmH20  Mean Airway Pressure:  [23 zxN74-12 cmH20] 25 cmH20 and oxygen saturations 97%. Differential diagnosis includes - COPD, CHF, Obesity Hypoventilation, Interstitial lung disease and Pneumonia Labs and images were reviewed. Will treat underlying causes.   Follow pulmonary recommendations.

## 2020-07-04 ENCOUNTER — OUTSIDE PLACE OF SERVICE (OUTPATIENT)
Dept: PULMONOLOGY | Facility: CLINIC | Age: 30
End: 2020-07-04
Payer: MEDICAID

## 2020-07-04 LAB
ALBUMIN SERPL BCP-MCNC: 2.9 G/DL (ref 3.5–5.2)
ALLENS TEST: ABNORMAL
ALLENS TEST: ABNORMAL
ALP SERPL-CCNC: 51 U/L (ref 55–135)
ALT SERPL W/O P-5'-P-CCNC: 11 U/L (ref 10–44)
ANION GAP SERPL CALC-SCNC: 11 MMOL/L (ref 8–16)
AST SERPL-CCNC: 10 U/L (ref 10–40)
BASOPHILS # BLD AUTO: 0.04 K/UL (ref 0–0.2)
BASOPHILS NFR BLD: 0.2 % (ref 0–1.9)
BILIRUB SERPL-MCNC: 0.4 MG/DL (ref 0.1–1)
BUN SERPL-MCNC: 30 MG/DL (ref 6–20)
CALCIUM SERPL-MCNC: 10 MG/DL (ref 8.7–10.5)
CHLORIDE SERPL-SCNC: 98 MMOL/L (ref 95–110)
CO2 SERPL-SCNC: 29 MMOL/L (ref 23–29)
CREAT SERPL-MCNC: 0.8 MG/DL (ref 0.5–1.4)
DELSYS: ABNORMAL
DELSYS: ABNORMAL
DIFFERENTIAL METHOD: ABNORMAL
EOSINOPHIL # BLD AUTO: 0.1 K/UL (ref 0–0.5)
EOSINOPHIL NFR BLD: 0.4 % (ref 0–8)
ERYTHROCYTE [DISTWIDTH] IN BLOOD BY AUTOMATED COUNT: 16.4 % (ref 11.5–14.5)
ERYTHROCYTE [SEDIMENTATION RATE] IN BLOOD BY WESTERGREN METHOD: 16 MM/H
ERYTHROCYTE [SEDIMENTATION RATE] IN BLOOD BY WESTERGREN METHOD: 16 MM/H
EST. GFR  (AFRICAN AMERICAN): >60 ML/MIN/1.73 M^2
EST. GFR  (NON AFRICAN AMERICAN): >60 ML/MIN/1.73 M^2
ETCO2: 47
FIO2: 60
FIO2: 70
GLUCOSE SERPL-MCNC: 150 MG/DL (ref 70–110)
HCO3 UR-SCNC: 32 MMOL/L (ref 24–28)
HCO3 UR-SCNC: 34.2 MMOL/L (ref 24–28)
HCT VFR BLD AUTO: 44.5 % (ref 37–48.5)
HGB BLD-MCNC: 13.5 G/DL (ref 12–16)
IMM GRANULOCYTES # BLD AUTO: 0.25 K/UL (ref 0–0.04)
IMM GRANULOCYTES NFR BLD AUTO: 1.3 % (ref 0–0.5)
LYMPHOCYTES # BLD AUTO: 2.7 K/UL (ref 1–4.8)
LYMPHOCYTES NFR BLD: 14.5 % (ref 18–48)
MAGNESIUM SERPL-MCNC: 2.1 MG/DL (ref 1.6–2.6)
MCH RBC QN AUTO: 25.7 PG (ref 27–31)
MCHC RBC AUTO-ENTMCNC: 30.3 G/DL (ref 32–36)
MCV RBC AUTO: 85 FL (ref 82–98)
MIN VOL: 8.4
MIN VOL: 9
MODE: ABNORMAL
MODE: ABNORMAL
MONOCYTES # BLD AUTO: 1.9 K/UL (ref 0.3–1)
MONOCYTES NFR BLD: 10.1 % (ref 4–15)
NEUTROPHILS # BLD AUTO: 13.7 K/UL (ref 1.8–7.7)
NEUTROPHILS NFR BLD: 73.5 % (ref 38–73)
NRBC BLD-RTO: 0 /100 WBC
PCO2 BLDA: 51.9 MMHG (ref 35–45)
PCO2 BLDA: 56.2 MMHG (ref 35–45)
PEEP: 10
PEEP: 20
PH SMN: 7.39 [PH] (ref 7.35–7.45)
PH SMN: 7.4 [PH] (ref 7.35–7.45)
PHOSPHATE SERPL-MCNC: 3.6 MG/DL (ref 2.7–4.5)
PIP: 23
PIP: 37
PLATELET # BLD AUTO: 344 K/UL (ref 150–350)
PMV BLD AUTO: 11.3 FL (ref 9.2–12.9)
PO2 BLDA: 74 MMHG (ref 80–100)
PO2 BLDA: 79 MMHG (ref 80–100)
POC BE: 7 MMOL/L
POC BE: 9 MMOL/L
POC SATURATED O2: 94 % (ref 95–100)
POC SATURATED O2: 95 % (ref 95–100)
POC TCO2: 34 MMOL/L (ref 23–27)
POC TCO2: 36 MMOL/L (ref 23–27)
POCT GLUCOSE: 158 MG/DL (ref 70–110)
POCT GLUCOSE: 165 MG/DL (ref 70–110)
POCT GLUCOSE: 185 MG/DL (ref 70–110)
POCT GLUCOSE: 208 MG/DL (ref 70–110)
POCT GLUCOSE: 241 MG/DL (ref 70–110)
POTASSIUM SERPL-SCNC: 3.8 MMOL/L (ref 3.5–5.1)
PROT SERPL-MCNC: 7.9 G/DL (ref 6–8.4)
RBC # BLD AUTO: 5.26 M/UL (ref 4–5.4)
SAMPLE: ABNORMAL
SAMPLE: ABNORMAL
SITE: ABNORMAL
SITE: ABNORMAL
SODIUM SERPL-SCNC: 138 MMOL/L (ref 136–145)
SP02: 94
SP02: 95
VT: 500
VT: 500
WBC # BLD AUTO: 18.6 K/UL (ref 3.9–12.7)

## 2020-07-04 PROCEDURE — 94640 AIRWAY INHALATION TREATMENT: CPT

## 2020-07-04 PROCEDURE — 25000242 PHARM REV CODE 250 ALT 637 W/ HCPCS: Performed by: NURSE PRACTITIONER

## 2020-07-04 PROCEDURE — 36600 WITHDRAWAL OF ARTERIAL BLOOD: CPT

## 2020-07-04 PROCEDURE — 27200966 HC CLOSED SUCTION SYSTEM

## 2020-07-04 PROCEDURE — 25000242 PHARM REV CODE 250 ALT 637 W/ HCPCS: Performed by: INTERNAL MEDICINE

## 2020-07-04 PROCEDURE — 85025 COMPLETE CBC W/AUTO DIFF WBC: CPT

## 2020-07-04 PROCEDURE — 99232 SBSQ HOSP IP/OBS MODERATE 35: CPT | Mod: S$GLB,,, | Performed by: INTERNAL MEDICINE

## 2020-07-04 PROCEDURE — 63600175 PHARM REV CODE 636 W HCPCS: Performed by: INTERNAL MEDICINE

## 2020-07-04 PROCEDURE — 94003 VENT MGMT INPAT SUBQ DAY: CPT

## 2020-07-04 PROCEDURE — 25000003 PHARM REV CODE 250: Performed by: INTERNAL MEDICINE

## 2020-07-04 PROCEDURE — 80053 COMPREHEN METABOLIC PANEL: CPT

## 2020-07-04 PROCEDURE — 84100 ASSAY OF PHOSPHORUS: CPT

## 2020-07-04 PROCEDURE — 99900026 HC AIRWAY MAINTENANCE (STAT)

## 2020-07-04 PROCEDURE — C9113 INJ PANTOPRAZOLE SODIUM, VIA: HCPCS | Performed by: INTERNAL MEDICINE

## 2020-07-04 PROCEDURE — 83735 ASSAY OF MAGNESIUM: CPT

## 2020-07-04 PROCEDURE — 27000221 HC OXYGEN, UP TO 24 HOURS

## 2020-07-04 PROCEDURE — C1751 CATH, INF, PER/CENT/MIDLINE: HCPCS

## 2020-07-04 PROCEDURE — 99232 PR SUBSEQUENT HOSPITAL CARE,LEVL II: ICD-10-PCS | Mod: S$GLB,,, | Performed by: INTERNAL MEDICINE

## 2020-07-04 PROCEDURE — 36573 INSJ PICC RS&I 5 YR+: CPT

## 2020-07-04 PROCEDURE — 94770 HC EXHALED C02 TEST: CPT

## 2020-07-04 PROCEDURE — 99291 PR CRITICAL CARE, E/M 30-74 MINUTES: ICD-10-PCS | Mod: S$GLB,,, | Performed by: INTERNAL MEDICINE

## 2020-07-04 PROCEDURE — 94761 N-INVAS EAR/PLS OXIMETRY MLT: CPT

## 2020-07-04 PROCEDURE — 99900035 HC TECH TIME PER 15 MIN (STAT)

## 2020-07-04 PROCEDURE — 99291 CRITICAL CARE FIRST HOUR: CPT | Mod: S$GLB,,, | Performed by: INTERNAL MEDICINE

## 2020-07-04 PROCEDURE — 82803 BLOOD GASES ANY COMBINATION: CPT

## 2020-07-04 PROCEDURE — 63600175 PHARM REV CODE 636 W HCPCS: Performed by: NURSE PRACTITIONER

## 2020-07-04 PROCEDURE — 36415 COLL VENOUS BLD VENIPUNCTURE: CPT

## 2020-07-04 PROCEDURE — 20000000 HC ICU ROOM

## 2020-07-04 PROCEDURE — 87070 CULTURE OTHR SPECIMN AEROBIC: CPT

## 2020-07-04 RX ORDER — BUDESONIDE 0.25 MG/2ML
0.5 INHALANT ORAL EVERY 12 HOURS
Status: DISCONTINUED | OUTPATIENT
Start: 2020-07-04 | End: 2020-07-10

## 2020-07-04 RX ADMIN — METOCLOPRAMIDE 10 MG: 5 INJECTION, SOLUTION INTRAMUSCULAR; INTRAVENOUS at 06:07

## 2020-07-04 RX ADMIN — IPRATROPIUM BROMIDE AND ALBUTEROL SULFATE 3 ML: .5; 3 SOLUTION RESPIRATORY (INHALATION) at 03:07

## 2020-07-04 RX ADMIN — DEXMEDETOMIDINE HYDROCHLORIDE 1 MCG/KG/HR: 100 INJECTION, SOLUTION, CONCENTRATE INTRAVENOUS at 12:07

## 2020-07-04 RX ADMIN — DEXMEDETOMIDINE HYDROCHLORIDE 1 MCG/KG/HR: 100 INJECTION, SOLUTION, CONCENTRATE INTRAVENOUS at 06:07

## 2020-07-04 RX ADMIN — IPRATROPIUM BROMIDE AND ALBUTEROL SULFATE 3 ML: .5; 3 SOLUTION RESPIRATORY (INHALATION) at 07:07

## 2020-07-04 RX ADMIN — PANTOPRAZOLE SODIUM 40 MG: 40 INJECTION, POWDER, LYOPHILIZED, FOR SOLUTION INTRAVENOUS at 08:07

## 2020-07-04 RX ADMIN — FUROSEMIDE 60 MG: 10 INJECTION, SOLUTION INTRAVENOUS at 11:07

## 2020-07-04 RX ADMIN — INSULIN ASPART 2 UNITS: 100 INJECTION, SOLUTION INTRAVENOUS; SUBCUTANEOUS at 05:07

## 2020-07-04 RX ADMIN — INSULIN ASPART 2 UNITS: 100 INJECTION, SOLUTION INTRAVENOUS; SUBCUTANEOUS at 06:07

## 2020-07-04 RX ADMIN — DEXMEDETOMIDINE HYDROCHLORIDE 1 MCG/KG/HR: 100 INJECTION, SOLUTION, CONCENTRATE INTRAVENOUS at 11:07

## 2020-07-04 RX ADMIN — MIDAZOLAM HYDROCHLORIDE 7 MG/HR: 5 INJECTION, SOLUTION INTRAMUSCULAR; INTRAVENOUS at 07:07

## 2020-07-04 RX ADMIN — DEXMEDETOMIDINE HYDROCHLORIDE 1 MCG/KG/HR: 100 INJECTION, SOLUTION, CONCENTRATE INTRAVENOUS at 07:07

## 2020-07-04 RX ADMIN — BUDESONIDE 0.25 MG: 0.25 SUSPENSION RESPIRATORY (INHALATION) at 07:07

## 2020-07-04 RX ADMIN — BUDESONIDE 0.5 MG: 0.25 SUSPENSION RESPIRATORY (INHALATION) at 07:07

## 2020-07-04 RX ADMIN — METHYLPREDNISOLONE SODIUM SUCCINATE 40 MG: 40 INJECTION, POWDER, FOR SOLUTION INTRAMUSCULAR; INTRAVENOUS at 08:07

## 2020-07-04 RX ADMIN — LEVOFLOXACIN 750 MG: 750 INJECTION, SOLUTION INTRAVENOUS at 04:07

## 2020-07-04 RX ADMIN — DEXMEDETOMIDINE HYDROCHLORIDE 1 MCG/KG/HR: 100 INJECTION, SOLUTION, CONCENTRATE INTRAVENOUS at 08:07

## 2020-07-04 RX ADMIN — MIDAZOLAM HYDROCHLORIDE 7 MG/HR: 5 INJECTION, SOLUTION INTRAMUSCULAR; INTRAVENOUS at 09:07

## 2020-07-04 RX ADMIN — INSULIN ASPART 4 UNITS: 100 INJECTION, SOLUTION INTRAVENOUS; SUBCUTANEOUS at 11:07

## 2020-07-04 RX ADMIN — CHLORHEXIDINE GLUCONATE 0.12% ORAL RINSE 15 ML: 1.2 LIQUID ORAL at 08:07

## 2020-07-04 RX ADMIN — LINEZOLID 600 MG: 600 INJECTION, SOLUTION INTRAVENOUS at 09:07

## 2020-07-04 RX ADMIN — DEXMEDETOMIDINE HYDROCHLORIDE 1 MCG/KG/HR: 100 INJECTION, SOLUTION, CONCENTRATE INTRAVENOUS at 01:07

## 2020-07-04 RX ADMIN — DEXMEDETOMIDINE HYDROCHLORIDE 1 MCG/KG/HR: 100 INJECTION, SOLUTION, CONCENTRATE INTRAVENOUS at 09:07

## 2020-07-04 RX ADMIN — DEXMEDETOMIDINE HYDROCHLORIDE 1 MCG/KG/HR: 100 INJECTION, SOLUTION, CONCENTRATE INTRAVENOUS at 04:07

## 2020-07-04 RX ADMIN — METOCLOPRAMIDE 10 MG: 5 INJECTION, SOLUTION INTRAMUSCULAR; INTRAVENOUS at 01:07

## 2020-07-04 RX ADMIN — ENOXAPARIN SODIUM 165 MG: 120 INJECTION SUBCUTANEOUS at 07:07

## 2020-07-04 RX ADMIN — POTASSIUM CHLORIDE 40 MEQ: 400 INJECTION, SOLUTION INTRAVENOUS at 04:07

## 2020-07-04 RX ADMIN — IPRATROPIUM BROMIDE AND ALBUTEROL SULFATE 3 ML: .5; 3 SOLUTION RESPIRATORY (INHALATION) at 11:07

## 2020-07-04 RX ADMIN — DEXMEDETOMIDINE HYDROCHLORIDE 1 MCG/KG/HR: 100 INJECTION, SOLUTION, CONCENTRATE INTRAVENOUS at 03:07

## 2020-07-04 RX ADMIN — MICAFUNGIN SODIUM 100 MG: 20 INJECTION, POWDER, LYOPHILIZED, FOR SOLUTION INTRAVENOUS at 08:07

## 2020-07-04 RX ADMIN — ENOXAPARIN SODIUM 165 MG: 120 INJECTION SUBCUTANEOUS at 08:07

## 2020-07-04 RX ADMIN — METOCLOPRAMIDE 10 MG: 5 INJECTION, SOLUTION INTRAMUSCULAR; INTRAVENOUS at 09:07

## 2020-07-04 RX ADMIN — IPRATROPIUM BROMIDE AND ALBUTEROL SULFATE 3 ML: .5; 3 SOLUTION RESPIRATORY (INHALATION) at 04:07

## 2020-07-04 RX ADMIN — INSULIN ASPART 1 UNITS: 100 INJECTION, SOLUTION INTRAVENOUS; SUBCUTANEOUS at 11:07

## 2020-07-04 NOTE — ASSESSMENT & PLAN NOTE
Patient with peripheral edema, mildly elevated BNP.  IV lasix was initiated at [facility].    Echo was completed today with results as follows:  · Concentric left ventricular remodeling.  · Normal left ventricular systolic function. The estimated ejection fraction is 69%.  · Normal LV diastolic function.  · No wall motion abnormalities.  · Normal right ventricular systolic function.  · Mild right atrial enlargement.  · Trivial pericardial effusion.  · Mild left atrial enlargement.    IV lasix discontinued as no indication of heart failure.

## 2020-07-04 NOTE — PLAN OF CARE
"Pt has been in a turn mode on the bed. 10 minutes to the left, 5 minutes each on back and to the right. Pt unable to tolerate very long on right side w/o it hurting per pt from writing notes. Pt wrote a note about her job at [employer]. Pt wrote asking RN to call her sister Nydia to find out if she called. Nydia stated she talked to the head nurse and  at [employer] updating them on her status. Flexi seal in place draining liquid stool. Suctioned as needed. Pt wrote that the top of her mouth was "cut up". Told pt and pm nurse to use sponge suctions.   "

## 2020-07-04 NOTE — EICU
eICU Note :    Called by the [facility]:    Problem: PICC position    Pertinent History and labs reviewed : 29 y/o    Severe sepsis    Class 3 severe obesity with serious comorbidity and body mass index (BMI) of 60.0 to 69.9 in adult    Obesity hypoventilation syndrome    Cardiomegaly    Mild intermittent asthma    Nicotine dependence    Bilateral pneumonia    Acute respiratory failure with hypoxia and hypercarbia    Type 2 diabetes mellitus    Staphylococcal pneumonia    Obstructive sleep apnea syndrome    Pulmonary hypertension        Treatment /Intervention given: PICC in place , OK to use         Maryellen Wilkins M.D  eICU Physician

## 2020-07-04 NOTE — ASSESSMENT & PLAN NOTE
Body mass index is 58.35 kg/m². Morbid obesity complicates all aspects of disease management from diagnostic modalities to treatment. Weight loss encouraged and health benefits explained to patient.

## 2020-07-04 NOTE — PROGRESS NOTES
Consult Note  Infectious Disease    Reason for Consult:  ID gabino    HPI: Michelle Wren is a  30 y.o. female with a history of asthma, type 2 diabetes, smoking and severe obesity (344 lb) who transferred from [facility] on 06/22 for pulmonary consultation.  She presented to their emergency room the day prior to this admission with a 2-3 day history of shortness of breath.  She was using a friend's CPAP and then required BiPAP and Solu-Medrol, bronchodilator treatments and Lasix.  She required intubation on 06/21 for progressive hypercapnic hypoxemic respiratory failure and was transferred on the ventilator.  She was given vancomycin, Levaquin and Zosyn empirically.  Initial chest x-rays look like pulmonary edema, BNP was mildly elevated but echocardiogram showed normal ejection fraction.  Some concern regarding angioedema on the 2nd hospital day due to swelling of her lips.  Endotracheal aspirate grew MSSA and vancomycin and Zosyn were changed to oxacillin, Levaquin was resumed when fever worsened as steroids were decreased.  Continuing to run low-grade temperature with repeat sputum culture only normal estephanie from the 27th.  Continues on steroids without bronchospasm.  White blood cells remain elevated No eosinophilia on arrival to [place].  Procalcitonin normal x3.  Rheumatoid factor borderline with normal C CP.  COVID negative, Legionella urine antigen negative.  No renal issues are proteinuria to consider granulomatous disease.  Has not been able to get a CT scan of the chest to try to clarify pattern of infiltrates due to habitus. Requiring high PEEP.   Plain films of initially showed lower lobe lobe alveolar infiltrates but these have faded and there may now just be bilateral pleural effusions.  KIRA, Anca, C1 esterase inhibitor antigen are pending.    6/30: Interim reviewed, discussed with nurse at bedside regarding trip to CT and family meeting. Ct chest reviewed and concur with Dr. Schulz re  appearance similar to COVID infiltrates.   7/1:  T-max 102.9° today.  Hemodynamically stable.  Up to 70% FiO2 on the ventilator.  White blood cells are about the same.  Tolerating tube feedings and had a soft bowel movement.  Sedated on the ventilator at this time.  HIV negative, respiratory viral panel negative, ferritin 394.  COVID IgG negative  7/2: temp down to less than 100. Could not be proned, due to hypoxemia, now being turned acutely. She is arousable despite sedation. Blood cultures negative. Awaiting picc line insertion to get TLC out  7/3:  Temps of stabilized temperatures have stabilized, white blood cells are higher, FiO2 is stools at 70% with 20 of PEEP.  PICC line has still not been exchanged, nor TLC removed.  Blood cultures remain negative from 07/01.  7/4:  Line was changed this morning to a PICC, tip culture in progress.  White blood cells 18,000, chest x-ray actually looks better with better aeration of the bases.  Had high residual last night, tiny bits of blood on gastric aspirate.  No obvious bleeding in the rectal tube    EXAM & DIAGNOSTICS REVIEWED:   Vitals:     Temp:  [98.6 °F (37 °C)-99.7 °F (37.6 °C)]   Temp: 98.9 °F (37.2 °C) (07/04/20 1145)  Pulse: 80 (07/04/20 1415)  Resp: 19 (07/04/20 1415)  BP: 112/60 (07/04/20 1400)  SpO2: 95 % (07/04/20 1415)    Intake/Output Summary (Last 24 hours) at 7/4/2020 1452  Last data filed at 7/4/2020 1352  Gross per 24 hour   Intake 2840.8 ml   Output 4235 ml   Net -1394.2 ml     Vent Mode: A/C  Oxygen Concentration (%):  [70] 70  Resp Rate Total:  [16 br/min-27 br/min] 17 br/min  Vt Set:  [500 mL] 500 mL  PEEP/CPAP:  [20 cmH20] 20 cmH20  Pressure Support:  [0 cmH20] 0 cmH20  Mean Airway Pressure:  [23 prG40-30 cmH20] 26 cmH20      General:  In NAD.  Sedated    Eyes:  Anicteric,  ENT:  Oral cavity exam not obtainable today,   Neck:  supple,    Lungs: Clear anteriorly  Heart:  RRR, no gallop/murmur/rub noted  Abd:  Soft, morbidly obese, NT, ND, normal  BS, no masses or organomegaly appreciated.   Tube feeding at 30 cc/hour, rectal tube with liquid stool  :   Vazquez, urine clear, no flank tenderness  Musc:  Joints without effusion, swelling, erythema, synovitis, muscle wasting.   Skin:  No rashes. No palmar or plantar lesions. No subungual petechiae  Wound:   Neuro: Sedated on the ventilator  Psych:  Sedated on the ventilator  Lymphatic:     Exam limited by habitus  Extrem: No edema, erythema, phlebitis, cellulitis, warm and well perfused  VAD:  Right arm PICC line 7/4 right radial arterial line    Isolation:  none    Lines/Tubes/Drains:    General Labs reviewed:  Recent Labs   Lab 07/02/20 0319 07/03/20 0315 07/04/20 0318   WBC 16.68* 22.05* 18.60*   HGB 14.1 13.3 13.5   HCT 45.7 44.6 44.5   * 331 344       Recent Labs   Lab 07/02/20 0319 07/03/20 0315 07/04/20 0318    142 138   K 3.8 3.4* 3.8    100 98   CO2 29 29 29   BUN 27* 30* 30*   CREATININE 0.8 0.8 0.8   CALCIUM 9.9 9.2 10.0   PROT 7.9 7.4 7.9   BILITOT 0.5 0.5 0.4   ALKPHOS 50* 48* 51*   ALT 16 11 11   AST 8* 8* 10           Micro:  Microbiology Results (last 7 days)     Procedure Component Value Units Date/Time    Blood culture [[id number]] Collected: 07/01/20 2036    Order Status: Completed Specimen: Blood from Line, Central Neck Updated: 07/04/20 1012     Blood Culture, Routine No Growth to date      No Growth to date      No Growth to date    Narrative:      From TLC    Blood culture [[id number]] Collected: 07/01/20 2034    Order Status: Completed Specimen: Blood Updated: 07/04/20 1012     Blood Culture, Routine No Growth to date      No Growth to date      No Growth to date    Narrative:      From arterial line    IV catheter culture [[id number]] Collected: 07/04/20 0844    Order Status: Sent Specimen: Catheter Tip, Intrajugular Updated: 07/04/20 0849    Blood culture [[id number]] Collected: 06/28/20 0824    Order Status: Completed Specimen: Blood from Antecubital, Right Arm  Updated: 07/03/20 2212     Blood Culture, Routine No growth after 5 days.    Blood culture [[id number]] Collected: 06/27/20 0550    Order Status: Completed Specimen: Blood from Line, Central Updated: 07/03/20 0612     Blood Culture, Routine No growth after 5 days.    Blood culture [[id number]] Collected: 06/26/20 1914    Order Status: Completed Specimen: Blood Updated: 07/02/20 0612     Blood Culture, Routine No growth after 5 days.    Narrative:      X2 sticks, one from central line, one peripheral    Blood culture [[id number]] Collected: 06/26/20 1914    Order Status: Completed Specimen: Blood from Antecubital, Left Arm Updated: 07/02/20 0612     Blood Culture, Routine No growth after 5 days.    Respiratory Infection Panel (PCR), Nasopharyngeal [[id number]] Collected: 07/01/20 1400    Order Status: Completed Specimen: Nasopharyngeal Swab Updated: 07/01/20 1513     Respiratory Infection Panel Source NP Swab     Adenovirus Not Detected     Coronavirus 229E, Common Cold Virus Not Detected     Coronavirus HKU1, Common Cold Virus Not Detected     Coronavirus NL63, Common Cold Virus Not Detected     Coronavirus OC43, Common Cold Virus Not Detected     Comment: The Coronavirus strains detected in this test cause the common cold.  These strains are not the COVID-19 (novel Coronavirus)strain   associated with the respiratory disease outbreak.          Human Metapneumovirus Not Detected     Human Rhinovirus/Enterovirus Not Detected     Influenza A (subtypes H1, H1-2009,H3) Not Detected     Influenza B Not Detected     Parainfluenza Virus 1 Not Detected     Parainfluenza Virus 2 Not Detected     Parainfluenza Virus 3 Not Detected     Parainfluenza Virus 4 Not Detected     Respiratory Syncytial Virus Not Detected     Bordetella Parapertussis (IR2344) Not Detected     Bordetella pertussis (ptxP) Not Detected     Chlamydia pneumoniae Not Detected     Mycoplasma pneumoniae Not Detected     Comment: Respiratory Infection Panel  testing performed by Multiplex PCR.       Narrative:      For all other respiratory sources, order [id number] -  Respiratory Viral Panel by PCR    Culture, Respiratory with Gram Stain [[id number]]  (Abnormal) Collected: 06/27/20 1444    Order Status: Completed Specimen: Respiratory from Tracheal Aspirate Updated: 06/30/20 0718     Respiratory Culture No S aureus or Pseudomonas isolated.      KENROY LUSITANIAE  Rare  Normal respiratory estephanie also present       Gram Stain (Respiratory) <10 epithelial cells per low power field.     Gram Stain (Respiratory) Rare WBC's     Gram Stain (Respiratory) No organisms seen    Blood culture [[id number]] Collected: 06/23/20 2201    Order Status: Completed Specimen: Blood Updated: 06/29/20 0612     Blood Culture, Routine No growth after 5 days.    Urine Culture High Risk [[id number]] Collected: 06/27/20 1437    Order Status: Completed Specimen: Urine, Catheterized Updated: 06/29/20 0220     Urine Culture, Routine No growth    Narrative:      Indicated criteria for high risk culture:->Other  Other (specify):->icu        Imaging Reviewed:   CXR   CT chest      Cardiology:echo    IMPRESSION & PLAN   1. Hypercapneic, hypoxemic, respiratory failure. High PEEP requirement.    COVID PCR negative on admission, and 6/30, COVID IgG negative  2. MSSA in sputum, but minimal infiltrates, normal procalcitonin x3  3. Morbid obesity with LAURIE  4. History of asthma, requiring a vent in the past      Recommendations:   Zyvox , levaquin and Mycamine  Blood culture from TLC an arterial line are in progress  Follow-up line tip culture    thanks

## 2020-07-04 NOTE — NURSING
Nidhi Taylor RN in to start pt's PICC line. Explain procedure to pt able to obtain consent from pt.

## 2020-07-04 NOTE — ASSESSMENT & PLAN NOTE
This patient does have evidence of infective focus  My overall impression is severe sepsis.  Antibiotics given-   Antibiotics (From admission, onward)    Start     Stop Route Frequency Ordered    07/01/20 2130  linezolid 600 mg/300 mL IVPB 600 mg      -- IV Every 12 hours (non-standard times) 07/01/20 2021 06/29/20 1500  levoFLOXacin 750 mg/150 mL IVPB 750 mg      -- IV Every 24 hours (non-standard times) 06/29/20 1231       And on intravenous Mycamine    Severe Sepsis only-  Latest labs reviewed, they are-  Recent Labs   Lab 07/04/20  0318   BILITOT 0.4     No results for input(s): LACTATE in the last 24 hours.  Recent Labs   Lab 07/04/20  0529   PH 7.398   PO2 79*   PCO2 51.9*   HCO3 32.0*   BE 7       Organ dysfunction indicated by Acute respiratory failure      Temp Readings from Last 1 Encounters:   07/04/20 98.6 °F (37 °C) (Axillary)     BP Readings from Last 1 Encounters:   07/04/20 (!) 100/56     Pulse Readings from Last 1 Encounters:   07/04/20 82

## 2020-07-04 NOTE — PROGRESS NOTES
Progress Note  PULMONARY    Admit Date: 6/23/2020 07/04/2020      SUBJECTIVE:     June 25th-patient is sedated, no complaints, intubated.  6/26 intubated.  6/27 no c/o intubated.  6/28 no new c/o,intubated, arouses  6/29- no new c/o, intubated,   6/30 no new c/o  7/1- remains intubated, on SIMV with peep 20 FiO2 65%. Febrile to 101.3 this am. Heavily sedated w/ versed 8mg/hr  7/2- remains intubated, FiO2 70%. On versed 3mg/hr and awake, writing to communicate  7/3- remains intubated, oxygenation w/o major change. Afebrile  7/4 the patient is on extremely high ventilator settings.  She awakens when stimulated.  She appears to have obesity hypoventilation syndrome.    PFSH and Allergies reviewed.    OBJECTIVE:     Vitals (Most recent):  Vitals:    07/04/20 1630   BP: 109/60   Pulse: 81   Resp: 18   Temp:        Vitals (24 hour range):  Temp:  [98.6 °F (37 °C)-99.7 °F (37.6 °C)]   Pulse:  [69-86]   Resp:  [15-42]   BP: (100-148)/(55-91)   SpO2:  [91 %-99 %]   Arterial Line BP: ()/()       Intake/Output Summary (Last 24 hours) at 7/4/2020 1708  Last data filed at 7/4/2020 1621  Gross per 24 hour   Intake 2840.8 ml   Output 4555 ml   Net -1714.2 ml          Physical Exam:  PHYSICAL EXAM  GENERAL: Younger patient, extremely obese, in no distress, resting on the ventilator HEENT: Pupils equal and reactive. Extraocular movements intact. Nose intact.   Pharynx intubated with OG and ET tubes  NECK: Supple.   HEART: Regular rate and rhythm. No murmur or gallop auscultated.  LUNGS: Clear to auscultation and percussion. Lung excursion symmetrical. No change in fremitus. No adventitial noises.  ABDOMEN: Bowel sounds present.  Very obese.  Non-tender, no masses palpated.  Rectal tube in place.  : Normal anatomy.  Vazquez catheter with yellow urine.  EXTREMITIES: Normal muscle tone and joint movement, no cyanosis or clubbing.   LYMPHATICS: No adenopathy palpated, no edema.  SKIN: Dry, intact, no lesions.   Tattoos present.  NEURO:  Intubated and sedated.  PSYCH:  Unable to assess.      Radiographs reviewed: view by direct vision   7/4 the chest x-ray shows less pneumonia and atelectasis today  CXR 7/1/20- bibasilar airspace disease, opacification overlying left hemidiaphragm  CTA chest 6/30/20- no PE. Bilateral lower lobes with dense consolidations, patchy infiltrates mid and upper lobes as well. PA trunk enlarged    Labs     Recent Labs   Lab 07/04/20 0318   WBC 18.60*   HGB 13.5   HCT 44.5        Recent Labs   Lab 07/04/20 0318      K 3.8   CL 98   CO2 29   BUN 30*   CREATININE 0.8   *   CALCIUM 10.0   MG 2.1   PHOS 3.6   AST 10   ALT 11   ALKPHOS 51*   BILITOT 0.4   PROT 7.9   ALBUMIN 2.9*     Recent Labs   Lab 07/04/20 0529   PH 7.398   PCO2 51.9*   PO2 79*   HCO3 32.0*     Microbiology Results (last 7 days)     Procedure Component Value Units Date/Time    Blood culture [[id number]] Collected: 07/01/20 2036    Order Status: Completed Specimen: Blood from Line, Central Neck Updated: 07/04/20 1012     Blood Culture, Routine No Growth to date      No Growth to date      No Growth to date    Narrative:      From TLC    Blood culture [[id number]] Collected: 07/01/20 2034    Order Status: Completed Specimen: Blood Updated: 07/04/20 1012     Blood Culture, Routine No Growth to date      No Growth to date      No Growth to date    Narrative:      From arterial line    IV catheter culture [[id number]] Collected: 07/04/20 0844    Order Status: Sent Specimen: Catheter Tip, Intrajugular Updated: 07/04/20 0849    Blood culture [[id number]] Collected: 06/28/20 0824    Order Status: Completed Specimen: Blood from Antecubital, Right Arm Updated: 07/03/20 2212     Blood Culture, Routine No growth after 5 days.    Blood culture [[id number]] Collected: 06/27/20 0550    Order Status: Completed Specimen: Blood from Line, Central Updated: 07/03/20 0612     Blood Culture, Routine No growth after 5 days.    Blood  culture [[id number]] Collected: 06/26/20 1914    Order Status: Completed Specimen: Blood Updated: 07/02/20 0612     Blood Culture, Routine No growth after 5 days.    Narrative:      X2 sticks, one from central line, one peripheral    Blood culture [[id number]] Collected: 06/26/20 1914    Order Status: Completed Specimen: Blood from Antecubital, Left Arm Updated: 07/02/20 0612     Blood Culture, Routine No growth after 5 days.    Respiratory Infection Panel (PCR), Nasopharyngeal [[id number]] Collected: 07/01/20 1400    Order Status: Completed Specimen: Nasopharyngeal Swab Updated: 07/01/20 1513     Respiratory Infection Panel Source NP Swab     Adenovirus Not Detected     Coronavirus 229E, Common Cold Virus Not Detected     Coronavirus HKU1, Common Cold Virus Not Detected     Coronavirus NL63, Common Cold Virus Not Detected     Coronavirus OC43, Common Cold Virus Not Detected     Comment: The Coronavirus strains detected in this test cause the common cold.  These strains are not the COVID-19 (novel Coronavirus)strain   associated with the respiratory disease outbreak.          Human Metapneumovirus Not Detected     Human Rhinovirus/Enterovirus Not Detected     Influenza A (subtypes H1, H1-2009,H3) Not Detected     Influenza B Not Detected     Parainfluenza Virus 1 Not Detected     Parainfluenza Virus 2 Not Detected     Parainfluenza Virus 3 Not Detected     Parainfluenza Virus 4 Not Detected     Respiratory Syncytial Virus Not Detected     Bordetella Parapertussis (WT3849) Not Detected     Bordetella pertussis (ptxP) Not Detected     Chlamydia pneumoniae Not Detected     Mycoplasma pneumoniae Not Detected     Comment: Respiratory Infection Panel testing performed by Multiplex PCR.       Narrative:      For all other respiratory sources, order [id number] -  Respiratory Viral Panel by PCR    Culture, Respiratory with Gram Stain [[id number]]  (Abnormal) Collected: 06/27/20 1444    Order Status: Completed Specimen: Respiratory  from Tracheal Aspirate Updated: 06/30/20 0718     Respiratory Culture No S aureus or Pseudomonas isolated.      KENROY LUSITANIAE  Rare  Normal respiratory estephanie also present       Gram Stain (Respiratory) <10 epithelial cells per low power field.     Gram Stain (Respiratory) Rare WBC's     Gram Stain (Respiratory) No organisms seen    Blood culture [[id number]] Collected: 06/23/20 2201    Order Status: Completed Specimen: Blood Updated: 06/29/20 0612     Blood Culture, Routine No growth after 5 days.    Urine Culture High Risk [[id number]] Collected: 06/27/20 1437    Order Status: Completed Specimen: Urine, Catheterized Updated: 06/29/20 0220     Urine Culture, Routine No growth    Narrative:      Indicated criteria for high risk culture:->Other  Other (specify):->icu        Echo 6/22/2020  · Concentric left ventricular remodeling.  · Normal left ventricular systolic function. The estimated ejection fraction is 69%.  · Normal LV diastolic function.  · No wall motion abnormalities.  · Normal right ventricular systolic function.  · Mild right atrial enlargement.  · Trivial pericardial effusion.  · Mild left atrial enlargement.       Impression:  Active Hospital Problems    Diagnosis  POA    *Severe sepsis [A41.9, R65.20]  Yes    Pulmonary hypertension [I27.20]  Yes     By pulmonary artery size on ct chest 6/30      Obstructive sleep apnea syndrome [G47.33]  Yes    Staphylococcal pneumonia [J15.20]  Yes    Type 2 diabetes mellitus [E11.9]  Yes    Nicotine dependence [F17.200]  Yes    Bilateral pneumonia [J18.9]  Yes    Acute respiratory failure with hypoxia and hypercarbia [J96.01, J96.02]  Yes    Obesity hypoventilation syndrome [E66.2]  Yes    Mild intermittent asthma [J45.20]  Yes    Class 3 severe obesity with serious comorbidity and body mass index (BMI) of 60.0 to 69.9 in adult [E66.01, Z68.44]  Not Applicable    Cardiomegaly [I51.7]  Yes      Resolved Hospital Problems   No resolved problems to  display.     Acute on chronic hypoxemic/hypercapneic resp failure with mechanical ventilation and very high oxygen and pressure settings  Asthma exacerbation, apparently resolved  Bilateral pneumonia- MSSA on sputum cx, improving on chest x-ray  Atelectasis, improving  ALURIE/OHS  Morbid obesity, BMI is 58    Plan:   - continue vent support but wean peep and FiO2, check ABG at 1800  - continue aggressive turning  - continue versed and Precedex as needed  - per Dr. Gentile had suspected propofol adverse reaction w/ bradycardia earlier in course  - continue diuresis  - continue nebulized bronchodilators and budesonide  - continue solumedrol 40mg daily  - ID following  - continue antibiotics  - continues on therapeutic dose lovenox- this is empiric due to her high risk for developing VTE  -patient needs to be pulled to to cardiac chair to improve ventilation  -given patient's extreme morbid obesity tracheostomy may be necessary    Critical care time spent reviewing the chart, examining the patient, reviewing the labs, reviewing the radiological findings, discussing care with nursing, physicians, and respiratory and creating the note and  has been greater than 35 min

## 2020-07-04 NOTE — RESPIRATORY THERAPY
07/04/20 0712   PRE-TX-O2   Oxygen Concentration (%) 70   SpO2 97 %   Pulse 75   Resp 16   ETCO2   ETCO2 (mmHg) 45 mmHg   Wound Care   $ Wound Care Tech Time 15 min   Area of Concern Upper lip;Lower lip;Corner lip   Skin Color/Characteristics without discoloration   Skin Temperature warm        Airway - Non-Surgical 06/22/20 1625 Endotracheal Tube   Placement Date/Time: 06/22/20 1625   Present Prior to Hospital Arrival?: No  Method of Intubation: Direct laryngoscopy  Inserted by: MD  Airway Device: Endotracheal Tube  Mask Ventilation: Easy  Blade: Ramon #3  Airway Device Size: 7.5  Style: Cuffed...   Secured at 24 cm   Measured At Lips   Secured Location Center   Secured by Commercial tube lord   Bite Block center   Site Condition Dry   Status Intact;Secured;Patent   Site Assessment Clean;Dry   Equipment Change   $ RT Equipment Inline suction catheter;HME   Closed Suction System Changed? Yes   Closed Suction System Next Due 07/07/20   Vent Select   Conventional Vent Y   Ventilator Initiated No   $ Ventilator Subsequent 1   Charged w/in last 24h YES   IHI Ventilator Associated Pneumonia Bundle   Head of Bed Elevated  HOB 30   Preset Conventional Ventilator Settings   Vent Type    Ventilation Type VC   Vent Mode A/C   Humidity HME   Set Rate 16 BPM   Vt Set 500 mL   PEEP/CPAP 20 cmH20   Pressure Support 0 cmH20   Waveform RAMP   Peak Flow 50 L/min   Set Inspiratory Pressure 0 cmH20   Insp Time 0 Sec(s)   Plateau Set/Insp. Hold (sec) 0   Insp Rise Time  0 %   Trigger Sensitivity Flow/I-Trigger 3 L/min   P High 0 cm H2O   P Low 0 cm H2O   T High 0 sec   T Low 0 sec   Patient Ventilator Parameters   Resp Rate Total 16 br/min   Peak Airway Pressure 36 cmH2O   Mean Airway Pressure 26 cmH20   Plateau Pressure 33 cmH20   Exhaled Vt 501 mL   Total Ve 8.03 mL   Spont Ve 0 L   I:E Ratio Measured 1:2.40   Conventional Ventilator Alarms   Ve High Alarm 24 L/min   Resp Rate High Alarm 40 br/min   Press High Alarm 65  cmH2O   Apnea Rate 20   Apnea Volume (mL) 450 mL   Apnea Oxygen Concentration  100   Apnea Flow Rate (L/min) 70   T Apnea 20 sec(s)   Ready to Wean/Extubation Screen   FIO2<=50 (chart decimal) (!) 0.7   MV<16L (chart vol.) 8.03   PEEP <=8 (chart #) (!) 20   Ready to Wean Parameters   F/VT Ratio<105 (RSBI) (!) 31.94

## 2020-07-04 NOTE — SUBJECTIVE & OBJECTIVE
Interval History: Patient seen and examined. No acute events. Failed SBT yesterday. Continues to have high residuals.     Review of Systems   Unable to perform ROS: Intubated     Objective:     Vital Signs (Most Recent):  Temp: 98.6 °F (37 °C) (07/04/20 0745)  Pulse: 82 (07/04/20 0745)  Resp: (!) 24 (07/04/20 0745)  BP: (!) 100/56 (07/04/20 0630)  SpO2: 95 % (07/04/20 0745) Vital Signs (24h Range):  Temp:  [98.6 °F (37 °C)-99.7 °F (37.6 °C)] 98.6 °F (37 °C)  Pulse:  [69-86] 82  Resp:  [15-42] 24  SpO2:  [90 %-99 %] 95 %  BP: (100-148)/(53-91) 100/56  Arterial Line BP: ()/() 104/104     Weight: (!) 144.7 kg (319 lb 0.1 oz)  Body mass index is 58.35 kg/m².    Intake/Output Summary (Last 24 hours) at 7/4/2020 0923  Last data filed at 7/4/2020 0600  Gross per 24 hour   Intake 2600.8 ml   Output 3050 ml   Net -449.2 ml      Physical Exam  Constitutional:       General: She is not in acute distress.     Appearance: She is well-developed.      Comments: sedated   HENT:      Head: Normocephalic and atraumatic.   Eyes:      Pupils: Pupils are equal, round, and reactive to light.   Neck:      Musculoskeletal: Neck supple.      Thyroid: No thyromegaly.   Cardiovascular:      Rate and Rhythm: Normal rate and regular rhythm.      Heart sounds: No murmur. No friction rub. No gallop.    Pulmonary:      Effort: Pulmonary effort is normal. No respiratory distress.      Breath sounds: Normal breath sounds. No wheezing.      Comments: Coarse bilaterally  Abdominal:      General: Bowel sounds are normal. There is no distension.      Palpations: Abdomen is soft.      Tenderness: There is no abdominal tenderness. There is no guarding.   Musculoskeletal: Normal range of motion.   Skin:     General: Skin is warm and dry.      Findings: No erythema.   Neurological:      Comments: sedated         Significant Labs:   CBC:   Recent Labs   Lab 07/03/20  0315 07/04/20  0318   WBC 22.05* 18.60*   HGB 13.3 13.5   HCT 44.6 44.5   PLT  331 376       Significant Imaging: I have reviewed all pertinent imaging results/findings within the past 24 hours.

## 2020-07-04 NOTE — PLAN OF CARE
POC reviewed. PICC line inserted by Rachel. Asked CIRO MCCONNELL to read. K replacement in progress.Pt able to communicate by writing. Open eyes to command.  Daily leak test done by RT. T max 99.6 ax. Sinus rhythm 75. Remain on vent AC 16  FIO2 70% Peep20  Wt 144.7kg today. Large amount of urine output post lasix. With liquid stool. Flexseal intact. Mepilex intact sacral area. Safety and fall prevention. Soft restraint on.

## 2020-07-04 NOTE — RESPIRATORY THERAPY
07/03/20 1936   Patient Assessment/Suction   Level of Consciousness (AVPU) responds to voice   Respiratory Effort Normal;Unlabored   Expansion/Accessory Muscles/Retractions no use of accessory muscles   All Lung Fields Breath Sounds coarse;rhonchi   Rhythm/Pattern, Respiratory assisted mechanically;depth regular;pattern regular;unlabored   Cough Frequency infrequent   Suction Method tracheal   $ Suction Charges Inline Suction Procedure Stat Charge   Secretions Amount small   Secretions Color cloudy   Secretions Characteristics thick   PRE-TX-O2   O2 Device (Oxygen Therapy) ventilator   Oxygen Concentration (%) 70   SpO2 95 %   Pulse Oximetry Type Continuous   $ Pulse Oximetry - Multiple Charge Pulse Oximetry - Multiple   Pulse 76   Resp 17   Temp 99 °F (37.2 °C)   ETCO2   ETCO2 (mmHg) 46 mmHg   Aerosol Therapy   $ Aerosol Therapy Charges Aerosol Treatment   Daily Review of Necessity (SVN) completed   Respiratory Treatment Status (SVN) given   Treatment Route (SVN) in-line   Patient Position (SVN) semi-Howard's   Post Treatment Assessment (SVN) breath sounds improved   Signs of Intolerance (SVN) none   Breath Sounds Post-Respiratory Treatment   Post-treatment Heart Rate (beats/min) 77   Post-treatment Resp Rate (breaths/min) 16   Vent Select   Charged w/in last 24h YES   Preset Conventional Ventilator Settings   Ventilation Type VC   Vent Mode A/C   Set Rate 16 BPM   Vt Set 500 mL   PEEP/CPAP 20 cmH20   Pressure Support 0 cmH20   Waveform RAMP   Peak Flow 50 L/min   Set Inspiratory Pressure 0 cmH20   Insp Time 0 Sec(s)   Plateau Set/Insp. Hold (sec) 0   Insp Rise Time  0 %   Trigger Sensitivity Flow/I-Trigger 3 L/min   P High 0 cm H2O   P Low 0 cm H2O   T High 0 sec   T Low 0 sec   Patient Ventilator Parameters   Resp Rate Total 17 br/min   Peak Airway Pressure 30 cmH2O   Mean Airway Pressure 24 cmH20   Plateau Pressure 33 cmH20   Exhaled Vt 498 mL   Total Ve 8.64 mL   Spont Ve 0 L   I:E Ratio Measured 1:2.40    Conventional Ventilator Alarms   Ve High Alarm 24 L/min   Resp Rate High Alarm 40 br/min   Press High Alarm 65 cmH2O   Apnea Rate 20   Apnea Volume (mL) 450 mL   Apnea Oxygen Concentration  100   Apnea Flow Rate (L/min) 70   T Apnea 20 sec(s)   Ready to Wean/Extubation Screen   FIO2<=50 (chart decimal) (!) 0.7   MV<16L (chart vol.) 8.64   PEEP <=8 (chart #) (!) 20   Ready to Wean Parameters   F/VT Ratio<105 (RSBI) (!) 34.14

## 2020-07-04 NOTE — PROGRESS NOTES
[facility] Hospital Medicine  Progress Note    Patient Name: Michelle Wren  MRN: [id number]  Patient Class: IP- Inpatient   Admission Date: 6/23/2020  Length of Stay: 11 days  Attending Physician: Peg Stewart MD  Primary Care Provider: Primary Doctor No        Subjective:     Principal Problem:Severe sepsis        HPI:  Michelle Wren is a 31 y/o female with a past medical history significant for asthma, type 2 diabetes, and severe obesity who is transferred from [facility] to [facility] for pulmonary consultation.  Patient presented to the ED at [facility] yesterday with complaints of 2-3 days of shortness of breath.  It is reported that she uses CPAP at night but she got this from a friend so unsure of settings.  She was placed on BiPAP and given IV Solu-Medrol, bronchodilator treatments, and IV Lasix.  She was admitted to the ICU at [facility].  Patient continued to decline and underwent intubation yesterday around 7:00 p.m.  per report, today patient appeared to be worse.  Her saturations were in the mid 90s on FiO2 of 100%.  A D-dimer was checked and was negative.  Per review of labs which were drawn earlier today, her WBC count was 23,000 and her lactic acid was 2.4.  She was placed on IV Zosyn and IV vancomycin.  Upon arrival to [facility], patient is in no acute distress.  She is intubated and sedated.  Vital signs are stable.  Lactic acid will be repeated now as patient meets sepsis criteria and will check a procalcitonin.  She will be monitored closely in the ICU.           Overview/Hospital Course:  Patient is being managed in intensive care unit, requiring mechanical ventilation under supervision of pulmonary medicine.  Repeat COVID -19 test has been negative.  Patient is receiving intravenous antibiotics for bilateral pneumonia.  Sputum cultures grew MSSA species.  Patient is being followed by infectious disease specialist.  Patient tested negative  for COVID -19 antibody.  Patient has not been able to get extubated, requiring high FiO2 and very high PEEP pressures.    Interval History: Patient seen and examined. No acute events. Failed SBT yesterday. Continues to have high residuals.     Review of Systems   Unable to perform ROS: Intubated     Objective:     Vital Signs (Most Recent):  Temp: 98.6 °F (37 °C) (07/04/20 0745)  Pulse: 82 (07/04/20 0745)  Resp: (!) 24 (07/04/20 0745)  BP: (!) 100/56 (07/04/20 0630)  SpO2: 95 % (07/04/20 0745) Vital Signs (24h Range):  Temp:  [98.6 °F (37 °C)-99.7 °F (37.6 °C)] 98.6 °F (37 °C)  Pulse:  [69-86] 82  Resp:  [15-42] 24  SpO2:  [90 %-99 %] 95 %  BP: (100-148)/(53-91) 100/56  Arterial Line BP: ()/() 104/104     Weight: (!) 144.7 kg (319 lb 0.1 oz)  Body mass index is 58.35 kg/m².    Intake/Output Summary (Last 24 hours) at 7/4/2020 0923  Last data filed at 7/4/2020 0600  Gross per 24 hour   Intake 2600.8 ml   Output 3050 ml   Net -449.2 ml      Physical Exam  Constitutional:       General: She is not in acute distress.     Appearance: She is well-developed.      Comments: sedated   HENT:      Head: Normocephalic and atraumatic.   Eyes:      Pupils: Pupils are equal, round, and reactive to light.   Neck:      Musculoskeletal: Neck supple.      Thyroid: No thyromegaly.   Cardiovascular:      Rate and Rhythm: Normal rate and regular rhythm.      Heart sounds: No murmur. No friction rub. No gallop.    Pulmonary:      Effort: Pulmonary effort is normal. No respiratory distress.      Breath sounds: Normal breath sounds. No wheezing.      Comments: Coarse bilaterally  Abdominal:      General: Bowel sounds are normal. There is no distension.      Palpations: Abdomen is soft.      Tenderness: There is no abdominal tenderness. There is no guarding.   Musculoskeletal: Normal range of motion.   Skin:     General: Skin is warm and dry.      Findings: No erythema.   Neurological:      Comments: sedated         Significant  Labs:   CBC:   Recent Labs   Lab 07/03/20  0315 07/04/20  0318   WBC 22.05* 18.60*   HGB 13.3 13.5   HCT 44.6 44.5    344       Significant Imaging: I have reviewed all pertinent imaging results/findings within the past 24 hours.      Assessment/Plan:      * Severe sepsis  This patient does have evidence of infective focus  My overall impression is severe sepsis.  Antibiotics given-   Antibiotics (From admission, onward)    Start     Stop Route Frequency Ordered    07/01/20 2130  linezolid 600 mg/300 mL IVPB 600 mg      -- IV Every 12 hours (non-standard times) 07/01/20 2021 06/29/20 1500  levoFLOXacin 750 mg/150 mL IVPB 750 mg      -- IV Every 24 hours (non-standard times) 06/29/20 1231       And on intravenous Mycamine    Severe Sepsis only-  Latest labs reviewed, they are-  Recent Labs   Lab 07/04/20  0318   BILITOT 0.4     No results for input(s): LACTATE in the last 24 hours.  Recent Labs   Lab 07/04/20  0529   PH 7.398   PO2 79*   PCO2 51.9*   HCO3 32.0*   BE 7       Organ dysfunction indicated by Acute respiratory failure      Temp Readings from Last 1 Encounters:   07/04/20 98.6 °F (37 °C) (Axillary)     BP Readings from Last 1 Encounters:   07/04/20 (!) 100/56     Pulse Readings from Last 1 Encounters:   07/04/20 82           Obstructive sleep apnea syndrome  On mechanical ventilation.      Staphylococcal pneumonia  MSSA.  Continue Zyvox and intravenous Levaquin therapy.  Also on intravenous Mycamine. Follow Pulmonary recommendations.    Type 2 diabetes mellitus  Not on chronic medication.  A1c 6.2%.  Accuchecks ac/hs with SSI coverage as needed.  Nutrition consult as pt NPO on vent.        Acute respiratory failure with hypoxia and hypercarbia  Patient with Hypercapnic and Hypoxic Respiratory failure which is Acute.  she is not on home oxygen. Supplemental ventilation was provided and noted- Vent Mode: A/C  Oxygen Concentration (%):  [70] 70  Resp Rate Total:  [16 br/min-27 br/min] 16 br/min  Vt  Set:  [500 mL] 500 mL  PEEP/CPAP:  [20 cmH20] 20 cmH20  Pressure Support:  [0 cmH20] 0 cmH20  Mean Airway Pressure:  [23 geI29-63 cmH20] 26 cmH20 and oxygen saturations 97%. Differential diagnosis includes - COPD, CHF, Obesity Hypoventilation, Interstitial lung disease and Pneumonia Labs and images were reviewed. Will treat underlying causes.   Follow pulmonary recommendations.       Bilateral pneumonia  IV zosyn, IV Levaquin and Mycamine.  Follow Pulmonary and Infectious Disease recommendations  Blood and sputum cultures-follow.  Sputum culture growing staph aureus species.  Monitor clinical response.        Nicotine dependence  Health hazards associated with cigarette smoking should be reviewed with patient and cessation encouraged when pt is able to participate.       Mild intermittent asthma  Chronic; rescue inhaler only noted to home meds, no controller.  Continue bronchodilators q4h.  Currently intubated on mechanical ventilation.  Follow pulmonary recommendations. On IV Solumedrol 40 mg q 24 hrs.      Cardiomegaly  Patient with peripheral edema, mildly elevated BNP.  IV lasix was initiated at [facility].    Echo was completed today with results as follows:  · Concentric left ventricular remodeling.  · Normal left ventricular systolic function. The estimated ejection fraction is 69%.  · Normal LV diastolic function.  · No wall motion abnormalities.  · Normal right ventricular systolic function.  · Mild right atrial enlargement.  · Trivial pericardial effusion.  · Mild left atrial enlargement.    IV lasix discontinued as no indication of heart failure.    Obesity hypoventilation syndrome  Intubated.  Appreciate pulmonary recommendations  May eventually need trach if current trajectory continues       Class 3 severe obesity with serious comorbidity and body mass index (BMI) of 60.0 to 69.9 in adult  Body mass index is 58.35 kg/m². Morbid obesity complicates all aspects of disease management from diagnostic modalities  to treatment. Weight loss encouraged and health benefits explained to patient.            VTE Risk Mitigation (From admission, onward)         Ordered     enoxaparin injection 165 mg  Every 12 hours (non-standard times)      06/26/20 1703     IP VTE HIGH RISK PATIENT  Once      06/23/20 2055     Place sequential compression device  Until discontinued      06/23/20 2055                Critical care time spent on the evaluation and treatment of severe organ dysfunction, review of pertinent labs and imaging studies, discussions with consulting providers and discussions with patient/family: 25 minutes.      Olegario Diaz MD  Department of Hospital Medicine   [facility]

## 2020-07-04 NOTE — ASSESSMENT & PLAN NOTE
Patient with Hypercapnic and Hypoxic Respiratory failure which is Acute.  she is not on home oxygen. Supplemental ventilation was provided and noted- Vent Mode: A/C  Oxygen Concentration (%):  [70] 70  Resp Rate Total:  [16 br/min-27 br/min] 16 br/min  Vt Set:  [500 mL] 500 mL  PEEP/CPAP:  [20 cmH20] 20 cmH20  Pressure Support:  [0 cmH20] 0 cmH20  Mean Airway Pressure:  [23 brN53-10 cmH20] 26 cmH20 and oxygen saturations 97%. Differential diagnosis includes - COPD, CHF, Obesity Hypoventilation, Interstitial lung disease and Pneumonia Labs and images were reviewed. Will treat underlying causes.   Follow pulmonary recommendations.

## 2020-07-04 NOTE — ASSESSMENT & PLAN NOTE
Intubated.  Appreciate pulmonary recommendations  May eventually need trach if current trajectory continues

## 2020-07-04 NOTE — RESPIRATORY THERAPY
07/04/20 0712   Patient Assessment/Suction   Level of Consciousness (AVPU) responds to voice   All Lung Fields Breath Sounds diminished   Suction Method oral;tracheal   Secretions Amount small   Secretions Color white   Secretions Characteristics thick   PRE-TX-O2   O2 Device (Oxygen Therapy) ventilator   $ Is the patient on Low Flow Oxygen? Yes   Oxygen Concentration (%) 70   SpO2 97 %   Pulse Oximetry Type Continuous   $ Pulse Oximetry - Multiple Charge Pulse Oximetry - Multiple   Pulse 75   Resp 16   ETCO2   $ ETCO2 Charge Exhaled CO2 Monitoring   $ ETCO2 Usage Currently wearing   ETCO2 (mmHg) 45 mmHg   Aerosol Therapy   $ Aerosol Therapy Charges Aerosol Treatment   Respiratory Treatment Status (SVN) given   Treatment Route (SVN) in-line   Patient Position (SVN) HOB elevated   Post Treatment Assessment (SVN) breath sounds unchanged   Signs of Intolerance (SVN) none   Breath Sounds Post-Respiratory Treatment   Post-treatment Heart Rate (beats/min) 75   Post-treatment Resp Rate (breaths/min) 16   Wound Care   $ Wound Care Tech Time 15 min   Area of Concern Upper lip;Lower lip;Corner lip   Skin Color/Characteristics without discoloration   Skin Temperature warm        Airway - Non-Surgical 06/22/20 1625 Endotracheal Tube   Placement Date/Time: 06/22/20 1625   Present Prior to Hospital Arrival?: No  Method of Intubation: Direct laryngoscopy  Inserted by: MD  Airway Device: Endotracheal Tube  Mask Ventilation: Easy  Blade: Ramon #3  Airway Device Size: 7.5  Style: Cuffed...   Secured at 24 cm   Measured At Lips   Secured Location Center   Secured by Commercial tube lord   Bite Block center   Site Condition Dry   Status Intact;Secured;Patent   Site Assessment Clean;Dry   Equipment Change   $ RT Equipment Inline suction catheter;HME   Closed Suction System Changed? Yes   Closed Suction System Next Due 07/07/20   Vent Select   Conventional Vent Y   Ventilator Initiated No   $ Ventilator Subsequent 1   Charged w/in  last 24h YES   IHI Ventilator Associated Pneumonia Bundle   Head of Bed Elevated  HOB 30   Preset Conventional Ventilator Settings   Vent Type    Ventilation Type VC   Vent Mode A/C   Humidity HME   Set Rate 16 BPM   Vt Set 500 mL   PEEP/CPAP 20 cmH20   Pressure Support 0 cmH20   Waveform RAMP   Peak Flow 50 L/min   Set Inspiratory Pressure 0 cmH20   Insp Time 0 Sec(s)   Plateau Set/Insp. Hold (sec) 0   Insp Rise Time  0 %   Trigger Sensitivity Flow/I-Trigger 3 L/min   P High 0 cm H2O   P Low 0 cm H2O   T High 0 sec   T Low 0 sec   Patient Ventilator Parameters   Resp Rate Total 16 br/min   Peak Airway Pressure 36 cmH2O   Mean Airway Pressure 26 cmH20   Plateau Pressure 33 cmH20   Exhaled Vt 501 mL   Total Ve 8.03 mL   Spont Ve 0 L   I:E Ratio Measured 1:2.40   Conventional Ventilator Alarms   Ve High Alarm 24 L/min   Resp Rate High Alarm 40 br/min   Press High Alarm 65 cmH2O   Apnea Rate 20   Apnea Volume (mL) 450 mL   Apnea Oxygen Concentration  100   Apnea Flow Rate (L/min) 70   T Apnea 20 sec(s)   Ready to Wean/Extubation Screen   FIO2<=50 (chart decimal) (!) 0.7   MV<16L (chart vol.) 8.03   PEEP <=8 (chart #) (!) 20   Ready to Wean Parameters   F/VT Ratio<105 (RSBI) (!) 31.94

## 2020-07-04 NOTE — PROCEDURES
"Michelle Wren is a 30 y.o. female patient.    Temp: 99.6 °F (37.6 °C) (07/04/20 0545)  Pulse: 78 (07/04/20 0630)  Resp: 16 (07/04/20 0630)  BP: (!) 108/55 (07/04/20 0600)  SpO2: 97 % (07/04/20 0630)  Weight: (!) 144.7 kg (319 lb 0.1 oz) (07/04/20 0400)  Height: 5' 2" (157.5 cm) (06/24/20 1023)    PICC  Date/Time: 7/4/2020 6:00 AM  Location procedure was performed: [facility] ICU  Performed by: Nidhi Taylor RN  Pre-operative Diagnosis: sepsis  Post-operative diagnosis: sepsis  Consent Done: Yes  Time out: Immediately prior to procedure a time out was called to verify the correct patient, procedure, equipment, support staff and site/side marked as required  Indications: med administration and vascular access  Anesthesia: local infiltration  Local anesthetic: lidocaine 1% without epinephrine  Anesthetic Total (mL): 5  Preparation: skin prepped with ChloraPrep  Skin prep agent dried: skin prep agent completely dried prior to procedure  Sterile barriers: all five maximum sterile barriers used - cap, mask, sterile gown, sterile gloves, and large sterile sheet  Hand hygiene: hand hygiene performed prior to central venous catheter insertion  Location details: right brachial  Catheter type: double lumen  Catheter size: 5 Fr  Catheter Length: 39cm    Ultrasound guidance: yes  Vessel Caliber: small and patent, compressibility normal  Vascular Doppler: not done  Needle advanced into vessel with real time Ultrasound guidance.  Image recorded and saved.  Sterile sheath used.  no esophageal manometryNumber of attempts: 2  Post-procedure: blood return through all ports, chlorhexidine patch and sterile dressing applied  Estimated blood loss (mL): 5  Specimens: No  Implants: No  Assessment: placement verified by x-ray  Tip Termination Explanation: distal svc per EICU MD  Complications: none          Nidhi Taylor  7/4/2020  "

## 2020-07-04 NOTE — PLAN OF CARE
Afebrile. Remains on precedex and Versed for sedation.  Weaned pt's PEEP and FiO2 per Dr. Reich; wants pt weaned down to 50% FiO2 and repeat ABG in the morning.  Central line pulled this morning and tip sent to lab for culture.    Monitoring residuals from TF. Microclots found in stomach contents; MD informed.  POC discussed with pt and family.  Safety maintained entire shift.

## 2020-07-05 ENCOUNTER — ANESTHESIA EVENT (OUTPATIENT)
Dept: INTENSIVE CARE | Facility: HOSPITAL | Age: 30
End: 2020-07-05
Payer: MEDICAID

## 2020-07-05 ENCOUNTER — ANESTHESIA (OUTPATIENT)
Dept: INTENSIVE CARE | Facility: HOSPITAL | Age: 30
End: 2020-07-05
Payer: MEDICAID

## 2020-07-05 PROBLEM — E66.813 CLASS 3 SEVERE OBESITY WITH SERIOUS COMORBIDITY AND BODY MASS INDEX (BMI) OF 60.0 TO 69.9 IN ADULT: Status: RESOLVED | Noted: 2020-06-22 | Resolved: 2020-07-05

## 2020-07-05 PROBLEM — E66.01 MORBID OBESITY: Status: ACTIVE | Noted: 2020-07-05

## 2020-07-05 PROBLEM — R65.20 SEVERE SEPSIS: Status: RESOLVED | Noted: 2020-06-23 | Resolved: 2020-07-05

## 2020-07-05 PROBLEM — A41.9 SEVERE SEPSIS: Status: RESOLVED | Noted: 2020-06-23 | Resolved: 2020-07-05

## 2020-07-05 PROBLEM — J18.9 BILATERAL PNEUMONIA: Status: RESOLVED | Noted: 2020-06-23 | Resolved: 2020-07-05

## 2020-07-05 PROBLEM — E66.01 CLASS 3 SEVERE OBESITY WITH SERIOUS COMORBIDITY AND BODY MASS INDEX (BMI) OF 60.0 TO 69.9 IN ADULT: Status: RESOLVED | Noted: 2020-06-22 | Resolved: 2020-07-05

## 2020-07-05 LAB
ALBUMIN SERPL BCP-MCNC: 2.9 G/DL (ref 3.5–5.2)
ALLENS TEST: ABNORMAL
ALLENS TEST: ABNORMAL
ALP SERPL-CCNC: 49 U/L (ref 55–135)
ALT SERPL W/O P-5'-P-CCNC: 12 U/L (ref 10–44)
ANION GAP SERPL CALC-SCNC: 13 MMOL/L (ref 8–16)
AST SERPL-CCNC: 11 U/L (ref 10–40)
BASOPHILS # BLD AUTO: 0.04 K/UL (ref 0–0.2)
BASOPHILS NFR BLD: 0.3 % (ref 0–1.9)
BILIRUB SERPL-MCNC: 0.5 MG/DL (ref 0.1–1)
BNP SERPL-MCNC: 13 PG/ML (ref 0–99)
BUN SERPL-MCNC: 32 MG/DL (ref 6–20)
C DIFF GDH STL QL: NEGATIVE
C DIFF TOX A+B STL QL IA: NEGATIVE
CALCIUM SERPL-MCNC: 9.9 MG/DL (ref 8.7–10.5)
CHLORIDE SERPL-SCNC: 96 MMOL/L (ref 95–110)
CO2 SERPL-SCNC: 31 MMOL/L (ref 23–29)
CREAT SERPL-MCNC: 0.9 MG/DL (ref 0.5–1.4)
DELSYS: ABNORMAL
DELSYS: ABNORMAL
DIFFERENTIAL METHOD: ABNORMAL
EOSINOPHIL # BLD AUTO: 0.1 K/UL (ref 0–0.5)
EOSINOPHIL NFR BLD: 0.5 % (ref 0–8)
ERYTHROCYTE [DISTWIDTH] IN BLOOD BY AUTOMATED COUNT: 16.6 % (ref 11.5–14.5)
ERYTHROCYTE [SEDIMENTATION RATE] IN BLOOD BY WESTERGREN METHOD: 16 MM/H
ERYTHROCYTE [SEDIMENTATION RATE] IN BLOOD BY WESTERGREN METHOD: 16 MM/H
EST. GFR  (AFRICAN AMERICAN): >60 ML/MIN/1.73 M^2
EST. GFR  (NON AFRICAN AMERICAN): >60 ML/MIN/1.73 M^2
ETCO2: 44
FIO2: 50
FIO2: 65
GLUCOSE SERPL-MCNC: 141 MG/DL (ref 70–110)
HCO3 UR-SCNC: 32.9 MMOL/L (ref 24–28)
HCO3 UR-SCNC: 36.3 MMOL/L (ref 24–28)
HCT VFR BLD AUTO: 44.2 % (ref 37–48.5)
HGB BLD-MCNC: 13.2 G/DL (ref 12–16)
IMM GRANULOCYTES # BLD AUTO: 0.15 K/UL (ref 0–0.04)
IMM GRANULOCYTES NFR BLD AUTO: 1 % (ref 0–0.5)
LYMPHOCYTES # BLD AUTO: 2.4 K/UL (ref 1–4.8)
LYMPHOCYTES NFR BLD: 15.9 % (ref 18–48)
MAGNESIUM SERPL-MCNC: 2.1 MG/DL (ref 1.6–2.6)
MCH RBC QN AUTO: 25.4 PG (ref 27–31)
MCHC RBC AUTO-ENTMCNC: 29.9 G/DL (ref 32–36)
MCV RBC AUTO: 85 FL (ref 82–98)
MIN VOL: 8
MODE: ABNORMAL
MODE: ABNORMAL
MONOCYTES # BLD AUTO: 1.7 K/UL (ref 0.3–1)
MONOCYTES NFR BLD: 10.9 % (ref 4–15)
NEUTROPHILS # BLD AUTO: 10.9 K/UL (ref 1.8–7.7)
NEUTROPHILS NFR BLD: 71.4 % (ref 38–73)
NRBC BLD-RTO: 0 /100 WBC
PCO2 BLDA: 53.3 MMHG (ref 35–45)
PCO2 BLDA: 55 MMHG (ref 35–45)
PEEP: 10
PEEP: 10
PH SMN: 7.4 [PH] (ref 7.35–7.45)
PH SMN: 7.43 [PH] (ref 7.35–7.45)
PHOSPHATE SERPL-MCNC: 3.3 MG/DL (ref 2.7–4.5)
PIP: 30
PLATELET # BLD AUTO: 304 K/UL (ref 150–350)
PMV BLD AUTO: 12.4 FL (ref 9.2–12.9)
PO2 BLDA: 56 MMHG (ref 80–100)
PO2 BLDA: 64 MMHG (ref 80–100)
POC BE: 12 MMOL/L
POC BE: 8 MMOL/L
POC SATURATED O2: 88 % (ref 95–100)
POC SATURATED O2: 92 % (ref 95–100)
POC TCO2: 34 MMOL/L (ref 23–27)
POC TCO2: 38 MMOL/L (ref 23–27)
POCT GLUCOSE: 165 MG/DL (ref 70–110)
POCT GLUCOSE: 174 MG/DL (ref 70–110)
POCT GLUCOSE: 233 MG/DL (ref 70–110)
POTASSIUM SERPL-SCNC: 3.4 MMOL/L (ref 3.5–5.1)
PROCALCITONIN SERPL IA-MCNC: 0.23 NG/ML
PROT SERPL-MCNC: 7.8 G/DL (ref 6–8.4)
RBC # BLD AUTO: 5.19 M/UL (ref 4–5.4)
SAMPLE: ABNORMAL
SAMPLE: ABNORMAL
SITE: ABNORMAL
SITE: ABNORMAL
SODIUM SERPL-SCNC: 140 MMOL/L (ref 136–145)
SP02: 100
VT: 500
VT: 500
WBC # BLD AUTO: 15.28 K/UL (ref 3.9–12.7)

## 2020-07-05 PROCEDURE — 36620 INSERTION CATHETER ARTERY: CPT | Mod: ,,, | Performed by: ANESTHESIOLOGY

## 2020-07-05 PROCEDURE — 36415 COLL VENOUS BLD VENIPUNCTURE: CPT

## 2020-07-05 PROCEDURE — 83735 ASSAY OF MAGNESIUM: CPT

## 2020-07-05 PROCEDURE — 99900035 HC TECH TIME PER 15 MIN (STAT)

## 2020-07-05 PROCEDURE — 80053 COMPREHEN METABOLIC PANEL: CPT

## 2020-07-05 PROCEDURE — 84100 ASSAY OF PHOSPHORUS: CPT

## 2020-07-05 PROCEDURE — 94003 VENT MGMT INPAT SUBQ DAY: CPT

## 2020-07-05 PROCEDURE — 99291 CRITICAL CARE FIRST HOUR: CPT | Mod: S$GLB,,, | Performed by: INTERNAL MEDICINE

## 2020-07-05 PROCEDURE — 85025 COMPLETE CBC W/AUTO DIFF WBC: CPT

## 2020-07-05 PROCEDURE — 63600175 PHARM REV CODE 636 W HCPCS: Performed by: INTERNAL MEDICINE

## 2020-07-05 PROCEDURE — 94640 AIRWAY INHALATION TREATMENT: CPT

## 2020-07-05 PROCEDURE — 20000000 HC ICU ROOM

## 2020-07-05 PROCEDURE — 27000221 HC OXYGEN, UP TO 24 HOURS

## 2020-07-05 PROCEDURE — 63600175 PHARM REV CODE 636 W HCPCS: Performed by: NURSE PRACTITIONER

## 2020-07-05 PROCEDURE — 99900026 HC AIRWAY MAINTENANCE (STAT)

## 2020-07-05 PROCEDURE — 82803 BLOOD GASES ANY COMBINATION: CPT

## 2020-07-05 PROCEDURE — 87324 CLOSTRIDIUM AG IA: CPT

## 2020-07-05 PROCEDURE — 87449 NOS EACH ORGANISM AG IA: CPT

## 2020-07-05 PROCEDURE — 25000242 PHARM REV CODE 250 ALT 637 W/ HCPCS: Performed by: NURSE PRACTITIONER

## 2020-07-05 PROCEDURE — 36600 WITHDRAWAL OF ARTERIAL BLOOD: CPT

## 2020-07-05 PROCEDURE — 25000242 PHARM REV CODE 250 ALT 637 W/ HCPCS: Performed by: INTERNAL MEDICINE

## 2020-07-05 PROCEDURE — 83880 ASSAY OF NATRIURETIC PEPTIDE: CPT

## 2020-07-05 PROCEDURE — 25000003 PHARM REV CODE 250: Performed by: INTERNAL MEDICINE

## 2020-07-05 PROCEDURE — 99232 SBSQ HOSP IP/OBS MODERATE 35: CPT | Mod: S$GLB,,, | Performed by: INTERNAL MEDICINE

## 2020-07-05 PROCEDURE — 94761 N-INVAS EAR/PLS OXIMETRY MLT: CPT

## 2020-07-05 PROCEDURE — 63600175 PHARM REV CODE 636 W HCPCS: Performed by: HOSPITALIST

## 2020-07-05 PROCEDURE — C9113 INJ PANTOPRAZOLE SODIUM, VIA: HCPCS | Performed by: INTERNAL MEDICINE

## 2020-07-05 PROCEDURE — 99232 PR SUBSEQUENT HOSPITAL CARE,LEVL II: ICD-10-PCS | Mod: S$GLB,,, | Performed by: INTERNAL MEDICINE

## 2020-07-05 PROCEDURE — 36620 ARTERIAL: ICD-10-PCS | Mod: ,,, | Performed by: ANESTHESIOLOGY

## 2020-07-05 PROCEDURE — 99291 PR CRITICAL CARE, E/M 30-74 MINUTES: ICD-10-PCS | Mod: S$GLB,,, | Performed by: INTERNAL MEDICINE

## 2020-07-05 PROCEDURE — 84145 PROCALCITONIN (PCT): CPT

## 2020-07-05 PROCEDURE — 36620 INSERTION CATHETER ARTERY: CPT

## 2020-07-05 PROCEDURE — 94770 HC EXHALED C02 TEST: CPT

## 2020-07-05 PROCEDURE — 37799 UNLISTED PX VASCULAR SURGERY: CPT

## 2020-07-05 RX ORDER — ENOXAPARIN SODIUM 100 MG/ML
40 INJECTION SUBCUTANEOUS EVERY 12 HOURS
Status: DISCONTINUED | OUTPATIENT
Start: 2020-07-05 | End: 2020-07-14 | Stop reason: HOSPADM

## 2020-07-05 RX ADMIN — DEXMEDETOMIDINE HYDROCHLORIDE 1.1 MCG/KG/HR: 100 INJECTION, SOLUTION, CONCENTRATE INTRAVENOUS at 11:07

## 2020-07-05 RX ADMIN — INSULIN ASPART 2 UNITS: 100 INJECTION, SOLUTION INTRAVENOUS; SUBCUTANEOUS at 05:07

## 2020-07-05 RX ADMIN — DEXMEDETOMIDINE HYDROCHLORIDE 1.1 MCG/KG/HR: 100 INJECTION, SOLUTION, CONCENTRATE INTRAVENOUS at 12:07

## 2020-07-05 RX ADMIN — ENOXAPARIN SODIUM 165 MG: 120 INJECTION SUBCUTANEOUS at 07:07

## 2020-07-05 RX ADMIN — CHLORHEXIDINE GLUCONATE 0.12% ORAL RINSE 15 ML: 1.2 LIQUID ORAL at 09:07

## 2020-07-05 RX ADMIN — IPRATROPIUM BROMIDE AND ALBUTEROL SULFATE 3 ML: .5; 3 SOLUTION RESPIRATORY (INHALATION) at 12:07

## 2020-07-05 RX ADMIN — POTASSIUM CHLORIDE 60 MEQ: 14.9 INJECTION, SOLUTION INTRAVENOUS at 05:07

## 2020-07-05 RX ADMIN — MICAFUNGIN SODIUM 100 MG: 20 INJECTION, POWDER, LYOPHILIZED, FOR SOLUTION INTRAVENOUS at 10:07

## 2020-07-05 RX ADMIN — ENOXAPARIN SODIUM 40 MG: 100 INJECTION SUBCUTANEOUS at 09:07

## 2020-07-05 RX ADMIN — METOCLOPRAMIDE 10 MG: 5 INJECTION, SOLUTION INTRAMUSCULAR; INTRAVENOUS at 01:07

## 2020-07-05 RX ADMIN — MIDAZOLAM HYDROCHLORIDE 7 MG/HR: 5 INJECTION, SOLUTION INTRAMUSCULAR; INTRAVENOUS at 12:07

## 2020-07-05 RX ADMIN — METHYLPREDNISOLONE SODIUM SUCCINATE 40 MG: 40 INJECTION, POWDER, FOR SOLUTION INTRAMUSCULAR; INTRAVENOUS at 08:07

## 2020-07-05 RX ADMIN — DEXMEDETOMIDINE HYDROCHLORIDE 1 MCG/KG/HR: 100 INJECTION, SOLUTION, CONCENTRATE INTRAVENOUS at 10:07

## 2020-07-05 RX ADMIN — LINEZOLID 600 MG: 600 INJECTION, SOLUTION INTRAVENOUS at 09:07

## 2020-07-05 RX ADMIN — IPRATROPIUM BROMIDE AND ALBUTEROL SULFATE 3 ML: .5; 3 SOLUTION RESPIRATORY (INHALATION) at 04:07

## 2020-07-05 RX ADMIN — PANTOPRAZOLE SODIUM 40 MG: 40 INJECTION, POWDER, LYOPHILIZED, FOR SOLUTION INTRAVENOUS at 08:07

## 2020-07-05 RX ADMIN — DEXMEDETOMIDINE HYDROCHLORIDE 1 MCG/KG/HR: 100 INJECTION, SOLUTION, CONCENTRATE INTRAVENOUS at 07:07

## 2020-07-05 RX ADMIN — IPRATROPIUM BROMIDE AND ALBUTEROL SULFATE 3 ML: .5; 3 SOLUTION RESPIRATORY (INHALATION) at 11:07

## 2020-07-05 RX ADMIN — LINEZOLID 600 MG: 600 INJECTION, SOLUTION INTRAVENOUS at 10:07

## 2020-07-05 RX ADMIN — IPRATROPIUM BROMIDE AND ALBUTEROL SULFATE 3 ML: .5; 3 SOLUTION RESPIRATORY (INHALATION) at 07:07

## 2020-07-05 RX ADMIN — DEXMEDETOMIDINE HYDROCHLORIDE 1 MCG/KG/HR: 100 INJECTION, SOLUTION, CONCENTRATE INTRAVENOUS at 05:07

## 2020-07-05 RX ADMIN — BUDESONIDE 0.5 MG: 0.25 SUSPENSION RESPIRATORY (INHALATION) at 07:07

## 2020-07-05 RX ADMIN — FUROSEMIDE 60 MG: 10 INJECTION, SOLUTION INTRAVENOUS at 11:07

## 2020-07-05 RX ADMIN — DEXMEDETOMIDINE HYDROCHLORIDE 1.1 MCG/KG/HR: 100 INJECTION, SOLUTION, CONCENTRATE INTRAVENOUS at 03:07

## 2020-07-05 RX ADMIN — METOCLOPRAMIDE 10 MG: 5 INJECTION, SOLUTION INTRAMUSCULAR; INTRAVENOUS at 05:07

## 2020-07-05 RX ADMIN — CHLORHEXIDINE GLUCONATE 0.12% ORAL RINSE 15 ML: 1.2 LIQUID ORAL at 08:07

## 2020-07-05 RX ADMIN — INSULIN ASPART 4 UNITS: 100 INJECTION, SOLUTION INTRAVENOUS; SUBCUTANEOUS at 11:07

## 2020-07-05 RX ADMIN — FUROSEMIDE 60 MG: 10 INJECTION, SOLUTION INTRAVENOUS at 10:07

## 2020-07-05 RX ADMIN — DEXMEDETOMIDINE HYDROCHLORIDE 1 MCG/KG/HR: 100 INJECTION, SOLUTION, CONCENTRATE INTRAVENOUS at 02:07

## 2020-07-05 RX ADMIN — METOCLOPRAMIDE 10 MG: 5 INJECTION, SOLUTION INTRAMUSCULAR; INTRAVENOUS at 10:07

## 2020-07-05 RX ADMIN — DEXMEDETOMIDINE HYDROCHLORIDE 1.1 MCG/KG/HR: 100 INJECTION, SOLUTION, CONCENTRATE INTRAVENOUS at 08:07

## 2020-07-05 RX ADMIN — DEXMEDETOMIDINE HYDROCHLORIDE 1.1 MCG/KG/HR: 100 INJECTION, SOLUTION, CONCENTRATE INTRAVENOUS at 06:07

## 2020-07-05 NOTE — PLAN OF CARE
T max 100.8. Did vent changes during the night. Was able to get down to 50% and Peep 10. ABG done. Relayed to DR Reich by RT. FIO2 back to 55%. Pt awake .able to communicate by writing. Frequent suctioning done. Oral care done. With sore to upper palate.Tube was repositioned by RT to the right.  Tube feeding at 30ml /hour. Obtain creamy light red color secretion with small red flakes and clots.Bed bath given skin care done. Mepilex intact to sacral area. Art line attempted by RT. Unsuccessful. Potassium replaced this am K3.4. Soft restraint on. Safety and fall prevention maintain.

## 2020-07-05 NOTE — PLAN OF CARE
Remains on versed and Precedex for sedation.  Attempted to wean pt's sedation, but she did not tolerate.  Pt sat up in chair position of bed majority of the shift.     ART line attempted multiple times by RT and Dr. Nation (anesthesia) as well without success.    High residuals from TF; micro blood clots remain in stomach contents.  MD cut back on lovenox dose.  C.diff specimen sent to lab; on contact precautions now.  POC discussed with pt and family.  Safety maintained entire shift.

## 2020-07-05 NOTE — PROGRESS NOTES
Progress Note  PULMONARY    Admit Date: 6/23/2020 07/05/2020      SUBJECTIVE:     June 25th-patient is sedated, no complaints, intubated.  6/26 intubated.  6/27 no c/o intubated.  6/28 no new c/o,intubated, arouses  6/29- no new c/o, intubated,   6/30 no new c/o  7/1- remains intubated, on SIMV with peep 20 FiO2 65%. Febrile to 101.3 this am. Heavily sedated w/ versed 8mg/hr  7/2- remains intubated, FiO2 70%. On versed 3mg/hr and awake, writing to communicate  7/3- remains intubated, oxygenation w/o major change. Afebrile  7/4 the patient is on extremely high ventilator settings.  She awakens when stimulated.  She appears to have obesity hypoventilation syndrome.  7/5  the patient tolerated weaning her peep to 10 and her FiO2 to 55% overnight.  However, this morning she D recruited and wound up on 20 of peep and 100% FiO2 to regain sats in the 90s.  The patient is too large for there cardiac chair.  Her bed is in a chair position with her at about a 70 degree elevation.  I am afraid this patient is going to require trach and a PEG with prolonged ventilation until she loses about 100 lb.  It has been my experience that once the BMI nears 60, patient smother to death.  PFSH and Allergies reviewed.    OBJECTIVE:     Vitals (Most recent):  Vitals:    07/05/20 1415   BP:    Pulse: 84   Resp: (!) 23   Temp:        Vitals (24 hour range):  Temp:  [99.2 °F (37.3 °C)-100.8 °F (38.2 °C)]   Pulse:  []   Resp:  [16-44]   BP: ()/(52-88)   SpO2:  [85 %-100 %]       Intake/Output Summary (Last 24 hours) at 7/5/2020 1520  Last data filed at 7/5/2020 1254  Gross per 24 hour   Intake 2293 ml   Output 3535 ml   Net -1242 ml          Physical Exam:  PHYSICAL EXAM  GENERAL: Younger patient, extremely obese, in no distress, resting on the ventilator   HEENT: Pupils equal and reactive. Extraocular movements intact. Nose intact.   Pharynx intubated with OG and ET tubes  NECK: Supple.   HEART: Regular rate and  rhythm. No murmur or gallop auscultated.  LUNGS: Clear to auscultation and percussion. Lung excursion symmetrical. No change in fremitus. No adventitial noises.  ABDOMEN: Bowel sounds present.  Very obese.  Non-tender, no masses palpated.  Rectal tube in place.  : Normal anatomy.  Vazquez catheter with yellow urine.  EXTREMITIES: Normal muscle tone and joint movement, no cyanosis or clubbing.   LYMPHATICS: No adenopathy palpated, no edema.  SKIN: Dry, intact, no lesions.  Tattoos present.  NEURO:  Intubated and sedated.  PSYCH:  Unable to assess.      Radiographs reviewed: view by direct vision   7/4 the chest x-ray shows less pneumonia and atelectasis today  CXR 7/1/20- bibasilar airspace disease, opacification overlying left hemidiaphragm  CTA chest 6/30/20- no PE. Bilateral lower lobes with dense consolidations, patchy infiltrates mid and upper lobes as well. PA trunk enlarged    Labs     Recent Labs   Lab 07/05/20  0330   WBC 15.28*   HGB 13.2   HCT 44.2        Recent Labs   Lab 07/05/20  0330 07/05/20  1409     --    K 3.4*  --    CL 96  --    CO2 31*  --    BUN 32*  --    CREATININE 0.9  --    *  --    CALCIUM 9.9  --    MG 2.1  --    PHOS 3.3  --    AST 11  --    ALT 12  --    ALKPHOS 49*  --    BILITOT 0.5  --    PROT 7.8  --    ALBUMIN 2.9*  --    PROCAL  --  0.23   BNP  --  13     Recent Labs   Lab 07/05/20  0838   PH 7.428   PCO2 55.0*   PO2 64*   HCO3 36.3*     Microbiology Results (last 7 days)     Procedure Component Value Units Date/Time    Clostridium difficile EIA [[id number]] Collected: 07/05/20 1503    Order Status: Sent Specimen: Stool Updated: 07/05/20 1504    Culture, Respiratory with Gram Stain [[id number]]     Order Status: No result Specimen: Respiratory from Tracheal Aspirate     Blood culture [[id number]] Collected: 07/01/20 2034    Order Status: Completed Specimen: Blood Updated: 07/05/20 1012     Blood Culture, Routine No Growth to date      No Growth to date      No  Growth to date      No Growth to date    Narrative:      From arterial line    Blood culture [[id number]] Collected: 07/01/20 2036    Order Status: Completed Specimen: Blood from Line, Central Neck Updated: 07/05/20 1012     Blood Culture, Routine No Growth to date      No Growth to date      No Growth to date      No Growth to date    Narrative:      From TLC    IV catheter culture [[id number]] Collected: 07/04/20 0844    Order Status: Sent Specimen: Catheter Tip, Intrajugular Updated: 07/04/20 2331    Blood culture [[id number]] Collected: 06/28/20 0824    Order Status: Completed Specimen: Blood from Antecubital, Right Arm Updated: 07/03/20 2212     Blood Culture, Routine No growth after 5 days.    Blood culture [[id number]] Collected: 06/27/20 0550    Order Status: Completed Specimen: Blood from Line, Central Updated: 07/03/20 0612     Blood Culture, Routine No growth after 5 days.    Blood culture [[id number]] Collected: 06/26/20 1914    Order Status: Completed Specimen: Blood Updated: 07/02/20 0612     Blood Culture, Routine No growth after 5 days.    Narrative:      X2 sticks, one from central line, one peripheral    Blood culture [[id number]] Collected: 06/26/20 1914    Order Status: Completed Specimen: Blood from Antecubital, Left Arm Updated: 07/02/20 0612     Blood Culture, Routine No growth after 5 days.    Respiratory Infection Panel (PCR), Nasopharyngeal [[id number]] Collected: 07/01/20 1400    Order Status: Completed Specimen: Nasopharyngeal Swab Updated: 07/01/20 1513     Respiratory Infection Panel Source NP Swab     Adenovirus Not Detected     Coronavirus 229E, Common Cold Virus Not Detected     Coronavirus HKU1, Common Cold Virus Not Detected     Coronavirus NL63, Common Cold Virus Not Detected     Coronavirus OC43, Common Cold Virus Not Detected     Comment: The Coronavirus strains detected in this test cause the common cold.  These strains are not the COVID-19 (novel Coronavirus)strain   associated with  the respiratory disease outbreak.          Human Metapneumovirus Not Detected     Human Rhinovirus/Enterovirus Not Detected     Influenza A (subtypes H1, H1-2009,H3) Not Detected     Influenza B Not Detected     Parainfluenza Virus 1 Not Detected     Parainfluenza Virus 2 Not Detected     Parainfluenza Virus 3 Not Detected     Parainfluenza Virus 4 Not Detected     Respiratory Syncytial Virus Not Detected     Bordetella Parapertussis (OI4768) Not Detected     Bordetella pertussis (ptxP) Not Detected     Chlamydia pneumoniae Not Detected     Mycoplasma pneumoniae Not Detected     Comment: Respiratory Infection Panel testing performed by Multiplex PCR.       Narrative:      For all other respiratory sources, order [id number] -  Respiratory Viral Panel by PCR    Culture, Respiratory with Gram Stain [[id number]]  (Abnormal) Collected: 06/27/20 1444    Order Status: Completed Specimen: Respiratory from Tracheal Aspirate Updated: 06/30/20 0718     Respiratory Culture No S aureus or Pseudomonas isolated.      KENROY LUSITANIAE  Rare  Normal respiratory estephanie also present       Gram Stain (Respiratory) <10 epithelial cells per low power field.     Gram Stain (Respiratory) Rare WBC's     Gram Stain (Respiratory) No organisms seen    Blood culture [[id number]] Collected: 06/23/20 2201    Order Status: Completed Specimen: Blood Updated: 06/29/20 0612     Blood Culture, Routine No growth after 5 days.    Urine Culture High Risk [[id number]] Collected: 06/27/20 1437    Order Status: Completed Specimen: Urine, Catheterized Updated: 06/29/20 0220     Urine Culture, Routine No growth    Narrative:      Indicated criteria for high risk culture:->Other  Other (specify):->icu        Echo 6/22/2020  · Concentric left ventricular remodeling.  · Normal left ventricular systolic function. The estimated ejection fraction is 69%.  · Normal LV diastolic function.  · No wall motion abnormalities.  · Normal right ventricular systolic  function.  · Mild right atrial enlargement.  · Trivial pericardial effusion.  · Mild left atrial enlargement.       Impression:  Active Hospital Problems    Diagnosis  POA    *Severe sepsis [A41.9, R65.20]  Yes    Pulmonary hypertension [I27.20]  Yes     By pulmonary artery size on ct chest 6/30      Obstructive sleep apnea syndrome [G47.33]  Yes    Staphylococcal pneumonia [J15.20]  Yes    Type 2 diabetes mellitus [E11.9]  Yes    Nicotine dependence [F17.200]  Yes    Bilateral pneumonia [J18.9]  Yes    Acute respiratory failure with hypoxia and hypercarbia [J96.01, J96.02]  Yes    Obesity hypoventilation syndrome [E66.2]  Yes    Mild intermittent asthma [J45.20]  Yes    Class 3 severe obesity with serious comorbidity and body mass index (BMI) of 60.0 to 69.9 in adult [E66.01, Z68.44]  Not Applicable    Cardiomegaly [I51.7]  Yes      Resolved Hospital Problems   No resolved problems to display.     Acute on chronic hypoxemic/hypercapneic resp failure with mechanical ventilation and very high oxygen and pressure settings  Asthma exacerbation, apparently resolved  Bilateral pneumonia- MSSA on sputum cx, improving on chest x-ray  Atelectasis, significantly worse again after dropping PEEP for approximately 12 hr  LAURIE/OHS  Morbid obesity, BMI is 58  Oozing from full-dose Lovenox    Plan:   - continue vent support but wean FiO2 to try to get off of toxic oxygen levels  -make sure dietary is feeding her only enough protein to maintain muscle mass and vitamins and minerals and no extra calories, the patient needs to be significantly ketotic  - continue aggressive turning  - continue versed and Precedex as needed  - per Dr. Gentile had suspected propofol adverse reaction w/ bradycardia earlier in course  - continue diuresis  - continue nebulized bronchodilators and budesonide  - continue solumedrol 40mg daily  - ID following  - continue antibiotics  - lower Lovenox to prophylactic doses she is oozing  everywhere  -given patient's extreme morbid obesity a tracheostomy will be necessary, I would move forward on this now and then move her to an LTAC    Critical care time spent reviewing the chart, examining the patient, reviewing the labs, reviewing the radiological findings, discussing care with nursing, physicians, and respiratory and creating the note and  has been greater than 35 min

## 2020-07-05 NOTE — RESPIRATORY THERAPY
07/04/20 1933   Patient Assessment/Suction   Level of Consciousness (AVPU) alert   Respiratory Effort Normal;Unlabored   Expansion/Accessory Muscles/Retractions no use of accessory muscles   All Lung Fields Breath Sounds diminished   Rhythm/Pattern, Respiratory assisted mechanically;depth regular;pattern regular;unlabored   Cough Frequency infrequent   PRE-TX-O2   O2 Device (Oxygen Therapy) ventilator   Oxygen Concentration (%) 55   SpO2 97 %   Pulse Oximetry Type Continuous   $ Pulse Oximetry - Multiple Charge Pulse Oximetry - Multiple   Pulse 72   Resp 17   ETCO2   ETCO2 (mmHg) 47 mmHg   Aerosol Therapy   $ Aerosol Therapy Charges Aerosol Treatment   Daily Review of Necessity (SVN) completed   Respiratory Treatment Status (SVN) given   Treatment Route (SVN) in-line   Patient Position (SVN) semi-Howard's   Post Treatment Assessment (SVN) breath sounds improved   Signs of Intolerance (SVN) none   Breath Sounds Post-Respiratory Treatment   Post-treatment Heart Rate (beats/min) 76   Post-treatment Resp Rate (breaths/min) 16   Ready to Wean/Extubation Screen   FIO2<=50 (chart decimal) (!) 0.55

## 2020-07-05 NOTE — RESPIRATORY THERAPY
Pt sats 88-91. Increased FIO2 to 60% with no change in O2 sats. Increased FIO2 to 65%. O2 increased to 94%

## 2020-07-05 NOTE — RESPIRATORY THERAPY
07/04/20 1930   PRE-TX-O2   Oxygen Concentration (%) 55   SpO2 97 %   Pulse 72   Resp 17   BP (!) 109/58   ETCO2   ETCO2 (mmHg) 47 mmHg   Vent Select   Conventional Vent Y   Charged w/in last 24h YES   Preset Conventional Ventilator Settings   Vent Type    Ventilation Type VC   Vent Mode A/C   Set Rate 16 BPM   Vt Set 500 mL   PEEP/CPAP 10 cmH20   Pressure Support 0 cmH20   Waveform RAMP   Peak Flow 50 L/min   Set Inspiratory Pressure 0 cmH20   Insp Time 0 Sec(s)   Plateau Set/Insp. Hold (sec) 0   Insp Rise Time  0 %   Trigger Sensitivity Flow/I-Trigger 3 L/min   P High 0 cm H2O   P Low 0 cm H2O   T High 0 sec   T Low 0 sec   Patient Ventilator Parameters   Resp Rate Total 17 br/min   Peak Airway Pressure 24 cmH2O   Mean Airway Pressure 15 cmH20   Plateau Pressure 33 cmH20   Exhaled Vt 498 mL   Total Ve 8.35 mL   Spont Ve 0 L   I:E Ratio Measured 1:2.30   Conventional Ventilator Alarms   Ve High Alarm 24 L/min   Resp Rate High Alarm 40 br/min   Press High Alarm 65 cmH2O   Apnea Rate 20   Apnea Volume (mL) 450 mL   Apnea Oxygen Concentration  100   Apnea Flow Rate (L/min) 70   T Apnea 20 sec(s)   Ready to Wean/Extubation Screen   FIO2<=50 (chart decimal) (!) 0.55   MV<16L (chart vol.) 8.35   PEEP <=8 (chart #) (!) 10   Ready to Wean Parameters   F/VT Ratio<105 (RSBI) (!) 34.14

## 2020-07-05 NOTE — ANESTHESIA PROCEDURE NOTES
Arterial    Diagnosis: respiratory failure    Patient location during procedure: ICU  Procedure start time: 7/5/2020 4:01 PM  Timeout: 7/5/2020 4:00 PM  Procedure end time: 7/5/2020 4:24 PM    Staffing  Authorizing Provider: Leonardo Nation MD  Performing Provider: Leonardo Nation MD    Anesthesiologist was present at the time of the procedure.  Arterial  Skin Prep: chlorhexidine gluconate  Local Infiltration: none  Orientation: right  Location: radial  Catheter Size: 20 G  Catheter placement by Ultrasound guidance. Heme positive aspiration all ports.  Vessel Caliber: patent  Needle advanced into vessel with real time Ultrasound guidance.  Guidewire confirmed in vessel.Insertion Attempts: 4 or more  Assessment  Dressing: secured with tape and tegaderm            
None

## 2020-07-05 NOTE — RESPIRATORY THERAPY
Pt O2 sats dropping thru the day. FIO2 & peep increased gradually with no change. FIO2 increased to 100%. Peep increased to 20. Dr. Son @ bedside.Shana was inserted but shortly after, with pts movement the shana came out. Multiple attempts by myself & Dr. Nation to obtain another without success. Dr. Nation suggest waiting until the am for anesthesia to attempt once again

## 2020-07-05 NOTE — RESPIRATORY THERAPY
07/05/20 0701   Patient Assessment/Suction   Level of Consciousness (AVPU) responds to voice   All Lung Fields Breath Sounds diminished   Suction Method oral;tracheal   Secretions Amount small   Secretions Color white   Secretions Characteristics thick   PRE-TX-O2   O2 Device (Oxygen Therapy) ventilator   $ Is the patient on Low Flow Oxygen? Yes   Oxygen Concentration (%) 55   SpO2 (!) 88 %  (Increased FIO2 to 60%)   Pulse Oximetry Type Continuous   $ Pulse Oximetry - Multiple Charge Pulse Oximetry - Multiple   Pulse 81   Resp 16   ETCO2   $ ETCO2 Charge Exhaled CO2 Monitoring   $ ETCO2 Usage Currently wearing   ETCO2 (mmHg) 44 mmHg   Aerosol Therapy   $ Aerosol Therapy Charges Aerosol Treatment   Respiratory Treatment Status (SVN) given   Treatment Route (SVN) in-line   Patient Position (SVN) HOB elevated   Post Treatment Assessment (SVN) breath sounds unchanged   Signs of Intolerance (SVN) none   Breath Sounds Post-Respiratory Treatment   Post-treatment Heart Rate (beats/min) 80   Post-treatment Resp Rate (breaths/min) 16   Wound Care   $ Wound Care Tech Time 15 min   Area of Concern Upper lip;Lower lip;Corner lip   Skin Color/Characteristics without discoloration   Skin Temperature warm        Airway - Non-Surgical 06/22/20 1625 Endotracheal Tube   Placement Date/Time: 06/22/20 1625   Present Prior to Hospital Arrival?: No  Method of Intubation: Direct laryngoscopy  Inserted by: MD  Airway Device: Endotracheal Tube  Mask Ventilation: Easy  Blade: Ramon #3  Airway Device Size: 7.5  Style: Cuffed...   Secured at 24 cm   Measured At Lips   Secured Location Right   Secured by Commercial tube lord   Bite Block right   Site Condition Dry   Status Intact;Secured;Patent   Site Assessment Clean;Dry   Vent Select   Conventional Vent Y   Ventilator Initiated No   $ Ventilator Subsequent 1   Charged w/in last 24h YES   IHI Ventilator Associated Pneumonia Bundle   Head of Bed Elevated  HOB 30   Preset Conventional  Ventilator Settings   Vent Type    Ventilation Type VC   Vent Mode A/C   Humidity HME   Set Rate 16 BPM   Vt Set 500 mL   PEEP/CPAP 10 cmH20   Pressure Support 0 cmH20   Waveform RAMP   Peak Flow 50 L/min   Set Inspiratory Pressure 0 cmH20   Insp Time 0 Sec(s)   Plateau Set/Insp. Hold (sec) 0   Insp Rise Time  0 %   Trigger Sensitivity Flow/I-Trigger 3 L/min   P High 0 cm H2O   P Low 0 cm H2O   T High 0 sec   T Low 0 sec   Patient Ventilator Parameters   Resp Rate Total 16 br/min   Peak Airway Pressure 29 cmH2O   Mean Airway Pressure 16 cmH20   Plateau Pressure 33 cmH20   Exhaled Vt 489 mL   Total Ve 8.43 mL   Spont Ve 0 L   I:E Ratio Measured 1:2.40   Conventional Ventilator Alarms   Ve High Alarm 24 L/min   Resp Rate High Alarm 40 br/min   Press High Alarm 65 cmH2O   Apnea Rate 20   Apnea Volume (mL) 450 mL   Apnea Oxygen Concentration  100   Apnea Flow Rate (L/min) 70   T Apnea 20 sec(s)   Ready to Wean/Extubation Screen   FIO2<=50 (chart decimal) (!) 0.55   MV<16L (chart vol.) 8.43   PEEP <=8 (chart #) (!) 10   Ready to Wean Parameters   F/VT Ratio<105 (RSBI) (!) 32.72

## 2020-07-05 NOTE — PROGRESS NOTES
Consult Note  Infectious Disease    Reason for Consult:  ID gabino    HPI: Michelle Wren is a  30 y.o. female with a history of asthma, type 2 diabetes, smoking and severe obesity (344 lb) who transferred from [facility] on 06/22 for pulmonary consultation.  She presented to their emergency room the day prior to this admission with a 2-3 day history of shortness of breath.  She was using a friend's CPAP and then required BiPAP and Solu-Medrol, bronchodilator treatments and Lasix.  She required intubation on 06/21 for progressive hypercapnic hypoxemic respiratory failure and was transferred on the ventilator.  She was given vancomycin, Levaquin and Zosyn empirically.  Initial chest x-rays look like pulmonary edema, BNP was mildly elevated but echocardiogram showed normal ejection fraction.  Some concern regarding angioedema on the 2nd hospital day due to swelling of her lips.  Endotracheal aspirate grew MSSA and vancomycin and Zosyn were changed to oxacillin, Levaquin was resumed when fever worsened as steroids were decreased.  Continuing to run low-grade temperature with repeat sputum culture only normal estephanie from the 27th.  Continues on steroids without bronchospasm.  White blood cells remain elevated No eosinophilia on arrival to [place].  Procalcitonin normal x3.  Rheumatoid factor borderline with normal C CP.  COVID negative, Legionella urine antigen negative.  No renal issues are proteinuria to consider granulomatous disease.  Has not been able to get a CT scan of the chest to try to clarify pattern of infiltrates due to habitus. Requiring high PEEP.   Plain films of initially showed lower lobe lobe alveolar infiltrates but these have faded and there may now just be bilateral pleural effusions.  KIRA, Anca, C1 esterase inhibitor antigen are pending.    6/30: Interim reviewed, discussed with nurse at bedside regarding trip to CT and family meeting. Ct chest reviewed and concur with Dr. Schulz re  "appearance similar to COVID infiltrates.   7/1:  T-max 102.9° today.  Hemodynamically stable.  Up to 70% FiO2 on the ventilator.  White blood cells are about the same.  Tolerating tube feedings and had a soft bowel movement.  Sedated on the ventilator at this time.  HIV negative, respiratory viral panel negative, ferritin 394.  COVID IgG negative  7/2: temp down to less than 100. Could not be proned, due to hypoxemia, now being turned acutely. She is arousable despite sedation. Blood cultures negative. Awaiting picc line insertion to get TLC out  7/3:  Temps of stabilized temperatures have stabilized, white blood cells are higher, FiO2 is stools at 70% with 20 of PEEP.  PICC line has still not been exchanged, nor TLC removed.  Blood cultures remain negative from 07/01.  7/4:  Line was changed this morning to a PICC, tip culture in progress.  White blood cells 18,000, chest x-ray actually looks better with better aeration of the bases.  Had high residual last night, tiny bits of blood on gastric aspirate.  No obvious bleeding in the rectal tube  7/5: afebrile. tmax 100.8. unsuccessful at weaning FIO2 or peep. WBC lower. CXR worsened symmetrically. In chair  Position, writing notes, "I'm ready to go " (home). Stools are loose, in rectal tube. No abd pain.     EXAM & DIAGNOSTICS REVIEWED:   Vitals:     Temp:  [99.2 °F (37.3 °C)-100.8 °F (38.2 °C)]   Temp: 99.3 °F (37.4 °C) (07/05/20 1200)  Pulse: 84 (07/05/20 1415)  Resp: (!) 23 (07/05/20 1415)  BP: 111/78 (07/05/20 1400)  SpO2: 96 % (07/05/20 1415)    Intake/Output Summary (Last 24 hours) at 7/5/2020 1432  Last data filed at 7/5/2020 1254  Gross per 24 hour   Intake 2293 ml   Output 3535 ml   Net -1242 ml     Vent Mode: A/C  Oxygen Concentration (%):  [] 80  Resp Rate Total:  [16 br/min-29 br/min] 22 br/min  Vt Set:  [500 mL] 500 mL  PEEP/CPAP:  [10 cmH20-15 cmH20] 10 cmH20  Pressure Support:  [0 cmH20] 0 cmH20  Mean Airway Pressure:  [15 chU57-88 cmH20] 15 " cmH20      General:  In NAD.  alert    Eyes:  Anicteric,  ENT:  Oral ET and OGT,   Neck:  supple,    Lungs: Clear anteriorly  Heart:  RRR, no gallop/murmur/rub noted  Abd:  Soft, morbidly obese, NT, ND, normal BS, no masses or organomegaly appreciated.   Tube feeding ongoing, rectal tube with liquid stool  :   Vazquez, urine clear, no flank tenderness  Musc:  Joints without effusion, swelling, erythema, synovitis, muscle wasting.   Skin:  No rashes. No palmar or plantar lesions. No subungual petechiae  Wound:   Neuro: Awake, communicating with written notes  Psych:  Calm, probably depressed  Lymphatic:     Exam limited by habitus  Extrem: No edema, erythema, phlebitis, cellulitis, warm and well perfused  VAD:  Right arm PICC line 7/4 right radial arterial line    Isolation:  none    Lines/Tubes/Drains:    General Labs reviewed:  Recent Labs   Lab 07/03/20 0315 07/04/20  0318 07/05/20  0330   WBC 22.05* 18.60* 15.28*   HGB 13.3 13.5 13.2   HCT 44.6 44.5 44.2    344 304       Recent Labs   Lab 07/03/20  0315 07/04/20  0318 07/05/20  0330    138 140   K 3.4* 3.8 3.4*    98 96   CO2 29 29 31*   BUN 30* 30* 32*   CREATININE 0.8 0.8 0.9   CALCIUM 9.2 10.0 9.9   PROT 7.4 7.9 7.8   BILITOT 0.5 0.4 0.5   ALKPHOS 48* 51* 49*   ALT 11 11 12   AST 8* 10 11           Micro:  Microbiology Results (last 7 days)     Procedure Component Value Units Date/Time    Culture, Respiratory with Gram Stain [[id number]]     Order Status: No result Specimen: Respiratory from Tracheal Aspirate     Blood culture [[id number]] Collected: 07/01/20 2034    Order Status: Completed Specimen: Blood Updated: 07/05/20 1012     Blood Culture, Routine No Growth to date      No Growth to date      No Growth to date      No Growth to date    Narrative:      From arterial line    Blood culture [[id number]] Collected: 07/01/20 2036    Order Status: Completed Specimen: Blood from Line, Central Neck Updated: 07/05/20 1012     Blood Culture,  Routine No Growth to date      No Growth to date      No Growth to date      No Growth to date    Narrative:      From TLC    IV catheter culture [[id number]] Collected: 07/04/20 0844    Order Status: Sent Specimen: Catheter Tip, Intrajugular Updated: 07/04/20 2331    Blood culture [[id number]] Collected: 06/28/20 0824    Order Status: Completed Specimen: Blood from Antecubital, Right Arm Updated: 07/03/20 2212     Blood Culture, Routine No growth after 5 days.    Blood culture [[id number]] Collected: 06/27/20 0550    Order Status: Completed Specimen: Blood from Line, Central Updated: 07/03/20 0612     Blood Culture, Routine No growth after 5 days.    Blood culture [[id number]] Collected: 06/26/20 1914    Order Status: Completed Specimen: Blood Updated: 07/02/20 0612     Blood Culture, Routine No growth after 5 days.    Narrative:      X2 sticks, one from central line, one peripheral    Blood culture [[id number]] Collected: 06/26/20 1914    Order Status: Completed Specimen: Blood from Antecubital, Left Arm Updated: 07/02/20 0612     Blood Culture, Routine No growth after 5 days.    Respiratory Infection Panel (PCR), Nasopharyngeal [[id number]] Collected: 07/01/20 1400    Order Status: Completed Specimen: Nasopharyngeal Swab Updated: 07/01/20 1513     Respiratory Infection Panel Source NP Swab     Adenovirus Not Detected     Coronavirus 229E, Common Cold Virus Not Detected     Coronavirus HKU1, Common Cold Virus Not Detected     Coronavirus NL63, Common Cold Virus Not Detected     Coronavirus OC43, Common Cold Virus Not Detected     Comment: The Coronavirus strains detected in this test cause the common cold.  These strains are not the COVID-19 (novel Coronavirus)strain   associated with the respiratory disease outbreak.          Human Metapneumovirus Not Detected     Human Rhinovirus/Enterovirus Not Detected     Influenza A (subtypes H1, H1-2009,H3) Not Detected     Influenza B Not Detected     Parainfluenza Virus 1 Not  Detected     Parainfluenza Virus 2 Not Detected     Parainfluenza Virus 3 Not Detected     Parainfluenza Virus 4 Not Detected     Respiratory Syncytial Virus Not Detected     Bordetella Parapertussis (HY7758) Not Detected     Bordetella pertussis (ptxP) Not Detected     Chlamydia pneumoniae Not Detected     Mycoplasma pneumoniae Not Detected     Comment: Respiratory Infection Panel testing performed by Multiplex PCR.       Narrative:      For all other respiratory sources, order [id number] -  Respiratory Viral Panel by PCR    Culture, Respiratory with Gram Stain [[id number]]  (Abnormal) Collected: 06/27/20 1444    Order Status: Completed Specimen: Respiratory from Tracheal Aspirate Updated: 06/30/20 0718     Respiratory Culture No S aureus or Pseudomonas isolated.      KENROY LUSITANIAE  Rare  Normal respiratory estephanie also present       Gram Stain (Respiratory) <10 epithelial cells per low power field.     Gram Stain (Respiratory) Rare WBC's     Gram Stain (Respiratory) No organisms seen    Blood culture [[id number]] Collected: 06/23/20 2201    Order Status: Completed Specimen: Blood Updated: 06/29/20 0612     Blood Culture, Routine No growth after 5 days.    Urine Culture High Risk [[id number]] Collected: 06/27/20 1437    Order Status: Completed Specimen: Urine, Catheterized Updated: 06/29/20 0220     Urine Culture, Routine No growth    Narrative:      Indicated criteria for high risk culture:->Other  Other (specify):->icu        Imaging Reviewed:   CXR   CT chest      Cardiology:echo    IMPRESSION & PLAN   1. Hypercapneic, hypoxemic, respiratory failure. High PEEP requirement.    COVID PCR negative on admission, and 6/30, COVID IgG negative   unweanable  2. MSSA in sputum, but minimal infiltrates, normal procalcitonin x3  3. Morbid obesity with LAURIE  4. History of asthma, requiring a vent in the past      Recommendations:   Zyvox , stop levaquin and if line tip culture is negative, stop Mycamine   check Cdiff  Follow-up  line tip culture  Check BNP, procal and sputum culture  thanks

## 2020-07-06 PROBLEM — J96.91 RESPIRATORY FAILURE WITH HYPOXIA: Status: RESOLVED | Noted: 2020-06-23 | Resolved: 2020-07-06

## 2020-07-06 LAB
ALBUMIN SERPL BCP-MCNC: 2.7 G/DL (ref 3.5–5.2)
ALLENS TEST: ABNORMAL
ALLENS TEST: ABNORMAL
ALP SERPL-CCNC: 43 U/L (ref 55–135)
ALT SERPL W/O P-5'-P-CCNC: 12 U/L (ref 10–44)
ANION GAP SERPL CALC-SCNC: 13 MMOL/L (ref 8–16)
AST SERPL-CCNC: 11 U/L (ref 10–40)
BASOPHILS # BLD AUTO: 0.03 K/UL (ref 0–0.2)
BASOPHILS NFR BLD: 0.3 % (ref 0–1.9)
BILIRUB SERPL-MCNC: 0.5 MG/DL (ref 0.1–1)
BUN SERPL-MCNC: 31 MG/DL (ref 6–20)
CALCIUM SERPL-MCNC: 9.3 MG/DL (ref 8.7–10.5)
CHLORIDE SERPL-SCNC: 101 MMOL/L (ref 95–110)
CO2 SERPL-SCNC: 27 MMOL/L (ref 23–29)
CREAT SERPL-MCNC: 0.9 MG/DL (ref 0.5–1.4)
DELSYS: ABNORMAL
DELSYS: ABNORMAL
DIFFERENTIAL METHOD: ABNORMAL
EOSINOPHIL # BLD AUTO: 0 K/UL (ref 0–0.5)
EOSINOPHIL NFR BLD: 0.3 % (ref 0–8)
ERYTHROCYTE [DISTWIDTH] IN BLOOD BY AUTOMATED COUNT: 16.4 % (ref 11.5–14.5)
ERYTHROCYTE [SEDIMENTATION RATE] IN BLOOD BY WESTERGREN METHOD: 16 MM/H
EST. GFR  (AFRICAN AMERICAN): >60 ML/MIN/1.73 M^2
EST. GFR  (NON AFRICAN AMERICAN): >60 ML/MIN/1.73 M^2
ETCO2: 43
FIO2: 40
FIO2: 70
GLUCOSE SERPL-MCNC: 198 MG/DL (ref 70–110)
HCO3 UR-SCNC: 31.8 MMOL/L (ref 24–28)
HCO3 UR-SCNC: 31.9 MMOL/L (ref 24–28)
HCT VFR BLD AUTO: 41.1 % (ref 37–48.5)
HGB BLD-MCNC: 12.7 G/DL (ref 12–16)
IMM GRANULOCYTES # BLD AUTO: 0.13 K/UL (ref 0–0.04)
IMM GRANULOCYTES NFR BLD AUTO: 1.2 % (ref 0–0.5)
LYMPHOCYTES # BLD AUTO: 2.2 K/UL (ref 1–4.8)
LYMPHOCYTES NFR BLD: 19.7 % (ref 18–48)
MAGNESIUM SERPL-MCNC: 2.1 MG/DL (ref 1.6–2.6)
MCH RBC QN AUTO: 26.2 PG (ref 27–31)
MCHC RBC AUTO-ENTMCNC: 30.9 G/DL (ref 32–36)
MCV RBC AUTO: 85 FL (ref 82–98)
MIN VOL: 8
MODE: ABNORMAL
MODE: ABNORMAL
MONOCYTES # BLD AUTO: 1.4 K/UL (ref 0.3–1)
MONOCYTES NFR BLD: 12.1 % (ref 4–15)
NEUTROPHILS # BLD AUTO: 7.5 K/UL (ref 1.8–7.7)
NEUTROPHILS NFR BLD: 66.4 % (ref 38–73)
NRBC BLD-RTO: 0 /100 WBC
PCO2 BLDA: 52.2 MMHG (ref 35–45)
PCO2 BLDA: 53.1 MMHG (ref 35–45)
PEEP: 20
PEEP: 5
PH SMN: 7.39 [PH] (ref 7.35–7.45)
PH SMN: 7.39 [PH] (ref 7.35–7.45)
PHOSPHATE SERPL-MCNC: 4.3 MG/DL (ref 2.7–4.5)
PIP: 30
PIP: 35
PLATELET # BLD AUTO: 306 K/UL (ref 150–350)
PMV BLD AUTO: 11 FL (ref 9.2–12.9)
PO2 BLDA: 46 MMHG (ref 80–100)
PO2 BLDA: 75 MMHG (ref 80–100)
POC BE: 7 MMOL/L
POC BE: 7 MMOL/L
POC SATURATED O2: 80 % (ref 95–100)
POC SATURATED O2: 94 % (ref 95–100)
POC TCO2: 33 MMOL/L (ref 23–27)
POC TCO2: 33 MMOL/L (ref 23–27)
POCT GLUCOSE: 138 MG/DL (ref 70–110)
POCT GLUCOSE: 192 MG/DL (ref 70–110)
POCT GLUCOSE: 200 MG/DL (ref 70–110)
POCT GLUCOSE: 206 MG/DL (ref 70–110)
POCT GLUCOSE: 225 MG/DL (ref 70–110)
POTASSIUM SERPL-SCNC: 3.4 MMOL/L (ref 3.5–5.1)
PROT SERPL-MCNC: 7.2 G/DL (ref 6–8.4)
PS: 25
RBC # BLD AUTO: 4.84 M/UL (ref 4–5.4)
SAMPLE: ABNORMAL
SAMPLE: ABNORMAL
SITE: ABNORMAL
SITE: ABNORMAL
SODIUM SERPL-SCNC: 141 MMOL/L (ref 136–145)
SP02: 96
VT: 500
VT: 536
WBC # BLD AUTO: 11.24 K/UL (ref 3.9–12.7)

## 2020-07-06 PROCEDURE — 94640 AIRWAY INHALATION TREATMENT: CPT

## 2020-07-06 PROCEDURE — 27000221 HC OXYGEN, UP TO 24 HOURS

## 2020-07-06 PROCEDURE — 63600175 PHARM REV CODE 636 W HCPCS: Performed by: INTERNAL MEDICINE

## 2020-07-06 PROCEDURE — 20000000 HC ICU ROOM

## 2020-07-06 PROCEDURE — 84100 ASSAY OF PHOSPHORUS: CPT

## 2020-07-06 PROCEDURE — 94770 HC EXHALED C02 TEST: CPT

## 2020-07-06 PROCEDURE — 97803 MED NUTRITION INDIV SUBSEQ: CPT

## 2020-07-06 PROCEDURE — 63600175 PHARM REV CODE 636 W HCPCS: Performed by: HOSPITALIST

## 2020-07-06 PROCEDURE — 99232 SBSQ HOSP IP/OBS MODERATE 35: CPT | Mod: S$GLB,,, | Performed by: INTERNAL MEDICINE

## 2020-07-06 PROCEDURE — 94003 VENT MGMT INPAT SUBQ DAY: CPT

## 2020-07-06 PROCEDURE — 25000242 PHARM REV CODE 250 ALT 637 W/ HCPCS: Performed by: NURSE PRACTITIONER

## 2020-07-06 PROCEDURE — 25000003 PHARM REV CODE 250: Performed by: INTERNAL MEDICINE

## 2020-07-06 PROCEDURE — 83735 ASSAY OF MAGNESIUM: CPT

## 2020-07-06 PROCEDURE — 99232 PR SUBSEQUENT HOSPITAL CARE,LEVL II: ICD-10-PCS | Mod: ,,, | Performed by: PHYSICIAN ASSISTANT

## 2020-07-06 PROCEDURE — 99291 CRITICAL CARE FIRST HOUR: CPT | Mod: ,,, | Performed by: INTERNAL MEDICINE

## 2020-07-06 PROCEDURE — 82803 BLOOD GASES ANY COMBINATION: CPT

## 2020-07-06 PROCEDURE — 80053 COMPREHEN METABOLIC PANEL: CPT

## 2020-07-06 PROCEDURE — 99291 PR CRITICAL CARE, E/M 30-74 MINUTES: ICD-10-PCS | Mod: ,,, | Performed by: INTERNAL MEDICINE

## 2020-07-06 PROCEDURE — 63600175 PHARM REV CODE 636 W HCPCS: Performed by: NURSE PRACTITIONER

## 2020-07-06 PROCEDURE — 99232 SBSQ HOSP IP/OBS MODERATE 35: CPT | Mod: ,,, | Performed by: PHYSICIAN ASSISTANT

## 2020-07-06 PROCEDURE — 25000242 PHARM REV CODE 250 ALT 637 W/ HCPCS: Performed by: INTERNAL MEDICINE

## 2020-07-06 PROCEDURE — 99232 PR SUBSEQUENT HOSPITAL CARE,LEVL II: ICD-10-PCS | Mod: S$GLB,,, | Performed by: INTERNAL MEDICINE

## 2020-07-06 PROCEDURE — 36600 WITHDRAWAL OF ARTERIAL BLOOD: CPT

## 2020-07-06 PROCEDURE — C9113 INJ PANTOPRAZOLE SODIUM, VIA: HCPCS | Performed by: INTERNAL MEDICINE

## 2020-07-06 PROCEDURE — 99900035 HC TECH TIME PER 15 MIN (STAT)

## 2020-07-06 PROCEDURE — 94761 N-INVAS EAR/PLS OXIMETRY MLT: CPT

## 2020-07-06 PROCEDURE — 85025 COMPLETE CBC W/AUTO DIFF WBC: CPT

## 2020-07-06 PROCEDURE — 36415 COLL VENOUS BLD VENIPUNCTURE: CPT

## 2020-07-06 RX ORDER — FLUCONAZOLE 100 MG/1
100 TABLET ORAL DAILY
Status: DISCONTINUED | OUTPATIENT
Start: 2020-07-07 | End: 2020-07-14 | Stop reason: HOSPADM

## 2020-07-06 RX ADMIN — FUROSEMIDE 60 MG: 10 INJECTION, SOLUTION INTRAVENOUS at 11:07

## 2020-07-06 RX ADMIN — DEXMEDETOMIDINE HYDROCHLORIDE 1 MCG/KG/HR: 100 INJECTION, SOLUTION, CONCENTRATE INTRAVENOUS at 05:07

## 2020-07-06 RX ADMIN — DEXMEDETOMIDINE HYDROCHLORIDE 1.1 MCG/KG/HR: 100 INJECTION, SOLUTION, CONCENTRATE INTRAVENOUS at 09:07

## 2020-07-06 RX ADMIN — DEXMEDETOMIDINE HYDROCHLORIDE 1.1 MCG/KG/HR: 100 INJECTION, SOLUTION, CONCENTRATE INTRAVENOUS at 11:07

## 2020-07-06 RX ADMIN — LINEZOLID 600 MG: 600 INJECTION, SOLUTION INTRAVENOUS at 09:07

## 2020-07-06 RX ADMIN — IPRATROPIUM BROMIDE AND ALBUTEROL SULFATE 3 ML: .5; 3 SOLUTION RESPIRATORY (INHALATION) at 04:07

## 2020-07-06 RX ADMIN — POTASSIUM CHLORIDE 60 MEQ: 14.9 INJECTION, SOLUTION INTRAVENOUS at 08:07

## 2020-07-06 RX ADMIN — CHLORHEXIDINE GLUCONATE 0.12% ORAL RINSE 15 ML: 1.2 LIQUID ORAL at 08:07

## 2020-07-06 RX ADMIN — ENOXAPARIN SODIUM 40 MG: 100 INJECTION SUBCUTANEOUS at 09:07

## 2020-07-06 RX ADMIN — LINEZOLID 600 MG: 600 INJECTION, SOLUTION INTRAVENOUS at 08:07

## 2020-07-06 RX ADMIN — METOCLOPRAMIDE 10 MG: 5 INJECTION, SOLUTION INTRAMUSCULAR; INTRAVENOUS at 06:07

## 2020-07-06 RX ADMIN — INSULIN ASPART 4 UNITS: 100 INJECTION, SOLUTION INTRAVENOUS; SUBCUTANEOUS at 11:07

## 2020-07-06 RX ADMIN — INSULIN ASPART 1 UNITS: 100 INJECTION, SOLUTION INTRAVENOUS; SUBCUTANEOUS at 01:07

## 2020-07-06 RX ADMIN — METHYLPREDNISOLONE SODIUM SUCCINATE 40 MG: 40 INJECTION, POWDER, FOR SOLUTION INTRAMUSCULAR; INTRAVENOUS at 08:07

## 2020-07-06 RX ADMIN — METOCLOPRAMIDE 10 MG: 5 INJECTION, SOLUTION INTRAMUSCULAR; INTRAVENOUS at 02:07

## 2020-07-06 RX ADMIN — DEXMEDETOMIDINE HYDROCHLORIDE 1.1 MCG/KG/HR: 100 INJECTION, SOLUTION, CONCENTRATE INTRAVENOUS at 01:07

## 2020-07-06 RX ADMIN — IPRATROPIUM BROMIDE AND ALBUTEROL SULFATE 3 ML: .5; 3 SOLUTION RESPIRATORY (INHALATION) at 11:07

## 2020-07-06 RX ADMIN — ENOXAPARIN SODIUM 40 MG: 100 INJECTION SUBCUTANEOUS at 08:07

## 2020-07-06 RX ADMIN — MIDAZOLAM HYDROCHLORIDE 7 MG/HR: 5 INJECTION, SOLUTION INTRAMUSCULAR; INTRAVENOUS at 06:07

## 2020-07-06 RX ADMIN — IPRATROPIUM BROMIDE AND ALBUTEROL SULFATE 3 ML: .5; 3 SOLUTION RESPIRATORY (INHALATION) at 12:07

## 2020-07-06 RX ADMIN — DEXMEDETOMIDINE HYDROCHLORIDE 1.1 MCG/KG/HR: 100 INJECTION, SOLUTION, CONCENTRATE INTRAVENOUS at 04:07

## 2020-07-06 RX ADMIN — IPRATROPIUM BROMIDE AND ALBUTEROL SULFATE 3 ML: .5; 3 SOLUTION RESPIRATORY (INHALATION) at 07:07

## 2020-07-06 RX ADMIN — PANTOPRAZOLE SODIUM 40 MG: 40 INJECTION, POWDER, LYOPHILIZED, FOR SOLUTION INTRAVENOUS at 08:07

## 2020-07-06 RX ADMIN — MIDAZOLAM HYDROCHLORIDE 7 MG/HR: 5 INJECTION, SOLUTION INTRAMUSCULAR; INTRAVENOUS at 10:07

## 2020-07-06 RX ADMIN — INSULIN ASPART 4 UNITS: 100 INJECTION, SOLUTION INTRAVENOUS; SUBCUTANEOUS at 05:07

## 2020-07-06 RX ADMIN — DEXMEDETOMIDINE HYDROCHLORIDE 1 MCG/KG/HR: 100 INJECTION, SOLUTION, CONCENTRATE INTRAVENOUS at 07:07

## 2020-07-06 RX ADMIN — INSULIN ASPART 2 UNITS: 100 INJECTION, SOLUTION INTRAVENOUS; SUBCUTANEOUS at 06:07

## 2020-07-06 RX ADMIN — BUDESONIDE 0.5 MG: 0.25 SUSPENSION RESPIRATORY (INHALATION) at 07:07

## 2020-07-06 RX ADMIN — DEXMEDETOMIDINE HYDROCHLORIDE 1.1 MCG/KG/HR: 100 INJECTION, SOLUTION, CONCENTRATE INTRAVENOUS at 02:07

## 2020-07-06 RX ADMIN — BUDESONIDE 0.5 MG: 0.25 SUSPENSION RESPIRATORY (INHALATION) at 08:07

## 2020-07-06 RX ADMIN — METOCLOPRAMIDE 10 MG: 5 INJECTION, SOLUTION INTRAMUSCULAR; INTRAVENOUS at 09:07

## 2020-07-06 NOTE — RESPIRATORY THERAPY
07/05/20 1930   Patient Assessment/Suction   Level of Consciousness (AVPU) alert   PRE-TX-O2   O2 Device (Oxygen Therapy) ventilator   Oxygen Concentration (%) 70   SpO2 97 %   Pulse 77   Resp 19   ETCO2   ETCO2 (mmHg) 42 mmHg   Vent Select   Conventional Vent Y   Charged w/in last 24h YES   Preset Conventional Ventilator Settings   Vent Type    Ventilation Type VC   Vent Mode A/C   Set Rate 16 BPM   Vt Set 500 mL   PEEP/CPAP 20 cmH20   Pressure Support 0 cmH20   Waveform RAMP   Peak Flow 50 L/min   Set Inspiratory Pressure 0 cmH20   Insp Time 0 Sec(s)   Plateau Set/Insp. Hold (sec) 0   Insp Rise Time  0 %   Trigger Sensitivity Flow/I-Trigger 3 L/min   P High 0 cm H2O   P Low 0 cm H2O   T High 0 sec   T Low 0 sec   Patient Ventilator Parameters   Resp Rate Total 18 br/min   Peak Airway Pressure 35 cmH2O   Mean Airway Pressure 26 cmH20   Plateau Pressure 33 cmH20   Exhaled Vt 400 mL   Total Ve 7.79 mL   Spont Ve 0 L   I:E Ratio Measured 1:1.60   Conventional Ventilator Alarms   Ve High Alarm 24 L/min   Resp Rate High Alarm 40 br/min   Press High Alarm 65 cmH2O   Apnea Rate 20   Apnea Volume (mL) 450 mL   Apnea Oxygen Concentration  100   Apnea Flow Rate (L/min) 70   T Apnea 20 sec(s)   Ready to Wean/Extubation Screen   FIO2<=50 (chart decimal) (!) 0.7   MV<16L (chart vol.) 7.79   PEEP <=8 (chart #) (!) 20   Ready to Wean Parameters   F/VT Ratio<105 (RSBI) (!) 47.5

## 2020-07-06 NOTE — ASSESSMENT & PLAN NOTE
Patient will likely need tracheostomy for weaning.  Will discuss with General surgery and pulmonology team tomorrow.

## 2020-07-06 NOTE — PROGRESS NOTES
[facility]  Adult Nutrition  Progress Note    SUMMARY      Intervention: collaboration with care providers, enteral nutrition therapy  Current Intake: TF Peptamen VHP @ 30 ml/hr + 9 packets beneprotein+ 30 ml flush q 4 hr  (provides 720 kcal (62% EEN), 66g+ beneprotein (98% EPN), and 604 ml free water)     Recommendation:   1) Advance diet to goal of DM 1500 kcal when extubated      2) Continue Peptamen VHP trickle feed @ 30 + 9-10 packets beneprotein daily   (advance slowly if pt tolerates to goal of 55 ml/hr + min 30 ml flush q 4 hr)  ( 1320 kcal ( 100% EEN), 121 g protein ( 93% EPN), and 1108 ml free water)   -if pt does not tolerate trial impact peptide 1.5 formula, 0g fiber  -hold TF for residuals > 200 ml     3) If pt does not tolerate TF @ 30 ml/hr + needed beneprotein initiate TPN  Custom TPN D 15 AA 7 @ 65 ml/hr no lipids   ( provides 109 g protein ( 84% EPN), 1231 kcal ( 100% EEN)     4) Weigh pt weekly   5) Nutrition education on diabetic diet once pt appropriate     Goals: 1) PO diet advanced or nutrition support initiates in < 5 days 2) meet 50% of needs with nutrition support or PO diet advanced at f/u  Nutrition Goal Status: met/ meeting-continues  Communication of RD Recs: reviewed with RN/ MD, POC, sticky note     Reason for Assessment     Reason For Assessment: follow up  Diagnosis: (severe sepsis)  Relevant Medical History: asthma, DM2, severe obesity  Interdisciplinary Rounds: attended     General Information Comments: 31 y/o female admitted with sepsis, bilateral pneumonia. + vent, on high dose propofol no pressor. NFPE done 6/24/20, no wasting seen pt appears obese. NPO x 1 day. Glucose WNL, of note with hx. of DM 2 on steriods.  6/29/20 TF at 30 ml/hr yesterday but pt did not tolerate, held. Reglan started and pt had BM per RN so TF restarted this morning, at 15 ml/hr. 130 ml residual noted. Plan for pt to continue reglan and slowly advance TF as able.  7/1/20 TF held  "yesterday, restarted at 10 ml/hr, still having 100 ml residual. Per RN will trial beneprotein and if unable to advance today will trial impact peptide.  7/3/20 Pt remains ventilated and sedated.TPN was stopped 2' pt tolerating TF today per MD. She is currently resceiving TF of Peptamen VHP @ 50mL/hr. She was able to have a BM today. Nutrition related lab values noted; she is hypokalemic and her blood sugars remain elevated.  7/6/20 TPN standard clinimix ordered 7/2 and 7/3 but discontinued as pt was tolerating TF at goal. Had high residuals, restarted and pt was tolerating at 30 ml/hr this morning. Held for a few hours, plans to restart this afternoon. Residuals 100-180 ml yesterday. Plan for trach tomorrow. Noted to have diarrhea, C. Diff ( -). Per discussion with MD plan to continue TF at this rate for now.     Nutrition Discharge Planning: to be determined- DM 1500 kcal diet or TF above     Nutrition Risk Screen     Nutrition Risk Screen: no indicators present     Nutrition/Diet History     Typical Food/Fluid Intake: unable to obtain  Spiritual, Cultural Beliefs, Confucianist Practices, Values that Affect Care: no  Food Allergies: NKFA  Factors Affecting Nutritional Intake: NPO, on mechanical ventilation     Anthropometrics  Height Method: Stated  Height: 5' 2"  Height (inches): 62 in  Weight Method: Bed Scale  Weight: (!) 141.6  Kg 7/6, 146.4 kg 7/1/20, 156.4 kg 6/29, 165 kg (admission)  Weight (lb): (!) 312 lb ( on lasix)  Ideal Body Weight (IBW), Female: 110 lb  % Ideal Body Weight, Female (lb): 330.69 %  BMI (Calculated): 57 kg/m2  BMI Grade: greater than 40 - morbid obesity        Lab/Procedures/Meds     Pertinent Labs Reviewed: reviewed  BMP  Lab Results   Component Value Date     07/06/2020    K 3.4 (L) 07/06/2020     07/06/2020    CO2 27 07/06/2020    BUN 31 (H) 07/06/2020    CREATININE 0.9 07/06/2020    CALCIUM 9.3 07/06/2020    ANIONGAP 13 07/06/2020    ESTGFRAFRICA >60 07/06/2020    EGFRNONAA " >60 07/06/2020     Recent Labs   Lab 07/06/20  1130   POCTGLUCOSE 225*     Pertinent Medications Reviewed: reviewed  Lasix, methylprednisolone, polyethylene glycol, KCl, Mg sulfate     Estimated/Assessed Needs     Weight Used For Calorie Calculations: 52.2 kg IBW  Energy Calorie Requirements (kcal): 22-25 kcal/kg ( IBW, BMI > 50 kg/m2) = 6475-4210 kcal  Energy Need Method: Kcal/kg  Protein Requirements: 130 g protein  Weight Used For Protein Calculations: 52.2 kg (115 lb 1.3 oz)(IBW ( 2.5 g protein/kg))  Fluid Requirements (mL): 1300 ml or per MD  Estimated Fluid Requirement Method: RDA Method  CHO Requirement: 153-170 g        Nutrition Prescription Ordered     Current Diet Order: TF above         Evaluation of Received Nutrient/Fluid Intake     Energy Calories Required: not meeting needs-improved  Protein Required: meeting needs  Fluid Required: not meeting needs improving  Tolerance: residuals  % Intake of Estimated Energy Needs: 62%  % Meal Intake: NPO + TF     Nutrition Risk     Level of Risk/Frequency of Follow-up: moderate/ high 2-3 x weekly     Assessment and Plan     Inadequate energy intake  R/t NPO  As evidenced by PO intakes < 50% EEN x 1-2 days  Intervention: above  improving     Altered nutrition related lab values  R/t medication side effects and altered absorption of nutrients  As evidenced by elevated A1C and glucose  Intervention: above  continues     Monitor and Evaluation     Food and Nutrient Intake: energy intake  Food and Nutrient Adminstration: diet order, enteral / parenteral nutrition order  Anthropometric Measurements: weight  Biochemical Data, Medical Tests and Procedures: electrolyte and renal panel, gastrointestinal profile, glucose/endocrine profile  Nutrition-Focused Physical Findings: overall appearance      Malnutrition Assessment     Skin (Micronutrient): (Maxi = 14)   Micronutrient Evaluation: no deficiencies               Edema (Fluid Accumulation): (2+)              Nutrition  Follow-Up     RD Follow-up?: Yes

## 2020-07-06 NOTE — RESPIRATORY THERAPY
07/06/20 0736   PRE-TX-O2   O2 Device (Oxygen Therapy) ventilator   $ Is the patient on Low Flow Oxygen? Yes   Oxygen Concentration (%) 70   SpO2 98 %   Pulse Oximetry Type Continuous   $ Pulse Oximetry - Multiple Charge Pulse Oximetry - Multiple   Pulse 71   Resp 16   BP (!) 101/58   ETCO2   $ ETCO2 Charge Exhaled CO2 Monitoring   $ ETCO2 Usage Currently wearing   ETCO2 (mmHg) 44 mmHg   ETCO2 Device Type Ventilator   Aerosol Therapy   $ Aerosol Therapy Charges Aerosol Treatment   Respiratory Treatment Status (SVN) given   Treatment Route (SVN) in-line   Patient Position (SVN) HOB elevated   Signs of Intolerance (SVN) none   Breath Sounds Post-Respiratory Treatment   Post-treatment Heart Rate (beats/min) 76   Post-treatment Resp Rate (breaths/min) 21   Wound Care   $ Wound Care Tech Time 15 min   Area of Concern Upper lip   Skin Color/Characteristics without discoloration   Skin Temperature warm        Airway - Non-Surgical 06/22/20 1625 Endotracheal Tube   Placement Date/Time: 06/22/20 1625   Present Prior to Hospital Arrival?: No  Method of Intubation: Direct laryngoscopy  Inserted by: MD  Airway Device: Endotracheal Tube  Mask Ventilation: Easy  Blade: Ramon #3  Airway Device Size: 7.5  Style: Cuffed...   Secured at 24 cm   Measured At Lips   Secured Location Right   Secured by Commercial tube lord   Bite Block right   Site Condition Dry   Status Intact;Secured;Patent   Cuff Pressure 24 cm H2O   Vent Select   Conventional Vent Y   $ Ventilator Subsequent 1   Charged w/in last 24h YES   IHI Ventilator Associated Pneumonia Bundle   Oral Care Mouth moisturizer;Mouth suctioned;Mouth swabbed   Preset Conventional Ventilator Settings   Vent Type    Ventilation Type VC   Vent Mode A/C   Humidity HME   Set Rate 16 BPM   Vt Set 500 mL   PEEP/CPAP 20 cmH20   Pressure Support 0 cmH20   Waveform RAMP   Peak Flow 50 L/min   Set Inspiratory Pressure 0 cmH20   Insp Time 0 Sec(s)   Plateau Set/Insp. Hold (sec) 0    Insp Rise Time  0 %   Trigger Sensitivity Flow/I-Trigger 3 L/min   P High 0 cm H2O   P Low 0 cm H2O   T High 0 sec   T Low 0 sec

## 2020-07-06 NOTE — PLAN OF CARE
May D/C special contact isolation.  Stool for C diff is negative.  Call Infection Control @ [phone] for questions.

## 2020-07-06 NOTE — SUBJECTIVE & OBJECTIVE
No current facility-administered medications on file prior to encounter.      Current Outpatient Medications on File Prior to Encounter   Medication Sig    albuterol (PROVENTIL/VENTOLIN HFA) 90 mcg/actuation inhaler Inhale 1-2 puffs into the lungs every 6 (six) hours as needed for Wheezing. Rescue    azithromycin (Z-JOSE ALBERTO) 250 MG tablet Take 1 tablet (250 mg total) by mouth once daily. Take first 2 tablets together, then 1 every day until finished.    metformin HCl (METFORMIN ORAL) Take by mouth.       Review of patient's allergies indicates:  No Known Allergies    Past Medical History:   Diagnosis Date    Asthma     Back pain     Diabetes mellitus     Morbid obesity     Obesity      Past Surgical History:   Procedure Laterality Date    APPENDECTOMY       SECTION       Family History     Family history is unknown by patient.        Tobacco Use    Smoking status: Current Every Day Smoker   Substance and Sexual Activity    Alcohol use: Yes     Frequency: Never     Comment: occ    Drug use: No    Sexual activity: Yes     Birth control/protection: None     Review of Systems   Unable to perform ROS: Intubated     Objective:     Vital Signs (Most Recent):  Temp: 99.7 °F (37.6 °C) (20 1115)  Pulse: 79 (20 1155)  Resp: 13 (20 1155)  BP: (!) 112/55 (20 1100)  SpO2: (!) 91 % (20 1155) Vital Signs (24h Range):  Temp:  [98.3 °F (36.8 °C)-99.7 °F (37.6 °C)] 99.7 °F (37.6 °C)  Pulse:  [68-91] 79  Resp:  [11-40] 13  SpO2:  [89 %-99 %] 91 %  BP: ()/(52-78) 112/55     Weight: (!) 141.6 kg (312 lb 2.7 oz)  Body mass index is 57.1 kg/m².    Physical Exam  Vitals signs reviewed.   Constitutional:       Appearance: She is well-developed. She is obese.      Comments: arousable    HENT:      Head: Normocephalic.   Neck:      Musculoskeletal: Neck supple.   Cardiovascular:      Rate and Rhythm: Normal rate.   Pulmonary:      Comments: Intubated, on vent  Skin:     General: Skin is  warm and dry.         Significant Labs:  CBC:   Recent Labs   Lab 07/06/20  0312   WBC 11.24   RBC 4.84   HGB 12.7   HCT 41.1      MCV 85   MCH 26.2*   MCHC 30.9*     CMP:   Recent Labs   Lab 07/06/20 0312   *   CALCIUM 9.3   ALBUMIN 2.7*   PROT 7.2      K 3.4*   CO2 27      BUN 31*   CREATININE 0.9   ALKPHOS 43*   ALT 12   AST 11   BILITOT 0.5       Significant Diagnostics:  I have reviewed all pertinent imaging results/findings within the past 24 hours.

## 2020-07-06 NOTE — SUBJECTIVE & OBJECTIVE
Interval History:  Patient seen and examined.  Continues to fail weaning trials and requires high peep and FiO2.  Plan of care discussed with pulmonology in detail.    Review of Systems   Unable to perform ROS: Intubated     Objective:     Vital Signs (Most Recent):  Temp: 98.4 °F (36.9 °C) (07/05/20 1600)  Pulse: 77 (07/05/20 1932)  Resp: 20 (07/05/20 1932)  BP: 119/71 (07/05/20 1800)  SpO2: 97 % (07/05/20 1932) Vital Signs (24h Range):  Temp:  [98.4 °F (36.9 °C)-100.8 °F (38.2 °C)] 98.4 °F (36.9 °C)  Pulse:  [] 77  Resp:  [16-44] 20  SpO2:  [85 %-100 %] 97 %  BP: ()/(52-88) 119/71     Weight: (!) 145 kg (319 lb 10.7 oz)  Body mass index is 58.47 kg/m².    Intake/Output Summary (Last 24 hours) at 7/5/2020 2154  Last data filed at 7/5/2020 1900  Gross per 24 hour   Intake 2068.88 ml   Output 3305 ml   Net -1236.12 ml      Physical Exam  Vitals signs and nursing note reviewed.   Constitutional:       General: She is not in acute distress.     Appearance: She is obese. She is not diaphoretic.      Comments: Intubated, sedated   HENT:      Mouth/Throat:      Pharynx: No oropharyngeal exudate.      Comments: ET tube noted  Eyes:      General:         Right eye: No discharge.         Left eye: No discharge.      Comments: Pupils sluggish   Neck:      Thyroid: No thyromegaly.      Vascular: No JVD.      Trachea: No tracheal deviation.   Cardiovascular:      Heart sounds: No murmur. No friction rub. No gallop.    Pulmonary:      Effort: No respiratory distress.      Breath sounds: No wheezing or rales.      Comments: Breath sounds course on ventilator.  Massive obesity noted with difficult to auscultate breath sounds.  Abdominal:      General: There is no distension.      Palpations: Abdomen is soft.      Tenderness: There is no abdominal tenderness.      Comments: Massive abdominal pannus noted   Musculoskeletal:         General: No tenderness or deformity.      Right lower leg: Edema present.      Left lower  leg: Edema present.   Skin:     Findings: No erythema or rash.   Neurological:      Motor: No abnormal muscle tone.      Deep Tendon Reflexes: Reflexes normal.      Comments: Patient sedated, unresponsive at present         Significant Labs: All pertinent labs within the past 24 hours have been reviewed.    Significant Imaging: I have reviewed all pertinent imaging results/findings within the past 24 hours.

## 2020-07-06 NOTE — NURSING
0820: Dr Seferino brooks.  Ok for sats 88-92%.    ABG at 1100, possible CPAP trial after, depending on results.  Tube feedings held temporarily in the event of extubation  See parameters for pressure control.

## 2020-07-06 NOTE — PLAN OF CARE
Pt remains in ICU intubated and sedated.  Vent adjustments made earlier this AM.  No CPAP trial as result of ABGs.  SIMV at this time.  Pt awakes easily and able to return to sleep.  Adequate outpt to johnson.  Johnson care and oral complete.  Seems to be tolerating TF at 30cc/hr.  Plan for trach and PEG on Wednesday placement.  Gen sx consulted.  Pt appears to understand POC and in agreeance.  Reiterated with family.  Safety maintained.

## 2020-07-06 NOTE — CONSULTS
[facility]  General Surgery  Consult Note    Patient Name: Michelle Wren  MRN: [id number]  Code Status: Full Code  Admission Date: 2020  Hospital Length of Stay: 13 days  Attending Physician: Gwyn Son MD  Primary Care Provider: Primary Doctor No    Patient information was obtained from past medical records, chart review and ER records.     Inpatient consult to General Surgery  Consult performed by: Milla Conner PA-C  Consult ordered by: Keegan Gentile MD        Subjective:     Principal Problem: Acute respiratory failure with hypoxia and hypercarbia    History of Present Illness: Michelle Wren is a 30 y.o. female currently intubated in the ICU. Her past medical history is significant for asthma, type 2 diabetes, and severe obesity. She was transferred from [facility] to [facility] for pulmonary consultation on  for acute respiratory failure and was intubated that evening.  She is on day 14 on vent. She has not been able to be weaned from vent. General Surgery is consulted for tracheostomy creation.     No current facility-administered medications on file prior to encounter.      Current Outpatient Medications on File Prior to Encounter   Medication Sig    albuterol (PROVENTIL/VENTOLIN HFA) 90 mcg/actuation inhaler Inhale 1-2 puffs into the lungs every 6 (six) hours as needed for Wheezing. Rescue    azithromycin (Z-JOSE ALBERTO) 250 MG tablet Take 1 tablet (250 mg total) by mouth once daily. Take first 2 tablets together, then 1 every day until finished.    metformin HCl (METFORMIN ORAL) Take by mouth.       Review of patient's allergies indicates:  No Known Allergies    Past Medical History:   Diagnosis Date    Asthma     Back pain     Diabetes mellitus     Morbid obesity     Obesity      Past Surgical History:   Procedure Laterality Date    APPENDECTOMY       SECTION       Family History     Family history is unknown by patient.        Tobacco Use     Smoking status: Current Every Day Smoker   Substance and Sexual Activity    Alcohol use: Yes     Frequency: Never     Comment: occ    Drug use: No    Sexual activity: Yes     Birth control/protection: None     Review of Systems   Unable to perform ROS: Intubated     Objective:     Vital Signs (Most Recent):  Temp: 99.7 °F (37.6 °C) (07/06/20 1115)  Pulse: 79 (07/06/20 1155)  Resp: 13 (07/06/20 1155)  BP: (!) 112/55 (07/06/20 1100)  SpO2: (!) 91 % (07/06/20 1155) Vital Signs (24h Range):  Temp:  [98.3 °F (36.8 °C)-99.7 °F (37.6 °C)] 99.7 °F (37.6 °C)  Pulse:  [68-91] 79  Resp:  [11-40] 13  SpO2:  [89 %-99 %] 91 %  BP: ()/(52-78) 112/55     Weight: (!) 141.6 kg (312 lb 2.7 oz)  Body mass index is 57.1 kg/m².    Physical Exam  Vitals signs reviewed.   Constitutional:       Appearance: She is well-developed. She is obese.      Comments: arousable    HENT:      Head: Normocephalic.   Neck:      Musculoskeletal: Neck supple.   Cardiovascular:      Rate and Rhythm: Normal rate.   Pulmonary:      Comments: Intubated, on vent  Skin:     General: Skin is warm and dry.         Significant Labs:  CBC:   Recent Labs   Lab 07/06/20  0312   WBC 11.24   RBC 4.84   HGB 12.7   HCT 41.1      MCV 85   MCH 26.2*   MCHC 30.9*     CMP:   Recent Labs   Lab 07/06/20  0312   *   CALCIUM 9.3   ALBUMIN 2.7*   PROT 7.2      K 3.4*   CO2 27      BUN 31*   CREATININE 0.9   ALKPHOS 43*   ALT 12   AST 11   BILITOT 0.5       Significant Diagnostics:  I have reviewed all pertinent imaging results/findings within the past 24 hours.    Assessment/Plan:     Respiratory failure with hypoxia  Plan for trach placement on Monday 7/13           VTE Risk Mitigation (From admission, onward)         Ordered     enoxaparin injection 40 mg  Every 12 hours      07/05/20 1217     IP VTE HIGH RISK PATIENT  Once      06/23/20 2055     Place sequential compression device  Until discontinued      06/23/20 2055                Thank  you for your consult. I will follow-up with patient. Please contact us if you have any additional questions.    Milla Conner PA-C  General Surgery  [facility]

## 2020-07-06 NOTE — ASSESSMENT & PLAN NOTE
Patient with peripheral edema, mildly elevated BNP.  IV lasix was initiated at [facility].    Echo was completed today with results as follows:  · Concentric left ventricular remodeling.  · Normal left ventricular systolic function. The estimated ejection fraction is 69%.  · Normal LV diastolic function.  · No wall motion abnormalities.  · Normal right ventricular systolic function.  · Mild right atrial enlargement.  · Trivial pericardial effusion.  · Mild left atrial enlargement.    IV lasix discontinued as no indication of heart failure.

## 2020-07-06 NOTE — NURSING
ABGs completed @2990.  Results to Dr Gentile per resp.  No CPAP trial.  Vent adjustments made per resp as ordered.  Tube feedings to restart

## 2020-07-06 NOTE — PROGRESS NOTES
[facility] Hospital Medicine  Progress Note    Patient Name: Michelle Wren  MRN: [id number]  Patient Class: IP- Inpatient   Admission Date: 6/23/2020  Length of Stay: 12 days  Attending Physician: Gwyn Son MD  Primary Care Provider: Primary Doctor No        Subjective:     Principal Problem:Acute respiratory failure with hypoxia and hypercarbia        HPI:  Michelle Wren is a 29 y/o female with a past medical history significant for asthma, type 2 diabetes, and severe obesity who is transferred from [facility] to [facility] for pulmonary consultation.  Patient presented to the ED at [facility] yesterday with complaints of 2-3 days of shortness of breath.  It is reported that she uses CPAP at night but she got this from a friend so unsure of settings.  She was placed on BiPAP and given IV Solu-Medrol, bronchodilator treatments, and IV Lasix.  She was admitted to the ICU at [facility].  Patient continued to decline and underwent intubation yesterday around 7:00 p.m.  per report, today patient appeared to be worse.  Her saturations were in the mid 90s on FiO2 of 100%.  A D-dimer was checked and was negative.  Per review of labs which were drawn earlier today, her WBC count was 23,000 and her lactic acid was 2.4.  She was placed on IV Zosyn and IV vancomycin.  Upon arrival to [facility], patient is in no acute distress.  She is intubated and sedated.  Vital signs are stable.  Lactic acid will be repeated now as patient meets sepsis criteria and will check a procalcitonin.  She will be monitored closely in the ICU.           Overview/Hospital Course:  Patient is being managed in intensive care unit, requiring mechanical ventilation under supervision of pulmonary medicine.  Repeat COVID -19 test has been negative.  Patient is receiving intravenous antibiotics for bilateral pneumonia.  Sputum cultures grew MSSA species.  Patient is being followed by infectious  disease specialist.  Patient tested negative for COVID -19 antibody.  Patient has not been able to get extubated, requiring high FiO2 and very high PEEP pressures.    Interval History:  Patient seen and examined.  Continues to fail weaning trials and requires high peep and FiO2.  Plan of care discussed with pulmonology in detail.    Review of Systems   Unable to perform ROS: Intubated     Objective:     Vital Signs (Most Recent):  Temp: 98.4 °F (36.9 °C) (07/05/20 1600)  Pulse: 77 (07/05/20 1932)  Resp: 20 (07/05/20 1932)  BP: 119/71 (07/05/20 1800)  SpO2: 97 % (07/05/20 1932) Vital Signs (24h Range):  Temp:  [98.4 °F (36.9 °C)-100.8 °F (38.2 °C)] 98.4 °F (36.9 °C)  Pulse:  [] 77  Resp:  [16-44] 20  SpO2:  [85 %-100 %] 97 %  BP: ()/(52-88) 119/71     Weight: (!) 145 kg (319 lb 10.7 oz)  Body mass index is 58.47 kg/m².    Intake/Output Summary (Last 24 hours) at 7/5/2020 2154  Last data filed at 7/5/2020 1900  Gross per 24 hour   Intake 2068.88 ml   Output 3305 ml   Net -1236.12 ml      Physical Exam  Vitals signs and nursing note reviewed.   Constitutional:       General: She is not in acute distress.     Appearance: She is obese. She is not diaphoretic.      Comments: Intubated, sedated   HENT:      Mouth/Throat:      Pharynx: No oropharyngeal exudate.      Comments: ET tube noted  Eyes:      General:         Right eye: No discharge.         Left eye: No discharge.      Comments: Pupils sluggish   Neck:      Thyroid: No thyromegaly.      Vascular: No JVD.      Trachea: No tracheal deviation.   Cardiovascular:      Heart sounds: No murmur. No friction rub. No gallop.    Pulmonary:      Effort: No respiratory distress.      Breath sounds: No wheezing or rales.      Comments: Breath sounds course on ventilator.  Massive obesity noted with difficult to auscultate breath sounds.  Abdominal:      General: There is no distension.      Palpations: Abdomen is soft.      Tenderness: There is no abdominal  tenderness.      Comments: Massive abdominal pannus noted   Musculoskeletal:         General: No tenderness or deformity.      Right lower leg: Edema present.      Left lower leg: Edema present.   Skin:     Findings: No erythema or rash.   Neurological:      Motor: No abnormal muscle tone.      Deep Tendon Reflexes: Reflexes normal.      Comments: Patient sedated, unresponsive at present         Significant Labs: All pertinent labs within the past 24 hours have been reviewed.    Significant Imaging: I have reviewed all pertinent imaging results/findings within the past 24 hours.      Assessment/Plan:      * Acute respiratory failure with hypoxia and hypercarbia  Patient with Hypercapnic and Hypoxic Respiratory failure which is Acute.  she is not on home oxygen. Supplemental ventilation was provided and noted- Vent Mode: A/C  Oxygen Concentration (%):  [] 70  Resp Rate Total:  [16 br/min-29 br/min] 18 br/min  Vt Set:  [500 mL] 500 mL  PEEP/CPAP:  [10 icK71-42 cmH20] 20 cmH20  Pressure Support:  [0 cmH20] 0 cmH20  Mean Airway Pressure:  [15 jaE37-63 cmH20] 26 cmH20 and oxygen saturations 97%. Differential diagnosis includes - COPD, CHF, Obesity Hypoventilation, Interstitial lung disease and Pneumonia Labs and images were reviewed. Will treat underlying causes.   Follow pulmonary recommendations.       Staphylococcal pneumonia  Patient with current diagnosis of pneumonia due to bacterial etiology due to  MSSA which is uncontrolled due to persistent hypoxia . Current antimicrobial regimen consists of   Antibiotics (From admission, onward)    Start     Stop Route Frequency Ordered    07/01/20 2130  linezolid 600 mg/300 mL IVPB 600 mg      -- IV Every 12 hours (non-standard times) 07/01/20 2021      . Cultures drawn and noted-   Microbiology Results (last 7 days)     Procedure Component Value Units Date/Time    Clostridium difficile EIA [[id number]] Collected: 07/05/20 1503    Order Status: Completed Specimen: Stool  Updated: 07/05/20 2108     C. diff Antigen Negative     C difficile Toxins A+B, EIA Negative     Comment: Testing not recommended for children <24 months old.       Culture, Respiratory with Gram Stain [[id number]]     Order Status: No result Specimen: Respiratory from Tracheal Aspirate     Blood culture [[id number]] Collected: 07/01/20 2034    Order Status: Completed Specimen: Blood Updated: 07/05/20 1012     Blood Culture, Routine No Growth to date      No Growth to date      No Growth to date      No Growth to date    Narrative:      From arterial line    Blood culture [[id number]] Collected: 07/01/20 2036    Order Status: Completed Specimen: Blood from Line, Central Neck Updated: 07/05/20 1012     Blood Culture, Routine No Growth to date      No Growth to date      No Growth to date      No Growth to date    Narrative:      From TLC    IV catheter culture [[id number]] Collected: 07/04/20 0844    Order Status: Sent Specimen: Catheter Tip, Intrajugular Updated: 07/04/20 2331    Blood culture [[id number]] Collected: 06/28/20 0824    Order Status: Completed Specimen: Blood from Antecubital, Right Arm Updated: 07/03/20 2212     Blood Culture, Routine No growth after 5 days.    Blood culture [[id number]] Collected: 06/27/20 0550    Order Status: Completed Specimen: Blood from Line, Central Updated: 07/03/20 0612     Blood Culture, Routine No growth after 5 days.    Blood culture [[id number]] Collected: 06/26/20 1914    Order Status: Completed Specimen: Blood Updated: 07/02/20 0612     Blood Culture, Routine No growth after 5 days.    Narrative:      X2 sticks, one from central line, one peripheral    Blood culture [[id number]] Collected: 06/26/20 1914    Order Status: Completed Specimen: Blood from Antecubital, Left Arm Updated: 07/02/20 0612     Blood Culture, Routine No growth after 5 days.    Respiratory Infection Panel (PCR), Nasopharyngeal [[id number]] Collected: 07/01/20 1400    Order Status: Completed Specimen:  Nasopharyngeal Swab Updated: 07/01/20 1513     Respiratory Infection Panel Source NP Swab     Adenovirus Not Detected     Coronavirus 229E, Common Cold Virus Not Detected     Coronavirus HKU1, Common Cold Virus Not Detected     Coronavirus NL63, Common Cold Virus Not Detected     Coronavirus OC43, Common Cold Virus Not Detected     Comment: The Coronavirus strains detected in this test cause the common cold.  These strains are not the COVID-19 (novel Coronavirus)strain   associated with the respiratory disease outbreak.          Human Metapneumovirus Not Detected     Human Rhinovirus/Enterovirus Not Detected     Influenza A (subtypes H1, H1-2009,H3) Not Detected     Influenza B Not Detected     Parainfluenza Virus 1 Not Detected     Parainfluenza Virus 2 Not Detected     Parainfluenza Virus 3 Not Detected     Parainfluenza Virus 4 Not Detected     Respiratory Syncytial Virus Not Detected     Bordetella Parapertussis (VX1106) Not Detected     Bordetella pertussis (ptxP) Not Detected     Chlamydia pneumoniae Not Detected     Mycoplasma pneumoniae Not Detected     Comment: Respiratory Infection Panel testing performed by Multiplex PCR.       Narrative:      For all other respiratory sources, order [id number] -  Respiratory Viral Panel by PCR    Culture, Respiratory with Gram Stain [[id number]]  (Abnormal) Collected: 06/27/20 1444    Order Status: Completed Specimen: Respiratory from Tracheal Aspirate Updated: 06/30/20 0718     Respiratory Culture No S aureus or Pseudomonas isolated.      KENROY LUSITANIAE  Rare  Normal respiratory estephanie also present       Gram Stain (Respiratory) <10 epithelial cells per low power field.     Gram Stain (Respiratory) Rare WBC's     Gram Stain (Respiratory) No organisms seen    Blood culture [[id number]] Collected: 06/23/20 2201    Order Status: Completed Specimen: Blood Updated: 06/29/20 0612     Blood Culture, Routine No growth after 5 days.    Urine Culture High Risk [[id number]]  Collected: 06/27/20 1437    Order Status: Completed Specimen: Urine, Catheterized Updated: 06/29/20 0220     Urine Culture, Routine No growth    Narrative:      Indicated criteria for high risk culture:->Other  Other (specify):->icu       Will monitor patient closely and continue current treatment plan unchanged.   Infectious disease and pulmonology consulted.  Will defer to them for further evaluation and management of this disease process.      Obesity hypoventilation syndrome  Patient will likely need tracheostomy for weaning.  Will discuss with General surgery and pulmonology team tomorrow.      Pulmonary hypertension  Continue to treat per pulmonology directions.      Mild intermittent asthma  Patient's COPD is uncontrolled due to worsening of baseline hypoxia currently.  Patient is currently off COPD Pathway. Continue scheduled inhalers Steroids, Antibiotics and Supplemental oxygen and monitor respiratory status closely.  Patient intubated.  Continue steroids.        Obstructive sleep apnea syndrome  Severe, likely contributing to respiratory failure.  Currently on ventilator.      Morbid obesity  Body mass index is 58.47 kg/m². Morbid obesity complicates all aspects of disease management from diagnostic modalities to treatment. Weight loss encouraged and health benefits explained to patient.        Type 2 diabetes mellitus  Patient's FSGs are controlled on current hypoglycemics.   Last A1c reviewed-   Lab Results   Component Value Date    HGBA1C 6.2 06/23/2020     Most recent fingerstick glucose reviewed-   Recent Labs   Lab 07/04/20  2348 07/05/20  0530 07/05/20  1134 07/05/20  1756   POCTGLUCOSE 158* 165* 233* 174*     Current correctional scale  Low  Maintain anti-hyperglycemic dose as follows-   Antihyperglycemics (From admission, onward)    Start     Stop Route Frequency Ordered    06/23/20 2235  insulin aspart U-100 pen 1-10 Units      -- SubQ Every 6 hours PRN 06/23/20 2135        Hold Oral  hypoglycemics while patient is in the hospital.      Nicotine dependence  Health hazards associated with cigarette smoking should be reviewed with patient and cessation encouraged when pt is able to participate.       Cardiomegaly  Patient with peripheral edema, mildly elevated BNP.  IV lasix was initiated at [facility].    Echo was completed today with results as follows:  · Concentric left ventricular remodeling.  · Normal left ventricular systolic function. The estimated ejection fraction is 69%.  · Normal LV diastolic function.  · No wall motion abnormalities.  · Normal right ventricular systolic function.  · Mild right atrial enlargement.  · Trivial pericardial effusion.  · Mild left atrial enlargement.    IV lasix discontinued as no indication of heart failure.      VTE Risk Mitigation (From admission, onward)         Ordered     enoxaparin injection 40 mg  Every 12 hours      07/05/20 1217     IP VTE HIGH RISK PATIENT  Once      06/23/20 2055     Place sequential compression device  Until discontinued      06/23/20 2055                Critical care time spent on the evaluation and treatment of severe organ dysfunction, review of pertinent labs and imaging studies, discussions with consulting providers and discussions with patient/family:  48 minutes.      Gwyn Son MD  Department of Hospital Medicine   [facility]

## 2020-07-06 NOTE — ASSESSMENT & PLAN NOTE
Patient's FSGs are controlled on current hypoglycemics.   Last A1c reviewed-   Lab Results   Component Value Date    HGBA1C 6.2 06/23/2020     Most recent fingerstick glucose reviewed-   Recent Labs   Lab 07/04/20  2348 07/05/20  0530 07/05/20  1134 07/05/20  1756   POCTGLUCOSE 158* 165* 233* 174*     Current correctional scale  Low  Maintain anti-hyperglycemic dose as follows-   Antihyperglycemics (From admission, onward)    Start     Stop Route Frequency Ordered    06/23/20 2235  insulin aspart U-100 pen 1-10 Units      -- SubQ Every 6 hours PRN 06/23/20 2135        Hold Oral hypoglycemics while patient is in the hospital.

## 2020-07-06 NOTE — PROGRESS NOTES
.  Progress Note  PULMONARY    Admit Date: 6/23/2020 07/06/2020      SUBJECTIVE:     June 25th-patient is sedated, no complaints, intubated.  6/26 intubated.  6/27 no c/o intubated.  6/28 no new c/o,intubated, arouses  6/29- no new c/o, intubated,   6/30 no new c/o  7/6 arouses,  No c/o. Intubated.        PFSH and Allergies reviewed.    OBJECTIVE:     Vitals (Most recent):  Vitals:    07/06/20 0736   BP: (!) 101/58   Pulse: 71   Resp: 16   Temp:        Vitals (24 hour range):  Temp:  [98.4 °F (36.9 °C)-99.3 °F (37.4 °C)]   Pulse:  []   Resp:  [16-40]   BP: ()/(52-78)   SpO2:  [85 %-99 %]       Intake/Output Summary (Last 24 hours) at 7/6/2020 0756  Last data filed at 7/6/2020 0626  Gross per 24 hour   Intake 2770.18 ml   Output 2965 ml   Net -194.82 ml          Physical Exam:  The patient's neuro status (alertness,orientation,cognitive function,motor skills,), pharyngeal exam (oral lesions, hygiene, abn dentition,), Neck (jvd,mass,thyroid,nodes in neck and above/below clavicle),RESPIRATORY(symmetry,effort,fremitus,percussion,auscultation),  Cor(rhythm,heart tones including gallops,perfusion,edema)ABD(distention,hepatic&splenomegaly,tenderness,masses), Skin(rash,cyanosis),Psyc(affect,judgement,).  Exam negative except for these pertinent findings:    Morbid obesity, intubated, good breath sounds, , no distress, symmetric, no edema, soft abdomen, distant heart-lips and tongue to be significantly swollen on July 6 but now minimal swelling tongue/lips      Radiographs reviewed: view by direct vision   Ct chest 6/30 - impressive posterior lung consodation, no pe, some ggo, huge pulm artery  c/w pulm hypertension  Chest x-ray June 25th suggest left lower lung collapse, there is some increased interstitial type markings in the right lower lung also.  cxr 6/30 lower lung infiltrates seem very likely by appearance even with obesity  cxr 7/6 bands  Of  Atelectasis  In bases.    Results for orders placed  during the hospital encounter of 06/22/20   X-Ray Chest 1 View    Narrative EXAMINATION:  XR CHEST 1 VIEW    CLINICAL HISTORY:  LINE PLACEMENT;    TECHNIQUE:  Single frontal view of the chest was performed.    COMPARISON:  06/23/2020.    FINDINGS:  There is persistent pulmonary hypoinflation.  There are bilateral pulmonary infiltrates which are stable to slightly increased over the interval likely representing pulmonary edema.  Small bilateral pleural effusions with bibasilar dependent atelectasis.    Heart size is enlarged.  Mediastinal contours unremarkable.  Trachea midline.    Endotracheal tube remains in satisfactory position.  Interval placement of right IJ catheter which terminates in the distal SVC.      Impression 1. Pulmonary hypoinflation.  2. Findings consistent with congestive heart failure which is stable to slightly increased over the interval with small bilateral pleural effusions.  3. Satisfactory indwelling life-support devices.      Electronically signed by: Navin Mortensen  Date:    06/23/2020  Time:    11:58   ]    Labs     Recent Labs   Lab 07/06/20 0312   WBC 11.24   HGB 12.7   HCT 41.1        Recent Labs   Lab 07/05/20  1409 07/06/20 0312   NA  --  141   K  --  3.4*   CL  --  101   CO2  --  27   BUN  --  31*   CREATININE  --  0.9   GLU  --  198*   CALCIUM  --  9.3   MG  --  2.1   PHOS  --  4.3   AST  --  11   ALT  --  12   ALKPHOS  --  43*   BILITOT  --  0.5   PROT  --  7.2   ALBUMIN  --  2.7*   PROCAL 0.23  --    BNP 13  --      Recent Labs   Lab 07/06/20  0552   PH 7.393   PCO2 52.2*   PO2 75*   HCO3 31.8*     Microbiology Results (last 7 days)     Procedure Component Value Units Date/Time    IV catheter culture [[id number]] Collected: 07/04/20 0844    Order Status: Completed Specimen: Catheter Tip, Intrajugular Updated: 07/06/20 0722     Aerobic Culture - Cath tip Further report to follow    Clostridium difficile EIA [[id number]] Collected: 07/05/20 1500    Order Status: Completed  Specimen: Stool Updated: 07/05/20 2108     C. diff Antigen Negative     C difficile Toxins A+B, EIA Negative     Comment: Testing not recommended for children <24 months old.       Culture, Respiratory with Gram Stain [[id number]]     Order Status: No result Specimen: Respiratory from Tracheal Aspirate     Blood culture [[id number]] Collected: 07/01/20 2034    Order Status: Completed Specimen: Blood Updated: 07/05/20 1012     Blood Culture, Routine No Growth to date      No Growth to date      No Growth to date      No Growth to date    Narrative:      From arterial line    Blood culture [[id number]] Collected: 07/01/20 2036    Order Status: Completed Specimen: Blood from Line, Central Neck Updated: 07/05/20 1012     Blood Culture, Routine No Growth to date      No Growth to date      No Growth to date      No Growth to date    Narrative:      From TLC    Blood culture [[id number]] Collected: 06/28/20 0824    Order Status: Completed Specimen: Blood from Antecubital, Right Arm Updated: 07/03/20 2212     Blood Culture, Routine No growth after 5 days.    Blood culture [[id number]] Collected: 06/27/20 0550    Order Status: Completed Specimen: Blood from Line, Central Updated: 07/03/20 0612     Blood Culture, Routine No growth after 5 days.    Blood culture [[id number]] Collected: 06/26/20 1914    Order Status: Completed Specimen: Blood Updated: 07/02/20 0612     Blood Culture, Routine No growth after 5 days.    Narrative:      X2 sticks, one from central line, one peripheral    Blood culture [[id number]] Collected: 06/26/20 1914    Order Status: Completed Specimen: Blood from Antecubital, Left Arm Updated: 07/02/20 0612     Blood Culture, Routine No growth after 5 days.    Respiratory Infection Panel (PCR), Nasopharyngeal [[id number]] Collected: 07/01/20 1400    Order Status: Completed Specimen: Nasopharyngeal Swab Updated: 07/01/20 1513     Respiratory Infection Panel Source NP Swab     Adenovirus Not Detected     Coronavirus  229E, Common Cold Virus Not Detected     Coronavirus HKU1, Common Cold Virus Not Detected     Coronavirus NL63, Common Cold Virus Not Detected     Coronavirus OC43, Common Cold Virus Not Detected     Comment: The Coronavirus strains detected in this test cause the common cold.  These strains are not the COVID-19 (novel Coronavirus)strain   associated with the respiratory disease outbreak.          Human Metapneumovirus Not Detected     Human Rhinovirus/Enterovirus Not Detected     Influenza A (subtypes H1, H1-2009,H3) Not Detected     Influenza B Not Detected     Parainfluenza Virus 1 Not Detected     Parainfluenza Virus 2 Not Detected     Parainfluenza Virus 3 Not Detected     Parainfluenza Virus 4 Not Detected     Respiratory Syncytial Virus Not Detected     Bordetella Parapertussis (DZ0952) Not Detected     Bordetella pertussis (ptxP) Not Detected     Chlamydia pneumoniae Not Detected     Mycoplasma pneumoniae Not Detected     Comment: Respiratory Infection Panel testing performed by Multiplex PCR.       Narrative:      For all other respiratory sources, order [id number] -  Respiratory Viral Panel by PCR    Culture, Respiratory with Gram Stain [[id number]]  (Abnormal) Collected: 06/27/20 1444    Order Status: Completed Specimen: Respiratory from Tracheal Aspirate Updated: 06/30/20 0718     Respiratory Culture No S aureus or Pseudomonas isolated.      KENROY LUSITANIAE  Rare  Normal respiratory estephanie also present       Gram Stain (Respiratory) <10 epithelial cells per low power field.     Gram Stain (Respiratory) Rare WBC's     Gram Stain (Respiratory) No organisms seen        Echo 6/22/2020  · Concentric left ventricular remodeling.  · Normal left ventricular systolic function. The estimated ejection fraction is 69%.  · Normal LV diastolic function.  · No wall motion abnormalities.  · Normal right ventricular systolic function.  · Mild right atrial enlargement.  · Trivial pericardial effusion.  · Mild left atrial  enlargement.       Impression:  Active Hospital Problems    Diagnosis  POA    *Acute respiratory failure with hypoxia and hypercarbia [J96.01, J96.02]  Yes    Morbid obesity [E66.01]  Yes    Pulmonary hypertension [I27.20]  Yes     By pulmonary artery size on ct chest 6/30      Obstructive sleep apnea syndrome [G47.33]  Yes    Staphylococcal pneumonia [J15.20]  Yes    Type 2 diabetes mellitus [E11.9]  Yes    Nicotine dependence [F17.200]  Yes    Obesity hypoventilation syndrome [E66.2]  Yes    Mild intermittent asthma [J45.20]  Yes    Cardiomegaly [I51.7]  Yes      Resolved Hospital Problems    Diagnosis Date Resolved POA    Severe sepsis [A41.9, R65.20] 07/05/2020 Yes    Bilateral pneumonia [J18.9] 07/05/2020 Yes    Class 3 severe obesity with serious comorbidity and body mass index (BMI) of 60.0 to 69.9 in adult [E66.01, Z68.44] 07/05/2020 Not Applicable               Plan:   June 25- swollen lips, gas exchange poor peep 17 79/0.7, cxr not impressive- wbc 17.2 from 24 on 23rd.  2nd covid neg.  Cta lungs to be done.  Expect some degree of atelectasis.  Wheezes are present  Leak test and wean 02. 6/26 ratio 89/0.7 on peep 17,   Wbc now 15k, no ct done.  Solumedrol 80/d  Pt initially bradycardic, abg with large neg base excess new from am lytes, propofol stopped for fear propofol infusion syndrome (about 40).  Fu abg with clearing acidosis.    Pt had peep decreased from 17 to 5 with sat rising from 95- to 97.  Pt  Is obstructed on vol time curve.    Pt developed resp distress off propofol on precedex.  Will give prn morphine with versed drip.  Ct not done with high peep/body size.  Could have ild but asthma should be likely dx.   mssa in sputum - staph pneumonia present at admit likely , steroids not good.   Will go to oxacillin 2 q 4 from [facility].  Cut steroids to 40/d, f/u procal-  Was 0.02 at presentation.    Addendum pt seemed to improve on lower levels peep but off propofol developed resp distress  and  Desat.  Will check d dimer again, increase loveonx to therapeutic, and check leg studies.    6/27 abx tapered to oxacillin with temp going to over 101.  Will resume levaquin, culture, check abd for tenderness and mouth for ulces.  Culture.  Try decrease imv - gas exchange poor.  No leg study  6/28 temp down now.  Discussed with relative,daughter in law.  Pt was at [facility] last yr with vent for a wk, 4 wks in hosp, no pneumonia but had infection, back to nl at [place], has osas. 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  Has had intermittent face/tongue/eye swelling ppt er visit at [place].    Will screen for hereditary angioedema/lupus/ra/wegener's   Cut peep to 5 with desat 89 on 89%   Get records from [place]  Need to follow leak test.  Airway should be marginal given osas and morbid obesity- with history would dx angioedema - cause not clear.   Continue full anticoagg.  Consider id f/u once records, cultures, and above screens return?  6/29- tried to cut peep yesterday and ended up on peep 17 100%- pa02 59 this am (59/1.00)- cxr - hard to read bilat lower lung infiltrates intermittently seen  Tm 100.4, oxacillin and levaquin- mssa in sputum 6/23 with nl estephanie on 27th.  Wbc 19 on steroids. No wheezes,  [place] records na,   Leak was none today, 200 on 28th, 300 on 25th, 320 on 26th, tongue/lips seem smaller today? Pt intubated 23rd or so.  rf 16 with neg ccp, complement levels not low.    With no clear working dx - support.  If no improvement will re attempt to get ct but doubtful will give clear picture.  Will monitor/support.  .Addendum-  Will ask id to see, dose high dose steroids if infection felt to covered?  6/30 remains marginal, attempted decrease peep yesterday with dramatic worsening.  Will increase steroids to 125 q8, records from [place] from 11 day stay in Jan 2019 were not suggestive of ild???   Ct chest done, above, discussed with dr Schulz.  Picture looks more like collapse  lung than pneumonia or covid.  Will decrease steroids, use high peep/pcv.  Discussed with family - father/brother,sis in law- picture not clear but loooks like asthma with collapse lower lungs.  Angioedema not likely initially significant but may complicate later.  rx presumed pe, pneumonia, asthma- increase rx for atelectasis. May need trach.  Pt could die  With mishap and will be at high risk.    Will decrease steroids back to 40/d.  Appreciate ID.  Vent adjusted.    141  July 6 -  Tm 99.3 on zyvox, micafungin,    Bun/creat  31/0.9 - I/o  2770/3205  - 141.6 kg now - was 164.7 on 6/23?  cxr 7/6 with plate like  Atelectasis - 4 and 5  Had more diffuse haze.      Vent  Adjusted - considering atelectasis and shunt as likely cause of gas exchange issues,  Will go to peep 5 with  pcv tidal volumes 600-900,  psv 25  And check  abg at 11.  If abg 11  Adequate - cpap trial  With plans to extubate to niv and mobilize if passes.  willl ask surgery to see for trach in event not progressing.  Hold tube feeds    Low dose lovenox now.      The following were evaluated and adjusted as needed: mechanical ventilator settings and weaning status, intravenous fluids and nutritional status, sedation and neurologic status, antibiotics, hemodynamics, support tubes and access lines and invasive monitoring, acid base balance and oxygenation needs and input and output and renal status       Critical Care  - THE PATIENT HAS A HIGH PROBABILITY OF IMMINENT OR LIFE THREATENING DETERIORATION.  Over 50%time of encounter was in direct care at bedside.  Time was 30 to 74 minutes required for patient care.  Time needed for all of the above totaled 49 minutes.

## 2020-07-06 NOTE — ASSESSMENT & PLAN NOTE
Patient's COPD is uncontrolled due to worsening of baseline hypoxia currently.  Patient is currently off COPD Pathway. Continue scheduled inhalers Steroids, Antibiotics and Supplemental oxygen and monitor respiratory status closely.  Patient intubated.  Continue steroids.

## 2020-07-06 NOTE — ASSESSMENT & PLAN NOTE
Body mass index is 58.47 kg/m². Morbid obesity complicates all aspects of disease management from diagnostic modalities to treatment. Weight loss encouraged and health benefits explained to patient.

## 2020-07-06 NOTE — PLAN OF CARE
Intervention: collaboration with care providers, enteral nutrition therapy  Current Intake: TF Peptamen VHP @ 30 ml/hr + 9 packets beneprotein+ 30 ml flush q 4 hr  (provides 720 kcal (62% EEN), 66g+ beneprotein (98% EPN), and 604 ml free water)     Recommendation:   1) Advance diet to goal of DM 1500 kcal when extubated      2) Continue Peptamen VHP trickle feed @ 30 + 9-10 packets beneprotein daily   (advance slowly if pt tolerates to goal of 55 ml/hr + min 30 ml flush q 4 hr)  ( 1320 kcal ( 100% EEN), 121 g protein ( 93% EPN), and 1108 ml free water)   -if pt does not tolerate trial impact peptide 1.5 formula, 0g fiber  -hold TF for residuals > 200 ml     3) If pt does not tolerate TF @ 30 ml/hr + needed beneprotein initiate TPN  Custom TPN D 15 AA 7 @ 65 ml/hr no lipids   ( provides 109 g protein ( 84% EPN), 1231 kcal ( 100% EEN)

## 2020-07-06 NOTE — HPI
Michelle Wren is a 30 y.o. female currently intubated in the ICU. Her past medical history is significant for asthma, type 2 diabetes, and severe obesity. She was transferred from [facility] to [facility] for pulmonary consultation on 6/22 for acute respiratory failure and was intubated that evening.  She is on day 14 on vent. She has not been able to be weaned from vent. General Surgery is consulted for tracheostomy creation.

## 2020-07-06 NOTE — RESPIRATORY THERAPY
07/05/20 1932   Patient Assessment/Suction   Level of Consciousness (AVPU) alert   Respiratory Effort Normal;Unlabored   Expansion/Accessory Muscles/Retractions no use of accessory muscles   All Lung Fields Breath Sounds diminished   Rhythm/Pattern, Respiratory depth regular;pattern regular;unlabored   Cough Frequency infrequent   PRE-TX-O2   O2 Device (Oxygen Therapy) ventilator   Oxygen Concentration (%) 70   SpO2 97 %   Pulse Oximetry Type Continuous   $ Pulse Oximetry - Multiple Charge Pulse Oximetry - Multiple   Pulse 77   Resp 20   Aerosol Therapy   $ Aerosol Therapy Charges Aerosol Treatment   Daily Review of Necessity (SVN) completed   Respiratory Treatment Status (SVN) given   Treatment Route (SVN) in-line   Patient Position (SVN) semi-Howard's   Post Treatment Assessment (SVN) breath sounds improved   Signs of Intolerance (SVN) none   Breath Sounds Post-Respiratory Treatment   Post-treatment Heart Rate (beats/min) 80   Post-treatment Resp Rate (breaths/min) 20   Ready to Wean/Extubation Screen   FIO2<=50 (chart decimal) (!) 0.7

## 2020-07-06 NOTE — ASSESSMENT & PLAN NOTE
Patient with current diagnosis of pneumonia due to bacterial etiology due to  MSSA which is uncontrolled due to persistent hypoxia . Current antimicrobial regimen consists of   Antibiotics (From admission, onward)    Start     Stop Route Frequency Ordered    07/01/20 2130  linezolid 600 mg/300 mL IVPB 600 mg      -- IV Every 12 hours (non-standard times) 07/01/20 2021      . Cultures drawn and noted-   Microbiology Results (last 7 days)     Procedure Component Value Units Date/Time    Clostridium difficile EIA [[id number]] Collected: 07/05/20 1503    Order Status: Completed Specimen: Stool Updated: 07/05/20 2108     C. diff Antigen Negative     C difficile Toxins A+B, EIA Negative     Comment: Testing not recommended for children <24 months old.       Culture, Respiratory with Gram Stain [[id number]]     Order Status: No result Specimen: Respiratory from Tracheal Aspirate     Blood culture [[id number]] Collected: 07/01/20 2034    Order Status: Completed Specimen: Blood Updated: 07/05/20 1012     Blood Culture, Routine No Growth to date      No Growth to date      No Growth to date      No Growth to date    Narrative:      From arterial line    Blood culture [[id number]] Collected: 07/01/20 2036    Order Status: Completed Specimen: Blood from Line, Central Neck Updated: 07/05/20 1012     Blood Culture, Routine No Growth to date      No Growth to date      No Growth to date      No Growth to date    Narrative:      From TLC    IV catheter culture [[id number]] Collected: 07/04/20 0844    Order Status: Sent Specimen: Catheter Tip, Intrajugular Updated: 07/04/20 2331    Blood culture [[id number]] Collected: 06/28/20 0824    Order Status: Completed Specimen: Blood from Antecubital, Right Arm Updated: 07/03/20 2212     Blood Culture, Routine No growth after 5 days.    Blood culture [[id number]] Collected: 06/27/20 0550    Order Status: Completed Specimen: Blood from Line, Central Updated: 07/03/20 0612     Blood Culture, Routine  No growth after 5 days.    Blood culture [[id number]] Collected: 06/26/20 1914    Order Status: Completed Specimen: Blood Updated: 07/02/20 0612     Blood Culture, Routine No growth after 5 days.    Narrative:      X2 sticks, one from central line, one peripheral    Blood culture [[id number]] Collected: 06/26/20 1914    Order Status: Completed Specimen: Blood from Antecubital, Left Arm Updated: 07/02/20 0612     Blood Culture, Routine No growth after 5 days.    Respiratory Infection Panel (PCR), Nasopharyngeal [[id number]] Collected: 07/01/20 1400    Order Status: Completed Specimen: Nasopharyngeal Swab Updated: 07/01/20 1513     Respiratory Infection Panel Source NP Swab     Adenovirus Not Detected     Coronavirus 229E, Common Cold Virus Not Detected     Coronavirus HKU1, Common Cold Virus Not Detected     Coronavirus NL63, Common Cold Virus Not Detected     Coronavirus OC43, Common Cold Virus Not Detected     Comment: The Coronavirus strains detected in this test cause the common cold.  These strains are not the COVID-19 (novel Coronavirus)strain   associated with the respiratory disease outbreak.          Human Metapneumovirus Not Detected     Human Rhinovirus/Enterovirus Not Detected     Influenza A (subtypes H1, H1-2009,H3) Not Detected     Influenza B Not Detected     Parainfluenza Virus 1 Not Detected     Parainfluenza Virus 2 Not Detected     Parainfluenza Virus 3 Not Detected     Parainfluenza Virus 4 Not Detected     Respiratory Syncytial Virus Not Detected     Bordetella Parapertussis (II7458) Not Detected     Bordetella pertussis (ptxP) Not Detected     Chlamydia pneumoniae Not Detected     Mycoplasma pneumoniae Not Detected     Comment: Respiratory Infection Panel testing performed by Multiplex PCR.       Narrative:      For all other respiratory sources, order [id number] -  Respiratory Viral Panel by PCR    Culture, Respiratory with Gram Stain [[id number]]  (Abnormal) Collected: 06/27/20 1444    Order Status:  Completed Specimen: Respiratory from Tracheal Aspirate Updated: 06/30/20 0718     Respiratory Culture No S aureus or Pseudomonas isolated.      KENROY LUSITANIAE  Rare  Normal respiratory estephanie also present       Gram Stain (Respiratory) <10 epithelial cells per low power field.     Gram Stain (Respiratory) Rare WBC's     Gram Stain (Respiratory) No organisms seen    Blood culture [[id number]] Collected: 06/23/20 2201    Order Status: Completed Specimen: Blood Updated: 06/29/20 0612     Blood Culture, Routine No growth after 5 days.    Urine Culture High Risk [[id number]] Collected: 06/27/20 1437    Order Status: Completed Specimen: Urine, Catheterized Updated: 06/29/20 0220     Urine Culture, Routine No growth    Narrative:      Indicated criteria for high risk culture:->Other  Other (specify):->icu       Will monitor patient closely and continue current treatment plan unchanged.   Infectious disease and pulmonology consulted.  Will defer to them for further evaluation and management of this disease process.

## 2020-07-06 NOTE — PLAN OF CARE
"Pt remains intubated and sedated. Afebrile. Using pen and paper for communication. NSR noted on the monitor. Tube feeds remain at 30 ml/hr d/t high residuals. Micro blood clots remain. Restraints remain for safety. Full bed bad/linen change this shift. Multiple c/o back pain from laying, pt asked on her note paper to "get up and walk". Vazquez catheter to gravity, yellow urine. Flexiseal to gravity, patent. Clear moisture barrier applied to skin post bath for peeling skin. Safety maintained.   C-diff negative.   "

## 2020-07-07 LAB
ALBUMIN SERPL BCP-MCNC: 2.9 G/DL (ref 3.5–5.2)
ALLENS TEST: ABNORMAL
ALLENS TEST: ABNORMAL
ALP SERPL-CCNC: 46 U/L (ref 55–135)
ALT SERPL W/O P-5'-P-CCNC: 13 U/L (ref 10–44)
ANION GAP SERPL CALC-SCNC: 11 MMOL/L (ref 8–16)
AST SERPL-CCNC: 12 U/L (ref 10–40)
BACTERIA BLD CULT: NORMAL
BACTERIA BLD CULT: NORMAL
BACTERIA CATH TIP CULT: ABNORMAL
BASOPHILS # BLD AUTO: 0.03 K/UL (ref 0–0.2)
BASOPHILS NFR BLD: 0.2 % (ref 0–1.9)
BILIRUB SERPL-MCNC: 0.6 MG/DL (ref 0.1–1)
BUN SERPL-MCNC: 36 MG/DL (ref 6–20)
CALCIUM SERPL-MCNC: 9.9 MG/DL (ref 8.7–10.5)
CHLORIDE SERPL-SCNC: 99 MMOL/L (ref 95–110)
CO2 SERPL-SCNC: 29 MMOL/L (ref 23–29)
CREAT SERPL-MCNC: 0.9 MG/DL (ref 0.5–1.4)
DELSYS: ABNORMAL
DELSYS: ABNORMAL
DIFFERENTIAL METHOD: ABNORMAL
EOSINOPHIL # BLD AUTO: 0 K/UL (ref 0–0.5)
EOSINOPHIL NFR BLD: 0.3 % (ref 0–8)
ERYTHROCYTE [DISTWIDTH] IN BLOOD BY AUTOMATED COUNT: 16.3 % (ref 11.5–14.5)
ERYTHROCYTE [SEDIMENTATION RATE] IN BLOOD BY WESTERGREN METHOD: 6 MM/H
EST. GFR  (AFRICAN AMERICAN): >60 ML/MIN/1.73 M^2
EST. GFR  (NON AFRICAN AMERICAN): >60 ML/MIN/1.73 M^2
FIO2: 40
FIO2: 40
GLUCOSE SERPL-MCNC: 247 MG/DL (ref 70–110)
HCO3 UR-SCNC: 28.1 MMOL/L (ref 24–28)
HCO3 UR-SCNC: 29.6 MMOL/L (ref 24–28)
HCT VFR BLD AUTO: 42.4 % (ref 37–48.5)
HGB BLD-MCNC: 12.9 G/DL (ref 12–16)
IMM GRANULOCYTES # BLD AUTO: 0.14 K/UL (ref 0–0.04)
IMM GRANULOCYTES NFR BLD AUTO: 1 % (ref 0–0.5)
IP: 30
IT: 1.5
LYMPHOCYTES # BLD AUTO: 2.3 K/UL (ref 1–4.8)
LYMPHOCYTES NFR BLD: 16.8 % (ref 18–48)
MAGNESIUM SERPL-MCNC: 2 MG/DL (ref 1.6–2.6)
MAP: 22
MCH RBC QN AUTO: 25.4 PG (ref 27–31)
MCHC RBC AUTO-ENTMCNC: 30.4 G/DL (ref 32–36)
MCV RBC AUTO: 84 FL (ref 82–98)
MODE: ABNORMAL
MODE: ABNORMAL
MONOCYTES # BLD AUTO: 1.5 K/UL (ref 0.3–1)
MONOCYTES NFR BLD: 11.2 % (ref 4–15)
NEUTROPHILS # BLD AUTO: 9.7 K/UL (ref 1.8–7.7)
NEUTROPHILS NFR BLD: 70.5 % (ref 38–73)
NRBC BLD-RTO: 0 /100 WBC
PCO2 BLDA: 45.6 MMHG (ref 35–45)
PCO2 BLDA: 49.5 MMHG (ref 35–45)
PEEP: 15
PEEP: 5
PH SMN: 7.38 [PH] (ref 7.35–7.45)
PH SMN: 7.4 [PH] (ref 7.35–7.45)
PHOSPHATE SERPL-MCNC: 3.7 MG/DL (ref 2.7–4.5)
PIP: 53
PLATELET # BLD AUTO: 312 K/UL (ref 150–350)
PMV BLD AUTO: 11.9 FL (ref 9.2–12.9)
PO2 BLDA: 50 MMHG (ref 80–100)
PO2 BLDA: 60 MMHG (ref 80–100)
POC BE: 3 MMOL/L
POC BE: 5 MMOL/L
POC SATURATED O2: 85 % (ref 95–100)
POC SATURATED O2: 90 % (ref 95–100)
POC TCO2: 29 MMOL/L (ref 23–27)
POC TCO2: 31 MMOL/L (ref 23–27)
POCT GLUCOSE: 215 MG/DL (ref 70–110)
POCT GLUCOSE: 226 MG/DL (ref 70–110)
POCT GLUCOSE: 229 MG/DL (ref 70–110)
POCT GLUCOSE: 280 MG/DL (ref 70–110)
POTASSIUM SERPL-SCNC: 3.3 MMOL/L (ref 3.5–5.1)
PROT SERPL-MCNC: 7.6 G/DL (ref 6–8.4)
PS: 10
RBC # BLD AUTO: 5.07 M/UL (ref 4–5.4)
SAMPLE: ABNORMAL
SAMPLE: ABNORMAL
SITE: ABNORMAL
SITE: ABNORMAL
SODIUM SERPL-SCNC: 139 MMOL/L (ref 136–145)
SP02: 99
VT: 1060
WBC # BLD AUTO: 13.72 K/UL (ref 3.9–12.7)

## 2020-07-07 PROCEDURE — 25000003 PHARM REV CODE 250: Performed by: INTERNAL MEDICINE

## 2020-07-07 PROCEDURE — 25000003 PHARM REV CODE 250: Performed by: HOSPITALIST

## 2020-07-07 PROCEDURE — 63600175 PHARM REV CODE 636 W HCPCS: Performed by: INTERNAL MEDICINE

## 2020-07-07 PROCEDURE — 63700000 PHARM REV CODE 250 ALT 637 W/O HCPCS: Performed by: INTERNAL MEDICINE

## 2020-07-07 PROCEDURE — 36415 COLL VENOUS BLD VENIPUNCTURE: CPT

## 2020-07-07 PROCEDURE — 20000000 HC ICU ROOM

## 2020-07-07 PROCEDURE — 36600 WITHDRAWAL OF ARTERIAL BLOOD: CPT

## 2020-07-07 PROCEDURE — 27000221 HC OXYGEN, UP TO 24 HOURS

## 2020-07-07 PROCEDURE — 85025 COMPLETE CBC W/AUTO DIFF WBC: CPT

## 2020-07-07 PROCEDURE — 99232 PR SUBSEQUENT HOSPITAL CARE,LEVL II: ICD-10-PCS | Mod: ,,, | Performed by: INTERNAL MEDICINE

## 2020-07-07 PROCEDURE — 84100 ASSAY OF PHOSPHORUS: CPT

## 2020-07-07 PROCEDURE — 83735 ASSAY OF MAGNESIUM: CPT

## 2020-07-07 PROCEDURE — 99900035 HC TECH TIME PER 15 MIN (STAT)

## 2020-07-07 PROCEDURE — 82803 BLOOD GASES ANY COMBINATION: CPT

## 2020-07-07 PROCEDURE — 63600175 PHARM REV CODE 636 W HCPCS: Performed by: NURSE PRACTITIONER

## 2020-07-07 PROCEDURE — 63600175 PHARM REV CODE 636 W HCPCS: Performed by: HOSPITALIST

## 2020-07-07 PROCEDURE — 94003 VENT MGMT INPAT SUBQ DAY: CPT

## 2020-07-07 PROCEDURE — 94761 N-INVAS EAR/PLS OXIMETRY MLT: CPT

## 2020-07-07 PROCEDURE — 94640 AIRWAY INHALATION TREATMENT: CPT

## 2020-07-07 PROCEDURE — 99900026 HC AIRWAY MAINTENANCE (STAT)

## 2020-07-07 PROCEDURE — 25000242 PHARM REV CODE 250 ALT 637 W/ HCPCS: Performed by: NURSE PRACTITIONER

## 2020-07-07 PROCEDURE — 80053 COMPREHEN METABOLIC PANEL: CPT

## 2020-07-07 PROCEDURE — 99232 SBSQ HOSP IP/OBS MODERATE 35: CPT | Mod: ,,, | Performed by: INTERNAL MEDICINE

## 2020-07-07 PROCEDURE — 25000242 PHARM REV CODE 250 ALT 637 W/ HCPCS: Performed by: INTERNAL MEDICINE

## 2020-07-07 PROCEDURE — C9113 INJ PANTOPRAZOLE SODIUM, VIA: HCPCS | Performed by: INTERNAL MEDICINE

## 2020-07-07 PROCEDURE — 94770 HC EXHALED C02 TEST: CPT

## 2020-07-07 RX ORDER — NYSTATIN 100000 [USP'U]/ML
500000 SUSPENSION ORAL 4 TIMES DAILY
Status: DISCONTINUED | OUTPATIENT
Start: 2020-07-07 | End: 2020-07-14 | Stop reason: HOSPADM

## 2020-07-07 RX ORDER — MORPHINE SULFATE 2 MG/ML
1 INJECTION, SOLUTION INTRAMUSCULAR; INTRAVENOUS
Status: DISCONTINUED | OUTPATIENT
Start: 2020-07-07 | End: 2020-07-08

## 2020-07-07 RX ADMIN — BUDESONIDE 0.5 MG: 0.25 SUSPENSION RESPIRATORY (INHALATION) at 07:07

## 2020-07-07 RX ADMIN — DEXMEDETOMIDINE HYDROCHLORIDE 1.2 MCG/KG/HR: 100 INJECTION, SOLUTION, CONCENTRATE INTRAVENOUS at 05:07

## 2020-07-07 RX ADMIN — DEXMEDETOMIDINE HYDROCHLORIDE 1.1 MCG/KG/HR: 100 INJECTION, SOLUTION, CONCENTRATE INTRAVENOUS at 12:07

## 2020-07-07 RX ADMIN — ENOXAPARIN SODIUM 40 MG: 100 INJECTION SUBCUTANEOUS at 09:07

## 2020-07-07 RX ADMIN — DEXMEDETOMIDINE HYDROCHLORIDE 1.2 MCG/KG/HR: 100 INJECTION, SOLUTION, CONCENTRATE INTRAVENOUS at 08:07

## 2020-07-07 RX ADMIN — IPRATROPIUM BROMIDE AND ALBUTEROL SULFATE 3 ML: .5; 3 SOLUTION RESPIRATORY (INHALATION) at 11:07

## 2020-07-07 RX ADMIN — MIDAZOLAM HYDROCHLORIDE 7 MG/HR: 5 INJECTION, SOLUTION INTRAMUSCULAR; INTRAVENOUS at 03:07

## 2020-07-07 RX ADMIN — DEXMEDETOMIDINE HYDROCHLORIDE 1.2 MCG/KG/HR: 100 INJECTION, SOLUTION, CONCENTRATE INTRAVENOUS at 12:07

## 2020-07-07 RX ADMIN — IPRATROPIUM BROMIDE AND ALBUTEROL SULFATE 3 ML: .5; 3 SOLUTION RESPIRATORY (INHALATION) at 03:07

## 2020-07-07 RX ADMIN — LINEZOLID 600 MG: 600 INJECTION, SOLUTION INTRAVENOUS at 09:07

## 2020-07-07 RX ADMIN — METOCLOPRAMIDE 10 MG: 5 INJECTION, SOLUTION INTRAMUSCULAR; INTRAVENOUS at 05:07

## 2020-07-07 RX ADMIN — METOCLOPRAMIDE 10 MG: 5 INJECTION, SOLUTION INTRAMUSCULAR; INTRAVENOUS at 10:07

## 2020-07-07 RX ADMIN — DEXMEDETOMIDINE HYDROCHLORIDE 1.2 MCG/KG/HR: 100 INJECTION, SOLUTION, CONCENTRATE INTRAVENOUS at 09:07

## 2020-07-07 RX ADMIN — METHYLPREDNISOLONE SODIUM SUCCINATE 40 MG: 40 INJECTION, POWDER, FOR SOLUTION INTRAMUSCULAR; INTRAVENOUS at 09:07

## 2020-07-07 RX ADMIN — DEXMEDETOMIDINE HYDROCHLORIDE 1.1 MCG/KG/HR: 100 INJECTION, SOLUTION, CONCENTRATE INTRAVENOUS at 02:07

## 2020-07-07 RX ADMIN — DEXMEDETOMIDINE HYDROCHLORIDE 1.2 MCG/KG/HR: 100 INJECTION, SOLUTION, CONCENTRATE INTRAVENOUS at 04:07

## 2020-07-07 RX ADMIN — DEXMEDETOMIDINE HYDROCHLORIDE 1.2 MCG/KG/HR: 100 INJECTION, SOLUTION, CONCENTRATE INTRAVENOUS at 11:07

## 2020-07-07 RX ADMIN — DEXMEDETOMIDINE HYDROCHLORIDE 1.2 MCG/KG/HR: 100 INJECTION, SOLUTION, CONCENTRATE INTRAVENOUS at 10:07

## 2020-07-07 RX ADMIN — ENOXAPARIN SODIUM 40 MG: 100 INJECTION SUBCUTANEOUS at 08:07

## 2020-07-07 RX ADMIN — POTASSIUM CHLORIDE 60 MEQ: 14.9 INJECTION, SOLUTION INTRAVENOUS at 09:07

## 2020-07-07 RX ADMIN — CHLORHEXIDINE GLUCONATE 0.12% ORAL RINSE 15 ML: 1.2 LIQUID ORAL at 09:07

## 2020-07-07 RX ADMIN — NYSTATIN 500000 UNITS: 500000 SUSPENSION ORAL at 08:07

## 2020-07-07 RX ADMIN — INSULIN ASPART 4 UNITS: 100 INJECTION, SOLUTION INTRAVENOUS; SUBCUTANEOUS at 05:07

## 2020-07-07 RX ADMIN — PANTOPRAZOLE SODIUM 40 MG: 40 INJECTION, POWDER, LYOPHILIZED, FOR SOLUTION INTRAVENOUS at 09:07

## 2020-07-07 RX ADMIN — IPRATROPIUM BROMIDE AND ALBUTEROL SULFATE 3 ML: .5; 3 SOLUTION RESPIRATORY (INHALATION) at 07:07

## 2020-07-07 RX ADMIN — FUROSEMIDE 60 MG: 10 INJECTION, SOLUTION INTRAVENOUS at 11:07

## 2020-07-07 RX ADMIN — DEXMEDETOMIDINE HYDROCHLORIDE 1.2 MCG/KG/HR: 100 INJECTION, SOLUTION, CONCENTRATE INTRAVENOUS at 02:07

## 2020-07-07 RX ADMIN — DEXMEDETOMIDINE HYDROCHLORIDE 1.2 MCG/KG/HR: 100 INJECTION, SOLUTION, CONCENTRATE INTRAVENOUS at 06:07

## 2020-07-07 RX ADMIN — INSULIN ASPART 4 UNITS: 100 INJECTION, SOLUTION INTRAVENOUS; SUBCUTANEOUS at 12:07

## 2020-07-07 RX ADMIN — INSULIN ASPART 4 UNITS: 100 INJECTION, SOLUTION INTRAVENOUS; SUBCUTANEOUS at 11:07

## 2020-07-07 RX ADMIN — METOCLOPRAMIDE 10 MG: 5 INJECTION, SOLUTION INTRAMUSCULAR; INTRAVENOUS at 02:07

## 2020-07-07 RX ADMIN — NYSTATIN 500000 UNITS: 500000 SUSPENSION ORAL at 04:07

## 2020-07-07 RX ADMIN — POLYETHYLENE GLYCOL (3350) 17 G: 17 POWDER, FOR SOLUTION ORAL at 09:07

## 2020-07-07 RX ADMIN — LINEZOLID 600 MG: 600 INJECTION, SOLUTION INTRAVENOUS at 08:07

## 2020-07-07 RX ADMIN — FLUCONAZOLE 100 MG: 100 TABLET ORAL at 09:07

## 2020-07-07 RX ADMIN — INSULIN ASPART 6 UNITS: 100 INJECTION, SOLUTION INTRAVENOUS; SUBCUTANEOUS at 04:07

## 2020-07-07 RX ADMIN — CHLORHEXIDINE GLUCONATE 0.12% ORAL RINSE 15 ML: 1.2 LIQUID ORAL at 08:07

## 2020-07-07 RX ADMIN — FUROSEMIDE 60 MG: 10 INJECTION, SOLUTION INTRAVENOUS at 12:07

## 2020-07-07 NOTE — ASSESSMENT & PLAN NOTE
Patient with current diagnosis of pneumonia due to bacterial etiology due to  MSSA which is uncontrolled due to persistent hypoxia . Current antimicrobial regimen consists of   Antibiotics (From admission, onward)    Start     Stop Route Frequency Ordered    07/01/20 2130  linezolid 600 mg/300 mL IVPB 600 mg      -- IV Every 12 hours (non-standard times) 07/01/20 2021      . Cultures drawn and noted-   Microbiology Results (last 7 days)     Procedure Component Value Units Date/Time    IV catheter culture [[id number]]  (Abnormal) Collected: 07/04/20 0844    Order Status: Completed Specimen: Catheter Tip, Intrajugular Updated: 07/06/20 1419     Aerobic Culture - Cath tip STAPHYLOCOCCUS EPIDERMIDIS  < 15 colonies      Blood culture [[id number]] Collected: 07/01/20 2036    Order Status: Completed Specimen: Blood from Line, Central Neck Updated: 07/06/20 1012     Blood Culture, Routine No Growth to date      No Growth to date      No Growth to date      No Growth to date      No Growth to date    Narrative:      From TLC    Blood culture [[id number]] Collected: 07/01/20 2034    Order Status: Completed Specimen: Blood Updated: 07/06/20 1012     Blood Culture, Routine No Growth to date      No Growth to date      No Growth to date      No Growth to date      No Growth to date    Narrative:      From arterial line    Clostridium difficile EIA [[id number]] Collected: 07/05/20 1503    Order Status: Completed Specimen: Stool Updated: 07/05/20 2108     C. diff Antigen Negative     C difficile Toxins A+B, EIA Negative     Comment: Testing not recommended for children <24 months old.       Culture, Respiratory with Gram Stain [[id number]]     Order Status: No result Specimen: Respiratory from Tracheal Aspirate     Blood culture [[id number]] Collected: 06/28/20 0824    Order Status: Completed Specimen: Blood from Antecubital, Right Arm Updated: 07/03/20 2212     Blood Culture, Routine No growth after 5 days.    Blood culture [[id number]]  Collected: 06/27/20 0550    Order Status: Completed Specimen: Blood from Line, Central Updated: 07/03/20 0612     Blood Culture, Routine No growth after 5 days.    Blood culture [[id number]] Collected: 06/26/20 1914    Order Status: Completed Specimen: Blood Updated: 07/02/20 0612     Blood Culture, Routine No growth after 5 days.    Narrative:      X2 sticks, one from central line, one peripheral    Blood culture [[id number]] Collected: 06/26/20 1914    Order Status: Completed Specimen: Blood from Antecubital, Left Arm Updated: 07/02/20 0612     Blood Culture, Routine No growth after 5 days.    Respiratory Infection Panel (PCR), Nasopharyngeal [[id number]] Collected: 07/01/20 1400    Order Status: Completed Specimen: Nasopharyngeal Swab Updated: 07/01/20 1513     Respiratory Infection Panel Source NP Swab     Adenovirus Not Detected     Coronavirus 229E, Common Cold Virus Not Detected     Coronavirus HKU1, Common Cold Virus Not Detected     Coronavirus NL63, Common Cold Virus Not Detected     Coronavirus OC43, Common Cold Virus Not Detected     Comment: The Coronavirus strains detected in this test cause the common cold.  These strains are not the COVID-19 (novel Coronavirus)strain   associated with the respiratory disease outbreak.          Human Metapneumovirus Not Detected     Human Rhinovirus/Enterovirus Not Detected     Influenza A (subtypes H1, H1-2009,H3) Not Detected     Influenza B Not Detected     Parainfluenza Virus 1 Not Detected     Parainfluenza Virus 2 Not Detected     Parainfluenza Virus 3 Not Detected     Parainfluenza Virus 4 Not Detected     Respiratory Syncytial Virus Not Detected     Bordetella Parapertussis (QD6910) Not Detected     Bordetella pertussis (ptxP) Not Detected     Chlamydia pneumoniae Not Detected     Mycoplasma pneumoniae Not Detected     Comment: Respiratory Infection Panel testing performed by Multiplex PCR.       Narrative:      For all other respiratory sources, order [id number]  -  Respiratory Viral Panel by PCR    Culture, Respiratory with Gram Stain [[id number]]  (Abnormal) Collected: 06/27/20 1444    Order Status: Completed Specimen: Respiratory from Tracheal Aspirate Updated: 06/30/20 0718     Respiratory Culture No S aureus or Pseudomonas isolated.      KENROY LUSITANIAE  Rare  Normal respiratory estephanie also present       Gram Stain (Respiratory) <10 epithelial cells per low power field.     Gram Stain (Respiratory) Rare WBC's     Gram Stain (Respiratory) No organisms seen       Will monitor patient closely and continue current treatment plan unchanged.   Infectious disease and pulmonology consulted.  Will defer to them for further evaluation and management of this disease process.     How Severe Is This Condition?: moderate

## 2020-07-07 NOTE — ASSESSMENT & PLAN NOTE
Patient with Hypercapnic and Hypoxic Respiratory failure which is Acute.  she is not on home oxygen. Supplemental ventilation was provided and noted- Vent Mode: SIMV  Oxygen Concentration (%):  [40-70] 40  Resp Rate Total:  [9.8 br/min-30 br/min] 16 br/min  Vt Set:  [0 mL-500 mL] 0 mL  PEEP/CPAP:  [15 ijC41-84 cmH20] 15 cmH20  Pressure Support:  [0 fiL77-01 cmH20] 25 cmH20  Mean Airway Pressure:  [20 jcJ33-91 cmH20] 20 cmH20 and oxygen saturations 97%. Differential diagnosis includes - COPD, CHF, Obesity Hypoventilation, Interstitial lung disease and Pneumonia Labs and images were reviewed. Will treat underlying causes.   Follow pulmonary recommendations.

## 2020-07-07 NOTE — SUBJECTIVE & OBJECTIVE
Interval History:  Patient seen and examined.  Continues to fail weaning trials and requires high peep and FiO2.  Plan of care discussed with pulmonology in detail.  Patient to go for tracheostomy tomorrow.    Review of Systems   Unable to perform ROS: Intubated     Objective:     Vital Signs (Most Recent):  Temp: 99.6 °F (37.6 °C) (07/06/20 1945)  Pulse: 76 (07/06/20 1945)  Resp: 12 (07/06/20 1945)  BP: 119/65 (07/06/20 1930)  SpO2: (!) 93 % (07/06/20 1945) Vital Signs (24h Range):  Temp:  [98.3 °F (36.8 °C)-99.7 °F (37.6 °C)] 99.6 °F (37.6 °C)  Pulse:  [68-84] 76  Resp:  [11-37] 12  SpO2:  [89 %-99 %] 93 %  BP: ()/(52-74) 119/65     Weight: (!) 141.6 kg (312 lb 2.7 oz)  Body mass index is 57.1 kg/m².    Intake/Output Summary (Last 24 hours) at 7/6/2020 2134  Last data filed at 7/6/2020 1751  Gross per 24 hour   Intake 3918.07 ml   Output 3525 ml   Net 393.07 ml      Physical Exam  Vitals signs and nursing note reviewed.   Constitutional:       General: She is not in acute distress.     Appearance: She is obese. She is not diaphoretic.      Comments: Intubated, sedated   HENT:      Mouth/Throat:      Pharynx: No oropharyngeal exudate.      Comments: ET tube noted  Eyes:      General:         Right eye: No discharge.         Left eye: No discharge.      Comments: Pupils sluggish   Neck:      Thyroid: No thyromegaly.      Vascular: No JVD.      Trachea: No tracheal deviation.   Cardiovascular:      Heart sounds: No murmur. No friction rub. No gallop.    Pulmonary:      Effort: No respiratory distress.      Breath sounds: No wheezing or rales.      Comments: Breath sounds course on ventilator.  Massive obesity noted with difficult to auscultate breath sounds.  Abdominal:      General: There is no distension.      Palpations: Abdomen is soft.      Tenderness: There is no abdominal tenderness.      Comments: Massive abdominal pannus noted   Musculoskeletal:         General: No tenderness or deformity.      Right  lower leg: Edema present.      Left lower leg: Edema present.   Skin:     Findings: No erythema or rash.   Neurological:      Motor: No abnormal muscle tone.      Deep Tendon Reflexes: Reflexes normal.      Comments: Patient sedated, unresponsive at present         Significant Labs: All pertinent labs within the past 24 hours have been reviewed.    Significant Imaging: I have reviewed all pertinent imaging results/findings within the past 24 hours.

## 2020-07-07 NOTE — PLAN OF CARE
Pt returned to sleep after versed restarted when extubation was put on hold. Pt's sister called to get an update on pt. After getting the password she was updated on pt getting extubated tomorrow and the trach if still needed on Monday. Sister stated she will update the father, son, and other family members. Bed in turn mode left right back. Vazquez draining 2100 ml urine this shift. VS remain stable. Nystatin to oral mucosa.

## 2020-07-07 NOTE — PROGRESS NOTES
Consult Note  Infectious Disease    Reason for Consult:  ID gabino    HPI: Michelle Wren is a  30 y.o. female with a history of asthma, type 2 diabetes, smoking and severe obesity (344 lb) who transferred from [facility] on 06/22 for pulmonary consultation.  She presented to their emergency room the day prior to this admission with a 2-3 day history of shortness of breath.  She was using a friend's CPAP and then required BiPAP and Solu-Medrol, bronchodilator treatments and Lasix.  She required intubation on 06/21 for progressive hypercapnic hypoxemic respiratory failure and was transferred on the ventilator.  She was given vancomycin, Levaquin and Zosyn empirically.  Initial chest x-rays look like pulmonary edema, BNP was mildly elevated but echocardiogram showed normal ejection fraction.  Some concern regarding angioedema on the 2nd hospital day due to swelling of her lips.  Endotracheal aspirate grew MSSA and vancomycin and Zosyn were changed to oxacillin, Levaquin was resumed when fever worsened as steroids were decreased.  Continuing to run low-grade temperature with repeat sputum culture only normal estephanie from the 27th.  Continues on steroids without bronchospasm.  White blood cells remain elevated No eosinophilia on arrival to [place].  Procalcitonin normal x3.  Rheumatoid factor borderline with normal C CP.  COVID negative, Legionella urine antigen negative.  No renal issues are proteinuria to consider granulomatous disease.  Has not been able to get a CT scan of the chest to try to clarify pattern of infiltrates due to habitus. Requiring high PEEP.   Plain films of initially showed lower lobe lobe alveolar infiltrates but these have faded and there may now just be bilateral pleural effusions.  KIRA, Anca, C1 esterase inhibitor antigen are pending.    6/30: Interim reviewed, discussed with nurse at bedside regarding trip to CT and family meeting. Ct chest reviewed and concur with Dr. Schulz re  "appearance similar to COVID infiltrates.   7/1:  T-max 102.9° today.  Hemodynamically stable.  Up to 70% FiO2 on the ventilator.  White blood cells are about the same.  Tolerating tube feedings and had a soft bowel movement.  Sedated on the ventilator at this time.  HIV negative, respiratory viral panel negative, ferritin 394.  COVID IgG negative  7/2: temp down to less than 100. Could not be proned, due to hypoxemia, now being turned acutely. She is arousable despite sedation. Blood cultures negative. Awaiting picc line insertion to get TLC out  7/3:  Temps of stabilized temperatures have stabilized, white blood cells are higher, FiO2 is stools at 70% with 20 of PEEP.  PICC line has still not been exchanged, nor TLC removed.  Blood cultures remain negative from 07/01.  7/4:  Line was changed this morning to a PICC, tip culture in progress.  White blood cells 18,000, chest x-ray actually looks better with better aeration of the bases.  Had high residual last night, tiny bits of blood on gastric aspirate.  No obvious bleeding in the rectal tube  7/5: afebrile. tmax 100.8. unsuccessful at weaning FIO2 or peep. WBC lower. CXR worsened symmetrically. In chair  Position, writing notes, "I'm ready to go " (home). Stools are loose, in rectal tube. No abd pain.   7/6: afebrile, WBC  Better. Down to 40% oxygen!! Line tip had Staph epi. Plans for trach and peg noted. Atelectasis on CXR. Cdiff neg.    EXAM & DIAGNOSTICS REVIEWED:   Vitals:     Temp:  [98.3 °F (36.8 °C)-99.7 °F (37.6 °C)]   Temp: 99.4 °F (37.4 °C) (07/06/20 1730)  Pulse: 78 (07/06/20 1730)  Resp: 12 (07/06/20 1730)  BP: (!) 111/58 (07/06/20 1730)  SpO2: (!) 91 % (07/06/20 1730)    Intake/Output Summary (Last 24 hours) at 7/6/2020 1917  Last data filed at 7/6/2020 1751  Gross per 24 hour   Intake 3918.07 ml   Output 3525 ml   Net 393.07 ml     Vent Mode: SIMV  Oxygen Concentration (%):  [40-70] 40  Resp Rate Total:  [9.8 br/min-30 br/min] 13 br/min  Vt Set:  [0 " mL-500 mL] 0 mL  PEEP/CPAP:  [15 ljK08-32 cmH20] 15 cmH20  Pressure Support:  [0 mxL85-11 cmH20] 25 cmH20  Mean Airway Pressure:  [21 otC52-05 cmH20] 21 cmH20      General:  In NAD.  alert    Eyes:  Anicteric,  ENT:  Oral ET and OGT,   Neck:  supple,    Lungs: Clear anteriorly  Heart:  RRR, no gallop/murmur/rub noted  Abd:  Soft, morbidly obese, NT, ND, normal BS, no masses or organomegaly appreciated.   Tube feeding ongoing, rectal tube with liquid stool  :   Vazquez, urine clear, no flank tenderness  Musc:  Joints without effusion, swelling, erythema, synovitis, muscle wasting.   Skin:  No rashes. No palmar or plantar lesions. No subungual petechiae  Wound:   Neuro: Awake, communicating with written notes  Psych:  Calm, attentive  Lymphatic:     Exam limited by habitus  Extrem: No edema, erythema, phlebitis, cellulitis, warm and well perfused  VAD:  Right arm PICC line 7/4 right radial arterial line    Isolation:  none    Lines/Tubes/Drains:    General Labs reviewed:  Recent Labs   Lab 07/04/20 0318 07/05/20  0330 07/06/20  0312   WBC 18.60* 15.28* 11.24   HGB 13.5 13.2 12.7   HCT 44.5 44.2 41.1    304 306       Recent Labs   Lab 07/04/20 0318 07/05/20  0330 07/06/20  0312    140 141   K 3.8 3.4* 3.4*   CL 98 96 101   CO2 29 31* 27   BUN 30* 32* 31*   CREATININE 0.8 0.9 0.9   CALCIUM 10.0 9.9 9.3   PROT 7.9 7.8 7.2   BILITOT 0.4 0.5 0.5   ALKPHOS 51* 49* 43*   ALT 11 12 12   AST 10 11 11           Micro:  Microbiology Results (last 7 days)     Procedure Component Value Units Date/Time    IV catheter culture [[id number]]  (Abnormal) Collected: 07/04/20 0844    Order Status: Completed Specimen: Catheter Tip, Intrajugular Updated: 07/06/20 1419     Aerobic Culture - Cath tip STAPHYLOCOCCUS EPIDERMIDIS  < 15 colonies      Blood culture [[id number]] Collected: 07/01/20 2036    Order Status: Completed Specimen: Blood from Line, Central Neck Updated: 07/06/20 1012     Blood Culture, Routine No Growth to date       No Growth to date      No Growth to date      No Growth to date      No Growth to date    Narrative:      From TLC    Blood culture [[id number]] Collected: 07/01/20 2034    Order Status: Completed Specimen: Blood Updated: 07/06/20 1012     Blood Culture, Routine No Growth to date      No Growth to date      No Growth to date      No Growth to date      No Growth to date    Narrative:      From arterial line    Clostridium difficile EIA [[id number]] Collected: 07/05/20 1503    Order Status: Completed Specimen: Stool Updated: 07/05/20 2108     C. diff Antigen Negative     C difficile Toxins A+B, EIA Negative     Comment: Testing not recommended for children <24 months old.       Culture, Respiratory with Gram Stain [[id number]]     Order Status: No result Specimen: Respiratory from Tracheal Aspirate     Blood culture [[id number]] Collected: 06/28/20 0824    Order Status: Completed Specimen: Blood from Antecubital, Right Arm Updated: 07/03/20 2212     Blood Culture, Routine No growth after 5 days.    Blood culture [[id number]] Collected: 06/27/20 0550    Order Status: Completed Specimen: Blood from Line, Central Updated: 07/03/20 0612     Blood Culture, Routine No growth after 5 days.    Blood culture [[id number]] Collected: 06/26/20 1914    Order Status: Completed Specimen: Blood Updated: 07/02/20 0612     Blood Culture, Routine No growth after 5 days.    Narrative:      X2 sticks, one from central line, one peripheral    Blood culture [[id number]] Collected: 06/26/20 1914    Order Status: Completed Specimen: Blood from Antecubital, Left Arm Updated: 07/02/20 0612     Blood Culture, Routine No growth after 5 days.    Respiratory Infection Panel (PCR), Nasopharyngeal [[id number]] Collected: 07/01/20 1400    Order Status: Completed Specimen: Nasopharyngeal Swab Updated: 07/01/20 1513     Respiratory Infection Panel Source NP Swab     Adenovirus Not Detected     Coronavirus 229E, Common Cold Virus Not Detected      Coronavirus HKU1, Common Cold Virus Not Detected     Coronavirus NL63, Common Cold Virus Not Detected     Coronavirus OC43, Common Cold Virus Not Detected     Comment: The Coronavirus strains detected in this test cause the common cold.  These strains are not the COVID-19 (novel Coronavirus)strain   associated with the respiratory disease outbreak.          Human Metapneumovirus Not Detected     Human Rhinovirus/Enterovirus Not Detected     Influenza A (subtypes H1, H1-2009,H3) Not Detected     Influenza B Not Detected     Parainfluenza Virus 1 Not Detected     Parainfluenza Virus 2 Not Detected     Parainfluenza Virus 3 Not Detected     Parainfluenza Virus 4 Not Detected     Respiratory Syncytial Virus Not Detected     Bordetella Parapertussis (DT7785) Not Detected     Bordetella pertussis (ptxP) Not Detected     Chlamydia pneumoniae Not Detected     Mycoplasma pneumoniae Not Detected     Comment: Respiratory Infection Panel testing performed by Multiplex PCR.       Narrative:      For all other respiratory sources, order [id number] -  Respiratory Viral Panel by PCR    Culture, Respiratory with Gram Stain [[id number]]  (Abnormal) Collected: 06/27/20 1444    Order Status: Completed Specimen: Respiratory from Tracheal Aspirate Updated: 06/30/20 0718     Respiratory Culture No S aureus or Pseudomonas isolated.      KENROY LUSITANIAE  Rare  Normal respiratory estephanie also present       Gram Stain (Respiratory) <10 epithelial cells per low power field.     Gram Stain (Respiratory) Rare WBC's     Gram Stain (Respiratory) No organisms seen        Imaging Reviewed:   CXR   CT chest      Cardiology:echo    IMPRESSION & PLAN   1. Hypercapneic, hypoxemic, respiratory failure. High PEEP requirement.    COVID PCR negative on admission, and 6/30, COVID IgG negative   unweanable  2. MSSA in sputum, but minimal infiltrates, normal procalcitonin x3  3. Morbid obesity with LAURIE  4. History of asthma, requiring a vent in the  past      Recommendations:   Zyvox  For 7 days post line removal(7/11) then stop   stop Mycamine, prophylactic diflucan while she is on steroids   will sign off,   thanks

## 2020-07-07 NOTE — PLAN OF CARE
Remains intubated in ICU  Versed and precedex gtt.  During night, FIO2 increased up to 60% from 40%, and rate from 5 to 8 as pt's sats were 88-89%.  FIo2 weaned back to 40% and rate to 6.  Versed at rate of 7mg/hr and precedex at rate of 1.2mcg/kg/hr.  Very anxious when having to lie flat to turn or be pulled up.  Afebrile overnight.  Pt with white tongue and very sore and raw roof of mouth.  To start diflucan today.  Mouth care done.  Urine output over 1200 ml so far tonight.  Physician notes state plans for trach on 7/13, so tube feedings continued overnight.  Rate is at 35ml/hr with goal of 55.  Max residual of 75ml.

## 2020-07-07 NOTE — PROGRESS NOTES
[facility] Medicine  Progress Note    Patient Name: Michelle Wren  MRN: [id number]  Patient Class: IP- Inpatient   Admission Date: 6/23/2020  Length of Stay: 13 days  Attending Physician: Gwyn Son MD  Primary Care Provider: Primary Doctor No        Subjective:     Principal Problem:Acute respiratory failure with hypoxia and hypercarbia        HPI:  Michelle Wren is a 31 y/o female with a past medical history significant for asthma, type 2 diabetes, and severe obesity who is transferred from [facility] to [facility] for pulmonary consultation.  Patient presented to the ED at [facility] yesterday with complaints of 2-3 days of shortness of breath.  It is reported that she uses CPAP at night but she got this from a friend so unsure of settings.  She was placed on BiPAP and given IV Solu-Medrol, bronchodilator treatments, and IV Lasix.  She was admitted to the ICU at [facility].  Patient continued to decline and underwent intubation yesterday around 7:00 p.m.  per report, today patient appeared to be worse.  Her saturations were in the mid 90s on FiO2 of 100%.  A D-dimer was checked and was negative.  Per review of labs which were drawn earlier today, her WBC count was 23,000 and her lactic acid was 2.4.  She was placed on IV Zosyn and IV vancomycin.  Upon arrival to [facility], patient is in no acute distress.  She is intubated and sedated.  Vital signs are stable.  Lactic acid will be repeated now as patient meets sepsis criteria and will check a procalcitonin.  She will be monitored closely in the ICU.           Overview/Hospital Course:  Patient is being managed in intensive care unit, requiring mechanical ventilation under supervision of pulmonary medicine.  Repeat COVID -19 test has been negative.  Patient is receiving intravenous antibiotics for bilateral pneumonia.  Sputum cultures grew MSSA species.  Patient is being followed by infectious  disease specialist.  Patient tested negative for COVID -19 antibody.  Patient has not been able to get extubated, requiring high FiO2 and very high PEEP pressures.    Interval History:  Patient seen and examined.  Continues to fail weaning trials and requires high peep and FiO2.  Plan of care discussed with pulmonology in detail.  Patient to go for tracheostomy tomorrow.    Review of Systems   Unable to perform ROS: Intubated     Objective:     Vital Signs (Most Recent):  Temp: 99.6 °F (37.6 °C) (07/06/20 1945)  Pulse: 76 (07/06/20 1945)  Resp: 12 (07/06/20 1945)  BP: 119/65 (07/06/20 1930)  SpO2: (!) 93 % (07/06/20 1945) Vital Signs (24h Range):  Temp:  [98.3 °F (36.8 °C)-99.7 °F (37.6 °C)] 99.6 °F (37.6 °C)  Pulse:  [68-84] 76  Resp:  [11-37] 12  SpO2:  [89 %-99 %] 93 %  BP: ()/(52-74) 119/65     Weight: (!) 141.6 kg (312 lb 2.7 oz)  Body mass index is 57.1 kg/m².    Intake/Output Summary (Last 24 hours) at 7/6/2020 2134  Last data filed at 7/6/2020 1751  Gross per 24 hour   Intake 3918.07 ml   Output 3525 ml   Net 393.07 ml      Physical Exam  Vitals signs and nursing note reviewed.   Constitutional:       General: She is not in acute distress.     Appearance: She is obese. She is not diaphoretic.      Comments: Intubated, sedated   HENT:      Mouth/Throat:      Pharynx: No oropharyngeal exudate.      Comments: ET tube noted  Eyes:      General:         Right eye: No discharge.         Left eye: No discharge.      Comments: Pupils sluggish   Neck:      Thyroid: No thyromegaly.      Vascular: No JVD.      Trachea: No tracheal deviation.   Cardiovascular:      Heart sounds: No murmur. No friction rub. No gallop.    Pulmonary:      Effort: No respiratory distress.      Breath sounds: No wheezing or rales.      Comments: Breath sounds course on ventilator.  Massive obesity noted with difficult to auscultate breath sounds.  Abdominal:      General: There is no distension.      Palpations: Abdomen is soft.       Tenderness: There is no abdominal tenderness.      Comments: Massive abdominal pannus noted   Musculoskeletal:         General: No tenderness or deformity.      Right lower leg: Edema present.      Left lower leg: Edema present.   Skin:     Findings: No erythema or rash.   Neurological:      Motor: No abnormal muscle tone.      Deep Tendon Reflexes: Reflexes normal.      Comments: Patient sedated, unresponsive at present         Significant Labs: All pertinent labs within the past 24 hours have been reviewed.    Significant Imaging: I have reviewed all pertinent imaging results/findings within the past 24 hours.      Assessment/Plan:      * Acute respiratory failure with hypoxia and hypercarbia  Patient with Hypercapnic and Hypoxic Respiratory failure which is Acute.  she is not on home oxygen. Supplemental ventilation was provided and noted- Vent Mode: SIMV  Oxygen Concentration (%):  [40-70] 40  Resp Rate Total:  [9.8 br/min-30 br/min] 16 br/min  Vt Set:  [0 mL-500 mL] 0 mL  PEEP/CPAP:  [15 bcH09-32 cmH20] 15 cmH20  Pressure Support:  [0 bzF36-37 cmH20] 25 cmH20  Mean Airway Pressure:  [20 sxW34-58 cmH20] 20 cmH20 and oxygen saturations 97%. Differential diagnosis includes - COPD, CHF, Obesity Hypoventilation, Interstitial lung disease and Pneumonia Labs and images were reviewed. Will treat underlying causes.   Follow pulmonary recommendations.       Staphylococcal pneumonia  Patient with current diagnosis of pneumonia due to bacterial etiology due to  MSSA which is uncontrolled due to persistent hypoxia . Current antimicrobial regimen consists of   Antibiotics (From admission, onward)    Start     Stop Route Frequency Ordered    07/01/20 2130  linezolid 600 mg/300 mL IVPB 600 mg      -- IV Every 12 hours (non-standard times) 07/01/20 2021      . Cultures drawn and noted-   Microbiology Results (last 7 days)     Procedure Component Value Units Date/Time    IV catheter culture [[id number]]  (Abnormal) Collected:  07/04/20 0844    Order Status: Completed Specimen: Catheter Tip, Intrajugular Updated: 07/06/20 1419     Aerobic Culture - Cath tip STAPHYLOCOCCUS EPIDERMIDIS  < 15 colonies      Blood culture [[id number]] Collected: 07/01/20 2036    Order Status: Completed Specimen: Blood from Line, Central Neck Updated: 07/06/20 1012     Blood Culture, Routine No Growth to date      No Growth to date      No Growth to date      No Growth to date      No Growth to date    Narrative:      From TLC    Blood culture [[id number]] Collected: 07/01/20 2034    Order Status: Completed Specimen: Blood Updated: 07/06/20 1012     Blood Culture, Routine No Growth to date      No Growth to date      No Growth to date      No Growth to date      No Growth to date    Narrative:      From arterial line    Clostridium difficile EIA [[id number]] Collected: 07/05/20 1503    Order Status: Completed Specimen: Stool Updated: 07/05/20 2108     C. diff Antigen Negative     C difficile Toxins A+B, EIA Negative     Comment: Testing not recommended for children <24 months old.       Culture, Respiratory with Gram Stain [[id number]]     Order Status: No result Specimen: Respiratory from Tracheal Aspirate     Blood culture [[id number]] Collected: 06/28/20 0824    Order Status: Completed Specimen: Blood from Antecubital, Right Arm Updated: 07/03/20 2212     Blood Culture, Routine No growth after 5 days.    Blood culture [[id number]] Collected: 06/27/20 0550    Order Status: Completed Specimen: Blood from Line, Central Updated: 07/03/20 0612     Blood Culture, Routine No growth after 5 days.    Blood culture [[id number]] Collected: 06/26/20 1914    Order Status: Completed Specimen: Blood Updated: 07/02/20 0612     Blood Culture, Routine No growth after 5 days.    Narrative:      X2 sticks, one from central line, one peripheral    Blood culture [[id number]] Collected: 06/26/20 1914    Order Status: Completed Specimen: Blood from Antecubital, Left Arm Updated: 07/02/20  0612     Blood Culture, Routine No growth after 5 days.    Respiratory Infection Panel (PCR), Nasopharyngeal [[id number]] Collected: 07/01/20 1400    Order Status: Completed Specimen: Nasopharyngeal Swab Updated: 07/01/20 1513     Respiratory Infection Panel Source NP Swab     Adenovirus Not Detected     Coronavirus 229E, Common Cold Virus Not Detected     Coronavirus HKU1, Common Cold Virus Not Detected     Coronavirus NL63, Common Cold Virus Not Detected     Coronavirus OC43, Common Cold Virus Not Detected     Comment: The Coronavirus strains detected in this test cause the common cold.  These strains are not the COVID-19 (novel Coronavirus)strain   associated with the respiratory disease outbreak.          Human Metapneumovirus Not Detected     Human Rhinovirus/Enterovirus Not Detected     Influenza A (subtypes H1, H1-2009,H3) Not Detected     Influenza B Not Detected     Parainfluenza Virus 1 Not Detected     Parainfluenza Virus 2 Not Detected     Parainfluenza Virus 3 Not Detected     Parainfluenza Virus 4 Not Detected     Respiratory Syncytial Virus Not Detected     Bordetella Parapertussis (RU2882) Not Detected     Bordetella pertussis (ptxP) Not Detected     Chlamydia pneumoniae Not Detected     Mycoplasma pneumoniae Not Detected     Comment: Respiratory Infection Panel testing performed by Multiplex PCR.       Narrative:      For all other respiratory sources, order [id number] -  Respiratory Viral Panel by PCR    Culture, Respiratory with Gram Stain [[id number]]  (Abnormal) Collected: 06/27/20 1444    Order Status: Completed Specimen: Respiratory from Tracheal Aspirate Updated: 06/30/20 0718     Respiratory Culture No S aureus or Pseudomonas isolated.      KENROY LUSITANIAE  Rare  Normal respiratory estephanie also present       Gram Stain (Respiratory) <10 epithelial cells per low power field.     Gram Stain (Respiratory) Rare WBC's     Gram Stain (Respiratory) No organisms seen       Will monitor patient closely  and continue current treatment plan unchanged.   Infectious disease and pulmonology consulted.  Will defer to them for further evaluation and management of this disease process.      Obesity hypoventilation syndrome  Patient will likely need tracheostomy for weaning.  Will discuss with General surgery and pulmonology team tomorrow.      Pulmonary hypertension  Continue to treat per pulmonology directions.      Mild intermittent asthma  Patient's COPD is uncontrolled due to worsening of baseline hypoxia currently.  Patient is currently off COPD Pathway. Continue scheduled inhalers Steroids, Antibiotics and Supplemental oxygen and monitor respiratory status closely.  Patient intubated.  Continue steroids.        Obstructive sleep apnea syndrome  Severe, likely contributing to respiratory failure.  Currently on ventilator.      Morbid obesity  Body mass index is 58.47 kg/m². Morbid obesity complicates all aspects of disease management from diagnostic modalities to treatment. Weight loss encouraged and health benefits explained to patient.        Type 2 diabetes mellitus  Patient's FSGs are controlled on current hypoglycemics.   Last A1c reviewed-   Lab Results   Component Value Date    HGBA1C 6.2 06/23/2020     Most recent fingerstick glucose reviewed-   Recent Labs   Lab 07/06/20  0117 07/06/20  0632 07/06/20  1130 07/06/20  1741   POCTGLUCOSE 192* 200* 225* 206*     Current correctional scale  Low  Maintain anti-hyperglycemic dose as follows-   Antihyperglycemics (From admission, onward)    Start     Stop Route Frequency Ordered    06/23/20 2235  insulin aspart U-100 pen 1-10 Units      -- SubQ Every 6 hours PRN 06/23/20 2135        Hold Oral hypoglycemics while patient is in the hospital.      Nicotine dependence  Health hazards associated with cigarette smoking should be reviewed with patient and cessation encouraged when pt is able to participate.       Cardiomegaly  Patient with peripheral edema, mildly elevated  BNP.  IV lasix was initiated at [facility].    Echo was completed today with results as follows:  · Concentric left ventricular remodeling.  · Normal left ventricular systolic function. The estimated ejection fraction is 69%.  · Normal LV diastolic function.  · No wall motion abnormalities.  · Normal right ventricular systolic function.  · Mild right atrial enlargement.  · Trivial pericardial effusion.  · Mild left atrial enlargement.    IV lasix discontinued as no indication of heart failure.      VTE Risk Mitigation (From admission, onward)         Ordered     enoxaparin injection 40 mg  Every 12 hours      07/05/20 1217     IP VTE HIGH RISK PATIENT  Once      06/23/20 2055     Place sequential compression device  Until discontinued      06/23/20 2055                Critical care time spent on the evaluation and treatment of severe organ dysfunction, review of pertinent labs and imaging studies, discussions with consulting providers and discussions with patient/family:  35 minutes.      Gwyn Son MD  Department of Hospital Medicine   [facility]

## 2020-07-07 NOTE — RESPIRATORY THERAPY
07/06/20 1924   Patient Assessment/Suction   Level of Consciousness (AVPU) alert   Respiratory Effort Normal;Unlabored   Expansion/Accessory Muscles/Retractions no use of accessory muscles   All Lung Fields Breath Sounds diminished   Rhythm/Pattern, Respiratory assisted mechanically;depth regular;pattern regular;unlabored   PRE-TX-O2   O2 Device (Oxygen Therapy) ventilator   Oxygen Concentration (%) 40   SpO2 (!) 94 %   Pulse Oximetry Type Continuous   $ Pulse Oximetry - Multiple Charge Pulse Oximetry - Multiple   Pulse 73   Resp 11   ETCO2   ETCO2 (mmHg) 37 mmHg   Aerosol Therapy   $ Aerosol Therapy Charges Aerosol Treatment   Daily Review of Necessity (SVN) completed   Respiratory Treatment Status (SVN) given   Treatment Route (SVN) in-line   Patient Position (SVN) semi-Howard's   Post Treatment Assessment (SVN) breath sounds improved   Signs of Intolerance (SVN) none   Breath Sounds Post-Respiratory Treatment   Post-treatment Heart Rate (beats/min) 73   Post-treatment Resp Rate (breaths/min) 15   Vent Select   Conventional Vent Y   Charged w/in last 24h YES   Preset Conventional Ventilator Settings   Vent Type    Ventilation Type PC   Vent Mode SIMV   Set Rate 5 BPM   Vt Set 0 mL   PEEP/CPAP 15 cmH20   Pressure Support 25 cmH20   Peak Flow 0 L/min   Set Inspiratory Pressure 30 cmH20   Insp Time 1.2 Sec(s)   Plateau Set/Insp. Hold (sec) 0   Insp Rise Time  100 %   Trigger Sensitivity Flow/I-Trigger 3 L/min   P High 0 cm H2O   P Low 0 cm H2O   T High 0 sec   T Low 0 sec   Patient Ventilator Parameters   Resp Rate Total 11 br/min   Peak Airway Pressure 46 cmH2O   Mean Airway Pressure 20 cmH20   Plateau Pressure 33 cmH20   Exhaled Vt 652 mL   Total Ve 8.39 mL   Spont Ve 4.67 L   I:E Ratio Measured 1:4.80   Conventional Ventilator Alarms   Ve High Alarm 24 L/min   Resp Rate High Alarm 40 br/min   Press High Alarm 65 cmH2O   Apnea Rate 20   Apnea Volume (mL) 450 mL   Apnea Oxygen Concentration  100   Apnea  Flow Rate (L/min) 70   T Apnea 20 sec(s)   Ready to Wean/Extubation Screen   FIO2<=50 (chart decimal) 0.4   MV<16L (chart vol.) 8.39   PEEP <=8 (chart #) (!) 15   Ready to Wean Parameters   F/VT Ratio<105 (RSBI) (!) 16.87

## 2020-07-07 NOTE — PROGRESS NOTES
.  Progress Note  PULMONARY    Admit Date: 6/23/2020 07/07/2020      SUBJECTIVE:     June 25th-patient is sedated, no complaints, intubated.  6/26 intubated.  6/27 no c/o intubated.  6/28 no new c/o,intubated, arouses  6/29- no new c/o, intubated,   6/30 no new c/o  7/6 arouses,  No c/o. Intubated.  7/7 no c/o, arouses        PFSH and Allergies reviewed.    OBJECTIVE:     Vitals (Most recent):  Vitals:    07/07/20 0745   BP:    Pulse: 74   Resp: 11   Temp:        Vitals (24 hour range):  Temp:  [98 °F (36.7 °C)-99.7 °F (37.6 °C)]   Pulse:  [64-84]   Resp:  [8-63]   BP: (100-126)/(54-75)   SpO2:  [89 %-100 %]       Intake/Output Summary (Last 24 hours) at 7/7/2020 0811  Last data filed at 7/7/2020 0600  Gross per 24 hour   Intake 3502.15 ml   Output 3250 ml   Net 252.15 ml          Physical Exam:  The patient's neuro status (alertness,orientation,cognitive function,motor skills,), pharyngeal exam (oral lesions, hygiene, abn dentition,), Neck (jvd,mass,thyroid,nodes in neck and above/below clavicle),RESPIRATORY(symmetry,effort,fremitus,percussion,auscultation),  Cor(rhythm,heart tones including gallops,perfusion,edema)ABD(distention,hepatic&splenomegaly,tenderness,masses), Skin(rash,cyanosis),Psyc(affect,judgement,).  Exam negative except for these pertinent findings:    Morbid obesity, intubated, good breath sounds, , no distress, symmetric, no edema, soft abdomen, distant heart-lips and tongue to be significantly swollen on July 7 but now minimal swelling tongue/lips      Radiographs reviewed: view by direct vision   Ct chest 6/30 - impressive posterior lung consodation, no pe, some ggo, huge pulm artery  c/w pulm hypertension  Chest x-ray June 25th suggest left lower lung collapse, there is some increased interstitial type markings in the right lower lung also.  cxr 6/30 lower lung infiltrates seem very likely by appearance even with obesity  cxr 7/6 bands  Of  Atelectasis  In bases.    Results for orders  placed during the hospital encounter of 06/22/20   X-Ray Chest 1 View    Narrative EXAMINATION:  XR CHEST 1 VIEW    CLINICAL HISTORY:  LINE PLACEMENT;    TECHNIQUE:  Single frontal view of the chest was performed.    COMPARISON:  06/23/2020.    FINDINGS:  There is persistent pulmonary hypoinflation.  There are bilateral pulmonary infiltrates which are stable to slightly increased over the interval likely representing pulmonary edema.  Small bilateral pleural effusions with bibasilar dependent atelectasis.    Heart size is enlarged.  Mediastinal contours unremarkable.  Trachea midline.    Endotracheal tube remains in satisfactory position.  Interval placement of right IJ catheter which terminates in the distal SVC.      Impression 1. Pulmonary hypoinflation.  2. Findings consistent with congestive heart failure which is stable to slightly increased over the interval with small bilateral pleural effusions.  3. Satisfactory indwelling life-support devices.      Electronically signed by: Navin Mortensen  Date:    06/23/2020  Time:    11:58   ]    Labs     Recent Labs   Lab 07/07/20  0405   WBC 13.72*   HGB 12.9   HCT 42.4        Recent Labs   Lab 07/07/20  0405      K 3.3*   CL 99   CO2 29   BUN 36*   CREATININE 0.9   *   CALCIUM 9.9   MG 2.0   PHOS 3.7   AST 12   ALT 13   ALKPHOS 46*   BILITOT 0.6   PROT 7.6   ALBUMIN 2.9*     Recent Labs   Lab 07/07/20  0438   PH 7.385   PCO2 49.5*   PO2 60*   HCO3 29.6*     Microbiology Results (last 7 days)     Procedure Component Value Units Date/Time    IV catheter culture [[id number]]  (Abnormal) Collected: 07/04/20 0844    Order Status: Completed Specimen: Catheter Tip, Intrajugular Updated: 07/06/20 1419     Aerobic Culture - Cath tip STAPHYLOCOCCUS EPIDERMIDIS  < 15 colonies      Blood culture [[id number]] Collected: 07/01/20 2036    Order Status: Completed Specimen: Blood from Line, Central Neck Updated: 07/06/20 1012     Blood Culture, Routine No Growth to  date      No Growth to date      No Growth to date      No Growth to date      No Growth to date    Narrative:      From TLC    Blood culture [[id number]] Collected: 07/01/20 2034    Order Status: Completed Specimen: Blood Updated: 07/06/20 1012     Blood Culture, Routine No Growth to date      No Growth to date      No Growth to date      No Growth to date      No Growth to date    Narrative:      From arterial line    Clostridium difficile EIA [[id number]] Collected: 07/05/20 1503    Order Status: Completed Specimen: Stool Updated: 07/05/20 2108     C. diff Antigen Negative     C difficile Toxins A+B, EIA Negative     Comment: Testing not recommended for children <24 months old.       Culture, Respiratory with Gram Stain [[id number]]     Order Status: No result Specimen: Respiratory from Tracheal Aspirate     Blood culture [[id number]] Collected: 06/28/20 0824    Order Status: Completed Specimen: Blood from Antecubital, Right Arm Updated: 07/03/20 2212     Blood Culture, Routine No growth after 5 days.    Blood culture [[id number]] Collected: 06/27/20 0550    Order Status: Completed Specimen: Blood from Line, Central Updated: 07/03/20 0612     Blood Culture, Routine No growth after 5 days.    Blood culture [[id number]] Collected: 06/26/20 1914    Order Status: Completed Specimen: Blood Updated: 07/02/20 0612     Blood Culture, Routine No growth after 5 days.    Narrative:      X2 sticks, one from central line, one peripheral    Blood culture [[id number]] Collected: 06/26/20 1914    Order Status: Completed Specimen: Blood from Antecubital, Left Arm Updated: 07/02/20 0612     Blood Culture, Routine No growth after 5 days.    Respiratory Infection Panel (PCR), Nasopharyngeal [[id number]] Collected: 07/01/20 1400    Order Status: Completed Specimen: Nasopharyngeal Swab Updated: 07/01/20 1513     Respiratory Infection Panel Source NP Swab     Adenovirus Not Detected     Coronavirus 229E, Common Cold Virus Not Detected      Coronavirus HKU1, Common Cold Virus Not Detected     Coronavirus NL63, Common Cold Virus Not Detected     Coronavirus OC43, Common Cold Virus Not Detected     Comment: The Coronavirus strains detected in this test cause the common cold.  These strains are not the COVID-19 (novel Coronavirus)strain   associated with the respiratory disease outbreak.          Human Metapneumovirus Not Detected     Human Rhinovirus/Enterovirus Not Detected     Influenza A (subtypes H1, H1-2009,H3) Not Detected     Influenza B Not Detected     Parainfluenza Virus 1 Not Detected     Parainfluenza Virus 2 Not Detected     Parainfluenza Virus 3 Not Detected     Parainfluenza Virus 4 Not Detected     Respiratory Syncytial Virus Not Detected     Bordetella Parapertussis (VC3225) Not Detected     Bordetella pertussis (ptxP) Not Detected     Chlamydia pneumoniae Not Detected     Mycoplasma pneumoniae Not Detected     Comment: Respiratory Infection Panel testing performed by Multiplex PCR.       Narrative:      For all other respiratory sources, order [id number] -  Respiratory Viral Panel by PCR        Echo 6/22/2020  · Concentric left ventricular remodeling.  · Normal left ventricular systolic function. The estimated ejection fraction is 69%.  · Normal LV diastolic function.  · No wall motion abnormalities.  · Normal right ventricular systolic function.  · Mild right atrial enlargement.  · Trivial pericardial effusion.  · Mild left atrial enlargement.       Impression:  Active Hospital Problems    Diagnosis  POA    *Acute respiratory failure with hypoxia and hypercarbia [J96.01, J96.02]  Yes    Morbid obesity [E66.01]  Yes    Pulmonary hypertension [I27.20]  Yes     By pulmonary artery size on ct chest 6/30      Obstructive sleep apnea syndrome [G47.33]  Yes    Staphylococcal pneumonia [J15.20]  Yes    Type 2 diabetes mellitus [E11.9]  Yes    Nicotine dependence [F17.200]  Yes    Obesity hypoventilation syndrome [E66.2]  Yes    Mild  intermittent asthma [J45.20]  Yes    Cardiomegaly [I51.7]  Yes      Resolved Hospital Problems    Diagnosis Date Resolved POA    Severe sepsis [A41.9, R65.20] 07/05/2020 Yes    Bilateral pneumonia [J18.9] 07/05/2020 Yes    Respiratory failure with hypoxia [J96.91] 07/06/2020 Yes    Class 3 severe obesity with serious comorbidity and body mass index (BMI) of 60.0 to 69.9 in adult [E66.01, Z68.44] 07/05/2020 Not Applicable               Plan:   June 25- swollen lips, gas exchange poor peep 17 79/0.7, cxr not impressive- wbc 17.2 from 24 on 23rd.  2nd covid neg.  Cta lungs to be done.  Expect some degree of atelectasis.  Wheezes are present  Leak test and wean 02.    6/26 ratio 89/0.7 on peep 17,   Wbc now 15k, no ct done.  Solumedrol 80/d  Pt initially bradycardic, abg with large neg base excess new from am lytes, propofol stopped for fear propofol infusion syndrome (about 40).  Fu abg with clearing acidosis.    Pt had peep decreased from 17 to 5 with sat rising from 95- to 97.  Pt  Is obstructed on vol time curve.    Pt developed resp distress off propofol on precedex.  Will give prn morphine with versed drip.  Ct not done with high peep/body size.  Could have ild but asthma should be likely dx.   mssa in sputum - staph pneumonia present at admit likely , steroids not good.   Will go to oxacillin 2 q 4 from [facility].  Cut steroids to 40/d, f/u procal-  Was 0.02 at presentation.    Addendum pt seemed to improve on lower levels peep but off propofol developed resp distress and  Desat.  Will check d dimer again, increase loveonx to therapeutic, and check leg studies.    6/27 abx tapered to oxacillin with temp going to over 101.  Will resume levaquin, culture, check abd for tenderness and mouth for ulces.  Culture.  Try decrease imv - gas exchange poor.  No leg study  6/28 temp down now.  Discussed with relative,daughter in law.  Pt was at [facility] last yr with vent for a wk, 4 wks in hosp, no pneumonia but had  infection, back to nl at [place], has osas. 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  Has had intermittent face/tongue/eye swelling ppt er visit at [facility].    Will screen for hereditary angioedema/lupus/ra/wegener's   Cut peep to 5 with desat 89 on 89%   Get records from [facility]  Need to follow leak test.  Airway should be marginal given osas and morbid obesity- with history would dx angioedema - cause not clear.   Continue full anticoagg.  Consider id f/u once records, cultures, and above screens return?  6/29- tried to cut peep yesterday and ended up on peep 17 100%- pa02 59 this am (59/1.00)- cxr - hard to read bilat lower lung infiltrates intermittently seen  Tm 100.4, oxacillin and levaquin- mssa in sputum 6/23 with nl estephanie on 27th.  Wbc 19 on steroids. No wheezes,  [facility] records na,   Leak was none today, 200 on 28th, 300 on 25th, 320 on 26th, tongue/lips seem smaller today? Pt intubated 23rd or so.  rf 16 with neg ccp, complement levels not low.    With no clear working dx - support.  If no improvement will re attempt to get ct but doubtful will give clear picture.  Will monitor/support.  .Addendum-  Will ask id to see, dose high dose steroids if infection felt to covered?  6/30 remains marginal, attempted decrease peep yesterday with dramatic worsening.  Will increase steroids to 125 q8, records from [facility] from 11 day stay in Jan 2019 were not suggestive of ild???   Ct chest done, above, discussed with dr Schulz.  Picture looks more like collapse lung than pneumonia or covid.  Will decrease steroids, use high peep/pcv.  Discussed with family - father/brother,sis in law- picture not clear but loooks like asthma with collapse lower lungs.  Angioedema not likely initially significant but may complicate later.  rx presumed pe, pneumonia, asthma- increase rx for atelectasis. May need trach.  Pt could die  With mishap and will be at high risk.    Will decrease steroids back to 40/d.  Appreciate  ID.  Vent adjusted.    141  July 6 -  Tm 99.3 on zyvox, micafungin,    Bun/creat  31/0.9 - I/o  2770/3205  - 141.6 kg now - was 164.7 on 6/23?  cxr 7/6 with plate like  Atelectasis - 4 and 5  Had more diffuse haze.      Vent  Adjusted - considering atelectasis and shunt as likely cause of gas exchange issues,  Will go to peep 5 with  pcv tidal volumes 600-900,  psv 25  And check  abg at 11.  If abg 11  Adequate - cpap trial  With plans to extubate to niv and mobilize if passes.  willl ask surgery to see for trach in event not progressing.  Hold tube feeds    Low dose lovenox now.    7/7/2020 - peep 15 on 40 % with 02 60, now on 30% - try cpap 11 am, hold tube feeds for cpap trial.    Glu 247-  Tm 99.6?  On zyvox for 7 days - id sign off.

## 2020-07-07 NOTE — CARE UPDATE
07/07/20 0711   Patient Assessment/Suction   Level of Consciousness (AVPU) responds to voice   Respiratory Effort Unlabored;Normal   Expansion/Accessory Muscles/Retractions no use of accessory muscles;no retractions;expansion symmetric   All Lung Fields Breath Sounds diminished   Rhythm/Pattern, Respiratory assisted mechanically   Cough Frequency infrequent   Cough Type assisted   Suction Method oral;tracheal   $ Suction Charges Inline Suction Procedure Stat Charge   Secretions Amount moderate   Secretions Color white;clear   Secretions Characteristics thick   PRE-TX-O2   O2 Device (Oxygen Therapy) ventilator   $ Is the patient on Low Flow Oxygen? Yes   Oxygen Concentration (%) 30   SpO2 98 %   Pulse Oximetry Type Continuous   $ Pulse Oximetry - Multiple Charge Pulse Oximetry - Multiple   Pulse 65   Resp (!) 8   ETCO2   $ ETCO2 Charge Exhaled CO2 Monitoring   $ ETCO2 Usage Currently wearing   ETCO2 (mmHg) 38 mmHg   ETCO2 Device Type Bedside Monitor;Artificial Airway   Aerosol Therapy   $ Aerosol Therapy Charges Aerosol Treatment   Daily Review of Necessity (SVN) completed   Respiratory Treatment Status (SVN) given   Treatment Route (SVN) in-line   Patient Position (SVN) semi-Howard's   Post Treatment Assessment (SVN) breath sounds unchanged   Signs of Intolerance (SVN) none   Breath Sounds Post-Respiratory Treatment   Throughout All Fields Post-Treatment All Fields   Throughout All Fields Post-Treatment no change   Post-treatment Heart Rate (beats/min) 64   Post-treatment Resp Rate (breaths/min) 9   Wound Care   $ Wound Care Tech Time 15 min   Area of Concern Cheek;Corner lip;Lower lip;Upper lip   Skin Color/Characteristics without discoloration   Skin Temperature warm        Airway - Non-Surgical 06/22/20 1625 Endotracheal Tube   Placement Date/Time: 06/22/20 1625   Present Prior to Hospital Arrival?: No  Method of Intubation: Direct laryngoscopy  Inserted by: MD  Airway Device: Endotracheal Tube  Mask  Ventilation: Easy  Blade: Ramon #3  Airway Device Size: 7.5  Style: Cuffed...   Secured at 24 cm   Measured At Lips   Secured Location Right   Secured by Commercial tube lord   Bite Block right   Site Condition Dry   Status Intact;Secured;Patent   Site Assessment Clean;Dry;No drainage   Cuff Pressure 32 cm H2O   Vent Select   Conventional Vent Y   $ Ventilator Subsequent 1   Charged w/in last 24h YES   Preset Conventional Ventilator Settings   Vent Type    Ventilation Type PC   Vent Mode SIMV   Humidity HME   Set Rate 6 BPM   Vt Set 0 mL   PEEP/CPAP 15 cmH20   Pressure Support 25 cmH20   Peak Flow 0 L/min   Set Inspiratory Pressure 30 cmH20   Insp Time 1.5 Sec(s)   Plateau Set/Insp. Hold (sec) 0   Insp Rise Time  100 %   Trigger Sensitivity Flow/I-Trigger 3 L/min   P High 0 cm H2O   P Low 0 cm H2O   T High 0 sec   T Low 0 sec   Patient Ventilator Parameters   Resp Rate Total 8.3 br/min   Peak Airway Pressure 47 cmH2O   Mean Airway Pressure 21 cmH20   Plateau Pressure 33 cmH20   Exhaled Vt 788 mL   Total Ve 7.51 mL   Spont Ve 2.49 L   I:E Ratio Measured 1:4.60   Conventional Ventilator Alarms   Ve High Alarm 24 L/min   Resp Rate High Alarm 45 br/min   Press High Alarm 65 cmH2O   Apnea Rate 20   Apnea Volume (mL) 450 mL   Apnea Oxygen Concentration  100   Apnea Flow Rate (L/min) 70   T Apnea 20 sec(s)   Ready to Wean/Extubation Screen   FIO2<=50 (chart decimal) 0.3   MV<16L (chart vol.) 7.51   PEEP <=8 (chart #) (!) 15   Ready to Wean Parameters   F/VT Ratio<105 (RSBI) (!) 10.15   Airway Safety   Ambu bag with the patient? Yes, Adult Ambu   Is mask with the patient? Yes, Adult Mask       Patient maintained on ventilator at documented settings. All alarms on and functioning properly. Aerosol treatments q4 with Duoneb and BID with Pulmicort.

## 2020-07-07 NOTE — ASSESSMENT & PLAN NOTE
Patient's FSGs are controlled on current hypoglycemics.   Last A1c reviewed-   Lab Results   Component Value Date    HGBA1C 6.2 06/23/2020     Most recent fingerstick glucose reviewed-   Recent Labs   Lab 07/06/20  0117 07/06/20  0632 07/06/20  1130 07/06/20  1741   POCTGLUCOSE 192* 200* 225* 206*     Current correctional scale  Low  Maintain anti-hyperglycemic dose as follows-   Antihyperglycemics (From admission, onward)    Start     Stop Route Frequency Ordered    06/23/20 2235  insulin aspart U-100 pen 1-10 Units      -- SubQ Every 6 hours PRN 06/23/20 2135        Hold Oral hypoglycemics while patient is in the hospital.

## 2020-07-07 NOTE — RESPIRATORY THERAPY
Dr. Phillips came to ICU and evaluated patient for extubation this afternoon. Dr. Phillips spoke with Dr. Son and Dr. Gentile. Extubation is on hold for today and will be attempted tomorrow morning. CPAP trial ended and patient placed back on previous settings.

## 2020-07-07 NOTE — RESPIRATORY THERAPY
07/07/20 0114   PRE-TX-O2   Oxygen Concentration (%) 70   SpO2 (!) 91 %   Pulse 74   Resp 10   ETCO2   ETCO2 (mmHg) 31 mmHg   Vent Select   Conventional Vent Y   Charged w/in last 24h YES   Preset Conventional Ventilator Settings   Vent Type    Ventilation Type PC   Vent Mode SIMV   Set Rate 10 BPM   Vt Set 0 mL   PEEP/CPAP 15 cmH20   Pressure Support 25 cmH20   Peak Flow 0 L/min   Set Inspiratory Pressure 30 cmH20   Insp Time 1.2 Sec(s)   Plateau Set/Insp. Hold (sec) 0   Insp Rise Time  100 %   Trigger Sensitivity Flow/I-Trigger 1.5 L/min   P High 0 cm H2O   P Low 0 cm H2O   T High 0 sec   T Low 0 sec   Patient Ventilator Parameters   Resp Rate Total 10 br/min   Peak Airway Pressure 52 cmH2O   Mean Airway Pressure 21 cmH20   Plateau Pressure 33 cmH20   Exhaled Vt 1020 mL   Total Ve 10.2 mL   Spont Ve 0 L   I:E Ratio Measured 1:4.00   Conventional Ventilator Alarms   Ve High Alarm 24 L/min   Resp Rate High Alarm 40 br/min   Press High Alarm 65 cmH2O   Apnea Rate 20   Apnea Volume (mL) 450 mL   Apnea Oxygen Concentration  100   Apnea Flow Rate (L/min) 70   T Apnea 20 sec(s)   Ready to Wean/Extubation Screen   FIO2<=50 (chart decimal) (!) 0.7   MV<16L (chart vol.) 10.2   PEEP <=8 (chart #) (!) 15   Ready to Wean Parameters   F/VT Ratio<105 (RSBI) (!) 9.8   pt. o2 sat had decreased into the upper 80's so I made adjustments to ks> 90. Will wean accordingly

## 2020-07-08 LAB
ALBUMIN SERPL BCP-MCNC: 3 G/DL (ref 3.5–5.2)
ALLENS TEST: ABNORMAL
ALP SERPL-CCNC: 45 U/L (ref 55–135)
ALT SERPL W/O P-5'-P-CCNC: 12 U/L (ref 10–44)
ANION GAP SERPL CALC-SCNC: 11 MMOL/L (ref 8–16)
AST SERPL-CCNC: 10 U/L (ref 10–40)
BASOPHILS # BLD AUTO: 0.03 K/UL (ref 0–0.2)
BASOPHILS NFR BLD: 0.2 % (ref 0–1.9)
BILIRUB SERPL-MCNC: 0.6 MG/DL (ref 0.1–1)
BUN SERPL-MCNC: 30 MG/DL (ref 6–20)
CALCIUM SERPL-MCNC: 10 MG/DL (ref 8.7–10.5)
CHLORIDE SERPL-SCNC: 99 MMOL/L (ref 95–110)
CO2 SERPL-SCNC: 30 MMOL/L (ref 23–29)
CREAT SERPL-MCNC: 0.8 MG/DL (ref 0.5–1.4)
DELSYS: ABNORMAL
DIFFERENTIAL METHOD: ABNORMAL
EOSINOPHIL # BLD AUTO: 0 K/UL (ref 0–0.5)
EOSINOPHIL NFR BLD: 0.2 % (ref 0–8)
ERYTHROCYTE [DISTWIDTH] IN BLOOD BY AUTOMATED COUNT: 16 % (ref 11.5–14.5)
EST. GFR  (AFRICAN AMERICAN): >60 ML/MIN/1.73 M^2
EST. GFR  (NON AFRICAN AMERICAN): >60 ML/MIN/1.73 M^2
FIO2: 50
GLUCOSE SERPL-MCNC: 172 MG/DL (ref 70–110)
HCO3 UR-SCNC: 30.1 MMOL/L (ref 24–28)
HCT VFR BLD AUTO: 44.4 % (ref 37–48.5)
HGB BLD-MCNC: 13.5 G/DL (ref 12–16)
IMM GRANULOCYTES # BLD AUTO: 0.09 K/UL (ref 0–0.04)
IMM GRANULOCYTES NFR BLD AUTO: 0.7 % (ref 0–0.5)
LYMPHOCYTES # BLD AUTO: 2.8 K/UL (ref 1–4.8)
LYMPHOCYTES NFR BLD: 21.1 % (ref 18–48)
MAGNESIUM SERPL-MCNC: 2 MG/DL (ref 1.6–2.6)
MCH RBC QN AUTO: 25.8 PG (ref 27–31)
MCHC RBC AUTO-ENTMCNC: 30.4 G/DL (ref 32–36)
MCV RBC AUTO: 85 FL (ref 82–98)
MIN VOL: 8
MODE: ABNORMAL
MONOCYTES # BLD AUTO: 1.4 K/UL (ref 0.3–1)
MONOCYTES NFR BLD: 10.3 % (ref 4–15)
NEUTROPHILS # BLD AUTO: 9.1 K/UL (ref 1.8–7.7)
NEUTROPHILS NFR BLD: 67.5 % (ref 38–73)
NRBC BLD-RTO: 0 /100 WBC
PCO2 BLDA: 48.9 MMHG (ref 35–45)
PEEP: 5
PH SMN: 7.4 [PH] (ref 7.35–7.45)
PHOSPHATE SERPL-MCNC: 4.5 MG/DL (ref 2.7–4.5)
PLATELET # BLD AUTO: 323 K/UL (ref 150–350)
PMV BLD AUTO: 11.8 FL (ref 9.2–12.9)
PO2 BLDA: 64 MMHG (ref 80–100)
POC BE: 5 MMOL/L
POC SATURATED O2: 92 % (ref 95–100)
POC TCO2: 32 MMOL/L (ref 23–27)
POCT GLUCOSE: 177 MG/DL (ref 70–110)
POCT GLUCOSE: 178 MG/DL (ref 70–110)
POCT GLUCOSE: 213 MG/DL (ref 70–110)
POTASSIUM SERPL-SCNC: 3.6 MMOL/L (ref 3.5–5.1)
PROT SERPL-MCNC: 7.9 G/DL (ref 6–8.4)
PS: 10
RBC # BLD AUTO: 5.23 M/UL (ref 4–5.4)
SAMPLE: ABNORMAL
SITE: ABNORMAL
SODIUM SERPL-SCNC: 140 MMOL/L (ref 136–145)
SP02: 98
SPONT RATE: 12
WBC # BLD AUTO: 13.45 K/UL (ref 3.9–12.7)

## 2020-07-08 PROCEDURE — 85025 COMPLETE CBC W/AUTO DIFF WBC: CPT

## 2020-07-08 PROCEDURE — 94003 VENT MGMT INPAT SUBQ DAY: CPT

## 2020-07-08 PROCEDURE — 25000003 PHARM REV CODE 250: Performed by: INTERNAL MEDICINE

## 2020-07-08 PROCEDURE — 36410 VNPNXR 3YR/> PHY/QHP DX/THER: CPT

## 2020-07-08 PROCEDURE — 94640 AIRWAY INHALATION TREATMENT: CPT

## 2020-07-08 PROCEDURE — 27000221 HC OXYGEN, UP TO 24 HOURS

## 2020-07-08 PROCEDURE — 82803 BLOOD GASES ANY COMBINATION: CPT

## 2020-07-08 PROCEDURE — 99900035 HC TECH TIME PER 15 MIN (STAT)

## 2020-07-08 PROCEDURE — 63600175 PHARM REV CODE 636 W HCPCS: Performed by: INTERNAL MEDICINE

## 2020-07-08 PROCEDURE — 94660 CPAP INITIATION&MGMT: CPT

## 2020-07-08 PROCEDURE — 83735 ASSAY OF MAGNESIUM: CPT

## 2020-07-08 PROCEDURE — C1751 CATH, INF, PER/CENT/MIDLINE: HCPCS

## 2020-07-08 PROCEDURE — 94761 N-INVAS EAR/PLS OXIMETRY MLT: CPT

## 2020-07-08 PROCEDURE — 36415 COLL VENOUS BLD VENIPUNCTURE: CPT

## 2020-07-08 PROCEDURE — 63600175 PHARM REV CODE 636 W HCPCS: Performed by: NURSE PRACTITIONER

## 2020-07-08 PROCEDURE — 27000190 HC CPAP FULL FACE MASK W/VALVE

## 2020-07-08 PROCEDURE — 25000242 PHARM REV CODE 250 ALT 637 W/ HCPCS: Performed by: NURSE PRACTITIONER

## 2020-07-08 PROCEDURE — 76937 US GUIDE VASCULAR ACCESS: CPT

## 2020-07-08 PROCEDURE — 94770 HC EXHALED C02 TEST: CPT

## 2020-07-08 PROCEDURE — 99232 PR SUBSEQUENT HOSPITAL CARE,LEVL II: ICD-10-PCS | Mod: ,,, | Performed by: INTERNAL MEDICINE

## 2020-07-08 PROCEDURE — 97803 MED NUTRITION INDIV SUBSEQ: CPT

## 2020-07-08 PROCEDURE — 99232 SBSQ HOSP IP/OBS MODERATE 35: CPT | Mod: ,,, | Performed by: INTERNAL MEDICINE

## 2020-07-08 PROCEDURE — 20000000 HC ICU ROOM

## 2020-07-08 PROCEDURE — 25000003 PHARM REV CODE 250: Performed by: HOSPITALIST

## 2020-07-08 PROCEDURE — C9113 INJ PANTOPRAZOLE SODIUM, VIA: HCPCS | Performed by: INTERNAL MEDICINE

## 2020-07-08 PROCEDURE — 25000242 PHARM REV CODE 250 ALT 637 W/ HCPCS: Performed by: INTERNAL MEDICINE

## 2020-07-08 PROCEDURE — 36600 WITHDRAWAL OF ARTERIAL BLOOD: CPT

## 2020-07-08 PROCEDURE — 80053 COMPREHEN METABOLIC PANEL: CPT

## 2020-07-08 PROCEDURE — 63600175 PHARM REV CODE 636 W HCPCS: Performed by: HOSPITALIST

## 2020-07-08 PROCEDURE — 99900026 HC AIRWAY MAINTENANCE (STAT)

## 2020-07-08 PROCEDURE — 63700000 PHARM REV CODE 250 ALT 637 W/O HCPCS: Performed by: INTERNAL MEDICINE

## 2020-07-08 PROCEDURE — 84100 ASSAY OF PHOSPHORUS: CPT

## 2020-07-08 RX ORDER — DEXMEDETOMIDINE HYDROCHLORIDE 4 UG/ML
0.5 INJECTION, SOLUTION INTRAVENOUS CONTINUOUS
Status: DISCONTINUED | OUTPATIENT
Start: 2020-07-08 | End: 2020-07-10

## 2020-07-08 RX ADMIN — LINEZOLID 600 MG: 600 INJECTION, SOLUTION INTRAVENOUS at 08:07

## 2020-07-08 RX ADMIN — DEXMEDETOMIDINE HYDROCHLORIDE 1.2 MCG/KG/HR: 100 INJECTION, SOLUTION, CONCENTRATE INTRAVENOUS at 06:07

## 2020-07-08 RX ADMIN — IPRATROPIUM BROMIDE AND ALBUTEROL SULFATE 3 ML: .5; 3 SOLUTION RESPIRATORY (INHALATION) at 12:07

## 2020-07-08 RX ADMIN — IPRATROPIUM BROMIDE AND ALBUTEROL SULFATE 3 ML: .5; 3 SOLUTION RESPIRATORY (INHALATION) at 04:07

## 2020-07-08 RX ADMIN — METOCLOPRAMIDE 10 MG: 5 INJECTION, SOLUTION INTRAMUSCULAR; INTRAVENOUS at 10:07

## 2020-07-08 RX ADMIN — PANTOPRAZOLE SODIUM 40 MG: 40 INJECTION, POWDER, LYOPHILIZED, FOR SOLUTION INTRAVENOUS at 08:07

## 2020-07-08 RX ADMIN — IPRATROPIUM BROMIDE AND ALBUTEROL SULFATE 3 ML: .5; 3 SOLUTION RESPIRATORY (INHALATION) at 11:07

## 2020-07-08 RX ADMIN — NYSTATIN 500000 UNITS: 500000 SUSPENSION ORAL at 01:07

## 2020-07-08 RX ADMIN — MIDAZOLAM HYDROCHLORIDE 7 MG/HR: 5 INJECTION, SOLUTION INTRAMUSCULAR; INTRAVENOUS at 04:07

## 2020-07-08 RX ADMIN — BUDESONIDE 0.5 MG: 0.25 SUSPENSION RESPIRATORY (INHALATION) at 08:07

## 2020-07-08 RX ADMIN — NYSTATIN 500000 UNITS: 500000 SUSPENSION ORAL at 05:07

## 2020-07-08 RX ADMIN — DEXMEDETOMIDINE HYDROCHLORIDE 0.5 MCG/KG/HR: 100 INJECTION, SOLUTION, CONCENTRATE INTRAVENOUS at 02:07

## 2020-07-08 RX ADMIN — METOCLOPRAMIDE 10 MG: 5 INJECTION, SOLUTION INTRAMUSCULAR; INTRAVENOUS at 05:07

## 2020-07-08 RX ADMIN — DEXMEDETOMIDINE HYDROCHLORIDE 1.2 MCG/KG/HR: 100 INJECTION, SOLUTION, CONCENTRATE INTRAVENOUS at 01:07

## 2020-07-08 RX ADMIN — FUROSEMIDE 60 MG: 10 INJECTION, SOLUTION INTRAVENOUS at 12:07

## 2020-07-08 RX ADMIN — ENOXAPARIN SODIUM 40 MG: 100 INJECTION SUBCUTANEOUS at 08:07

## 2020-07-08 RX ADMIN — FUROSEMIDE 60 MG: 10 INJECTION, SOLUTION INTRAVENOUS at 10:07

## 2020-07-08 RX ADMIN — DEXMEDETOMIDINE HYDROCHLORIDE 1.2 MCG/KG/HR: 100 INJECTION, SOLUTION, CONCENTRATE INTRAVENOUS at 03:07

## 2020-07-08 RX ADMIN — BUDESONIDE 0.5 MG: 0.25 SUSPENSION RESPIRATORY (INHALATION) at 07:07

## 2020-07-08 RX ADMIN — CHLORHEXIDINE GLUCONATE 0.12% ORAL RINSE 15 ML: 1.2 LIQUID ORAL at 08:07

## 2020-07-08 RX ADMIN — IPRATROPIUM BROMIDE AND ALBUTEROL SULFATE 3 ML: .5; 3 SOLUTION RESPIRATORY (INHALATION) at 07:07

## 2020-07-08 RX ADMIN — NYSTATIN 500000 UNITS: 500000 SUSPENSION ORAL at 08:07

## 2020-07-08 RX ADMIN — FLUCONAZOLE 100 MG: 100 TABLET ORAL at 08:07

## 2020-07-08 RX ADMIN — IPRATROPIUM BROMIDE AND ALBUTEROL SULFATE 3 ML: .5; 3 SOLUTION RESPIRATORY (INHALATION) at 08:07

## 2020-07-08 RX ADMIN — METOCLOPRAMIDE 10 MG: 5 INJECTION, SOLUTION INTRAMUSCULAR; INTRAVENOUS at 01:07

## 2020-07-08 RX ADMIN — METHYLPREDNISOLONE SODIUM SUCCINATE 40 MG: 40 INJECTION, POWDER, FOR SOLUTION INTRAMUSCULAR; INTRAVENOUS at 08:07

## 2020-07-08 RX ADMIN — DEXMEDETOMIDINE HYDROCHLORIDE 0.4 MCG/KG/HR: 100 INJECTION, SOLUTION, CONCENTRATE INTRAVENOUS at 06:07

## 2020-07-08 RX ADMIN — INSULIN ASPART 4 UNITS: 100 INJECTION, SOLUTION INTRAVENOUS; SUBCUTANEOUS at 12:07

## 2020-07-08 RX ADMIN — IPRATROPIUM BROMIDE AND ALBUTEROL SULFATE 3 ML: .5; 3 SOLUTION RESPIRATORY (INHALATION) at 03:07

## 2020-07-08 RX ADMIN — POTASSIUM CHLORIDE 40 MEQ: 400 INJECTION, SOLUTION INTRAVENOUS at 05:07

## 2020-07-08 NOTE — RESPIRATORY THERAPY
07/08/20 0510   PRE-TX-O2   Oxygen Concentration (%) 50   SpO2 95 %   Pulse 74   Resp (!) 9   ETCO2   ETCO2 (mmHg) 34 mmHg   Vent Select   Charged w/in last 24h YES   Preset Conventional Ventilator Settings   Ventilation Type PC   Vent Mode Spont   Set Rate 0 BPM   Vt Set 0 mL   PEEP/CPAP 15 cmH20   Pressure Support 25 cmH20   Peak Flow 0 L/min   Set Inspiratory Pressure 30 cmH20   Insp Time 0 Sec(s)   Plateau Set/Insp. Hold (sec) 0   Insp Rise Time  100 %   Trigger Sensitivity Flow/I-Trigger 3 L/min   P High 0 cm H2O   P Low 0 cm H2O   T High 0 sec   T Low 0 sec   Patient Ventilator Parameters   Resp Rate Total 8.3 br/min   Peak Airway Pressure 46 cmH2O   Mean Airway Pressure 20 cmH20   Plateau Pressure 33 cmH20   Exhaled Vt 866 mL   Total Ve 7.4 mL   Spont Ve 6.3 L   I:E Ratio Measured 1:2.70   Conventional Ventilator Alarms   Ve High Alarm 24 L/min   Resp Rate High Alarm 45 br/min   Press High Alarm 65 cmH2O   Apnea Rate 20   Apnea Volume (mL) 450 mL   Apnea Oxygen Concentration  100   Apnea Flow Rate (L/min) 70   T Apnea 20 sec(s)   Ready to Wean/Extubation Screen   FIO2<=50 (chart decimal) 0.5   MV<16L (chart vol.) 7.4   PEEP <=8 (chart #) (!) 15   Ready to Wean Parameters   F/VT Ratio<105 (RSBI) (!) 10.39   SBT initiated

## 2020-07-08 NOTE — CARE UPDATE
Patient extubated to bipap 20/10, 60% as per orders. Anethesia on stand by and bipap and intubation box on hand for any problems. Extubation went well with adverse reactions noted.

## 2020-07-08 NOTE — ASSESSMENT & PLAN NOTE
Patient will likely need tracheostomy for weaning.  Will attempt trial extubation and defer further evaluation and management to pulmonology and general surgery team.

## 2020-07-08 NOTE — PLAN OF CARE
Intervention: collaboration with care providers, enteral nutrition therapy  Current Intake (HELD): TF Peptamen VHP @ 35 ml/hr + 9 packets beneprotein+ 30 ml flush q 4 hr  (provides 840 kcal (73% EEN), 77g+ beneprotein (100% EPN), and 604 ml free water)     Recommendation:   1) Advance diet to goal of DM 1500 kcal, texture per SLP when extubated      2) if re intubated reinitiate TF Peptamen VHP trickle feed @ 30 + 9-10 packets beneprotein daily   (advance slowly if pt tolerates to goal of 55 ml/hr + min 30 ml flush q 4 hr)  ( 1320 kcal ( 100% EEN), 121 g protein ( 93% EPN), and 1108 ml free water)   -if pt does not tolerate trial impact peptide 1.5 formula, 0g fiber  -hold TF for residuals > 200 ml     3) Weigh pt weekly   4) Nutrition education on diabetic diet once pt appropriate     Goals: 1) PO diet advanced or nutrition support initiates in < 5 days 2) meet 50% of needs with nutrition support or PO diet advanced at f/u 3) PO diet advanced in < 48 hours or nutrition support ordered  Nutrition Goal Status: met/ met/ new  Communication of SHILAP Recs: reviewed with RN, POC, sticky note

## 2020-07-08 NOTE — PROGRESS NOTES
.  Progress Note  PULMONARY    Admit Date: 6/23/2020 07/08/2020      SUBJECTIVE:     June 25th-patient is sedated, no complaints, intubated.  6/26 intubated.  6/27 no c/o intubated.  6/28 no new c/o,intubated, arouses  6/29- no new c/o, intubated,   6/30 no new c/o  7/6 arouses,  No c/o. Intubated.  7/7 no c/o, arouses  7/8 no c/o intubated      PFSH and Allergies reviewed.    OBJECTIVE:     Vitals (Most recent):  Vitals:    07/08/20 0815   BP:    Pulse: 71   Resp: 11   Temp:        Vitals (24 hour range):  Temp:  [98.5 °F (36.9 °C)-98.7 °F (37.1 °C)]   Pulse:  [66-91]   Resp:  [8-37]   BP: (100-133)/(56-85)   SpO2:  [93 %-100 %]       Intake/Output Summary (Last 24 hours) at 7/8/2020 0832  Last data filed at 7/8/2020 0819  Gross per 24 hour   Intake 3123.82 ml   Output 3405 ml   Net -281.18 ml          Physical Exam:  The patient's neuro status (alertness,orientation,cognitive function,motor skills,), pharyngeal exam (oral lesions, hygiene, abn dentition,), Neck (jvd,mass,thyroid,nodes in neck and above/below clavicle),RESPIRATORY(symmetry,effort,fremitus,percussion,auscultation),  Cor(rhythm,heart tones including gallops,perfusion,edema)ABD(distention,hepatic&splenomegaly,tenderness,masses), Skin(rash,cyanosis),Psyc(affect,judgement,).  Exam negative except for these pertinent findings:    Morbid obesity, intubated, good breath sounds, , no distress, symmetric, no edema, soft abdomen, distant heart-lips and tongue to be significantly swollen on July 7 but now minimal swelling tongue/lips      Radiographs reviewed: view by direct vision   Ct chest 6/30 - impressive posterior lung consodation, no pe, some ggo, huge pulm artery  c/w pulm hypertension  Chest x-ray June 25th suggest left lower lung collapse, there is some increased interstitial type markings in the right lower lung also.  cxr 6/30 lower lung infiltrates seem very likely by appearance even with obesity  cxr 7/6 bands  Of  Atelectasis  In  bases.    Results for orders placed during the hospital encounter of 06/22/20   X-Ray Chest 1 View    Narrative EXAMINATION:  XR CHEST 1 VIEW    CLINICAL HISTORY:  LINE PLACEMENT;    TECHNIQUE:  Single frontal view of the chest was performed.    COMPARISON:  06/23/2020.    FINDINGS:  There is persistent pulmonary hypoinflation.  There are bilateral pulmonary infiltrates which are stable to slightly increased over the interval likely representing pulmonary edema.  Small bilateral pleural effusions with bibasilar dependent atelectasis.    Heart size is enlarged.  Mediastinal contours unremarkable.  Trachea midline.    Endotracheal tube remains in satisfactory position.  Interval placement of right IJ catheter which terminates in the distal SVC.      Impression 1. Pulmonary hypoinflation.  2. Findings consistent with congestive heart failure which is stable to slightly increased over the interval with small bilateral pleural effusions.  3. Satisfactory indwelling life-support devices.      Electronically signed by: Navin Mortensen  Date:    06/23/2020  Time:    11:58   ]    Labs     Recent Labs   Lab 07/08/20  0345   WBC 13.45*   HGB 13.5   HCT 44.4        Recent Labs   Lab 07/08/20  0345      K 3.6   CL 99   CO2 30*   BUN 30*   CREATININE 0.8   *   CALCIUM 10.0   MG 2.0   PHOS 4.5   AST 10   ALT 12   ALKPHOS 45*   BILITOT 0.6   PROT 7.9   ALBUMIN 3.0*     Recent Labs   Lab 07/08/20  0555   PH 7.398   PCO2 48.9*   PO2 64*   HCO3 30.1*     Microbiology Results (last 7 days)     Procedure Component Value Units Date/Time    IV catheter culture [[id number]]  (Abnormal) Collected: 07/04/20 0844    Order Status: Completed Specimen: Catheter Tip, Intrajugular Updated: 07/07/20 1034     Aerobic Culture - Cath tip STAPHYLOCOCCUS EPIDERMIDIS  < 15 colonies      Blood culture [[id number]] Collected: 07/01/20 2034    Order Status: Completed Specimen: Blood Updated: 07/07/20 1012     Blood Culture, Routine No  growth after 5 days.    Narrative:      From arterial line    Blood culture [[id number]] Collected: 07/01/20 2036    Order Status: Completed Specimen: Blood from Line, Central Neck Updated: 07/07/20 1012     Blood Culture, Routine No growth after 5 days.    Narrative:      From TLC    Clostridium difficile EIA [[id number]] Collected: 07/05/20 1503    Order Status: Completed Specimen: Stool Updated: 07/05/20 2108     C. diff Antigen Negative     C difficile Toxins A+B, EIA Negative     Comment: Testing not recommended for children <24 months old.       Culture, Respiratory with Gram Stain [[id number]]     Order Status: No result Specimen: Respiratory from Tracheal Aspirate     Blood culture [[id number]] Collected: 06/28/20 0824    Order Status: Completed Specimen: Blood from Antecubital, Right Arm Updated: 07/03/20 2212     Blood Culture, Routine No growth after 5 days.    Blood culture [[id number]] Collected: 06/27/20 0550    Order Status: Completed Specimen: Blood from Line, Central Updated: 07/03/20 0612     Blood Culture, Routine No growth after 5 days.    Blood culture [[id number]] Collected: 06/26/20 1914    Order Status: Completed Specimen: Blood Updated: 07/02/20 0612     Blood Culture, Routine No growth after 5 days.    Narrative:      X2 sticks, one from central line, one peripheral    Blood culture [[id number]] Collected: 06/26/20 1914    Order Status: Completed Specimen: Blood from Antecubital, Left Arm Updated: 07/02/20 0612     Blood Culture, Routine No growth after 5 days.    Respiratory Infection Panel (PCR), Nasopharyngeal [[id number]] Collected: 07/01/20 1400    Order Status: Completed Specimen: Nasopharyngeal Swab Updated: 07/01/20 1513     Respiratory Infection Panel Source NP Swab     Adenovirus Not Detected     Coronavirus 229E, Common Cold Virus Not Detected     Coronavirus HKU1, Common Cold Virus Not Detected     Coronavirus NL63, Common Cold Virus Not Detected     Coronavirus OC43, Common Cold Virus  Not Detected     Comment: The Coronavirus strains detected in this test cause the common cold.  These strains are not the COVID-19 (novel Coronavirus)strain   associated with the respiratory disease outbreak.          Human Metapneumovirus Not Detected     Human Rhinovirus/Enterovirus Not Detected     Influenza A (subtypes H1, H1-2009,H3) Not Detected     Influenza B Not Detected     Parainfluenza Virus 1 Not Detected     Parainfluenza Virus 2 Not Detected     Parainfluenza Virus 3 Not Detected     Parainfluenza Virus 4 Not Detected     Respiratory Syncytial Virus Not Detected     Bordetella Parapertussis (JX7866) Not Detected     Bordetella pertussis (ptxP) Not Detected     Chlamydia pneumoniae Not Detected     Mycoplasma pneumoniae Not Detected     Comment: Respiratory Infection Panel testing performed by Multiplex PCR.       Narrative:      For all other respiratory sources, order [id number] -  Respiratory Viral Panel by PCR        Echo 6/22/2020  · Concentric left ventricular remodeling.  · Normal left ventricular systolic function. The estimated ejection fraction is 69%.  · Normal LV diastolic function.  · No wall motion abnormalities.  · Normal right ventricular systolic function.  · Mild right atrial enlargement.  · Trivial pericardial effusion.  · Mild left atrial enlargement.       Impression:  Active Hospital Problems    Diagnosis  POA    *Acute respiratory failure with hypoxia and hypercarbia [J96.01, J96.02]  Yes    Morbid obesity [E66.01]  Yes    Pulmonary hypertension [I27.20]  Yes     By pulmonary artery size on ct chest 6/30      Obstructive sleep apnea syndrome [G47.33]  Yes    Staphylococcal pneumonia [J15.20]  Yes    Type 2 diabetes mellitus [E11.9]  Yes    Nicotine dependence [F17.200]  Yes    Obesity hypoventilation syndrome [E66.2]  Yes    Mild intermittent asthma [J45.20]  Yes    Cardiomegaly [I51.7]  Yes      Resolved Hospital Problems    Diagnosis Date Resolved POA    Severe sepsis  [A41.9, R65.20] 07/05/2020 Yes    Bilateral pneumonia [J18.9] 07/05/2020 Yes    Respiratory failure with hypoxia [J96.91] 07/06/2020 Yes    Class 3 severe obesity with serious comorbidity and body mass index (BMI) of 60.0 to 69.9 in adult [E66.01, Z68.44] 07/05/2020 Not Applicable               Plan:   June 25- swollen lips, gas exchange poor peep 17 79/0.7, cxr not impressive- wbc 17.2 from 24 on 23rd.  2nd covid neg.  Cta lungs to be done.  Expect some degree of atelectasis.  Wheezes are present  Leak test and wean 02.    6/26 ratio 89/0.7 on peep 17,   Wbc now 15k, no ct done.  Solumedrol 80/d  Pt initially bradycardic, abg with large neg base excess new from am lytes, propofol stopped for fear propofol infusion syndrome (about 40).  Fu abg with clearing acidosis.    Pt had peep decreased from 17 to 5 with sat rising from 95- to 97.  Pt  Is obstructed on vol time curve.    Pt developed resp distress off propofol on precedex.  Will give prn morphine with versed drip.  Ct not done with high peep/body size.  Could have ild but asthma should be likely dx.   mssa in sputum - staph pneumonia present at admit likely , steroids not good.   Will go to oxacillin 2 q 4 from [facility].  Cut steroids to 40/d, f/u procal-  Was 0.02 at presentation.    Addendum pt seemed to improve on lower levels peep but off propofol developed resp distress and  Desat.  Will check d dimer again, increase loveonx to therapeutic, and check leg studies.    6/27 abx tapered to oxacillin with temp going to over 101.  Will resume levaquin, culture, check abd for tenderness and mouth for ulces.  Culture.  Try decrease imv - gas exchange poor.  No leg study  6/28 temp down now.  Discussed with relative,daughter in law.  Pt was at [facility] last yr with vent for a wk, 4 wks in hosp, no pneumonia but had infection, back to nl at [place], has osas. 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  Has had intermittent face/tongue/eye swelling ppt  er visit at [facility].    Will screen for hereditary angioedema/lupus/ra/wegener's   Cut peep to 5 with desat 89 on 89%   Get records from [facility]  Need to follow leak test.  Airway should be marginal given osas and morbid obesity- with history would dx angioedema - cause not clear.   Continue full anticoagg.  Consider id f/u once records, cultures, and above screens return?  6/29- tried to cut peep yesterday and ended up on peep 17 100%- pa02 59 this am (59/1.00)- cxr - hard to read bilat lower lung infiltrates intermittently seen  Tm 100.4, oxacillin and levaquin- mssa in sputum 6/23 with nl estephanie on 27th.  Wbc 19 on steroids. No wheezes,  [facility] records na,   Leak was none today, 200 on 28th, 300 on 25th, 320 on 26th, tongue/lips seem smaller today? Pt intubated 23rd or so.  rf 16 with neg ccp, complement levels not low.    With no clear working dx - support.  If no improvement will re attempt to get ct but doubtful will give clear picture.  Will monitor/support.  .Addendum-  Will ask id to see, dose high dose steroids if infection felt to covered?  6/30 remains marginal, attempted decrease peep yesterday with dramatic worsening.  Will increase steroids to 125 q8, records from [facility] from 11 day stay in Jan 2019 were not suggestive of ild???   Ct chest done, above, discussed with dr Schulz.  Picture looks more like collapse lung than pneumonia or covid.  Will decrease steroids, use high peep/pcv.  Discussed with family - father/brother,sis in law- picture not clear but loooks like asthma with collapse lower lungs.  Angioedema not likely initially significant but may complicate later.  rx presumed pe, pneumonia, asthma- increase rx for atelectasis. May need trach.  Pt could die  With mishap and will be at high risk.    Will decrease steroids back to 40/d.  Appreciate ID.  Vent adjusted.    141 July 6 -  Tm 99.3 on zyvox, micafungin,    Bun/creat  31/0.9 - I/o  2770/3205  - 141.6 kg now - was 164.7 on  6/23?  cxr 7/6 with plate like  Atelectasis - 4 and 5  Had more diffuse haze.      Vent  Adjusted - considering atelectasis and shunt as likely cause of gas exchange issues,  Will go to peep 5 with  pcv tidal volumes 600-900,  psv 25  And check  abg at 11.  If abg 11  Adequate - cpap trial  With plans to extubate to niv and mobilize if passes.  willl ask surgery to see for trach in event not progressing.  Hold tube feeds    Low dose lovenox now.    7/7/2020 - peep 15 on 40 % with 02 60, now on 30% - try cpap 11 am, hold tube feeds for cpap trial.    Glu 247-  Tm 99.6?  On zyvox for 7 days - id sign off.        7/8/2020 -patient was not extubated yesterday for fear that she might have airway problems.  She is still doing well today with weaning trials.  Will go ahead and give her a trial of extubation.  Would place on BiPAP , mobilized promptly.

## 2020-07-08 NOTE — PROGRESS NOTES
[facility]  Adult Nutrition  Progress Note    SUMMARY      Intervention: collaboration with care providers, enteral nutrition therapy  Current Intake (HELD): TF Peptamen VHP @ 35 ml/hr + 9 packets beneprotein+ 30 ml flush q 4 hr  (provides 840 kcal (73% EEN), 77g+ beneprotein (100% EPN), and 604 ml free water)     Recommendation:   1) Advance diet to goal of DM 1500 kcal, texture per SLP when extubated      2) if re intubated reinitiate TF Peptamen VHP trickle feed @ 30 + 9-10 packets beneprotein daily   (advance slowly if pt tolerates to goal of 55 ml/hr + min 30 ml flush q 4 hr)  ( 1320 kcal ( 100% EEN), 121 g protein ( 93% EPN), and 1108 ml free water)   -if pt does not tolerate trial impact peptide 1.5 formula, 0g fiber  -hold TF for residuals > 200 ml     3) Weigh pt weekly   4) Nutrition education on diabetic diet once pt appropriate     Goals: 1) PO diet advanced or nutrition support initiates in < 5 days 2) meet 50% of needs with nutrition support or PO diet advanced at f/u 3) PO diet advanced in < 48 hours or nutrition support ordered  Nutrition Goal Status: met/ met/ new  Communication of RD Recs: reviewed with RN, TYREE, brian note     Reason for Assessment     Reason For Assessment: follow up  Diagnosis: (severe sepsis)  Relevant Medical History: asthma, DM2, severe obesity  Interdisciplinary Rounds: did not attend     General Information Comments: 31 y/o female admitted with sepsis, bilateral pneumonia. + vent, on high dose propofol no pressor. NFPE done 6/24/20, no wasting seen pt appears obese. NPO x 1 day. Glucose WNL, of note with hx. of DM 2 on steriods.  6/29/20 TF at 30 ml/hr yesterday but pt did not tolerate, held. Reglan started and pt had BM per RN so TF restarted this morning, at 15 ml/hr. 130 ml residual noted. Plan for pt to continue reglan and slowly advance TF as able.  7/1/20 TF held yesterday, restarted at 10 ml/hr, still having 100 ml residual. Per RN will trial  "beneprotein and if unable to advance today will trial impact peptide.  7/3/20 Pt remains ventilated and sedated.TPN was stopped 2' pt tolerating TF today per MD. She is currently resceiving TF of Peptamen VHP @ 50mL/hr. She was able to have a BM today. Nutrition related lab values noted; she is hypokalemic and her blood sugars remain elevated.  7/6/20 TPN standard clinimix ordered 7/2 and 7/3 but discontinued as pt was tolerating TF at goal. Had high residuals, restarted and pt was tolerating at 30 ml/hr this morning. Held for a few hours, plans to restart this afternoon. Residuals 100-180 ml yesterday. Plan for trach tomorrow. Noted to have diarrhea, C. Diff ( -). Per discussion with MD plan to continue TF at this rate for now.  7/8/20 Pt extubated this morning, now on bipap. NGT removed. Had been tolerating TF @ 35 ml/hr yesterday + beneprotein as ordered ( residuals  ml). Plan for SLP eval per RN.     Nutrition Discharge Planning: to be determined- DM 1500 kcal diet, texture per SLP     Nutrition Risk Screen     Nutrition Risk Screen: no indicators present     Nutrition/Diet History     Typical Food/Fluid Intake: unable to obtain  Spiritual, Cultural Beliefs, Sikhism Practices, Values that Affect Care: no  Food Allergies: NKFA  Factors Affecting Nutritional Intake: NPO, bipap     Anthropometrics  Height Method: Stated  Height: 5' 2"  Height (inches): 62 in  Weight Method: Bed Scale  Weight: 139.6 kg 7/8/20,  (!) 141.6  Kg 7/6, 146.4 kg 7/1/20, 156.4 kg 6/29, 165 kg (admission)  Weight (lb): (!) 307 lb ( on lasix)  Ideal Body Weight (IBW), Female: 110 lb  % Ideal Body Weight, Female (lb): 330.69 %  BMI (Calculated): 56 kg/m2  BMI Grade: greater than 40 - morbid obesity        Lab/Procedures/Meds     Pertinent Labs Reviewed: reviewed  BMP  Lab Results   Component Value Date     07/08/2020    K 3.6 07/08/2020    CL 99 07/08/2020    CO2 30 (H) 07/08/2020    BUN 30 (H) 07/08/2020    CREATININE 0.8 " 07/08/2020    CALCIUM 10.0 07/08/2020    ANIONGAP 11 07/08/2020    ESTGFRAFRICA >60 07/08/2020    EGFRNONAA >60 07/08/2020     Recent Labs   Lab 07/08/20  1214   POCTGLUCOSE 213*       Pertinent Medications Reviewed: reviewed  Lasix, methylprednisolone, polyethylene glycol, KCl, Mg sulfate, insulin     Estimated/Assessed Needs     Weight Used For Calorie Calculations: 52.2 kg IBW  Energy Calorie Requirements (kcal): 22-25 kcal/kg ( IBW, BMI > 50 kg/m2) = 7861-6718 kcal  Energy Need Method: Kcal/kg  Protein Requirements: 130 g protein  Weight Used For Protein Calculations: 52.2 kg (115 lb 1.3 oz)(IBW ( 2.5 g protein/kg))  Fluid Requirements (mL): 1300 ml or per MD  Estimated Fluid Requirement Method: RDA Method  CHO Requirement: 153-170 g        Nutrition Prescription Ordered     Current Diet Order: NPO TF held        Evaluation of Received Nutrient/Fluid Intake     Energy Calories Required: not meeting needs  Protein Required: not meeting needs  Fluid Required: not meeting needs  Tolerance: residuals  % Intake of Estimated Energy Needs: 0%  % Meal Intake: NPO x < 1 day     Nutrition Risk     Level of Risk/Frequency of Follow-up: moderate 2 x weekly     Assessment and Plan     Inadequate energy intake  R/t NPO  As evidenced by PO intakes < 50% EEN x 1-2 days  Intervention: above  improving     Altered nutrition related lab values  R/t medication side effects and altered absorption of nutrients  As evidenced by elevated A1C and glucose  Intervention: above  continues     Monitor and Evaluation     Food and Nutrient Intake: energy intake  Food and Nutrient Adminstration: diet order, enteral / parenteral nutrition order  Anthropometric Measurements: weight  Biochemical Data, Medical Tests and Procedures: electrolyte and renal panel, gastrointestinal profile, glucose/endocrine profile  Nutrition-Focused Physical Findings: overall appearance      Malnutrition Assessment     Skin (Micronutrient): (Maxi  = 13)   Micronutrient Evaluation: no deficiencies               Edema (Fluid Accumulation): (2+)              Nutrition Follow-Up     RD Follow-up?: Yes

## 2020-07-08 NOTE — RESPIRATORY THERAPY
07/07/20 1906   Patient Assessment/Suction   Level of Consciousness (AVPU) responds to voice   Respiratory Effort Normal;Unlabored   Expansion/Accessory Muscles/Retractions no use of accessory muscles   All Lung Fields Breath Sounds diminished   Rhythm/Pattern, Respiratory assisted mechanically;depth regular;pattern regular;unlabored   Cough Frequency infrequent   PRE-TX-O2   O2 Device (Oxygen Therapy) ventilator   Oxygen Concentration (%) 50   SpO2 99 %   Pulse Oximetry Type Continuous   $ Pulse Oximetry - Multiple Charge Pulse Oximetry - Multiple   Pulse 70   Resp (!) 8   ETCO2   ETCO2 (mmHg) 35 mmHg   Aerosol Therapy   $ Aerosol Therapy Charges Aerosol Treatment   Daily Review of Necessity (SVN) completed   Respiratory Treatment Status (SVN) given   Treatment Route (SVN) in-line   Patient Position (SVN) semi-Howard's   Post Treatment Assessment (SVN) breath sounds improved   Signs of Intolerance (SVN) none   Breath Sounds Post-Respiratory Treatment   Post-treatment Heart Rate (beats/min) 69   Post-treatment Resp Rate (breaths/min) 10   Vent Select   Conventional Vent Y   Charged w/in last 24h YES   Preset Conventional Ventilator Settings   Vent Type    Ventilation Type PC   Vent Mode SIMV   Set Rate 6 BPM   Vt Set 0 mL   PEEP/CPAP 15 cmH20   Pressure Support 25 cmH20   Peak Flow 0 L/min   Set Inspiratory Pressure 30 cmH20   Insp Time 1.5 Sec(s)   Plateau Set/Insp. Hold (sec) 0   Insp Rise Time  100 %   Trigger Sensitivity Flow/I-Trigger 3 L/min   P High 0 cm H2O   P Low 0 cm H2O   T High 0 sec   T Low 0 sec   Patient Ventilator Parameters   Resp Rate Total 8.7 br/min   Peak Airway Pressure 53 cmH2O   Mean Airway Pressure 22 cmH20   Plateau Pressure 33 cmH20   Exhaled Vt 945 mL   Total Ve 7.96 mL   Spont Ve 2.34 L   I:E Ratio Measured 1:2.20   Conventional Ventilator Alarms   Ve High Alarm 24 L/min   Resp Rate High Alarm 45 br/min   Press High Alarm 65 cmH2O   Apnea Rate 20   Apnea Volume (mL) 450 mL    Apnea Oxygen Concentration  100   Apnea Flow Rate (L/min) 70   T Apnea 20 sec(s)   Ready to Wean/Extubation Screen   FIO2<=50 (chart decimal) 0.5   MV<16L (chart vol.) 7.96   PEEP <=8 (chart #) (!) 15   Ready to Wean Parameters   F/VT Ratio<105 (RSBI) (!) 8.47

## 2020-07-08 NOTE — NURSING
Extubated to Bipap 20/10 60% fio2.Patient is AAOx4. Verbalized understanding of procedure and followed commands well. Currently 100% spo2. VSS. Precedex is at 0.5. Versed stopped.

## 2020-07-08 NOTE — CARE UPDATE
Patient placed on bipap as per orders. She is receiving aerosol tx Q4 and Q12. Will continue to monitor         07/08/20 0851   Wound Care   $ Wound Care Tech Time 15 min   Area of Concern Left;Right;Cheek;Bridge of nose;Chin   Skin Color/Characteristics without discoloration   Skin Temperature warm   Preset CPAP/BiPAP Settings   Mode Of Delivery BiPAP   $ CPAP/BiPAP Daily Charge BiPAP/CPAP Daily   $ Initial CPAP/BiPAP Setup? Yes   $ Is patient using? Yes   Size of Mask Small   Sized Appropriately? Yes   Equipment Type V60   Airway Device Type small full face mask   Humidifier not applicable   Ipap 20   EPAP (cm H2O) 10   Pressure Support (cm H2O) 10   Set Rate (Breaths/Min) 10   ITime (sec) 1   Rise Time (sec) 3   Patient CPAP/BiPAP Settings   Timed Inspiration (Sec) 1   IPAP Rise Time (sec) 3   RR Total (Breaths/Min) 28   Tidal Volume (mL) 561   VE Minute Ventilation (L/min) 15.5 L/min   Peak Inspiratory Pressure (cm H2O) 20   TiTOT (%) 31   Total Leak (L/Min) 0   Patient Trigger - ST Mode Only (%) 96   CPAP/BiPAP Backup Settings   Backup Rate 10 breaths per minute (bpm)   CPAP/BiPAP Alarms   High Pressure (cm H2O) 27   Low Pressure (cm H2O) 17   Low Pressure Delay (Sec) 20   Minute Ventilation (L/Min) 5   High RR (breaths/min) 40   Low RR (breaths/min) 10   Apnea (Sec) 20

## 2020-07-08 NOTE — PLAN OF CARE
Plan of care is progressing. Extubated at 0850 to Bipap. Tolerating bipap well. Placed back on Precedex d/t anxiety/tachycardia/hypertension - now resting comfortably. Good UO with lasix. NPO until able to wean Bipap to NC. AAOx4. Flexiseal removed. No BM this shift. C/o pain to Coccyx which resolved with positioning off of area. Sat in chair position in bed x 1 hour. Lateral rotation while lying in bed. IV abx. Safety maintained.

## 2020-07-08 NOTE — ASSESSMENT & PLAN NOTE
Patient with Hypercapnic and Hypoxic Respiratory failure which is Acute.  she is not on home oxygen. Supplemental ventilation was provided and noted- Vent Mode: SIMV  Oxygen Concentration (%):  [30-70] 50  Resp Rate Total:  [6.9 br/min-41 br/min] 8.8 br/min  Vt Set:  [0 mL] 0 mL  PEEP/CPAP:  [5 cmH20-15 cmH20] 15 cmH20  Pressure Support:  [10 ueK83-64 cmH20] 25 cmH20  Mean Airway Pressure:  [9.7 ccA54-65 cmH20] 22 cmH20 and oxygen saturations 97%. Differential diagnosis includes - COPD, CHF, Obesity Hypoventilation, Interstitial lung disease and Pneumonia Labs and images were reviewed. Will treat underlying causes.   Follow pulmonary recommendations.

## 2020-07-08 NOTE — PROGRESS NOTES
[facility] Medicine  Progress Note    Patient Name: Michelle Wren  MRN: [id number]  Patient Class: IP- Inpatient   Admission Date: 6/23/2020  Length of Stay: 14 days  Attending Physician: Gwyn Son MD  Primary Care Provider: Primary Doctor No        Subjective:     Principal Problem:Acute respiratory failure with hypoxia and hypercarbia        HPI:  Michelle Wren is a 29 y/o female with a past medical history significant for asthma, type 2 diabetes, and severe obesity who is transferred from [facility] to [facility] for pulmonary consultation.  Patient presented to the ED at [facility] yesterday with complaints of 2-3 days of shortness of breath.  It is reported that she uses CPAP at night but she got this from a friend so unsure of settings.  She was placed on BiPAP and given IV Solu-Medrol, bronchodilator treatments, and IV Lasix.  She was admitted to the ICU at [facility].  Patient continued to decline and underwent intubation yesterday around 7:00 p.m.  per report, today patient appeared to be worse.  Her saturations were in the mid 90s on FiO2 of 100%.  A D-dimer was checked and was negative.  Per review of labs which were drawn earlier today, her WBC count was 23,000 and her lactic acid was 2.4.  She was placed on IV Zosyn and IV vancomycin.  Upon arrival to [facility], patient is in no acute distress.  She is intubated and sedated.  Vital signs are stable.  Lactic acid will be repeated now as patient meets sepsis criteria and will check a procalcitonin.  She will be monitored closely in the ICU.           Overview/Hospital Course:  Patient is being managed in intensive care unit, requiring mechanical ventilation under supervision of pulmonary medicine.  Repeat COVID -19 test has been negative.  Patient is receiving intravenous antibiotics for bilateral pneumonia.  Sputum cultures grew MSSA species.  Patient is being followed by infectious  disease specialist.  Patient tested negative for COVID -19 antibody.  Patient has not been able to get extubated, requiring high FiO2 and very high PEEP pressures.    Interval History:  Patient seen and examined.  Continues to fail weaning trials and requires high peep and FiO2.  Plan of care discussed with pulmonology in detail.  Patient to go for trial extubation in the morning.    Review of Systems   Unable to perform ROS: Intubated     Objective:     Vital Signs (Most Recent):  Temp: 98.7 °F (37.1 °C) (07/07/20 1952)  Pulse: 76 (07/07/20 2015)  Resp: 10 (07/07/20 2015)  BP: 119/67 (07/07/20 2000)  SpO2: 96 % (07/07/20 2015) Vital Signs (24h Range):  Temp:  [98 °F (36.7 °C)-99.6 °F (37.6 °C)] 98.7 °F (37.1 °C)  Pulse:  [64-91] 76  Resp:  [8-63] 10  SpO2:  [89 %-100 %] 96 %  BP: (101-133)/(55-85) 119/67     Weight: (!) 142.5 kg (314 lb 2.5 oz)  Body mass index is 57.46 kg/m².    Intake/Output Summary (Last 24 hours) at 7/7/2020 2252  Last data filed at 7/7/2020 2000  Gross per 24 hour   Intake 2769.58 ml   Output 3210 ml   Net -440.42 ml      Physical Exam  Vitals signs and nursing note reviewed.   Constitutional:       General: She is not in acute distress.     Appearance: She is obese. She is not diaphoretic.      Comments: Intubated, sedated   HENT:      Mouth/Throat:      Pharynx: No oropharyngeal exudate.      Comments: ET tube noted  Eyes:      General:         Right eye: No discharge.         Left eye: No discharge.      Comments: Pupils sluggish   Neck:      Thyroid: No thyromegaly.      Vascular: No JVD.      Trachea: No tracheal deviation.   Cardiovascular:      Heart sounds: No murmur. No friction rub. No gallop.    Pulmonary:      Effort: No respiratory distress.      Breath sounds: No wheezing or rales.      Comments: Breath sounds course on ventilator.  Massive obesity noted with difficult to auscultate breath sounds.  Abdominal:      General: There is no distension.      Palpations: Abdomen is soft.       Tenderness: There is no abdominal tenderness.      Comments: Massive abdominal pannus noted   Musculoskeletal:         General: No tenderness or deformity.      Right lower leg: Edema present.      Left lower leg: Edema present.   Skin:     Findings: No erythema or rash.   Neurological:      Motor: No abnormal muscle tone.      Deep Tendon Reflexes: Reflexes normal.      Comments: Patient sedated, unresponsive at present         Significant Labs: All pertinent labs within the past 24 hours have been reviewed.    Significant Imaging: I have reviewed all pertinent imaging results/findings within the past 24 hours.      Assessment/Plan:      * Acute respiratory failure with hypoxia and hypercarbia  Patient with Hypercapnic and Hypoxic Respiratory failure which is Acute.  she is not on home oxygen. Supplemental ventilation was provided and noted- Vent Mode: SIMV  Oxygen Concentration (%):  [30-70] 50  Resp Rate Total:  [6.9 br/min-41 br/min] 8.8 br/min  Vt Set:  [0 mL] 0 mL  PEEP/CPAP:  [5 cmH20-15 cmH20] 15 cmH20  Pressure Support:  [10 lsZ85-07 cmH20] 25 cmH20  Mean Airway Pressure:  [9.7 zrW64-06 cmH20] 22 cmH20 and oxygen saturations 97%. Differential diagnosis includes - COPD, CHF, Obesity Hypoventilation, Interstitial lung disease and Pneumonia Labs and images were reviewed. Will treat underlying causes.   Follow pulmonary recommendations.       Staphylococcal pneumonia  Patient with current diagnosis of pneumonia due to bacterial etiology due to  MSSA which is uncontrolled due to persistent hypoxia . Current antimicrobial regimen consists of   Antibiotics (From admission, onward)    Start     Stop Route Frequency Ordered    07/01/20 2130  linezolid 600 mg/300 mL IVPB 600 mg      -- IV Every 12 hours (non-standard times) 07/01/20 2021      . Cultures drawn and noted-   Microbiology Results (last 7 days)     Procedure Component Value Units Date/Time    IV catheter culture [[id number]]  (Abnormal) Collected:  07/04/20 0844    Order Status: Completed Specimen: Catheter Tip, Intrajugular Updated: 07/06/20 1419     Aerobic Culture - Cath tip STAPHYLOCOCCUS EPIDERMIDIS  < 15 colonies      Blood culture [[id number]] Collected: 07/01/20 2036    Order Status: Completed Specimen: Blood from Line, Central Neck Updated: 07/06/20 1012     Blood Culture, Routine No Growth to date      No Growth to date      No Growth to date      No Growth to date      No Growth to date    Narrative:      From TLC    Blood culture [[id number]] Collected: 07/01/20 2034    Order Status: Completed Specimen: Blood Updated: 07/06/20 1012     Blood Culture, Routine No Growth to date      No Growth to date      No Growth to date      No Growth to date      No Growth to date    Narrative:      From arterial line    Clostridium difficile EIA [[id number]] Collected: 07/05/20 1503    Order Status: Completed Specimen: Stool Updated: 07/05/20 2108     C. diff Antigen Negative     C difficile Toxins A+B, EIA Negative     Comment: Testing not recommended for children <24 months old.       Culture, Respiratory with Gram Stain [[id number]]     Order Status: No result Specimen: Respiratory from Tracheal Aspirate     Blood culture [[id number]] Collected: 06/28/20 0824    Order Status: Completed Specimen: Blood from Antecubital, Right Arm Updated: 07/03/20 2212     Blood Culture, Routine No growth after 5 days.    Blood culture [[id number]] Collected: 06/27/20 0550    Order Status: Completed Specimen: Blood from Line, Central Updated: 07/03/20 0612     Blood Culture, Routine No growth after 5 days.    Blood culture [[id number]] Collected: 06/26/20 1914    Order Status: Completed Specimen: Blood Updated: 07/02/20 0612     Blood Culture, Routine No growth after 5 days.    Narrative:      X2 sticks, one from central line, one peripheral    Blood culture [[id number]] Collected: 06/26/20 1914    Order Status: Completed Specimen: Blood from Antecubital, Left Arm Updated: 07/02/20  0612     Blood Culture, Routine No growth after 5 days.    Respiratory Infection Panel (PCR), Nasopharyngeal [[id number]] Collected: 07/01/20 1400    Order Status: Completed Specimen: Nasopharyngeal Swab Updated: 07/01/20 1513     Respiratory Infection Panel Source NP Swab     Adenovirus Not Detected     Coronavirus 229E, Common Cold Virus Not Detected     Coronavirus HKU1, Common Cold Virus Not Detected     Coronavirus NL63, Common Cold Virus Not Detected     Coronavirus OC43, Common Cold Virus Not Detected     Comment: The Coronavirus strains detected in this test cause the common cold.  These strains are not the COVID-19 (novel Coronavirus)strain   associated with the respiratory disease outbreak.          Human Metapneumovirus Not Detected     Human Rhinovirus/Enterovirus Not Detected     Influenza A (subtypes H1, H1-2009,H3) Not Detected     Influenza B Not Detected     Parainfluenza Virus 1 Not Detected     Parainfluenza Virus 2 Not Detected     Parainfluenza Virus 3 Not Detected     Parainfluenza Virus 4 Not Detected     Respiratory Syncytial Virus Not Detected     Bordetella Parapertussis (TL8332) Not Detected     Bordetella pertussis (ptxP) Not Detected     Chlamydia pneumoniae Not Detected     Mycoplasma pneumoniae Not Detected     Comment: Respiratory Infection Panel testing performed by Multiplex PCR.       Narrative:      For all other respiratory sources, order [id number] -  Respiratory Viral Panel by PCR    Culture, Respiratory with Gram Stain [[id number]]  (Abnormal) Collected: 06/27/20 1444    Order Status: Completed Specimen: Respiratory from Tracheal Aspirate Updated: 06/30/20 0718     Respiratory Culture No S aureus or Pseudomonas isolated.      KENROY LUSITANIAE  Rare  Normal respiratory estephanie also present       Gram Stain (Respiratory) <10 epithelial cells per low power field.     Gram Stain (Respiratory) Rare WBC's     Gram Stain (Respiratory) No organisms seen       Will monitor patient closely  and continue current treatment plan unchanged.   Infectious disease and pulmonology consulted.  Will defer to them for further evaluation and management of this disease process.      Obesity hypoventilation syndrome  Patient will likely need tracheostomy for weaning.  Will attempt trial extubation and defer further evaluation and management to pulmonology and general surgery team.      Pulmonary hypertension  Continue to treat per pulmonology directions.      Mild intermittent asthma  Patient's COPD is uncontrolled due to worsening of baseline hypoxia currently.  Patient is currently off COPD Pathway. Continue scheduled inhalers Steroids, Antibiotics and Supplemental oxygen and monitor respiratory status closely.  Patient intubated.  Continue steroids.        Obstructive sleep apnea syndrome  Severe, likely contributing to respiratory failure.  Currently on ventilator.      Morbid obesity  Body mass index is 58.47 kg/m². Morbid obesity complicates all aspects of disease management from diagnostic modalities to treatment. Weight loss encouraged and health benefits explained to patient.        Type 2 diabetes mellitus  Patient's FSGs are controlled on current hypoglycemics.   Last A1c reviewed-   Lab Results   Component Value Date    HGBA1C 6.2 06/23/2020     Most recent fingerstick glucose reviewed-   Recent Labs   Lab 07/06/20  2339 07/07/20  0529 07/07/20  1255 07/07/20  1626   POCTGLUCOSE 138* 226* 215* 280*     Current correctional scale  Low  Maintain anti-hyperglycemic dose as follows-   Antihyperglycemics (From admission, onward)    Start     Stop Route Frequency Ordered    06/23/20 2235  insulin aspart U-100 pen 1-10 Units      -- SubQ Every 6 hours PRN 06/23/20 2135        Hold Oral hypoglycemics while patient is in the hospital.      Nicotine dependence  Health hazards associated with cigarette smoking should be reviewed with patient and cessation encouraged when pt is able to participate.        Cardiomegaly  Patient with peripheral edema, mildly elevated BNP.  IV lasix was initiated at [facility].    Echo was completed today with results as follows:  · Concentric left ventricular remodeling.  · Normal left ventricular systolic function. The estimated ejection fraction is 69%.  · Normal LV diastolic function.  · No wall motion abnormalities.  · Normal right ventricular systolic function.  · Mild right atrial enlargement.  · Trivial pericardial effusion.  · Mild left atrial enlargement.    IV lasix discontinued as no indication of heart failure.    VTE Risk Mitigation (From admission, onward)         Ordered     enoxaparin injection 40 mg  Every 12 hours      07/05/20 1217     IP VTE HIGH RISK PATIENT  Once      06/23/20 2055     Place sequential compression device  Until discontinued      06/23/20 2055                Critical care time spent on the evaluation and treatment of severe organ dysfunction, review of pertinent labs and imaging studies, discussions with consulting providers and discussions with patient/family: 38 minutes.      Gwyn Son MD  Department of Hospital Medicine   [facility]

## 2020-07-08 NOTE — ASSESSMENT & PLAN NOTE
Patient's FSGs are controlled on current hypoglycemics.   Last A1c reviewed-   Lab Results   Component Value Date    HGBA1C 6.2 06/23/2020     Most recent fingerstick glucose reviewed-   Recent Labs   Lab 07/06/20  2339 07/07/20  0529 07/07/20  1255 07/07/20  1626   POCTGLUCOSE 138* 226* 215* 280*     Current correctional scale  Low  Maintain anti-hyperglycemic dose as follows-   Antihyperglycemics (From admission, onward)    Start     Stop Route Frequency Ordered    06/23/20 2235  insulin aspart U-100 pen 1-10 Units      -- SubQ Every 6 hours PRN 06/23/20 2135        Hold Oral hypoglycemics while patient is in the hospital.

## 2020-07-08 NOTE — PLAN OF CARE
Afebrile overnight.  Sedation vacation starting this am at 05:05.    ABG's at 06:00.  Pt awake, answering yes/no questions and giving thumb's up signals.  Pt placed back on rate and sedation at 06:17.  Urine output of 1200ml overnight.  Tube feeding stopped at 23:38 pm for extubation trial today.    Precedex at 1.2;  versed at 7.  Updated pt on plan of care

## 2020-07-08 NOTE — PLAN OF CARE
SW sent patient information to [facility] via [facility] system for LTAC placement.       07/08/20 1559   Post-Acute Status   Post-Acute Authorization Placement   Post-Acute Placement Status Referrals Sent   Patient choice form signed by patient/caregiver List with quality metrics by geographic area provided

## 2020-07-08 NOTE — SUBJECTIVE & OBJECTIVE
Interval History:  Patient seen and examined.  Continues to fail weaning trials and requires high peep and FiO2.  Plan of care discussed with pulmonology in detail.  Patient to go for trial extubation in the morning.    Review of Systems   Unable to perform ROS: Intubated     Objective:     Vital Signs (Most Recent):  Temp: 98.7 °F (37.1 °C) (07/07/20 1952)  Pulse: 76 (07/07/20 2015)  Resp: 10 (07/07/20 2015)  BP: 119/67 (07/07/20 2000)  SpO2: 96 % (07/07/20 2015) Vital Signs (24h Range):  Temp:  [98 °F (36.7 °C)-99.6 °F (37.6 °C)] 98.7 °F (37.1 °C)  Pulse:  [64-91] 76  Resp:  [8-63] 10  SpO2:  [89 %-100 %] 96 %  BP: (101-133)/(55-85) 119/67     Weight: (!) 142.5 kg (314 lb 2.5 oz)  Body mass index is 57.46 kg/m².    Intake/Output Summary (Last 24 hours) at 7/7/2020 2252  Last data filed at 7/7/2020 2000  Gross per 24 hour   Intake 2769.58 ml   Output 3210 ml   Net -440.42 ml      Physical Exam  Vitals signs and nursing note reviewed.   Constitutional:       General: She is not in acute distress.     Appearance: She is obese. She is not diaphoretic.      Comments: Intubated, sedated   HENT:      Mouth/Throat:      Pharynx: No oropharyngeal exudate.      Comments: ET tube noted  Eyes:      General:         Right eye: No discharge.         Left eye: No discharge.      Comments: Pupils sluggish   Neck:      Thyroid: No thyromegaly.      Vascular: No JVD.      Trachea: No tracheal deviation.   Cardiovascular:      Heart sounds: No murmur. No friction rub. No gallop.    Pulmonary:      Effort: No respiratory distress.      Breath sounds: No wheezing or rales.      Comments: Breath sounds course on ventilator.  Massive obesity noted with difficult to auscultate breath sounds.  Abdominal:      General: There is no distension.      Palpations: Abdomen is soft.      Tenderness: There is no abdominal tenderness.      Comments: Massive abdominal pannus noted   Musculoskeletal:         General: No tenderness or deformity.       Right lower leg: Edema present.      Left lower leg: Edema present.   Skin:     Findings: No erythema or rash.   Neurological:      Motor: No abnormal muscle tone.      Deep Tendon Reflexes: Reflexes normal.      Comments: Patient sedated, unresponsive at present         Significant Labs: All pertinent labs within the past 24 hours have been reviewed.    Significant Imaging: I have reviewed all pertinent imaging results/findings within the past 24 hours.

## 2020-07-09 LAB
ALBUMIN SERPL BCP-MCNC: 3.2 G/DL (ref 3.5–5.2)
ALP SERPL-CCNC: 48 U/L (ref 55–135)
ALT SERPL W/O P-5'-P-CCNC: 16 U/L (ref 10–44)
ANION GAP SERPL CALC-SCNC: 16 MMOL/L (ref 8–16)
AST SERPL-CCNC: 16 U/L (ref 10–40)
BASOPHILS # BLD AUTO: 0.03 K/UL (ref 0–0.2)
BASOPHILS NFR BLD: 0.3 % (ref 0–1.9)
BILIRUB SERPL-MCNC: 0.8 MG/DL (ref 0.1–1)
BUN SERPL-MCNC: 28 MG/DL (ref 6–20)
CALCIUM SERPL-MCNC: 10.4 MG/DL (ref 8.7–10.5)
CHLORIDE SERPL-SCNC: 101 MMOL/L (ref 95–110)
CO2 SERPL-SCNC: 25 MMOL/L (ref 23–29)
CREAT SERPL-MCNC: 0.9 MG/DL (ref 0.5–1.4)
DIFFERENTIAL METHOD: ABNORMAL
EOSINOPHIL # BLD AUTO: 0 K/UL (ref 0–0.5)
EOSINOPHIL NFR BLD: 0.2 % (ref 0–8)
ERYTHROCYTE [DISTWIDTH] IN BLOOD BY AUTOMATED COUNT: 17.1 % (ref 11.5–14.5)
EST. GFR  (AFRICAN AMERICAN): >60 ML/MIN/1.73 M^2
EST. GFR  (NON AFRICAN AMERICAN): >60 ML/MIN/1.73 M^2
GLUCOSE SERPL-MCNC: 143 MG/DL (ref 70–110)
HCT VFR BLD AUTO: 49.5 % (ref 37–48.5)
HGB BLD-MCNC: 15.2 G/DL (ref 12–16)
IMM GRANULOCYTES # BLD AUTO: 0.09 K/UL (ref 0–0.04)
IMM GRANULOCYTES NFR BLD AUTO: 0.8 % (ref 0–0.5)
LYMPHOCYTES # BLD AUTO: 1.8 K/UL (ref 1–4.8)
LYMPHOCYTES NFR BLD: 14.8 % (ref 18–48)
MAGNESIUM SERPL-MCNC: 2.1 MG/DL (ref 1.6–2.6)
MCH RBC QN AUTO: 25.9 PG (ref 27–31)
MCHC RBC AUTO-ENTMCNC: 30.7 G/DL (ref 32–36)
MCV RBC AUTO: 84 FL (ref 82–98)
MONOCYTES # BLD AUTO: 1.2 K/UL (ref 0.3–1)
MONOCYTES NFR BLD: 10.2 % (ref 4–15)
NEUTROPHILS # BLD AUTO: 8.8 K/UL (ref 1.8–7.7)
NEUTROPHILS NFR BLD: 73.7 % (ref 38–73)
NRBC BLD-RTO: 0 /100 WBC
PHOSPHATE SERPL-MCNC: 4.1 MG/DL (ref 2.7–4.5)
PLATELET # BLD AUTO: 252 K/UL (ref 150–350)
PMV BLD AUTO: 11.3 FL (ref 9.2–12.9)
POCT GLUCOSE: 147 MG/DL (ref 70–110)
POCT GLUCOSE: 165 MG/DL (ref 70–110)
POCT GLUCOSE: 171 MG/DL (ref 70–110)
POCT GLUCOSE: 183 MG/DL (ref 70–110)
POCT GLUCOSE: 313 MG/DL (ref 70–110)
POTASSIUM SERPL-SCNC: 3.4 MMOL/L (ref 3.5–5.1)
PROT SERPL-MCNC: 8.3 G/DL (ref 6–8.4)
RBC # BLD AUTO: 5.88 M/UL (ref 4–5.4)
SODIUM SERPL-SCNC: 142 MMOL/L (ref 136–145)
WBC # BLD AUTO: 11.93 K/UL (ref 3.9–12.7)

## 2020-07-09 PROCEDURE — 63600175 PHARM REV CODE 636 W HCPCS: Performed by: INTERNAL MEDICINE

## 2020-07-09 PROCEDURE — 94660 CPAP INITIATION&MGMT: CPT

## 2020-07-09 PROCEDURE — 94640 AIRWAY INHALATION TREATMENT: CPT

## 2020-07-09 PROCEDURE — 99900035 HC TECH TIME PER 15 MIN (STAT)

## 2020-07-09 PROCEDURE — 25000242 PHARM REV CODE 250 ALT 637 W/ HCPCS: Performed by: INTERNAL MEDICINE

## 2020-07-09 PROCEDURE — 25000003 PHARM REV CODE 250: Performed by: INTERNAL MEDICINE

## 2020-07-09 PROCEDURE — 85025 COMPLETE CBC W/AUTO DIFF WBC: CPT

## 2020-07-09 PROCEDURE — 27000221 HC OXYGEN, UP TO 24 HOURS

## 2020-07-09 PROCEDURE — C9113 INJ PANTOPRAZOLE SODIUM, VIA: HCPCS | Performed by: INTERNAL MEDICINE

## 2020-07-09 PROCEDURE — 80053 COMPREHEN METABOLIC PANEL: CPT

## 2020-07-09 PROCEDURE — 94761 N-INVAS EAR/PLS OXIMETRY MLT: CPT

## 2020-07-09 PROCEDURE — 36415 COLL VENOUS BLD VENIPUNCTURE: CPT

## 2020-07-09 PROCEDURE — 99232 SBSQ HOSP IP/OBS MODERATE 35: CPT | Mod: ,,, | Performed by: INTERNAL MEDICINE

## 2020-07-09 PROCEDURE — 25000242 PHARM REV CODE 250 ALT 637 W/ HCPCS: Performed by: NURSE PRACTITIONER

## 2020-07-09 PROCEDURE — 63700000 PHARM REV CODE 250 ALT 637 W/O HCPCS: Performed by: INTERNAL MEDICINE

## 2020-07-09 PROCEDURE — 83735 ASSAY OF MAGNESIUM: CPT

## 2020-07-09 PROCEDURE — 25000003 PHARM REV CODE 250: Performed by: HOSPITALIST

## 2020-07-09 PROCEDURE — 63600175 PHARM REV CODE 636 W HCPCS: Performed by: HOSPITALIST

## 2020-07-09 PROCEDURE — 99232 PR SUBSEQUENT HOSPITAL CARE,LEVL II: ICD-10-PCS | Mod: ,,, | Performed by: INTERNAL MEDICINE

## 2020-07-09 PROCEDURE — 84100 ASSAY OF PHOSPHORUS: CPT

## 2020-07-09 PROCEDURE — 20000000 HC ICU ROOM

## 2020-07-09 RX ORDER — FUROSEMIDE 20 MG/1
20 TABLET ORAL DAILY
Status: DISCONTINUED | OUTPATIENT
Start: 2020-07-10 | End: 2020-07-14 | Stop reason: HOSPADM

## 2020-07-09 RX ORDER — PREDNISONE 5 MG/1
10 TABLET ORAL 2 TIMES DAILY
Status: DISCONTINUED | OUTPATIENT
Start: 2020-07-09 | End: 2020-07-14 | Stop reason: HOSPADM

## 2020-07-09 RX ADMIN — CHLORHEXIDINE GLUCONATE 0.12% ORAL RINSE 15 ML: 1.2 LIQUID ORAL at 10:07

## 2020-07-09 RX ADMIN — IPRATROPIUM BROMIDE AND ALBUTEROL SULFATE 3 ML: .5; 3 SOLUTION RESPIRATORY (INHALATION) at 12:07

## 2020-07-09 RX ADMIN — FLUCONAZOLE 100 MG: 100 TABLET ORAL at 10:07

## 2020-07-09 RX ADMIN — INSULIN ASPART 1 UNITS: 100 INJECTION, SOLUTION INTRAVENOUS; SUBCUTANEOUS at 12:07

## 2020-07-09 RX ADMIN — IPRATROPIUM BROMIDE AND ALBUTEROL SULFATE 3 ML: .5; 3 SOLUTION RESPIRATORY (INHALATION) at 07:07

## 2020-07-09 RX ADMIN — INSULIN ASPART 1 UNITS: 100 INJECTION, SOLUTION INTRAVENOUS; SUBCUTANEOUS at 11:07

## 2020-07-09 RX ADMIN — IPRATROPIUM BROMIDE AND ALBUTEROL SULFATE 3 ML: .5; 3 SOLUTION RESPIRATORY (INHALATION) at 03:07

## 2020-07-09 RX ADMIN — DEXMEDETOMIDINE HYDROCHLORIDE 0.4 MCG/KG/HR: 100 INJECTION, SOLUTION, CONCENTRATE INTRAVENOUS at 01:07

## 2020-07-09 RX ADMIN — LINEZOLID 600 MG: 600 INJECTION, SOLUTION INTRAVENOUS at 08:07

## 2020-07-09 RX ADMIN — PREDNISONE 10 MG: 5 TABLET ORAL at 08:07

## 2020-07-09 RX ADMIN — DEXMEDETOMIDINE HYDROCHLORIDE 0.4 MCG/KG/HR: 100 INJECTION, SOLUTION, CONCENTRATE INTRAVENOUS at 12:07

## 2020-07-09 RX ADMIN — NYSTATIN 500000 UNITS: 500000 SUSPENSION ORAL at 08:07

## 2020-07-09 RX ADMIN — DEXMEDETOMIDINE HYDROCHLORIDE 0.4 MCG/KG/HR: 100 INJECTION, SOLUTION, CONCENTRATE INTRAVENOUS at 07:07

## 2020-07-09 RX ADMIN — PREDNISONE 10 MG: 5 TABLET ORAL at 10:07

## 2020-07-09 RX ADMIN — NYSTATIN 500000 UNITS: 500000 SUSPENSION ORAL at 10:07

## 2020-07-09 RX ADMIN — METOCLOPRAMIDE 10 MG: 5 INJECTION, SOLUTION INTRAMUSCULAR; INTRAVENOUS at 05:07

## 2020-07-09 RX ADMIN — NYSTATIN 500000 UNITS: 500000 SUSPENSION ORAL at 05:07

## 2020-07-09 RX ADMIN — INSULIN ASPART 2 UNITS: 100 INJECTION, SOLUTION INTRAVENOUS; SUBCUTANEOUS at 04:07

## 2020-07-09 RX ADMIN — PANTOPRAZOLE SODIUM 40 MG: 40 INJECTION, POWDER, LYOPHILIZED, FOR SOLUTION INTRAVENOUS at 10:07

## 2020-07-09 RX ADMIN — INSULIN ASPART 8 UNITS: 100 INJECTION, SOLUTION INTRAVENOUS; SUBCUTANEOUS at 11:07

## 2020-07-09 RX ADMIN — METOCLOPRAMIDE 10 MG: 5 INJECTION, SOLUTION INTRAMUSCULAR; INTRAVENOUS at 10:07

## 2020-07-09 RX ADMIN — METOCLOPRAMIDE 10 MG: 5 INJECTION, SOLUTION INTRAMUSCULAR; INTRAVENOUS at 01:07

## 2020-07-09 RX ADMIN — BUDESONIDE 0.5 MG: 0.25 SUSPENSION RESPIRATORY (INHALATION) at 07:07

## 2020-07-09 RX ADMIN — CHLORHEXIDINE GLUCONATE 0.12% ORAL RINSE 15 ML: 1.2 LIQUID ORAL at 08:07

## 2020-07-09 RX ADMIN — NYSTATIN 500000 UNITS: 500000 SUSPENSION ORAL at 01:07

## 2020-07-09 RX ADMIN — LINEZOLID 600 MG: 600 INJECTION, SOLUTION INTRAVENOUS at 10:07

## 2020-07-09 RX ADMIN — ENOXAPARIN SODIUM 40 MG: 100 INJECTION SUBCUTANEOUS at 08:07

## 2020-07-09 RX ADMIN — POLYETHYLENE GLYCOL (3350) 17 G: 17 POWDER, FOR SOLUTION ORAL at 10:07

## 2020-07-09 RX ADMIN — ENOXAPARIN SODIUM 40 MG: 100 INJECTION SUBCUTANEOUS at 10:07

## 2020-07-09 NOTE — PROGRESS NOTES
[facility] Hospital Medicine  Progress Note    Patient Name: Michelle Wren  MRN: [id number]  Patient Class: IP- Inpatient   Admission Date: 6/23/2020  Length of Stay: 15 days  Attending Physician: Gwyn Son MD  Primary Care Provider: Primary Doctor No        Subjective:     Principal Problem:Acute respiratory failure with hypoxia and hypercarbia        HPI:  Michelle Wren is a 29 y/o female with a past medical history significant for asthma, type 2 diabetes, and severe obesity who is transferred from [facility] to [facility] for pulmonary consultation.  Patient presented to the ED at [facility] yesterday with complaints of 2-3 days of shortness of breath.  It is reported that she uses CPAP at night but she got this from a friend so unsure of settings.  She was placed on BiPAP and given IV Solu-Medrol, bronchodilator treatments, and IV Lasix.  She was admitted to the ICU at [facility].  Patient continued to decline and underwent intubation yesterday around 7:00 p.m.  per report, today patient appeared to be worse.  Her saturations were in the mid 90s on FiO2 of 100%.  A D-dimer was checked and was negative.  Per review of labs which were drawn earlier today, her WBC count was 23,000 and her lactic acid was 2.4.  She was placed on IV Zosyn and IV vancomycin.  Upon arrival to [facility], patient is in no acute distress.  She is intubated and sedated.  Vital signs are stable.  Lactic acid will be repeated now as patient meets sepsis criteria and will check a procalcitonin.  She will be monitored closely in the ICU.           Overview/Hospital Course:  Patient is being managed in intensive care unit, requiring mechanical ventilation under supervision of pulmonary medicine.  Repeat COVID -19 test has been negative.  Patient is receiving intravenous antibiotics for bilateral pneumonia.  Sputum cultures grew MSSA species.  Patient is being followed by infectious  disease specialist.  Patient tested negative for COVID -19 antibody.  Patient has not been able to get extubated, requiring high FiO2 and very high PEEP pressures.    Interval History:  Patient extubated this morning, continues to be well on BiPAP throughout the day.  Had some episodes of tachycardia and hypotension likely associated with sedation withdrawal.  Plan of care discussed in detail with patient, nurse, and pulmonology at the bedside.    Review of Systems   Unable to perform ROS: Patient nonverbal     Objective:     Vital Signs (Most Recent):  Temp: 99.4 °F (37.4 °C) (07/08/20 2000)  Pulse: 75 (07/08/20 2000)  Resp: 20 (07/08/20 2000)  BP: 107/62 (07/08/20 2000)  SpO2: 100 % (07/08/20 2000) Vital Signs (24h Range):  Temp:  [98.5 °F (36.9 °C)-100.2 °F (37.9 °C)] 99.4 °F (37.4 °C)  Pulse:  [] 75  Resp:  [8-40] 20  SpO2:  [93 %-100 %] 100 %  BP: (100-170)/() 107/62     Weight: (!) 139.6 kg (307 lb 12.2 oz)  Body mass index is 56.29 kg/m².    Intake/Output Summary (Last 24 hours) at 7/8/2020 2216  Last data filed at 7/8/2020 2000  Gross per 24 hour   Intake 1486.01 ml   Output 3070 ml   Net -1583.99 ml      Physical Exam  Vitals signs and nursing note reviewed.   Constitutional:       General: She is not in acute distress.     Appearance: She is obese.      Comments: Massively obese  female on BiPAP   Eyes:      Pupils: Pupils are equal, round, and reactive to light.   Cardiovascular:      Rate and Rhythm: Normal rate and regular rhythm.      Heart sounds: No murmur.   Pulmonary:      Comments: Decreased breath sounds noted bilaterally on BiPAP.  Abdominal:      General: There is no distension.      Palpations: Abdomen is soft.      Tenderness: There is no abdominal tenderness.   Musculoskeletal:      Right lower leg: Edema present.      Left lower leg: Edema present.   Skin:     Capillary Refill: Capillary refill takes 2 to 3 seconds.      Coloration: Skin is not pale.       Findings: No rash.   Neurological:      Mental Status: She is oriented to person, place, and time.         Significant Labs: All pertinent labs within the past 24 hours have been reviewed.    Significant Imaging: I have reviewed all pertinent imaging results/findings within the past 24 hours.      Assessment/Plan:      * Acute respiratory failure with hypoxia and hypercarbia  Patient with Hypercapnic and Hypoxic Respiratory failure which is Acute.  she is not on home oxygen. Supplemental ventilation was provided and noted- BIPAP 20/10.     Differential diagnosis includes - COPD, Obesity Hypoventilation, Pneumonia and Aspiration Labs and images were reviewed. Patient Has not has a recent ABG.   Will treat underlying causes and adjust management of respiratory failure as follows- continue to wean respiratory support as tolerated.  Patient is doing well with extubation.          Staphylococcal pneumonia  Patient with current diagnosis of pneumonia due to bacterial etiology due to  MSSA which is uncontrolled due to persistent hypoxia . Current antimicrobial regimen consists of   Antibiotics (From admission, onward)    Start     Stop Route Frequency Ordered    07/01/20 2130  linezolid 600 mg/300 mL IVPB 600 mg      -- IV Every 12 hours (non-standard times) 07/01/20 2021      . Cultures drawn and noted-   Microbiology Results (last 7 days)     Procedure Component Value Units Date/Time    IV catheter culture [[id number]]  (Abnormal) Collected: 07/04/20 0844    Order Status: Completed Specimen: Catheter Tip, Intrajugular Updated: 07/07/20 1034     Aerobic Culture - Cath tip STAPHYLOCOCCUS EPIDERMIDIS  < 15 colonies      Blood culture [[id number]] Collected: 07/01/20 2034    Order Status: Completed Specimen: Blood Updated: 07/07/20 1012     Blood Culture, Routine No growth after 5 days.    Narrative:      From arterial line    Blood culture [[id number]] Collected: 07/01/20 2036    Order Status: Completed Specimen: Blood from  Line, Central Neck Updated: 07/07/20 1012     Blood Culture, Routine No growth after 5 days.    Narrative:      From TLC    Clostridium difficile EIA [[id number]] Collected: 07/05/20 1503    Order Status: Completed Specimen: Stool Updated: 07/05/20 2108     C. diff Antigen Negative     C difficile Toxins A+B, EIA Negative     Comment: Testing not recommended for children <24 months old.       Culture, Respiratory with Gram Stain [[id number]]     Order Status: No result Specimen: Respiratory from Tracheal Aspirate     Blood culture [[id number]] Collected: 06/28/20 0824    Order Status: Completed Specimen: Blood from Antecubital, Right Arm Updated: 07/03/20 2212     Blood Culture, Routine No growth after 5 days.    Blood culture [[id number]] Collected: 06/27/20 0550    Order Status: Completed Specimen: Blood from Line, Central Updated: 07/03/20 0612     Blood Culture, Routine No growth after 5 days.    Blood culture [[id number]] Collected: 06/26/20 1914    Order Status: Completed Specimen: Blood Updated: 07/02/20 0612     Blood Culture, Routine No growth after 5 days.    Narrative:      X2 sticks, one from central line, one peripheral    Blood culture [[id number]] Collected: 06/26/20 1914    Order Status: Completed Specimen: Blood from Antecubital, Left Arm Updated: 07/02/20 0612     Blood Culture, Routine No growth after 5 days.       Will monitor patient closely and continue current treatment plan unchanged.   Infectious disease and pulmonology consulted.  Will defer to them for further evaluation and management of this disease process.      Obesity hypoventilation syndrome  Patient will likely need tracheostomy for weaning.  Will attempt trial extubation and defer further evaluation and management to pulmonology and general surgery team.      Pulmonary hypertension  Continue to treat per pulmonology directions.      Mild intermittent asthma  Patient's COPD is uncontrolled due to worsening of baseline hypoxia currently.   Patient is currently off COPD Pathway. Continue scheduled inhalers Steroids, Antibiotics and Supplemental oxygen and monitor respiratory status closely.  Patient intubated.  Continue steroids.        Obstructive sleep apnea syndrome  Severe, likely contributing to respiratory failure.  Currently on ventilator.      Morbid obesity  Body mass index is 58.47 kg/m². Morbid obesity complicates all aspects of disease management from diagnostic modalities to treatment. Weight loss encouraged and health benefits explained to patient.        Type 2 diabetes mellitus  Patient's FSGs are controlled on current hypoglycemics.   Last A1c reviewed-   Lab Results   Component Value Date    HGBA1C 6.2 06/23/2020     Most recent fingerstick glucose reviewed-   Recent Labs   Lab 07/07/20  2351 07/08/20  0540 07/08/20  1214 07/08/20  1831   POCTGLUCOSE 229* 178* 213* 177*     Current correctional scale  Low  Maintain anti-hyperglycemic dose as follows-   Antihyperglycemics (From admission, onward)    Start     Stop Route Frequency Ordered    06/23/20 2235  insulin aspart U-100 pen 1-10 Units      -- SubQ Every 6 hours PRN 06/23/20 2135        Hold Oral hypoglycemics while patient is in the hospital.      Nicotine dependence  Health hazards associated with cigarette smoking should be reviewed with patient and cessation encouraged when pt is able to participate.       Cardiomegaly  Patient with peripheral edema, mildly elevated BNP.  IV lasix was initiated at [facility].    Echo was completed today with results as follows:  · Concentric left ventricular remodeling.  · Normal left ventricular systolic function. The estimated ejection fraction is 69%.  · Normal LV diastolic function.  · No wall motion abnormalities.  · Normal right ventricular systolic function.  · Mild right atrial enlargement.  · Trivial pericardial effusion.  · Mild left atrial enlargement.    IV lasix discontinued as no indication of heart failure.    VTE Risk Mitigation (From  admission, onward)         Ordered     enoxaparin injection 40 mg  Every 12 hours      07/05/20 1217     IP VTE HIGH RISK PATIENT  Once      06/23/20 2055     Place sequential compression device  Until discontinued      06/23/20 2055                Critical care time spent on the evaluation and treatment of severe organ dysfunction, review of pertinent labs and imaging studies, discussions with consulting providers and discussions with patient/family: 37 minutes.      Gwyn Son MD  Department of Hospital Medicine   [facility]

## 2020-07-09 NOTE — PLAN OF CARE
7/9/2020 10:48:57 AM Declined: Insurance Denial  Layla Caro@[employer]  7/8/2020 4:05:52 PM Note: Jayden Palencia. I apologize, but [facility]s are not able to take [facility] patients.  Layla Caro@[facility]--[employer]    7/8/2020 4:08:50 PM Under Review: Remote - Local Review  Kristen Rubin@[facility]--[facility]       07/09/20 1057   Post-Acute Status   Post-Acute Authorization Placement   Post-Acute Placement Status Pending Post-Acute Clinical Review

## 2020-07-09 NOTE — ASSESSMENT & PLAN NOTE
Patient's FSGs are controlled on current hypoglycemics.   Last A1c reviewed-   Lab Results   Component Value Date    HGBA1C 6.2 06/23/2020     Most recent fingerstick glucose reviewed-   Recent Labs   Lab 07/07/20  2351 07/08/20  0540 07/08/20  1214 07/08/20  1831   POCTGLUCOSE 229* 178* 213* 177*     Current correctional scale  Low  Maintain anti-hyperglycemic dose as follows-   Antihyperglycemics (From admission, onward)    Start     Stop Route Frequency Ordered    06/23/20 2235  insulin aspart U-100 pen 1-10 Units      -- SubQ Every 6 hours PRN 06/23/20 2135        Hold Oral hypoglycemics while patient is in the hospital.

## 2020-07-09 NOTE — ASSESSMENT & PLAN NOTE
Patient with current diagnosis of pneumonia due to bacterial etiology due to  MSSA which is uncontrolled due to persistent hypoxia . Current antimicrobial regimen consists of   Antibiotics (From admission, onward)    Start     Stop Route Frequency Ordered    07/01/20 2130  linezolid 600 mg/300 mL IVPB 600 mg      -- IV Every 12 hours (non-standard times) 07/01/20 2021      . Cultures drawn and noted-   Microbiology Results (last 7 days)     Procedure Component Value Units Date/Time    IV catheter culture [[id number]]  (Abnormal) Collected: 07/04/20 0844    Order Status: Completed Specimen: Catheter Tip, Intrajugular Updated: 07/07/20 1034     Aerobic Culture - Cath tip STAPHYLOCOCCUS EPIDERMIDIS  < 15 colonies      Blood culture [[id number]] Collected: 07/01/20 2034    Order Status: Completed Specimen: Blood Updated: 07/07/20 1012     Blood Culture, Routine No growth after 5 days.    Narrative:      From arterial line    Blood culture [[id number]] Collected: 07/01/20 2036    Order Status: Completed Specimen: Blood from Line, Central Neck Updated: 07/07/20 1012     Blood Culture, Routine No growth after 5 days.    Narrative:      From TLC    Clostridium difficile EIA [[id number]] Collected: 07/05/20 1503    Order Status: Completed Specimen: Stool Updated: 07/05/20 2108     C. diff Antigen Negative     C difficile Toxins A+B, EIA Negative     Comment: Testing not recommended for children <24 months old.       Culture, Respiratory with Gram Stain [[id number]]     Order Status: No result Specimen: Respiratory from Tracheal Aspirate     Blood culture [[id number]] Collected: 06/28/20 0824    Order Status: Completed Specimen: Blood from Antecubital, Right Arm Updated: 07/03/20 2212     Blood Culture, Routine No growth after 5 days.    Blood culture [[id number]] Collected: 06/27/20 0550    Order Status: Completed Specimen: Blood from Line, Central Updated: 07/03/20 0612     Blood Culture, Routine No growth after 5 days.    Blood  culture [[id number]] Collected: 06/26/20 1914    Order Status: Completed Specimen: Blood Updated: 07/02/20 0612     Blood Culture, Routine No growth after 5 days.    Narrative:      X2 sticks, one from central line, one peripheral    Blood culture [[id number]] Collected: 06/26/20 1914    Order Status: Completed Specimen: Blood from Antecubital, Left Arm Updated: 07/02/20 0612     Blood Culture, Routine No growth after 5 days.       Will monitor patient closely and continue current treatment plan unchanged.   Infectious disease and pulmonology consulted.  Will defer to them for further evaluation and management of this disease process.

## 2020-07-09 NOTE — PLAN OF CARE
07/08/20 1947   Patient Assessment/Suction   Level of Consciousness (AVPU) alert   Respiratory Effort Normal;Unlabored   Expansion/Accessory Muscles/Retractions expansion symmetric   All Lung Fields Breath Sounds coarse;diminished   Rhythm/Pattern, Respiratory pattern regular   Cough Frequency no cough   PRE-TX-O2   O2 Device (Oxygen Therapy) BiPAP   Oxygen Concentration (%) 60   SpO2 99 %   Pulse Oximetry Type Continuous   Pulse 75   Resp 14   Aerosol Therapy   $ Aerosol Therapy Charges Aerosol Treatment   Respiratory Treatment Status (SVN) given   Treatment Route (SVN) in-line   Patient Position (SVN) semi-Howard's   Post Treatment Assessment (SVN) breath sounds unchanged   Signs of Intolerance (SVN) none   Breath Sounds Post-Respiratory Treatment   Post-treatment Heart Rate (beats/min) 73   Post-treatment Resp Rate (breaths/min) 14   Wound Care   $ Wound Care Tech Time 15 min   Area of Concern Bridge of nose;Chin   Skin Color/Characteristics without discoloration   Skin Temperature warm   Ready to Wean/Extubation Screen   FIO2<=50 (chart decimal) (!) 0.6   Preset CPAP/BiPAP Settings   Mode Of Delivery BiPAP   $ Initial CPAP/BiPAP Setup? No   $ Is patient using? Yes   Sized Appropriately? Yes   Equipment Type V60   Airway Device Type small full face mask   Humidifier not applicable   Ipap 20   EPAP (cm H2O) 10   Pressure Support (cm H2O) 10   Set Rate (Breaths/Min) 10   ITime (sec) 1   Rise Time (sec) 3   Patient CPAP/BiPAP Settings   Timed Inspiration (Sec) 1   IPAP Rise Time (sec) 3   RR Total (Breaths/Min) 13   Tidal Volume (mL) 901   VE Minute Ventilation (L/min) 10.9 L/min   Peak Inspiratory Pressure (cm H2O) 20   TiTOT (%) 25   Total Leak (L/Min) 0   Patient Trigger - ST Mode Only (%) 95

## 2020-07-09 NOTE — ASSESSMENT & PLAN NOTE
Patient with Hypercapnic and Hypoxic Respiratory failure which is Acute.  she is not on home oxygen. Supplemental ventilation was provided and noted- BIPAP 20/10.     Differential diagnosis includes - COPD, Obesity Hypoventilation, Pneumonia and Aspiration Labs and images were reviewed. Patient Has not has a recent ABG.   Will treat underlying causes and adjust management of respiratory failure as follows- continue to wean respiratory support as tolerated.  Patient is doing well with extubation.

## 2020-07-09 NOTE — PLAN OF CARE
POC for cauti care and clabsi care. Plan for LTAC placement once approved. Bipap off and tolerates NC at 5lpm requested Nydia (sister) to attempt to obtain home cpap settings if possible. Precedex in use at 0.3mcg/kg/min. All safety measures maintained.

## 2020-07-09 NOTE — PLAN OF CARE
Patient remains on Bipap via V60 with Ipap +20, Epap +10 and Fi02 .5.  Hr 83 and 02 saturation 99%  Patient receiving aerosol tx via duoneb now and q4hr and 0.5 Pulmicort now and q12hr.

## 2020-07-09 NOTE — PLAN OF CARE
07/08/20 1154   Patient Assessment/Suction   Level of Consciousness (AVPU) alert   Respiratory Effort Normal;Unlabored   Expansion/Accessory Muscles/Retractions expansion symmetric   All Lung Fields Breath Sounds diminished   Rhythm/Pattern, Respiratory pattern regular   Cough Frequency infrequent   Cough Type nonproductive   PRE-TX-O2   O2 Device (Oxygen Therapy) BiPAP   Oxygen Concentration (%) 60   SpO2 100 %   Pulse Oximetry Type Continuous   Pulse 73   Resp 11   Aerosol Therapy   $ Aerosol Therapy Charges Aerosol Treatment   Respiratory Treatment Status (SVN) given   Treatment Route (SVN) in-line   Patient Position (SVN) semi-Howard's   Post Treatment Assessment (SVN) breath sounds unchanged   Signs of Intolerance (SVN) none   Breath Sounds Post-Respiratory Treatment   Post-treatment Heart Rate (beats/min) 74   Post-treatment Resp Rate (breaths/min) 15   Ready to Wean/Extubation Screen   FIO2<=50 (chart decimal) (!) 0.6   Preset CPAP/BiPAP Settings   Mode Of Delivery BiPAP   $ Initial CPAP/BiPAP Setup? No   $ Is patient using? Yes   Sized Appropriately? Yes   Equipment Type V60   Airway Device Type small full face mask   Humidifier not applicable   Ipap 20   EPAP (cm H2O) 10   Pressure Support (cm H2O) 10   Set Rate (Breaths/Min) 10   ITime (sec) 1   Rise Time (sec) 3   Patient CPAP/BiPAP Settings   Timed Inspiration (Sec) 1   IPAP Rise Time (sec) 3   RR Total (Breaths/Min) 12   Tidal Volume (mL) 1100   VE Minute Ventilation (L/min) 13 L/min   Peak Inspiratory Pressure (cm H2O) 21   TiTOT (%) 31   Total Leak (L/Min) 11   Patient Trigger - ST Mode Only (%) 75

## 2020-07-09 NOTE — SUBJECTIVE & OBJECTIVE
Interval History:  Patient extubated this morning, continues to be well on BiPAP throughout the day.  Had some episodes of tachycardia and hypotension likely associated with sedation withdrawal.  Plan of care discussed in detail with patient, nurse, and pulmonology at the bedside.    Review of Systems   Unable to perform ROS: Patient nonverbal     Objective:     Vital Signs (Most Recent):  Temp: 99.4 °F (37.4 °C) (07/08/20 2000)  Pulse: 75 (07/08/20 2000)  Resp: 20 (07/08/20 2000)  BP: 107/62 (07/08/20 2000)  SpO2: 100 % (07/08/20 2000) Vital Signs (24h Range):  Temp:  [98.5 °F (36.9 °C)-100.2 °F (37.9 °C)] 99.4 °F (37.4 °C)  Pulse:  [] 75  Resp:  [8-40] 20  SpO2:  [93 %-100 %] 100 %  BP: (100-170)/() 107/62     Weight: (!) 139.6 kg (307 lb 12.2 oz)  Body mass index is 56.29 kg/m².    Intake/Output Summary (Last 24 hours) at 7/8/2020 2216  Last data filed at 7/8/2020 2000  Gross per 24 hour   Intake 1486.01 ml   Output 3070 ml   Net -1583.99 ml      Physical Exam  Vitals signs and nursing note reviewed.   Constitutional:       General: She is not in acute distress.     Appearance: She is obese.      Comments: Massively obese  female on BiPAP   Eyes:      Pupils: Pupils are equal, round, and reactive to light.   Cardiovascular:      Rate and Rhythm: Normal rate and regular rhythm.      Heart sounds: No murmur.   Pulmonary:      Comments: Decreased breath sounds noted bilaterally on BiPAP.  Abdominal:      General: There is no distension.      Palpations: Abdomen is soft.      Tenderness: There is no abdominal tenderness.   Musculoskeletal:      Right lower leg: Edema present.      Left lower leg: Edema present.   Skin:     Capillary Refill: Capillary refill takes 2 to 3 seconds.      Coloration: Skin is not pale.      Findings: No rash.   Neurological:      Mental Status: She is oriented to person, place, and time.         Significant Labs: All pertinent labs within the past 24 hours have  been reviewed.    Significant Imaging: I have reviewed all pertinent imaging results/findings within the past 24 hours.

## 2020-07-09 NOTE — PROGRESS NOTES
.  Progress Note  PULMONARY    Admit Date: 6/23/2020 07/09/2020      SUBJECTIVE:     June 25th-patient is sedated, no complaints, intubated.  6/26 intubated.  6/27 no c/o intubated.  6/28 no new c/o,intubated, arouses  6/29- no new c/o, intubated,   6/30 no new c/o  7/6 arouses,  No c/o. Intubated.  7/7 no c/o, arouses  7/8 intubated no c/o  7/9 wants juice, not sob. On precedex.      PFSH and Allergies reviewed.    OBJECTIVE:     Vitals (Most recent):  Vitals:    07/09/20 0728   BP:    Pulse: 81   Resp: 11   Temp:        Vitals (24 hour range):  Temp:  [98.8 °F (37.1 °C)-100.2 °F (37.9 °C)]   Pulse:  []   Resp:  [11-45]   BP: ()/()   SpO2:  [96 %-100 %]       Intake/Output Summary (Last 24 hours) at 7/9/2020 0807  Last data filed at 7/9/2020 0600  Gross per 24 hour   Intake 1121.18 ml   Output 3150 ml   Net -2028.82 ml          Physical Exam:  The patient's neuro status (alertness,orientation,cognitive function,motor skills,), pharyngeal exam (oral lesions, hygiene, abn dentition,), Neck (jvd,mass,thyroid,nodes in neck and above/below clavicle),RESPIRATORY(symmetry,effort,fremitus,percussion,auscultation),  Cor(rhythm,heart tones including gallops,perfusion,edema)ABD(distention,hepatic&splenomegaly,tenderness,masses), Skin(rash,cyanosis),Psyc(affect,judgement,).  Exam negative except for these pertinent findings:    Morbid obesity, niv to nasal cannula, good breath sounds, , no distress, symmetric, no edema, soft abdomen,  No sign lip swelling      Radiographs reviewed: view by direct vision   Ct chest 6/30 - impressive posterior lung consodation, no pe, some ggo, huge pulm artery  c/w pulm hypertension  Chest x-ray June 25th suggest left lower lung collapse, there is some increased interstitial type markings in the right lower lung also.  cxr 6/30 lower lung infiltrates seem very likely by appearance even with obesity  cxr 7/6 bands  Of  Atelectasis  In bases.    Results for orders  placed during the hospital encounter of 06/22/20   X-Ray Chest 1 View    Narrative EXAMINATION:  XR CHEST 1 VIEW    CLINICAL HISTORY:  LINE PLACEMENT;    TECHNIQUE:  Single frontal view of the chest was performed.    COMPARISON:  06/23/2020.    FINDINGS:  There is persistent pulmonary hypoinflation.  There are bilateral pulmonary infiltrates which are stable to slightly increased over the interval likely representing pulmonary edema.  Small bilateral pleural effusions with bibasilar dependent atelectasis.    Heart size is enlarged.  Mediastinal contours unremarkable.  Trachea midline.    Endotracheal tube remains in satisfactory position.  Interval placement of right IJ catheter which terminates in the distal SVC.      Impression 1. Pulmonary hypoinflation.  2. Findings consistent with congestive heart failure which is stable to slightly increased over the interval with small bilateral pleural effusions.  3. Satisfactory indwelling life-support devices.      Electronically signed by: Navin Mortensen  Date:    06/23/2020  Time:    11:58   ]    Labs     Recent Labs   Lab 07/09/20  0452   WBC 11.93   HGB 15.2   HCT 49.5*        Recent Labs   Lab 07/09/20  0452      K 3.4*      CO2 25   BUN 28*   CREATININE 0.9   *   CALCIUM 10.4   MG 2.1   PHOS 4.1   AST 16   ALT 16   ALKPHOS 48*   BILITOT 0.8   PROT 8.3   ALBUMIN 3.2*     No results for input(s): PH, PCO2, PO2, HCO3 in the last 24 hours.  Microbiology Results (last 7 days)     Procedure Component Value Units Date/Time    IV catheter culture [[id number]]  (Abnormal) Collected: 07/04/20 0844    Order Status: Completed Specimen: Catheter Tip, Intrajugular Updated: 07/07/20 1034     Aerobic Culture - Cath tip STAPHYLOCOCCUS EPIDERMIDIS  < 15 colonies      Blood culture [[id number]] Collected: 07/01/20 2034    Order Status: Completed Specimen: Blood Updated: 07/07/20 1012     Blood Culture, Routine No growth after 5 days.    Narrative:      From  arterial line    Blood culture [[id number]] Collected: 07/01/20 2036    Order Status: Completed Specimen: Blood from Line, Central Neck Updated: 07/07/20 1012     Blood Culture, Routine No growth after 5 days.    Narrative:      From TLC    Clostridium difficile EIA [[id number]] Collected: 07/05/20 1503    Order Status: Completed Specimen: Stool Updated: 07/05/20 2108     C. diff Antigen Negative     C difficile Toxins A+B, EIA Negative     Comment: Testing not recommended for children <24 months old.       Culture, Respiratory with Gram Stain [[id number]]     Order Status: No result Specimen: Respiratory from Tracheal Aspirate     Blood culture [[id number]] Collected: 06/28/20 0824    Order Status: Completed Specimen: Blood from Antecubital, Right Arm Updated: 07/03/20 2212     Blood Culture, Routine No growth after 5 days.    Blood culture [[id number]] Collected: 06/27/20 0550    Order Status: Completed Specimen: Blood from Line, Central Updated: 07/03/20 0612     Blood Culture, Routine No growth after 5 days.        Echo 6/22/2020  · Concentric left ventricular remodeling.  · Normal left ventricular systolic function. The estimated ejection fraction is 69%.  · Normal LV diastolic function.  · No wall motion abnormalities.  · Normal right ventricular systolic function.  · Mild right atrial enlargement.  · Trivial pericardial effusion.  · Mild left atrial enlargement.       Impression:  Active Hospital Problems    Diagnosis  POA    *Acute respiratory failure with hypoxia and hypercarbia [J96.01, J96.02]  Yes    Morbid obesity [E66.01]  Yes    Pulmonary hypertension [I27.20]  Yes     By pulmonary artery size on ct chest 6/30      Obstructive sleep apnea syndrome [G47.33]  Yes    Staphylococcal pneumonia [J15.20]  Yes    Type 2 diabetes mellitus [E11.9]  Yes    Nicotine dependence [F17.200]  Yes    Obesity hypoventilation syndrome [E66.2]  Yes    Mild intermittent asthma [J45.20]  Yes    Cardiomegaly [I51.7]   Yes      Resolved Hospital Problems    Diagnosis Date Resolved POA    Severe sepsis [A41.9, R65.20] 07/05/2020 Yes    Bilateral pneumonia [J18.9] 07/05/2020 Yes    Respiratory failure with hypoxia [J96.91] 07/06/2020 Yes    Class 3 severe obesity with serious comorbidity and body mass index (BMI) of 60.0 to 69.9 in adult [E66.01, Z68.44] 07/05/2020 Not Applicable               Plan:   June 25- swollen lips, gas exchange poor peep 17 79/0.7, cxr not impressive- wbc 17.2 from 24 on 23rd.  2nd covid neg.  Cta lungs to be done.  Expect some degree of atelectasis.  Wheezes are present  Leak test and wean 02. 6/26 ratio 89/0.7 on peep 17,   Wbc now 15k, no ct done.  Solumedrol 80/d  Pt initially bradycardic, abg with large neg base excess new from am lytes, propofol stopped for fear propofol infusion syndrome (about 40).  Fu abg with clearing acidosis.    Pt had peep decreased from 17 to 5 with sat rising from 95- to 97.  Pt  Is obstructed on vol time curve.    Pt developed resp distress off propofol on precedex.  Will give prn morphine with versed drip.  Ct not done with high peep/body size.  Could have ild but asthma should be likely dx.   mssa in sputum - staph pneumonia present at admit likely , steroids not good.   Will go to oxacillin 2 q 4 from [facility].  Cut steroids to 40/d, f/u procal-  Was 0.02 at presentation.    Addendum pt seemed to improve on lower levels peep but off propofol developed resp distress and  Desat.  Will check d dimer again, increase loveonx to therapeutic, and check leg studies.    6/27 abx tapered to oxacillin with temp going to over 101.  Will resume levaquin, culture, check abd for tenderness and mouth for ulces.  Culture.  Try decrease imv - gas exchange poor.  No leg study  6/28 temp down now.  Discussed with relative,daughter in law.  Pt was at [facility]note [facility] last yr with vent for a wk, 4 wks in hosp, no pneumonia but had infection, back to nl at [place], has osas. 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  Has had intermittent face/tongue/eye swelling ppt er visit at [place].    Will screen for hereditary angioedema/lupus/ra/wegener's   Cut peep to 5 with desat 89 on 89%   Get records from [place]  Need to follow leak test.  Airway should be marginal given osas and morbid obesity- with history would dx angioedema - cause not clear.   Continue full anticoagg.  Consider id f/u once records, cultures, and above screens return?  6/29- tried to cut peep yesterday and ended up on peep 17 100%- pa02 59 this am (59/1.00)- cxr - hard to read bilat lower lung infiltrates intermittently seen  Tm 100.4, oxacillin and levaquin- mssa in sputum 6/23 with nl estephanie on 27th.  Wbc 19 on steroids. No wheezes,  [place] records na,   Leak was none today, 200 on 28th, 300 on 25th, 320 on 26th, tongue/lips seem smaller today? Pt intubated 23rd or so.  rf 16 with neg ccp, complement levels not low.    With no clear working dx - support.  If no improvement will re attempt to get ct but doubtful will give clear picture.  Will monitor/support.  .Addendum-  Will ask id to see, dose high dose steroids if infection felt to covered?  6/30 remains marginal, attempted decrease peep yesterday with dramatic worsening.  Will increase steroids to 125 q8, records from [place] from 11 day stay in Jan 2019 were not suggestive of ild???   Ct chest done, above, discussed with dr Schulz.  Picture looks more like collapse lung than pneumonia or covid.  Will decrease steroids, use high peep/pcv.  Discussed with family - father/brother,sis in law- picture not clear but loooks like asthma with collapse lower lungs.  Angioedema not likely initially significant but may complicate later.  rx presumed pe, pneumonia, asthma- increase rx for atelectasis. May need trach.  Pt could die  With mishap and will be at high risk.    Will decrease steroids back to 40/d.  Appreciate ID.  Vent adjusted.    141  July 6 -  Tm 99.3 on zyvox,  micafungin,    Bun/creat  31/0.9 - I/o  2770/3205  - 141.6 kg now - was 164.7 on 6/23?  cxr 7/6 with plate like  Atelectasis - 4 and 5  Had more diffuse haze.      Vent  Adjusted - considering atelectasis and shunt as likely cause of gas exchange issues,  Will go to peep 5 with  pcv tidal volumes 600-900,  psv 25  And check  abg at 11.  If abg 11  Adequate - cpap trial  With plans to extubate to niv and mobilize if passes.  willl ask surgery to see for trach in event not progressing.  Hold tube feeds    Low dose lovenox now.    7/7/2020 - peep 15 on 40 % with 02 60, now on 30% - try cpap 11 am, hold tube feeds for cpap trial.    Glu 247-  Tm 99.6?  On zyvox for 7 days - id sign off.        7/8/2020 -patient was not extubated yesterday for fear that she might have airway problems.  She is still doing well today with weaning trials.  Will go ahead and give her a trial of extubation.  Would place on BiPAP , mobilized promptly.    7/9 off vent, 99% on 50% ox on niv, go to nc, mobilize, po/advance diet prn.  c1 esterase nl, rf 16   Change lasix to po, go to po prednisone, niv prn, needs for sleep.   Need cpap settings from home- should have niv.  Stop zyvox am.  Clear liq

## 2020-07-09 NOTE — PLAN OF CARE
Remains on BIPAP 20/10 rate 10. FIO2 just decrease to 50%. Sat 97%. AAO. Watching TV. Able to communicate by writing. Speech soft. Requested for  water. Aware NPO at this time. Frequent oral care done.Pleasant.Cooperative. Helps in repositioning turned to sides. Sinus rhythm. Resting during the night. Precedex at .4mcg/kg/hour. PICC to right brachial. Good urine output post lasix. No BM noted.Bed on lateral rotation.  Safety and fall prevention maintain.

## 2020-07-10 PROBLEM — J96.91 RESPIRATORY FAILURE WITH HYPOXIA: Status: ACTIVE | Noted: 2020-07-10

## 2020-07-10 LAB
ALBUMIN SERPL BCP-MCNC: 3.2 G/DL (ref 3.5–5.2)
ALP SERPL-CCNC: 56 U/L (ref 55–135)
ALT SERPL W/O P-5'-P-CCNC: 19 U/L (ref 10–44)
ANION GAP SERPL CALC-SCNC: 11 MMOL/L (ref 8–16)
AST SERPL-CCNC: 19 U/L (ref 10–40)
BASOPHILS # BLD AUTO: 0.04 K/UL (ref 0–0.2)
BASOPHILS NFR BLD: 0.3 % (ref 0–1.9)
BILIRUB SERPL-MCNC: 0.9 MG/DL (ref 0.1–1)
BUN SERPL-MCNC: 20 MG/DL (ref 6–20)
CALCIUM SERPL-MCNC: 9.9 MG/DL (ref 8.7–10.5)
CHLORIDE SERPL-SCNC: 98 MMOL/L (ref 95–110)
CO2 SERPL-SCNC: 29 MMOL/L (ref 23–29)
CREAT SERPL-MCNC: 0.8 MG/DL (ref 0.5–1.4)
DIFFERENTIAL METHOD: ABNORMAL
EOSINOPHIL # BLD AUTO: 0 K/UL (ref 0–0.5)
EOSINOPHIL NFR BLD: 0.2 % (ref 0–8)
ERYTHROCYTE [DISTWIDTH] IN BLOOD BY AUTOMATED COUNT: 16.8 % (ref 11.5–14.5)
EST. GFR  (AFRICAN AMERICAN): >60 ML/MIN/1.73 M^2
EST. GFR  (NON AFRICAN AMERICAN): >60 ML/MIN/1.73 M^2
GLUCOSE SERPL-MCNC: 147 MG/DL (ref 70–110)
HCT VFR BLD AUTO: 48.4 % (ref 37–48.5)
HGB BLD-MCNC: 14.8 G/DL (ref 12–16)
IMM GRANULOCYTES # BLD AUTO: 0.1 K/UL (ref 0–0.04)
IMM GRANULOCYTES NFR BLD AUTO: 0.8 % (ref 0–0.5)
LYMPHOCYTES # BLD AUTO: 2.6 K/UL (ref 1–4.8)
LYMPHOCYTES NFR BLD: 20.3 % (ref 18–48)
MAGNESIUM SERPL-MCNC: 2 MG/DL (ref 1.6–2.6)
MCH RBC QN AUTO: 26 PG (ref 27–31)
MCHC RBC AUTO-ENTMCNC: 30.6 G/DL (ref 32–36)
MCV RBC AUTO: 85 FL (ref 82–98)
MONOCYTES # BLD AUTO: 1.3 K/UL (ref 0.3–1)
MONOCYTES NFR BLD: 10.5 % (ref 4–15)
NEUTROPHILS # BLD AUTO: 8.7 K/UL (ref 1.8–7.7)
NEUTROPHILS NFR BLD: 67.9 % (ref 38–73)
NRBC BLD-RTO: 0 /100 WBC
PHOSPHATE SERPL-MCNC: 3.8 MG/DL (ref 2.7–4.5)
PLATELET # BLD AUTO: 364 K/UL (ref 150–350)
PLATELET BLD QL SMEAR: ABNORMAL
PMV BLD AUTO: 11.9 FL (ref 9.2–12.9)
POCT GLUCOSE: 155 MG/DL (ref 70–110)
POCT GLUCOSE: 183 MG/DL (ref 70–110)
POCT GLUCOSE: 185 MG/DL (ref 70–110)
POCT GLUCOSE: 200 MG/DL (ref 70–110)
POTASSIUM SERPL-SCNC: 3.7 MMOL/L (ref 3.5–5.1)
PROT SERPL-MCNC: 8.3 G/DL (ref 6–8.4)
RBC # BLD AUTO: 5.69 M/UL (ref 4–5.4)
SODIUM SERPL-SCNC: 138 MMOL/L (ref 136–145)
WBC # BLD AUTO: 12.78 K/UL (ref 3.9–12.7)

## 2020-07-10 PROCEDURE — 11000001 HC ACUTE MED/SURG PRIVATE ROOM

## 2020-07-10 PROCEDURE — 25000242 PHARM REV CODE 250 ALT 637 W/ HCPCS: Performed by: NURSE PRACTITIONER

## 2020-07-10 PROCEDURE — 97803 MED NUTRITION INDIV SUBSEQ: CPT

## 2020-07-10 PROCEDURE — 85025 COMPLETE CBC W/AUTO DIFF WBC: CPT

## 2020-07-10 PROCEDURE — 25000242 PHARM REV CODE 250 ALT 637 W/ HCPCS: Performed by: HOSPITALIST

## 2020-07-10 PROCEDURE — 63600175 PHARM REV CODE 636 W HCPCS: Performed by: INTERNAL MEDICINE

## 2020-07-10 PROCEDURE — 27000221 HC OXYGEN, UP TO 24 HOURS

## 2020-07-10 PROCEDURE — 63600175 PHARM REV CODE 636 W HCPCS: Performed by: HOSPITALIST

## 2020-07-10 PROCEDURE — 94660 CPAP INITIATION&MGMT: CPT

## 2020-07-10 PROCEDURE — 83735 ASSAY OF MAGNESIUM: CPT

## 2020-07-10 PROCEDURE — 94761 N-INVAS EAR/PLS OXIMETRY MLT: CPT

## 2020-07-10 PROCEDURE — 63700000 PHARM REV CODE 250 ALT 637 W/O HCPCS: Performed by: INTERNAL MEDICINE

## 2020-07-10 PROCEDURE — 25000242 PHARM REV CODE 250 ALT 637 W/ HCPCS: Performed by: INTERNAL MEDICINE

## 2020-07-10 PROCEDURE — 25000003 PHARM REV CODE 250: Performed by: HOSPITALIST

## 2020-07-10 PROCEDURE — 99900035 HC TECH TIME PER 15 MIN (STAT)

## 2020-07-10 PROCEDURE — 25000003 PHARM REV CODE 250: Performed by: INTERNAL MEDICINE

## 2020-07-10 PROCEDURE — 84100 ASSAY OF PHOSPHORUS: CPT

## 2020-07-10 PROCEDURE — 99233 PR SUBSEQUENT HOSPITAL CARE,LEVL III: ICD-10-PCS | Mod: ,,, | Performed by: INTERNAL MEDICINE

## 2020-07-10 PROCEDURE — 80053 COMPREHEN METABOLIC PANEL: CPT

## 2020-07-10 PROCEDURE — 36415 COLL VENOUS BLD VENIPUNCTURE: CPT

## 2020-07-10 PROCEDURE — 94640 AIRWAY INHALATION TREATMENT: CPT

## 2020-07-10 PROCEDURE — 99233 SBSQ HOSP IP/OBS HIGH 50: CPT | Mod: ,,, | Performed by: INTERNAL MEDICINE

## 2020-07-10 PROCEDURE — C9113 INJ PANTOPRAZOLE SODIUM, VIA: HCPCS | Performed by: INTERNAL MEDICINE

## 2020-07-10 RX ORDER — METOPROLOL SUCCINATE 50 MG/1
50 TABLET, EXTENDED RELEASE ORAL DAILY
Status: DISCONTINUED | OUTPATIENT
Start: 2020-07-10 | End: 2020-07-14 | Stop reason: HOSPADM

## 2020-07-10 RX ORDER — ALPRAZOLAM 0.25 MG/1
0.25 TABLET ORAL 4 TIMES DAILY PRN
Status: DISCONTINUED | OUTPATIENT
Start: 2020-07-10 | End: 2020-07-14 | Stop reason: HOSPADM

## 2020-07-10 RX ORDER — BUDESONIDE 0.5 MG/2ML
0.5 INHALANT ORAL EVERY 12 HOURS
Status: DISCONTINUED | OUTPATIENT
Start: 2020-07-10 | End: 2020-07-14 | Stop reason: HOSPADM

## 2020-07-10 RX ADMIN — LINEZOLID 600 MG: 600 INJECTION, SOLUTION INTRAVENOUS at 08:07

## 2020-07-10 RX ADMIN — IPRATROPIUM BROMIDE AND ALBUTEROL SULFATE 3 ML: .5; 3 SOLUTION RESPIRATORY (INHALATION) at 12:07

## 2020-07-10 RX ADMIN — NYSTATIN 500000 UNITS: 500000 SUSPENSION ORAL at 08:07

## 2020-07-10 RX ADMIN — DEXMEDETOMIDINE HYDROCHLORIDE 0.2 MCG/KG/HR: 100 INJECTION, SOLUTION, CONCENTRATE INTRAVENOUS at 01:07

## 2020-07-10 RX ADMIN — CHLORHEXIDINE GLUCONATE 0.12% ORAL RINSE 15 ML: 1.2 LIQUID ORAL at 08:07

## 2020-07-10 RX ADMIN — METOCLOPRAMIDE 10 MG: 5 INJECTION, SOLUTION INTRAMUSCULAR; INTRAVENOUS at 09:07

## 2020-07-10 RX ADMIN — PREDNISONE 10 MG: 5 TABLET ORAL at 08:07

## 2020-07-10 RX ADMIN — INSULIN ASPART 2 UNITS: 100 INJECTION, SOLUTION INTRAVENOUS; SUBCUTANEOUS at 11:07

## 2020-07-10 RX ADMIN — METOCLOPRAMIDE 10 MG: 5 INJECTION, SOLUTION INTRAMUSCULAR; INTRAVENOUS at 05:07

## 2020-07-10 RX ADMIN — ENOXAPARIN SODIUM 40 MG: 100 INJECTION SUBCUTANEOUS at 08:07

## 2020-07-10 RX ADMIN — LINEZOLID 600 MG: 600 INJECTION, SOLUTION INTRAVENOUS at 09:07

## 2020-07-10 RX ADMIN — PANTOPRAZOLE SODIUM 40 MG: 40 INJECTION, POWDER, LYOPHILIZED, FOR SOLUTION INTRAVENOUS at 08:07

## 2020-07-10 RX ADMIN — IPRATROPIUM BROMIDE AND ALBUTEROL SULFATE 3 ML: .5; 3 SOLUTION RESPIRATORY (INHALATION) at 04:07

## 2020-07-10 RX ADMIN — INSULIN ASPART 2 UNITS: 100 INJECTION, SOLUTION INTRAVENOUS; SUBCUTANEOUS at 05:07

## 2020-07-10 RX ADMIN — INSULIN ASPART 1 UNITS: 100 INJECTION, SOLUTION INTRAVENOUS; SUBCUTANEOUS at 09:07

## 2020-07-10 RX ADMIN — ALPRAZOLAM 0.25 MG: 0.25 TABLET ORAL at 11:07

## 2020-07-10 RX ADMIN — IPRATROPIUM BROMIDE AND ALBUTEROL SULFATE 3 ML: .5; 3 SOLUTION RESPIRATORY (INHALATION) at 07:07

## 2020-07-10 RX ADMIN — FLUCONAZOLE 100 MG: 100 TABLET ORAL at 08:07

## 2020-07-10 RX ADMIN — IPRATROPIUM BROMIDE AND ALBUTEROL SULFATE 3 ML: .5; 3 SOLUTION RESPIRATORY (INHALATION) at 03:07

## 2020-07-10 RX ADMIN — BUDESONIDE INHALATION 0.5 MG: 0.5 SUSPENSION RESPIRATORY (INHALATION) at 07:07

## 2020-07-10 RX ADMIN — NYSTATIN 500000 UNITS: 500000 SUSPENSION ORAL at 02:07

## 2020-07-10 RX ADMIN — BUDESONIDE 0.5 MG: 0.25 SUSPENSION RESPIRATORY (INHALATION) at 07:07

## 2020-07-10 RX ADMIN — IPRATROPIUM BROMIDE AND ALBUTEROL SULFATE 3 ML: .5; 3 SOLUTION RESPIRATORY (INHALATION) at 06:07

## 2020-07-10 RX ADMIN — IPRATROPIUM BROMIDE AND ALBUTEROL SULFATE 3 ML: .5; 3 SOLUTION RESPIRATORY (INHALATION) at 11:07

## 2020-07-10 RX ADMIN — FUROSEMIDE 20 MG: 20 TABLET ORAL at 08:07

## 2020-07-10 RX ADMIN — INSULIN ASPART 2 UNITS: 100 INJECTION, SOLUTION INTRAVENOUS; SUBCUTANEOUS at 04:07

## 2020-07-10 RX ADMIN — METOPROLOL SUCCINATE 50 MG: 50 TABLET, FILM COATED, EXTENDED RELEASE ORAL at 11:07

## 2020-07-10 RX ADMIN — PREDNISONE 10 MG: 5 TABLET ORAL at 09:07

## 2020-07-10 NOTE — PROGRESS NOTES
[facility] Hospital Medicine  Progress Note    Patient Name: Michelle Wren  MRN: [id number]  Patient Class: IP- Inpatient   Admission Date: 6/23/2020  Length of Stay: 16 days  Attending Physician: Gwyn Son MD  Primary Care Provider: Primary Doctor No        Subjective:     Principal Problem:Acute respiratory failure with hypoxia and hypercarbia        HPI:  Michelle Wren is a 29 y/o female with a past medical history significant for asthma, type 2 diabetes, and severe obesity who is transferred from [facility] to [facility] for pulmonary consultation.  Patient presented to the ED at [facility] yesterday with complaints of 2-3 days of shortness of breath.  It is reported that she uses CPAP at night but she got this from a friend so unsure of settings.  She was placed on BiPAP and given IV Solu-Medrol, bronchodilator treatments, and IV Lasix.  She was admitted to the ICU at [facility].  Patient continued to decline and underwent intubation yesterday around 7:00 p.m.  per report, today patient appeared to be worse.  Her saturations were in the mid 90s on FiO2 of 100%.  A D-dimer was checked and was negative.  Per review of labs which were drawn earlier today, her WBC count was 23,000 and her lactic acid was 2.4.  She was placed on IV Zosyn and IV vancomycin.  Upon arrival to [facility], patient is in no acute distress.  She is intubated and sedated.  Vital signs are stable.  Lactic acid will be repeated now as patient meets sepsis criteria and will check a procalcitonin.  She will be monitored closely in the ICU.           Overview/Hospital Course:  Patient is being managed in intensive care unit, requiring mechanical ventilation under supervision of pulmonary medicine.  Repeat COVID -19 test has been negative.  Patient is receiving intravenous antibiotics for bilateral pneumonia.  Sputum cultures grew MSSA species.  Patient is being followed by infectious  disease specialist.  Patient tested negative for COVID -19 antibody.  Patient has not been able to get extubated, requiring high FiO2 and very high PEEP pressures.    Interval History:  Patient extubated on 07/08.  Continues to do well.  Weaned off of BiPAP on to nasal cannula today.  remains on Precedex.    Review of Systems   Constitutional: Positive for activity change, appetite change and fatigue.   Respiratory: Positive for shortness of breath. Negative for cough.    Cardiovascular: Negative for chest pain and leg swelling.   Gastrointestinal: Negative for abdominal pain and nausea.   Musculoskeletal: Positive for back pain.   Neurological: Negative for weakness.   Psychiatric/Behavioral: Negative for confusion.   All other systems reviewed and are negative.    Objective:     Vital Signs (Most Recent):  Temp: (!) 100.7 °F (38.2 °C) (07/09/20 1930)  Pulse: 98 (07/09/20 1951)  Resp: (!) 26 (07/09/20 1951)  BP: (!) 160/67 (07/09/20 1930)  SpO2: 100 % (07/09/20 1951) Vital Signs (24h Range):  Temp:  [98.6 °F (37 °C)-100.7 °F (38.2 °C)] 100.7 °F (38.2 °C)  Pulse:  [] 98  Resp:  [10-81] 26  SpO2:  [95 %-100 %] 100 %  BP: ()/(52-91) 160/67     Weight: (!) 137.9 kg (304 lb 0.2 oz)  Body mass index is 55.6 kg/m².    Intake/Output Summary (Last 24 hours) at 7/9/2020 2246  Last data filed at 7/9/2020 1937  Gross per 24 hour   Intake 1966.2 ml   Output 1870 ml   Net 96.2 ml      Physical Exam  Vitals signs and nursing note reviewed.   Constitutional:       General: She is not in acute distress.     Appearance: She is obese.      Comments: Massively obese  female in no acute distress   Eyes:      Pupils: Pupils are equal, round, and reactive to light.   Cardiovascular:      Rate and Rhythm: Normal rate and regular rhythm.      Heart sounds: No murmur.   Pulmonary:      Comments: Decreased breath sounds noted bilaterally  Abdominal:      General: There is no distension.      Palpations: Abdomen is  soft.      Tenderness: There is no abdominal tenderness.   Musculoskeletal:      Right lower leg: Edema present.      Left lower leg: Edema present.   Skin:     Capillary Refill: Capillary refill takes 2 to 3 seconds.      Coloration: Skin is not pale.      Findings: No rash.   Neurological:      Mental Status: She is oriented to person, place, and time.         Significant Labs: All pertinent labs within the past 24 hours have been reviewed.    Significant Imaging: I have reviewed all pertinent imaging results/findings within the past 24 hours.      Assessment/Plan:      * Acute respiratory failure with hypoxia and hypercarbia  Patient with Hypercapnic and Hypoxic Respiratory failure which is Acute.  she is not on home oxygen.  Patient remains on nasal cannula presently.    Differential diagnosis includes - COPD, Obesity Hypoventilation, Pneumonia and Aspiration Labs and images were reviewed. Patient Has not has a recent ABG.     Will treat underlying causes and adjust management of respiratory failure as follows- continue to wean respiratory support as tolerated.  Patient is doing well post extubation.          Staphylococcal pneumonia  Patient with current diagnosis of pneumonia due to bacterial etiology due to  MSSA which is uncontrolled due to persistent hypoxia . Current antimicrobial regimen consists of   Antibiotics (From admission, onward)    Start     Stop Route Frequency Ordered    07/01/20 2130  linezolid 600 mg/300 mL IVPB 600 mg      -- IV Every 12 hours (non-standard times) 07/01/20 2021      . Cultures drawn and noted-   Microbiology Results (last 7 days)     Procedure Component Value Units Date/Time    IV catheter culture [[id number]]  (Abnormal) Collected: 07/04/20 0844    Order Status: Completed Specimen: Catheter Tip, Intrajugular Updated: 07/07/20 1034     Aerobic Culture - Cath tip STAPHYLOCOCCUS EPIDERMIDIS  < 15 colonies      Blood culture [[id number]] Collected: 07/01/20 2034    Order Status:  Completed Specimen: Blood Updated: 07/07/20 1012     Blood Culture, Routine No growth after 5 days.    Narrative:      From arterial line    Blood culture [[id number]] Collected: 07/01/20 2036    Order Status: Completed Specimen: Blood from Line, Central Neck Updated: 07/07/20 1012     Blood Culture, Routine No growth after 5 days.    Narrative:      From TLC    Clostridium difficile EIA [[id number]] Collected: 07/05/20 1503    Order Status: Completed Specimen: Stool Updated: 07/05/20 2108     C. diff Antigen Negative     C difficile Toxins A+B, EIA Negative     Comment: Testing not recommended for children <24 months old.       Culture, Respiratory with Gram Stain [[id number]]     Order Status: No result Specimen: Respiratory from Tracheal Aspirate     Blood culture [[id number]] Collected: 06/28/20 0824    Order Status: Completed Specimen: Blood from Antecubital, Right Arm Updated: 07/03/20 2212     Blood Culture, Routine No growth after 5 days.    Blood culture [[id number]] Collected: 06/27/20 0550    Order Status: Completed Specimen: Blood from Line, Central Updated: 07/03/20 0612     Blood Culture, Routine No growth after 5 days.       Will monitor patient closely and continue current treatment plan unchanged.   Infectious disease and pulmonology consulted.  Will defer to them for further evaluation and management of this disease process.      Obesity hypoventilation syndrome  Patient doing well after extubation.  Continue to monitor.      Pulmonary hypertension  Continue to treat per pulmonology directions.      Mild intermittent asthma  Patient's COPD is uncontrolled due to worsening of baseline hypoxia currently.  Patient is currently off COPD Pathway. Continue scheduled inhalers Steroids, Antibiotics and Supplemental oxygen and monitor respiratory status closely.  Continue steroids.        Obstructive sleep apnea syndrome  Severe, likely contributing to respiratory failure.       Morbid obesity  Body mass index  is 55.6 kg/m². Morbid obesity complicates all aspects of disease management from diagnostic modalities to treatment. Weight loss encouraged and health benefits explained to patient.        Type 2 diabetes mellitus  Patient's FSGs are controlled on current hypoglycemics.   Last A1c reviewed-   Lab Results   Component Value Date    HGBA1C 6.2 06/23/2020     Most recent fingerstick glucose reviewed-   Recent Labs   Lab 07/09/20  0001 07/09/20  0531 07/09/20  1130 07/09/20  1649   POCTGLUCOSE 171* 147* 313* 183*     Current correctional scale  Low  Maintain anti-hyperglycemic dose as follows-   Antihyperglycemics (From admission, onward)    Start     Stop Route Frequency Ordered    06/23/20 2235  insulin aspart U-100 pen 1-10 Units      -- SubQ Every 6 hours PRN 06/23/20 2135        Hold Oral hypoglycemics while patient is in the hospital.      Nicotine dependence  Health hazards associated with cigarette smoking should be reviewed with patient and cessation encouraged when pt is able to participate.       Cardiomegaly  Patient with peripheral edema, mildly elevated BNP.  IV lasix was initiated at [facility].    Echo was completed today with results as follows:  · Concentric left ventricular remodeling.  · Normal left ventricular systolic function. The estimated ejection fraction is 69%.  · Normal LV diastolic function.  · No wall motion abnormalities.  · Normal right ventricular systolic function.  · Mild right atrial enlargement.  · Trivial pericardial effusion.  · Mild left atrial enlargement.    IV lasix discontinued as no indication of heart failure.      VTE Risk Mitigation (From admission, onward)         Ordered     enoxaparin injection 40 mg  Every 12 hours      07/05/20 1217     IP VTE HIGH RISK PATIENT  Once      06/23/20 2055     Place sequential compression device  Until discontinued      06/23/20 2055                Critical care time spent on the evaluation and treatment of severe organ dysfunction, review of  pertinent labs and imaging studies, discussions with consulting providers and discussions with patient/family: 43 minutes.      Gwyn Son MD  Department of Hospital Medicine   [facility]

## 2020-07-10 NOTE — NURSING TRANSFER
Nursing Transfer Note      7/10/2020     Transfer from  to room 214    Transfer via bed    Transfer with oxygen in use 3lpm    Transported by Payam BURGESS    Medicines sent: yes    Chart send with patient: YES    Notified: report to nurse Marcelino    Patient reassessed at: dairon Marcelino at bedside    Upon arrival to floor: oriented to call light and nurse Marcelino updated

## 2020-07-10 NOTE — PLAN OF CARE
Patient remains on aerosol tx via duoneb now and q4hr and 0.5mg pulmicort now and q12hr.  Hr 107 and 02 saturation 96% on nc at 3lpm.

## 2020-07-10 NOTE — PLAN OF CARE
Intervention: collaboration with care providers, carbohydrate consistent diet     Recommendation:   1) Continue consistent carbohydrate diet ( 3-4 carb servings/meal)  2) Weigh pt weekly   3) Nutrition education and handouts given  4) add boost glucose control 1 x daily     Goals: 1) PO diet advanced or nutrition support initiates in < 5 days 2) meet 50% of needs with nutrition support or PO diet advanced at f/u 3) PO diet advanced in < 48 hours or nutrition support ordered 4) PO intakes > 50% of meals and supplements at f/u  Nutrition Goal Status: met/ met/ met  Communication of RD Recs: POC, sticky note, second sign

## 2020-07-10 NOTE — PROGRESS NOTES
[facility]  Adult Nutrition  Progress Note    SUMMARY      Intervention: collaboration with care providers, carbohydrate consistent diet     Recommendation:   1) Continue consistent carbohydrate diet ( 3-4 carb servings/meal)  2) Weigh pt weekly   3) Nutrition education and handouts given  4) add boost glucose control 1 x daily     Goals: 1) PO diet advanced or nutrition support initiates in < 5 days 2) meet 50% of needs with nutrition support or PO diet advanced at f/u 3) PO diet advanced in < 48 hours or nutrition support ordered 4) PO intakes > 50% of meals and supplements at f/u  Nutrition Goal Status: met/ met/ met  Communication of RD Recs: POC, sticky note, second sign     Reason for Assessment     Reason For Assessment: follow up  Diagnosis: (severe sepsis)  Relevant Medical History: asthma, DM2, severe obesity  Interdisciplinary Rounds: attended     General Information Comments: 29 y/o female admitted with sepsis, bilateral pneumonia. + vent, on high dose propofol no pressor. NFPE done 6/24/20, no wasting seen pt appears obese. NPO x 1 day. Glucose WNL, of note with hx. of DM 2 on steriods.  6/29/20 TF at 30 ml/hr yesterday but pt did not tolerate, held. Reglan started and pt had BM per RN so TF restarted this morning, at 15 ml/hr. 130 ml residual noted. Plan for pt to continue reglan and slowly advance TF as able.  7/1/20 TF held yesterday, restarted at 10 ml/hr, still having 100 ml residual. Per RN will trial beneprotein and if unable to advance today will trial impact peptide.  7/3/20 Pt remains ventilated and sedated.TPN was stopped 2' pt tolerating TF today per MD. She is currently resceiving TF of Peptamen VHP @ 50mL/hr. She was able to have a BM today. Nutrition related lab values noted; she is hypokalemic and her blood sugars remain elevated.  7/6/20 TPN standard clinimix ordered 7/2 and 7/3 but discontinued as pt was tolerating TF at goal. Had high residuals, restarted and pt  "was tolerating at 30 ml/hr this morning. Held for a few hours, plans to restart this afternoon. Residuals 100-180 ml yesterday. Plan for trach tomorrow. Noted to have diarrhea, C. Diff ( -). Per discussion with MD plan to continue TF at this rate for now.  7/8/20 Pt extubated this morning, now on bipap. NGT removed. Had been tolerating TF @ 35 ml/hr yesterday + beneprotein as ordered ( residuals  ml). Plan for SLP eval per RN.  7/10/20 Pt tolerating diabetic diet, total feed. Poor appetite r/t constipation, but after breakfast pt stated she had a BM. Educated pt on diabetic diet, which she was not following at home. Always skipping 1-2 meals and overeating at dinner r/t busy work schedule. Encouraged compliance, suggested meal replacement shakes when needed. Pt agreeable to plan. Gave pt the number of an outpatient dietitian.     Nutrition Discharge Planning: to be determined- DM 1500 kcal diet + glucerna 1-2 x daily as needed     Nutrition Risk Screen     Nutrition Risk Screen: no indicators present     Nutrition/Diet History     Typical Food/Fluid Intake: unable to obtain  Spiritual, Cultural Beliefs, Oriental orthodox Practices, Values that Affect Care: no  Food Allergies: NKFA  Factors Affecting Nutritional Intake: decreased appetite, nutrition related ADLs     Anthropometrics  Height Method: Stated  Height: 5' 2"  Height (inches): 62 in  Weight Method: Bed Scale  Weight: 139.6 kg 7/10/20,  (!) 141.6  Kg 7/6, 146.4 kg 7/1/20, 156.4 kg 6/29, 165 kg (admission)  Weight (lb): (!) 307 lb ( on lasix)  Ideal Body Weight (IBW), Female: 110 lb  % Ideal Body Weight, Female (lb): 330.69 %  BMI (Calculated): 56 kg/m2  BMI Grade: greater than 40 - morbid obesity        Lab/Procedures/Meds     Pertinent Labs Reviewed: reviewed  BMP  Lab Results   Component Value Date     07/10/2020    K 3.7 07/10/2020    CL 98 07/10/2020    CO2 29 07/10/2020    BUN 20 07/10/2020    CREATININE 0.8 07/10/2020    CALCIUM 9.9 07/10/2020    " ANIONGAP 11 07/10/2020    ESTGFRAFRICA >60 07/10/2020    EGFRNONAA >60 07/10/2020     Recent Labs   Lab 07/10/20  1126   POCTGLUCOSE 200*       Pertinent Medications Reviewed: reviewed  Polyethylene glycol, insulin     Estimated/Assessed Needs     Weight Used For Calorie Calculations: 52.2 kg IBW  Energy Calorie Requirements (kcal): 22-25 kcal/kg ( IBW, BMI > 50 kg/m2) = 8053-4024 kcal  Energy Need Method: Kcal/kg  Protein Requirements: 130 g protein  Weight Used For Protein Calculations: 52.2 kg (115 lb 1.3 oz)(IBW ( 2.5 g protein/kg))  Fluid Requirements (mL): 1300 ml or per MD  Estimated Fluid Requirement Method: RDA Method  CHO Requirement: 153-170 g        Nutrition Prescription Ordered     Current Diet Order: Consistent carbohydrate diet        Evaluation of Received Nutrient/Fluid Intake     Energy Calories Required: not meeting needs  Protein Required: not meeting needs  Fluid Required: not meeting needs  Tolerance: residuals  % Intake of Estimated Energy Needs: 50-75%  % Meal Intake: 25-50% most     Nutrition Risk     Level of Risk/Frequency of Follow-up: moderate 2 x weekly     Assessment and Plan     Inadequate energy intake  R/t NPO  As evidenced by PO intakes < 50% EEN x 1-2 days  Intervention: above  improving     Altered nutrition related lab values  R/t medication side effects and altered absorption of nutrients  As evidenced by elevated A1C and glucose  Intervention: above  improving     Monitor and Evaluation     Food and Nutrient Intake: energy intake  Food and Nutrient Adminstration: diet order  Anthropometric Measurements: weight  Biochemical Data, Medical Tests and Procedures: electrolyte and renal panel, gastrointestinal profile, glucose/endocrine profile  Nutrition-Focused Physical Findings: overall appearance      Malnutrition Assessment     Skin (Micronutrient): (Maxi = 10)   Micronutrient Evaluation: no deficiencies               Edema (Fluid Accumulation): (1-2+)              Nutrition  Follow-Up     RD Follow-up?: Yes

## 2020-07-10 NOTE — PLAN OF CARE
7/10/2020 10:48:45 AM Note: any update?  Slime Armenta       07/10/20 1048   Post-Acute Status   Post-Acute Authorization Placement   Post-Acute Placement Status Pending Post-Acute Clinical Review

## 2020-07-10 NOTE — NURSING
Arrived from icu bed transport on o2 3 l n/c. Alert, oriented, anxious , muscle twitches restless leg , bed alarm in use. o2 sats on arrival 88%, temp of 100.3 o2 increased to 4 l n/c sats up to 92% bed in low psotiion call light in reach, side rails up x 3

## 2020-07-10 NOTE — PROGRESS NOTES
.  Progress Note  PULMONARY    Admit Date: 6/23/2020   07/10/2020      SUBJECTIVE:     June 25th-patient is sedated, no complaints, intubated.  6/26 intubated.  6/27 no c/o intubated.  6/28 no new c/o,intubated, arouses  6/29- no new c/o, intubated,   6/30 no new c/o  7/6 arouses,  No c/o. Intubated.  7/7 no c/o, arouses  7/8 intubated no c/o  7/9 wants juice, not sob. On precedex.  Above from Dr Gentile and below from Dr Guerra:  7/10- On NC 3L. Complains her feet are hurting. Got up to restroom and that made her more short of breath today. Father at bedside     PFSH and Allergies reviewed.    OBJECTIVE:     Vitals (Most recent):  Vitals:    07/10/20 0700   BP: (!) 117/56   Pulse: 107   Resp: 18   Temp:        Vitals (24 hour range):  Temp:  [98.6 °F (37 °C)-100.7 °F (38.2 °C)]   Pulse:  []   Resp:  [10-81]   BP: (116-164)/(56-91)   SpO2:  [88 %-100 %]       Intake/Output Summary (Last 24 hours) at 7/10/2020 0903  Last data filed at 7/10/2020 0531  Gross per 24 hour   Intake 2005.03 ml   Output 895 ml   Net 1110.03 ml          Physical Exam:  The patient's neuro status (alertness,orientation,cognitive function,motor skills,), pharyngeal exam (oral lesions, hygiene, abn dentition,), Neck (jvd,mass,thyroid,nodes in neck and above/below clavicle),RESPIRATORY(symmetry,effort,fremitus,percussion,auscultation),  Cor(rhythm,heart tones including gallops,perfusion,edema)ABD(distention,hepatic&splenomegaly,tenderness,masses), Skin(rash,cyanosis),Psyc(affect,judgement,).  Exam negative except for these pertinent findings:    M4, tongue w/ white discoloration  Morbidly obese, no acute distress  NC in place  Lungs clear  Abdomen distended nontender  No edema  Bilateral feet tender to palpation, no wounds or discoloration noted    Radiographs reviewed: view by direct vision   CXR 7/10- continued bibasilar opacification (likely appears worse off positive pressure). S/p R sided PICC line    Labs     Recent Labs   Lab  07/10/20  0325   WBC 12.78*   HGB 14.8   HCT 48.4   *     Recent Labs   Lab 07/10/20  0325      K 3.7   CL 98   CO2 29   BUN 20   CREATININE 0.8   *   CALCIUM 9.9   MG 2.0   PHOS 3.8   AST 19   ALT 19   ALKPHOS 56   BILITOT 0.9   PROT 8.3   ALBUMIN 3.2*     No results for input(s): PH, PCO2, PO2, HCO3 in the last 24 hours.  Microbiology Results (last 7 days)     Procedure Component Value Units Date/Time    IV catheter culture [[id number]]  (Abnormal) Collected: 07/04/20 0844    Order Status: Completed Specimen: Catheter Tip, Intrajugular Updated: 07/07/20 1034     Aerobic Culture - Cath tip STAPHYLOCOCCUS EPIDERMIDIS  < 15 colonies      Blood culture [[id number]] Collected: 07/01/20 2034    Order Status: Completed Specimen: Blood Updated: 07/07/20 1012     Blood Culture, Routine No growth after 5 days.    Narrative:      From arterial line    Blood culture [[id number]] Collected: 07/01/20 2036    Order Status: Completed Specimen: Blood from Line, Central Neck Updated: 07/07/20 1012     Blood Culture, Routine No growth after 5 days.    Narrative:      From TLC    Clostridium difficile EIA [[id number]] Collected: 07/05/20 1503    Order Status: Completed Specimen: Stool Updated: 07/05/20 2108     C. diff Antigen Negative     C difficile Toxins A+B, EIA Negative     Comment: Testing not recommended for children <24 months old.       Culture, Respiratory with Gram Stain [[id number]]     Order Status: No result Specimen: Respiratory from Tracheal Aspirate     Blood culture [[id number]] Collected: 06/28/20 0824    Order Status: Completed Specimen: Blood from Antecubital, Right Arm Updated: 07/03/20 2212     Blood Culture, Routine No growth after 5 days.        Echo 6/22/2020  · Concentric left ventricular remodeling.  · Normal left ventricular systolic function. The estimated ejection fraction is 69%.  · Normal LV diastolic function.  · No wall motion abnormalities.  · Normal right ventricular systolic  function.  · Mild right atrial enlargement.  · Trivial pericardial effusion.  · Mild left atrial enlargement.       Impression:  Active Hospital Problems    Diagnosis  POA    *Acute respiratory failure with hypoxia and hypercarbia [J96.01, J96.02]  Yes    Morbid obesity [E66.01]  Yes    Pulmonary hypertension [I27.20]  Yes     By pulmonary artery size on ct chest 6/30      Obstructive sleep apnea syndrome [G47.33]  Yes    Staphylococcal pneumonia [J15.20]  Yes    Type 2 diabetes mellitus [E11.9]  Yes    Nicotine dependence [F17.200]  Yes    Obesity hypoventilation syndrome [E66.2]  Yes    Mild intermittent asthma [J45.20]  Yes    Cardiomegaly [I51.7]  Yes      Resolved Hospital Problems    Diagnosis Date Resolved POA    Severe sepsis [A41.9, R65.20] 07/05/2020 Yes    Bilateral pneumonia [J18.9] 07/05/2020 Yes    Respiratory failure with hypoxia [J96.91] 07/06/2020 Yes    Class 3 severe obesity with serious comorbidity and body mass index (BMI) of 60.0 to 69.9 in adult [E66.01, Z68.44] 07/05/2020 Not Applicable               Plan:   Acute on chronic hypoxemic/hypercapneic resp failure  [facility]URIE  Acute exacerbation of asthma  Bilateral lower lobe pneumonia- MSSA  Morbid obesity  Oropharyngeal thrush    - mobilize as tolerated  - continue NC oxygen during day, BIPAP at night  - continue nebulized bronchodilators and budesonide  - continue prednisone slow taper  - continue Linezolid  - continue PO lasix  - continue nystatin for a few more days; thrush appears improved  - ok to transfer out of ICU  - needs NIV for home use; d/w Dr. Seferino Guerra MD  Pulmonary & Critical Care Medicine

## 2020-07-10 NOTE — ASSESSMENT & PLAN NOTE
Patient with current diagnosis of pneumonia due to bacterial etiology due to  MSSA which is uncontrolled due to persistent hypoxia . Current antimicrobial regimen consists of   Antibiotics (From admission, onward)    Start     Stop Route Frequency Ordered    07/01/20 2130  linezolid 600 mg/300 mL IVPB 600 mg      -- IV Every 12 hours (non-standard times) 07/01/20 2021      . Cultures drawn and noted-   Microbiology Results (last 7 days)     Procedure Component Value Units Date/Time    IV catheter culture [[id number]]  (Abnormal) Collected: 07/04/20 0844    Order Status: Completed Specimen: Catheter Tip, Intrajugular Updated: 07/07/20 1034     Aerobic Culture - Cath tip STAPHYLOCOCCUS EPIDERMIDIS  < 15 colonies      Blood culture [[id number]] Collected: 07/01/20 2034    Order Status: Completed Specimen: Blood Updated: 07/07/20 1012     Blood Culture, Routine No growth after 5 days.    Narrative:      From arterial line    Blood culture [[id number]] Collected: 07/01/20 2036    Order Status: Completed Specimen: Blood from Line, Central Neck Updated: 07/07/20 1012     Blood Culture, Routine No growth after 5 days.    Narrative:      From TLC    Clostridium difficile EIA [[id number]] Collected: 07/05/20 1503    Order Status: Completed Specimen: Stool Updated: 07/05/20 2108     C. diff Antigen Negative     C difficile Toxins A+B, EIA Negative     Comment: Testing not recommended for children <24 months old.       Culture, Respiratory with Gram Stain [[id number]]     Order Status: No result Specimen: Respiratory from Tracheal Aspirate     Blood culture [[id number]] Collected: 06/28/20 0824    Order Status: Completed Specimen: Blood from Antecubital, Right Arm Updated: 07/03/20 2212     Blood Culture, Routine No growth after 5 days.    Blood culture [[id number]] Collected: 06/27/20 0550    Order Status: Completed Specimen: Blood from Line, Central Updated: 07/03/20 0612     Blood Culture, Routine No growth after 5 days.       Will  monitor patient closely and continue current treatment plan unchanged.   Infectious disease and pulmonology consulted.  Will defer to them for further evaluation and management of this disease process.

## 2020-07-10 NOTE — ASSESSMENT & PLAN NOTE
Patient with Hypercapnic and Hypoxic Respiratory failure which is Acute.  she is not on home oxygen.  Patient remains on nasal cannula presently.    Differential diagnosis includes - COPD, Obesity Hypoventilation, Pneumonia and Aspiration Labs and images were reviewed. Patient Has not has a recent ABG.     Will treat underlying causes and adjust management of respiratory failure as follows- continue to wean respiratory support as tolerated.  Patient is doing well post extubation.

## 2020-07-10 NOTE — PLAN OF CARE
POC for cauti care and clabsi care. Pt 2 person max assist to BSCC due to inability to have BM via bedpan.  Pt SOB and fatigued upon return to bed. Instructed on first dose of metoprolol and xanax for increasing HR and C/O anxiety. Updated on POC with pending move to routine room once available. All safety maintained.

## 2020-07-10 NOTE — CARE UPDATE
07/09/20 1949 07/09/20 1951   Patient Assessment/Suction   Level of Consciousness (AVPU) alert  --    Respiratory Effort Unlabored  --    Expansion/Accessory Muscles/Retractions no use of accessory muscles;no retractions  --    All Lung Fields Breath Sounds diminished  --    Rhythm/Pattern, Respiratory pattern regular;depth regular  --    Cough Frequency infrequent  --    Cough Type fair;nonproductive  --    PRE-TX-O2   O2 Device (Oxygen Therapy) nasal cannula  --    $ Is the patient on Low Flow Oxygen? Yes  --    Flow (L/min) 3  --    SpO2 100 % 100 %   Pulse Oximetry Type Continuous  --    $ Pulse Oximetry - Multiple Charge Pulse Oximetry - Multiple  --    Oximetry Probe Site No Change Needed  --    Pulse 94 98   Resp 20 (!) 26   Aerosol Therapy   $ Aerosol Therapy Charges Aerosol Treatment Aerosol Treatment;Mouth rinsed post treatment   Respiratory Treatment Status (SVN) given given   Treatment Route (SVN) mask mask   Patient Position (SVN) HOB elevated HOB elevated   Post Treatment Assessment (SVN) breath sounds unchanged breath sounds unchanged   Signs of Intolerance (SVN)  --  none   Aerosol Therapy (SVN) Infant   Respiratory Treatment Status (SVN) adjusted per protocol  --

## 2020-07-10 NOTE — NURSING
Site care done to right brachial PICC. Catheter coiled and anchor with sutureless device. With difficulty in aspirating blood before. Unable to aspirate blood this time. MARIA R Puente NP order for CXR to check placement. Was read by [facility] Catheter tip in the SVC .

## 2020-07-10 NOTE — ASSESSMENT & PLAN NOTE
Body mass index is 55.6 kg/m². Morbid obesity complicates all aspects of disease management from diagnostic modalities to treatment. Weight loss encouraged and health benefits explained to patient.

## 2020-07-10 NOTE — PT/OT/SLP PROGRESS
Physical Therapy      Patient Name:  Michelle Wren   MRN:  [id number]    Patient not seen today secondary to refused all functional activity, including sit at eob due to got up to bedside commode with nurse this morning and now is very tired.  Spent 10 min in room.  Will follow-up 7/11.    Marshall Connors, PT

## 2020-07-10 NOTE — PLAN OF CARE
Remains on 3Lnc. Sat stays at 96-98%. Resp Tx q4. AAO, watching TVmost of the time. Sinus tachycardia . T Max 100.7 ax. Had used bedpan several times no BM noted. Precedex at .2mcg/kg/hour. Poor appetite. U/O 400ml for the shift dana color. PICC right brachial intact IVF infusing well. Safety and fall prevention maintain.

## 2020-07-10 NOTE — ASSESSMENT & PLAN NOTE
Patient's FSGs are controlled on current hypoglycemics.   Last A1c reviewed-   Lab Results   Component Value Date    HGBA1C 6.2 06/23/2020     Most recent fingerstick glucose reviewed-   Recent Labs   Lab 07/09/20  0001 07/09/20  0531 07/09/20  1130 07/09/20  1649   POCTGLUCOSE 171* 147* 313* 183*     Current correctional scale  Low  Maintain anti-hyperglycemic dose as follows-   Antihyperglycemics (From admission, onward)    Start     Stop Route Frequency Ordered    06/23/20 2235  insulin aspart U-100 pen 1-10 Units      -- SubQ Every 6 hours PRN 06/23/20 2135        Hold Oral hypoglycemics while patient is in the hospital.

## 2020-07-10 NOTE — SUBJECTIVE & OBJECTIVE
Interval History:  Patient extubated on 07/08.  Continues to do well.  Weaned off of BiPAP on to nasal cannula today.  remains on Precedex.    Review of Systems   Constitutional: Positive for activity change, appetite change and fatigue.   Respiratory: Positive for shortness of breath. Negative for cough.    Cardiovascular: Negative for chest pain and leg swelling.   Gastrointestinal: Negative for abdominal pain and nausea.   Musculoskeletal: Positive for back pain.   Neurological: Negative for weakness.   Psychiatric/Behavioral: Negative for confusion.   All other systems reviewed and are negative.    Objective:     Vital Signs (Most Recent):  Temp: (!) 100.7 °F (38.2 °C) (07/09/20 1930)  Pulse: 98 (07/09/20 1951)  Resp: (!) 26 (07/09/20 1951)  BP: (!) 160/67 (07/09/20 1930)  SpO2: 100 % (07/09/20 1951) Vital Signs (24h Range):  Temp:  [98.6 °F (37 °C)-100.7 °F (38.2 °C)] 100.7 °F (38.2 °C)  Pulse:  [] 98  Resp:  [10-81] 26  SpO2:  [95 %-100 %] 100 %  BP: ()/(52-91) 160/67     Weight: (!) 137.9 kg (304 lb 0.2 oz)  Body mass index is 55.6 kg/m².    Intake/Output Summary (Last 24 hours) at 7/9/2020 2246  Last data filed at 7/9/2020 1937  Gross per 24 hour   Intake 1966.2 ml   Output 1870 ml   Net 96.2 ml      Physical Exam  Vitals signs and nursing note reviewed.   Constitutional:       General: She is not in acute distress.     Appearance: She is obese.      Comments: Massively obese  female in no acute distress   Eyes:      Pupils: Pupils are equal, round, and reactive to light.   Cardiovascular:      Rate and Rhythm: Normal rate and regular rhythm.      Heart sounds: No murmur.   Pulmonary:      Comments: Decreased breath sounds noted bilaterally  Abdominal:      General: There is no distension.      Palpations: Abdomen is soft.      Tenderness: There is no abdominal tenderness.   Musculoskeletal:      Right lower leg: Edema present.      Left lower leg: Edema present.   Skin:      Capillary Refill: Capillary refill takes 2 to 3 seconds.      Coloration: Skin is not pale.      Findings: No rash.   Neurological:      Mental Status: She is oriented to person, place, and time.         Significant Labs: All pertinent labs within the past 24 hours have been reviewed.    Significant Imaging: I have reviewed all pertinent imaging results/findings within the past 24 hours.

## 2020-07-11 ENCOUNTER — OUTSIDE PLACE OF SERVICE (OUTPATIENT)
Dept: PULMONOLOGY | Facility: CLINIC | Age: 30
End: 2020-07-11
Payer: MEDICAID

## 2020-07-11 DIAGNOSIS — J96.01 ACUTE RESPIRATORY FAILURE WITH HYPOXIA AND HYPERCARBIA: ICD-10-CM

## 2020-07-11 DIAGNOSIS — J96.02 ACUTE RESPIRATORY FAILURE WITH HYPOXIA AND HYPERCARBIA: ICD-10-CM

## 2020-07-11 LAB
ALBUMIN SERPL BCP-MCNC: 3.3 G/DL (ref 3.5–5.2)
ALP SERPL-CCNC: 58 U/L (ref 55–135)
ALT SERPL W/O P-5'-P-CCNC: 17 U/L (ref 10–44)
ANION GAP SERPL CALC-SCNC: 13 MMOL/L (ref 8–16)
AST SERPL-CCNC: 15 U/L (ref 10–40)
BASOPHILS # BLD AUTO: 0.04 K/UL (ref 0–0.2)
BASOPHILS NFR BLD: 0.3 % (ref 0–1.9)
BILIRUB SERPL-MCNC: 0.9 MG/DL (ref 0.1–1)
BUN SERPL-MCNC: 15 MG/DL (ref 6–20)
CALCIUM SERPL-MCNC: 10.1 MG/DL (ref 8.7–10.5)
CHLORIDE SERPL-SCNC: 96 MMOL/L (ref 95–110)
CO2 SERPL-SCNC: 28 MMOL/L (ref 23–29)
CREAT SERPL-MCNC: 0.8 MG/DL (ref 0.5–1.4)
DIFFERENTIAL METHOD: ABNORMAL
EOSINOPHIL # BLD AUTO: 0 K/UL (ref 0–0.5)
EOSINOPHIL NFR BLD: 0.1 % (ref 0–8)
ERYTHROCYTE [DISTWIDTH] IN BLOOD BY AUTOMATED COUNT: 16.3 % (ref 11.5–14.5)
EST. GFR  (AFRICAN AMERICAN): >60 ML/MIN/1.73 M^2
EST. GFR  (NON AFRICAN AMERICAN): >60 ML/MIN/1.73 M^2
GLUCOSE SERPL-MCNC: 168 MG/DL (ref 70–110)
HCT VFR BLD AUTO: 48.6 % (ref 37–48.5)
HGB BLD-MCNC: 14.6 G/DL (ref 12–16)
IMM GRANULOCYTES # BLD AUTO: 0.11 K/UL (ref 0–0.04)
IMM GRANULOCYTES NFR BLD AUTO: 0.7 % (ref 0–0.5)
LYMPHOCYTES # BLD AUTO: 1.6 K/UL (ref 1–4.8)
LYMPHOCYTES NFR BLD: 10.5 % (ref 18–48)
MAGNESIUM SERPL-MCNC: 2 MG/DL (ref 1.6–2.6)
MCH RBC QN AUTO: 26 PG (ref 27–31)
MCHC RBC AUTO-ENTMCNC: 30 G/DL (ref 32–36)
MCV RBC AUTO: 87 FL (ref 82–98)
MONOCYTES # BLD AUTO: 1.7 K/UL (ref 0.3–1)
MONOCYTES NFR BLD: 11.4 % (ref 4–15)
NEUTROPHILS # BLD AUTO: 11.7 K/UL (ref 1.8–7.7)
NEUTROPHILS NFR BLD: 77 % (ref 38–73)
NRBC BLD-RTO: 0 /100 WBC
PHOSPHATE SERPL-MCNC: 3.1 MG/DL (ref 2.7–4.5)
PLATELET # BLD AUTO: 345 K/UL (ref 150–350)
PMV BLD AUTO: 11.8 FL (ref 9.2–12.9)
POCT GLUCOSE: 168 MG/DL (ref 70–110)
POCT GLUCOSE: 179 MG/DL (ref 70–110)
POCT GLUCOSE: 192 MG/DL (ref 70–110)
POCT GLUCOSE: 212 MG/DL (ref 70–110)
POTASSIUM SERPL-SCNC: 3.8 MMOL/L (ref 3.5–5.1)
PROT SERPL-MCNC: 8.2 G/DL (ref 6–8.4)
RBC # BLD AUTO: 5.61 M/UL (ref 4–5.4)
SODIUM SERPL-SCNC: 137 MMOL/L (ref 136–145)
WBC # BLD AUTO: 15.13 K/UL (ref 3.9–12.7)

## 2020-07-11 PROCEDURE — 94660 CPAP INITIATION&MGMT: CPT

## 2020-07-11 PROCEDURE — C9113 INJ PANTOPRAZOLE SODIUM, VIA: HCPCS | Performed by: HOSPITALIST

## 2020-07-11 PROCEDURE — 63700000 PHARM REV CODE 250 ALT 637 W/O HCPCS: Performed by: HOSPITALIST

## 2020-07-11 PROCEDURE — 97161 PT EVAL LOW COMPLEX 20 MIN: CPT

## 2020-07-11 PROCEDURE — 99900035 HC TECH TIME PER 15 MIN (STAT)

## 2020-07-11 PROCEDURE — 63600175 PHARM REV CODE 636 W HCPCS: Performed by: HOSPITALIST

## 2020-07-11 PROCEDURE — 94640 AIRWAY INHALATION TREATMENT: CPT

## 2020-07-11 PROCEDURE — 25000003 PHARM REV CODE 250: Performed by: HOSPITALIST

## 2020-07-11 PROCEDURE — 27000221 HC OXYGEN, UP TO 24 HOURS

## 2020-07-11 PROCEDURE — 99232 SBSQ HOSP IP/OBS MODERATE 35: CPT | Mod: S$GLB,,, | Performed by: INTERNAL MEDICINE

## 2020-07-11 PROCEDURE — 25000242 PHARM REV CODE 250 ALT 637 W/ HCPCS: Performed by: HOSPITALIST

## 2020-07-11 PROCEDURE — 99232 PR SUBSEQUENT HOSPITAL CARE,LEVL II: ICD-10-PCS | Mod: S$GLB,,, | Performed by: INTERNAL MEDICINE

## 2020-07-11 PROCEDURE — 85025 COMPLETE CBC W/AUTO DIFF WBC: CPT

## 2020-07-11 PROCEDURE — 84100 ASSAY OF PHOSPHORUS: CPT

## 2020-07-11 PROCEDURE — 97110 THERAPEUTIC EXERCISES: CPT

## 2020-07-11 PROCEDURE — 25000242 PHARM REV CODE 250 ALT 637 W/ HCPCS: Performed by: INTERNAL MEDICINE

## 2020-07-11 PROCEDURE — 94761 N-INVAS EAR/PLS OXIMETRY MLT: CPT

## 2020-07-11 PROCEDURE — 80053 COMPREHEN METABOLIC PANEL: CPT

## 2020-07-11 PROCEDURE — 83735 ASSAY OF MAGNESIUM: CPT

## 2020-07-11 PROCEDURE — 36415 COLL VENOUS BLD VENIPUNCTURE: CPT

## 2020-07-11 PROCEDURE — 11000001 HC ACUTE MED/SURG PRIVATE ROOM

## 2020-07-11 RX ORDER — IPRATROPIUM BROMIDE AND ALBUTEROL SULFATE 2.5; .5 MG/3ML; MG/3ML
3 SOLUTION RESPIRATORY (INHALATION)
Status: DISCONTINUED | OUTPATIENT
Start: 2020-07-11 | End: 2020-07-14 | Stop reason: HOSPADM

## 2020-07-11 RX ADMIN — PREDNISONE 10 MG: 5 TABLET ORAL at 08:07

## 2020-07-11 RX ADMIN — LINEZOLID 600 MG: 600 INJECTION, SOLUTION INTRAVENOUS at 08:07

## 2020-07-11 RX ADMIN — INSULIN ASPART 2 UNITS: 100 INJECTION, SOLUTION INTRAVENOUS; SUBCUTANEOUS at 05:07

## 2020-07-11 RX ADMIN — IPRATROPIUM BROMIDE AND ALBUTEROL SULFATE 3 ML: .5; 2.5 SOLUTION RESPIRATORY (INHALATION) at 07:07

## 2020-07-11 RX ADMIN — IPRATROPIUM BROMIDE AND ALBUTEROL SULFATE 3 ML: .5; 3 SOLUTION RESPIRATORY (INHALATION) at 12:07

## 2020-07-11 RX ADMIN — PREDNISONE 10 MG: 5 TABLET ORAL at 09:07

## 2020-07-11 RX ADMIN — LINEZOLID 600 MG: 600 INJECTION, SOLUTION INTRAVENOUS at 09:07

## 2020-07-11 RX ADMIN — FUROSEMIDE 20 MG: 20 TABLET ORAL at 09:07

## 2020-07-11 RX ADMIN — FLUCONAZOLE 100 MG: 100 TABLET ORAL at 09:07

## 2020-07-11 RX ADMIN — METOCLOPRAMIDE 10 MG: 5 INJECTION, SOLUTION INTRAMUSCULAR; INTRAVENOUS at 09:07

## 2020-07-11 RX ADMIN — BUDESONIDE INHALATION 0.5 MG: 0.5 SUSPENSION RESPIRATORY (INHALATION) at 07:07

## 2020-07-11 RX ADMIN — IPRATROPIUM BROMIDE AND ALBUTEROL SULFATE 3 ML: .5; 3 SOLUTION RESPIRATORY (INHALATION) at 03:07

## 2020-07-11 RX ADMIN — IPRATROPIUM BROMIDE AND ALBUTEROL SULFATE 3 ML: .5; 3 SOLUTION RESPIRATORY (INHALATION) at 04:07

## 2020-07-11 RX ADMIN — METOCLOPRAMIDE 10 MG: 5 INJECTION, SOLUTION INTRAMUSCULAR; INTRAVENOUS at 02:07

## 2020-07-11 RX ADMIN — PANTOPRAZOLE SODIUM 40 MG: 40 INJECTION, POWDER, LYOPHILIZED, FOR SOLUTION INTRAVENOUS at 09:07

## 2020-07-11 RX ADMIN — METOPROLOL SUCCINATE 50 MG: 50 TABLET, FILM COATED, EXTENDED RELEASE ORAL at 09:07

## 2020-07-11 RX ADMIN — INSULIN ASPART 1 UNITS: 100 INJECTION, SOLUTION INTRAVENOUS; SUBCUTANEOUS at 08:07

## 2020-07-11 RX ADMIN — METOCLOPRAMIDE 10 MG: 5 INJECTION, SOLUTION INTRAMUSCULAR; INTRAVENOUS at 05:07

## 2020-07-11 RX ADMIN — INSULIN ASPART 4 UNITS: 100 INJECTION, SOLUTION INTRAVENOUS; SUBCUTANEOUS at 11:07

## 2020-07-11 NOTE — ASSESSMENT & PLAN NOTE
Patient with current diagnosis of pneumonia due to bacterial etiology due to  MSSA which is uncontrolled due to persistent hypoxia . Current antimicrobial regimen consists of   Antibiotics (From admission, onward)    Start     Stop Route Frequency Ordered    07/01/20 2130  linezolid 600 mg/300 mL IVPB 600 mg      -- IV Every 12 hours (non-standard times) 07/01/20 2021      . Cultures drawn and noted-   Microbiology Results (last 7 days)     Procedure Component Value Units Date/Time    IV catheter culture [[id number]]  (Abnormal) Collected: 07/04/20 0844    Order Status: Completed Specimen: Catheter Tip, Intrajugular Updated: 07/07/20 1034     Aerobic Culture - Cath tip STAPHYLOCOCCUS EPIDERMIDIS  < 15 colonies      Blood culture [[id number]] Collected: 07/01/20 2034    Order Status: Completed Specimen: Blood Updated: 07/07/20 1012     Blood Culture, Routine No growth after 5 days.    Narrative:      From arterial line    Blood culture [[id number]] Collected: 07/01/20 2036    Order Status: Completed Specimen: Blood from Line, Central Neck Updated: 07/07/20 1012     Blood Culture, Routine No growth after 5 days.    Narrative:      From TLC    Clostridium difficile EIA [[id number]] Collected: 07/05/20 1503    Order Status: Completed Specimen: Stool Updated: 07/05/20 2108     C. diff Antigen Negative     C difficile Toxins A+B, EIA Negative     Comment: Testing not recommended for children <24 months old.       Culture, Respiratory with Gram Stain [[id number]]     Order Status: No result Specimen: Respiratory from Tracheal Aspirate     Blood culture [[id number]] Collected: 06/28/20 0824    Order Status: Completed Specimen: Blood from Antecubital, Right Arm Updated: 07/03/20 2212     Blood Culture, Routine No growth after 5 days.       Will monitor patient closely and continue current treatment plan unchanged.   Infectious disease and pulmonology consulted.  Will defer to them for further evaluation and management of this  disease process.

## 2020-07-11 NOTE — PROGRESS NOTES
[facility] Medicine  Progress Note    Patient Name: Michelle Wren  MRN: [id number]  Patient Class: IP- Inpatient   Admission Date: 6/23/2020  Length of Stay: 17 days  Attending Physician: Gwyn Son MD  Primary Care Provider: Primary Doctor No        Subjective:     Principal Problem:Acute respiratory failure with hypoxia and hypercarbia        HPI:  Michelle Wren is a 31 y/o female with a past medical history significant for asthma, type 2 diabetes, and severe obesity who is transferred from [facility] to [facility] for pulmonary consultation.  Patient presented to the ED at [facility] yesterday with complaints of 2-3 days of shortness of breath.  It is reported that she uses CPAP at night but she got this from a friend so unsure of settings.  She was placed on BiPAP and given IV Solu-Medrol, bronchodilator treatments, and IV Lasix.  She was admitted to the ICU at [facility].  Patient continued to decline and underwent intubation yesterday around 7:00 p.m.  per report, today patient appeared to be worse.  Her saturations were in the mid 90s on FiO2 of 100%.  A D-dimer was checked and was negative.  Per review of labs which were drawn earlier today, her WBC count was 23,000 and her lactic acid was 2.4.  She was placed on IV Zosyn and IV vancomycin.  Upon arrival to [facility], patient is in no acute distress.  She is intubated and sedated.  Vital signs are stable.  Lactic acid will be repeated now as patient meets sepsis criteria and will check a procalcitonin.  She will be monitored closely in the ICU.           Overview/Hospital Course:  Patient is being managed in intensive care unit, requiring mechanical ventilation under supervision of pulmonary medicine.  Repeat COVID -19 test has been negative.  Patient is receiving intravenous antibiotics for bilateral pneumonia.  Sputum cultures grew MSSA species.  Patient is being followed by infectious  disease specialist.  Patient tested negative for COVID -19 antibody.  Patient has not been able to get extubated, requiring high FiO2 and very high PEEP pressures.    Interval History:  Patient extubated on 07/08.  Continues to do well.  Weaned off of BiPAP on to nasal cannula.  Patient remains somewhat anxious, weaned off of Precedex with started on Xanax p.r.n. patient worked well with physical therapy.    Review of Systems   Constitutional: Positive for activity change, appetite change and fatigue.   Respiratory: Positive for shortness of breath. Negative for cough.    Cardiovascular: Negative for chest pain and leg swelling.   Gastrointestinal: Negative for abdominal pain and nausea.   Musculoskeletal: Positive for back pain.   Neurological: Negative for weakness.   Psychiatric/Behavioral: Negative for confusion.   All other systems reviewed and are negative.    Objective:     Vital Signs (Most Recent):  Temp: 99.9 °F (37.7 °C) (07/10/20 1913)  Pulse: (!) 117 (07/10/20 1936)  Resp: 18 (07/10/20 1936)  BP: 132/73 (07/10/20 1913)  SpO2: (!) 93 % (07/10/20 1926) Vital Signs (24h Range):  Temp:  [98.8 °F (37.1 °C)-100.3 °F (37.9 °C)] 99.9 °F (37.7 °C)  Pulse:  [] 117  Resp:  [14-66] 18  SpO2:  [88 %-100 %] 93 %  BP: (116-153)/(56-98) 132/73     Weight: (!) 139.6 kg (307 lb 12.2 oz)  Body mass index is 56.29 kg/m².    Intake/Output Summary (Last 24 hours) at 7/10/2020 2058  Last data filed at 7/10/2020 1800  Gross per 24 hour   Intake 1151.74 ml   Output 760 ml   Net 391.74 ml      Physical Exam  Vitals signs and nursing note reviewed.   Constitutional:       General: She is not in acute distress.     Appearance: She is obese.      Comments: Massively obese  female in no acute distress   Eyes:      Pupils: Pupils are equal, round, and reactive to light.   Cardiovascular:      Rate and Rhythm: Normal rate and regular rhythm.      Heart sounds: No murmur.   Pulmonary:      Comments: Decreased  breath sounds noted bilaterally  Abdominal:      General: There is no distension.      Palpations: Abdomen is soft.      Tenderness: There is no abdominal tenderness.   Musculoskeletal:      Right lower leg: Edema present.      Left lower leg: Edema present.   Skin:     Capillary Refill: Capillary refill takes 2 to 3 seconds.      Coloration: Skin is not pale.      Findings: No rash.   Neurological:      Mental Status: She is oriented to person, place, and time.         Significant Labs: All pertinent labs within the past 24 hours have been reviewed.    Significant Imaging: I have reviewed all pertinent imaging results/findings within the past 24 hours.      Assessment/Plan:      * Acute respiratory failure with hypoxia and hypercarbia  Patient with Hypercapnic and Hypoxic Respiratory failure which is Acute.  she is not on home oxygen.  Patient remains on nasal cannula presently.    Differential diagnosis includes - COPD, Obesity Hypoventilation, Pneumonia and Aspiration Labs and images were reviewed. Patient Has not has a recent ABG.     Will treat underlying causes and adjust management of respiratory failure as follows- continue to wean respiratory support as tolerated.  Patient is doing well post extubation.          Staphylococcal pneumonia  Patient with current diagnosis of pneumonia due to bacterial etiology due to  MSSA which is uncontrolled due to persistent hypoxia . Current antimicrobial regimen consists of   Antibiotics (From admission, onward)    Start     Stop Route Frequency Ordered    07/01/20 2130  linezolid 600 mg/300 mL IVPB 600 mg      -- IV Every 12 hours (non-standard times) 07/01/20 2021      . Cultures drawn and noted-   Microbiology Results (last 7 days)     Procedure Component Value Units Date/Time    IV catheter culture [[id number]]  (Abnormal) Collected: 07/04/20 0844    Order Status: Completed Specimen: Catheter Tip, Intrajugular Updated: 07/07/20 1034     Aerobic Culture - Cath tip  STAPHYLOCOCCUS EPIDERMIDIS  < 15 colonies      Blood culture [[id number]] Collected: 07/01/20 2034    Order Status: Completed Specimen: Blood Updated: 07/07/20 1012     Blood Culture, Routine No growth after 5 days.    Narrative:      From arterial line    Blood culture [[id number]] Collected: 07/01/20 2036    Order Status: Completed Specimen: Blood from Line, Central Neck Updated: 07/07/20 1012     Blood Culture, Routine No growth after 5 days.    Narrative:      From TLC    Clostridium difficile EIA [[id number]] Collected: 07/05/20 1503    Order Status: Completed Specimen: Stool Updated: 07/05/20 2108     C. diff Antigen Negative     C difficile Toxins A+B, EIA Negative     Comment: Testing not recommended for children <24 months old.       Culture, Respiratory with Gram Stain [[id number]]     Order Status: No result Specimen: Respiratory from Tracheal Aspirate     Blood culture [[id number]] Collected: 06/28/20 0824    Order Status: Completed Specimen: Blood from Antecubital, Right Arm Updated: 07/03/20 2212     Blood Culture, Routine No growth after 5 days.       Will monitor patient closely and continue current treatment plan unchanged.   Infectious disease and pulmonology consulted.  Will defer to them for further evaluation and management of this disease process.      Obesity hypoventilation syndrome  Patient doing well after extubation.  Continue to monitor.      Pulmonary hypertension  Continue to treat per pulmonology directions.      Mild intermittent asthma  Patient's COPD is uncontrolled due to worsening of baseline hypoxia currently.  Patient is currently off COPD Pathway. Continue scheduled inhalers Steroids, Antibiotics and Supplemental oxygen and monitor respiratory status closely.  Continue steroids.        Obstructive sleep apnea syndrome  Severe, likely contributing to respiratory failure.       Morbid obesity  Body mass index is 55.6 kg/m². Morbid obesity complicates all aspects of disease management  from diagnostic modalities to treatment. Weight loss encouraged and health benefits explained to patient.        Type 2 diabetes mellitus  Patient's FSGs are controlled on current hypoglycemics.   Last A1c reviewed-   Lab Results   Component Value Date    HGBA1C 6.2 06/23/2020     Most recent fingerstick glucose reviewed-   Recent Labs   Lab 07/09/20  2347 07/10/20  0522 07/10/20  1126 07/10/20  1657   POCTGLUCOSE 165* 185* 200* 155*     Current correctional scale  Low  Maintain anti-hyperglycemic dose as follows-   Antihyperglycemics (From admission, onward)    Start     Stop Route Frequency Ordered    06/23/20 2235  insulin aspart U-100 pen 1-10 Units      -- SubQ Every 6 hours PRN 06/23/20 2135        Hold Oral hypoglycemics while patient is in the hospital.      Nicotine dependence  Health hazards associated with cigarette smoking should be reviewed with patient and cessation encouraged when pt is able to participate.       Cardiomegaly  Patient with peripheral edema, mildly elevated BNP.  [place] [facility]    Echo was completed today with results as follows:  · Concentric left ventricular remodeling.  · Normal left ventricular systolic function. The estimated ejection fraction is 69%.  · Normal LV diastolic function.  · No wall motion abnormalities.  · Normal right ventricular systolic function.  · Mild right atrial enlargement.  · Trivial pericardial effusion.  · Mild left atrial enlargement.    IV lasix discontinued as no indication of heart failure.    VTE Risk Mitigation (From admission, onward)         Ordered     enoxaparin injection 40 mg  Every 12 hours      07/05/20 1217     IP VTE HIGH RISK PATIENT  Once      06/23/20 2055     Place sequential compression device  Until discontinued      06/23/20 2055                      Gwyn Son MD  Department of Hospital Medicine   [facility]

## 2020-07-11 NOTE — PROGRESS NOTES
[facility] Medicine  Progress Note    Patient Name: Michelle Wren  MRN: [id number]  Patient Class: IP- Inpatient   Admission Date: 6/23/2020  Length of Stay: 18 days  Attending Physician: Sumit Ac,*  Primary Care Provider: Primary Doctor No        Subjective:     Principal Problem:Acute respiratory failure with hypoxia and hypercarbia        HPI:  Michelle Wren is a 29 y/o female with a past medical history significant for asthma, type 2 diabetes, and severe obesity who is transferred from [facility] to [facility] for pulmonary consultation.  Patient presented to the ED at [facility] yesterday with complaints of 2-3 days of shortness of breath.  It is reported that she uses CPAP at night but she got this from a friend so unsure of settings.  She was placed on BiPAP and given IV Solu-Medrol, bronchodilator treatments, and IV Lasix.  She was admitted to the ICU at [facility].  Patient continued to decline and underwent intubation yesterday around 7:00 p.m.  per report, today patient appeared to be worse.  Her saturations were in the mid 90s on FiO2 of 100%.  A D-dimer was checked and was negative.  Per review of labs which were drawn earlier today, her WBC count was 23,000 and her lactic acid was 2.4.  She was placed on IV Zosyn and IV vancomycin.  Upon arrival to [facility], patient is in no acute distress.  She is intubated and sedated.  Vital signs are stable.  Lactic acid will be repeated now as patient meets sepsis criteria and will check a procalcitonin.  She will be monitored closely in the ICU.           Overview/Hospital Course:  Patient is being managed in intensive care unit, requiring mechanical ventilation under supervision of pulmonary medicine.  Repeat COVID -19 test has been negative.  Patient is receiving intravenous antibiotics for bilateral pneumonia.  Sputum cultures grew MSSA species.  Patient is being followed by  infectious disease specialist.  Patient tested negative for COVID -19 antibody.  Patient remained intubated for prolonged period of time given her severe intrinsic lung disease as well as obesity hypoventilation.  She was extubated on 07/08, and did well post extubation.  She was initially on BiPAP and was weaned slowly to nasal cannula which she tolerated.    Interval History:  Patient extubated on 07/08, now step-down from the ICU.  Breathing comfortably on oxygen per nasal cannula.  She did not participate with physical therapy this morning as she had just received her breakfast but plans to get up this afternoon.  On complaint is being tired    Review of Systems   Constitutional: Positive for activity change, appetite change and fatigue.   Respiratory: Positive for shortness of breath. Negative for cough.    Cardiovascular: Negative for chest pain and leg swelling.   Gastrointestinal: Negative for abdominal pain and nausea.   Musculoskeletal: Positive for back pain.   Neurological: Negative for weakness.   Psychiatric/Behavioral: Negative for confusion.   All other systems reviewed and are negative.    Objective:     Vital Signs (Most Recent):  Temp: 99.9 °F (37.7 °C) (07/11/20 0734)  Pulse: (!) 113 (07/11/20 0734)  Resp: 18 (07/11/20 0734)  BP: (!) 141/67 (07/11/20 0734)  SpO2: (!) 90 % (07/11/20 0734) Vital Signs (24h Range):  Temp:  [99.3 °F (37.4 °C)-100.3 °F (37.9 °C)] 99.9 °F (37.7 °C)  Pulse:  [] 113  Resp:  [16-23] 18  SpO2:  [88 %-100 %] 90 %  BP: (110-153)/(63-98) 141/67     Weight: (!) 139.6 kg (307 lb 12.2 oz)  Body mass index is 56.29 kg/m².    Intake/Output Summary (Last 24 hours) at 7/11/2020 0950  Last data filed at 7/11/2020 0540  Gross per 24 hour   Intake 1041.12 ml   Output 355 ml   Net 686.12 ml      Physical Exam  Vitals signs and nursing note reviewed.   Constitutional:       General: She is not in acute distress.     Appearance: She is obese.      Comments: Massively obese   American female in no acute distress   Eyes:      Pupils: Pupils are equal, round, and reactive to light.   Cardiovascular:      Rate and Rhythm: Normal rate and regular rhythm.      Heart sounds: No murmur.   Pulmonary:      Comments: Decreased breath sounds noted bilaterally  Abdominal:      General: There is no distension.      Palpations: Abdomen is soft.      Tenderness: There is no abdominal tenderness.   Musculoskeletal:      Right lower leg: Edema present.      Left lower leg: Edema present.   Skin:     Capillary Refill: Capillary refill takes 2 to 3 seconds.      Coloration: Skin is not pale.      Findings: No rash.   Neurological:      Mental Status: She is oriented to person, place, and time.         Significant Labs: All pertinent labs within the past 24 hours have been reviewed.    Significant Imaging: I have reviewed all pertinent imaging results/findings within the past 24 hours.      Assessment/Plan:      * Acute respiratory failure with hypoxia and hypercarbia  Patient with Hypercapnic and Hypoxic Respiratory failure which is Acute.  she is not on home oxygen.  Patient remains on nasal cannula presently.    Differential diagnosis includes - COPD, Obesity Hypoventilation, Pneumonia and Aspiration Labs and images were reviewed. Patient Has not has a recent ABG.     Will treat underlying causes and adjust management of respiratory failure as follows- continue to wean respiratory support as tolerated.  Patient is doing well post extubation.          Morbid obesity  Body mass index is 55.6 kg/m². Morbid obesity complicates all aspects of disease management from diagnostic modalities to treatment. Weight loss encouraged and health benefits explained to patient.        Pulmonary hypertension  Continue to treat per pulmonology directions.      Obstructive sleep apnea syndrome  Severe, likely contributing to respiratory failure.       Staphylococcal pneumonia  Patient with current diagnosis of pneumonia due  to bacterial etiology due to  MSSA which is uncontrolled due to persistent hypoxia . Current antimicrobial regimen consists of   Antibiotics (From admission, onward)    Start     Stop Route Frequency Ordered    07/01/20 2130  linezolid 600 mg/300 mL IVPB 600 mg      -- IV Every 12 hours (non-standard times) 07/01/20 2021      . Cultures drawn and noted-   Microbiology Results (last 7 days)     Procedure Component Value Units Date/Time    IV catheter culture [[id number]]  (Abnormal) Collected: 07/04/20 0844    Order Status: Completed Specimen: Catheter Tip, Intrajugular Updated: 07/07/20 1034     Aerobic Culture - Cath tip STAPHYLOCOCCUS EPIDERMIDIS  < 15 colonies      Blood culture [[id number]] Collected: 07/01/20 2034    Order Status: Completed Specimen: Blood Updated: 07/07/20 1012     Blood Culture, Routine No growth after 5 days.    Narrative:      From arterial line    Blood culture [[id number]] Collected: 07/01/20 2036    Order Status: Completed Specimen: Blood from Line, Central Neck Updated: 07/07/20 1012     Blood Culture, Routine No growth after 5 days.    Narrative:      From TLC    Clostridium difficile EIA [[id number]] Collected: 07/05/20 1503    Order Status: Completed Specimen: Stool Updated: 07/05/20 2108     C. diff Antigen Negative     C difficile Toxins A+B, EIA Negative     Comment: Testing not recommended for children <24 months old.       Culture, Respiratory with Gram Stain [[id number]]     Order Status: No result Specimen: Respiratory from Tracheal Aspirate     Blood culture [[id number]] Collected: 06/28/20 0824    Order Status: Completed Specimen: Blood from Antecubital, Right Arm Updated: 07/03/20 2212     Blood Culture, Routine No growth after 5 days.       Will monitor patient closely and continue current treatment plan unchanged.   Infectious disease and pulmonology consulted.  Will defer to them for further evaluation and management of this disease process.      Type 2 diabetes  mellitus  Patient's FSGs are controlled on current hypoglycemics.   Last A1c reviewed-   Lab Results   Component Value Date    HGBA1C 6.2 06/23/2020     Most recent fingerstick glucose reviewed-   Recent Labs   Lab 07/09/20  2347 07/10/20  0522 07/10/20  1126 07/10/20  1657   POCTGLUCOSE 165* 185* 200* 155*     Current correctional scale  Low  Maintain anti-hyperglycemic dose as follows-   Antihyperglycemics (From admission, onward)    Start     Stop Route Frequency Ordered    06/23/20 2235  insulin aspart U-100 pen 1-10 Units      -- SubQ Every 6 hours PRN 06/23/20 2135        Hold Oral hypoglycemics while patient is in the hospital.      Nicotine dependence  Health hazards associated with cigarette smoking should be reviewed with patient and cessation encouraged when pt is able to participate.       Mild intermittent asthma  Patient's COPD is uncontrolled due to worsening of baseline hypoxia currently.  Patient is currently off COPD Pathway. Continue scheduled inhalers Steroids, Antibiotics and Supplemental oxygen and monitor respiratory status closely.  Continue steroids.        Cardiomegaly  Patient with peripheral edema, mildly elevated BNP.  IV lasix was initiated at [facility].    Echo was completed today with results as follows:  · Concentric left ventricular remodeling.  · Normal left ventricular systolic function. The estimated ejection fraction is 69%.  · Normal LV diastolic function.  · No wall motion abnormalities.  · Normal right ventricular systolic function.  · Mild right atrial enlargement.  · Trivial pericardial effusion.  · Mild left atrial enlargement.    IV lasix discontinued as no indication of heart failure.    Obesity hypoventilation syndrome  Patient doing well after extubation.  Continue to monitor.        VTE Risk Mitigation (From admission, onward)         Ordered     enoxaparin injection 40 mg  Every 12 hours      07/05/20 1217     IP VTE HIGH RISK PATIENT  Once      06/23/20 2055     Place  sequential compression device  Until discontinued      06/23/20 2055                      Sumit Ac MD  Department of Hospital Medicine   [facility]

## 2020-07-11 NOTE — PLAN OF CARE
Patient alert and oriented.  Sinus tach on cardiac monitor #8672.  Diabetes management.  Right upper arm PICC flushed X 2, dressing due to be changed 7/17/2020.  Patient able to turn herself in bed.  Patient refused oral care and mouth wash.  Full code.  BiPAP Q HS.  Call light in reach.  Bed in low/locked position.

## 2020-07-11 NOTE — PROGRESS NOTES
.  Progress Note  PULMONARY    Admit Date: 6/23/2020 07/11/2020      SUBJECTIVE:     June 25th-patient is sedated, no complaints, intubated.  6/26 intubated.  6/27 no c/o intubated.  6/28 no new c/o,intubated, arouses  6/29- no new c/o, intubated,   6/30 no new c/o  7/6 arouses,  No c/o. Intubated.  7/7 no c/o, arouses  7/8 intubated no c/o  7/9 wants juice, not sob. On precedex.  Above from Dr Gentile and below from Dr Guerra:  7/10- On NC 3L. Complains her feet are hurting. Got up to restroom and that made her more short of breath today. Father at bedside     7/11/2020 - Stable and on the floor, no new respiratory complaints.  She has been up in the chair - was able to stand and pivot but legs are still very weak.  No new issues reported.    PFSH and Allergies reviewed.    OBJECTIVE:     Vitals (Most recent):  Vitals:    07/11/20 1618   BP:    Pulse: 103   Resp: 16   Temp:        Vitals (24 hour range):  Temp:  [99.4 °F (37.4 °C)-100.3 °F (37.9 °C)]   Pulse:  []   Resp:  [16-22]   BP: (110-154)/()   SpO2:  [88 %-98 %]       Intake/Output Summary (Last 24 hours) at 7/11/2020 1643  Last data filed at 7/11/2020 0540  Gross per 24 hour   Intake 720 ml   Output 90 ml   Net 630 ml          Physical Exam:  The patient's neuro status (alertness,orientation,cognitive function,motor skills,), pharyngeal exam (oral lesions, hygiene, abn dentition,), Neck (jvd,mass,thyroid,nodes in neck and above/below clavicle),RESPIRATORY(symmetry,effort,fremitus,percussion,auscultation),  Cor(rhythm,heart tones including gallops,perfusion,edema)ABD(distention,hepatic&splenomegaly,tenderness,masses), Skin(rash,cyanosis),Psyc(affect,judgement,).  Exam negative except for these pertinent findings:    M4, tongue w/ white discoloration  Morbidly obese, no acute distress  NC in place  Lungs clear  Abdomen distended nontender  No edema  Bilateral feet tender to palpation, no wounds or discoloration noted    Radiographs reviewed:  view by direct vision   CXR 7/10- continued bibasilar opacification (likely appears worse off positive pressure). S/p R sided PICC line    Labs     Recent Labs   Lab 07/11/20  0502   WBC 15.13*   HGB 14.6   HCT 48.6*        Recent Labs   Lab 07/11/20  0502      K 3.8   CL 96   CO2 28   BUN 15   CREATININE 0.8   *   CALCIUM 10.1   MG 2.0   PHOS 3.1   AST 15   ALT 17   ALKPHOS 58   BILITOT 0.9   PROT 8.2   ALBUMIN 3.3*     No results for input(s): PH, PCO2, PO2, HCO3 in the last 24 hours.  Microbiology Results (last 7 days)     Procedure Component Value Units Date/Time    IV catheter culture [[id number]]  (Abnormal) Collected: 07/04/20 0844    Order Status: Completed Specimen: Catheter Tip, Intrajugular Updated: 07/07/20 1034     Aerobic Culture - Cath tip STAPHYLOCOCCUS EPIDERMIDIS  < 15 colonies      Blood culture [[id number]] Collected: 07/01/20 2034    Order Status: Completed Specimen: Blood Updated: 07/07/20 1012     Blood Culture, Routine No growth after 5 days.    Narrative:      From arterial line    Blood culture [[id number]] Collected: 07/01/20 2036    Order Status: Completed Specimen: Blood from Line, Central Neck Updated: 07/07/20 1012     Blood Culture, Routine No growth after 5 days.    Narrative:      From TLC    Clostridium difficile EIA [[id number]] Collected: 07/05/20 1503    Order Status: Completed Specimen: Stool Updated: 07/05/20 2108     C. diff Antigen Negative     C difficile Toxins A+B, EIA Negative     Comment: Testing not recommended for children <24 months old.       Culture, Respiratory with Gram Stain [[id number]]     Order Status: No result Specimen: Respiratory from Tracheal Aspirate         Echo 6/22/2020  · Concentric left ventricular remodeling.  · Normal left ventricular systolic function. The estimated ejection fraction is 69%.  · Normal LV diastolic function.  · No wall motion abnormalities.  · Normal right ventricular systolic function.  · Mild right atrial  enlargement.  · Trivial pericardial effusion.  · Mild left atrial enlargement.       Impression:  Active Hospital Problems    Diagnosis  POA    *Acute respiratory failure with hypoxia and hypercarbia [J96.01, J96.02]  Yes    Respiratory failure with hypoxia [J96.91]  Yes    Morbid obesity [E66.01]  Yes    Pulmonary hypertension [I27.20]  Yes     By pulmonary artery size on ct chest 6/30      Obstructive sleep apnea syndrome [G47.33]  Yes    Staphylococcal pneumonia [J15.20]  Yes    Type 2 diabetes mellitus [E11.9]  Yes    Nicotine dependence [F17.200]  Yes    Obesity hypoventilation syndrome [E66.2]  Yes    Mild intermittent asthma [J45.20]  Yes    Cardiomegaly [I51.7]  Yes      Resolved Hospital Problems    Diagnosis Date Resolved POA    Severe sepsis [A41.9, R65.20] 07/05/2020 Yes    Bilateral pneumonia [J18.9] 07/05/2020 Yes    Respiratory failure with hypoxia [J96.91] 07/06/2020 Yes    Class 3 severe obesity with serious comorbidity and body mass index (BMI) of 60.0 to 69.9 in adult [E66.01, Z68.44] 07/05/2020 Not Applicable               Plan:   Acute on chronic hypoxemic/hypercapneic resp failure  [facility]/LAURIE  Acute exacerbation of asthma  Bilateral lower lobe pneumonia- MSSA  Morbid obesity  Oropharyngeal thrush    - mobilize as tolerated  - continue NC oxygen during day, BIPAP at night  - continue nebulized bronchodilators and budesonide  - continue prednisone slow taper  - continue Linezolid  - continue PO lasix  - continue nystatin for a few more days; thrush appears improved  - increase activity as able      Chris Ray MD  [facility] Pulmonary/Critical Care

## 2020-07-11 NOTE — PLAN OF CARE
POC reviewed with patient and patient's family, understanding verbalized. AAOX4.Up with 2 assist. Glucose monitoring/coverage ACHS as needed. Telemetry monitoring maintained. VS stable throughout shift. Safety maintained. Will continue to monitor.

## 2020-07-11 NOTE — PLAN OF CARE
Patient remains on aerosol tx via duoneb now and q4hr and 0.5mg pulmicort now and q12hr.  Hr 120 and 02 saturation 93% on nc at 3lpm.  Bipap at bedside on standby.

## 2020-07-11 NOTE — SUBJECTIVE & OBJECTIVE
Interval History:  Patient extubated on 07/08.  Continues to do well.  Weaned off of BiPAP on to nasal cannula.  Patient remains somewhat anxious, weaned off of Precedex with started on Xanax p.r.n. patient worked well with physical therapy.    Review of Systems   Constitutional: Positive for activity change, appetite change and fatigue.   Respiratory: Positive for shortness of breath. Negative for cough.    Cardiovascular: Negative for chest pain and leg swelling.   Gastrointestinal: Negative for abdominal pain and nausea.   Musculoskeletal: Positive for back pain.   Neurological: Negative for weakness.   Psychiatric/Behavioral: Negative for confusion.   All other systems reviewed and are negative.    Objective:     Vital Signs (Most Recent):  Temp: 99.9 °F (37.7 °C) (07/10/20 1913)  Pulse: (!) 117 (07/10/20 1936)  Resp: 18 (07/10/20 1936)  BP: 132/73 (07/10/20 1913)  SpO2: (!) 93 % (07/10/20 1926) Vital Signs (24h Range):  Temp:  [98.8 °F (37.1 °C)-100.3 °F (37.9 °C)] 99.9 °F (37.7 °C)  Pulse:  [] 117  Resp:  [14-66] 18  SpO2:  [88 %-100 %] 93 %  BP: (116-153)/(56-98) 132/73     Weight: (!) 139.6 kg (307 lb 12.2 oz)  Body mass index is 56.29 kg/m².    Intake/Output Summary (Last 24 hours) at 7/10/2020 2058  Last data filed at 7/10/2020 1800  Gross per 24 hour   Intake 1151.74 ml   Output 760 ml   Net 391.74 ml      Physical Exam  Vitals signs and nursing note reviewed.   Constitutional:       General: She is not in acute distress.     Appearance: She is obese.      Comments: Massively obese  female in no acute distress   Eyes:      Pupils: Pupils are equal, round, and reactive to light.   Cardiovascular:      Rate and Rhythm: Normal rate and regular rhythm.      Heart sounds: No murmur.   Pulmonary:      Comments: Decreased breath sounds noted bilaterally  Abdominal:      General: There is no distension.      Palpations: Abdomen is soft.      Tenderness: There is no abdominal tenderness.    Musculoskeletal:      Right lower leg: Edema present.      Left lower leg: Edema present.   Skin:     Capillary Refill: Capillary refill takes 2 to 3 seconds.      Coloration: Skin is not pale.      Findings: No rash.   Neurological:      Mental Status: She is oriented to person, place, and time.         Significant Labs: All pertinent labs within the past 24 hours have been reviewed.    Significant Imaging: I have reviewed all pertinent imaging results/findings within the past 24 hours.

## 2020-07-11 NOTE — SUBJECTIVE & OBJECTIVE
Interval History:  Patient extubated on 07/08, now step-down from the ICU.  Breathing comfortably on oxygen per nasal cannula.  She did not participate with physical therapy this morning as she had just received her breakfast but plans to get up this afternoon.  On complaint is being tired    Review of Systems   Constitutional: Positive for activity change, appetite change and fatigue.   Respiratory: Positive for shortness of breath. Negative for cough.    Cardiovascular: Negative for chest pain and leg swelling.   Gastrointestinal: Negative for abdominal pain and nausea.   Musculoskeletal: Positive for back pain.   Neurological: Negative for weakness.   Psychiatric/Behavioral: Negative for confusion.   All other systems reviewed and are negative.    Objective:     Vital Signs (Most Recent):  Temp: 99.9 °F (37.7 °C) (07/11/20 0734)  Pulse: (!) 113 (07/11/20 0734)  Resp: 18 (07/11/20 0734)  BP: (!) 141/67 (07/11/20 0734)  SpO2: (!) 90 % (07/11/20 0734) Vital Signs (24h Range):  Temp:  [99.3 °F (37.4 °C)-100.3 °F (37.9 °C)] 99.9 °F (37.7 °C)  Pulse:  [] 113  Resp:  [16-23] 18  SpO2:  [88 %-100 %] 90 %  BP: (110-153)/(63-98) 141/67     Weight: (!) 139.6 kg (307 lb 12.2 oz)  Body mass index is 56.29 kg/m².    Intake/Output Summary (Last 24 hours) at 7/11/2020 0950  Last data filed at 7/11/2020 0540  Gross per 24 hour   Intake 1041.12 ml   Output 355 ml   Net 686.12 ml      Physical Exam  Vitals signs and nursing note reviewed.   Constitutional:       General: She is not in acute distress.     Appearance: She is obese.      Comments: Massively obese  female in no acute distress   Eyes:      Pupils: Pupils are equal, round, and reactive to light.   Cardiovascular:      Rate and Rhythm: Normal rate and regular rhythm.      Heart sounds: No murmur.   Pulmonary:      Comments: Decreased breath sounds noted bilaterally  Abdominal:      General: There is no distension.      Palpations: Abdomen is soft.       Tenderness: There is no abdominal tenderness.   Musculoskeletal:      Right lower leg: Edema present.      Left lower leg: Edema present.   Skin:     Capillary Refill: Capillary refill takes 2 to 3 seconds.      Coloration: Skin is not pale.      Findings: No rash.   Neurological:      Mental Status: She is oriented to person, place, and time.         Significant Labs: All pertinent labs within the past 24 hours have been reviewed.    Significant Imaging: I have reviewed all pertinent imaging results/findings within the past 24 hours.

## 2020-07-11 NOTE — PT/OT/SLP EVAL
Physical Therapy Evaluation    Patient Name:  Michelle Wren   MRN:  [id number]    Recommendations:     Discharge Recommendations:  home, home health PT, home with home health   Discharge Equipment Recommendations: other (see comments)(TBD)   Barriers to discharge: limited mobility    Assessment:     Michelle Wren is a 30 y.o. female admitted with a medical diagnosis of Acute respiratory failure with hypoxia and hypercarbia.  She presents with the following impairments/functional limitations:  weakness, impaired endurance, impaired sensation, impaired functional mobilty, gait instability, impaired balance, decreased lower extremity function, decreased upper extremity function, impaired cardiopulmonary response to activity  .    Rehab Prognosis: Good; patient would benefit from acute skilled PT services to address these deficits and reach maximum level of function.    Recent Surgery: Procedure(s) (LRB):  CREATION, TRACHEOSTOMY (N/A)  EGD, WITH PEG TUBE INSERTION (N/A)      Plan:     During this hospitalization, patient to be seen daily to address the identified rehab impairments via gait training, therapeutic activities, therapeutic exercises and progress toward the following goals:    · Plan of Care Expires:  07/31/20    Subjective     Chief Complaint: weak legs  Patient/Family Comments/goals: agrees to work with PT to get to chair    Living Environment:  House with family with 0 steps to enter  Prior to admission, patients level of function was independent.  Equipment used at home: none.    Upon discharge, patient will have assistance from family.    Objective:     Communicated with nurse (Dulce) prior to session.  Patient found HOB elevated with bed alarm, oxygen  upon PT entry to room.    General Precautions: Standard, fall   Orthopedic Precautions:    Braces: N/A     Exams:  · RLE ROM: WFL except somewhat luevano by soft tissue approximation  · RLE Strength: 3/5 - 3+/5  · LLE ROM: WFL except somehwat luevano by  soft tissue approximation  · LLE Strength: 3/5 - 3+/5    Functional Mobility:  · Bed Mobility:     · Rolling Left:  moderate assistance  · Supine to Sit: moderate assistance  · Transfers:     · Sit to Stand:  minimum assistance and moderate assistance with rolling walker  · Bed to Chair: moderate assistance with  rolling walker  using  Stand Pivot  · Gait: sidestepped 3' to chair with RW with mod A and cues    Therapeutic Activities and Exercises:   SUPINE: SLR (poor rom), heelslides, hip abd/add, ankle DF, x 10 reps x 2 sets each  SEATED: instructed to perform frequent ankle arom  Static stand balance training with RW x ~30 sec with CGA    AM-PAC 6 CLICK MOBILITY  Total Score:14     Patient left up in chair with all lines intact, call button in reach, chair alarm on, nurse (Dulce) notified and O2 to wall via nc.    GOALS:   Multidisciplinary Problems     Physical Therapy Goals        Problem: Physical Therapy Goal    Goal Priority Disciplines Outcome Goal Variances Interventions   Physical Therapy Goal     PT, PT/OT Ongoing, Progressing     Description: Goals to be met by: 2020     Patient will increase functional independence with mobility by performin). Supine to sit with Stand-by Assistance  2). Sit to supine with Stand-by Assistance  3). Sit to stand transfer with Stand-by Assistance  4). Bed to chair transfer with Stand-by Assistance   5). Gait  x > 25 feet with Stand-by Assistance using Rolling Walker.                      History:     Past Medical History:   Diagnosis Date    Asthma     Back pain     Diabetes mellitus     Morbid obesity     Obesity        Past Surgical History:   Procedure Laterality Date    APPENDECTOMY       SECTION         Time Tracking:     PT Received On: 20  PT Start Time: 1000     PT Stop Time: 1020  PT Total Time (min): 20 min     Billable Minutes: Evaluation 10 and Therapeutic Exercise 10      Marshall Connors, PT  2020

## 2020-07-11 NOTE — RESPIRATORY THERAPY
07/10/20 1926   Patient Assessment/Suction   Level of Consciousness (AVPU) alert   Respiratory Effort Unlabored   Expansion/Accessory Muscles/Retractions no use of accessory muscles   All Lung Fields Breath Sounds diminished   Rhythm/Pattern, Respiratory unlabored   Cough Frequency no cough   PRE-TX-O2   O2 Device (Oxygen Therapy) BiPAP   Oxygen Concentration (%) 50   SpO2 (!) 93 %   Pulse Oximetry Type Continuous   $ Pulse Oximetry - Multiple Charge Pulse Oximetry - Multiple   Pulse (!) 121   Resp 16   Aerosol Therapy   $ Aerosol Therapy Charges Aerosol Treatment   Respiratory Treatment Status (SVN) given   Treatment Route (SVN) in-line   Patient Position (SVN) HOB elevated   Post Treatment Assessment (SVN) breath sounds unchanged   Signs of Intolerance (SVN) none   Breath Sounds Post-Respiratory Treatment   Throughout All Fields Post-Treatment All Fields   Throughout All Fields Post-Treatment no change   Post-treatment Heart Rate (beats/min) 117   Post-treatment Resp Rate (breaths/min) 18   Ready to Wean/Extubation Screen   FIO2<=50 (chart decimal) 0.5   Preset CPAP/BiPAP Settings   Mode Of Delivery BiPAP   $ CPAP/BiPAP Daily Charge BiPAP/CPAP Daily   $ Initial CPAP/BiPAP Setup? No   $ Is patient using? Yes   Size of Mask Small   Sized Appropriately? Yes   Equipment Type V60   Airway Device Type small full face mask   Humidifier not applicable   Ipap 20   EPAP (cm H2O) 10   Pressure Support (cm H2O) 10   Set Rate (Breaths/Min) 10   ITime (sec) 1   Rise Time (sec) 0.3   Patient CPAP/BiPAP Settings   Timed Inspiration (Sec) 1   IPAP Rise Time (sec) 0.3   RR Total (Breaths/Min) 18   Tidal Volume (mL) 578   VE Minute Ventilation (L/min) 10.6 L/min   Peak Inspiratory Pressure (cm H2O) 20   TiTOT (%) 27   Total Leak (L/Min) 11   Patient Trigger - ST Mode Only (%) 98   CPAP/BiPAP Alarms   High Pressure (cm H2O) 25   Low Pressure (cm H2O) 15   Low Pressure Delay (Sec) 20   Minute Ventilation (L/Min) 5   High RR  (breaths/min) 35   Low RR (breaths/min) 10   Apnea (Sec) 20

## 2020-07-11 NOTE — ASSESSMENT & PLAN NOTE
Patient's COPD is uncontrolled due to worsening of baseline hypoxia currently.  Patient is currently off COPD Pathway. Continue scheduled inhalers Steroids, Antibiotics and Supplemental oxygen and monitor respiratory status closely.  Continue steroids.

## 2020-07-11 NOTE — PLAN OF CARE
Problem: Physical Therapy Goal  Goal: Physical Therapy Goal  Description: Goals to be met by: 2020     Patient will increase functional independence with mobility by performin). Supine to sit with Stand-by Assistance  2). Sit to supine with Stand-by Assistance  3). Sit to stand transfer with Stand-by Assistance  4). Bed to chair transfer with Stand-by Assistance   5). Gait  x > 25 feet with Stand-by Assistance using Rolling Walker.     Outcome: Ongoing, Progressing

## 2020-07-11 NOTE — ASSESSMENT & PLAN NOTE
Patient's FSGs are controlled on current hypoglycemics.   Last A1c reviewed-   Lab Results   Component Value Date    HGBA1C 6.2 06/23/2020     Most recent fingerstick glucose reviewed-   Recent Labs   Lab 07/09/20  2347 07/10/20  0522 07/10/20  1126 07/10/20  1657   POCTGLUCOSE 165* 185* 200* 155*     Current correctional scale  Low  Maintain anti-hyperglycemic dose as follows-   Antihyperglycemics (From admission, onward)    Start     Stop Route Frequency Ordered    06/23/20 2235  insulin aspart U-100 pen 1-10 Units      -- SubQ Every 6 hours PRN 06/23/20 2135        Hold Oral hypoglycemics while patient is in the hospital.

## 2020-07-12 LAB
ALBUMIN SERPL BCP-MCNC: 3.3 G/DL (ref 3.5–5.2)
ALP SERPL-CCNC: 57 U/L (ref 55–135)
ALT SERPL W/O P-5'-P-CCNC: 17 U/L (ref 10–44)
ANION GAP SERPL CALC-SCNC: 14 MMOL/L (ref 8–16)
AST SERPL-CCNC: 13 U/L (ref 10–40)
BASOPHILS # BLD AUTO: 0.03 K/UL (ref 0–0.2)
BASOPHILS NFR BLD: 0.2 % (ref 0–1.9)
BILIRUB SERPL-MCNC: 0.8 MG/DL (ref 0.1–1)
BUN SERPL-MCNC: 13 MG/DL (ref 6–20)
CALCIUM SERPL-MCNC: 9.7 MG/DL (ref 8.7–10.5)
CHLORIDE SERPL-SCNC: 93 MMOL/L (ref 95–110)
CO2 SERPL-SCNC: 28 MMOL/L (ref 23–29)
CREAT SERPL-MCNC: 0.9 MG/DL (ref 0.5–1.4)
DIFFERENTIAL METHOD: ABNORMAL
EOSINOPHIL # BLD AUTO: 0 K/UL (ref 0–0.5)
EOSINOPHIL NFR BLD: 0 % (ref 0–8)
ERYTHROCYTE [DISTWIDTH] IN BLOOD BY AUTOMATED COUNT: 16.1 % (ref 11.5–14.5)
EST. GFR  (AFRICAN AMERICAN): >60 ML/MIN/1.73 M^2
EST. GFR  (NON AFRICAN AMERICAN): >60 ML/MIN/1.73 M^2
GLUCOSE SERPL-MCNC: 264 MG/DL (ref 70–110)
HCT VFR BLD AUTO: 46.8 % (ref 37–48.5)
HGB BLD-MCNC: 14 G/DL (ref 12–16)
IMM GRANULOCYTES # BLD AUTO: 0.12 K/UL (ref 0–0.04)
IMM GRANULOCYTES NFR BLD AUTO: 0.8 % (ref 0–0.5)
LYMPHOCYTES # BLD AUTO: 2 K/UL (ref 1–4.8)
LYMPHOCYTES NFR BLD: 13.2 % (ref 18–48)
MAGNESIUM SERPL-MCNC: 1.7 MG/DL (ref 1.6–2.6)
MCH RBC QN AUTO: 26.3 PG (ref 27–31)
MCHC RBC AUTO-ENTMCNC: 29.9 G/DL (ref 32–36)
MCV RBC AUTO: 88 FL (ref 82–98)
MONOCYTES # BLD AUTO: 1.7 K/UL (ref 0.3–1)
MONOCYTES NFR BLD: 11.1 % (ref 4–15)
NEUTROPHILS # BLD AUTO: 11.5 K/UL (ref 1.8–7.7)
NEUTROPHILS NFR BLD: 74.7 % (ref 38–73)
NRBC BLD-RTO: 0 /100 WBC
PHOSPHATE SERPL-MCNC: 3 MG/DL (ref 2.7–4.5)
PLATELET # BLD AUTO: 320 K/UL (ref 150–350)
PMV BLD AUTO: 12.1 FL (ref 9.2–12.9)
POCT GLUCOSE: 189 MG/DL (ref 70–110)
POCT GLUCOSE: 192 MG/DL (ref 70–110)
POTASSIUM SERPL-SCNC: 4 MMOL/L (ref 3.5–5.1)
PROT SERPL-MCNC: 8 G/DL (ref 6–8.4)
RBC # BLD AUTO: 5.33 M/UL (ref 4–5.4)
SODIUM SERPL-SCNC: 135 MMOL/L (ref 136–145)
WBC # BLD AUTO: 15.34 K/UL (ref 3.9–12.7)

## 2020-07-12 PROCEDURE — 25000003 PHARM REV CODE 250: Performed by: HOSPITALIST

## 2020-07-12 PROCEDURE — 63600175 PHARM REV CODE 636 W HCPCS: Performed by: HOSPITALIST

## 2020-07-12 PROCEDURE — 99232 SBSQ HOSP IP/OBS MODERATE 35: CPT | Mod: S$GLB,,, | Performed by: INTERNAL MEDICINE

## 2020-07-12 PROCEDURE — 97116 GAIT TRAINING THERAPY: CPT | Mod: CQ

## 2020-07-12 PROCEDURE — 99232 PR SUBSEQUENT HOSPITAL CARE,LEVL II: ICD-10-PCS | Mod: S$GLB,,, | Performed by: INTERNAL MEDICINE

## 2020-07-12 PROCEDURE — 85025 COMPLETE CBC W/AUTO DIFF WBC: CPT

## 2020-07-12 PROCEDURE — 94761 N-INVAS EAR/PLS OXIMETRY MLT: CPT

## 2020-07-12 PROCEDURE — 25000242 PHARM REV CODE 250 ALT 637 W/ HCPCS: Performed by: HOSPITALIST

## 2020-07-12 PROCEDURE — 11000001 HC ACUTE MED/SURG PRIVATE ROOM

## 2020-07-12 PROCEDURE — 83735 ASSAY OF MAGNESIUM: CPT

## 2020-07-12 PROCEDURE — C9113 INJ PANTOPRAZOLE SODIUM, VIA: HCPCS | Performed by: HOSPITALIST

## 2020-07-12 PROCEDURE — 84100 ASSAY OF PHOSPHORUS: CPT

## 2020-07-12 PROCEDURE — 27000221 HC OXYGEN, UP TO 24 HOURS

## 2020-07-12 PROCEDURE — 25000242 PHARM REV CODE 250 ALT 637 W/ HCPCS: Performed by: INTERNAL MEDICINE

## 2020-07-12 PROCEDURE — 94640 AIRWAY INHALATION TREATMENT: CPT

## 2020-07-12 PROCEDURE — 36415 COLL VENOUS BLD VENIPUNCTURE: CPT

## 2020-07-12 PROCEDURE — 80053 COMPREHEN METABOLIC PANEL: CPT

## 2020-07-12 PROCEDURE — 63700000 PHARM REV CODE 250 ALT 637 W/O HCPCS: Performed by: HOSPITALIST

## 2020-07-12 PROCEDURE — 94760 N-INVAS EAR/PLS OXIMETRY 1: CPT

## 2020-07-12 RX ORDER — PANTOPRAZOLE SODIUM 40 MG/1
40 TABLET, DELAYED RELEASE ORAL DAILY
Status: DISCONTINUED | OUTPATIENT
Start: 2020-07-12 | End: 2020-07-14 | Stop reason: HOSPADM

## 2020-07-12 RX ADMIN — METOCLOPRAMIDE 10 MG: 5 INJECTION, SOLUTION INTRAMUSCULAR; INTRAVENOUS at 05:07

## 2020-07-12 RX ADMIN — FUROSEMIDE 20 MG: 20 TABLET ORAL at 08:07

## 2020-07-12 RX ADMIN — LINEZOLID 600 MG: 600 INJECTION, SOLUTION INTRAVENOUS at 09:07

## 2020-07-12 RX ADMIN — METOCLOPRAMIDE 10 MG: 5 INJECTION, SOLUTION INTRAMUSCULAR; INTRAVENOUS at 02:07

## 2020-07-12 RX ADMIN — ALPRAZOLAM 0.25 MG: 0.25 TABLET ORAL at 09:07

## 2020-07-12 RX ADMIN — BUDESONIDE INHALATION 0.5 MG: 0.5 SUSPENSION RESPIRATORY (INHALATION) at 07:07

## 2020-07-12 RX ADMIN — INSULIN ASPART 1 UNITS: 100 INJECTION, SOLUTION INTRAVENOUS; SUBCUTANEOUS at 09:07

## 2020-07-12 RX ADMIN — METOPROLOL SUCCINATE 50 MG: 50 TABLET, FILM COATED, EXTENDED RELEASE ORAL at 08:07

## 2020-07-12 RX ADMIN — IPRATROPIUM BROMIDE AND ALBUTEROL SULFATE 3 ML: .5; 2.5 SOLUTION RESPIRATORY (INHALATION) at 07:07

## 2020-07-12 RX ADMIN — METOCLOPRAMIDE 10 MG: 5 INJECTION, SOLUTION INTRAMUSCULAR; INTRAVENOUS at 09:07

## 2020-07-12 RX ADMIN — LINEZOLID 600 MG: 600 INJECTION, SOLUTION INTRAVENOUS at 08:07

## 2020-07-12 RX ADMIN — IPRATROPIUM BROMIDE AND ALBUTEROL SULFATE 3 ML: .5; 2.5 SOLUTION RESPIRATORY (INHALATION) at 01:07

## 2020-07-12 RX ADMIN — PREDNISONE 10 MG: 5 TABLET ORAL at 09:07

## 2020-07-12 RX ADMIN — PREDNISONE 10 MG: 5 TABLET ORAL at 08:07

## 2020-07-12 RX ADMIN — PANTOPRAZOLE SODIUM 40 MG: 40 INJECTION, POWDER, LYOPHILIZED, FOR SOLUTION INTRAVENOUS at 09:07

## 2020-07-12 RX ADMIN — INSULIN ASPART 2 UNITS: 100 INJECTION, SOLUTION INTRAVENOUS; SUBCUTANEOUS at 04:07

## 2020-07-12 RX ADMIN — ACETAMINOPHEN 650 MG: 160 SOLUTION ORAL at 05:07

## 2020-07-12 RX ADMIN — IPRATROPIUM BROMIDE AND ALBUTEROL SULFATE 3 ML: .5; 2.5 SOLUTION RESPIRATORY (INHALATION) at 04:07

## 2020-07-12 RX ADMIN — FLUCONAZOLE 100 MG: 100 TABLET ORAL at 08:07

## 2020-07-12 NOTE — SUBJECTIVE & OBJECTIVE
Interval History:  Patient extubated on 07/08, now step-down from the ICU.  Patient in better spirits today. Breathing comfortably on oxygen per nasal cannula.  Hitting care time interview.  Apparently per his treated with physical therapy yesterday.  Slowly improving.    Review of Systems   Constitutional: Positive for activity change, appetite change and fatigue.   Respiratory: Positive for shortness of breath. Negative for cough.    Cardiovascular: Negative for chest pain and leg swelling.   Gastrointestinal: Negative for abdominal pain and nausea.   Musculoskeletal: Positive for back pain.   Neurological: Negative for weakness.   Psychiatric/Behavioral: Negative for confusion.   All other systems reviewed and are negative.    Objective:     Vital Signs (Most Recent):  Temp: 99.4 °F (37.4 °C) (07/12/20 0747)  Pulse: 97 (07/12/20 0747)  Resp: 20 (07/12/20 0747)  BP: (!) 156/95 (07/12/20 0747)  SpO2: 95 % (07/12/20 0747) Vital Signs (24h Range):  Temp:  [98.4 °F (36.9 °C)-99.6 °F (37.6 °C)] 99.4 °F (37.4 °C)  Pulse:  [] 97  Resp:  [16-22] 20  SpO2:  [92 %-98 %] 95 %  BP: (128-156)/() 156/95     Weight: (!) 139.6 kg (307 lb 12.2 oz)  Body mass index is 56.29 kg/m².    Intake/Output Summary (Last 24 hours) at 7/12/2020 1033  Last data filed at 7/11/2020 1800  Gross per 24 hour   Intake 490 ml   Output --   Net 490 ml      Physical Exam  Vitals signs and nursing note reviewed.   Constitutional:       General: She is not in acute distress.     Appearance: She is obese.      Comments: Massively obese  female in no acute distress   Eyes:      Pupils: Pupils are equal, round, and reactive to light.   Cardiovascular:      Rate and Rhythm: Normal rate and regular rhythm.      Heart sounds: No murmur.   Pulmonary:      Comments: Decreased breath sounds noted bilaterally  Abdominal:      General: There is no distension.      Palpations: Abdomen is soft.      Tenderness: There is no abdominal  tenderness.   Musculoskeletal:      Right lower leg: Edema present.      Left lower leg: Edema present.   Skin:     Capillary Refill: Capillary refill takes 2 to 3 seconds.      Coloration: Skin is not pale.      Findings: No rash.   Neurological:      Mental Status: She is oriented to person, place, and time.         Significant Labs: All pertinent labs within the past 24 hours have been reviewed.    Significant Imaging: I have reviewed all pertinent imaging results/findings within the past 24 hours.

## 2020-07-12 NOTE — PROGRESS NOTES
[facility] Hospital Medicine  Progress Note    Patient Name: Michelle Wren  MRN: [id number]  Patient Class: IP- Inpatient   Admission Date: 6/23/2020  Length of Stay: 19 days  Attending Physician: Sumit Ac,*  Primary Care Provider: Primary Doctor No        Subjective:     Principal Problem:Acute respiratory failure with hypoxia and hypercarbia        HPI:  Michelle Wren is a 31 y/o female with a past medical history significant for asthma, type 2 diabetes, and severe obesity who is transferred from [facility] to [facility] for pulmonary consultation.  Patient presented to the ED at [facility] yesterday with complaints of 2-3 days of shortness of breath.  It is reported that she uses CPAP at night but she got this from a friend so unsure of settings.  She was placed on BiPAP and given IV Solu-Medrol, bronchodilator treatments, and IV Lasix.  She was admitted to the ICU at [facility].  Patient continued to decline and underwent intubation yesterday around 7:00 p.m.  per report, today patient appeared to be worse.  Her saturations were in the mid 90s on FiO2 of 100%.  A D-dimer was checked and was negative.  Per review of labs which were drawn earlier today, her WBC count was 23,000 and her lactic acid was 2.4.  She was placed on IV Zosyn and IV vancomycin.  Upon arrival to [facility], patient is in no acute distress.  She is intubated and sedated.  Vital signs are stable.  Lactic acid will be repeated now as patient meets sepsis criteria and will check a procalcitonin.  She will be monitored closely in the ICU.           Overview/Hospital Course:  Patient is being managed in intensive care unit, requiring mechanical ventilation under supervision of pulmonary medicine.  Repeat COVID -19 test has been negative.  Patient is receiving intravenous antibiotics for bilateral pneumonia.  Sputum cultures grew MSSA species.  Patient is being followed by  infectious disease specialist.  Patient tested negative for COVID -19 antibody.  Patient remained intubated for prolonged period of time given her severe intrinsic lung disease as well as obesity hypoventilation.  She was extubated on 07/08, and did well post extubation.  She was initially on BiPAP and was weaned slowly to nasal cannula which she tolerated.    Interval History:  Patient extubated on 07/08, now step-down from the ICU.  Patient in better spirits today. Breathing comfortably on oxygen per nasal cannula.  Hitting care time interview.  Apparently per his treated with physical therapy yesterday.  Slowly improving.    Review of Systems   Constitutional: Positive for activity change, appetite change and fatigue.   Respiratory: Positive for shortness of breath. Negative for cough.    Cardiovascular: Negative for chest pain and leg swelling.   Gastrointestinal: Negative for abdominal pain and nausea.   Musculoskeletal: Positive for back pain.   Neurological: Negative for weakness.   Psychiatric/Behavioral: Negative for confusion.   All other systems reviewed and are negative.    Objective:     Vital Signs (Most Recent):  Temp: 99.4 °F (37.4 °C) (07/12/20 0747)  Pulse: 97 (07/12/20 0747)  Resp: 20 (07/12/20 0747)  BP: (!) 156/95 (07/12/20 0747)  SpO2: 95 % (07/12/20 0747) Vital Signs (24h Range):  Temp:  [98.4 °F (36.9 °C)-99.6 °F (37.6 °C)] 99.4 °F (37.4 °C)  Pulse:  [] 97  Resp:  [16-22] 20  SpO2:  [92 %-98 %] 95 %  BP: (128-156)/() 156/95     Weight: (!) 139.6 kg (307 lb 12.2 oz)  Body mass index is 56.29 kg/m².    Intake/Output Summary (Last 24 hours) at 7/12/2020 1033  Last data filed at 7/11/2020 1800  Gross per 24 hour   Intake 490 ml   Output --   Net 490 ml      Physical Exam  Vitals signs and nursing note reviewed.   Constitutional:       General: She is not in acute distress.     Appearance: She is obese.      Comments: Massively obese  female in no acute distress   Eyes:       Pupils: Pupils are equal, round, and reactive to light.   Cardiovascular:      Rate and Rhythm: Normal rate and regular rhythm.      Heart sounds: No murmur.   Pulmonary:      Comments: Decreased breath sounds noted bilaterally  Abdominal:      General: There is no distension.      Palpations: Abdomen is soft.      Tenderness: There is no abdominal tenderness.   Musculoskeletal:      Right lower leg: Edema present.      Left lower leg: Edema present.   Skin:     Capillary Refill: Capillary refill takes 2 to 3 seconds.      Coloration: Skin is not pale.      Findings: No rash.   Neurological:      Mental Status: She is oriented to person, place, and time.         Significant Labs: All pertinent labs within the past 24 hours have been reviewed.    Significant Imaging: I have reviewed all pertinent imaging results/findings within the past 24 hours.      Assessment/Plan:      * Acute respiratory failure with hypoxia and hypercarbia  Patient with Hypercapnic and Hypoxic Respiratory failure which is Acute.  she is not on home oxygen.  Patient remains on nasal cannula presently.    Differential diagnosis includes - COPD, Obesity Hypoventilation, Pneumonia and Aspiration Labs and images were reviewed. Patient Has not has a recent ABG.     Will treat underlying causes and adjust management of respiratory failure as follows- continue to wean respiratory support as tolerated.  Patient is doing well post extubation.          Morbid obesity  Body mass index is 55.6 kg/m². Morbid obesity complicates all aspects of disease management from diagnostic modalities to treatment. Weight loss encouraged and health benefits explained to patient.        Pulmonary hypertension  Continue to treat per pulmonology directions.      Obstructive sleep apnea syndrome  Severe, likely contributing to respiratory failure.       Staphylococcal pneumonia  Patient with current diagnosis of pneumonia due to bacterial etiology due to  MSSA which is  uncontrolled due to persistent hypoxia . Current antimicrobial regimen consists of   Antibiotics (From admission, onward)    Start     Stop Route Frequency Ordered    07/01/20 2130  linezolid 600 mg/300 mL IVPB 600 mg      -- IV Every 12 hours (non-standard times) 07/01/20 2021      . Cultures drawn and noted-   Microbiology Results (last 7 days)     Procedure Component Value Units Date/Time    IV catheter culture [[id number]]  (Abnormal) Collected: 07/04/20 0844    Order Status: Completed Specimen: Catheter Tip, Intrajugular Updated: 07/07/20 1034     Aerobic Culture - Cath tip STAPHYLOCOCCUS EPIDERMIDIS  < 15 colonies      Blood culture [[id number]] Collected: 07/01/20 2034    Order Status: Completed Specimen: Blood Updated: 07/07/20 1012     Blood Culture, Routine No growth after 5 days.    Narrative:      From arterial line    Blood culture [[id number]] Collected: 07/01/20 2036    Order Status: Completed Specimen: Blood from Line, Central Neck Updated: 07/07/20 1012     Blood Culture, Routine No growth after 5 days.    Narrative:      From TLC    Clostridium difficile EIA [[id number]] Collected: 07/05/20 1503    Order Status: Completed Specimen: Stool Updated: 07/05/20 2108     C. diff Antigen Negative     C difficile Toxins A+B, EIA Negative     Comment: Testing not recommended for children <24 months old.       Culture, Respiratory with Gram Stain [[id number]]     Order Status: No result Specimen: Respiratory from Tracheal Aspirate     Blood culture [[id number]] Collected: 06/28/20 0824    Order Status: Completed Specimen: Blood from Antecubital, Right Arm Updated: 07/03/20 2212     Blood Culture, Routine No growth after 5 days.       Will monitor patient closely and continue current treatment plan unchanged.   Infectious disease and pulmonology consulted.  Will defer to them for further evaluation and management of this disease process.      Type 2 diabetes mellitus  Patient's FSGs are controlled on current  hypoglycemics.   Last A1c reviewed-   Lab Results   Component Value Date    HGBA1C 6.2 06/23/2020     Most recent fingerstick glucose reviewed-   Recent Labs   Lab 07/09/20  2347 07/10/20  0522 07/10/20  1126 07/10/20  1657   POCTGLUCOSE 165* 185* 200* 155*     Current correctional scale  Low  Maintain anti-hyperglycemic dose as follows-   Antihyperglycemics (From admission, onward)    Start     Stop Route Frequency Ordered    06/23/20 2235  insulin aspart U-100 pen 1-10 Units      -- SubQ Every 6 hours PRN 06/23/20 2135        Hold Oral hypoglycemics while patient is in the hospital.      Nicotine dependence  Health hazards associated with cigarette smoking should be reviewed with patient and cessation encouraged when pt is able to participate.       Mild intermittent asthma  Patient's COPD is uncontrolled due to worsening of baseline hypoxia currently.  Patient is currently off COPD Pathway. Continue scheduled inhalers Steroids, Antibiotics and Supplemental oxygen and monitor respiratory status closely.  Continue steroids.        Cardiomegaly  Patient with peripheral edema, mildly elevated BNP.  IV lasix was initiated at [facility].    Echo was completed today with results as follows:  · Concentric left ventricular remodeling.  · Normal left ventricular systolic function. The estimated ejection fraction is 69%.  · Normal LV diastolic function.  · No wall motion abnormalities.  · Normal right ventricular systolic function.  · Mild right atrial enlargement.  · Trivial pericardial effusion.  · Mild left atrial enlargement.    IV lasix discontinued as no indication of heart failure.    Obesity hypoventilation syndrome  Patient doing well after extubation.  Continue to monitor.        VTE Risk Mitigation (From admission, onward)         Ordered     enoxaparin injection 40 mg  Every 12 hours      07/05/20 1217     IP VTE HIGH RISK PATIENT  Once      06/23/20 2055     Place sequential compression device  Until discontinued       06/23/20 2055                      Sumit Ac MD  Department of Hospital Medicine   [facility]

## 2020-07-12 NOTE — PROGRESS NOTES
.  Progress Note  PULMONARY    Admit Date: 6/23/2020 07/12/2020      SUBJECTIVE:     June 25th-patient is sedated, no complaints, intubated.  6/26 intubated.  6/27 no c/o intubated.  6/28 no new c/o,intubated, arouses  6/29- no new c/o, intubated,   6/30 no new c/o  7/6 arouses,  No c/o. Intubated.  7/7 no c/o, arouses  7/8 intubated no c/o  7/9 wants juice, not sob. On precedex.  Above from Dr Gentile and below from Dr Guerra:  7/10- On NC 3L. Complains her feet are hurting. Got up to restroom and that made her more short of breath today. Father at bedside     7/11/2020 - Stable and on the floor, no new respiratory complaints.  She has been up in the chair - was able to stand and pivot but legs are still very weak.  No new issues reported.    7/12/2020 - Pt taking a shower when I came to see her.  No new respiratory issues reported.    PFSH and Allergies reviewed.    OBJECTIVE:     Vitals (Most recent):  Vitals:    07/12/20 1056   BP: (!) 141/91   Pulse: 101   Resp: 18   Temp: 98.2 °F (36.8 °C)       Vitals (24 hour range):  Temp:  [98.2 °F (36.8 °C)-99.6 °F (37.6 °C)]   Pulse:  []   Resp:  [16-22]   BP: (128-156)/()   SpO2:  [92 %-98 %]       Intake/Output Summary (Last 24 hours) at 7/12/2020 1103  Last data filed at 7/11/2020 1800  Gross per 24 hour   Intake 490 ml   Output --   Net 490 ml          Physical Exam:  The patient's neuro status (alertness,orientation,cognitive function,motor skills,), pharyngeal exam (oral lesions, hygiene, abn dentition,), Neck (jvd,mass,thyroid,nodes in neck and above/below clavicle),RESPIRATORY(symmetry,effort,fremitus,percussion,auscultation),  Cor(rhythm,heart tones including gallops,perfusion,edema)ABD(distention,hepatic&splenomegaly,tenderness,masses), Skin(rash,cyanosis),Psyc(affect,judgement,).  Exam negative except for these pertinent findings:    M4, tongue w/ white discoloration  Morbidly obese, no acute distress  NC in place  Lungs clear  Abdomen  distended nontender  No edema  Bilateral feet tender to palpation, no wounds or discoloration noted    Radiographs reviewed: view by direct vision   CXR 7/10- continued bibasilar opacification (likely appears worse off positive pressure). S/p R sided PICC line    Labs     Recent Labs   Lab 07/12/20  0609   WBC 15.34*   HGB 14.0   HCT 46.8        Recent Labs   Lab 07/12/20  0609   *   K 4.0   CL 93*   CO2 28   BUN 13   CREATININE 0.9   *   CALCIUM 9.7   MG 1.7   PHOS 3.0   AST 13   ALT 17   ALKPHOS 57   BILITOT 0.8   PROT 8.0   ALBUMIN 3.3*     No results for input(s): PH, PCO2, PO2, HCO3 in the last 24 hours.  Microbiology Results (last 7 days)     Procedure Component Value Units Date/Time    IV catheter culture [[id number]]  (Abnormal) Collected: 07/04/20 0844    Order Status: Completed Specimen: Catheter Tip, Intrajugular Updated: 07/07/20 1034     Aerobic Culture - Cath tip STAPHYLOCOCCUS EPIDERMIDIS  < 15 colonies      Blood culture [[id number]] Collected: 07/01/20 2034    Order Status: Completed Specimen: Blood Updated: 07/07/20 1012     Blood Culture, Routine No growth after 5 days.    Narrative:      From arterial line    Blood culture [[id number]] Collected: 07/01/20 2036    Order Status: Completed Specimen: Blood from Line, Central Neck Updated: 07/07/20 1012     Blood Culture, Routine No growth after 5 days.    Narrative:      From TLC    Clostridium difficile EIA [[id number]] Collected: 07/05/20 1503    Order Status: Completed Specimen: Stool Updated: 07/05/20 2108     C. diff Antigen Negative     C difficile Toxins A+B, EIA Negative     Comment: Testing not recommended for children <24 months old.       Culture, Respiratory with Gram Stain [[id number]]     Order Status: No result Specimen: Respiratory from Tracheal Aspirate         Echo 6/22/2020  · Concentric left ventricular remodeling.  · Normal left ventricular systolic function. The estimated ejection fraction is 69%.  · Normal LV  diastolic function.  · No wall motion abnormalities.  · Normal right ventricular systolic function.  · Mild right atrial enlargement.  · Trivial pericardial effusion.  · Mild left atrial enlargement.       Impression:  Active Hospital Problems    Diagnosis  POA    *Acute respiratory failure with hypoxia and hypercarbia [J96.01, J96.02]  Yes    Respiratory failure with hypoxia [J96.91]  Yes    Morbid obesity [E66.01]  Yes    Pulmonary hypertension [I27.20]  Yes     By pulmonary artery size on ct chest 6/30      Obstructive sleep apnea syndrome [G47.33]  Yes    Staphylococcal pneumonia [J15.20]  Yes    Type 2 diabetes mellitus [E11.9]  Yes    Nicotine dependence [F17.200]  Yes    Obesity hypoventilation syndrome [E66.2]  Yes    Mild intermittent asthma [J45.20]  Yes    Cardiomegaly [I51.7]  Yes      Resolved Hospital Problems    Diagnosis Date Resolved POA    Severe sepsis [A41.9, R65.20] 07/05/2020 Yes    Bilateral pneumonia [J18.9] 07/05/2020 Yes    Respiratory failure with hypoxia [J96.91] 07/06/2020 Yes    Class 3 severe obesity with serious comorbidity and body mass index (BMI) of 60.0 to 69.9 in adult [E66.01, Z68.44] 07/05/2020 Not Applicable               Plan:   Acute on chronic hypoxemic/hypercapneic resp failure  [facility]/LAURIE  Acute exacerbation of asthma  Bilateral lower lobe pneumonia- MSSA  Morbid obesity  Oropharyngeal thrush    - mobilize as tolerated  - continue NC oxygen during day, BIPAP at night  - continue nebulized bronchodilators and budesonide  - continue prednisone slow taper  - continue Linezolid  - continue PO lasix  - continue nystatin for a few more days; thrush appears improved  - increase activity as able      Chris Ray MD  [facility] Pulmonary/Critical Care

## 2020-07-12 NOTE — PLAN OF CARE
Problem: Adult Inpatient Plan of Care  Goal: Patient-Specific Goal (Individualization)  Outcome: Ongoing, Progressing   Encouraged increased mobility

## 2020-07-12 NOTE — PLAN OF CARE
Patient receiving aerosol tx via duonrb now and q4hr w/a and 0.5mg pulmicort now and q12hr.  Hr 99 and 02 saturation 96% on nc at 3lpm.  Bipap on standby.

## 2020-07-12 NOTE — PT/OT/SLP PROGRESS
Physical Therapy Treatment    Patient Name:  Michelle Wren   MRN:  [id number]    Recommendations:     Discharge Recommendations:  home, home health PT, home with home health   Discharge Equipment Recommendations: other (see comments)(TBD)     Assessment:     Michelle Wren is a 30 y.o. female admitted with a medical diagnosis of Acute respiratory failure with hypoxia and hypercarbia.  She presents with the following impairments/functional limitations:   Pt ambulated 20' w/rw, sba, 3LO2.  Pt ambulated with slow pace, shortened step length, standing rest breaks.  Pt sweating at conclusion of therapy, stating she was hot.  Gave pt water.    Rehab Prognosis: Fair; patient would benefit from acute skilled PT services to address these deficits and reach maximum level of function.    Recent Surgery: Procedure(s) (LRB):  CREATION, TRACHEOSTOMY (N/A)  EGD, WITH PEG TUBE INSERTION (N/A)      Plan:     During this hospitalization, patient to be seen daily to address the identified rehab impairments via gait training, therapeutic activities, therapeutic exercises and progress toward the following goals:    · Plan of Care Expires:  07/31/20    Subjective     Chief Complaint: B foot pain, reported no pain at conclusion of therapy  Patient/Family Comments/goals:   Pain/Comfort:  · Pain Rating 1: (pain B feet)  · Pain Rating Post-Intervention 1: (no pain B feet when ambulation was finished)      Objective:     Communicated with nurse Quevedo prior to session.  Patient found up in chair with oxygen, peripheral IV, PICC line, telemetry upon PT entry to room.     General Precautions: Standard, fall   Orthopedic Precautions:N/A   Braces: N/A     Functional Mobility:  · Gait: sit-stand sba, ambulated 20' in room, sba, 3LO2      AM-PAC 6 CLICK MOBILITY          Therapeutic Activities and Exercises:      Patient left up in chair with all lines intact and call button in reach..    GOALS:   Multidisciplinary Problems     Physical  Therapy Goals        Problem: Physical Therapy Goal    Goal Priority Disciplines Outcome Goal Variances Interventions   Physical Therapy Goal     PT, PT/OT Ongoing, Progressing     Description: Goals to be met by: 2020     Patient will increase functional independence with mobility by performin). Supine to sit with Stand-by Assistance  2). Sit to supine with Stand-by Assistance  3). Sit to stand transfer with Stand-by Assistance  4). Bed to chair transfer with Stand-by Assistance   5). Gait  x > 25 feet with Stand-by Assistance using Rolling Walker.                      Time Tracking:     PT Received On: 20  PT Start Time: 1055     PT Stop Time: 1110  PT Total Time (min): 15 min     Billable Minutes: Gait Training 15    Treatment Type: Treatment  PT/PTA: PTA     PTA Visit Number: 1     Jo-Ann Castaneda PTA  2020

## 2020-07-12 NOTE — RESPIRATORY THERAPY
07/11/20 1932   Patient Assessment/Suction   Level of Consciousness (AVPU) alert   Respiratory Effort Unlabored   Expansion/Accessory Muscles/Retractions no use of accessory muscles   All Lung Fields Breath Sounds diminished   Rhythm/Pattern, Respiratory unlabored   Cough Frequency frequent   Cough Type nonproductive   PRE-TX-O2   O2 Device (Oxygen Therapy) nasal cannula   Flow (L/min) 3   Oxygen Concentration (%) 32   SpO2 95 %   Pulse Oximetry Type Intermittent   $ Pulse Oximetry - Multiple Charge Pulse Oximetry - Multiple   Pulse (!) 117   Resp 20   Aerosol Therapy   $ Aerosol Therapy Charges Aerosol Treatment   Respiratory Treatment Status (SVN) given   Treatment Route (SVN) mask   Patient Position (SVN) HOB elevated   Post Treatment Assessment (SVN) breath sounds unchanged   Signs of Intolerance (SVN) none   Breath Sounds Post-Respiratory Treatment   Throughout All Fields Post-Treatment All Fields   Throughout All Fields Post-Treatment no change   Post-treatment Heart Rate (beats/min) 106   Post-treatment Resp Rate (breaths/min) 20   Ready to Wean/Extubation Screen   FIO2<=50 (chart decimal) 0.32   Preset CPAP/BiPAP Settings   Mode Of Delivery Standby

## 2020-07-12 NOTE — PLAN OF CARE
Pt alert and oriented. Transfers with assist. Has walked to bathroom today. Up in chair. Call light in reach

## 2020-07-13 LAB
ALBUMIN SERPL BCP-MCNC: 3.2 G/DL (ref 3.5–5.2)
ALP SERPL-CCNC: 58 U/L (ref 55–135)
ALT SERPL W/O P-5'-P-CCNC: 17 U/L (ref 10–44)
ANION GAP SERPL CALC-SCNC: 11 MMOL/L (ref 8–16)
AST SERPL-CCNC: 12 U/L (ref 10–40)
BASOPHILS # BLD AUTO: 0.02 K/UL (ref 0–0.2)
BASOPHILS NFR BLD: 0.2 % (ref 0–1.9)
BILIRUB SERPL-MCNC: 0.6 MG/DL (ref 0.1–1)
BUN SERPL-MCNC: 13 MG/DL (ref 6–20)
CALCIUM SERPL-MCNC: 9.2 MG/DL (ref 8.7–10.5)
CHLORIDE SERPL-SCNC: 94 MMOL/L (ref 95–110)
CO2 SERPL-SCNC: 30 MMOL/L (ref 23–29)
CREAT SERPL-MCNC: 0.8 MG/DL (ref 0.5–1.4)
DIFFERENTIAL METHOD: ABNORMAL
EOSINOPHIL # BLD AUTO: 0 K/UL (ref 0–0.5)
EOSINOPHIL NFR BLD: 0.2 % (ref 0–8)
ERYTHROCYTE [DISTWIDTH] IN BLOOD BY AUTOMATED COUNT: 16.3 % (ref 11.5–14.5)
EST. GFR  (AFRICAN AMERICAN): >60 ML/MIN/1.73 M^2
EST. GFR  (NON AFRICAN AMERICAN): >60 ML/MIN/1.73 M^2
GLUCOSE SERPL-MCNC: 184 MG/DL (ref 70–110)
HCT VFR BLD AUTO: 44.4 % (ref 37–48.5)
HGB BLD-MCNC: 13.4 G/DL (ref 12–16)
IMM GRANULOCYTES # BLD AUTO: 0.1 K/UL (ref 0–0.04)
IMM GRANULOCYTES NFR BLD AUTO: 0.8 % (ref 0–0.5)
LYMPHOCYTES # BLD AUTO: 1.8 K/UL (ref 1–4.8)
LYMPHOCYTES NFR BLD: 14.9 % (ref 18–48)
MAGNESIUM SERPL-MCNC: 1.7 MG/DL (ref 1.6–2.6)
MCH RBC QN AUTO: 26.1 PG (ref 27–31)
MCHC RBC AUTO-ENTMCNC: 30.2 G/DL (ref 32–36)
MCV RBC AUTO: 86 FL (ref 82–98)
MONOCYTES # BLD AUTO: 1.7 K/UL (ref 0.3–1)
MONOCYTES NFR BLD: 14.4 % (ref 4–15)
NEUTROPHILS # BLD AUTO: 8.3 K/UL (ref 1.8–7.7)
NEUTROPHILS NFR BLD: 69.5 % (ref 38–73)
NRBC BLD-RTO: 0 /100 WBC
PHOSPHATE SERPL-MCNC: 3.6 MG/DL (ref 2.7–4.5)
PLATELET # BLD AUTO: 306 K/UL (ref 150–350)
PMV BLD AUTO: 11.4 FL (ref 9.2–12.9)
POCT GLUCOSE: 169 MG/DL (ref 70–110)
POCT GLUCOSE: 219 MG/DL (ref 70–110)
POCT GLUCOSE: 246 MG/DL (ref 70–110)
POCT GLUCOSE: 276 MG/DL (ref 70–110)
POTASSIUM SERPL-SCNC: 4.1 MMOL/L (ref 3.5–5.1)
PROT SERPL-MCNC: 7.5 G/DL (ref 6–8.4)
RBC # BLD AUTO: 5.14 M/UL (ref 4–5.4)
SODIUM SERPL-SCNC: 135 MMOL/L (ref 136–145)
WBC # BLD AUTO: 11.98 K/UL (ref 3.9–12.7)

## 2020-07-13 PROCEDURE — 27000221 HC OXYGEN, UP TO 24 HOURS

## 2020-07-13 PROCEDURE — 11000001 HC ACUTE MED/SURG PRIVATE ROOM

## 2020-07-13 PROCEDURE — 94761 N-INVAS EAR/PLS OXIMETRY MLT: CPT

## 2020-07-13 PROCEDURE — 85025 COMPLETE CBC W/AUTO DIFF WBC: CPT

## 2020-07-13 PROCEDURE — 25000003 PHARM REV CODE 250: Performed by: INTERNAL MEDICINE

## 2020-07-13 PROCEDURE — 63600175 PHARM REV CODE 636 W HCPCS: Performed by: HOSPITALIST

## 2020-07-13 PROCEDURE — 80053 COMPREHEN METABOLIC PANEL: CPT

## 2020-07-13 PROCEDURE — 25000003 PHARM REV CODE 250: Performed by: HOSPITALIST

## 2020-07-13 PROCEDURE — 25000242 PHARM REV CODE 250 ALT 637 W/ HCPCS: Performed by: HOSPITALIST

## 2020-07-13 PROCEDURE — 99900035 HC TECH TIME PER 15 MIN (STAT)

## 2020-07-13 PROCEDURE — 84100 ASSAY OF PHOSPHORUS: CPT

## 2020-07-13 PROCEDURE — 97116 GAIT TRAINING THERAPY: CPT | Mod: CQ

## 2020-07-13 PROCEDURE — 25000242 PHARM REV CODE 250 ALT 637 W/ HCPCS: Performed by: INTERNAL MEDICINE

## 2020-07-13 PROCEDURE — 99233 PR SUBSEQUENT HOSPITAL CARE,LEVL III: ICD-10-PCS | Mod: ,,, | Performed by: INTERNAL MEDICINE

## 2020-07-13 PROCEDURE — 99233 SBSQ HOSP IP/OBS HIGH 50: CPT | Mod: ,,, | Performed by: INTERNAL MEDICINE

## 2020-07-13 PROCEDURE — 83735 ASSAY OF MAGNESIUM: CPT

## 2020-07-13 PROCEDURE — 94640 AIRWAY INHALATION TREATMENT: CPT

## 2020-07-13 PROCEDURE — 36415 COLL VENOUS BLD VENIPUNCTURE: CPT

## 2020-07-13 PROCEDURE — 63700000 PHARM REV CODE 250 ALT 637 W/O HCPCS: Performed by: HOSPITALIST

## 2020-07-13 PROCEDURE — 97530 THERAPEUTIC ACTIVITIES: CPT | Mod: CQ

## 2020-07-13 RX ORDER — ALBUTEROL SULFATE 90 UG/1
AEROSOL, METERED RESPIRATORY (INHALATION)
Qty: 18 G | Refills: 11 | Status: SHIPPED | OUTPATIENT
Start: 2020-07-13

## 2020-07-13 RX ORDER — BUDESONIDE AND FORMOTEROL FUMARATE DIHYDRATE 160; 4.5 UG/1; UG/1
2 AEROSOL RESPIRATORY (INHALATION) 2 TIMES DAILY
Qty: 1 INHALER | Refills: 5 | Status: SHIPPED | OUTPATIENT
Start: 2020-07-13 | End: 2020-07-30

## 2020-07-13 RX ORDER — MONTELUKAST SODIUM 10 MG/1
10 TABLET ORAL NIGHTLY
Qty: 30 TABLET | Refills: 5 | Status: ON HOLD | OUTPATIENT
Start: 2020-07-13 | End: 2020-11-16 | Stop reason: HOSPADM

## 2020-07-13 RX ORDER — PREDNISONE 20 MG/1
TABLET ORAL
Qty: 20 TABLET | Refills: 1 | Status: SHIPPED | OUTPATIENT
Start: 2020-07-13 | End: 2020-07-30 | Stop reason: SDUPTHER

## 2020-07-13 RX ADMIN — IPRATROPIUM BROMIDE AND ALBUTEROL SULFATE 3 ML: .5; 2.5 SOLUTION RESPIRATORY (INHALATION) at 01:07

## 2020-07-13 RX ADMIN — IPRATROPIUM BROMIDE AND ALBUTEROL SULFATE 3 ML: .5; 2.5 SOLUTION RESPIRATORY (INHALATION) at 07:07

## 2020-07-13 RX ADMIN — PREDNISONE 10 MG: 5 TABLET ORAL at 09:07

## 2020-07-13 RX ADMIN — ALPRAZOLAM 0.25 MG: 0.25 TABLET ORAL at 09:07

## 2020-07-13 RX ADMIN — INSULIN ASPART 4 UNITS: 100 INJECTION, SOLUTION INTRAVENOUS; SUBCUTANEOUS at 05:07

## 2020-07-13 RX ADMIN — LINEZOLID 600 MG: 600 INJECTION, SOLUTION INTRAVENOUS at 09:07

## 2020-07-13 RX ADMIN — PANTOPRAZOLE SODIUM 40 MG: 40 TABLET, DELAYED RELEASE ORAL at 09:07

## 2020-07-13 RX ADMIN — FUROSEMIDE 20 MG: 20 TABLET ORAL at 09:07

## 2020-07-13 RX ADMIN — FLUCONAZOLE 100 MG: 100 TABLET ORAL at 09:07

## 2020-07-13 RX ADMIN — ACETAMINOPHEN 650 MG: 160 SOLUTION ORAL at 11:07

## 2020-07-13 RX ADMIN — METOCLOPRAMIDE 10 MG: 5 INJECTION, SOLUTION INTRAMUSCULAR; INTRAVENOUS at 07:07

## 2020-07-13 RX ADMIN — INSULIN ASPART 6 UNITS: 100 INJECTION, SOLUTION INTRAVENOUS; SUBCUTANEOUS at 11:07

## 2020-07-13 RX ADMIN — BUDESONIDE INHALATION 0.5 MG: 0.5 SUSPENSION RESPIRATORY (INHALATION) at 07:07

## 2020-07-13 RX ADMIN — METOPROLOL SUCCINATE 50 MG: 50 TABLET, FILM COATED, EXTENDED RELEASE ORAL at 09:07

## 2020-07-13 RX ADMIN — METOCLOPRAMIDE 10 MG: 5 INJECTION, SOLUTION INTRAMUSCULAR; INTRAVENOUS at 02:07

## 2020-07-13 RX ADMIN — INSULIN ASPART 4 UNITS: 100 INJECTION, SOLUTION INTRAVENOUS; SUBCUTANEOUS at 09:07

## 2020-07-13 RX ADMIN — IPRATROPIUM BROMIDE AND ALBUTEROL SULFATE 3 ML: .5; 2.5 SOLUTION RESPIRATORY (INHALATION) at 03:07

## 2020-07-13 RX ADMIN — METOCLOPRAMIDE 10 MG: 5 INJECTION, SOLUTION INTRAMUSCULAR; INTRAVENOUS at 09:07

## 2020-07-13 NOTE — PROGRESS NOTES
[facility] Medicine  Progress Note    Patient Name: Michelle Wren  MRN: [id number]  Patient Class: IP- Inpatient   Admission Date: 6/23/2020  Length of Stay: 20 days  Attending Physician: Peg Stewart MD  Primary Care Provider: Primary Doctor No        Subjective:     Principal Problem:Acute respiratory failure with hypoxia and hypercarbia        HPI:  Michelle Wren is a 29 y/o female with a past medical history significant for asthma, type 2 diabetes, and severe obesity who is transferred from [facility] to [facility] for pulmonary consultation.  Patient presented to the ED at [facility] yesterday with complaints of 2-3 days of shortness of breath.  It is reported that she uses CPAP at night but she got this from a friend so unsure of settings.  She was placed on BiPAP and given IV Solu-Medrol, bronchodilator treatments, and IV Lasix.  She was admitted to the ICU at [facility].  Patient continued to decline and underwent intubation yesterday around 7:00 p.m.  per report, today patient appeared to be worse.  Her saturations were in the mid 90s on FiO2 of 100%.  A D-dimer was checked and was negative.  Per review of labs which were drawn earlier today, her WBC count was 23,000 and her lactic acid was 2.4.  She was placed on IV Zosyn and IV vancomycin.  Upon arrival to [facility], patient is in no acute distress.  She is intubated and sedated.  Vital signs are stable.  Lactic acid will be repeated now as patient meets sepsis criteria and will check a procalcitonin.  She will be monitored closely in the ICU.           Overview/Hospital Course:  Patient is being managed in intensive care unit, requiring mechanical ventilation under supervision of pulmonary medicine.  Repeat COVID -19 test has been negative.  Patient is receiving intravenous antibiotics for bilateral pneumonia.  Sputum cultures grew MSSA species.  Patient is being followed by infectious  disease specialist.  Patient tested negative for COVID -19 antibody.  Patient remained intubated for prolonged period of time given her severe intrinsic lung disease as well as obesity hypoventilation.  She was extubated on 07/08, and did well post extubation.  She was initially on BiPAP and was weaned slowly to nasal cannula which she tolerated.    Interval History:  Patient is looking and feeling better.  Patient is not requiring any supplemental oxygen.  Ambulation is increased.  Patient is anxious to go home.  Patient is afebrile.  Patient used CPAP at nighttime.    Review of Systems   Post-operatively, patient denied chest pain, shortness of breath, abdominal pain, nausea, vomiting, headache, vision changes, focal neuro-deficits, cough or fever.    Objective:     Vital Signs (Most Recent):  Temp: 98.7 °F (37.1 °C) (07/13/20 0721)  Pulse: 110 (07/13/20 0721)  Resp: (!) 23 (07/13/20 0721)  BP: (!) 159/92 (07/13/20 0721)  SpO2: (!) 93 % (07/13/20 0721) Vital Signs (24h Range):  Temp:  [98 °F (36.7 °C)-101 °F (38.3 °C)] 98.7 °F (37.1 °C)  Pulse:  [] 110  Resp:  [16-23] 23  SpO2:  [91 %-99 %] 93 %  BP: (138-174)/(71-98) 159/92     Weight: (!) 139.6 kg (307 lb 12.2 oz)  Body mass index is 56.29 kg/m².    Intake/Output Summary (Last 24 hours) at 7/13/2020 1043  Last data filed at 7/12/2020 2138  Gross per 24 hour   Intake 540 ml   Output 720 ml   Net -180 ml      Physical Exam  Vitals signs and nursing note reviewed.   Constitutional:       Appearance: She is well-developed. She is obese.      Interventions: She is sedated and intubated.   HENT:      Head: Normocephalic and atraumatic.   Eyes:      Conjunctiva/sclera: Conjunctivae normal.      Pupils: Pupils are equal, round, and reactive to light.   Neck:      Musculoskeletal: Normal range of motion and neck supple.   Cardiovascular:      Rate and Rhythm: Normal rate and regular rhythm.      Pulses: Normal pulses.      Heart sounds: Normal heart sounds.    Pulmonary:      Effort: Pulmonary effort is normal. She is intubated.      Breath sounds: Decreased breath sounds present.   Abdominal:      General: Bowel sounds are normal.      Palpations: Abdomen is soft. There is no mass.      Tenderness: There is no abdominal tenderness.   Genitourinary:     Comments: Vazquez catheter in place    Musculoskeletal:         General: No swelling or deformity.   Skin:     General: Skin is warm and dry.      Capillary Refill: Capillary refill takes 2 to 3 seconds.   Neurological:      Comments: Unable to assess as pt is on mechanical ventilation with sedation     Psychiatric:      Comments: Sedated          Significant Labs:   CBC:   Recent Labs   Lab 07/12/20  0609 07/13/20  0537   WBC 15.34* 11.98   HGB 14.0 13.4   HCT 46.8 44.4    306     CMP:   Recent Labs   Lab 07/12/20  0609 07/13/20  0537   * 135*   K 4.0 4.1   CL 93* 94*   CO2 28 30*   * 184*   BUN 13 13   CREATININE 0.9 0.8   CALCIUM 9.7 9.2   PROT 8.0 7.5   ALBUMIN 3.3* 3.2*   BILITOT 0.8 0.6   ALKPHOS 57 58   AST 13 12   ALT 17 17   ANIONGAP 14 11   EGFRNONAA >60 >60     Lactic Acid:   No results for input(s): LACTATE in the last 48 hours.  Microbiology Results (last 7 days)     Procedure Component Value Units Date/Time    IV catheter culture [[id number]]  (Abnormal) Collected: 07/04/20 0844    Order Status: Completed Specimen: Catheter Tip, Intrajugular Updated: 07/07/20 1034     Aerobic Culture - Cath tip STAPHYLOCOCCUS EPIDERMIDIS  < 15 colonies  -    Blood culture [[id number]] Collected: 07/01/20 2034    Order Status: Completed Specimen: Blood Updated: 07/07/20 1012     Blood Culture, Routine No growth after 5 days.    Narrative:      From arterial line    Blood culture [[id number]] Collected: 07/01/20 2036    Order Status: Completed Specimen: Blood from Line, Central Neck Updated: 07/07/20 1012     Blood Culture, Routine No growth after 5 days.    Narrative:      From TLC        Significant Imaging:    ECHO:  · Concentric left ventricular remodeling.  · Normal left ventricular systolic function. The estimated ejection fraction is 69%.  · Normal LV diastolic function.  · No wall motion abnormalities.  · Normal right ventricular systolic function.  · Mild right atrial enlargement.  · Trivial pericardial effusion.  · Mild left atrial enlargement.    CXR: Mild cardiomegaly.  Hypoventilation.    CXR:   1. Interval placement of endotracheal tube with the tip in the proximal right mainstem bronchus.  This should be withdrawn several cm.  2. The study is motion degraded.  Indistinctness of the left hemidiaphragm could be related to patient motion with atelectasis and/or pleural effusion not completely excluded.    CXR:  1. Limited evaluation secondary to technique.  2. Findings suspicious for congestive heart failure which does not appear significantly changed over the interval.  3. Small bilateral pleural effusions with bibasilar dependent atelectasis.  4. Endotracheal tube.    CXR:  Stable positioning of support devices.  Unchanged bibasilar consolidation/atelectasis.    CXR: Bibasilar opacification not significantly changed compared to the prior exam.  Positions of lines and tubes described.    CXR:   Continued bibasilar lung infiltrates.  Mild cardiomegaly.  Endotracheal tube, NG tube and central lines in position.    CXR:   Mild decrease in right basilar infiltrate.  Continued left basilar infiltrate.  Cardiomegaly.  Tubes in position.     CTA chest:  Limited evaluation for small peripheral luminal filling defects with the streaky artifact but no large central luminal filling defects are seen in pulmonary artery suggesting pulmonary artery embolism   Bilateral infiltrates with features somewhat suspicious for later changes of COVID-19 pneumonia.    CXR: Lines and tubes remain in place and bilateral infiltrates do not appear significantly changed.    CXR:  Tip of the PICC line at the level of the right subclavian  vein. Mild bibasilar opacification not significantly changed.      Assessment/Plan:      * Acute respiratory failure with hypoxia and hypercarbia  Patient with Hypercapnic and Hypoxic Respiratory failure which is Acute.  she is not on home oxygen.  Patient remains on nasal cannula presently.    Differential diagnosis includes - COPD, Obesity Hypoventilation, Pneumonia and Aspiration Labs and images were reviewed. Patient Has not has a recent ABG.     Will treat underlying causes and adjust management of respiratory failure as follows- continue to wean respiratory support as tolerated.  Patient is doing well post extubation.          Morbid obesity  Body mass index is 55.6 kg/m². Morbid obesity complicates all aspects of disease management from diagnostic modalities to treatment. Weight loss encouraged and health benefits explained to patient.        Pulmonary hypertension  Continue to treat per pulmonology directions.      Obstructive sleep apnea syndrome  Severe, likely contributing to respiratory failure.       Staphylococcal pneumonia  Patient with current diagnosis of pneumonia due to bacterial etiology due to  MSSA which is uncontrolled due to persistent hypoxia . Current antimicrobial regimen consists of   Antibiotics (From admission, onward)    Start     Stop Route Frequency Ordered    07/01/20 2130  linezolid 600 mg/300 mL IVPB 600 mg      -- IV Every 12 hours (non-standard times) 07/01/20 2021      . Cultures drawn and noted-   Microbiology Results (last 7 days)     Procedure Component Value Units Date/Time    IV catheter culture [[id number]]  (Abnormal) Collected: 07/04/20 0844    Order Status: Completed Specimen: Catheter Tip, Intrajugular Updated: 07/07/20 1034     Aerobic Culture - Cath tip STAPHYLOCOCCUS EPIDERMIDIS  < 15 colonies      Blood culture [[id number]] Collected: 07/01/20 2034    Order Status: Completed Specimen: Blood Updated: 07/07/20 1012     Blood Culture, Routine No growth after 5 days.     Narrative:      From arterial line    Blood culture [[id number]] Collected: 07/01/20 2036    Order Status: Completed Specimen: Blood from Line, Central Neck Updated: 07/07/20 1012     Blood Culture, Routine No growth after 5 days.    Narrative:      From TLC    Clostridium difficile EIA [[id number]] Collected: 07/05/20 1503    Order Status: Completed Specimen: Stool Updated: 07/05/20 2108     C. diff Antigen Negative     C difficile Toxins A+B, EIA Negative     Comment: Testing not recommended for children <24 months old.       Culture, Respiratory with Gram Stain [[id number]]     Order Status: No result Specimen: Respiratory from Tracheal Aspirate     Blood culture [[id number]] Collected: 06/28/20 0824    Order Status: Completed Specimen: Blood from Antecubital, Right Arm Updated: 07/03/20 2212     Blood Culture, Routine No growth after 5 days.       Will monitor patient closely and continue current treatment plan unchanged.   Infectious disease and pulmonology consulted.  Will defer to them for further evaluation and management of this disease process.      Type 2 diabetes mellitus  Patient's FSGs are controlled on current hypoglycemics.   Last A1c reviewed-   Lab Results   Component Value Date    HGBA1C 6.2 06/23/2020     Most recent fingerstick glucose reviewed-   Recent Labs   Lab 07/09/20  2347 07/10/20  0522 07/10/20  1126 07/10/20  1657   POCTGLUCOSE 165* 185* 200* 155*     Current correctional scale  Low  Maintain anti-hyperglycemic dose as follows-   Antihyperglycemics (From admission, onward)    Start     Stop Route Frequency Ordered    06/23/20 2235  insulin aspart U-100 pen 1-10 Units      -- SubQ Every 6 hours PRN 06/23/20 2135        Hold Oral hypoglycemics while patient is in the hospital.      Nicotine dependence  Health hazards associated with cigarette smoking should be reviewed with patient and cessation encouraged when pt is able to participate.       Mild intermittent asthma  Patient's COPD is  uncontrolled due to worsening of baseline hypoxia currently.  Patient is currently off COPD Pathway. Continue scheduled inhalers Steroids, Antibiotics and Supplemental oxygen and monitor respiratory status closely.  Continue steroids.        Cardiomegaly  Patient with peripheral edema, mildly elevated BNP.  IV lasix was initiated at [facility].    Echo was completed today with results as follows:  · Concentric left ventricular remodeling.  · Normal left ventricular systolic function. The estimated ejection fraction is 69%.  · Normal LV diastolic function.  · No wall motion abnormalities.  · Normal right ventricular systolic function.  · Mild right atrial enlargement.  · Trivial pericardial effusion.  · Mild left atrial enlargement.    IV lasix discontinued as no indication of heart failure.    Obesity hypoventilation syndrome  Patient doing well after extubation.  Continue to monitor.       Discussed with  and , likely DC home in a.m..  VTE Risk Mitigation (From admission, onward)         Ordered     enoxaparin injection 40 mg  Every 12 hours      07/05/20 1217     IP VTE HIGH RISK PATIENT  Once      06/23/20 2055     Place sequential compression device  Until discontinued      06/23/20 2055                      Peg Stewart MD  Department of Hospital Medicine   [facility]

## 2020-07-13 NOTE — SUBJECTIVE & OBJECTIVE
Interval History:  Patient is in intensive care unit with respiratory failure, on mechanical ventilation after getting transferred from [place], [place].  T-max 99.9 degree F.  Patient is requiring 70% FiO2 with PEEP 20 cm of water.  No acute distress noted.  Patient is following commands. Patient could not be proned due to hypoxia.    Review of Systems   Unable to perform ROS: Intubated     Objective:     Vital Signs (Most Recent):  Temp: 98.7 °F (37.1 °C) (07/13/20 0721)  Pulse: 110 (07/13/20 0721)  Resp: (!) 23 (07/13/20 0721)  BP: (!) 159/92 (07/13/20 0721)  SpO2: (!) 93 % (07/13/20 0721) Vital Signs (24h Range):  Temp:  [98 °F (36.7 °C)-101 °F (38.3 °C)] 98.7 °F (37.1 °C)  Pulse:  [] 110  Resp:  [16-23] 23  SpO2:  [91 %-99 %] 93 %  BP: (138-174)/(71-98) 159/92     Weight: (!) 139.6 kg (307 lb 12.2 oz)  Body mass index is 56.29 kg/m².    Intake/Output Summary (Last 24 hours) at 7/13/2020 1043  Last data filed at 7/12/2020 2138  Gross per 24 hour   Intake 540 ml   Output 720 ml   Net -180 ml      Physical Exam  Vitals signs and nursing note reviewed.   Constitutional:       Appearance: She is well-developed. She is obese.      Interventions: She is sedated and intubated.   HENT:      Head: Normocephalic and atraumatic.   Eyes:      Conjunctiva/sclera: Conjunctivae normal.      Pupils: Pupils are equal, round, and reactive to light.   Neck:      Musculoskeletal: Normal range of motion and neck supple.   Cardiovascular:      Rate and Rhythm: Normal rate and regular rhythm.      Pulses: Normal pulses.      Heart sounds: Normal heart sounds.   Pulmonary:      Effort: Pulmonary effort is normal. She is intubated.      Breath sounds: Decreased breath sounds present.   Abdominal:      General: Bowel sounds are normal.      Palpations: Abdomen is soft. There is no mass.      Tenderness: There is no abdominal tenderness.   Genitourinary:     Comments: Vazquez catheter in place    Musculoskeletal:          General: No swelling or deformity.   Skin:     General: Skin is warm and dry.      Capillary Refill: Capillary refill takes 2 to 3 seconds.   Neurological:      Comments: Unable to assess as pt is on mechanical ventilation with sedation     Psychiatric:      Comments: Sedated          Significant Labs:   CBC:   Recent Labs   Lab 07/12/20  0609 07/13/20  0537   WBC 15.34* 11.98   HGB 14.0 13.4   HCT 46.8 44.4    306     CMP:   Recent Labs   Lab 07/12/20  0609 07/13/20  0537   * 135*   K 4.0 4.1   CL 93* 94*   CO2 28 30*   * 184*   BUN 13 13   CREATININE 0.9 0.8   CALCIUM 9.7 9.2   PROT 8.0 7.5   ALBUMIN 3.3* 3.2*   BILITOT 0.8 0.6   ALKPHOS 57 58   AST 13 12   ALT 17 17   ANIONGAP 14 11   EGFRNONAA >60 >60     Lactic Acid:   No results for input(s): LACTATE in the last 48 hours.  Microbiology Results (last 7 days)     Procedure Component Value Units Date/Time    IV catheter culture [[id number]]  (Abnormal) Collected: 07/04/20 0844    Order Status: Completed Specimen: Catheter Tip, Intrajugular Updated: 07/07/20 1034     Aerobic Culture - Cath tip STAPHYLOCOCCUS EPIDERMIDIS  < 15 colonies      Blood culture [[id number]] Collected: 07/01/20 2034    Order Status: Completed Specimen: Blood Updated: 07/07/20 1012     Blood Culture, Routine No growth after 5 days.    Narrative:      From arterial line    Blood culture [[id number]] Collected: 07/01/20 2036    Order Status: Completed Specimen: Blood from Line, Central Neck Updated: 07/07/20 1012     Blood Culture, Routine No growth after 5 days.    Narrative:      From TLC        Significant Imaging:   ECHO:  · Concentric left ventricular remodeling.  · Normal left ventricular systolic function. The estimated ejection fraction is 69%.  · Normal LV diastolic function.  · No wall motion abnormalities.  · Normal right ventricular systolic function.  · Mild right atrial enlargement.  · Trivial pericardial effusion.  · Mild left atrial enlargement.    CXR:  Mild cardiomegaly.  Hypoventilation.    CXR:   1. Interval placement of endotracheal tube with the tip in the proximal right mainstem bronchus.  This should be withdrawn several cm.  2. The study is motion degraded.  Indistinctness of the left hemidiaphragm could be related to patient motion with atelectasis and/or pleural effusion not completely excluded.    CXR:  1. Limited evaluation secondary to technique.  2. Findings suspicious for congestive heart failure which does not appear significantly changed over the interval.  3. Small bilateral pleural effusions with bibasilar dependent atelectasis.  4. Endotracheal tube.    CXR:  Stable positioning of support devices.  Unchanged bibasilar consolidation/atelectasis.    CXR: Bibasilar opacification not significantly changed compared to the prior exam.  Positions of lines and tubes described.    CXR:   Continued bibasilar lung infiltrates.  Mild cardiomegaly.  Endotracheal tube, NG tube and central lines in position.    CXR:   Mild decrease in right basilar infiltrate.  Continued left basilar infiltrate.  Cardiomegaly.  Tubes in position.     CTA chest:  Limited evaluation for small peripheral luminal filling defects with the streaky artifact but no large central luminal filling defects are seen in pulmonary artery suggesting pulmonary artery embolism   Bilateral infiltrates with features somewhat suspicious for later changes of COVID-19 pneumonia.    CXR: Lines and tubes remain in place and bilateral infiltrates do not appear significantly changed.    CXR:  Tip of the PICC line at the level of the right subclavian vein. Mild bibasilar opacification not significantly changed.

## 2020-07-13 NOTE — RESPIRATORY THERAPY
07/12/20 1931   Patient Assessment/Suction   Level of Consciousness (AVPU) alert   Respiratory Effort Unlabored   Expansion/Accessory Muscles/Retractions no use of accessory muscles   All Lung Fields Breath Sounds diminished;clear   Rhythm/Pattern, Respiratory unlabored   Cough Frequency infrequent   Cough Type good;nonproductive   PRE-TX-O2   O2 Device (Oxygen Therapy) nasal cannula   Flow (L/min) 3   Oxygen Concentration (%) 32   SpO2 96 %   Pulse Oximetry Type Intermittent   $ Pulse Oximetry - Multiple Charge Pulse Oximetry - Multiple   Pulse 101   Resp 16   Aerosol Therapy   $ Aerosol Therapy Charges Aerosol Treatment   Respiratory Treatment Status (SVN) given   Treatment Route (SVN) mask   Patient Position (SVN) HOB elevated   Post Treatment Assessment (SVN) breath sounds unchanged   Signs of Intolerance (SVN) none   Breath Sounds Post-Respiratory Treatment   Throughout All Fields Post-Treatment All Fields   Throughout All Fields Post-Treatment no change   Post-treatment Heart Rate (beats/min) 94   Post-treatment Resp Rate (breaths/min) 16   Ready to Wean/Extubation Screen   FIO2<=50 (chart decimal) 0.32

## 2020-07-13 NOTE — PLAN OF CARE
07/13/20 0713   Patient Assessment/Suction   Respiratory Effort Normal;Unlabored   Expansion/Accessory Muscles/Retractions no use of accessory muscles   All Lung Fields Breath Sounds Anterior:;Lateral:;diminished   Cough Frequency infrequent   Cough Type nonproductive   PRE-TX-O2   O2 Device (Oxygen Therapy) nasal cannula   $ Is the patient on Low Flow Oxygen? Yes   Flow (L/min) 3   SpO2 97 %   Pulse Oximetry Type Intermittent   $ Pulse Oximetry - Multiple Charge Pulse Oximetry - Multiple   Pulse 102   Resp 18   Aerosol Therapy   $ Aerosol Therapy Charges Aerosol Treatment   Respiratory Treatment Status (SVN) given   Treatment Route (SVN) mask   Patient Position (SVN) HOB elevated   Post Treatment Assessment (SVN) breath sounds unchanged   Signs of Intolerance (SVN) none   Breath Sounds Post-Respiratory Treatment   Throughout All Fields Post-Treatment All Fields   Throughout All Fields Post-Treatment no change   Post-treatment Heart Rate (beats/min) 101   Post-treatment Resp Rate (breaths/min) 16   Wound Care   $ Wound Care Tech Time 15 min   Area of Concern Bridge of nose   Skin Color/Characteristics without discoloration   Skin Temperature warm   Preset CPAP/BiPAP Settings   Mode Of Delivery Standby

## 2020-07-13 NOTE — PLAN OF CARE
According to chart review, the pt is denied at [facility]. Per Kristen with [facility] the pt is accepted and she has submitted for auth from [facility]. CM following.   Date Status/Notes Created By   · 7/10/2020 10:54:32 AM Note: Auth has been submitted. [facility] is working on it because she has called me a few times today with questions  [email or web address]  · 7/10/2020 10:48:45 AM Note: any update?  Slime Armenta  · 7/8/2020 4:08:50 PM Under Review: Remote - Local Review  [email or web address] [email or web address]  · 7/8/2020 4:07:48 PM New: please review for LTAC placement.  Slime Armenta          Updated via secure chat that the pt was denied for LTAC services on 7/9/13 and a peer to peer was offered at that time. Oilvia Dunlap LCSW     [facility] denied my LTAC auth reques. Peer to Peer can be scheduled at [phone]. CM following. Olivia Dunlap LCSW           07/13/20 0820   Post-Acute Status   Post-Acute Authorization Placement   Post-Acute Placement Status Pending Payor Review

## 2020-07-13 NOTE — RESPIRATORY THERAPY
07/13/20 1532   Patient Assessment/Suction   Level of Consciousness (AVPU) alert   Respiratory Effort Normal;Unlabored   All Lung Fields Breath Sounds diminished   PRE-TX-O2   O2 Device (Oxygen Therapy) nasal cannula w/ humidification   $ Is the patient on Low Flow Oxygen? Yes   Flow (L/min) 2   SpO2 97 %   Pulse Oximetry Type Intermittent   $ Pulse Oximetry - Multiple Charge Pulse Oximetry - Multiple   Pulse 81   Resp 12   Aerosol Therapy   $ Aerosol Therapy Charges Aerosol Treatment   Respiratory Treatment Status (SVN) given   Treatment Route (SVN) mask   Patient Position (SVN) HOB elevated   Signs of Intolerance (SVN) none   Breath Sounds Post-Respiratory Treatment   Throughout All Fields Post-Treatment All Fields   Throughout All Fields Post-Treatment aeration increased   Post-treatment Heart Rate (beats/min) 84   Post-treatment Resp Rate (breaths/min) 16   Duoneb treatments

## 2020-07-13 NOTE — PT/OT/SLP PROGRESS
Physical Therapy Treatment    Patient Name:  Michelle Wren   MRN:  [id number]    Recommendations:     Discharge Recommendations:  home, home health PT, home with home health   Discharge Equipment Recommendations: other (see comments)(TBD)   Barriers to discharge: None    Assessment:     Michelle Wren is a 30 y.o. female admitted with a medical diagnosis of Acute respiratory failure with hypoxia and hypercarbia.  She presents with the following impairments/functional limitations:  weakness, impaired endurance, impaired self care skills, impaired functional mobilty, gait instability, impaired balance, impaired cardiopulmonary response to activity. Patient motivated to ambulate out of room today. She was able to make it 150' with RW and CGA with no rest breaks. She is very slow with antalgic gt and SOB with sweating above the upper lip. She was then transferred to the toilet where she had a bowel movement and then transferred to a chair with no complaints of SOB or fatigue. Continue with PT and POC.    Rehab Prognosis: Good; patient would benefit from acute skilled PT services to address these deficits and reach maximum level of function.    Recent Surgery: Procedure(s) (LRB):    Plan:     During this hospitalization, patient to be seen daily to address the identified rehab impairments via gait training, therapeutic activities, therapeutic exercises and progress toward the following goals:    · Plan of Care Expires:  07/31/20    Subjective     Chief Complaint: tires easily  Patient/Family Comments/goals: to get stronger  Pain/Comfort:  · Pain Rating 1: 0/10  · Pain Addressed 1: Reposition  · Pain Rating Post-Intervention 1: 0/10      Objective:     Communicated with MYRNA Jones prior to session.  Patient found up in chair with oxygen, peripheral IV, PICC line, telemetry upon PT entry to room.     General Precautions: Standard, fall   Orthopedic Precautions:N/A   Braces:       Functional Mobility:  · Transfers:      · Sit to Stand:  stand by assistance with rolling walker  · Toilet to Chair: stand by assistance with  rolling walker  using  Stand Pivot  · Toilet Transfer: minimum assistance with  rolling walker and grab bars  using  Stand Pivot and Step Transfer  · Gait: 150' with RW and SBA antalgic gt slow but steady, no stopping      AM-PAC 6 CLICK MOBILITY          Therapeutic Activities and Exercises:   Gait training, toilet transfer, cleaned patient after bowel movement, transferred pt from toilet to chair, Stayed with pt for safety during toileting due to unavailability of CNA or Nursing.    Patient left up in chair with all lines intact, call button in reach, chair alarm on and RN aware..    GOALS:   Multidisciplinary Problems     Physical Therapy Goals        Problem: Physical Therapy Goal    Goal Priority Disciplines Outcome Goal Variances Interventions   Physical Therapy Goal     PT, PT/OT Ongoing, Progressing     Description: Goals to be met by: 2020     Patient will increase functional independence with mobility by performin). Supine to sit with Stand-by Assistance  2). Sit to supine with Stand-by Assistance  3). Sit to stand transfer with Stand-by Assistance  4). Bed to chair transfer with Stand-by Assistance   5). Gait  x > 25 feet with Stand-by Assistance using Rolling Walker.                      Time Tracking:     PT Received On: 20  PT Start Time: 1043     PT Stop Time: 1121  PT Total Time (min): 38 min     Billable Minutes: Gait Training 15 and Therapeutic Activity 23    Treatment Type: Treatment  PT/PTA: PTA     PTA Visit Number: 2     Keshia Sorenson, PTA  2020

## 2020-07-13 NOTE — PLAN OF CARE
Problem: Adult Inpatient Plan of Care  Goal: Plan of Care Review  Outcome: Ongoing, Progressing  Flowsheets (Taken 7/13/2020 0253)  Plan of Care Reviewed With: patient  Pt AAOx4, lateral left with HOB elevated, resting but responsive with call light within reach and bed alarm active.  VSS with NAD noted; pt free of injury or falls.  RUE PICC dressing reinforced for IV administration.  Pt mobility and stamina during transferring improving; pt transferred with nurse to BSC with minimum assistance versus previous maximum assistance of 1-2 staff members.  Will continue to monitor.   Goal: Patient-Specific Goal (Individualization)  Outcome: Ongoing, Progressing  Flowsheets (Taken 7/13/2020 0253)  Individualized Care Needs: Safety, Breathing Pattern, Blood Sugars  Anxieties, Fears or Concerns: I feel much better today  Patient-Specific Goals (Include Timeframe): Pt will have WBCs closer to range WNL by end of day 7/13/20

## 2020-07-13 NOTE — PROGRESS NOTES
.  Progress Note  PULMONARY    Admit Date: 6/23/2020 07/13/2020      SUBJECTIVE:     June 25th-patient is sedated, no complaints, intubated.  6/26 intubated.  6/27 no c/o intubated.  6/28 no new c/o,intubated, arouses  6/29- no new c/o, intubated,   6/30 no new c/o  7/6 arouses,  No c/o. Intubated.  7/7 no c/o, arouses  7/8 intubated no c/o  7/9 wants juice, not sob. On precedex.  7/13- no c/o - breathing approaching nl.     PFSH and Allergies reviewed.    OBJECTIVE:     Vitals (Most recent):  Vitals:    07/13/20 0721   BP: (!) 159/92   Pulse: 110   Resp: (!) 23   Temp: 98.7 °F (37.1 °C)       Vitals (24 hour range):  Temp:  [98 °F (36.7 °C)-101 °F (38.3 °C)]   Pulse:  []   Resp:  [16-23]   BP: (138-174)/(71-98)   SpO2:  [91 %-99 %]       Intake/Output Summary (Last 24 hours) at 7/13/2020 0817  Last data filed at 7/12/2020 2138  Gross per 24 hour   Intake 540 ml   Output 720 ml   Net -180 ml          Physical Exam:  The patient's neuro status (alertness,orientation,cognitive function,motor skills,), pharyngeal exam (oral lesions, hygiene, abn dentition,), Neck (jvd,mass,thyroid,nodes in neck and above/below clavicle),RESPIRATORY(symmetry,effort,fremitus,percussion,auscultation),  Cor(rhythm,heart tones including gallops,perfusion,edema)ABD(distention,hepatic&splenomegaly,tenderness,masses), Skin(rash,cyanosis),Psyc(affect,judgement,).  Exam negative except for these pertinent findings:    Morbid obesity, good breath sounds, , no distress, symmetric, no edema, soft abdomen,  No sign lip swelling      Radiographs reviewed: view by direct vision   Ct chest 6/30 - impressive posterior lung consodation, no pe, some ggo, huge pulm artery  c/w pulm hypertension  Chest x-ray June 25th suggest left lower lung collapse, there is some increased interstitial type markings in the right lower lung also.  cxr 6/30 lower lung infiltrates seem very likely by appearance even with obesity  cxr 7/6 bands  Of  Atelectasis   In bases.    Results for orders placed during the hospital encounter of 06/22/20   X-Ray Chest 1 View    Narrative EXAMINATION:  XR CHEST 1 VIEW    CLINICAL HISTORY:  LINE PLACEMENT;    TECHNIQUE:  Single frontal view of the chest was performed.    COMPARISON:  06/23/2020.    FINDINGS:  There is persistent pulmonary hypoinflation.  There are bilateral pulmonary infiltrates which are stable to slightly increased over the interval likely representing pulmonary edema.  Small bilateral pleural effusions with bibasilar dependent atelectasis.    Heart size is enlarged.  Mediastinal contours unremarkable.  Trachea midline.    Endotracheal tube remains in satisfactory position.  Interval placement of right IJ catheter which terminates in the distal SVC.      Impression 1. Pulmonary hypoinflation.  2. Findings consistent with congestive heart failure which is stable to slightly increased over the interval with small bilateral pleural effusions.  3. Satisfactory indwelling life-support devices.      Electronically signed by: Navin Mortensen  Date:    06/23/2020  Time:    11:58   ]    Labs     Recent Labs   Lab 07/13/20  0537   WBC 11.98   HGB 13.4   HCT 44.4        Recent Labs   Lab 07/13/20  0537   *   K 4.1   CL 94*   CO2 30*   BUN 13   CREATININE 0.8   *   CALCIUM 9.2   MG 1.7   PHOS 3.6   AST 12   ALT 17   ALKPHOS 58   BILITOT 0.6   PROT 7.5   ALBUMIN 3.2*     No results for input(s): PH, PCO2, PO2, HCO3 in the last 24 hours.  Microbiology Results (last 7 days)     Procedure Component Value Units Date/Time    IV catheter culture [[id number]]  (Abnormal) Collected: 07/04/20 0844    Order Status: Completed Specimen: Catheter Tip, Intrajugular Updated: 07/07/20 1034     Aerobic Culture - Cath tip STAPHYLOCOCCUS EPIDERMIDIS  < 15 colonies      Blood culture [[id number]] Collected: 07/01/20 2034    Order Status: Completed Specimen: Blood Updated: 07/07/20 1012     Blood Culture, Routine No growth after 5  days.    Narrative:      From arterial line    Blood culture [[id number]] Collected: 07/01/20 2036    Order Status: Completed Specimen: Blood from Line, Central Neck Updated: 07/07/20 1012     Blood Culture, Routine No growth after 5 days.    Narrative:      From TLC        Echo 6/22/2020  · Concentric left ventricular remodeling.  · Normal left ventricular systolic function. The estimated ejection fraction is 69%.  · Normal LV diastolic function.  · No wall motion abnormalities.  · Normal right ventricular systolic function.  · Mild right atrial enlargement.  · Trivial pericardial effusion.  · Mild left atrial enlargement.       Impression:  Active Hospital Problems    Diagnosis  POA    *Acute respiratory failure with hypoxia and hypercarbia [J96.01, J96.02]  Yes    Respiratory failure with hypoxia [J96.91]  Yes    Morbid obesity [E66.01]  Yes    Pulmonary hypertension [I27.20]  Yes     By pulmonary artery size on ct chest 6/30      Obstructive sleep apnea syndrome [G47.33]  Yes    Staphylococcal pneumonia [J15.20]  Yes    Type 2 diabetes mellitus [E11.9]  Yes    Nicotine dependence [F17.200]  Yes    Obesity hypoventilation syndrome [E66.2]  Yes    Mild intermittent asthma [J45.20]  Yes    Cardiomegaly [I51.7]  Yes      Resolved Hospital Problems    Diagnosis Date Resolved POA    Severe sepsis [A41.9, R65.20] 07/05/2020 Yes    Bilateral pneumonia [J18.9] 07/05/2020 Yes    Respiratory failure with hypoxia [J96.91] 07/06/2020 Yes    Class 3 severe obesity with serious comorbidity and body mass index (BMI) of 60.0 to 69.9 in adult [E66.01, Z68.44] 07/05/2020 Not Applicable               Plan:   June 25- swollen lips, gas exchange poor peep 17 79/0.7, cxr not impressive- wbc 17.2 from 24 on 23rd.  2nd covid neg.  Cta lungs to be done.  Expect some degree of atelectasis.  Wheezes are present  Leak test and wean 02. 6/26 ratio 89/0.7 on peep 17,   Wbc now 15k, no ct done.  Solumedrol 80/d  Pt initially  bradycardic, abg with large neg base excess new from am lytes, propofol stopped for fear propofol infusion syndrome (about 40).  Fu abg with clearing acidosis.    Pt had peep decreased from 17 to 5 with sat rising from 95- to 97.  Pt  Is obstructed on vol time curve.    Pt developed resp distress off propofol on precedex.  Will give prn morphine with versed drip.  Ct not done with high peep/body size.  Could have ild but asthma should be likely dx.   mssa in sputum - staph pneumonia present at admit likely , steroids not good.   Will go to oxacillin 2 q 4 from [facility].  Cut steroids to 40/d, f/u procal-  Was 0.02 at presentation.    Addendum pt seemed to improve on lower levels peep but off propofol developed resp distress and  Desat.  Will check d dimer again, increase loveonx to therapeutic, and check leg studies.    6/27 abx tapered to oxacillin with temp going to over 101.  Will resume levaquin, culture, check abd for tenderness and mouth for ulces.  Culture.  Try decrease imv - gas exchange poor.  No leg study  6/28 temp down now.  Discussed with relative,daughter in law.  Pt was at [facility] last yr with vent for a wk, 4 wks in hosp, no pneumonia but had infection, back to nl at [place], has osas. 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  Has had intermittent face/tongue/eye swelling ppt er visit at [facility].    Will screen for hereditary angioedema/lupus/ra/wegener's   Cut peep to 5 with desat 89 on 89%   Get records from [facility]  Need to follow leak test.  Airway should be marginal given osas and morbid obesity- with history would dx angioedema - cause not clear.   Continue full anticoagg.  Consider id f/u once records, cultures, and above screens return?  6/29- tried to cut peep yesterday and ended up on peep 17 100%- pa02 59 this am (59/1.00)- cxr - hard to read bilat lower lung infiltrates intermittently seen  Tm 100.4, oxacillin and levaquin- mssa in sputum 6/23 with nl estephanie on 27th.  Wbc  19 on steroids. No wheezes,  [place] records na,   Leak was none today, 200 on 28th, 300 on 25th, 320 on 26th, tongue/lips seem smaller today? Pt intubated 23rd or so.  rf 16 with neg ccp, complement levels not low.    With no clear working dx - support.  If no improvement will re attempt to get ct but doubtful will give clear picture.  Will monitor/support.  .Addendum-  Will ask id to see, dose high dose steroids if infection felt to covered?  6/30 remains marginal, attempted decrease peep yesterday with dramatic worsening.  Will increase steroids to 125 q8, records from [place] from 11 day stay in Jan 2019 were not suggestive of ild???   Ct chest done, above, discussed with dr Schulz.  Picture looks more like collapse lung than pneumonia or covid.  Will decrease steroids, use high peep/pcv.  Discussed with family - father/brother,sis in law- picture not clear but loooks like asthma with collapse lower lungs.  Angioedema not likely initially significant but may complicate later.  rx presumed pe, pneumonia, asthma- increase rx for atelectasis. May need trach.  Pt could die  With mishap and will be at high risk.    Will decrease steroids back to 40/d.  Appreciate ID.  Vent adjusted.    141 July 6 -  Tm 99.3 on zyvox, micafungin,    Bun/creat  31/0.9 - I/o  2770/3205  - 141.6 kg now - was 164.7 on 6/23?  cxr 7/6 with plate like  Atelectasis - 4 and 5  Had more diffuse haze.      Vent  Adjusted - considering atelectasis and shunt as likely cause of gas exchange issues,  Will go to peep 5 with  pcv tidal volumes 600-900,  psv 25  And check  abg at 11.  If abg 11  Adequate - cpap trial  With plans to extubate to niv and mobilize if passes.  willl ask surgery to see for trach in event not progressing.  Hold tube feeds    Low dose lovenox now.    7/7/2020 - peep 15 on 40 % with 02 60, now on 30% - try cpap 11 am, hold tube feeds for cpap trial.    Glu 247-  Tm 99.6?  On zyvox for 7 days - id sign off.        7/8/2020  -patient was not extubated yesterday for fear that she might have airway problems.  She is still doing well today with weaning trials.  Will go ahead and give her a trial of extubation.  Would place on BiPAP , mobilized promptly.    7/9 off vent, 99% on 50% ox on niv, go to nc, mobilize, po/advance diet prn.  c1 esterase nl, rf 16   Change lasix to po, go to po prednisone, niv prn, needs for sleep.   Need cpap settings from home- should have niv.  Stop zyvox am.  Clear liq  7/13- pt would use cpap/bipap device limted to 1 am 5 days a wk as would need to wake up for 5 but couldn't hear alarm clock with machine.  No mdi rescue, used symbicort, no action plan.  Unable to use neb at work.       Pt understands this episode was near fatal, much worse than last yr.  She uses rescue therapy several times a wk.      She needs to us cpap/bipap through out night.  Needs resuce inhaler and steroid action plan.      Sent in prednisone for 20/d x 5 , #20 with rf.    Also symbicort and albuterol mdi.     syggest office f/u in 4 wks.

## 2020-07-14 VITALS
TEMPERATURE: 98 F | SYSTOLIC BLOOD PRESSURE: 136 MMHG | RESPIRATION RATE: 14 BRPM | OXYGEN SATURATION: 96 % | BODY MASS INDEX: 53.92 KG/M2 | WEIGHT: 293 LBS | DIASTOLIC BLOOD PRESSURE: 75 MMHG | HEART RATE: 80 BPM | HEIGHT: 62 IN

## 2020-07-14 LAB
ALBUMIN SERPL BCP-MCNC: 3.1 G/DL (ref 3.5–5.2)
ALP SERPL-CCNC: 57 U/L (ref 55–135)
ALT SERPL W/O P-5'-P-CCNC: 17 U/L (ref 10–44)
ANION GAP SERPL CALC-SCNC: 10 MMOL/L (ref 8–16)
AST SERPL-CCNC: 13 U/L (ref 10–40)
BASOPHILS # BLD AUTO: 0.03 K/UL (ref 0–0.2)
BASOPHILS NFR BLD: 0.3 % (ref 0–1.9)
BILIRUB SERPL-MCNC: 0.4 MG/DL (ref 0.1–1)
BUN SERPL-MCNC: 9 MG/DL (ref 6–20)
CALCIUM SERPL-MCNC: 9.6 MG/DL (ref 8.7–10.5)
CHLORIDE SERPL-SCNC: 95 MMOL/L (ref 95–110)
CO2 SERPL-SCNC: 29 MMOL/L (ref 23–29)
CREAT SERPL-MCNC: 0.8 MG/DL (ref 0.5–1.4)
DIFFERENTIAL METHOD: ABNORMAL
EOSINOPHIL # BLD AUTO: 0.1 K/UL (ref 0–0.5)
EOSINOPHIL NFR BLD: 0.5 % (ref 0–8)
ERYTHROCYTE [DISTWIDTH] IN BLOOD BY AUTOMATED COUNT: 16.3 % (ref 11.5–14.5)
EST. GFR  (AFRICAN AMERICAN): >60 ML/MIN/1.73 M^2
EST. GFR  (NON AFRICAN AMERICAN): >60 ML/MIN/1.73 M^2
GLUCOSE SERPL-MCNC: 151 MG/DL (ref 70–110)
HCT VFR BLD AUTO: 42.3 % (ref 37–48.5)
HGB BLD-MCNC: 12.8 G/DL (ref 12–16)
IMM GRANULOCYTES # BLD AUTO: 0.07 K/UL (ref 0–0.04)
IMM GRANULOCYTES NFR BLD AUTO: 0.6 % (ref 0–0.5)
LYMPHOCYTES # BLD AUTO: 2.8 K/UL (ref 1–4.8)
LYMPHOCYTES NFR BLD: 25 % (ref 18–48)
MAGNESIUM SERPL-MCNC: 1.9 MG/DL (ref 1.6–2.6)
MCH RBC QN AUTO: 26.6 PG (ref 27–31)
MCHC RBC AUTO-ENTMCNC: 30.3 G/DL (ref 32–36)
MCV RBC AUTO: 88 FL (ref 82–98)
MONOCYTES # BLD AUTO: 1.6 K/UL (ref 0.3–1)
MONOCYTES NFR BLD: 13.9 % (ref 4–15)
NEUTROPHILS # BLD AUTO: 6.8 K/UL (ref 1.8–7.7)
NEUTROPHILS NFR BLD: 59.7 % (ref 38–73)
NRBC BLD-RTO: 0 /100 WBC
PHOSPHATE SERPL-MCNC: 4.4 MG/DL (ref 2.7–4.5)
PLATELET # BLD AUTO: 306 K/UL (ref 150–350)
PMV BLD AUTO: 11.2 FL (ref 9.2–12.9)
POCT GLUCOSE: 152 MG/DL (ref 70–110)
POCT GLUCOSE: 162 MG/DL (ref 70–110)
POCT GLUCOSE: 212 MG/DL (ref 70–110)
POTASSIUM SERPL-SCNC: 3.6 MMOL/L (ref 3.5–5.1)
PROT SERPL-MCNC: 7.3 G/DL (ref 6–8.4)
RBC # BLD AUTO: 4.82 M/UL (ref 4–5.4)
SODIUM SERPL-SCNC: 134 MMOL/L (ref 136–145)
WBC # BLD AUTO: 11.34 K/UL (ref 3.9–12.7)

## 2020-07-14 PROCEDURE — 99232 SBSQ HOSP IP/OBS MODERATE 35: CPT | Mod: ,,, | Performed by: INTERNAL MEDICINE

## 2020-07-14 PROCEDURE — 63600175 PHARM REV CODE 636 W HCPCS: Performed by: HOSPITALIST

## 2020-07-14 PROCEDURE — 94640 AIRWAY INHALATION TREATMENT: CPT

## 2020-07-14 PROCEDURE — 25000003 PHARM REV CODE 250: Performed by: HOSPITALIST

## 2020-07-14 PROCEDURE — 36415 COLL VENOUS BLD VENIPUNCTURE: CPT

## 2020-07-14 PROCEDURE — 85025 COMPLETE CBC W/AUTO DIFF WBC: CPT

## 2020-07-14 PROCEDURE — 25000003 PHARM REV CODE 250: Performed by: INTERNAL MEDICINE

## 2020-07-14 PROCEDURE — 80053 COMPREHEN METABOLIC PANEL: CPT

## 2020-07-14 PROCEDURE — 94761 N-INVAS EAR/PLS OXIMETRY MLT: CPT

## 2020-07-14 PROCEDURE — 25000242 PHARM REV CODE 250 ALT 637 W/ HCPCS: Performed by: INTERNAL MEDICINE

## 2020-07-14 PROCEDURE — 83735 ASSAY OF MAGNESIUM: CPT

## 2020-07-14 PROCEDURE — 25000003 PHARM REV CODE 250: Performed by: NURSE PRACTITIONER

## 2020-07-14 PROCEDURE — 27000221 HC OXYGEN, UP TO 24 HOURS

## 2020-07-14 PROCEDURE — 25000242 PHARM REV CODE 250 ALT 637 W/ HCPCS: Performed by: HOSPITALIST

## 2020-07-14 PROCEDURE — 99232 PR SUBSEQUENT HOSPITAL CARE,LEVL II: ICD-10-PCS | Mod: ,,, | Performed by: INTERNAL MEDICINE

## 2020-07-14 PROCEDURE — 84100 ASSAY OF PHOSPHORUS: CPT

## 2020-07-14 RX ORDER — IBUPROFEN 400 MG/1
800 TABLET ORAL ONCE
Status: COMPLETED | OUTPATIENT
Start: 2020-07-14 | End: 2020-07-14

## 2020-07-14 RX ORDER — PANTOPRAZOLE SODIUM 40 MG/1
40 TABLET, DELAYED RELEASE ORAL DAILY
Qty: 30 TABLET | Refills: 0 | Status: SHIPPED | OUTPATIENT
Start: 2020-07-14 | End: 2023-10-05

## 2020-07-14 RX ADMIN — PREDNISONE 10 MG: 5 TABLET ORAL at 08:07

## 2020-07-14 RX ADMIN — INSULIN ASPART 4 UNITS: 100 INJECTION, SOLUTION INTRAVENOUS; SUBCUTANEOUS at 11:07

## 2020-07-14 RX ADMIN — LINEZOLID 600 MG: 600 INJECTION, SOLUTION INTRAVENOUS at 08:07

## 2020-07-14 RX ADMIN — BUDESONIDE INHALATION 0.5 MG: 0.5 SUSPENSION RESPIRATORY (INHALATION) at 07:07

## 2020-07-14 RX ADMIN — IBUPROFEN 800 MG: 400 TABLET, FILM COATED ORAL at 01:07

## 2020-07-14 RX ADMIN — METOCLOPRAMIDE 10 MG: 5 INJECTION, SOLUTION INTRAMUSCULAR; INTRAVENOUS at 05:07

## 2020-07-14 RX ADMIN — IPRATROPIUM BROMIDE AND ALBUTEROL SULFATE 3 ML: .5; 2.5 SOLUTION RESPIRATORY (INHALATION) at 07:07

## 2020-07-14 RX ADMIN — INSULIN ASPART 2 UNITS: 100 INJECTION, SOLUTION INTRAVENOUS; SUBCUTANEOUS at 08:07

## 2020-07-14 RX ADMIN — FUROSEMIDE 20 MG: 20 TABLET ORAL at 08:07

## 2020-07-14 RX ADMIN — METOPROLOL SUCCINATE 50 MG: 50 TABLET, FILM COATED, EXTENDED RELEASE ORAL at 08:07

## 2020-07-14 RX ADMIN — PANTOPRAZOLE SODIUM 40 MG: 40 TABLET, DELAYED RELEASE ORAL at 08:07

## 2020-07-14 RX ADMIN — IPRATROPIUM BROMIDE AND ALBUTEROL SULFATE 3 ML: .5; 2.5 SOLUTION RESPIRATORY (INHALATION) at 11:07

## 2020-07-14 NOTE — PLAN OF CARE
Pt clear for discharge from case management standpoint once oxygen is delivered to hospital room from [facility]. Pt discharging to home.      07/14/20 1518   Final Note   Assessment Type Final Discharge Note   Anticipated Discharge Disposition Home   Hospital Follow Up  Appt(s) scheduled? Yes

## 2020-07-14 NOTE — DISCHARGE SUMMARY
[facility] Hospital Medicine  Discharge Summary      Patient Name: Michelle Wren  MRN: [id number]  Admission Date: 6/23/2020  Hospital Length of Stay: 21 days  Discharge Date and Time:  07/14/2020 8:56 AM  Attending Physician: Peg Stewart MD   Discharging Provider: Peg Stewart MD  Primary Care Provider: Primary Doctor No      HPI:   Michelle Wren is a 29 y/o female with a past medical history significant for asthma, type 2 diabetes, and severe obesity who is transferred from [facility] to [facility] for pulmonary consultation.  Patient presented to the ED at [facility] yesterday with complaints of 2-3 days of shortness of breath.  It is reported that she uses CPAP at night but she got this from a friend so unsure of settings.  She was placed on BiPAP and given IV Solu-Medrol, bronchodilator treatments, and IV Lasix.  She was admitted to the ICU at [facility].  Patient continued to decline and underwent intubation yesterday around 7:00 p.m.  per report, today patient appeared to be worse.  Her saturations were in the mid 90s on FiO2 of 100%.  A D-dimer was checked and was negative.  Per review of labs which were drawn earlier today, her WBC count was 23,000 and her lactic acid was 2.4.  She was placed on IV Zosyn and IV vancomycin.  Upon arrival to [facility], patient is in no acute distress.  She is intubated and sedated.  Vital signs are stable.  Lactic acid will be repeated now as patient meets sepsis criteria and will check a procalcitonin.  She will be monitored closely in the ICU.       Hospital Course:   Patient was managed in intensive care unit, requiring mechanical ventilation under supervision of pulmonary medicine.  Repeat COVID -19 tests were negative.  Patient received intravenous antibiotics for bilateral pneumonia.  Sputum cultures grew MSSA species.  Patient was followed by infectious disease specialist and pulmonary medicine specialist.  Patient  tested negative for COVID -19 antibody.  Patient remained intubated for prolonged period of time given her severe intrinsic lung disease as well as obesity hypoventilation.  She was extubated on 07/08/2020 and did well post extubation.  She was initially on BiPAP and was weaned slowly to nasal cannula which she tolerated.  Patient has been counseled regarding regular use of CPAP/BiPAP nightly.  Daily exercise plan and weight loss strategies discussed with the patient.  Patient completed antibiotic therapy.  Patient to continue use of inhalers, steroid inhalers and prednisone therapy as recommended by Dr. Sung from Pulmonary Medicine.  Patient will continue close follow-up with her doctors and Dr. Sung.  Patient instructed to keep blood sugar log.  During hospital stay metformin has been discontinued.  Patient is anxious to be discharged.  Patient has qualified for home supplemental oxygen which is being arranged by .    Consults:   Consults (From admission, onward)        Status Ordering Provider     Inpatient consult to General Surgery  Once     Provider:  Daisha Colmenares MD    Completed KEEGAN SUNG     Inpatient consult to Infectious Diseases  Once     Provider:  Keiko Bedoya MD    Completed KEEGAN SUNG     Inpatient consult to PICC team ([facility])  Once     Provider:  (Not yet assigned)    Acknowledged ISH SABILLON     Inpatient consult to Pulmonology  Once     Provider:  Keegan Sung MD    Completed LOUIE LING     Inpatient consult to Registered Dietitian/Nutritionist  Once     Provider:  (Not yet assigned)    Completed LOUIE LING     Inpatient consult to Registered Dietitian/Nutritionist  Once     Provider:  (Not yet assigned)    Completed ISH SABILLON     Inpatient consult to Social Work/Case Management  Once     Provider:  (Not yet assigned)    Acknowledged ISH SABILLON        ECHO:  · Concentric left ventricular remodeling.  · Normal left ventricular  systolic function. The estimated ejection fraction is 69%.  · Normal LV diastolic function.  · No wall motion abnormalities.  · Normal right ventricular systolic function.  · Mild right atrial enlargement.  · Trivial pericardial effusion.  · Mild left atrial enlargement.     CXR: Mild cardiomegaly.  Hypoventilation.     CXR:   1. Interval placement of endotracheal tube with the tip in the proximal right mainstem bronchus.  This should be withdrawn several cm.  2. The study is motion degraded.  Indistinctness of the left hemidiaphragm could be related to patient motion with atelectasis and/or pleural effusion not completely excluded.     CXR:  1. Limited evaluation secondary to technique.  2. Findings suspicious for congestive heart failure which does not appear significantly changed over the interval.  3. Small bilateral pleural effusions with bibasilar dependent atelectasis.  4. Endotracheal tube.     CXR:  Stable positioning of support devices.  Unchanged bibasilar consolidation/atelectasis.     CXR: Bibasilar opacification not significantly changed compared to the prior exam.  Positions of lines and tubes described.     CXR:   Continued bibasilar lung infiltrates.  Mild cardiomegaly.  Endotracheal tube, NG tube and central lines in position.     CXR:   Mild decrease in right basilar infiltrate.  Continued left basilar infiltrate.  Cardiomegaly.  Tubes in position.      CTA chest:  Limited evaluation for small peripheral luminal filling defects with the streaky artifact but no large central luminal filling defects are seen in pulmonary artery suggesting pulmonary artery embolism   Bilateral infiltrates with features somewhat suspicious for later changes of COVID-19 pneumonia.     CXR: Lines and tubes remain in place and bilateral infiltrates do not appear significantly changed.     CXR:  Tip of the PICC line at the level of the right subclavian vein. Mild bibasilar opacification not significantly  changed.    Microbiology Results (last 7 days)     Procedure Component Value Units Date/Time    IV catheter culture [[id number]]  (Abnormal) Collected: 07/04/20 0844    Order Status: Completed Specimen: Catheter Tip, Intrajugular Updated: 07/07/20 1034     Aerobic Culture - Cath tip STAPHYLOCOCCUS EPIDERMIDIS  < 15 colonies      Blood culture [[id number]] Collected: 07/01/20 2034    Order Status: Completed Specimen: Blood Updated: 07/07/20 1012     Blood Culture, Routine No growth after 5 days.    Narrative:      From arterial line    Blood culture [[id number]] Collected: 07/01/20 2036    Order Status: Completed Specimen: Blood from Line, Central Neck Updated: 07/07/20 1012     Blood Culture, Routine No growth after 5 days.    Narrative:      From TLC        Final Active Diagnoses:    Diagnosis Date Noted POA    PRINCIPAL PROBLEM:  Acute respiratory failure with hypoxia and hypercarbia [J96.01, J96.02] 06/23/2020 Yes    Respiratory failure with hypoxia [J96.91] 07/10/2020 Yes    Morbid obesity [E66.01] 07/05/2020 Yes    Pulmonary hypertension [I27.20] 07/01/2020 Yes    Obstructive sleep apnea syndrome [G47.33] 06/28/2020 Yes    Staphylococcal pneumonia [J15.20] 06/26/2020 Yes    Type 2 diabetes mellitus [E11.9] 06/24/2020 Yes    Nicotine dependence [F17.200] 06/23/2020 Yes    Obesity hypoventilation syndrome [E66.2] 06/22/2020 Yes    Mild intermittent asthma [J45.20] 06/22/2020 Yes    Cardiomegaly [I51.7] 06/22/2020 Yes      Problems Resolved During this Admission:    Diagnosis Date Noted Date Resolved POA    Severe sepsis [A41.9, R65.20] 06/23/2020 07/05/2020 Yes    Bilateral pneumonia [J18.9] 06/23/2020 07/05/2020 Yes    Respiratory failure with hypoxia [J96.91] 06/23/2020 07/06/2020 Yes    Class 3 severe obesity with serious comorbidity and body mass index (BMI) of 60.0 to 69.9 in adult [E66.01, Z68.44] 06/22/2020 07/05/2020 Not Applicable       Discharged Condition: stable    Disposition: Home or  Self Care    Follow Up:  Follow-up Information     Keegan Gentile MD In 1 month.    Specialty: Pulmonary Disease  Contact information:  [address] [facility]  [address]  [place] [id number] [phone]             PCP In 1 week.               Patient Instructions:      Diet diabetic     Other restrictions (specify):   Order Comments: PLEASE OBSERVE FALL PRECAUTIONS     Call MD for:   Order Comments: For worsening symptoms, chest pain, shortness of breath, increased abdominal pain, high grade fever, stroke or stroke like symptoms, immediately go to the nearest Emergency Room or call 911 as soon as possible.       Significant Diagnostic Studies: Labs:   CMP   Recent Labs   Lab 07/13/20  0537 07/14/20  0548   * 134*   K 4.1 3.6   CL 94* 95   CO2 30* 29   * 151*   BUN 13 9   CREATININE 0.8 0.8   CALCIUM 9.2 9.6   PROT 7.5 7.3   ALBUMIN 3.2* 3.1*   BILITOT 0.6 0.4   ALKPHOS 58 57   AST 12 13   ALT 17 17   ANIONGAP 11 10   ESTGFRAFRICA >60 >60   EGFRNONAA >60 >60    and CBC   Recent Labs   Lab 07/13/20  0537 07/14/20  0548   WBC 11.98 11.34   HGB 13.4 12.8   HCT 44.4 42.3    306       Pending Diagnostic Studies:     Procedure Component Value Units Date/Time     Lower Extremity Veins Bilateral [[id number]]     Order Status: Sent Lab Status: No result          Medications:  Reconciled Home Medications:      Medication List      START taking these medications    budesonide-formoterol 160-4.5 mcg 160-4.5 mcg/actuation Hfaa  Commonly known as: SYMBICORT  Inhale 2 puffs into the lungs 2 (two) times daily.     montelukast 10 mg tablet  Commonly known as: SINGULAIR  Take 1 tablet (10 mg total) by mouth every evening.     pantoprazole 40 MG tablet  Commonly known as: PROTONIX  Take 1 tablet (40 mg total) by mouth once daily.     predniSONE 20 MG tablet  Commonly known as: DELTASONE  Take one daily for 5 days and may repeat for shortness of breath.        CHANGE how you take these medications    * albuterol 90  mcg/actuation inhaler  Commonly known as: PROVENTIL/VENTOLIN HFA  Inhale 1-2 puffs into the lungs every 6 (six) hours as needed for Wheezing. Rescue  What changed: Another medication with the same name was added. Make sure you understand how and when to take each.     * albuterol 90 mcg/actuation inhaler  Commonly known as: PROVENTIL/VENTOLIN HFA  2 puffs every 4 hours as needed for cough, wheeze, or shortness of breath  What changed: You were already taking a medication with the same name, and this prescription was added. Make sure you understand how and when to take each.         * This list has 2 medication(s) that are the same as other medications prescribed for you. Read the directions carefully, and ask your doctor or other care provider to review them with you.            STOP taking these medications    azithromycin 250 MG tablet  Commonly known as: Z-JOSE ALBERTO     METFORMIN ORAL            Indwelling Lines/Drains at time of discharge:   Lines/Drains/Airways     Peripherally Inserted Central Catheter Line            PICC Double Lumen 07/04/20 0600 right brachial 10 days                Time spent on the discharge of patient: 34 minutes  Patient was seen and examined on the date of discharge and determined to be suitable for discharge.         Peg Stewart MD  Department of Hospital Medicine  [facility]

## 2020-07-14 NOTE — NURSING
"  Patient is c/o pain with her feet that is a throbbing pain. Nurse offered tylenol to the patient and she refused stating "that it would not help she ahs already tried it".  Teresa Rowe NP notified via secure chat. Will continue to monitor.   "

## 2020-07-14 NOTE — PLAN OF CARE
The pt has home O2 ordered and it is being processed by [facility]. CM following. Olivia Dunlap LCSW     1:52 pm- Per Helen with [facility] - the pts home O2 has been sent to [facility].  Olivia Dunlap LCSW       07/14/20 1239   Post-Acute Status   Post-Acute Authorization E   E Status Referrals Sent

## 2020-07-14 NOTE — PROGRESS NOTES
.  Progress Note  PULMONARY    Admit Date: 6/23/2020 07/14/2020      SUBJECTIVE:     June 25th-patient is sedated, no complaints, intubated.  6/26 intubated.  6/27 no c/o intubated.  6/28 no new c/o,intubated, arouses  6/29- no new c/o, intubated,   6/30 no new c/o  7/6 arouses,  No c/o. Intubated.  7/7 no c/o, arouses  7/8 intubated no c/o  7/9 wants juice, not sob. On precedex.  7/13- no c/o - breathing approaching nl.   7/14 didn't use niv last pm- pt admonished. Breathing nearer nl. Having foot pain bilat walking.      PFSH and Allergies reviewed.    OBJECTIVE:     Vitals (Most recent):  Vitals:    07/14/20 0741   BP:    Pulse: 91   Resp: 14   Temp:        Vitals (24 hour range):  Temp:  [98.1 °F (36.7 °C)-99.3 °F (37.4 °C)]   Pulse:  []   Resp:  [12-18]   BP: (136-141)/(75-92)   SpO2:  [91 %-98 %]       Intake/Output Summary (Last 24 hours) at 7/14/2020 0818  Last data filed at 7/14/2020 0600  Gross per 24 hour   Intake 1250 ml   Output 700 ml   Net 550 ml          Physical Exam:  The patient's neuro status (alertness,orientation,cognitive function,motor skills,), pharyngeal exam (oral lesions, hygiene, abn dentition,), Neck (jvd,mass,thyroid,nodes in neck and above/below clavicle),RESPIRATORY(symmetry,effort,fremitus,percussion,auscultation),  Cor(rhythm,heart tones including gallops,perfusion,edema)ABD(distention,hepatic&splenomegaly,tenderness,masses), Skin(rash,cyanosis),Psyc(affect,judgement,).  Exam negative except for these pertinent findings:    Morbid obesity, good breath sounds, , no distress, symmetric, no edema, soft abdomen,  No sign lip swelling- feet look and palpate ok      Radiographs reviewed: view by direct vision   Ct chest 6/30 - impressive posterior lung consodation, no pe, some ggo, huge pulm artery  c/w pulm hypertension  Chest x-ray June 25th suggest left lower lung collapse, there is some increased interstitial type markings in the right lower lung also.  cxr 6/30 lower  lung infiltrates seem very likely by appearance even with obesity  cxr 7/6 bands  Of  Atelectasis  In bases.    Results for orders placed during the hospital encounter of 06/22/20   X-Ray Chest 1 View    Narrative EXAMINATION:  XR CHEST 1 VIEW    CLINICAL HISTORY:  LINE PLACEMENT;    TECHNIQUE:  Single frontal view of the chest was performed.    COMPARISON:  06/23/2020.    FINDINGS:  There is persistent pulmonary hypoinflation.  There are bilateral pulmonary infiltrates which are stable to slightly increased over the interval likely representing pulmonary edema.  Small bilateral pleural effusions with bibasilar dependent atelectasis.    Heart size is enlarged.  Mediastinal contours unremarkable.  Trachea midline.    Endotracheal tube remains in satisfactory position.  Interval placement of right IJ catheter which terminates in the distal SVC.      Impression 1. Pulmonary hypoinflation.  2. Findings consistent with congestive heart failure which is stable to slightly increased over the interval with small bilateral pleural effusions.  3. Satisfactory indwelling life-support devices.      Electronically signed by: Navin Mortensen  Date:    06/23/2020  Time:    11:58   ]    Labs     Recent Labs   Lab 07/14/20  0548   WBC 11.34   HGB 12.8   HCT 42.3        Recent Labs   Lab 07/14/20  0548   *   K 3.6   CL 95   CO2 29   BUN 9   CREATININE 0.8   *   CALCIUM 9.6   MG 1.9   PHOS 4.4   AST 13   ALT 17   ALKPHOS 57   BILITOT 0.4   PROT 7.3   ALBUMIN 3.1*     No results for input(s): PH, PCO2, PO2, HCO3 in the last 24 hours.  Microbiology Results (last 7 days)     Procedure Component Value Units Date/Time    IV catheter culture [[id number]]  (Abnormal) Collected: 07/04/20 0844    Order Status: Completed Specimen: Catheter Tip, Intrajugular Updated: 07/07/20 1034     Aerobic Culture - Cath tip STAPHYLOCOCCUS EPIDERMIDIS  < 15 colonies      Blood culture [[id number]] Collected: 07/01/20 2034    Order Status:  Completed Specimen: Blood Updated: 07/07/20 1012     Blood Culture, Routine No growth after 5 days.    Narrative:      From arterial line    Blood culture [[id number]] Collected: 07/01/20 2036    Order Status: Completed Specimen: Blood from Line, Central Neck Updated: 07/07/20 1012     Blood Culture, Routine No growth after 5 days.    Narrative:      From TLC        Echo 6/22/2020  · Concentric left ventricular remodeling.  · Normal left ventricular systolic function. The estimated ejection fraction is 69%.  · Normal LV diastolic function.  · No wall motion abnormalities.  · Normal right ventricular systolic function.  · Mild right atrial enlargement.  · Trivial pericardial effusion.  · Mild left atrial enlargement.       Impression:  Active Hospital Problems    Diagnosis  POA    *Acute respiratory failure with hypoxia and hypercarbia [J96.01, J96.02]  Yes    Respiratory failure with hypoxia [J96.91]  Yes    Morbid obesity [E66.01]  Yes    Pulmonary hypertension [I27.20]  Yes     By pulmonary artery size on ct chest 6/30      Obstructive sleep apnea syndrome [G47.33]  Yes    Staphylococcal pneumonia [J15.20]  Yes    Type 2 diabetes mellitus [E11.9]  Yes    Nicotine dependence [F17.200]  Yes    Obesity hypoventilation syndrome [E66.2]  Yes    Mild intermittent asthma [J45.20]  Yes    Cardiomegaly [I51.7]  Yes      Resolved Hospital Problems    Diagnosis Date Resolved POA    Severe sepsis [A41.9, R65.20] 07/05/2020 Yes    Bilateral pneumonia [J18.9] 07/05/2020 Yes    Respiratory failure with hypoxia [J96.91] 07/06/2020 Yes    Class 3 severe obesity with serious comorbidity and body mass index (BMI) of 60.0 to 69.9 in adult [E66.01, Z68.44] 07/05/2020 Not Applicable               Plan:   June 25- swollen lips, gas exchange poor peep 17 79/0.7, cxr not impressive- wbc 17.2 from 24 on 23rd.  2nd covid neg.  Cta lungs to be done.  Expect some degree of atelectasis.  Wheezes are present  Leak test and wean  02.    6/26 ratio 89/0.7 on peep 17,   Wbc now 15k, no ct done.  Solumedrol 80/d  Pt initially bradycardic, abg with large neg base excess new from am lytes, propofol stopped for fear propofol infusion syndrome (about 40).  Fu abg with clearing acidosis.    Pt had peep decreased from 17 to 5 with sat rising from 95- to 97.  Pt  Is obstructed on vol time curve.    Pt developed resp distress off propofol on precedex.  Will give prn morphine with versed drip.  Ct not done with high peep/body size.  Could have ild but asthma should be likely dx.   mssa in sputum - staph pneumonia present at admit likely , steroids not good.   Will go to oxacillin 2 q 4 from [facility].  Cut steroids to 40/d, f/u procal-  Was 0.02 at presentation.    Addendum pt seemed to improve on lower levels peep but off propofol developed resp distress and  Desat.  Will check d dimer again, increase loveonx to therapeutic, and check leg studies.    6/27 abx tapered to oxacillin with temp going to over 101.  Will resume levaquin, culture, check abd for tenderness and mouth for ulces.  Culture.  Try decrease imv - gas exchange poor.  No leg study  6/28 temp down now.  Discussed with relative,daughter in law.  Pt was at [facility] last yr with vent for a wk, 4 wks in hosp, no pneumonia but had infection, back to nl at [place], has osas. 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  Has had intermittent face/tongue/eye swelling ppt er visit at [facility].    Will screen for hereditary angioedema/lupus/ra/wegener's   Cut peep to 5 with desat 89 on 89%   Get records from [facility]  Need to follow leak test.  Airway should be marginal given osas and morbid obesity- with history would dx angioedema - cause not clear.   Continue full anticoagg.  Consider id f/u once records, cultures, and above screens return?  6/29- tried to cut peep yesterday and ended up on peep 17 100%- pa02 59 this am (59/1.00)- cxr - hard to read bilat lower lung infiltrates  intermittently seen  Tm 100.4, oxacillin and levaquin- mssa in sputum 6/23 with nl estephanie on 27th.  Wbc 19 on steroids. No wheezes,  [place] records na,   Leak was none today, 200 on 28th, 300 on 25th, 320 on 26th, tongue/lips seem smaller today? Pt intubated 23rd or so.  rf 16 with neg ccp, complement levels not low.    With no clear working dx - support.  If no improvement will re attempt to get ct but doubtful will give clear picture.  Will monitor/support.  .Addendum-  Will ask id to see, dose high dose steroids if infection felt to covered?  6/30 remains marginal, attempted decrease peep yesterday with dramatic worsening.  Will increase steroids to 125 q8, records from [place] from 11 day stay in Jan 2019 were not suggestive of ild???   Ct chest done, above, discussed with dr Schulz.  Picture looks more like collapse lung than pneumonia or covid.  Will decrease steroids, use high peep/pcv.  Discussed with family - father/brother,sis in law- picture not clear but loooks like asthma with collapse lower lungs.  Angioedema not likely initially significant but may complicate later.  rx presumed pe, pneumonia, asthma- increase rx for atelectasis. May need trach.  Pt could die  With mishap and will be at high risk.    Will decrease steroids back to 40/d.  Appreciate ID.  Vent adjusted.    141 July 6 -  Tm 99.3 on zyvox, micafungin,    Bun/creat  31/0.9 - I/o  2770/3205  - 141.6 kg now - was 164.7 on 6/23?  cxr 7/6 with plate like  Atelectasis - 4 and 5  Had more diffuse haze.      Vent  Adjusted - considering atelectasis and shunt as likely cause of gas exchange issues,  Will go to peep 5 with  pcv tidal volumes 600-900,  psv 25  And check  abg at 11.  If abg 11  Adequate - cpap trial  With plans to extubate to niv and mobilize if passes.  willl ask surgery to see for trach in event not progressing.  Hold tube feeds    Low dose lovenox now.    7/7/2020 - peep 15 on 40 % with 02 60, now on 30% - try cpap 11 am, hold  tube feeds for cpap trial.    Glu 247-  Tm 99.6?  On zyvox for 7 days - id sign off.        7/8/2020 -patient was not extubated yesterday for fear that she might have airway problems.  She is still doing well today with weaning trials.  Will go ahead and give her a trial of extubation.  Would place on BiPAP , mobilized promptly.    7/9 off vent, 99% on 50% ox on niv, go to nc, mobilize, po/advance diet prn.  c1 esterase nl, rf 16   Change lasix to po, go to po prednisone, niv prn, needs for sleep.   Need cpap settings from home- should have niv.  Stop zyvox am.  Clear liq  7/13- pt would use cpap/bipap device limted to 1 am 5 days a wk as would need to wake up for 5 but couldn't hear alarm clock with machine.  No mdi rescue, used symbicort, no action plan.  Unable to use neb at work.       Pt understands this episode was near fatal, much worse than last yr.  She uses rescue therapy several times a wk.      She needs to us cpap/bipap through out night.  Needs resuce inhaler and steroid action plan.      Sent in prednisone for 20/d x 5 , #20 with rf.    Also symbicort and albuterol mdi.     syggest office f/u in 4 wks.    7/14 pt denies yellow mucous in past.  Cautioned as had staph infection to note mucous as risk for staph/lung infections.    Cautioned re compliance with cpap/niv.    Explained need for steroid action plans, and regular rx with symbicort/singulair.  And prn albuterol.    Foot pain cause not clear- steroids myopathy????      outpt ok.

## 2020-07-14 NOTE — PLAN OF CARE
Pt clear for discharge from case management standpoint. Pt discharging to home       07/14/20 0937   Final Note   Assessment Type Final Discharge Note   Anticipated Discharge Disposition Home   Hospital Follow Up  Appt(s) scheduled? Yes

## 2020-07-14 NOTE — DISCHARGE INSTRUCTIONS
Daily regular use of CPAP as directed.    Weight loss plan.    Keep blood glucose log.  Your metformin medication has been discontinued during hospital stay.  If blood glucose readings remained above 140 mg percent, notify primary care physician for antidiabetic medication use.

## 2020-07-14 NOTE — RESPIRATORY THERAPY
07/14/20 1100   Patient Assessment/Suction   Level of Consciousness (AVPU) alert   PRE-TX-O2   O2 Device (Oxygen Therapy) room air   SpO2 (!) 87 %   Home Oxygen Qualification   Room Air SpO2 At Rest 90 %   Room Air SpO2 on Exertion (!) 86 %   SpO2 During Exertion on O2 93 %   Heart Rate on O2 90 bpm   Exertion O2 LPM 2 LPM   SpO2 on Recovery 94 %   Recovery Heart Rate 86 bpm   Recovery O2 LPM 2 LPM

## 2020-07-14 NOTE — PLAN OF CARE
Hospital follow up scheduled with PCP. SAMEER updated.   Apt also scheduled with Dr. Gentile. MAAMES updated.

## 2020-07-14 NOTE — NURSING
Oxygen delivered. Pt's ride arrived. Pt escorted to exit via wheelchair. No acute distress noted.

## 2020-07-14 NOTE — RESPIRATORY THERAPY
07/14/20 0741   Patient Assessment/Suction   Level of Consciousness (AVPU) alert   Respiratory Effort Normal   All Lung Fields Breath Sounds diminished   PRE-TX-O2   Pulse 91   Resp 14   Aerosol Therapy   $ Aerosol Therapy Charges Aerosol Treatment   Respiratory Treatment Status (SVN) given   Treatment Route (SVN) mask;oxygen   Patient Position (SVN) HOB elevated;semi-Howard's   Post Treatment Assessment (SVN) breath sounds unchanged   Signs of Intolerance (SVN) none   Breath Sounds Post-Respiratory Treatment   Throughout All Fields Post-Treatment All Fields   Throughout All Fields Post-Treatment no change   Post-treatment Heart Rate (beats/min) 89   Post-treatment Resp Rate (breaths/min) 16

## 2020-07-14 NOTE — PLAN OF CARE
Per Esperanza at [facility] - the Home o2 should be delivered already or almost here as it has already been sent out. Olivia Dunlap, FAWAD     07/14/20 1516   Post-Acute Status   Post-Acute Authorization E   [facility] Status Set-up Complete

## 2020-07-14 NOTE — PLAN OF CARE
Patient alert and oriented resting in bed. NAD. Denies pain or SOB. VSS. Up to BR with stand by assist. O2@3LNc.  Bed alarm active. Plan of care reviewed with patient. Verbalizes understanding.Call light in reach. Pt free from fall or injury. Will monitor.

## 2020-07-14 NOTE — NURSING
AVS reviewed. Pt verbalized understanding. Tele and PICC removed. Pt notified family of discharge and is waiting on a ride. Will continue to monitor.

## 2020-07-14 NOTE — CARE UPDATE
07/13/20 1932   Patient Assessment/Suction   Level of Consciousness (AVPU) alert   Respiratory Effort Normal   Expansion/Accessory Muscles/Retractions no use of accessory muscles;no retractions   All Lung Fields Breath Sounds diminished   Rhythm/Pattern, Respiratory no shortness of breath reported   Cough Type nonproductive   PRE-TX-O2   O2 Device (Oxygen Therapy) nasal cannula   $ Is the patient on Low Flow Oxygen? Yes   Flow (L/min) 2   SpO2 98 %   Pulse Oximetry Type Intermittent   $ Pulse Oximetry - Multiple Charge Pulse Oximetry - Multiple   Pulse 94   Resp 18   Temp 98.8 °F (37.1 °C)   /78   Aerosol Therapy   $ Aerosol Therapy Charges Aerosol Treatment   Respiratory Treatment Status (SVN) given   Treatment Route (SVN) mask   Patient Position (SVN) HOB elevated   Post Treatment Assessment (SVN) breath sounds unchanged   Signs of Intolerance (SVN) none   Preset CPAP/BiPAP Settings   Mode Of Delivery Standby

## 2020-07-30 ENCOUNTER — OFFICE VISIT (OUTPATIENT)
Dept: PULMONOLOGY | Facility: CLINIC | Age: 30
End: 2020-07-30
Payer: MEDICAID

## 2020-07-30 VITALS
HEART RATE: 98 BPM | OXYGEN SATURATION: 98 % | BODY MASS INDEX: 64.94 KG/M2 | DIASTOLIC BLOOD PRESSURE: 110 MMHG | SYSTOLIC BLOOD PRESSURE: 177 MMHG | WEIGHT: 293 LBS

## 2020-07-30 DIAGNOSIS — J96.12 CHRONIC RESPIRATORY FAILURE WITH HYPOXIA AND HYPERCAPNIA: Primary | ICD-10-CM

## 2020-07-30 DIAGNOSIS — E09.9 STEROID-INDUCED DIABETES: ICD-10-CM

## 2020-07-30 DIAGNOSIS — J98.4 RESTRICTIVE LUNG DISEASE: ICD-10-CM

## 2020-07-30 DIAGNOSIS — J44.9 CHRONIC OBSTRUCTIVE PULMONARY DISEASE, UNSPECIFIED COPD TYPE: ICD-10-CM

## 2020-07-30 DIAGNOSIS — J96.11 CHRONIC RESPIRATORY FAILURE WITH HYPOXIA AND HYPERCAPNIA: Primary | ICD-10-CM

## 2020-07-30 DIAGNOSIS — T38.0X5A STEROID-INDUCED DIABETES: ICD-10-CM

## 2020-07-30 DIAGNOSIS — J45.50 SEVERE PERSISTENT ASTHMA WITHOUT COMPLICATION: ICD-10-CM

## 2020-07-30 DIAGNOSIS — E66.2 OBESITY HYPOVENTILATION SYNDROME: ICD-10-CM

## 2020-07-30 PROBLEM — J45.20 MILD INTERMITTENT ASTHMA: Status: RESOLVED | Noted: 2020-06-22 | Resolved: 2020-07-30

## 2020-07-30 PROCEDURE — 99999 PR PBB SHADOW E&M-EST. PATIENT-LVL III: ICD-10-PCS | Mod: PBBFAC,,, | Performed by: INTERNAL MEDICINE

## 2020-07-30 PROCEDURE — 99215 PR OFFICE/OUTPT VISIT, EST, LEVL V, 40-54 MIN: ICD-10-PCS | Mod: S$PBB,,, | Performed by: INTERNAL MEDICINE

## 2020-07-30 PROCEDURE — 99999 PR PBB SHADOW E&M-EST. PATIENT-LVL III: CPT | Mod: PBBFAC,,, | Performed by: INTERNAL MEDICINE

## 2020-07-30 PROCEDURE — 99215 OFFICE O/P EST HI 40 MIN: CPT | Mod: S$PBB,,, | Performed by: INTERNAL MEDICINE

## 2020-07-30 PROCEDURE — 99213 OFFICE O/P EST LOW 20 MIN: CPT | Mod: PBBFAC,PO | Performed by: INTERNAL MEDICINE

## 2020-07-30 RX ORDER — METFORMIN HYDROCHLORIDE 500 MG/1
1000 TABLET ORAL 2 TIMES DAILY WITH MEALS
Qty: 120 TABLET | Refills: 11 | Status: SHIPPED | OUTPATIENT
Start: 2020-07-30 | End: 2020-07-30 | Stop reason: SDUPTHER

## 2020-07-30 RX ORDER — PREDNISONE 20 MG/1
TABLET ORAL
Qty: 30 TABLET | Refills: 1 | Status: SHIPPED | OUTPATIENT
Start: 2020-07-30 | End: 2020-07-30 | Stop reason: SDUPTHER

## 2020-07-30 RX ORDER — FLUTICASONE FUROATE AND VILANTEROL TRIFENATATE 200; 25 UG/1; UG/1
1 POWDER RESPIRATORY (INHALATION) DAILY
Qty: 1 EACH | Refills: 5 | Status: SHIPPED | OUTPATIENT
Start: 2020-07-30 | End: 2023-10-05

## 2020-07-30 RX ORDER — FLUTICASONE FUROATE AND VILANTEROL TRIFENATATE 200; 25 UG/1; UG/1
1 POWDER RESPIRATORY (INHALATION) DAILY
Qty: 1 EACH | Refills: 5 | Status: SHIPPED | OUTPATIENT
Start: 2020-07-30 | End: 2020-07-30 | Stop reason: SDUPTHER

## 2020-07-30 RX ORDER — DOXYCYCLINE HYCLATE 100 MG
TABLET ORAL
Status: ON HOLD | COMMUNITY
Start: 2020-07-21 | End: 2020-11-16 | Stop reason: HOSPADM

## 2020-07-30 RX ORDER — FUROSEMIDE 20 MG/1
TABLET ORAL
Status: ON HOLD | COMMUNITY
Start: 2020-07-21 | End: 2020-11-16 | Stop reason: HOSPADM

## 2020-07-30 RX ORDER — PREDNISONE 20 MG/1
TABLET ORAL
Qty: 30 TABLET | Refills: 1 | Status: ON HOLD | OUTPATIENT
Start: 2020-07-30 | End: 2020-11-16 | Stop reason: SDUPTHER

## 2020-07-30 RX ORDER — METFORMIN HYDROCHLORIDE 500 MG/1
1000 TABLET ORAL 2 TIMES DAILY WITH MEALS
Qty: 120 TABLET | Refills: 11 | Status: SHIPPED | OUTPATIENT
Start: 2020-07-30 | End: 2021-07-30

## 2020-07-30 NOTE — PROGRESS NOTES
7/30/2020    [facility] Follow Up    Chief Complaint   Patient presents with    Establish Care     had 2 collaped lungs, wants to get off o2       HPI:  Min cough, no wheezes, no sob.  Patient was discharged last week from hospital.  She had a prolonged hospital course with respiratory failure.  Patient has chronic obstructive lung disease with dyspnea on a regular basis.  She suffers from hypercapnic and hypoxic respiratory failure.  Her course on the ventilator was prolonged and severe and very life threatening.  Tracheostomy was considered.  Patient does have restrictive lung disease.  She has morbid obesity with obesity hypoventilation syndrome.  She has severe persistent asthma with a recent exacerbation.  She has a component of chronic obstructive lung disease, this is despite her young age.  This is related to severe asthma.    Patient had difficulty getting her prednisone.  She does start of the last few days.  She is not taking any diabetes therapy.  She monitor sugars at home and they are in the 200 range.    Patient has been using home oxygen with great benefit.  She has ambulated in the office today and her O2 sat falls 84% on room air ambulating.  Patient has improved on a regular basis.  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    No pet exposure.  No sinus problem.    She is not on controller therapy for asthma.  Medicines coverage is limited with her Medicaid.  Only 5 prescriptions a month.    Patient has CPAP device prior, CPAP device is not adequate to treat the severity of her illness however.  Patient does need noninvasive ventilation which she will recur with respiratory failure.      From my consult note at recent [facility] hosp stay:  06/24/2020                                                  Admit Date: 6/23/2020  [facility]  New Patient Consult         Chief Complaint   Patient presents with    Shortness of Breath        History of Present  Illness: poor history, uses cpap at home?   Pt presented to [facility] with cough/wheezesjaune 2019, October 2019 - then presented June 22 with resp failure.  Pt was intubated after failing niv.  Pt reported increase sob 2-3 days pta with increased leg swelling.  rm air pao2 was  53 with co2 61- abg just 7 hrs later had 02 111 with niv and co2 84 on 50%, pt was intubated with right mainstem on initial cxr, subsequently had need for high 02, tidal vol was 550 (400 was 8 cc/kg pbw)- peep was between 12-15 with need for 100%.  Pt was dosed steroids with procal 0.02, d dimer was wnl, wbc initially 11.7 going to 24-26 post steroids.  Echo 6/22 was good.  From progress note plans during hosp stay:  Plan:   June 25- swollen lips, gas exchange poor peep 17 79/0.7, cxr not impressive- wbc 17.2 from 24 on 23rd.  2nd covid neg.  Cta lungs to be done.  Expect some degree of atelectasis.  Wheezes are present  Leak test and wean 02.    6/26 ratio 89/0.7 on peep 17,   Wbc now 15k, no ct done.  Solumedrol 80/d  Pt initially bradycardic, abg with large neg base excess new from am lytes, propofol stopped for fear propofol infusion syndrome (about 40).  Fu abg with clearing acidosis.    Pt had peep decreased from 17 to 5 with sat rising from 95- to 97.  Pt  Is obstructed on vol time curve.    Pt developed resp distress off propofol on precedex.  Will give prn morphine with versed drip.  Ct not done with high peep/body size.  Could have ild but asthma should be likely dx.   mssa in sputum - staph pneumonia present at admit likely , steroids not good.   Will go to oxacillin 2 q 4 from [facility].  Cut steroids to 40/d, f/u procal-  Was 0.02 at presentation.    Addendum pt seemed to improve on lower levels peep but off propofol developed resp distress and  Desat.  Will check d dimer again, increase loveonx to therapeutic, and check leg studies.    6/27 abx tapered to oxacillin with temp going to over 101.  Will resume levaquin, culture, check abd  for tenderness and mouth for ulces.  Culture.  Try decrease imv - gas exchange poor.  No leg study  6/28 temp down now.  Discussed with relative,daughter in law.  Pt was at [facility] [place] 2[facility] last yr with vent for a wk, 4 wks in hosp, no pneumonia but had infection, back to nl at [place], has osas. 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  Has had intermittent face/tongue/eye swelling ppt er visit at [facility].    Will screen for hereditary angioedema/lupus/ra/wegener's   Cut peep to 5 with desat 89 on 89%   Get records from [place]  Need to follow leak test.  Airway should be marginal given osas and morbid obesity- with history would dx angioedema - cause not clear.   Continue full anticoagg.  Consider id f/u once records, cultures, and above screens return?  6/29- tried to cut peep yesterday and ended up on peep 17 100%- pa02 59 this am (59/1.00)- cxr - hard to read bilat lower lung infiltrates intermittently seen  Tm 100.4, oxacillin and levaquin- mssa in sputum 6/23 with nl estephanie on 27th.  Wbc 19 on steroids. No wheezes,  [facility] records na,   Leak was none today, 200 on 28th, 300 on 25th, 320 on 26th, tongue/lips seem smaller today? Pt intubated 23rd or so.  rf 16 with neg ccp, complement levels not low.    With no clear working dx - support.  If no improvement will re attempt to get ct but doubtful will give clear picture.  Will monitor/support.  .Addendum-  Will ask id to see, dose high dose steroids if infection felt to covered?  6/30 remains marginal, attempted decrease peep yesterday with dramatic worsening.  Will increase steroids to 125 q8, records from [place] from 11 day stay in Jan 2019 were not suggestive of ild???   Ct chest done, above, discussed with dr Schulz.  Picture looks more like collapse lung than pneumonia or covid.  Will decrease steroids, use high peep/pcv.  Discussed with family - father/brother,sis in law- picture not clear but loooks like asthma with collapse lower lungs.   Angioedema not likely initially significant but may complicate later.  rx presumed pe, pneumonia, asthma- increase rx for atelectasis. May need trach.  Pt could die  With mishap and will be at high risk.    Will decrease steroids back to 40/d.  Appreciate ID.  Vent adjusted.    141  July 6 -  Tm 99.3 on zyvox, micafungin,    Bun/creat  31/0.9 - I/o  2770/3205  - 141.6 kg now - was 164.7 on 6/23?  cxr 7/6 with plate like  Atelectasis - 4 and 5  Had more diffuse haze.       Vent  Adjusted - considering atelectasis and shunt as likely cause of gas exchange issues,  Will go to peep 5 with  pcv tidal volumes 600-900,  psv 25  And check  abg at 11.  If abg 11  Adequate - cpap trial  With plans to extubate to niv and mobilize if passes.  willl ask surgery to see for trach in event not progressing.  Hold tube feeds     Low dose lovenox now.    7/7/2020 - peep 15 on 40 % with 02 60, now on 30% - try cpap 11 am, hold tube feeds for cpap trial.    Glu 247-  Tm 99.6?  On zyvox for 7 days - id sign off.          7/8/2020 -patient was not extubated yesterday for fear that she might have airway problems.  She is still doing well today with weaning trials.  Will go ahead and give her a trial of extubation.  Would place on BiPAP , mobilized promptly.     7/9 off vent, 99% on 50% ox on niv, go to nc, mobilize, po/advance diet prn.  c1 esterase nl, rf 16   Change lasix to po, go to po prednisone, niv prn, needs for sleep.   Need cpap settings from home- should have niv.  Stop zyvox am.  Clear liq  7/13- pt would use cpap/bipap device limted to 1 am 5 days a wk as would need to wake up for 5 but couldn't hear alarm clock with machine.  No mdi rescue, used symbicort, no action plan.  Unable to use neb at work.        Pt understands this episode was near fatal, much worse than last yr.  She uses rescue therapy several times a wk.       She needs to us cpap/bipap through out night.  Needs resuce inhaler and steroid action plan.       Sent  in prednisone for 20/d x 5 , #20 with rf.     Also symbicort and albuterol mdi.      syggest office f/u in 4 wks.      pt denies yellow mucous in past.  Cautioned as had staph infection to note mucous as risk for staph/lung infections.     Cautioned re compliance with cpap/niv.     Explained need for steroid action plans, and regular rx with symbicort/singulair.  And prn albuterol.     Foot pain cause not clear- steroids myopathy????    The chief compliant  problem is varies with instablilty at time   PFSH:  Past Medical History:   Diagnosis Date    Asthma     Back pain     Diabetes mellitus     Morbid obesity     Obesity          Past Surgical History:   Procedure Laterality Date    APPENDECTOMY       SECTION       Social History     Tobacco Use    Smoking status: Former Smoker   Substance Use Topics    Alcohol use: Yes     Frequency: Never     Comment: occ    Drug use: No     Family History   Family history unknown: Yes     Review of patient's allergies indicates:  No Known Allergies    Performance Status:The patient's activity level is functions out of house.      Review of Systems:  a review of eleven systems covering constitutional, Eye, HEENT, Psych, Respiratory, Cardiac, GI, , Musculoskeletal, Endocrine, Dermatologic was negative except for pertinent findings as listed ABOVE and below: pertinent positive as above, rest is good  Patient continues to have bilateral foot pain.  She was recently started on gabapentin.      Exam:Comprehensive exam done. BP (!) 177/110 (BP Location: Right arm, Patient Position: Sitting)   Pulse 98   Wt (!) 161 kg (355 lb 0.8 oz)   SpO2 98% Comment: on room air at rest  BMI 64.94 kg/m²   Exam included Vitals as listed, and patient's appearance and affect and alertness and mood, oral exam for yeast and hygiene and pharynx lesions and Mallapatti (M) score, neck with inspection for jvd and masses and thyroid abnormalities and lymph nodes (supraclavicular and  infraclavicular nodes and axillary also examined and noted if abn), chest exam included symmetry and effort and fremitus and percussion and auscultation, cardiac exam included rhythm and gallops and murmur and rubs and jvd and edema, abdominal exam for mass and hepatosplenomegaly and tenderness and hernias and bowel sounds, Musculoskeletal exam with muscle tone and posture and mobility/gait and  strength, and skin for rashes and cyanosis and pallor and turgor, extremity for clubbing.  Findings were normal except for pertinent findings listed below:  M4, morbid obese, chest is clear, symmetric chest.  Tactile fremitus and percussion are normal.  Chest is symmetrical.  Bilateral lower leg edema.  No clubbing.  Heart tones are distant.  Rest is good.  Ambulatory O2 sat is 84% in office today on room air         Radiographs (ct chest and cxr) reviewed: view by direct vision chest x-ray during during admission did not show infiltrates    Labs reviewed  creatinine was normal in June        PFT will be done and results to be reviewed       Plan:  Clinical impression is apparently straight forward and impression with management as below.    Michelle was seen today for establish care.    Diagnoses and all orders for this visit:    Chronic respiratory failure with hypoxia and hypercapnia  -     VENTILATOR FOR HOME USE  -     Spirometry without Bronchodilator; Future    Obesity hypoventilation syndrome  -     VENTILATOR FOR HOME USE  -     Spirometry without Bronchodilator; Future    Restrictive lung disease  -     VENTILATOR FOR HOME USE  -     Spirometry without Bronchodilator; Future    Chronic obstructive pulmonary disease, unspecified COPD type  -     predniSONE (DELTASONE) 20 MG tablet; Take 2 daily for 5 days then take one daily for 5 days and may repeat for shortness of breath.  -     fluticasone furoate-vilanteroL (BREO ELLIPTA) 200-25 mcg/dose DsDv diskus inhaler; Inhale 1 puff into the lungs once daily.  Controller  -     VENTILATOR FOR HOME USE  -     Spirometry without Bronchodilator; Future    Severe persistent asthma without complication  -     predniSONE (DELTASONE) 20 MG tablet; Take 2 daily for 5 days then take one daily for 5 days and may repeat for shortness of breath.  -     fluticasone furoate-vilanteroL (BREO ELLIPTA) 200-25 mcg/dose DsDv diskus inhaler; Inhale 1 puff into the lungs once daily. Controller    Steroid-induced diabetes  -     metFORMIN (GLUCOPHAGE) 500 MG tablet; Take 2 tablets (1,000 mg total) by mouth 2 (two) times daily with meals.        Follow up if symptoms worsen or fail to improve.    Discussed with patient above for education the following:      Patient Instructions     breo once daily would be optimal controller - get on insurance.  Would replace symbicort    Use prednisone 20mg daily for 5 days if any bad breathing, if severe use 2 daily for 5 days, then one daily for 5 days and get evaluated in er- may get off insurance.  May cause sugar to go up.    Use albuterol as needed- get on insurance.  Use 2-3 puffs or nebulizer.      Metformin will keep sugars from going up- 1000 mg twice daily- may cause stomach problems.  Should not cause low sugars, usual 1st line treatment.  Usually not expensive.  May get by with lower dose.    Your oxygen  Level fell to 84% walking in office today.  You need to continue oxygen as has helped tremendously.  Oxygen level over 88  Is considered adequate.       Due to patient's copd and chronic respiratory failure and restrictive lung disease and obesity hypoventilation  , patient's conditioning is worsening and requires a non invasive home ventilator (nocturnal and daytime volume ventilation prn use) to prevent co2 retention and untimely hospital re admits. bipap is insufficient due to severity of condition.  Your copd and chronic respiratory failure and restrictive lung disease and obesity hypoventilation are the  primary causes of chronic  respiratory failure.  bipap is not sufficient.        You should get breathing test.    Results for MICH NASH (MRN [id number]) as of 7/30/2020 10:28   Ref. Range 6/22/2020 15:36   POC PH Latest Ref Range: 7.35 - 7.45  7.244 (LL)   POC PCO2 Latest Ref Range: 35 - 45 mmHg 83.6 (HH)   POC PO2 Latest Ref Range: 80 - 100 mmHg 111 (H)

## 2020-07-30 NOTE — PATIENT INSTRUCTIONS
breo once daily would be optimal controller - get on insurance.  Would replace symbicort    Use prednisone 20mg daily for 5 days if any bad breathing, if severe use 2 daily for 5 days, then one daily for 5 days and get evaluated in er- may get off insurance.  May cause sugar to go up.    Use albuterol as needed- get on insurance.  Use 2-3 puffs or nebulizer.      Metformin will keep sugars from going up- 1000 mg twice daily- may cause stomach problems.  Should not cause low sugars, usual 1st line treatment.  Usually not expensive.  May get by with lower dose.    Your oxygen  Level fell to 84% walking in office today.  You need to continue oxygen as has helped tremendously.  Oxygen level over 88  Is considered adequate.       Due to patient's copd and chronic respiratory failure and restrictive lung disease and obesity hypoventilation  , patient's conditioning is worsening and requires a non invasive home ventilator (nocturnal and daytime volume ventilation prn use) to prevent co2 retention and untimely hospital re admits. bipap is insufficient due to severity of condition.  Your copd and chronic respiratory failure and restrictive lung disease and obesity hypoventilation are the  primary causes of chronic respiratory failure.  bipap is not sufficient.        You should get breathing test.    Results for MICH NASH (MRN [id number]) as of 7/30/2020 10:28   Ref. Range 6/22/2020 15:36   POC PH Latest Ref Range: 7.35 - 7.45  7.244 (LL)   POC PCO2 Latest Ref Range: 35 - 45 mmHg 83.6 (HH)   POC PO2 Latest Ref Range: 80 - 100 mmHg 111 (H)       singulair may be useful if used regular

## 2020-07-30 NOTE — LETTER
[facility]  [address], [address]  [place] [address]  Phone: [phone]  Fax: [phone]       2020     niki Hairston 6/10/2020    Pt may resume work with no restriction on 2020.      Thank You,    Keegan Gentile MD  Pulmonary Diseases

## 2020-09-11 NOTE — PLAN OF CARE
Message was left for patient to contact this office about appointment. Pt remains intubated and sedated on vent in ICU room 516, pulmonology, respiratory and nurse management with multiple attempts to wean vent  throughout shift. Patient not stable enough to tolerate transfer to CT for CTA- Pulmonologist agreed. Patient tolerating Vent SIMV- Rate 25, FiO2 80%, Peep 15, PS 25, . Sedation changed per MD. Currently on Precedex 0.7 and Versed 3 to maintain Rass -2.     Started patient on TF per dietary recommendation. Patient anticoagulated with full dose lovenox per order. Urine output accurate, patient remained SR/SB on tele. Family updated twice this shift. A-line positional, multiple attempts by respiratory to replace A-line to right radial unsuccessful; Left Artesian repositioned by respiratory,

## 2020-11-08 ENCOUNTER — HOSPITAL ENCOUNTER (INPATIENT)
Facility: HOSPITAL | Age: 30
LOS: 1 days | Discharge: ANOTHER HOSPITAL | End: 2020-11-09
Attending: EMERGENCY MEDICINE | Admitting: FAMILY MEDICINE
Payer: MEDICAID

## 2020-11-08 DIAGNOSIS — E66.2 PICKWICKIAN SYNDROME: ICD-10-CM

## 2020-11-08 DIAGNOSIS — J44.1 COPD EXACERBATION: Primary | ICD-10-CM

## 2020-11-08 DIAGNOSIS — R06.02 SHORTNESS OF BREATH: ICD-10-CM

## 2020-11-08 LAB
ALBUMIN SERPL BCP-MCNC: 3.6 G/DL (ref 3.5–5.2)
ALLENS TEST: ABNORMAL
ALP SERPL-CCNC: 70 U/L (ref 55–135)
ALT SERPL W/O P-5'-P-CCNC: 20 U/L (ref 10–44)
ANION GAP SERPL CALC-SCNC: 9 MMOL/L (ref 8–16)
AST SERPL-CCNC: 22 U/L (ref 10–40)
BASOPHILS # BLD AUTO: 0.04 K/UL (ref 0–0.2)
BASOPHILS NFR BLD: 0.3 % (ref 0–1.9)
BILIRUB SERPL-MCNC: 0.4 MG/DL (ref 0.1–1)
BNP SERPL-MCNC: 261 PG/ML (ref 0–99)
BUN SERPL-MCNC: 15 MG/DL (ref 6–20)
CALCIUM SERPL-MCNC: 8.7 MG/DL (ref 8.7–10.5)
CHLORIDE SERPL-SCNC: 94 MMOL/L (ref 95–110)
CO2 SERPL-SCNC: 38 MMOL/L (ref 23–29)
CREAT SERPL-MCNC: 0.8 MG/DL (ref 0.5–1.4)
DELSYS: ABNORMAL
DIFFERENTIAL METHOD: ABNORMAL
EOSINOPHIL # BLD AUTO: 0 K/UL (ref 0–0.5)
EOSINOPHIL NFR BLD: 0.1 % (ref 0–8)
EP: 12
EP: 12
ERYTHROCYTE [DISTWIDTH] IN BLOOD BY AUTOMATED COUNT: 17.6 % (ref 11.5–14.5)
ERYTHROCYTE [SEDIMENTATION RATE] IN BLOOD BY WESTERGREN METHOD: 18 MM/H
ERYTHROCYTE [SEDIMENTATION RATE] IN BLOOD BY WESTERGREN METHOD: 22 MM/H
EST. GFR  (AFRICAN AMERICAN): >60 ML/MIN/1.73 M^2
EST. GFR  (NON AFRICAN AMERICAN): >60 ML/MIN/1.73 M^2
FIO2: 40
FIO2: 45
FIO2: 45
GLUCOSE SERPL-MCNC: 138 MG/DL (ref 70–110)
HCO3 UR-SCNC: 41.1 MMOL/L (ref 24–28)
HCO3 UR-SCNC: 42.6 MMOL/L (ref 24–28)
HCO3 UR-SCNC: 43.8 MMOL/L (ref 24–28)
HCT VFR BLD AUTO: 46.5 % (ref 37–48.5)
HGB BLD-MCNC: 13 G/DL (ref 12–16)
IMM GRANULOCYTES # BLD AUTO: 0.13 K/UL (ref 0–0.04)
IMM GRANULOCYTES NFR BLD AUTO: 0.9 % (ref 0–0.5)
INR PPP: 1 (ref 0.8–1.2)
IP: 20
IP: 22
LYMPHOCYTES # BLD AUTO: 2.9 K/UL (ref 1–4.8)
LYMPHOCYTES NFR BLD: 19.1 % (ref 18–48)
MCH RBC QN AUTO: 22.8 PG (ref 27–31)
MCHC RBC AUTO-ENTMCNC: 28 G/DL (ref 32–36)
MCV RBC AUTO: 81 FL (ref 82–98)
MODE: ABNORMAL
MONOCYTES # BLD AUTO: 1.7 K/UL (ref 0.3–1)
MONOCYTES NFR BLD: 11.3 % (ref 4–15)
NEUTROPHILS # BLD AUTO: 10.3 K/UL (ref 1.8–7.7)
NEUTROPHILS NFR BLD: 68.3 % (ref 38–73)
NRBC BLD-RTO: 3 /100 WBC
PCO2 BLDA: 94.6 MMHG (ref 35–45)
PCO2 BLDA: 95 MMHG (ref 35–45)
PCO2 BLDA: 97.7 MMHG (ref 35–45)
PH SMN: 7.25 [PH] (ref 7.35–7.45)
PH SMN: 7.26 [PH] (ref 7.35–7.45)
PH SMN: 7.26 [PH] (ref 7.35–7.45)
PLATELET # BLD AUTO: 405 K/UL (ref 150–350)
PMV BLD AUTO: 10.1 FL (ref 9.2–12.9)
PO2 BLDA: 100 MMHG (ref 80–100)
PO2 BLDA: 78 MMHG (ref 80–100)
PO2 BLDA: 85 MMHG (ref 80–100)
POC BE: 14 MMOL/L
POC BE: 15 MMOL/L
POC BE: 17 MMOL/L
POC SATURATED O2: 92 % (ref 95–100)
POC SATURATED O2: 93 % (ref 95–100)
POC SATURATED O2: 96 % (ref 95–100)
POC TCO2: 44 MMOL/L (ref 23–27)
POC TCO2: 45 MMOL/L (ref 23–27)
POC TCO2: 47 MMOL/L (ref 23–27)
POTASSIUM SERPL-SCNC: 4 MMOL/L (ref 3.5–5.1)
PROT SERPL-MCNC: 7.6 G/DL (ref 6–8.4)
PROTHROMBIN TIME: 10.2 SEC (ref 9–12.5)
RBC # BLD AUTO: 5.71 M/UL (ref 4–5.4)
SAMPLE: ABNORMAL
SARS-COV-2 RDRP RESP QL NAA+PROBE: NEGATIVE
SITE: ABNORMAL
SODIUM SERPL-SCNC: 141 MMOL/L (ref 136–145)
TROPONIN I SERPL DL<=0.01 NG/ML-MCNC: 0.17 NG/ML (ref 0.02–0.5)
WBC # BLD AUTO: 15 K/UL (ref 3.9–12.7)

## 2020-11-08 PROCEDURE — 25000003 PHARM REV CODE 250

## 2020-11-08 PROCEDURE — 12000002 HC ACUTE/MED SURGE SEMI-PRIVATE ROOM

## 2020-11-08 PROCEDURE — 80053 COMPREHEN METABOLIC PANEL: CPT

## 2020-11-08 PROCEDURE — 63600175 PHARM REV CODE 636 W HCPCS

## 2020-11-08 PROCEDURE — 87070 CULTURE OTHR SPECIMN AEROBIC: CPT

## 2020-11-08 PROCEDURE — 27000221 HC OXYGEN, UP TO 24 HOURS

## 2020-11-08 PROCEDURE — 94761 N-INVAS EAR/PLS OXIMETRY MLT: CPT

## 2020-11-08 PROCEDURE — 84484 ASSAY OF TROPONIN QUANT: CPT

## 2020-11-08 PROCEDURE — 85610 PROTHROMBIN TIME: CPT

## 2020-11-08 PROCEDURE — 87205 SMEAR GRAM STAIN: CPT

## 2020-11-08 PROCEDURE — 93010 EKG 12-LEAD: ICD-10-PCS | Mod: ,,, | Performed by: INTERNAL MEDICINE

## 2020-11-08 PROCEDURE — 36600 WITHDRAWAL OF ARTERIAL BLOOD: CPT

## 2020-11-08 PROCEDURE — U0002 COVID-19 LAB TEST NON-CDC: HCPCS

## 2020-11-08 PROCEDURE — 82803 BLOOD GASES ANY COMBINATION: CPT

## 2020-11-08 PROCEDURE — 63600175 PHARM REV CODE 636 W HCPCS: Performed by: FAMILY MEDICINE

## 2020-11-08 PROCEDURE — 94003 VENT MGMT INPAT SUBQ DAY: CPT

## 2020-11-08 PROCEDURE — 27000190 HC CPAP FULL FACE MASK W/VALVE

## 2020-11-08 PROCEDURE — 25000242 PHARM REV CODE 250 ALT 637 W/ HCPCS: Performed by: FAMILY MEDICINE

## 2020-11-08 PROCEDURE — 25000242 PHARM REV CODE 250 ALT 637 W/ HCPCS: Performed by: HOSPITALIST

## 2020-11-08 PROCEDURE — 71045 X-RAY EXAM CHEST 1 VIEW: CPT | Mod: TC,FY

## 2020-11-08 PROCEDURE — 25000003 PHARM REV CODE 250: Performed by: FAMILY MEDICINE

## 2020-11-08 PROCEDURE — 71045 X-RAY EXAM CHEST 1 VIEW: CPT | Mod: 26,,, | Performed by: RADIOLOGY

## 2020-11-08 PROCEDURE — 81000 URINALYSIS NONAUTO W/SCOPE: CPT

## 2020-11-08 PROCEDURE — 87086 URINE CULTURE/COLONY COUNT: CPT

## 2020-11-08 PROCEDURE — 94660 CPAP INITIATION&MGMT: CPT

## 2020-11-08 PROCEDURE — 94002 VENT MGMT INPAT INIT DAY: CPT

## 2020-11-08 PROCEDURE — 99900035 HC TECH TIME PER 15 MIN (STAT)

## 2020-11-08 PROCEDURE — 93005 ELECTROCARDIOGRAM TRACING: CPT

## 2020-11-08 PROCEDURE — 25000242 PHARM REV CODE 250 ALT 637 W/ HCPCS

## 2020-11-08 PROCEDURE — 71045 X-RAY EXAM CHEST 1 VIEW: CPT | Mod: 26,77,, | Performed by: RADIOLOGY

## 2020-11-08 PROCEDURE — 71045 XR CHEST 1 VIEW FOR LINE/TUBE PLACEMENT: ICD-10-PCS | Mod: 26,77,, | Performed by: RADIOLOGY

## 2020-11-08 PROCEDURE — 83880 ASSAY OF NATRIURETIC PEPTIDE: CPT

## 2020-11-08 PROCEDURE — 99285 EMERGENCY DEPT VISIT HI MDM: CPT | Mod: 25

## 2020-11-08 PROCEDURE — 94640 AIRWAY INHALATION TREATMENT: CPT

## 2020-11-08 PROCEDURE — 93010 ELECTROCARDIOGRAM REPORT: CPT | Mod: ,,, | Performed by: INTERNAL MEDICINE

## 2020-11-08 PROCEDURE — 63600175 PHARM REV CODE 636 W HCPCS: Performed by: EMERGENCY MEDICINE

## 2020-11-08 PROCEDURE — 71045 XR CHEST AP PORTABLE: ICD-10-PCS | Mod: 26,,, | Performed by: RADIOLOGY

## 2020-11-08 PROCEDURE — 85025 COMPLETE CBC W/AUTO DIFF WBC: CPT

## 2020-11-08 RX ORDER — IPRATROPIUM BROMIDE AND ALBUTEROL SULFATE 2.5; .5 MG/3ML; MG/3ML
SOLUTION RESPIRATORY (INHALATION)
Status: COMPLETED
Start: 2020-11-08 | End: 2020-11-08

## 2020-11-08 RX ORDER — GLUCAGON 1 MG
1 KIT INJECTION
Status: DISCONTINUED | OUTPATIENT
Start: 2020-11-08 | End: 2020-11-09 | Stop reason: HOSPADM

## 2020-11-08 RX ORDER — SUCCINYLCHOLINE CHLORIDE 20 MG/ML
INJECTION INTRAMUSCULAR; INTRAVENOUS
Status: COMPLETED
Start: 2020-11-08 | End: 2020-11-08

## 2020-11-08 RX ORDER — METHYLPREDNISOLONE SOD SUCC 125 MG
80 VIAL (EA) INJECTION EVERY 8 HOURS
Status: DISCONTINUED | OUTPATIENT
Start: 2020-11-08 | End: 2020-11-09 | Stop reason: HOSPADM

## 2020-11-08 RX ORDER — IPRATROPIUM BROMIDE AND ALBUTEROL SULFATE 2.5; .5 MG/3ML; MG/3ML
3 SOLUTION RESPIRATORY (INHALATION) EVERY 6 HOURS
Status: CANCELLED | OUTPATIENT
Start: 2020-11-08

## 2020-11-08 RX ORDER — BUDESONIDE 0.25 MG/2ML
INHALANT ORAL
Status: COMPLETED
Start: 2020-11-08 | End: 2020-11-08

## 2020-11-08 RX ORDER — MUPIROCIN 20 MG/G
OINTMENT TOPICAL 2 TIMES DAILY
Status: CANCELLED | OUTPATIENT
Start: 2020-11-08 | End: 2020-11-13

## 2020-11-08 RX ORDER — PROPOFOL 10 MG/ML
0-50 INJECTION, EMULSION INTRAVENOUS
Status: COMPLETED | OUTPATIENT
Start: 2020-11-08 | End: 2020-11-08

## 2020-11-08 RX ORDER — METFORMIN HYDROCHLORIDE 500 MG/1
1000 TABLET ORAL 2 TIMES DAILY WITH MEALS
Status: CANCELLED | OUTPATIENT
Start: 2020-11-08

## 2020-11-08 RX ORDER — LEVOFLOXACIN 5 MG/ML
750 INJECTION, SOLUTION INTRAVENOUS
Status: DISCONTINUED | OUTPATIENT
Start: 2020-11-08 | End: 2020-11-08

## 2020-11-08 RX ORDER — ETOMIDATE 2 MG/ML
INJECTION INTRAVENOUS
Status: COMPLETED
Start: 2020-11-08 | End: 2020-11-08

## 2020-11-08 RX ORDER — BUDESONIDE 0.25 MG/2ML
0.5 INHALANT ORAL ONCE
Status: COMPLETED | OUTPATIENT
Start: 2020-11-08 | End: 2020-11-08

## 2020-11-08 RX ORDER — SODIUM CHLORIDE 0.9 % (FLUSH) 0.9 %
10 SYRINGE (ML) INJECTION
Status: DISCONTINUED | OUTPATIENT
Start: 2020-11-08 | End: 2020-11-09 | Stop reason: HOSPADM

## 2020-11-08 RX ORDER — IPRATROPIUM BROMIDE AND ALBUTEROL SULFATE 2.5; .5 MG/3ML; MG/3ML
3 SOLUTION RESPIRATORY (INHALATION)
Status: COMPLETED | OUTPATIENT
Start: 2020-11-08 | End: 2020-11-08

## 2020-11-08 RX ORDER — ENOXAPARIN SODIUM 100 MG/ML
40 INJECTION SUBCUTANEOUS EVERY 24 HOURS
Status: DISCONTINUED | OUTPATIENT
Start: 2020-11-08 | End: 2020-11-09 | Stop reason: HOSPADM

## 2020-11-08 RX ORDER — FUROSEMIDE 20 MG/1
20 TABLET ORAL DAILY
Status: DISCONTINUED | OUTPATIENT
Start: 2020-11-09 | End: 2020-11-09

## 2020-11-08 RX ORDER — IPRATROPIUM BROMIDE AND ALBUTEROL SULFATE 2.5; .5 MG/3ML; MG/3ML
6 SOLUTION RESPIRATORY (INHALATION) ONCE
Status: COMPLETED | OUTPATIENT
Start: 2020-11-08 | End: 2020-11-08

## 2020-11-08 RX ORDER — ALBUTEROL SULFATE 0.83 MG/ML
SOLUTION RESPIRATORY (INHALATION)
Status: COMPLETED
Start: 2020-11-08 | End: 2020-11-08

## 2020-11-08 RX ORDER — IBUPROFEN 200 MG
24 TABLET ORAL
Status: DISCONTINUED | OUTPATIENT
Start: 2020-11-08 | End: 2020-11-09 | Stop reason: HOSPADM

## 2020-11-08 RX ORDER — IBUPROFEN 200 MG
16 TABLET ORAL
Status: DISCONTINUED | OUTPATIENT
Start: 2020-11-08 | End: 2020-11-09 | Stop reason: HOSPADM

## 2020-11-08 RX ORDER — IPRATROPIUM BROMIDE AND ALBUTEROL SULFATE 2.5; .5 MG/3ML; MG/3ML
3 SOLUTION RESPIRATORY (INHALATION) EVERY 4 HOURS
Status: DISCONTINUED | OUTPATIENT
Start: 2020-11-08 | End: 2020-11-09 | Stop reason: HOSPADM

## 2020-11-08 RX ORDER — PROPOFOL 10 MG/ML
INJECTION, EMULSION INTRAVENOUS
Status: COMPLETED
Start: 2020-11-08 | End: 2020-11-08

## 2020-11-08 RX ORDER — INSULIN ASPART 100 [IU]/ML
1-10 INJECTION, SOLUTION INTRAVENOUS; SUBCUTANEOUS
Status: DISCONTINUED | OUTPATIENT
Start: 2020-11-08 | End: 2020-11-09 | Stop reason: HOSPADM

## 2020-11-08 RX ORDER — LEVOFLOXACIN 5 MG/ML
750 INJECTION, SOLUTION INTRAVENOUS
Status: DISCONTINUED | OUTPATIENT
Start: 2020-11-08 | End: 2020-11-09 | Stop reason: HOSPADM

## 2020-11-08 RX ORDER — ONDANSETRON 2 MG/ML
4 INJECTION INTRAMUSCULAR; INTRAVENOUS EVERY 8 HOURS PRN
Status: DISCONTINUED | OUTPATIENT
Start: 2020-11-08 | End: 2020-11-09 | Stop reason: HOSPADM

## 2020-11-08 RX ORDER — PANTOPRAZOLE SODIUM 40 MG/1
40 TABLET, DELAYED RELEASE ORAL DAILY
Status: DISCONTINUED | OUTPATIENT
Start: 2020-11-09 | End: 2020-11-09

## 2020-11-08 RX ORDER — SODIUM CHLORIDE 0.9 % (FLUSH) 0.9 %
10 SYRINGE (ML) INJECTION
Status: DISCONTINUED | OUTPATIENT
Start: 2020-11-08 | End: 2020-11-08

## 2020-11-08 RX ORDER — ACETAMINOPHEN 325 MG/1
650 TABLET ORAL EVERY 4 HOURS PRN
Status: DISCONTINUED | OUTPATIENT
Start: 2020-11-08 | End: 2020-11-09 | Stop reason: HOSPADM

## 2020-11-08 RX ORDER — ALBUTEROL SULFATE 0.83 MG/ML
5 SOLUTION RESPIRATORY (INHALATION) ONCE
Status: COMPLETED | OUTPATIENT
Start: 2020-11-08 | End: 2020-11-08

## 2020-11-08 RX ORDER — FUROSEMIDE 10 MG/ML
40 INJECTION INTRAMUSCULAR; INTRAVENOUS
Status: COMPLETED | OUTPATIENT
Start: 2020-11-08 | End: 2020-11-08

## 2020-11-08 RX ORDER — MONTELUKAST SODIUM 10 MG/1
10 TABLET ORAL NIGHTLY
Status: DISCONTINUED | OUTPATIENT
Start: 2020-11-08 | End: 2020-11-09 | Stop reason: HOSPADM

## 2020-11-08 RX ORDER — METHYLPREDNISOLONE SOD SUCC 125 MG
125 VIAL (EA) INJECTION
Status: COMPLETED | OUTPATIENT
Start: 2020-11-08 | End: 2020-11-08

## 2020-11-08 RX ORDER — FLUTICASONE FUROATE AND VILANTEROL 200; 25 UG/1; UG/1
1 POWDER RESPIRATORY (INHALATION) DAILY
Status: CANCELLED | OUTPATIENT
Start: 2020-11-09

## 2020-11-08 RX ADMIN — BUDESONIDE 0.5 MG: 0.25 INHALANT ORAL at 03:11

## 2020-11-08 RX ADMIN — PROPOFOL 60 MCG/KG/MIN: 10 INJECTION, EMULSION INTRAVENOUS at 09:11

## 2020-11-08 RX ADMIN — IPRATROPIUM BROMIDE AND ALBUTEROL SULFATE 3 ML: .5; 3 SOLUTION RESPIRATORY (INHALATION) at 06:11

## 2020-11-08 RX ADMIN — METHYLPREDNISOLONE SODIUM SUCCINATE 125 MG: 125 INJECTION, POWDER, FOR SOLUTION INTRAMUSCULAR; INTRAVENOUS at 11:11

## 2020-11-08 RX ADMIN — IPRATROPIUM BROMIDE AND ALBUTEROL SULFATE 6 ML: .5; 3 SOLUTION RESPIRATORY (INHALATION) at 03:11

## 2020-11-08 RX ADMIN — DEXTROSE MONOHYDRATE 3 MCG/KG/MIN: 50 INJECTION, SOLUTION INTRAVENOUS at 11:11

## 2020-11-08 RX ADMIN — IPRATROPIUM BROMIDE AND ALBUTEROL SULFATE 3 ML: .5; 3 SOLUTION RESPIRATORY (INHALATION) at 08:11

## 2020-11-08 RX ADMIN — IPRATROPIUM BROMIDE AND ALBUTEROL SULFATE 6 ML: 2.5; .5 SOLUTION RESPIRATORY (INHALATION) at 03:11

## 2020-11-08 RX ADMIN — ALBUTEROL SULFATE 5 MG: 2.5 SOLUTION RESPIRATORY (INHALATION) at 03:11

## 2020-11-08 RX ADMIN — ETOMIDATE 20 MG: 2 INJECTION INTRAVENOUS at 09:11

## 2020-11-08 RX ADMIN — ALBUTEROL SULFATE 5 MG: 0.83 SOLUTION RESPIRATORY (INHALATION) at 03:11

## 2020-11-08 RX ADMIN — SUCCINYLCHOLINE CHLORIDE 200 MG: 20 INJECTION, SOLUTION INTRAMUSCULAR; INTRAVENOUS; PARENTERAL at 09:11

## 2020-11-08 RX ADMIN — BUDESONIDE 0.5 MG: 0.25 INHALANT RESPIRATORY (INHALATION) at 03:11

## 2020-11-08 RX ADMIN — FUROSEMIDE 40 MG: 10 INJECTION, SOLUTION INTRAMUSCULAR; INTRAVENOUS at 04:11

## 2020-11-08 NOTE — ED PROVIDER NOTES
Encounter Date: 2020       History     Chief Complaint   Patient presents with    Shortness of Breath     exertional dyspnea     HPI   Patient is an obese 30-year-old white female comes in complaining of increased wheezing and shortness of breath.  States that she does not think her CPAP set right in the she still can not breathe home.  She has a long history of asthma and respiratory failure and pulmonary hypertension.  She has a history of obesity hypoventilation send her home and will earlier this year when respiratory failure and was on a ventilator for some 3 weeks.  Patient states she started getting more short of breath over the last week and using lot of her albuterol inhaler and taking breathing treatments.  She has had no fever.  Her family relates that her O2 sat monitor when she goes to sleep sometimes goes down the low 70s.  She relates that her blood sugars have been running okay  She has had some nausea but no vomiting.  She gives me a history that she had congestive heart failure about 6 years ago  No diaphoresis or fever  Review of patient's allergies indicates:  No Known Allergies  Past Medical History:   Diagnosis Date    Asthma     Back pain     Diabetes mellitus     Morbid obesity     Obesity      Past Surgical History:   Procedure Laterality Date    APPENDECTOMY       SECTION       Family History   Family history unknown: Yes     Social History     Tobacco Use    Smoking status: Former Smoker   Substance Use Topics    Alcohol use: Yes     Frequency: Never     Comment: occ    Drug use: No     Review of Systems   Constitutional: Positive for fatigue. Negative for diaphoresis and fever.   HENT: Negative.    Respiratory: Positive for cough, chest tightness, shortness of breath and wheezing.    Cardiovascular: Negative for chest pain and palpitations.   Gastrointestinal: Positive for abdominal distention. Negative for abdominal pain, diarrhea, nausea and vomiting.    Genitourinary: Negative.    Musculoskeletal: Negative.    Neurological: Positive for dizziness. Negative for seizures, syncope and weakness.   Psychiatric/Behavioral: Negative.        Physical Exam     Initial Vitals [11/08/20 1505]   BP Pulse Resp Temp SpO2   (!) 181/124 98 20 99 °F (37.2 °C) (!) 74 %      MAP       --         Physical Exam    Nursing note and vitals reviewed.  Constitutional: She appears well-developed and well-nourished. She is not diaphoretic. No distress.   obese   HENT:   Head: Normocephalic and atraumatic.   Mouth/Throat: No oropharyngeal exudate.   Eyes: Conjunctivae and EOM are normal. Right eye exhibits no discharge. Left eye exhibits no discharge. No scleral icterus.   Neck: Normal range of motion. Neck supple.   Obese neck   Cardiovascular: Normal rate, regular rhythm, normal heart sounds and intact distal pulses.   No murmur heard.  Pulmonary/Chest: Breath sounds normal. She is in respiratory distress. She has no wheezes. She has no rhonchi. She has no rales. She exhibits no tenderness.   Decreased breath sounds throughout.  Minimal wheezing.  No rhonchi or rales appreciated.   Abdominal: Soft. Bowel sounds are normal. She exhibits no distension. There is no abdominal tenderness. There is no rebound.   Musculoskeletal: Normal range of motion. No tenderness.   Lymphadenopathy:     She has no cervical adenopathy.   Neurological: She is alert and oriented to person, place, and time. She has normal strength. GCS score is 15. GCS eye subscore is 4. GCS verbal subscore is 5. GCS motor subscore is 6.   Skin: Skin is warm and dry. Capillary refill takes less than 2 seconds. No rash and no abscess noted. No erythema. No pallor.   Psychiatric: She has a normal mood and affect. Her behavior is normal. Judgment and thought content normal.       On exam patient is morbidly obese 30-year-old white female sitting up on the stretcher obviously short of breath.  HEENT exam shows tympanic membranes  normal, pharynx not seen well secondary to large tongue, neck is supple iron no stridor.  Pupils equal round reactive to light  Chest shows distant breath sounds with some wheezing  Heart shows regular rhythm no murmur gallop  Abdomen is obese tender epigastric area no rebound tenderness or guarding  Extremities she has some edema over the legs no cyanosis noted  Neurologic exam patient is alert and oriented x3.  Equal motor and sensory bilaterally.  Cranial nerves 2-12 grossly intact  Skin warm dry good turgor  ED Course   Procedures  Labs Reviewed   CBC W/ AUTO DIFFERENTIAL - Abnormal; Notable for the following components:       Result Value    WBC 15.00 (*)     RBC 5.71 (*)     MCV 81 (*)     MCH 22.8 (*)     MCHC 28.0 (*)     RDW 17.6 (*)     Platelets 405 (*)     Immature Granulocytes 0.9 (*)     Gran # (ANC) 10.3 (*)     Immature Grans (Abs) 0.13 (*)     Mono # 1.7 (*)     nRBC 3 (*)     All other components within normal limits   COMPREHENSIVE METABOLIC PANEL - Abnormal; Notable for the following components:    Chloride 94 (*)     CO2 38 (*)     Glucose 138 (*)     All other components within normal limits   B-TYPE NATRIURETIC PEPTIDE - Abnormal; Notable for the following components:     (*)     All other components within normal limits   ISTAT PROCEDURE - Abnormal; Notable for the following components:    POC PH 7.246 (*)     POC PCO2 94.6 (*)     POC HCO3 41.1 (*)     POC SATURATED O2 93 (*)     POC TCO2 44 (*)     All other components within normal limits   ISTAT PROCEDURE - Abnormal; Notable for the following components:    POC PH 7.259 (*)     POC PCO2 95.0 (*)     POC PO2 78 (*)     POC HCO3 42.6 (*)     POC SATURATED O2 92 (*)     POC TCO2 45 (*)     All other components within normal limits   SARS-COV-2 RNA AMPLIFICATION, QUAL   TROPONIN I   PROTIME-INR     EKG Readings: (Independently Interpreted)   Initial Reading: No STEMI.   Normal sinus rhythm occasional PVCs appreciated.  No ST elevations  or depressions.       Imaging Results          X-Ray Chest AP Portable (Final result)  Result time 11/08/20 16:59:08    Final result by Chris Frausto MD (11/08/20 16:59:08)                 Impression:      Cardiomegaly.  Basilar airspace disease could reflect pulmonary edema, pneumonia, atelectasis or a combination there of.      Electronically signed by: Chris Frausto  Date:    11/08/2020  Time:    16:59             Narrative:    EXAMINATION:  XR CHEST AP PORTABLE    CLINICAL HISTORY:  shortness of breath;    TECHNIQUE:  Single frontal view of the chest was performed.    COMPARISON:  07/12/2020    FINDINGS:  Bibasilar airspace disease, infrahilar regions.  Unchanged cardiomegaly.  Indistinct pulmonary vasculature.  No pleural effusion or pneumothorax.  No acute osseous abnormality.                              X-Rays:   Independently Interpreted Readings:   Other Readings:  Atelectasis versus edema versus possible infiltrate.  Patient had poor inspiratory effort due to her body habitus.   patient noted to be retaining pCO2 and we will pull try room bipap.  I have spoken with the patient about transfer order but she really did does not want to be transferred                patient is now a full code and is willing to be admitted.        Patient failed BiPAP therapy.  Patient required intubation.  Intubation procedure:  Patient's preoxygenated with 100% oxygen.  She was sedated with etomidate succinylcholine for rapid sequence intubation.  [place] scope with MAC 4 blade was used to visualize airway.  A 0.0 endotracheal tube was placed between the vocal cords once visualized.  Cuff was inflated 10 mL of air.  There was bilateral breath sounds as well as positive end-tidal CO2.  Chest x-ray was performed to confirm adequate placement of endotracheal tube.  Patient tolerated procedure well and was continued on ventilatory support.    Total critical care time 55 min.       Clinical Impression:       ICD-10-CM  ICD-9-CM   1. COPD exacerbation  J44.1 491.21   2. Shortness of breath  R06.02 786.05   3. Pickwickian syndrome  E66.2 278.03                      Disposition:   Disposition: Admitted  Condition: Serious     ED Disposition Condition    Admit                             Raphael Nguyen MD  11/08/20 1935       Raphael Nguyen MD  11/08/20 2709

## 2020-11-08 NOTE — ED TRIAGE NOTES
Chronic obesity related hypoventilation, hypoxia and hypercapnia. C/O exertional dyspnea, just started back to work.

## 2020-11-09 ENCOUNTER — HOSPITAL ENCOUNTER (INPATIENT)
Facility: HOSPITAL | Age: 30
LOS: 7 days | Discharge: HOME | End: 2020-11-16
Attending: HOSPITALIST | Admitting: STUDENT IN AN ORGANIZED HEALTH CARE EDUCATION/TRAINING PROGRAM
Payer: MEDICAID

## 2020-11-09 ENCOUNTER — ANESTHESIA EVENT (OUTPATIENT)
Dept: INTENSIVE CARE | Facility: HOSPITAL | Age: 30
End: 2020-11-09
Payer: MEDICAID

## 2020-11-09 ENCOUNTER — ANESTHESIA (OUTPATIENT)
Dept: INTENSIVE CARE | Facility: HOSPITAL | Age: 30
End: 2020-11-09
Payer: MEDICAID

## 2020-11-09 VITALS
HEIGHT: 62 IN | BODY MASS INDEX: 53.92 KG/M2 | RESPIRATION RATE: 14 BRPM | OXYGEN SATURATION: 97 % | TEMPERATURE: 98 F | HEART RATE: 99 BPM | SYSTOLIC BLOOD PRESSURE: 122 MMHG | DIASTOLIC BLOOD PRESSURE: 46 MMHG | WEIGHT: 293 LBS

## 2020-11-09 DIAGNOSIS — J96.22 ACUTE ON CHRONIC RESPIRATORY FAILURE WITH HYPOXIA AND HYPERCAPNIA: ICD-10-CM

## 2020-11-09 DIAGNOSIS — J44.9 CHRONIC OBSTRUCTIVE PULMONARY DISEASE, UNSPECIFIED COPD TYPE: ICD-10-CM

## 2020-11-09 DIAGNOSIS — J45.50 SEVERE PERSISTENT ASTHMA WITHOUT COMPLICATION: ICD-10-CM

## 2020-11-09 DIAGNOSIS — I27.20 PULMONARY HTN: ICD-10-CM

## 2020-11-09 DIAGNOSIS — J96.21 ACUTE ON CHRONIC RESPIRATORY FAILURE WITH HYPOXIA AND HYPERCAPNIA: ICD-10-CM

## 2020-11-09 DIAGNOSIS — J96.90 RESPIRATORY FAILURE: ICD-10-CM

## 2020-11-09 LAB
ALBUMIN SERPL BCP-MCNC: 3.1 G/DL (ref 3.5–5.2)
ALLENS TEST: ABNORMAL
ALP SERPL-CCNC: 64 U/L (ref 55–135)
ALT SERPL W/O P-5'-P-CCNC: 18 U/L (ref 10–44)
AMORPH CRY URNS QL MICRO: ABNORMAL
ANION GAP SERPL CALC-SCNC: 10 MMOL/L (ref 8–16)
AST SERPL-CCNC: 22 U/L (ref 10–40)
BACTERIA #/AREA URNS HPF: ABNORMAL /HPF
BASOPHILS # BLD AUTO: 0.02 K/UL (ref 0–0.2)
BASOPHILS NFR BLD: 0.1 % (ref 0–1.9)
BILIRUB SERPL-MCNC: 0.8 MG/DL (ref 0.1–1)
BILIRUB UR QL STRIP: NEGATIVE
BUN SERPL-MCNC: 14 MG/DL (ref 6–20)
CALCIUM SERPL-MCNC: 8.7 MG/DL (ref 8.7–10.5)
CHLORIDE SERPL-SCNC: 95 MMOL/L (ref 95–110)
CLARITY UR: CLEAR
CO2 SERPL-SCNC: 36 MMOL/L (ref 23–29)
COLOR UR: YELLOW
CREAT SERPL-MCNC: 0.8 MG/DL (ref 0.5–1.4)
DELSYS: ABNORMAL
DIFFERENTIAL METHOD: ABNORMAL
EOSINOPHIL # BLD AUTO: 0 K/UL (ref 0–0.5)
EOSINOPHIL NFR BLD: 0 % (ref 0–8)
ERYTHROCYTE [DISTWIDTH] IN BLOOD BY AUTOMATED COUNT: 17.2 % (ref 11.5–14.5)
ERYTHROCYTE [SEDIMENTATION RATE] IN BLOOD BY WESTERGREN METHOD: 14 MM/H
ERYTHROCYTE [SEDIMENTATION RATE] IN BLOOD BY WESTERGREN METHOD: 16 MM/H
ERYTHROCYTE [SEDIMENTATION RATE] IN BLOOD BY WESTERGREN METHOD: 18 MM/H
EST. GFR  (AFRICAN AMERICAN): >60 ML/MIN/1.73 M^2
EST. GFR  (NON AFRICAN AMERICAN): >60 ML/MIN/1.73 M^2
ETCO2: 54
FIO2: 100
FIO2: 40
FIO2: 40
GLUCOSE SERPL-MCNC: 169 MG/DL (ref 70–110)
GLUCOSE UR QL STRIP: NEGATIVE
HCO3 UR-SCNC: 40.2 MMOL/L (ref 24–28)
HCO3 UR-SCNC: 40.9 MMOL/L (ref 24–28)
HCO3 UR-SCNC: 43.5 MMOL/L (ref 24–28)
HCT VFR BLD AUTO: 45.4 % (ref 37–48.5)
HGB BLD-MCNC: 13.1 G/DL (ref 12–16)
HGB UR QL STRIP: ABNORMAL
HYALINE CASTS #/AREA URNS LPF: 6 /LPF
IMM GRANULOCYTES # BLD AUTO: 0.11 K/UL (ref 0–0.04)
IMM GRANULOCYTES NFR BLD AUTO: 0.7 % (ref 0–0.5)
KETONES UR QL STRIP: NEGATIVE
LACTATE SERPL-SCNC: 2.3 MMOL/L (ref 0.5–2.2)
LACTATE SERPL-SCNC: 2.8 MMOL/L (ref 0.5–2.2)
LACTATE SERPL-SCNC: 2.8 MMOL/L (ref 0.5–2.2)
LEUKOCYTE ESTERASE UR QL STRIP: NEGATIVE
LYMPHOCYTES # BLD AUTO: 0.6 K/UL (ref 1–4.8)
LYMPHOCYTES NFR BLD: 3.7 % (ref 18–48)
MAGNESIUM SERPL-MCNC: 2 MG/DL (ref 1.6–2.6)
MCH RBC QN AUTO: 22.9 PG (ref 27–31)
MCHC RBC AUTO-ENTMCNC: 28.9 G/DL (ref 32–36)
MCV RBC AUTO: 79 FL (ref 82–98)
MICROSCOPIC COMMENT: ABNORMAL
MIN VOL: 8.1
MODE: ABNORMAL
MONOCYTES # BLD AUTO: 0.6 K/UL (ref 0.3–1)
MONOCYTES NFR BLD: 3.8 % (ref 4–15)
NEUTROPHILS # BLD AUTO: 13.6 K/UL (ref 1.8–7.7)
NEUTROPHILS NFR BLD: 91.7 % (ref 38–73)
NITRITE UR QL STRIP: NEGATIVE
NRBC BLD-RTO: 1 /100 WBC
PCO2 BLDA: 57.4 MMHG (ref 35–45)
PCO2 BLDA: 61.9 MMHG (ref 35–45)
PCO2 BLDA: 75.3 MMHG (ref 35–45)
PEEP: 5
PH SMN: 7.37 [PH] (ref 7.35–7.45)
PH SMN: 7.43 [PH] (ref 7.35–7.45)
PH SMN: 7.45 [PH] (ref 7.35–7.45)
PH UR STRIP: 6 [PH] (ref 5–8)
PHOSPHATE SERPL-MCNC: 3.2 MG/DL (ref 2.7–4.5)
PIP: 30
PIP: 34
PLATELET # BLD AUTO: 350 K/UL (ref 150–350)
PMV BLD AUTO: 10.7 FL (ref 9.2–12.9)
PO2 BLDA: 60 MMHG (ref 80–100)
PO2 BLDA: 62 MMHG (ref 80–100)
PO2 BLDA: 80 MMHG (ref 80–100)
POC BE: 16 MMOL/L
POC BE: 17 MMOL/L
POC BE: 18 MMOL/L
POC SATURATED O2: 89 % (ref 95–100)
POC SATURATED O2: 91 % (ref 95–100)
POC SATURATED O2: 96 % (ref 95–100)
POC TCO2: 42 MMOL/L (ref 23–27)
POC TCO2: 43 MMOL/L (ref 23–27)
POC TCO2: 46 MMOL/L (ref 23–27)
POCT GLUCOSE: 155 MG/DL (ref 70–110)
POCT GLUCOSE: 162 MG/DL (ref 70–110)
POCT GLUCOSE: 175 MG/DL (ref 70–110)
POCT GLUCOSE: 181 MG/DL (ref 70–110)
POTASSIUM SERPL-SCNC: 4.3 MMOL/L (ref 3.5–5.1)
PROCALCITONIN SERPL IA-MCNC: 0.02 NG/ML
PROCALCITONIN SERPL IA-MCNC: 0.03 NG/ML
PROT SERPL-MCNC: 6.8 G/DL (ref 6–8.4)
PROT UR QL STRIP: ABNORMAL
RBC # BLD AUTO: 5.72 M/UL (ref 4–5.4)
RBC #/AREA URNS HPF: 8 /HPF (ref 0–4)
SAMPLE: ABNORMAL
SITE: ABNORMAL
SODIUM SERPL-SCNC: 141 MMOL/L (ref 136–145)
SP GR UR STRIP: >=1.03 (ref 1–1.03)
SP02: 99
SQUAMOUS #/AREA URNS HPF: 5 /HPF
TRICHOMONAS UR QL MICRO: ABNORMAL
URN SPEC COLLECT METH UR: ABNORMAL
UROBILINOGEN UR STRIP-ACNC: NEGATIVE EU/DL
VT: 500
WBC # BLD AUTO: 14.87 K/UL (ref 3.9–12.7)
WBC #/AREA URNS HPF: 13 /HPF (ref 0–5)

## 2020-11-09 PROCEDURE — 36600 WITHDRAWAL OF ARTERIAL BLOOD: CPT

## 2020-11-09 PROCEDURE — 36415 COLL VENOUS BLD VENIPUNCTURE: CPT

## 2020-11-09 PROCEDURE — 94640 AIRWAY INHALATION TREATMENT: CPT

## 2020-11-09 PROCEDURE — C9113 INJ PANTOPRAZOLE SODIUM, VIA: HCPCS | Performed by: INTERNAL MEDICINE

## 2020-11-09 PROCEDURE — 63600175 PHARM REV CODE 636 W HCPCS: Performed by: INTERNAL MEDICINE

## 2020-11-09 PROCEDURE — 76937 CENTRAL LINE: ICD-10-PCS | Mod: 26,,, | Performed by: ANESTHESIOLOGY

## 2020-11-09 PROCEDURE — 87040 BLOOD CULTURE FOR BACTERIA: CPT

## 2020-11-09 PROCEDURE — 63600175 PHARM REV CODE 636 W HCPCS

## 2020-11-09 PROCEDURE — 99900035 HC TECH TIME PER 15 MIN (STAT)

## 2020-11-09 PROCEDURE — 94002 VENT MGMT INPAT INIT DAY: CPT

## 2020-11-09 PROCEDURE — C9113 INJ PANTOPRAZOLE SODIUM, VIA: HCPCS | Performed by: FAMILY MEDICINE

## 2020-11-09 PROCEDURE — 36556 INSERT NON-TUNNEL CV CATH: CPT | Mod: ,,, | Performed by: ANESTHESIOLOGY

## 2020-11-09 PROCEDURE — 36556 CENTRAL LINE: ICD-10-PCS | Mod: ,,, | Performed by: ANESTHESIOLOGY

## 2020-11-09 PROCEDURE — 99291 PR CRITICAL CARE, E/M 30-74 MINUTES: ICD-10-PCS | Mod: ,,, | Performed by: INTERNAL MEDICINE

## 2020-11-09 PROCEDURE — 94761 N-INVAS EAR/PLS OXIMETRY MLT: CPT

## 2020-11-09 PROCEDURE — 83605 ASSAY OF LACTIC ACID: CPT | Mod: 91

## 2020-11-09 PROCEDURE — 82803 BLOOD GASES ANY COMBINATION: CPT

## 2020-11-09 PROCEDURE — 63600175 PHARM REV CODE 636 W HCPCS: Performed by: FAMILY MEDICINE

## 2020-11-09 PROCEDURE — 87040 BLOOD CULTURE FOR BACTERIA: CPT | Mod: 59

## 2020-11-09 PROCEDURE — 27000221 HC OXYGEN, UP TO 24 HOURS

## 2020-11-09 PROCEDURE — 99239 PR HOSPITAL DISCHARGE DAY,>30 MIN: ICD-10-PCS | Mod: ,,, | Performed by: FAMILY MEDICINE

## 2020-11-09 PROCEDURE — 20000000 HC ICU ROOM

## 2020-11-09 PROCEDURE — 83735 ASSAY OF MAGNESIUM: CPT

## 2020-11-09 PROCEDURE — 80053 COMPREHEN METABOLIC PANEL: CPT

## 2020-11-09 PROCEDURE — 83605 ASSAY OF LACTIC ACID: CPT

## 2020-11-09 PROCEDURE — 99291 CRITICAL CARE FIRST HOUR: CPT | Mod: ,,, | Performed by: INTERNAL MEDICINE

## 2020-11-09 PROCEDURE — 84145 PROCALCITONIN (PCT): CPT

## 2020-11-09 PROCEDURE — 84145 PROCALCITONIN (PCT): CPT | Mod: 91

## 2020-11-09 PROCEDURE — 84100 ASSAY OF PHOSPHORUS: CPT

## 2020-11-09 PROCEDURE — 25000242 PHARM REV CODE 250 ALT 637 W/ HCPCS: Performed by: HOSPITALIST

## 2020-11-09 PROCEDURE — 94003 VENT MGMT INPAT SUBQ DAY: CPT

## 2020-11-09 PROCEDURE — 63600175 PHARM REV CODE 636 W HCPCS: Performed by: NURSE PRACTITIONER

## 2020-11-09 PROCEDURE — 99239 HOSP IP/OBS DSCHRG MGMT >30: CPT | Mod: ,,, | Performed by: FAMILY MEDICINE

## 2020-11-09 PROCEDURE — 25000003 PHARM REV CODE 250: Performed by: NURSE PRACTITIONER

## 2020-11-09 PROCEDURE — 76937 US GUIDE VASCULAR ACCESS: CPT | Performed by: ANESTHESIOLOGY

## 2020-11-09 PROCEDURE — 63600175 PHARM REV CODE 636 W HCPCS: Performed by: HOSPITALIST

## 2020-11-09 PROCEDURE — 25000242 PHARM REV CODE 250 ALT 637 W/ HCPCS: Performed by: INTERNAL MEDICINE

## 2020-11-09 PROCEDURE — 85025 COMPLETE CBC W/AUTO DIFF WBC: CPT

## 2020-11-09 PROCEDURE — 25000003 PHARM REV CODE 250: Performed by: FAMILY MEDICINE

## 2020-11-09 PROCEDURE — 94770 HC EXHALED C02 TEST: CPT

## 2020-11-09 RX ORDER — PANTOPRAZOLE SODIUM 40 MG/10ML
40 INJECTION, POWDER, LYOPHILIZED, FOR SOLUTION INTRAVENOUS DAILY
Status: DISCONTINUED | OUTPATIENT
Start: 2020-11-09 | End: 2020-11-09 | Stop reason: HOSPADM

## 2020-11-09 RX ORDER — FUROSEMIDE 10 MG/ML
20 INJECTION INTRAMUSCULAR; INTRAVENOUS DAILY
Status: DISCONTINUED | OUTPATIENT
Start: 2020-11-10 | End: 2020-11-13

## 2020-11-09 RX ORDER — SODIUM CHLORIDE 0.9 % (FLUSH) 0.9 %
10 SYRINGE (ML) INJECTION
Status: DISCONTINUED | OUTPATIENT
Start: 2020-11-09 | End: 2020-11-16 | Stop reason: HOSPADM

## 2020-11-09 RX ORDER — GLUCAGON 1 MG
1 KIT INJECTION
Status: DISCONTINUED | OUTPATIENT
Start: 2020-11-09 | End: 2020-11-16 | Stop reason: HOSPADM

## 2020-11-09 RX ORDER — ENOXAPARIN SODIUM 100 MG/ML
40 INJECTION SUBCUTANEOUS EVERY 12 HOURS
Status: DISCONTINUED | OUTPATIENT
Start: 2020-11-10 | End: 2020-11-10

## 2020-11-09 RX ORDER — PANTOPRAZOLE SODIUM 40 MG/10ML
40 INJECTION, POWDER, LYOPHILIZED, FOR SOLUTION INTRAVENOUS DAILY
Status: DISCONTINUED | OUTPATIENT
Start: 2020-11-09 | End: 2020-11-16 | Stop reason: HOSPADM

## 2020-11-09 RX ORDER — LEVOFLOXACIN 5 MG/ML
750 INJECTION, SOLUTION INTRAVENOUS
Status: DISCONTINUED | OUTPATIENT
Start: 2020-11-09 | End: 2020-11-09

## 2020-11-09 RX ORDER — PROPOFOL 10 MG/ML
0-50 INJECTION, EMULSION INTRAVENOUS CONTINUOUS
Status: DISCONTINUED | OUTPATIENT
Start: 2020-11-09 | End: 2020-11-13

## 2020-11-09 RX ORDER — IBUPROFEN 200 MG
16 TABLET ORAL
Status: DISCONTINUED | OUTPATIENT
Start: 2020-11-09 | End: 2020-11-16 | Stop reason: HOSPADM

## 2020-11-09 RX ORDER — IPRATROPIUM BROMIDE AND ALBUTEROL SULFATE 2.5; .5 MG/3ML; MG/3ML
3 SOLUTION RESPIRATORY (INHALATION) EVERY 4 HOURS
Status: DISCONTINUED | OUTPATIENT
Start: 2020-11-09 | End: 2020-11-16 | Stop reason: HOSPADM

## 2020-11-09 RX ORDER — INSULIN ASPART 100 [IU]/ML
1-10 INJECTION, SOLUTION INTRAVENOUS; SUBCUTANEOUS EVERY 6 HOURS PRN
Status: DISCONTINUED | OUTPATIENT
Start: 2020-11-09 | End: 2020-11-16 | Stop reason: HOSPADM

## 2020-11-09 RX ORDER — FUROSEMIDE 10 MG/ML
20 INJECTION INTRAMUSCULAR; INTRAVENOUS DAILY
Status: DISCONTINUED | OUTPATIENT
Start: 2020-11-09 | End: 2020-11-09 | Stop reason: HOSPADM

## 2020-11-09 RX ORDER — DEXMEDETOMIDINE HYDROCHLORIDE 4 UG/ML
0.2 INJECTION, SOLUTION INTRAVENOUS CONTINUOUS
Status: DISCONTINUED | OUTPATIENT
Start: 2020-11-09 | End: 2020-11-10

## 2020-11-09 RX ORDER — PROPOFOL 10 MG/ML
0-50 INJECTION, EMULSION INTRAVENOUS CONTINUOUS
Status: DISCONTINUED | OUTPATIENT
Start: 2020-11-09 | End: 2020-11-09 | Stop reason: HOSPADM

## 2020-11-09 RX ORDER — IBUPROFEN 200 MG
24 TABLET ORAL
Status: DISCONTINUED | OUTPATIENT
Start: 2020-11-09 | End: 2020-11-16 | Stop reason: HOSPADM

## 2020-11-09 RX ORDER — PROPOFOL 10 MG/ML
INJECTION, EMULSION INTRAVENOUS
Status: COMPLETED
Start: 2020-11-09 | End: 2020-11-09

## 2020-11-09 RX ORDER — MIDAZOLAM HYDROCHLORIDE 1 MG/ML
INJECTION, SOLUTION INTRAVENOUS CONTINUOUS
Status: DISCONTINUED | OUTPATIENT
Start: 2020-11-09 | End: 2020-11-09 | Stop reason: HOSPADM

## 2020-11-09 RX ADMIN — DEXMEDETOMIDINE HYDROCHLORIDE 0.2 MCG/KG/HR: 100 INJECTION, SOLUTION, CONCENTRATE INTRAVENOUS at 08:11

## 2020-11-09 RX ADMIN — IPRATROPIUM BROMIDE AND ALBUTEROL SULFATE 3 ML: .5; 3 SOLUTION RESPIRATORY (INHALATION) at 08:11

## 2020-11-09 RX ADMIN — PROPOFOL 50 MCG/KG/MIN: 10 INJECTION, EMULSION INTRAVENOUS at 06:11

## 2020-11-09 RX ADMIN — PROPOFOL 50 MCG/KG/MIN: 10 INJECTION, EMULSION INTRAVENOUS at 07:11

## 2020-11-09 RX ADMIN — PANTOPRAZOLE SODIUM 40 MG: 40 INJECTION, POWDER, FOR SOLUTION INTRAVENOUS at 10:11

## 2020-11-09 RX ADMIN — PANTOPRAZOLE SODIUM 40 MG: 40 INJECTION, POWDER, FOR SOLUTION INTRAVENOUS at 08:11

## 2020-11-09 RX ADMIN — PROPOFOL 50 MCG/KG/MIN: 10 INJECTION, EMULSION INTRAVENOUS at 11:11

## 2020-11-09 RX ADMIN — MIDAZOLAM HYDROCHLORIDE 2 MG: 1 INJECTION, SOLUTION INTRAVENOUS at 01:11

## 2020-11-09 RX ADMIN — DEXTROSE MONOHYDRATE 3 MCG/KG/MIN: 50 INJECTION, SOLUTION INTRAVENOUS at 05:11

## 2020-11-09 RX ADMIN — METHYLPREDNISOLONE SODIUM SUCCINATE 80 MG: 125 INJECTION, POWDER, FOR SOLUTION INTRAMUSCULAR; INTRAVENOUS at 05:11

## 2020-11-09 RX ADMIN — IPRATROPIUM BROMIDE AND ALBUTEROL SULFATE 3 ML: .5; 3 SOLUTION RESPIRATORY (INHALATION) at 03:11

## 2020-11-09 RX ADMIN — PROPOFOL 50 MCG/KG/MIN: 10 INJECTION, EMULSION INTRAVENOUS at 04:11

## 2020-11-09 RX ADMIN — PROPOFOL 50 MCG/KG/MIN: 10 INJECTION, EMULSION INTRAVENOUS at 10:11

## 2020-11-09 RX ADMIN — IPRATROPIUM BROMIDE AND ALBUTEROL SULFATE 3 ML: .5; 2.5 SOLUTION RESPIRATORY (INHALATION) at 08:11

## 2020-11-09 RX ADMIN — PROPOFOL 50 MCG/KG/MIN: 10 INJECTION, EMULSION INTRAVENOUS at 01:11

## 2020-11-09 RX ADMIN — METHYLPREDNISOLONE SODIUM SUCCINATE 80 MG: 125 INJECTION, POWDER, FOR SOLUTION INTRAMUSCULAR; INTRAVENOUS at 01:11

## 2020-11-09 RX ADMIN — PROPOFOL 50 MCG/KG/MIN: 10 INJECTION, EMULSION INTRAVENOUS at 08:11

## 2020-11-09 RX ADMIN — PROPOFOL 50 MCG/KG/MIN: 10 INJECTION, EMULSION INTRAVENOUS at 12:11

## 2020-11-09 RX ADMIN — CEFTRIAXONE 2 G: 2 INJECTION, SOLUTION INTRAVENOUS at 08:11

## 2020-11-09 RX ADMIN — FUROSEMIDE 20 MG: 10 INJECTION, SOLUTION INTRAMUSCULAR; INTRAVENOUS at 10:11

## 2020-11-09 RX ADMIN — ENOXAPARIN SODIUM 40 MG: 40 INJECTION SUBCUTANEOUS at 04:11

## 2020-11-09 RX ADMIN — IPRATROPIUM BROMIDE AND ALBUTEROL SULFATE 3 ML: .5; 3 SOLUTION RESPIRATORY (INHALATION) at 01:11

## 2020-11-09 RX ADMIN — PROPOFOL 50 MCG/KG/MIN: 10 INJECTION, EMULSION INTRAVENOUS at 02:11

## 2020-11-09 RX ADMIN — PROPOFOL 50 MCG/KG/MIN: 10 INJECTION, EMULSION INTRAVENOUS at 05:11

## 2020-11-09 RX ADMIN — METHYLPREDNISOLONE SODIUM SUCCINATE 80 MG: 40 INJECTION, POWDER, FOR SOLUTION INTRAMUSCULAR; INTRAVENOUS at 10:11

## 2020-11-09 RX ADMIN — PROPOFOL 50 MCG/KG/MIN: 10 INJECTION, EMULSION INTRAVENOUS at 03:11

## 2020-11-09 NOTE — NURSING
Received pt from ER. Intubated with # 8 Et 24cm at teeth. NG tube present in Right nare.. Vent AC Rate of 16, , Peep 5, FIO2 40%.  Vitals WNL.  Diprivan @ 50mcg/kg/min, Nimbex at 3mcg/kg/min.

## 2020-11-09 NOTE — PLAN OF CARE
(Physician in Lead of Transfers)   Outside Transfer Acceptance Note / Regional Referral Center    Transferring Physician: Bruna Shafer MD ()    Accepting Physician: Olivia Cobian MD    Date of Acceptance: 11/09/2020    Transferring Facility: [facility]    Destination Facility and Admitting Physician: Diana SHIELDS MD     Reason for Transfer: pulmonary     Report from Transferring Physician/Hospital course: 30F with morbid obesity, obesity hypoventilation syndrome, restrictive lung disease, severe persistent asthma, and recent admit requiring intubation and difficulty weaning from vent (on vent 2.5 weeks), unknown compliance with home BiPAP, hypertension, diabetes, who presented to the [facility] ED last night around 5:00 p.m., with respiratory failure, admitted and treated with BiPAP IV steroids, empiric antibiotics, DuoNebs IV Lasix with low threshold to intubate.  Tried to transfer to [facility] but she refused.  Around 9:00 p.m. she was only arousable to sternal rub but and intubated for airway protection.  AC rate 16, , peep 5, FiO2 40%.  Vitals within normal limits.  Diprivan at 50 mics per kg per minute, Nimbex at 3 mics per kg per minute - weaning nimbex and changing to versed.  Today, Dr. Arellano placed a right IJ triple-lumen central line.    Seen by Dr Gentile, pulmonology, during last inpatient stay and then in clinic on 7/30 - essentially needs a Trilogy  Her family reported that her sats drop to the 70s with minimal exertion in her house    Transfer request for pulmonary service is not available at current site.  Tried [facility] but they declined apparantly due to capacity.   Transferring to [facility].    Vent Mode: A/C  Oxygen Concentration (%):  [40-91] 40  Resp Rate Total:  [14 br/min-16 br/min] 14 br/min  Vt Set:  [500 mL] 500 mL  PEEP/CPAP:  [5 cmH20] 5 cmH20  Pressure Support:  [0 cmH20] 0 cmH20  Mean Airway Pressure:  [11 xhB50-44 cmH20] 11 cmH20    VS: VSS    Labs:  see above    Radiographs: see above    To Do List: Admit to HM, consult pulm     Upon patient arrival to floor, please contact Hospital Medicine on call.     Olivia Cobian MD  Hospital Medicine Staff  Cell: [phone]

## 2020-11-09 NOTE — DISCHARGE SUMMARY
[facility] - ICU  Critical Care - Medicine  Discharge Summary      Patient Name: Michelle Wren  MRN: [id number]  Admission Date: 11/8/2020  Hospital Length of Stay: 1 days  Discharge Date and Time:  11/09/2020 1:35 PM  Attending Physician: Bruna Shafer MD   Discharging Provider: Bruna Shafer MD  Primary Care Provider: Primary Doctor No  Reason for Admission: Acute on chronic respiratory failure    HPI:   Patient is a 30 y.o. female who has a past medical history of Asthma, Back pain, Diabetes mellitus, Morbid obesity, and Obesity presented with shortness of breath. Patient is currently lethargic and on Bipap. She is not able to provide any history. History taken mainly from ED staff and chart review. Per chart review patient has similar presentation few months ago. She failed Bipap and required intubation. She was difficult to wean due to her body habitus and her restrictive lung disease. Since discharge she has been about the same. There is question on if she is complaint with Bipap machine at home.     * No surgery found *    Indwelling Lines/Drains at Time of Discharge:   Lines/Drains/Airways     Central Venous Catheter Line            Percutaneous Central Line Insertion/Assessment - Triple Lumen  11/09/20 1231 less than 1 day          Drain                 NG/OG Tube 11/08/20 2150 [place] sump 14 Fr. Left nostril less than 1 day         Urethral Catheter 11/08/20 2153 Temperature probe 16 Fr. less than 1 day          Airway                 Airway - Non-Surgical 11/08/20 2145 Endotracheal Tube less than 1 day                Hospital Course:   Just prior to admission, repeat ABG showed extensive hypercapnia without improvement, CO2 near 100. Patient was in worsening respiratory failure and ultimately required intubation. Admitted on Nimbex and diprivan drip. Started on IV solumedrol 80 mg q6h, IV levaquin 750 mg daily, duonebs q4h, lasix 20 mg daily. Though repeat ABG improving,  based on the significant severity of patient's underlying lung disease, decision made to seek transfer for higher level of care. Discussed with patient's family that she had initially only wanted to go to [facility] where her mother was but they had no ICU beds. Family requested transfer to any facility with an open ICU, preferably [facility]. Transfer accepted.    Consults (From admission, onward)        Status Ordering Provider     Consult Respiratory Therapy if no hx of CHF  Once     Provider:  (Not yet assigned)    Acknowledged PAVAN MELENDREZ          Significant Labs:  ABGs:   Recent Labs   Lab 11/09/20  0453   PH 7.428   PCO2 61.9*   HCO3 40.9*   POCSATURATED 96   BE 17     BMP:   Recent Labs   Lab 11/09/20  0710   *      K 4.3   CL 95   CO2 36*   BUN 14   CREATININE 0.8   CALCIUM 8.7   MG 2.0     CMP:   Recent Labs   Lab 11/08/20  1523 11/09/20  0710    141   K 4.0 4.3   CL 94* 95   CO2 38* 36*   * 169*   BUN 15 14   CREATININE 0.8 0.8   CALCIUM 8.7 8.7   PROT 7.6 6.8   ALBUMIN 3.6 3.1*   BILITOT 0.4 0.8   ALKPHOS 70 64   AST 22 22   ALT 20 18   ANIONGAP 9 10   EGFRNONAA >60.0 >60.0     Cardiac Markers: No results for input(s): CKMB, TROPONINT, MYOGLOBIN in the last 48 hours.  Coagulation:   Recent Labs   Lab 11/08/20  1523   INR 1.0     Lactic Acid:   Recent Labs   Lab 11/09/20  0307 11/09/20  0902   LACTATE 2.8* 2.3*     All pertinent labs within the past 24 hours have been reviewed.    Significant Imaging:  I have reviewed all pertinent imaging results/findings within the past 24 hours.    Pending Diagnostic Studies:     None        Final Active Diagnoses:    Diagnosis Date Noted POA    PRINCIPAL PROBLEM:  Acute respiratory failure with hypoxia and hypercarbia [J96.01, J96.02] 06/23/2020 Yes    Severe persistent asthma without complication [J45.50] 07/30/2020 Yes    Type 2 diabetes mellitus [E11.9] 06/24/2020 Yes    Obesity hypoventilation syndrome [E66.2] 06/22/2020  Yes    Cardiomegaly [I51.7] 06/22/2020 Yes      Problems Resolved During this Admission:       Discharged Condition: stable    Disposition: [facility] for higher level of care    Follow Up:    Patient Instructions:   No discharge procedures on file.  Medications:  Transfer Medications (for Discharge Readmit only):   Current Facility-Administered Medications   Medication Dose Route Frequency Provider Last Rate Last Dose    acetaminophen tablet 650 mg  650 mg Oral Q4H PRN Rashid Thorpe MD        albuterol-ipratropium 2.5 mg-0.5 mg/3 mL nebulizer solution 3 mL  3 mL Nebulization Q4H Rashid Thorpe MD   3 mL at 11/09/20 1321    cisatracurium (NIMBEX) 200 mg in dextrose 5 % 100 mL infusion  3 mcg/kg/min Intravenous Continuous Raphael Nguyen MD 14.5 mL/hr at 11/09/20 0550 3 mcg/kg/min at 11/09/20 0550    dextrose 50% injection 12.5 g  12.5 g Intravenous PRN Rashid Thorpe MD        dextrose 50% injection 25 g  25 g Intravenous PRN Rashid Thorpe MD        enoxaparin injection 40 mg  40 mg Subcutaneous Q24H Rashid Thorpe MD        fluticasone-umeclidin-vilanter 100-62.5-25 mcg DsDv 1 puff  1 puff Inhalation Daily Raphael Nguyen MD        furosemide injection 20 mg  20 mg Intravenous Daily Bruna Shafer MD   20 mg at 11/09/20 1043    glucagon (human recombinant) injection 1 mg  1 mg Intramuscular PRN Rashid Thorpe MD        glucose chewable tablet 16 g  16 g Oral PRN Rashid Thorpe MD        glucose chewable tablet 24 g  24 g Oral PRN Rashid Thorpe MD        insulin aspart U-100 pen 1-10 Units  1-10 Units Subcutaneous QID (AC + HS) PRN Rashid Thorpe MD        levoFLOXacin 750 mg/150 mL IVPB 750 mg  750 mg Intravenous Q24H Raphael Nguyen MD        methylPREDNISolone sodium succinate injection 80 mg  80 mg Intravenous Q8H Raphael Nguyen MD   80 mg at 11/09/20 1333    midazolam (PF) in 0.9 % NaCl 1 mg/mL Soln   Intravenous Continuous Bruna Shafer MD        montelukast tablet 10 mg  10  mg Oral QHS Raphael Nguyen MD        ondansetron injection 4 mg  4 mg Intravenous Q8H PRN Rashid Thorpe MD        pantoprazole injection 40 mg  40 mg Intravenous Daily Bruna Shafer MD   40 mg at 11/09/20 1043    propofol (DIPRIVAN) 10 mg/mL infusion  0-50 mcg/kg/min Intravenous Continuous Rashid Thorpe MD 48.3 mL/hr at 11/09/20 1230 50 mcg/kg/min at 11/09/20 1230    sodium chloride 0.9% flush 10 mL  10 mL Intravenous PRN MD Bruna Verudgo MD  Critical Care - Medicine  [facility]

## 2020-11-09 NOTE — ASSESSMENT & PLAN NOTE
Results for orders placed during the hospital encounter of 06/22/20   Echo Color Flow Doppler? Yes    Narrative · Concentric left ventricular remodeling.  · Normal left ventricular systolic function. The estimated ejection   fraction is 69%.  · Normal LV diastolic function.  · No wall motion abnormalities.  · Normal right ventricular systolic function.  · Mild right atrial enlargement.  · Trivial pericardial effusion.  · Mild left atrial enlargement.        Give IV lasix   BNP slightly eleveated  Will monitor for repeat dosing of lasix  Cont to monitor I/O's and daily weights.  Fluid restriction (2 liters/24 hours)  Low Na diet  Maintain oxygen sats >92%     Patient Vitals for the past 72 hrs (Last 3 readings):   Weight   11/08/20 1505 (!) 161 kg (355 lb)

## 2020-11-09 NOTE — H&P
[facility] - ED Hospital Medicine  History & Physical    Patient Name: Michelle Wren  MRN: [id number]  Admission Date: 2020  Attending Physician: Raphael Nguyen MD   Primary Care Provider: Primary Doctor No         Patient information was obtained from ER records.     Start time: 7:10 pm  Chief complaint: SOB  The patient location is: [place]    Subjective:     Principal Problem:Acute respiratory failure with hypoxia and hypercarbia    Chief Complaint:   Chief Complaint   Patient presents with    Shortness of Breath     exertional dyspnea        HPI: History of Present Illness:  Patient is a 30 y.o. female who has a past medical history of Asthma, Back pain, Diabetes mellitus, Morbid obesity, and Obesity presented with shortness of breath. Patient is currently lethargic and on Bipap. She is not able to provide any history. History taken mainly from ED staff and chart review. Per chart review patient has similar presentation few months ago. She failed Bipap and required intubation. She was difficult to wean due to her body habitus and her restrictive lung disease. Since discharge she has been about the same. There is question on if she is complaint with Bipap machine at home.     Past Medical History:   Diagnosis Date    Asthma     Back pain     Diabetes mellitus     Morbid obesity     Obesity        Past Surgical History:   Procedure Laterality Date    APPENDECTOMY       SECTION         Review of patient's allergies indicates:  No Known Allergies    No current facility-administered medications on file prior to encounter.      Current Outpatient Medications on File Prior to Encounter   Medication Sig    albuterol (PROVENTIL/VENTOLIN HFA) 90 mcg/actuation inhaler 2 puffs every 4 hours as needed for cough, wheeze, or shortness of breath    fluticasone furoate-vilanteroL (BREO ELLIPTA) 200-25 mcg/dose DsDv diskus inhaler Inhale 1 puff into the lungs once daily.  Controller    fluticasone-umeclidin-vilanter (TRELEGY ELLIPTA) 100-62.5-25 mcg DsDv Inhale 1 puff into the lungs once daily.    furosemide (LASIX) 20 MG tablet     metFORMIN (GLUCOPHAGE) 500 MG tablet Take 2 tablets (1,000 mg total) by mouth 2 (two) times daily with meals.    montelukast (SINGULAIR) 10 mg tablet Take 1 tablet (10 mg total) by mouth every evening.    pantoprazole (PROTONIX) 40 MG tablet Take 1 tablet (40 mg total) by mouth once daily.    doxycycline (VIBRA-TABS) 100 MG tablet     predniSONE (DELTASONE) 20 MG tablet Take 2 daily for 5 days then take one daily for 5 days and may repeat for shortness of breath.     Family History     Family history is unknown by patient.        Tobacco Use    Smoking status: Former Smoker   Substance and Sexual Activity    Alcohol use: Yes     Frequency: Never     Comment: occ    Drug use: No    Sexual activity: Yes     Birth control/protection: None     Review of Systems   Unable to perform ROS: Mental status change     Objective:     Vital Signs (Most Recent):  Temp: 99 °F (37.2 °C) (11/08/20 1505)  Pulse: 87 (11/08/20 1846)  Resp: (!) 22 (11/08/20 1846)  BP: 130/84 (11/08/20 1832)  SpO2: 96 % (11/08/20 1846) Vital Signs (24h Range):  Temp:  [99 °F (37.2 °C)] 99 °F (37.2 °C)  Pulse:  [] 87  Resp:  [15-31] 22  SpO2:  [74 %-99 %] 96 %  BP: (114-181)/() 130/84     Weight: (!) 161 kg (355 lb)  Body mass index is 64.93 kg/m².    Physical Exam  Constitutional:       General: She is in acute distress.      Appearance: She is morbidly obese. She is ill-appearing.      Interventions: Face mask in place.      Comments: Lying in bed lethargic and difficult to arouse   HENT:      Head: Normocephalic and atraumatic.   Cardiovascular:      Rate and Rhythm: Normal rate.   Pulmonary:      Effort: Respiratory distress present.   Musculoskeletal:      Right lower leg: Edema present.      Left lower leg: Edema present.   Neurological:      Mental Status: She is  lethargic.             Significant Labs: All pertinent labs within the past 24 hours have been reviewed.    Significant Imaging: I have reviewed all pertinent imaging results/findings within the past 24 hours.    Assessment/Plan:     * Acute respiratory failure with hypoxia and hypercarbia  ABG  Recent Labs   Lab 11/08/20  1815   PH 7.259*   PO2 78*   PCO2 95.0*   HCO3 42.6*   BE 15     Admit to ICU  Chest Xray: Cardiomegaly. Basilar airspace disease could reflect pulmonary edema, pneumonia, atelectasis or a combination there of.   Differential diagnosis includes - COPD/Asthma, Obesity Hypoventilation, Pneumonia and Aspiration  Now on Bipap  IV steroids  GI prophylaxis while on steroids  Start empiric abx  Duo nebs  IV lasix  Repeat ABG  Low threshold to intubate.     Severe persistent asthma without complication  Plan as above.       Type 2 diabetes mellitus  Hold home oral agents  Low dose correction scale   Cont blood glucose monitoring     Cardiomegaly  Results for orders placed during the hospital encounter of 06/22/20   Echo Color Flow Doppler? Yes    Narrative · Concentric left ventricular remodeling.  · Normal left ventricular systolic function. The estimated ejection   fraction is 69%.  · Normal LV diastolic function.  · No wall motion abnormalities.  · Normal right ventricular systolic function.  · Mild right atrial enlargement.  · Trivial pericardial effusion.  · Mild left atrial enlargement.        Give IV lasix   BNP slightly eleveated  Will monitor for repeat dosing of lasix  Cont to monitor I/O's and daily weights.  Fluid restriction (2 liters/24 hours)  Low Na diet  Maintain oxygen sats >92%     Patient Vitals for the past 72 hrs (Last 3 readings):   Weight   11/08/20 1505 (!) 161 kg (355 lb)         Obesity hypoventilation syndrome  Severe  Bipap dependant  Plan as above.         VTE Risk Mitigation (From admission, onward)         Ordered     enoxaparin injection 40 mg  Every 24 hours      11/08/20  2001     IP VTE HIGH RISK PATIENT  Once      11/08/20 2011     Place sequential compression device  Until discontinued      11/08/20 2011     Place sequential compression device  Until discontinued      11/08/20 2001                The attending portion of this evaluation, treatment, and documentation was performed per Rashid Coleman MD via audiovisual.      Rashid Coleman MD  Department of Hospital Medicine   [facility] - ED

## 2020-11-09 NOTE — NURSING
Report given to MYRNA Whitney at [facility] ICU for patient pending transfer. Awaiting [facility] arrival for patient transport. Patients cousin, Bijal, aware of pending transfer.

## 2020-11-09 NOTE — ANESTHESIA PROCEDURE NOTES
Central Line    Diagnosis: inadequete venous access  Patient location during procedure: ICU  Procedure start time: 11/9/2020 12:31 PM  Timeout: 11/9/2020 12:30 PM  Procedure end time: 11/9/2020 12:38 PM    Staffing  Authorizing Provider: Mj Arellano MD  Performing Provider: Mj Arellano MD    Staffing  Anesthesiologist: Evert Rodriguez MD  Performed: anesthesiologist   Anesthesiologist was present at the time of the procedure.  Preanesthetic Checklist  Completed: patient identified, site marked, surgical consent, timeout performed, IV checked, risks and benefits discussed and monitors and equipment checked  Indication   Indication: hemodynamic monitoring, vascular access, med administration     Anesthesia   general anesthesia    Central Line   Skin Prep: skin prepped with ChloraPrep, skin prep agent completely dried prior to procedure  maximum sterile barriers used during central venous catheter insertion  hand hygiene performed prior to central venous catheter insertion  Location: right, internal jugular.   Catheter type: triple lumen  Catheter Size: 7 Fr  Ultrasound: vascular probe with ultrasound  Vessel Caliber: medium, patent  Vascular Doppler:  not done, compressibility normal  Needle advanced into vessel with real time Ultrasound guidance.  Guidewire confirmed in vessel.  Image recorded and saved.  Manometry: none  Insertion Attempts: 2   Securement:line sutured, sterile dressing applied and blood return through all ports    Post-Procedure   X-Ray: no pneumothorax on x-ray, placement verified by x-ray, tip termination and successful placement  Adverse Events:none    Guidewire Guidewire removed intact. Guidewire removed intact, verified with nurse.

## 2020-11-09 NOTE — ASSESSMENT & PLAN NOTE
ABG  Recent Labs   Lab 11/08/20  1815   PH 7.259*   PO2 78*   PCO2 95.0*   HCO3 42.6*   BE 15     Admit to ICU  Chest Xray: Cardiomegaly. Basilar airspace disease could reflect pulmonary edema, pneumonia, atelectasis or a combination there of.   Differential diagnosis includes - COPD/Asthma, Obesity Hypoventilation, Pneumonia and Aspiration  Now on Bipap  IV steroids  GI prophylaxis while on steroids  Start empiric abx  Duo nebs  IV lasix  Repeat ABG  Low threshold to intubate.

## 2020-11-09 NOTE — HPI
History of Present Illness:  Patient is a 30 y.o. female who has a past medical history of Asthma, Back pain, Diabetes mellitus, Morbid obesity, and Obesity presented with shortness of breath. Patient is currently lethargic and on Bipap. She is not able to provide any history. History taken mainly from ED staff and chart review. Per chart review patient has similar presentation few months ago. She failed Bipap and required intubation. She was difficult to wean due to her body habitus and her restrictive lung disease. Since discharge she has been about the same. There is question on if she is complaint with Bipap machine at home.

## 2020-11-09 NOTE — NURSING
Report received from Nidhi, ICU RN for transfer of patient from [facility] to [facility]. Room 515. [place] to contact [facility] when patient leaves facility.

## 2020-11-09 NOTE — PLAN OF CARE
Problem: Adult Inpatient Plan of Care  Goal: Plan of Care Review  Outcome: Ongoing, Progressing  Goal: Patient-Specific Goal (Individualization)  Outcome: Ongoing, Progressing  Goal: Absence of Hospital-Acquired Illness or Injury  Outcome: Ongoing, Progressing  Goal: Optimal Comfort and Wellbeing  Outcome: Ongoing, Progressing     Problem: Infection  Goal: Infection Symptom Resolution  Outcome: Ongoing, Progressing     Problem: Skin and Tissue Injury (Mechanical Ventilation, Invasive)  Goal: Absence of Device-Related Skin and Tissue Injury  Outcome: Ongoing, Progressing

## 2020-11-10 PROBLEM — J45.51 SEVERE PERSISTENT ASTHMA WITH ACUTE EXACERBATION: Status: ACTIVE | Noted: 2020-07-30

## 2020-11-10 PROBLEM — N30.00 ACUTE CYSTITIS WITHOUT HEMATURIA: Status: ACTIVE | Noted: 2020-11-10

## 2020-11-10 PROBLEM — J18.9 PNEUMONIA DUE TO INFECTIOUS ORGANISM: Status: ACTIVE | Noted: 2020-06-26

## 2020-11-10 PROBLEM — J96.91 RESPIRATORY FAILURE WITH HYPOXIA: Status: RESOLVED | Noted: 2020-07-10 | Resolved: 2020-11-10

## 2020-11-10 PROBLEM — J96.21 ACUTE ON CHRONIC RESPIRATORY FAILURE WITH HYPOXIA AND HYPERCAPNIA: Status: ACTIVE | Noted: 2020-06-22

## 2020-11-10 LAB
ALBUMIN SERPL BCP-MCNC: 2.9 G/DL (ref 3.5–5.2)
ALLENS TEST: ABNORMAL
ALLENS TEST: ABNORMAL
ALP SERPL-CCNC: 71 U/L (ref 55–135)
ALT SERPL W/O P-5'-P-CCNC: 13 U/L (ref 10–44)
ANION GAP SERPL CALC-SCNC: 11 MMOL/L (ref 8–16)
AST SERPL-CCNC: 15 U/L (ref 10–40)
BACTERIA UR CULT: NO GROWTH
BASOPHILS # BLD AUTO: 0.02 K/UL (ref 0–0.2)
BASOPHILS NFR BLD: 0.1 % (ref 0–1.9)
BILIRUB SERPL-MCNC: 0.4 MG/DL (ref 0.1–1)
BSA FOR ECHO PROCEDURE: 2.67 M2
BUN SERPL-MCNC: 13 MG/DL (ref 6–20)
CALCIUM SERPL-MCNC: 9.3 MG/DL (ref 8.7–10.5)
CHLORIDE SERPL-SCNC: 97 MMOL/L (ref 95–110)
CO2 SERPL-SCNC: 34 MMOL/L (ref 23–29)
CREAT SERPL-MCNC: 0.8 MG/DL (ref 0.5–1.4)
CV ECHO LV RWT: 0.48 CM
D DIMER PPP IA.FEU-MCNC: 0.83 MG/L FEU
DELSYS: ABNORMAL
DELSYS: ABNORMAL
DIFFERENTIAL METHOD: ABNORMAL
ECHO LV POSTERIOR WALL: 1.12 CM (ref 0.6–1.1)
EOSINOPHIL # BLD AUTO: 0 K/UL (ref 0–0.5)
EOSINOPHIL NFR BLD: 0 % (ref 0–8)
ERYTHROCYTE [DISTWIDTH] IN BLOOD BY AUTOMATED COUNT: 17.2 % (ref 11.5–14.5)
ERYTHROCYTE [SEDIMENTATION RATE] IN BLOOD BY WESTERGREN METHOD: 16 MM/H
ERYTHROCYTE [SEDIMENTATION RATE] IN BLOOD BY WESTERGREN METHOD: 16 MM/H
EST. GFR  (AFRICAN AMERICAN): >60 ML/MIN/1.73 M^2
EST. GFR  (NON AFRICAN AMERICAN): >60 ML/MIN/1.73 M^2
ETCO2: 37
ETCO2: 42
FIO2: 100
FIO2: 70
FRACTIONAL SHORTENING: 33 % (ref 28–44)
GLUCOSE SERPL-MCNC: 169 MG/DL (ref 70–110)
HCO3 UR-SCNC: 36.5 MMOL/L (ref 24–28)
HCO3 UR-SCNC: 37.2 MMOL/L (ref 24–28)
HCT VFR BLD AUTO: 44.6 % (ref 37–48.5)
HGB BLD-MCNC: 13.1 G/DL (ref 12–16)
IGE SERPL-ACNC: 44 IU/ML (ref 0–100)
IMM GRANULOCYTES # BLD AUTO: 0.13 K/UL (ref 0–0.04)
IMM GRANULOCYTES NFR BLD AUTO: 0.6 % (ref 0–0.5)
INTERVENTRICULAR SEPTUM: 1.29 CM (ref 0.6–1.1)
LACTATE SERPL-SCNC: 1.9 MMOL/L (ref 0.5–2.2)
LACTATE SERPL-SCNC: 2 MMOL/L (ref 0.5–2.2)
LACTATE SERPL-SCNC: 2.1 MMOL/L (ref 0.5–2.2)
LACTATE SERPL-SCNC: 2.8 MMOL/L (ref 0.5–2.2)
LACTATE SERPL-SCNC: 2.9 MMOL/L (ref 0.5–2.2)
LACTATE SERPL-SCNC: 3.4 MMOL/L (ref 0.5–2.2)
LEFT INTERNAL DIMENSION IN SYSTOLE: 3.15 CM (ref 2.1–4)
LEFT VENTRICLE DIASTOLIC VOLUME INDEX: 41.33 ML/M2
LEFT VENTRICLE DIASTOLIC VOLUME: 101.3 ML
LEFT VENTRICLE MASS INDEX: 86 G/M2
LEFT VENTRICLE SYSTOLIC VOLUME INDEX: 16.1 ML/M2
LEFT VENTRICLE SYSTOLIC VOLUME: 39.49 ML
LEFT VENTRICULAR INTERNAL DIMENSION IN DIASTOLE: 4.68 CM (ref 3.5–6)
LEFT VENTRICULAR MASS: 211.84 G
LYMPHOCYTES # BLD AUTO: 0.6 K/UL (ref 1–4.8)
LYMPHOCYTES NFR BLD: 3.2 % (ref 18–48)
MAGNESIUM SERPL-MCNC: 2.3 MG/DL (ref 1.6–2.6)
MCH RBC QN AUTO: 23.1 PG (ref 27–31)
MCHC RBC AUTO-ENTMCNC: 29.4 G/DL (ref 32–36)
MCV RBC AUTO: 79 FL (ref 82–98)
MIN VOL: 10
MIN VOL: 11
MODE: ABNORMAL
MODE: ABNORMAL
MONOCYTES # BLD AUTO: 1.5 K/UL (ref 0.3–1)
MONOCYTES NFR BLD: 7.6 % (ref 4–15)
NEUTROPHILS # BLD AUTO: 17.9 K/UL (ref 1.8–7.7)
NEUTROPHILS NFR BLD: 88.5 % (ref 38–73)
NRBC BLD-RTO: 1 /100 WBC
PCO2 BLDA: 51.2 MMHG (ref 35–45)
PCO2 BLDA: 53 MMHG (ref 35–45)
PEEP: 12
PEEP: 14
PH SMN: 7.45 [PH] (ref 7.35–7.45)
PH SMN: 7.47 [PH] (ref 7.35–7.45)
PHOSPHATE SERPL-MCNC: 3.4 MG/DL (ref 2.7–4.5)
PIP: 36
PIP: 37
PISA TR MAX VEL: 2.3 M/S
PLATELET # BLD AUTO: 356 K/UL (ref 150–350)
PMV BLD AUTO: 11.2 FL (ref 9.2–12.9)
PO2 BLDA: 56 MMHG (ref 80–100)
PO2 BLDA: 59 MMHG (ref 80–100)
POC BE: 12 MMOL/L
POC BE: 14 MMOL/L
POC SATURATED O2: 89 % (ref 95–100)
POC SATURATED O2: 91 % (ref 95–100)
POC TCO2: 38 MMOL/L (ref 23–27)
POC TCO2: 39 MMOL/L (ref 23–27)
POCT GLUCOSE: 131 MG/DL (ref 70–110)
POCT GLUCOSE: 145 MG/DL (ref 70–110)
POCT GLUCOSE: 157 MG/DL (ref 70–110)
POCT GLUCOSE: 159 MG/DL (ref 70–110)
POCT GLUCOSE: 182 MG/DL (ref 70–110)
POTASSIUM SERPL-SCNC: 4.4 MMOL/L (ref 3.5–5.1)
PROT SERPL-MCNC: 7 G/DL (ref 6–8.4)
RBC # BLD AUTO: 5.66 M/UL (ref 4–5.4)
SAMPLE: ABNORMAL
SAMPLE: ABNORMAL
SITE: ABNORMAL
SITE: ABNORMAL
SODIUM SERPL-SCNC: 142 MMOL/L (ref 136–145)
SP02: 96
SP02: 99
TR MAX PG: 21 MMHG
VT: 500
VT: 500
WBC # BLD AUTO: 20.17 K/UL (ref 3.9–12.7)

## 2020-11-10 PROCEDURE — 99233 PR SUBSEQUENT HOSPITAL CARE,LEVL III: ICD-10-PCS | Mod: ,,, | Performed by: INTERNAL MEDICINE

## 2020-11-10 PROCEDURE — 25000242 PHARM REV CODE 250 ALT 637 W/ HCPCS: Performed by: INTERNAL MEDICINE

## 2020-11-10 PROCEDURE — 82803 BLOOD GASES ANY COMBINATION: CPT

## 2020-11-10 PROCEDURE — 84100 ASSAY OF PHOSPHORUS: CPT

## 2020-11-10 PROCEDURE — 94761 N-INVAS EAR/PLS OXIMETRY MLT: CPT

## 2020-11-10 PROCEDURE — 36600 WITHDRAWAL OF ARTERIAL BLOOD: CPT

## 2020-11-10 PROCEDURE — 94770 HC EXHALED C02 TEST: CPT

## 2020-11-10 PROCEDURE — 25000003 PHARM REV CODE 250: Performed by: NURSE PRACTITIONER

## 2020-11-10 PROCEDURE — 63600175 PHARM REV CODE 636 W HCPCS: Performed by: STUDENT IN AN ORGANIZED HEALTH CARE EDUCATION/TRAINING PROGRAM

## 2020-11-10 PROCEDURE — 83735 ASSAY OF MAGNESIUM: CPT

## 2020-11-10 PROCEDURE — 99900035 HC TECH TIME PER 15 MIN (STAT)

## 2020-11-10 PROCEDURE — C9113 INJ PANTOPRAZOLE SODIUM, VIA: HCPCS | Performed by: INTERNAL MEDICINE

## 2020-11-10 PROCEDURE — 25000003 PHARM REV CODE 250: Performed by: HOSPITALIST

## 2020-11-10 PROCEDURE — 27000221 HC OXYGEN, UP TO 24 HOURS

## 2020-11-10 PROCEDURE — 63600175 PHARM REV CODE 636 W HCPCS: Performed by: INTERNAL MEDICINE

## 2020-11-10 PROCEDURE — 99900026 HC AIRWAY MAINTENANCE (STAT)

## 2020-11-10 PROCEDURE — 85379 FIBRIN DEGRADATION QUANT: CPT

## 2020-11-10 PROCEDURE — 80053 COMPREHEN METABOLIC PANEL: CPT

## 2020-11-10 PROCEDURE — 63600175 PHARM REV CODE 636 W HCPCS: Performed by: NURSE PRACTITIONER

## 2020-11-10 PROCEDURE — 99233 SBSQ HOSP IP/OBS HIGH 50: CPT | Mod: ,,, | Performed by: INTERNAL MEDICINE

## 2020-11-10 PROCEDURE — 94640 AIRWAY INHALATION TREATMENT: CPT

## 2020-11-10 PROCEDURE — 82785 ASSAY OF IGE: CPT

## 2020-11-10 PROCEDURE — 63600175 PHARM REV CODE 636 W HCPCS: Performed by: HOSPITALIST

## 2020-11-10 PROCEDURE — 85025 COMPLETE CBC W/AUTO DIFF WBC: CPT

## 2020-11-10 PROCEDURE — 20000000 HC ICU ROOM

## 2020-11-10 PROCEDURE — 83605 ASSAY OF LACTIC ACID: CPT | Mod: 91

## 2020-11-10 PROCEDURE — 25000003 PHARM REV CODE 250: Performed by: STUDENT IN AN ORGANIZED HEALTH CARE EDUCATION/TRAINING PROGRAM

## 2020-11-10 PROCEDURE — 36415 COLL VENOUS BLD VENIPUNCTURE: CPT

## 2020-11-10 RX ORDER — MAGNESIUM SULFATE HEPTAHYDRATE 40 MG/ML
4 INJECTION, SOLUTION INTRAVENOUS
Status: DISCONTINUED | OUTPATIENT
Start: 2020-11-10 | End: 2020-11-16 | Stop reason: HOSPADM

## 2020-11-10 RX ORDER — ONDANSETRON 2 MG/ML
4 INJECTION INTRAMUSCULAR; INTRAVENOUS EVERY 8 HOURS PRN
Status: DISCONTINUED | OUTPATIENT
Start: 2020-11-10 | End: 2020-11-16 | Stop reason: HOSPADM

## 2020-11-10 RX ORDER — ENOXAPARIN SODIUM 150 MG/ML
120 INJECTION SUBCUTANEOUS ONCE
Status: DISCONTINUED | OUTPATIENT
Start: 2020-11-10 | End: 2020-11-10

## 2020-11-10 RX ORDER — ENOXAPARIN SODIUM 100 MG/ML
1 INJECTION SUBCUTANEOUS
Status: DISCONTINUED | OUTPATIENT
Start: 2020-11-11 | End: 2020-11-13

## 2020-11-10 RX ORDER — POTASSIUM CHLORIDE 14.9 MG/ML
60 INJECTION INTRAVENOUS
Status: DISCONTINUED | OUTPATIENT
Start: 2020-11-10 | End: 2020-11-16 | Stop reason: HOSPADM

## 2020-11-10 RX ORDER — ENOXAPARIN SODIUM 150 MG/ML
120 INJECTION SUBCUTANEOUS ONCE
Status: COMPLETED | OUTPATIENT
Start: 2020-11-10 | End: 2020-11-10

## 2020-11-10 RX ORDER — POTASSIUM CHLORIDE 29.8 MG/ML
80 INJECTION INTRAVENOUS
Status: DISCONTINUED | OUTPATIENT
Start: 2020-11-10 | End: 2020-11-16 | Stop reason: HOSPADM

## 2020-11-10 RX ORDER — MAGNESIUM SULFATE HEPTAHYDRATE 40 MG/ML
2 INJECTION, SOLUTION INTRAVENOUS
Status: DISCONTINUED | OUTPATIENT
Start: 2020-11-10 | End: 2020-11-16 | Stop reason: HOSPADM

## 2020-11-10 RX ORDER — MONTELUKAST SODIUM 10 MG/1
10 TABLET ORAL NIGHTLY
Status: DISCONTINUED | OUTPATIENT
Start: 2020-11-10 | End: 2020-11-16 | Stop reason: HOSPADM

## 2020-11-10 RX ORDER — POTASSIUM CHLORIDE 29.8 MG/ML
40 INJECTION INTRAVENOUS
Status: DISCONTINUED | OUTPATIENT
Start: 2020-11-10 | End: 2020-11-16 | Stop reason: HOSPADM

## 2020-11-10 RX ORDER — MUPIROCIN 20 MG/G
OINTMENT TOPICAL 2 TIMES DAILY
Status: COMPLETED | OUTPATIENT
Start: 2020-11-10 | End: 2020-11-14

## 2020-11-10 RX ORDER — ACETAMINOPHEN 325 MG/1
650 TABLET ORAL EVERY 6 HOURS PRN
Status: DISCONTINUED | OUTPATIENT
Start: 2020-11-10 | End: 2020-11-16 | Stop reason: HOSPADM

## 2020-11-10 RX ADMIN — MONTELUKAST 10 MG: 10 TABLET, FILM COATED ORAL at 08:11

## 2020-11-10 RX ADMIN — PROPOFOL 50 MCG/KG/MIN: 10 INJECTION, EMULSION INTRAVENOUS at 08:11

## 2020-11-10 RX ADMIN — IPRATROPIUM BROMIDE AND ALBUTEROL SULFATE 3 ML: .5; 2.5 SOLUTION RESPIRATORY (INHALATION) at 07:11

## 2020-11-10 RX ADMIN — IPRATROPIUM BROMIDE AND ALBUTEROL SULFATE 3 ML: .5; 2.5 SOLUTION RESPIRATORY (INHALATION) at 12:11

## 2020-11-10 RX ADMIN — AZITHROMYCIN MONOHYDRATE 500 MG: 500 INJECTION, POWDER, LYOPHILIZED, FOR SOLUTION INTRAVENOUS at 05:11

## 2020-11-10 RX ADMIN — PROPOFOL 50 MCG/KG/MIN: 10 INJECTION, EMULSION INTRAVENOUS at 01:11

## 2020-11-10 RX ADMIN — MIDAZOLAM HYDROCHLORIDE 1 MG/HR: 5 INJECTION INTRAMUSCULAR; INTRAVENOUS at 08:11

## 2020-11-10 RX ADMIN — IPRATROPIUM BROMIDE AND ALBUTEROL SULFATE 3 ML: .5; 2.5 SOLUTION RESPIRATORY (INHALATION) at 04:11

## 2020-11-10 RX ADMIN — PROPOFOL 35 MCG/KG/MIN: 10 INJECTION, EMULSION INTRAVENOUS at 12:11

## 2020-11-10 RX ADMIN — PROPOFOL 50 MCG/KG/MIN: 10 INJECTION, EMULSION INTRAVENOUS at 07:11

## 2020-11-10 RX ADMIN — DEXTROSE 2 MG/HR: 50 INJECTION, SOLUTION INTRAVENOUS at 04:11

## 2020-11-10 RX ADMIN — MUPIROCIN: 20 OINTMENT TOPICAL at 10:11

## 2020-11-10 RX ADMIN — CEFTRIAXONE 2 G: 2 INJECTION, SOLUTION INTRAVENOUS at 08:11

## 2020-11-10 RX ADMIN — INSULIN ASPART 2 UNITS: 100 INJECTION, SOLUTION INTRAVENOUS; SUBCUTANEOUS at 05:11

## 2020-11-10 RX ADMIN — PROPOFOL 40 MCG/KG/MIN: 10 INJECTION, EMULSION INTRAVENOUS at 06:11

## 2020-11-10 RX ADMIN — ENOXAPARIN SODIUM 120 MG: 120 INJECTION SUBCUTANEOUS at 10:11

## 2020-11-10 RX ADMIN — IPRATROPIUM BROMIDE AND ALBUTEROL SULFATE 3 ML: .5; 2.5 SOLUTION RESPIRATORY (INHALATION) at 11:11

## 2020-11-10 RX ADMIN — PROPOFOL 50 MCG/KG/MIN: 10 INJECTION, EMULSION INTRAVENOUS at 05:11

## 2020-11-10 RX ADMIN — PROPOFOL 40 MCG/KG/MIN: 10 INJECTION, EMULSION INTRAVENOUS at 04:11

## 2020-11-10 RX ADMIN — PROPOFOL 50 MCG/KG/MIN: 10 INJECTION, EMULSION INTRAVENOUS at 03:11

## 2020-11-10 RX ADMIN — FUROSEMIDE 20 MG: 10 INJECTION, SOLUTION INTRAVENOUS at 10:11

## 2020-11-10 RX ADMIN — METHYLPREDNISOLONE SODIUM SUCCINATE 80 MG: 40 INJECTION, POWDER, FOR SOLUTION INTRAMUSCULAR; INTRAVENOUS at 09:11

## 2020-11-10 RX ADMIN — PANTOPRAZOLE SODIUM 40 MG: 40 INJECTION, POWDER, FOR SOLUTION INTRAVENOUS at 10:11

## 2020-11-10 RX ADMIN — MUPIROCIN: 20 OINTMENT TOPICAL at 09:11

## 2020-11-10 RX ADMIN — METHYLPREDNISOLONE SODIUM SUCCINATE 80 MG: 40 INJECTION, POWDER, FOR SOLUTION INTRAMUSCULAR; INTRAVENOUS at 02:11

## 2020-11-10 RX ADMIN — PROPOFOL 50 MCG/KG/MIN: 10 INJECTION, EMULSION INTRAVENOUS at 10:11

## 2020-11-10 RX ADMIN — PROPOFOL 35 MCG/KG/MIN: 10 INJECTION, EMULSION INTRAVENOUS at 10:11

## 2020-11-10 RX ADMIN — INSULIN ASPART 1 UNITS: 100 INJECTION, SOLUTION INTRAVENOUS; SUBCUTANEOUS at 12:11

## 2020-11-10 RX ADMIN — METHYLPREDNISOLONE SODIUM SUCCINATE 80 MG: 40 INJECTION, POWDER, FOR SOLUTION INTRAMUSCULAR; INTRAVENOUS at 05:11

## 2020-11-10 RX ADMIN — ENOXAPARIN SODIUM 40 MG: 40 INJECTION SUBCUTANEOUS at 05:11

## 2020-11-10 NOTE — HOSPITAL COURSE
-11/10 Patient sedated and intubated. On ceftriaxone/azithromycin. Remains on IV methylprednisolone. Also on therapeutic dose Lovenox per Pulmonary recommendations.  -11/11 Patient to change to PS mode and will try to extubate; on Precedex and Propofol infusions. All cultures negative.   -11/12 attempting extubation with Anesthesia ready; all cultures remain negative  -11/13 Patient successfully extubated to BiPAP; PT/OT/SLP consulted; Lasix discontinued; Lovenox dose adjusted; remains on steroids and and azithro/ceftrixone  -11/14 Patient transferred to PCU on 4 L NC- requiring bipap at night    Patient's oxygen was gradually weaned to 2 L nasal cannula.  Her SOB gradually improved.  She was maintained on oral steroids.  She completed a course of Abx while hospitalized.  She continued to utilize noninvasive ventilator at nighttime.  She was followed closely by pulmonology.  She were with PT/OT ambulating well without difficulty.  They recommended home health which Pt declined stating not needed.  She was D/C home on 11/16 with supplemental oxygen at 2 L. She will continue a course of oral steroids.  She did bring and her noninvasive ventilator to the hospital which was checked by pulmonology.  Her settings were reviewed.  She was instructed to continue with noninvasive ventilator for sleep, as well as supplemental oxygen during the day.  Return precautions were discussed at length with Pt who verbalized understanding and readiness for discharge.  She will maintain close follow-up with pulmonology.    Physical Exam:  Morbidly obese  Resp even, unlabored.

## 2020-11-10 NOTE — ASSESSMENT & PLAN NOTE
Patient with current diagnosis of possible pneumonia which is uncontrolled due to persistent hypoxia . Current antimicrobial regimen consists of   Antibiotics (From admission, onward)    Start     Stop Route Frequency Ordered    11/10/20 2043  cefTRIAXone (ROCEPHIN) 2 g/50 mL D5W IVPB      -- IV Every 24 hours (non-standard times) 11/10/20 0429    11/10/20 0530  azithromycin 500 mg in dextrose 5 % 250 mL IVPB (ready to mix system)      -- IV Every 24 hours (non-standard times) 11/10/20 0429      . Cultures currently pending.  Will monitor patient closely and continue current treatment plan unchanged.

## 2020-11-10 NOTE — ASSESSMENT & PLAN NOTE
Body mass index is 65.73 kg/m². Morbid obesity complicates all aspects of disease management from diagnostic modalities to treatment. Will encourage weight loss explain health benefits when patient is not sedated.

## 2020-11-10 NOTE — ASSESSMENT & PLAN NOTE
Patient's FSGs are controlled on current hypoglycemics.   Last A1c reviewed-   Lab Results   Component Value Date    HGBA1C 6.2 06/23/2020     Most recent fingerstick glucose reviewed-   Recent Labs   Lab 11/09/20  1906 11/10/20  0036 11/10/20  0458 11/10/20  1236   POCTGLUCOSE 181* 159* 182* 157*     Current correctional scale  Medium  Maintain anti-hyperglycemic dose as follows-   Antihyperglycemics (From admission, onward)    Start     Stop Route Frequency Ordered    11/09/20 1825  insulin aspart U-100 pen 1-10 Units      -- SubQ Every 6 hours PRN 11/09/20 1825        -Hypoglycemic precautions  -Nutrition consulted  -Blood glucose checks Q6H

## 2020-11-10 NOTE — NURSING
Patient arrived via EMS from [place] to [facility] on ventilator accompanied by ICU nurse Nidhi Valadez. Transferred from portable vent to bedside vent- A/C rate 16,  FiO2 100%, PEEP 5, Propofol @ 50 mcg, Versed @ 5. Notified hospital med team of patients arrival.

## 2020-11-10 NOTE — ASSESSMENT & PLAN NOTE
Patient's FSGs are controlled on current hypoglycemics.   Last A1c reviewed-   Lab Results   Component Value Date    HGBA1C 6.2 06/23/2020     Most recent fingerstick glucose reviewed-   Recent Labs   Lab 11/09/20  0616 11/09/20  1338 11/09/20  1906 11/10/20  0036   POCTGLUCOSE 175* 155* 181* 159*     Current correctional scale  Medium  Maintain anti-hyperglycemic dose as follows-   Antihyperglycemics (From admission, onward)    Start     Stop Route Frequency Ordered    11/09/20 1825  insulin aspart U-100 pen 1-10 Units      -- SubQ Every 6 hours PRN 11/09/20 1825

## 2020-11-10 NOTE — HPI
"Michelle Wren is a 30 y.o. female with a PMHx of restrictive lung disease, morbid obesity, severe persistent asthma, hypertension, diabetes and obesity hypoventilation syndrome  who presented to the [place] ED last night around 5:00 p.m. with respiratory failure. Per  physician note she was "admitted and treated with BiPAP IV steroids, empiric antibiotics, DuoNebs IV Lasix with low threshold to intubate.  Tried to transfer to [facility] but she refused.  Around 9:00 p.m. she was only arousable to sternal rub but and intubated for airway protection.  AC rate 16, , peep 5, FiO2 40%.  Vitals within normal limits.  Diprivan at 50 mics per kg per minute, Nimbex at 3 mics per kg per minute - weaning nimbex and changing to versed.  Today, Dr. Arellano placed a right IJ triple-lumen central line. Seen by Dr Gentile, pulmonology, during last inpatient stay and then in clinic on 7/30 - essentially needs a Trilogy. Her family reported that her sats drop to the 70s with minimal exertion in her house."  The patient is being transferred [facility] for pulmonology services.    Upon arrival to [facility], patient is sedated and intubated.  The patient is unable to provide any history, so history was mainly taken from  physician note and previous hospital records. Per chart review patient has similar presentation in June that resulted in a prolonged intubation. The patient has followed up outpatient with pulmonology once since previous admission.  She is supposed to be on BiPAP, but unsure if she is compliant.  The patient will be admitted to the intensive care unit under Hospital Medicine for further workup and evaluation.  "

## 2020-11-10 NOTE — NURSING
Pt left with [facility] via stretcher on portable vent with RN, Nidhi Valadez, escorting. Family aware of transfer.

## 2020-11-10 NOTE — SUBJECTIVE & OBJECTIVE
Interval History: Patient remains intubated and sedated. Pulmonary consulted. On steroids, ceftriaxone, azithromycin, and Lovenox therapeutic dose.    Review of Systems   Unable to perform ROS: Intubated     Objective:     Vital Signs (Most Recent):  Temp: 98.7 °F (37.1 °C) (11/10/20 0800)  Pulse: 70 (11/10/20 1150)  Resp: 18 (11/10/20 1150)  BP: (!) 118/57 (11/10/20 1150)  SpO2: 100 % (11/10/20 1150) Vital Signs (24h Range):  Temp:  [98 °F (36.7 °C)-98.8 °F (37.1 °C)] 98.7 °F (37.1 °C)  Pulse:  [] 70  Resp:  [14-24] 18  SpO2:  [91 %-100 %] 100 %  BP: (109-134)/(41-63) 118/57     Weight: (!) 163 kg (359 lb 5.6 oz)  Body mass index is 65.73 kg/m².    Intake/Output Summary (Last 24 hours) at 11/10/2020 1404  Last data filed at 11/10/2020 1000  Gross per 24 hour   Intake 1118.63 ml   Output 2370 ml   Net -1251.37 ml      Physical Exam  Vitals signs and nursing note reviewed.   Constitutional:       General: She is not in acute distress.     Appearance: She is well-developed. She is obese. She is ill-appearing. She is not toxic-appearing or diaphoretic.      Interventions: She is sedated, intubated and restrained.   HENT:      Head: Normocephalic and atraumatic.      Right Ear: External ear normal.      Left Ear: External ear normal.      Nose: Nose normal.      Comments: NG tube noted to left nare     Mouth/Throat:      Mouth: Mucous membranes are moist.   Eyes:      Extraocular Movements: Extraocular movements intact.      Conjunctiva/sclera: Conjunctivae normal.   Neck:      Musculoskeletal: No neck rigidity.      Vascular: No JVD.      Comments: Central line to IJ noted; dressing CDI  Cardiovascular:      Rate and Rhythm: Normal rate and regular rhythm.      Pulses: Normal pulses.      Heart sounds: No murmur. No friction rub. No gallop.    Pulmonary:      Effort: Pulmonary effort is normal. She is intubated.      Breath sounds: Normal breath sounds. No stridor. No wheezing, rhonchi or rales. Decreased breath  sounds: Bilaterally.      Comments: ETT in place  Abdominal:      General: Abdomen is flat. Bowel sounds are normal. There is no distension.      Palpations: Abdomen is soft.      Tenderness: There is no abdominal tenderness. There is no guarding.   Genitourinary:     Vagina: Bleeding (small amount of old blood noted to sanitary napkin) present.      Comments: Vazquez in place draining hazy, yellow urine  Musculoskeletal: Normal range of motion.         General: No tenderness or deformity.      Right lower le+ Edema present.      Left lower le+ Edema present.   Skin:     General: Skin is warm and dry.      Comments: LEs with darkened, coarse skin         Significant Labs: All pertinent labs within the past 24 hours have been reviewed.    Significant Imaging: I have reviewed all pertinent imaging results/findings within the past 24 hours.

## 2020-11-10 NOTE — ASSESSMENT & PLAN NOTE
-Continue to treat per Pulmonology recommendations.  -Follow up TTE  -IV Lasix scheduled  -Monitor I's and O's

## 2020-11-10 NOTE — PROGRESS NOTES
"[facility] Medicine  Progress Note    Patient Name: Michelle Wren  MRN: [id number]  Patient Class: IP- Inpatient   Admission Date: 11/9/2020  Length of Stay: 1 days  Attending Physician: Alejandro Quiroz MD  Primary Care Provider: Primary Doctor No        Subjective:     Principal Problem:Acute on chronic respiratory failure with hypoxia and hypercapnia        HPI:  Michelle Wren is a 30 y.o. female with a PMHx of restrictive lung disease, morbid obesity, severe persistent asthma, hypertension, diabetes and obesity hypoventilation syndrome  who presented to the [place] ED last night around 5:00 p.m. with respiratory failure. Per  physician note she was "admitted and treated with BiPAP IV steroids, empiric antibiotics, DuoNebs IV Lasix with low threshold to intubate.  Tried to transfer to [facility] but she refused.  Around 9:00 p.m. she was only arousable to sternal rub but and intubated for airway protection.  AC rate 16, , peep 5, FiO2 40%.  Vitals within normal limits.  Diprivan at 50 mics per kg per minute, Nimbex at 3 mics per kg per minute - weaning nimbex and changing to versed.  Today, Dr. Arellano placed a right IJ triple-lumen central line. Seen by Dr Gentile, pulmonology, during last inpatient stay and then in clinic on 7/30 - essentially needs a Trilogy. Her family reported that her sats drop to the 70s with minimal exertion in her house."  The patient is being transferred [facility] for pulmonology services.    Upon arrival to [facility], patient is sedated and intubated.  The patient is unable to provide any history, so history was mainly taken from  physician note and previous hospital records. Per chart review patient has similar presentation in June that resulted in a prolonged intubation. The patient has followed up outpatient with pulmonology once since previous admission.  She is supposed to be on BiPAP, but unsure if she is " compliant.  The patient will be admitted to the intensive care unit under Hospital Medicine for further workup and evaluation.    Overview/Hospital Course:  -11/10 Patient sedated and intubated. On ceftriaxone/azithromycin. Remains on IV methylprednisolone. Also on therapeutic dose Lovenox per Pulmonary recommendations.    Interval History: Patient remains intubated and sedated. Pulmonary consulted. On steroids, ceftriaxone, azithromycin, and Lovenox therapeutic dose.    Review of Systems   Unable to perform ROS: Intubated     Objective:     Vital Signs (Most Recent):  Temp: 98.7 °F (37.1 °C) (11/10/20 0800)  Pulse: 70 (11/10/20 1150)  Resp: 18 (11/10/20 1150)  BP: (!) 118/57 (11/10/20 1150)  SpO2: 100 % (11/10/20 1150) Vital Signs (24h Range):  Temp:  [98 °F (36.7 °C)-98.8 °F (37.1 °C)] 98.7 °F (37.1 °C)  Pulse:  [] 70  Resp:  [14-24] 18  SpO2:  [91 %-100 %] 100 %  BP: (109-134)/(41-63) 118/57     Weight: (!) 163 kg (359 lb 5.6 oz)  Body mass index is 65.73 kg/m².    Intake/Output Summary (Last 24 hours) at 11/10/2020 1404  Last data filed at 11/10/2020 1000  Gross per 24 hour   Intake 1118.63 ml   Output 2370 ml   Net -1251.37 ml      Physical Exam  Vitals signs and nursing note reviewed.   Constitutional:       General: She is not in acute distress.     Appearance: She is well-developed. She is obese. She is ill-appearing. She is not toxic-appearing or diaphoretic.      Interventions: She is sedated, intubated and restrained.   HENT:      Head: Normocephalic and atraumatic.      Right Ear: External ear normal.      Left Ear: External ear normal.      Nose: Nose normal.      Comments: NG tube noted to left nare     Mouth/Throat:      Mouth: Mucous membranes are moist.   Eyes:      Extraocular Movements: Extraocular movements intact.      Conjunctiva/sclera: Conjunctivae normal.   Neck:      Musculoskeletal: No neck rigidity.      Vascular: No JVD.      Comments: Central line to IJ noted; dressing  CDI  Cardiovascular:      Rate and Rhythm: Normal rate and regular rhythm.      Pulses: Normal pulses.      Heart sounds: No murmur. No friction rub. No gallop.    Pulmonary:      Effort: Pulmonary effort is normal. She is intubated.      Breath sounds: Normal breath sounds. No stridor. No wheezing, rhonchi or rales. Decreased breath sounds: Bilaterally.      Comments: ETT in place  Abdominal:      General: Abdomen is flat. Bowel sounds are normal. There is no distension.      Palpations: Abdomen is soft.      Tenderness: There is no abdominal tenderness. There is no guarding.   Genitourinary:     Vagina: Bleeding (small amount of old blood noted to sanitary napkin) present.      Comments: Vazquez in place draining hazy, yellow urine  Musculoskeletal: Normal range of motion.         General: No tenderness or deformity.      Right lower le+ Edema present.      Left lower le+ Edema present.   Skin:     General: Skin is warm and dry.      Comments: LEs with darkened, coarse skin         Significant Labs: All pertinent labs within the past 24 hours have been reviewed.    Significant Imaging: I have reviewed all pertinent imaging results/findings within the past 24 hours.      Assessment/Plan:      * Acute on chronic respiratory failure with hypoxia and hypercapnia  Patient with history of intubation. Failed BiPAP therapy at [place] requiring intubation. Increased respiratory rate. Tachycardic. Afebrile. WBC elevated. Lactate rising as high to 3.4. BNP also elevated to 261, yet recent TTE with no cardiac dysfunction. D-dimer elevated. CXR shows bilateral lower lobe infiltrates; history of MSSA in sputum. Likely secondary to PNA, asthma exacerbation, obesity hypoventilation syndrome, PE or some combination.  Vent Mode: SIMV  Oxygen Concentration (%):  [] 60  Resp Rate Total:  [14 br/min-24 br/min] 18 br/min  Vt Set:  [400 mL-500 mL] 500 mL  PEEP/CPAP:  [5 djI48-13 cmH20] 14 cmH20  Pressure Support:  [0  pwF58-99 cmH20] 25 cmH20  Mean Airway Pressure:  [12 xaV07-77 cmH20] 20 cmH20    ABG  Recent Labs   Lab 11/10/20  0733   PH 7.469*   PO2 59*   PCO2 51.2*   HCO3 37.2*   BE 14   -PPI for PPx  -Continue supplemental oxygen to keep oxygen saturation >92%  -Continue DuoNebs and IV steroids, Singulair  -Pulmonology consulted  -Continue Rocephin and azithromycin  -Continue therapeutic Lovenox SQ  -TTE ordered to assess for pulmonary HTN and or right heart strain    Severe sepsis  Tachycardiac and tachypneic on admission. Afebrile. WBC elevated. Lactate remains elevated. Bilateral infiltrates on CXR. U/A concerning for UTI. Patient may have PNA and/or UTI.  -Intubated as above for respiratory failure  -Follow up all cultures  -Empiric Rocephin and azithromycin as above  -Trend lactate  -Caution with IVF repletion given elevated BNP    Obesity hypoventilation syndrome  Currently intubated.  -Appreciate Pulmonology recommendations  -Please see plan above    Cardiomegaly  Results for orders placed during the hospital encounter of 06/22/20   Echo Color Flow Doppler? Yes     Narrative · Concentric left ventricular remodeling.  · Normal left ventricular systolic function. The estimated ejection   fraction is 69%.  · Normal LV diastolic function.  · No wall motion abnormalities.  · Normal right ventricular systolic function.  · Mild right atrial enlargement.  · Trivial pericardial effusion.  · Mild left atrial enlargement.        -Follow up TTE as above   -BNP elevated and peripheral edema noted.  -Continue IV Lasix   -Cont to monitor I/O's and daily weights.  -Maintain oxygen sats >92%     Type 2 diabetes mellitus  Patient's FSGs are controlled on current hypoglycemics.   Last A1c reviewed-   Lab Results   Component Value Date    HGBA1C 6.2 06/23/2020     Most recent fingerstick glucose reviewed-   Recent Labs   Lab 11/09/20  1906 11/10/20  0036 11/10/20  0458 11/10/20  1236   POCTGLUCOSE 181* 159* 182* 157*     Current  correctional scale  Medium  Maintain anti-hyperglycemic dose as follows-   Antihyperglycemics (From admission, onward)    Start     Stop Route Frequency Ordered    11/09/20 1825  insulin aspart U-100 pen 1-10 Units      -- SubQ Every 6 hours PRN 11/09/20 1825        -Hypoglycemic precautions  -Nutrition consulted  -Blood glucose checks Q6H    Severe persistent asthma with acute exacerbation  See respiratory failure above.    Acute cystitis without hematuria  Unclear if patient symptomatic. Tachycardic and WBC elevated. U/A shows elevation in WBC.  -Follow up urine culture  -Empiric Rocephin as above      Morbid obesity  Body mass index is 65.73 kg/m². Morbid obesity complicates all aspects of disease management from diagnostic modalities to treatment. Will encourage weight loss explain health benefits when patient is not sedated.    Pulmonary hypertension  -Continue to treat per Pulmonology recommendations.  -Follow up TTE  -IV Lasix scheduled  -Monitor I's and O's    Pneumonia due to infectious organism  Patient with current diagnosis of possible pneumonia which is uncontrolled due to persistent hypoxia . Current antimicrobial regimen consists of   Antibiotics (From admission, onward)    Start     Stop Route Frequency Ordered    11/10/20 2043  cefTRIAXone (ROCEPHIN) 2 g/50 mL D5W IVPB      -- IV Every 24 hours (non-standard times) 11/10/20 0429    11/10/20 0900  mupirocin 2 % ointment      11/15 0859 Nasl 2 times daily 11/10/20 0430    11/10/20 0530  azithromycin 500 mg in dextrose 5 % 250 mL IVPB (ready to mix system)      -- IV Every 24 hours (non-standard times) 11/10/20 0429      . Cultures currently pending.  Will monitor patient closely and continue current treatment plan unchanged.      VTE Risk Mitigation (From admission, onward)         Ordered     enoxaparin injection 160 mg  Every 24 hours (non-standard times)      11/10/20 0823                Discharge Planning   BAKARI:  cannot determine at this time     Code Status: Full Code   Is the patient medically ready for discharge?:     Reason for patient still in hospital (select all that apply): Patient unstable, Patient trending condition, Treatment, Consult recommendations and Pending disposition               Critical care time spent on the evaluation and treatment of severe organ dysfunction, review of pertinent labs and imaging studies, discussions with consulting providers and discussions with patient/family: 45 minutes.      Alejandro Quiroz MD  Department of Hospital Medicine   [facility]

## 2020-11-10 NOTE — SUBJECTIVE & OBJECTIVE
Past Medical History:   Diagnosis Date    Asthma     Back pain     Diabetes mellitus     Morbid obesity     Obesity        Past Surgical History:   Procedure Laterality Date    APPENDECTOMY       SECTION         Review of patient's allergies indicates:  No Known Allergies    Current Facility-Administered Medications on File Prior to Encounter   Medication    [DISCONTINUED] acetaminophen tablet 650 mg    [DISCONTINUED] albuterol-ipratropium 2.5 mg-0.5 mg/3 mL nebulizer solution 3 mL    [DISCONTINUED] cisatracurium (NIMBEX) 200 mg in dextrose 5 % 100 mL infusion    [DISCONTINUED] dextrose 50% injection 12.5 g    [DISCONTINUED] dextrose 50% injection 25 g    [DISCONTINUED] enoxaparin injection 40 mg    [DISCONTINUED] fluticasone-umeclidin-vilanter 100-62.5-25 mcg DsDv 1 puff    [DISCONTINUED] furosemide injection 20 mg    [DISCONTINUED] furosemide tablet 20 mg    [DISCONTINUED] glucagon (human recombinant) injection 1 mg    [DISCONTINUED] glucose chewable tablet 16 g    [DISCONTINUED] glucose chewable tablet 24 g    [DISCONTINUED] insulin aspart U-100 pen 1-10 Units    [DISCONTINUED] levoFLOXacin 750 mg/150 mL IVPB 750 mg    [DISCONTINUED] methylPREDNISolone sodium succinate injection 80 mg    [DISCONTINUED] midazolam (PF) in 0.9 % NaCl 1 mg/mL Soln    [DISCONTINUED] midazolam 100 mg/100 mL in dextrose 5% infusion    [DISCONTINUED] montelukast tablet 10 mg    [DISCONTINUED] ondansetron injection 4 mg    [DISCONTINUED] pantoprazole EC tablet 40 mg    [DISCONTINUED] pantoprazole injection 40 mg    [DISCONTINUED] propofol (DIPRIVAN) 10 mg/mL infusion    [DISCONTINUED] sodium chloride 0.9% flush 10 mL     Current Outpatient Medications on File Prior to Encounter   Medication Sig    albuterol (PROVENTIL/VENTOLIN HFA) 90 mcg/actuation inhaler 2 puffs every 4 hours as needed for cough, wheeze, or shortness of breath    doxycycline (VIBRA-TABS) 100 MG tablet     fluticasone  furoate-vilanteroL (BREO ELLIPTA) 200-25 mcg/dose DsDv diskus inhaler Inhale 1 puff into the lungs once daily. Controller    fluticasone-umeclidin-vilanter (TRELEGY ELLIPTA) 100-62.5-25 mcg DsDv Inhale 1 puff into the lungs once daily.    furosemide (LASIX) 20 MG tablet     metFORMIN (GLUCOPHAGE) 500 MG tablet Take 2 tablets (1,000 mg total) by mouth 2 (two) times daily with meals.    montelukast (SINGULAIR) 10 mg tablet Take 1 tablet (10 mg total) by mouth every evening.    pantoprazole (PROTONIX) 40 MG tablet Take 1 tablet (40 mg total) by mouth once daily.    predniSONE (DELTASONE) 20 MG tablet Take 2 daily for 5 days then take one daily for 5 days and may repeat for shortness of breath.     Family History     Family history is unknown by patient.        Tobacco Use    Smoking status: Former Smoker   Substance and Sexual Activity    Alcohol use: Yes     Frequency: Never     Comment: occ    Drug use: No    Sexual activity: Yes     Birth control/protection: None     Review of Systems   Unable to perform ROS: Intubated     Objective:     Vital Signs (Most Recent):  Temp: 98.6 °F (37 °C) (11/09/20 1845)  Pulse: 73 (11/10/20 0034)  Resp: 16 (11/10/20 0034)  BP: (!) 121/56 (11/09/20 2020)  SpO2: 97 % (11/10/20 0034) Vital Signs (24h Range):  Temp:  [98 °F (36.7 °C)-100.7 °F (38.2 °C)] 98.6 °F (37 °C)  Pulse:  [] 73  Resp:  [13-22] 16  SpO2:  [90 %-100 %] 97 %  BP: (115-150)/(41-66) 121/56     Weight: (!) 163 kg (359 lb 5.6 oz)  Body mass index is 65.73 kg/m².    Physical Exam  Vitals signs and nursing note reviewed.   Constitutional:       General: She is not in acute distress.     Appearance: She is well-developed. She is obese. She is ill-appearing. She is not toxic-appearing or diaphoretic.      Interventions: She is sedated, intubated and restrained.   HENT:      Head: Normocephalic and atraumatic.      Right Ear: External ear normal.      Left Ear: External ear normal.      Nose:      Comments: NG  tube noted to left nare     Mouth/Throat:      Mouth: Mucous membranes are moist.   Eyes:      Pupils: Pupils are equal, round, and reactive to light.   Neck:      Musculoskeletal: Normal range of motion.      Vascular: No JVD.      Comments: Central line to IJ noted; dressing CDI  Cardiovascular:      Rate and Rhythm: Normal rate and regular rhythm.      Heart sounds: No murmur.   Pulmonary:      Effort: Pulmonary effort is normal. No respiratory distress. She is intubated.      Breath sounds: Decreased breath sounds (Bilaterally) present. No wheezing or rhonchi.      Comments: ETT in place; FiO2 currently 100%  Abdominal:      General: Bowel sounds are normal. There is no distension.      Palpations: Abdomen is soft.      Tenderness: There is no abdominal tenderness. There is no guarding.   Genitourinary:     Vagina: Bleeding (small amount of old blood noted to sanitary napkin) present.      Comments: Vazquez in place draining hazy, yellow urine  Musculoskeletal: Normal range of motion.         General: No tenderness or deformity.      Right lower le+ Edema present.      Left lower le+ Edema present.   Skin:     General: Skin is warm and dry.      Capillary Refill: Capillary refill takes 2 to 3 seconds.   Neurological:      Comments: Patient arrived from outside hospital intubated and sedated on propofol and Versed.  Currently she is unresponsive and unable to follow commands.           CRANIAL NERVES     CN III, IV, VI   Pupils are equal, round, and reactive to light.       Significant Labs:   CBC:   Recent Labs   Lab 20  1523 20  0710   WBC 15.00* 14.87*   HGB 13.0 13.1   HCT 46.5 45.4   * 350     CMP:   Recent Labs   Lab 20  1523 20  0710    141   K 4.0 4.3   CL 94* 95   CO2 38* 36*   * 169*   BUN 15 14   CREATININE 0.8 0.8   CALCIUM 8.7 8.7   PROT 7.6 6.8   ALBUMIN 3.6 3.1*   BILITOT 0.4 0.8   ALKPHOS 70 64   AST 22 22   ALT 20 18   ANIONGAP 9 10   EGFRNONAA  >60.0 >60.0     Troponin:   Recent Labs   Lab 11/08/20  1523   TROPONINI 0.17     Urine Studies:   Recent Labs   Lab 11/08/20  2333   COLORU Yellow   APPEARANCEUA Clear   PHUR 6.0   SPECGRAV >=1.030*   PROTEINUA 2+*   GLUCUA Negative   KETONESU Negative   BILIRUBINUA Negative   OCCULTUA 1+*   NITRITE Negative   UROBILINOGEN Negative   LEUKOCYTESUR Negative   RBCUA 8*   WBCUA 13*   BACTERIA Few*   SQUAMEPITHEL 5   HYALINECASTS 6*     All pertinent labs within the past 24 hours have been reviewed.    Significant Imaging: I have reviewed and interpreted all pertinent imaging results/findings within the past 24 hours.

## 2020-11-10 NOTE — ASSESSMENT & PLAN NOTE
Patient with history of intubation. Failed BiPAP therapy at [place] requiring intubation. Increased respiratory rate. Tachycardic. Afebrile. WBC elevated. Lactate rising as high to 3.4. BNP also elevated to 261, yet recent TTE with no cardiac dysfunction. D-dimer elevated. CXR shows bilateral lower lobe infiltrates; history of MSSA in sputum. Likely secondary to PNA, asthma exacerbation, obesity hypoventilation syndrome, PE or some combination.  Vent Mode: SIMV  Oxygen Concentration (%):  [] 60  Resp Rate Total:  [14 br/min-24 br/min] 18 br/min  Vt Set:  [400 mL-500 mL] 500 mL  PEEP/CPAP:  [5 mbK31-99 cmH20] 14 cmH20  Pressure Support:  [0 mbF40-98 cmH20] 25 cmH20  Mean Airway Pressure:  [12 mmL16-32 cmH20] 20 cmH20    ABG  Recent Labs   Lab 11/10/20  0733   PH 7.469*   PO2 59*   PCO2 51.2*   HCO3 37.2*   BE 14   -PPI for PPx  -Continue supplemental oxygen to keep oxygen saturation >92%  -Continue DuoNebs and IV steroids, Singulair  -Pulmonology consulted  -Continue Rocephin and azithromycin  -Continue therapeutic Lovenox SQ  -TTE ordered to assess for pulmonary HTN and or right heart strain   13-Jun-2018

## 2020-11-10 NOTE — ASSESSMENT & PLAN NOTE
Likely secondary to PNA, asthma exacerbation, obesity hypoventilation syndrome.  Patient's vent settings, CXR and last ABG were reviewed.    Vent Mode: A/C  Oxygen Concentration (%):  [] 70  Resp Rate Total:  [14 br/min-18 br/min] 17 br/min  Vt Set:  [400 mL-500 mL] 500 mL  PEEP/CPAP:  [5 ccV49-75 cmH20] 12 cmH20  Pressure Support:  [0 cmH20] 0 cmH20  Mean Airway Pressure:  [11 foT33-60 cmH20] 18 cmH20    ABG  Recent Labs   Lab 11/09/20  1904   PH 7.370   PO2 62*   PCO2 75.3*   HCO3 43.5*   BE 18     Continue supplemental oxygen to keep oxygen saturation >92%.  Continue DuoNebs and IV steroids.  Pulmonology consulted.

## 2020-11-10 NOTE — CONSULTS
"  2020      Admit Date: 2020  Michelle Wren  New Patient Consult    No chief complaint on file.      History of Present Illness:       Called brother's number -Jaycob Elena.  He related pt moved out as he was strict, pt living with cousin.  Details of what transpired recently na.      Pt was under my care 2020 office visit and  to  [facility] hosp stay - had severe asthma with resp failure and morbid obesity and mssa in sputum with bilat infiltrates seen on cxrs.  Pt had cpap prior- bipap was recommended.  She was able to ambulate in office rm air with sat falling to 84%.  Prednisone action plan was prescribed as was controller with breo 200.  Pt did not keep follow up as lacked coverage- she was to call prn. Home ox was to be continuted.  Metformin recommended while on prednisone.  F/u with pcp was to be arranged. Pt need peep very high last admit, difficult to oxygenate- presumed from morbid obese/atelectasis/sedation/MSSA pneumonia.        Pt presented to [facility]    - bipap used, not clear if had used prednisone or controller. gpc in sputum with patchy bilat infiltrates.  procal 0.03 today.  sars neg.  PFSH:  Past Medical History:   Diagnosis Date    Asthma     Back pain     Diabetes mellitus     Morbid obesity     Obesity      Past Surgical History:   Procedure Laterality Date    APPENDECTOMY       SECTION       Social History     Tobacco Use    Smoking status: Former Smoker   Substance Use Topics    Alcohol use: Yes     Frequency: Never     Comment: occ    Drug use: No     Family History   Family history unknown: Yes     Review of patient's allergies indicates:  No Known Allergies         Review of Systems:  due to neurologic status/impairments a Review of Systems could not be obtained       Exam:Comprehensive exam done. /63   Pulse 91   Temp 98.6 °F (37 °C)   Resp 18   Ht 5' 2" (1.575 m)   Wt (!) 163 kg (359 lb 5.6 oz)   SpO2 97%   BMI 65.73 " kg/m²   Exam included Vitals as listed, and patient's appearance and affect and alertness and mood, oral exam for yeast and hygiene and pharynx lesions and Mallapatti (M) score, neck with inspection for jvd and masses and thyroid abnormalities and lymph nodes (supraclavicular and infraclavicular nodes also examined and noted if abn), chest exam included symmetry and effort and fremitus and percussion and auscultation, cardiac exam included rhythm and gallops and murmur and rubs and jvd and edema, abdominal exam for mass and hepatosplenomegaly and tenderness and hernias and bowel sounds, Musculoskeletal exam with muscle tone and posture and mobility/gait and  strenght, and skin for rashes and cyanosis and pallor and turgor, extremity for clubbing.  Findings were normal except as listed below:  Morbid obese, not arousing.  chest is symmetric, no distress, normal percussion, normal fremitus and decreased  breath sounds, oral et, distent bs, lower ext edema +2, rest good.  .     Radiographs reviewed: view by direct vision morbid obese, bilat streaky infilrates  Results for orders placed during the hospital encounter of 11/09/20   X-Ray Chest 1 View    Narrative EXAMINATION:  XR CHEST 1 VIEW    CLINICAL HISTORY:  Verify ETT and central line placement.    TECHNIQUE:  Single frontal view of the chest was performed.    COMPARISON:  Multiple prior radiographs of the chest, most recent from earlier the same date.    FINDINGS:  Endotracheal tube terminates approximately 4 cm proximal to the cas.  There is a nasogastric tube coursing into the abdomen.  There is a right internal jugular central venous catheter, unchanged.  The lungs are well expanded.  There are bilateral patchy airspace opacities, unchanged.  There is a probable small left pleural effusion.  There is no pneumothorax.  The visualized osseous structures are intact.      Impression Bilateral airspace opacities, unchanged.      Electronically signed  by: Matias Franklin  Date:    11/09/2020  Time:    19:45   ]  Results for MICH NASH (MRN [id number]) as of 11/9/2020 19:37   Ref. Range 11/8/2020 16:49   POC PH Latest Ref Range: 7.35 - 7.45  7.246 (LL)   POC PCO2 Latest Ref Range: 35 - 45 mmHg 94.6 (HH)   POC PO2 Latest Ref Range: 80 - 100 mmHg 85   POC BE Latest Ref Range: -2 to 2 mmol/L 14     Labs     Recent Labs   Lab 11/09/20 0710   WBC 14.87*   HGB 13.1   HCT 45.4        Recent Labs   Lab 11/09/20  0307 11/09/20 0710 11/09/20 1918   NA  --  141  --   --    K  --  4.3  --   --    CL  --  95  --   --    CO2  --  36*  --   --    BUN  --  14  --   --    CREATININE  --  0.8  --   --    GLU  --  169*  --   --    CALCIUM  --  8.7  --   --    MG  --  2.0  --   --    PHOS  --  3.2  --   --    AST  --  22  --   --    ALT  --  18  --   --    ALKPHOS  --  64  --   --    BILITOT  --  0.8  --   --    PROT  --  6.8  --   --    ALBUMIN  --  3.1*  --   --    PROCAL 0.03  --   --   --    LACTATE 2.8*  --    < > 2.8*    < > = values in this interval not displayed.     Recent Labs   Lab 11/09/20 1904   PH 7.370   PCO2 75.3*   PO2 62*   HCO3 43.5*     Microbiology Results (last 7 days)     ** No results found for the last 168 hours. **          Impression:  Active Hospital Problems    Diagnosis  POA    Respiratory failure [J96.90]  Yes    Restrictive lung disease [J98.4]  Yes    Morbid obesity [E66.01]  Yes    Pulmonary hypertension [I27.20]  Yes     By pulmonary artery size on ct chest 6/30      Angioedema [T78.3XXA]  Yes    Staphylococcal pneumonia [J15.20]  Yes    Acute on chronic respiratory failure with hypercapnia [J96.22]  Yes    Obesity hypoventilation syndrome [E66.2]  Yes      Resolved Hospital Problems   No resolved problems to display.               Plan: expect staph again,  Asthma exacerbation, morbid obese, obese hypovent.      Temp up 100.7     Needs 100% ox again.  abg looks worse this admit than last time. Will keep tidal vol up with  somewhat high peep as had extreme difficulty last admit.     Not sure where problem that ppt recurrance would be - rx asthma, infection, resp failure, monitor.    Pt had angioedema and concerns with propofol infusion in June.  Pt had transient theraputic lovenox given with severe gas exchange problems Camille admit  Pt was wheezing initially in June.      The following were evaluated and adjusted as needed: mechanical ventilator settings and weaning status, intravenous fluids and nutritional status, sedation and neurologic status, antibiotics, hemodynamics, support tubes and access lines and invasive monitoring, acid base balance and oxygenation needs and input and output and renal status       Critical Care  - THE PATIENT HAS A HIGH PROBABILITY OF IMMINENT OR LIFE THREATENING DETERIORATION.  Over 50%time of encounter was in direct care at bedside.  Time was 30 to 74 minutes required for patient care.  Time needed for all of the above totaled 39 minutes.

## 2020-11-10 NOTE — ASSESSMENT & PLAN NOTE
Tachycardiac and tachypneic on admission. Afebrile. WBC elevated. Lactate remains elevated. Bilateral infiltrates on CXR. U/A concerning for UTI. Patient may have PNA and/or UTI.  -Intubated as above for respiratory failure  -Follow up all cultures  -Empiric Rocephin and azithromycin as above  -Trend lactate  -Caution with IVF repletion given elevated BNP

## 2020-11-10 NOTE — PROGRESS NOTES
Progress Note  PULMONARY    Admit Date: 11/9/2020   11/10/2020  Consult note from Dr Gentile 11/9:  Called brother's number -Jaycob Elena.  He related pt moved out as he was strict, pt living with cousin.  Details of what transpired recently na.       Pt was under my care July 30 2020 office visit and June 23 to July 14 [facility] hosp stay - had severe asthma with resp failure and morbid obesity and mssa in sputum with bilat infiltrates seen on cxrs.  Pt had cpap prior- bipap was recommended.  She was able to ambulate in office rm air with sat falling to 84%.  Prednisone action plan was prescribed as was controller with breo 200.  Pt did not keep follow up as lacked coverage- she was to call prn. Home ox was to be continuted.  Metformin recommended while on prednisone.  F/u with pcp was to be arranged. Pt need peep very high last admit, difficult to oxygenate- presumed from morbid obese/atelectasis/sedation/MSSA pneumonia.    expect staph again,  Asthma exacerbation, morbid obese, obese hypovent.       Temp up 100.7      Needs 100% ox again.  abg looks worse this admit than last time. Will keep tidal vol up with somewhat high peep as had extreme difficulty last admit.      Not sure where problem that ppt recurrance would be - rx asthma, infection, resp failure, monitor.     Pt had angioedema and concerns with propofol infusion in June.  Pt had transient theraputic lovenox given with severe gas exchange problems Camille admit  Pt was wheezing initially in June.    SUBJECTIVE:     11/10 intubated and sedated      PFSH and Allergies reviewed.    OBJECTIVE:     Vitals (Most recent):  Vitals:    11/10/20 0459   BP: (!) 120/56   Pulse: 75   Resp: 20   Temp:        Vitals (24 hour range):  Temp:  [98 °F (36.7 °C)-100.7 °F (38.2 °C)]   Pulse:  []   Resp:  [13-20]   BP: (115-150)/(41-66)   SpO2:  [95 %-100 %]     No intake or output data in the 24 hours ending 11/10/20 0606       Physical Exam:  The patient's neuro status  (alertness,orientation,cognitive function,motor skills,), pharyngeal exam (oral lesions, hygiene, abn dentition,), Neck (jvd,mass,thyroid,nodes in neck and above/below clavicle),RESPIRATORY(symmetry,effort,fremitus,percussion,auscultation),  Cor(rhythm,heart tones including gallops,perfusion,edema)ABD(distention,hepatic&splenomegaly,tenderness,masses), Skin(rash,cyanosis),Psyc(affect,judgement,).  Exam negative except for these pertinent findings:    Sedated on vent, et tube, chest is symmetric, no distress, normal percussion, normal fremitus and good  breath sounds- some edema.      Radiographs reviewed: view by direct vision  11/10 band atelectasis right, sl increased opacification lower lungs    Results for orders placed during the hospital encounter of 11/09/20   X-Ray Chest 1 View    Narrative EXAMINATION:  XR CHEST 1 VIEW    CLINICAL HISTORY:  Verify ETT and central line placement.    TECHNIQUE:  Single frontal view of the chest was performed.    COMPARISON:  Multiple prior radiographs of the chest, most recent from earlier the same date.    FINDINGS:  Endotracheal tube terminates approximately 4 cm proximal to the cas.  There is a nasogastric tube coursing into the abdomen.  There is a right internal jugular central venous catheter, unchanged.  The lungs are well expanded.  There are bilateral patchy airspace opacities, unchanged.  There is a probable small left pleural effusion.  There is no pneumothorax.  The visualized osseous structures are intact.      Impression Bilateral airspace opacities, unchanged.      Electronically signed by: Matias Franklin  Date:    11/09/2020  Time:    19:45   ]    Labs     Recent Labs   Lab 11/10/20  0349   WBC 20.17*   HGB 13.1   HCT 44.6   *     Recent Labs   Lab 11/09/20  1918  11/10/20  0349   NA  --   --  142   K  --   --  4.4   CL  --   --  97   CO2  --   --  34*   BUN  --   --  13   CREATININE  --   --  0.8   GLU  --   --  169*   CALCIUM  --   --  9.3   MG  --    --  2.3   PHOS  --   --  3.4   AST  --   --  15   ALT  --   --  13   ALKPHOS  --   --  71   BILITOT  --   --  0.4   PROT  --   --  7.0   ALBUMIN  --   --  2.9*   PROCAL 0.02  --   --    LACTATE 2.8*   < > 2.9*    < > = values in this interval not displayed.     Recent Labs   Lab 11/10/20  0459   PH 7.445   PCO2 53.0*   PO2 56*   HCO3 36.5*     Microbiology Results (last 7 days)     Procedure Component Value Units Date/Time    Blood culture [[id number]] Collected: 11/09/20 2056    Order Status: Sent Specimen: Blood Updated: 11/10/20 0118          Impression:  Active Hospital Problems    Diagnosis  POA    *Acute on chronic respiratory failure with hypoxia and hypercapnia [J96.21, J96.22]  Yes    Severe persistent asthma with acute exacerbation [J45.51]  Yes    Morbid obesity [E66.01]  Yes    Pulmonary hypertension [I27.20]  Yes     By pulmonary artery size on ct chest 6/30      Pneumonia due to infectious organism [J18.9]  Yes    Type 2 diabetes mellitus [E11.9]  Yes    Obesity hypoventilation syndrome [E66.2]  Yes    Cardiomegaly [I51.7]  Yes      Resolved Hospital Problems   No resolved problems to display.               Plan:   11/10 - intubated  tm 100.7, aztihro and rocephin,   I/o na  56 o2 on 70% with ox increased to 100% - f/u cxr and increase peep to 14 ordered.    Having shunting problem likely due to morbid obese/atelectasis.  Similar problem last admit in June.      Try to use psv and low ox to stabilize lower lungs.  Sputum pending.    D dimer check, will increase lovenox to theraputic  Mg/kg once daily.                                 .

## 2020-11-10 NOTE — PLAN OF CARE
Unable to discuss goals with patient due to level of consciousness. Ventilated and sedated, Dr Getnile adjusts settings and e-ICU made aware of adjustments. Rotation bed in use, bath completed. No distress noted.

## 2020-11-10 NOTE — H&P
"[facility] Hospital Medicine  History & Physical    Patient Name: Michelle Wren  MRN: [id number]  Admission Date: 11/9/2020  Attending Physician: Bernice Ortiz MD   Primary Care Provider: Primary Doctor No         Patient information was obtained from past medical records and ER records.     Subjective:     Principal Problem:Acute on chronic respiratory failure with hypoxia and hypercapnia    Chief Complaint:   Chief Complaint   Patient presents with    Shortness of Breath     respiratory failure        HPI: Michelle Wren is a 30 y.o. female with a PMHx of restrictive lung disease, morbid obesity, severe persistent asthma, hypertension, diabetes and obesity hypoventilation syndrome  who presented to the [place] ED last night around 5:00 p.m. with respiratory failure. Per  physician note she was "admitted and treated with BiPAP IV steroids, empiric antibiotics, DuoNebs IV Lasix with low threshold to intubate.  Tried to transfer to [facility] but she refused.  Around 9:00 p.m. she was only arousable to sternal rub but and intubated for airway protection.  AC rate 16, , peep 5, FiO2 40%.  Vitals within normal limits.  Diprivan at 50 mics per kg per minute, Nimbex at 3 mics per kg per minute - weaning nimbex and changing to versed.  Today, Dr. Arellano placed a right IJ triple-lumen central line. Seen by Dr Gentile, pulmonology, during last inpatient stay and then in clinic on 7/30 - essentially needs a Trilogy. Her family reported that her sats drop to the 70s with minimal exertion in her house."  The patient is being transferred [facility] for pulmonology services.    Upon arrival to [facility], patient is sedated and intubated.  The patient is unable to provide any history, so history was mainly taken from  physician note and previous hospital records. Per chart review patient has similar presentation in June that resulted in a prolonged intubation. " The patient has followed up outpatient with pulmonology once since previous admission.  She is supposed to be on BiPAP, but unsure if she is compliant.  The patient will be admitted to the intensive care unit under Hospital Medicine for further workup and evaluation.    Past Medical History:   Diagnosis Date    Asthma     Back pain     Diabetes mellitus     Morbid obesity     Obesity        Past Surgical History:   Procedure Laterality Date    APPENDECTOMY       SECTION         Review of patient's allergies indicates:  No Known Allergies    Current Facility-Administered Medications on File Prior to Encounter   Medication    [DISCONTINUED] acetaminophen tablet 650 mg    [DISCONTINUED] albuterol-ipratropium 2.5 mg-0.5 mg/3 mL nebulizer solution 3 mL    [DISCONTINUED] cisatracurium (NIMBEX) 200 mg in dextrose 5 % 100 mL infusion    [DISCONTINUED] dextrose 50% injection 12.5 g    [DISCONTINUED] dextrose 50% injection 25 g    [DISCONTINUED] enoxaparin injection 40 mg    [DISCONTINUED] fluticasone-umeclidin-vilanter 100-62.5-25 mcg DsDv 1 puff    [DISCONTINUED] furosemide injection 20 mg    [DISCONTINUED] furosemide tablet 20 mg    [DISCONTINUED] glucagon (human recombinant) injection 1 mg    [DISCONTINUED] glucose chewable tablet 16 g    [DISCONTINUED] glucose chewable tablet 24 g    [DISCONTINUED] insulin aspart U-100 pen 1-10 Units    [DISCONTINUED] levoFLOXacin 750 mg/150 mL IVPB 750 mg    [DISCONTINUED] methylPREDNISolone sodium succinate injection 80 mg    [DISCONTINUED] midazolam (PF) in 0.9 % NaCl 1 mg/mL Soln    [DISCONTINUED] midazolam 100 mg/100 mL in dextrose 5% infusion    [DISCONTINUED] montelukast tablet 10 mg    [DISCONTINUED] ondansetron injection 4 mg    [DISCONTINUED] pantoprazole EC tablet 40 mg    [DISCONTINUED] pantoprazole injection 40 mg    [DISCONTINUED] propofol (DIPRIVAN) 10 mg/mL infusion    [DISCONTINUED] sodium chloride 0.9% flush 10 mL     Current  Outpatient Medications on File Prior to Encounter   Medication Sig    albuterol (PROVENTIL/VENTOLIN HFA) 90 mcg/actuation inhaler 2 puffs every 4 hours as needed for cough, wheeze, or shortness of breath    doxycycline (VIBRA-TABS) 100 MG tablet     fluticasone furoate-vilanteroL (BREO ELLIPTA) 200-25 mcg/dose DsDv diskus inhaler Inhale 1 puff into the lungs once daily. Controller    fluticasone-umeclidin-vilanter (TRELEGY ELLIPTA) 100-62.5-25 mcg DsDv Inhale 1 puff into the lungs once daily.    furosemide (LASIX) 20 MG tablet     metFORMIN (GLUCOPHAGE) 500 MG tablet Take 2 tablets (1,000 mg total) by mouth 2 (two) times daily with meals.    montelukast (SINGULAIR) 10 mg tablet Take 1 tablet (10 mg total) by mouth every evening.    pantoprazole (PROTONIX) 40 MG tablet Take 1 tablet (40 mg total) by mouth once daily.    predniSONE (DELTASONE) 20 MG tablet Take 2 daily for 5 days then take one daily for 5 days and may repeat for shortness of breath.     Family History     Family history is unknown by patient.        Tobacco Use    Smoking status: Former Smoker   Substance and Sexual Activity    Alcohol use: Yes     Frequency: Never     Comment: occ    Drug use: No    Sexual activity: Yes     Birth control/protection: None     Review of Systems   Unable to perform ROS: Intubated     Objective:     Vital Signs (Most Recent):  Temp: 98.6 °F (37 °C) (11/09/20 1845)  Pulse: 73 (11/10/20 0034)  Resp: 16 (11/10/20 0034)  BP: (!) 121/56 (11/09/20 2020)  SpO2: 97 % (11/10/20 0034) Vital Signs (24h Range):  Temp:  [98 °F (36.7 °C)-100.7 °F (38.2 °C)] 98.6 °F (37 °C)  Pulse:  [] 73  Resp:  [13-22] 16  SpO2:  [90 %-100 %] 97 %  BP: (115-150)/(41-66) 121/56     Weight: (!) 163 kg (359 lb 5.6 oz)  Body mass index is 65.73 kg/m².    Physical Exam  Vitals signs and nursing note reviewed.   Constitutional:       General: She is not in acute distress.     Appearance: She is well-developed. She is obese. She is  ill-appearing. She is not toxic-appearing or diaphoretic.      Interventions: She is sedated, intubated and restrained.   HENT:      Head: Normocephalic and atraumatic.      Right Ear: External ear normal.      Left Ear: External ear normal.      Nose:      Comments: NG tube noted to left nare     Mouth/Throat:      Mouth: Mucous membranes are moist.   Eyes:      Pupils: Pupils are equal, round, and reactive to light.   Neck:      Musculoskeletal: Normal range of motion.      Vascular: No JVD.      Comments: Central line to IJ noted; dressing CDI  Cardiovascular:      Rate and Rhythm: Normal rate and regular rhythm.      Heart sounds: No murmur.   Pulmonary:      Effort: Pulmonary effort is normal. No respiratory distress. She is intubated.      Breath sounds: Decreased breath sounds (Bilaterally) present. No wheezing or rhonchi.      Comments: ETT in place; FiO2 currently 100%  Abdominal:      General: Bowel sounds are normal. There is no distension.      Palpations: Abdomen is soft.      Tenderness: There is no abdominal tenderness. There is no guarding.   Genitourinary:     Vagina: Bleeding (small amount of old blood noted to sanitary napkin) present.      Comments: Vazquez in place draining hazy, yellow urine  Musculoskeletal: Normal range of motion.         General: No tenderness or deformity.      Right lower le+ Edema present.      Left lower le+ Edema present.   Skin:     General: Skin is warm and dry.      Capillary Refill: Capillary refill takes 2 to 3 seconds.   Neurological:      Comments: Patient arrived from outside hospital intubated and sedated on propofol and Versed.  Currently she is unresponsive and unable to follow commands.           CRANIAL NERVES     CN III, IV, VI   Pupils are equal, round, and reactive to light.       Significant Labs:   CBC:   Recent Labs   Lab 20  1523 20  0710   WBC 15.00* 14.87*   HGB 13.0 13.1   HCT 46.5 45.4   * 350     CMP:   Recent Labs   Lab  11/08/20  1523 11/09/20  0710    141   K 4.0 4.3   CL 94* 95   CO2 38* 36*   * 169*   BUN 15 14   CREATININE 0.8 0.8   CALCIUM 8.7 8.7   PROT 7.6 6.8   ALBUMIN 3.6 3.1*   BILITOT 0.4 0.8   ALKPHOS 70 64   AST 22 22   ALT 20 18   ANIONGAP 9 10   EGFRNONAA >60.0 >60.0     Troponin:   Recent Labs   Lab 11/08/20  1523   TROPONINI 0.17     Urine Studies:   Recent Labs   Lab 11/08/20  2333   COLORU Yellow   APPEARANCEUA Clear   PHUR 6.0   SPECGRAV >=1.030*   PROTEINUA 2+*   GLUCUA Negative   KETONESU Negative   BILIRUBINUA Negative   OCCULTUA 1+*   NITRITE Negative   UROBILINOGEN Negative   LEUKOCYTESUR Negative   RBCUA 8*   WBCUA 13*   BACTERIA Few*   SQUAMEPITHEL 5   HYALINECASTS 6*     All pertinent labs within the past 24 hours have been reviewed.    Significant Imaging: I have reviewed and interpreted all pertinent imaging results/findings within the past 24 hours.    Assessment/Plan:     * Acute on chronic respiratory failure with hypoxia and hypercapnia  Likely secondary to PNA, asthma exacerbation, obesity hypoventilation syndrome.  Patient's vent settings, CXR and last ABG were reviewed.    Vent Mode: A/C  Oxygen Concentration (%):  [] 70  Resp Rate Total:  [14 br/min-18 br/min] 17 br/min  Vt Set:  [400 mL-500 mL] 500 mL  PEEP/CPAP:  [5 okM54-78 cmH20] 12 cmH20  Pressure Support:  [0 cmH20] 0 cmH20  Mean Airway Pressure:  [11 uoO98-20 cmH20] 18 cmH20    ABG  Recent Labs   Lab 11/09/20  1904   PH 7.370   PO2 62*   PCO2 75.3*   HCO3 43.5*   BE 18     Continue supplemental oxygen to keep oxygen saturation >92%.  Continue DuoNebs and IV steroids.  Pulmonology consulted.    Severe persistent asthma with acute exacerbation  See respiratory failure above.    Morbid obesity  Body mass index is 65.73 kg/m². Morbid obesity complicates all aspects of disease management from diagnostic modalities to treatment. Will encourage weight loss explain health benefits when patient is not sedated.    Pulmonary  hypertension  Continue to treat per pulmonology recommendations.    Pneumonia due to infectious organism  Patient with current diagnosis of possible pneumonia which is uncontrolled due to persistent hypoxia . Current antimicrobial regimen consists of   Antibiotics (From admission, onward)    Start     Stop Route Frequency Ordered    11/10/20 2043  cefTRIAXone (ROCEPHIN) 2 g/50 mL D5W IVPB      -- IV Every 24 hours (non-standard times) 11/10/20 0429    11/10/20 0530  azithromycin 500 mg in dextrose 5 % 250 mL IVPB (ready to mix system)      -- IV Every 24 hours (non-standard times) 11/10/20 0429      . Cultures currently pending.  Will monitor patient closely and continue current treatment plan unchanged.    Type 2 diabetes mellitus  Patient's FSGs are controlled on current hypoglycemics.   Last A1c reviewed-   Lab Results   Component Value Date    HGBA1C 6.2 06/23/2020     Most recent fingerstick glucose reviewed-   Recent Labs   Lab 11/09/20  0616 11/09/20  1338 11/09/20  1906 11/10/20  0036   POCTGLUCOSE 175* 155* 181* 159*     Current correctional scale  Medium  Maintain anti-hyperglycemic dose as follows-   Antihyperglycemics (From admission, onward)    Start     Stop Route Frequency Ordered    11/09/20 1825  insulin aspart U-100 pen 1-10 Units      -- SubQ Every 6 hours PRN 11/09/20 1825          Cardiomegaly  Results for orders placed during the hospital encounter of 06/22/20   Echo Color Flow Doppler? Yes     Narrative · Concentric left ventricular remodeling.  · Normal left ventricular systolic function. The estimated ejection   fraction is 69%.  · Normal LV diastolic function.  · No wall motion abnormalities.  · Normal right ventricular systolic function.  · Mild right atrial enlargement.  · Trivial pericardial effusion.  · Mild left atrial enlargement.             BNP elevated and peripheral edema noted.  Continue IV lasix   Will monitor for repeat dosing of lasix  Cont to monitor I/O's and daily  weights.  Maintain oxygen sats >92%     Obesity hypoventilation syndrome  Currently intubated.  Appreciate pulmonology recommendations.      VTE Risk Mitigation (From admission, onward)        High VTE Risk Ordered     enoxaparin injection 40 mg  Every 12 hours      11/09/20 2027              Critical care time spent on the evaluation and treatment of severe organ dysfunction, review of pertinent labs and imaging studies, discussions with consulting providers and discussions with patient/family: 45 minutes.     Emma Melissa NP  Department of Hospital Medicine   [facility]

## 2020-11-10 NOTE — PLAN OF CARE
Intervention: collaboration with care providers    Recommendation:   1) Advance PO diet to goal of DM 2000 kcal, low sodium diet ( 4 carb servings/meal) when extubated     2) If diet not advanced in < 4 days consider intiate TF Peptamen intense VHP @ 10 ml/hr advancing to goal of 50 ml/hr while on propofol + 130 ml flush q 4 hr ( 60 ml/hr when propofol d/c'd)   ( provides 1440 kcal + propofol (61% EEN + propofol permissive underfeeding), 132 g protein ( 100% EPN), and 1209 ml free water)     3) weigh pt weekly    Goals: 1) PO diet advance in < 4 days or TF intiated  Nutrition Goal Status: new  Communication of RD Recs: (POC, sticky note)

## 2020-11-10 NOTE — ASSESSMENT & PLAN NOTE
Results for orders placed during the hospital encounter of 06/22/20   Echo Color Flow Doppler? Yes     Narrative · Concentric left ventricular remodeling.  · Normal left ventricular systolic function. The estimated ejection   fraction is 69%.  · Normal LV diastolic function.  · No wall motion abnormalities.  · Normal right ventricular systolic function.  · Mild right atrial enlargement.  · Trivial pericardial effusion.  · Mild left atrial enlargement.        -Follow up TTE as above   -BNP elevated and peripheral edema noted.  -Continue IV Lasix   -Cont to monitor I/O's and daily weights.  -Maintain oxygen sats >92%

## 2020-11-10 NOTE — ASSESSMENT & PLAN NOTE
Unclear if patient symptomatic. Tachycardic and WBC elevated. U/A shows elevation in WBC.  -Follow up urine culture  -Empiric Rocephin as above

## 2020-11-10 NOTE — EICU
e-ICU admit note                  Reason for ICU admission:    Transfer from [facility] intubated    Acute respiratory failure with hypoxia and hypercarbia       Asthma                    HPI:    31 yo asthmatic female presented to [facility] on 11/8/20 with SOB. Initially on BIPAP but eventually was intubated with CO2 100.  Started on IV solumedrol 80 mg q6h, IV levaquin 750 mg daily, duonebs q4h, lasix 20 mg daily in their ICU.  Transferred today to [facility] ICU for higher level of care        PMx:    Asthma   Back pain   Diabetes mellitus   Morbid obesity            Home Meds:     albuterol (PROVENTIL/VENTOLIN HFA) 90 mcg/actuation inhaler 2 puffs every 4 hours as needed for cough, wheeze, or shortness of breath    fluticasone furoate-vilanteroL (BREO ELLIPTA) 200-25 mcg/dose DsDv diskus inhaler Inhale 1 puff into the lungs once daily. Controller    fluticasone-umeclidin-vilanter (TRELEGY ELLIPTA) 100-62.5-25 mcg DsDv Inhale 1 puff into the lungs once daily.    furosemide (LASIX) 20 MG tablet      metFORMIN (GLUCOPHAGE) 500 MG tablet Take 2 tablets (1,000 mg total) by mouth 2 (two) times daily with meals.    montelukast (SINGULAIR) 10 mg tablet Take 1 tablet (10 mg total) by mouth every evening.    pantoprazole (PROTONIX) 40 MG tablet Take 1 tablet (40 mg total) by mouth once daily.    doxycycline (VIBRA-TABS) 100 MG tablet      predniSONE (DELTASONE) 20 MG tablet Take 2 daily for 5 days then take one daily for 5 days and may repeat for shortness of breath.                         Significant labs:    WBC 97650  Lactate 2.8  PCT normal  7.37/75/62/43 ( on Vent)                Significant images    CXR:    The lungs are well expanded.  There are bilateral patchy airspace opacities, unchanged.  There is a probable small left pleural effusion.  There is no pneumothorax.  The visualized osseous structures are intact.      ECG:    NSR, no acute changes             I viewed the pt via camera at    8:30 pm:    122/60, 85  sinus, 99%, RR 16         Vent settings:    , RR 16, peep 12, FiO2 70%    Ongoing:    Propofol 50 mcg/Kg/min  Precedex 0.2 mcg/Kg/hr  Midzolam 1.2 mg/hr                        Assessment/Plan:      Acute on chronic respiratory failure with hypoxia and hypercarbia   Possible PNA  Asthma exacerbation  Empric Ceftriaxone  Solumedrol  b2 agonists  Avoid hyperinflation as high risk for PTX     Obesity hypoventilation syndrome         DM  SSI    SUP  PPI    DVT ppx  Enoxaparin

## 2020-11-10 NOTE — ASSESSMENT & PLAN NOTE
Results for orders placed during the hospital encounter of 06/22/20   Echo Color Flow Doppler? Yes     Narrative · Concentric left ventricular remodeling.  · Normal left ventricular systolic function. The estimated ejection   fraction is 69%.  · Normal LV diastolic function.  · No wall motion abnormalities.  · Normal right ventricular systolic function.  · Mild right atrial enlargement.  · Trivial pericardial effusion.  · Mild left atrial enlargement.             BNP elevated and peripheral edema noted.  Continue IV lasix   Will monitor for repeat dosing of lasix  Cont to monitor I/O's and daily weights.  Maintain oxygen sats >92%

## 2020-11-10 NOTE — ASSESSMENT & PLAN NOTE
Patient with current diagnosis of possible pneumonia which is uncontrolled due to persistent hypoxia . Current antimicrobial regimen consists of   Antibiotics (From admission, onward)    Start     Stop Route Frequency Ordered    11/10/20 2043  cefTRIAXone (ROCEPHIN) 2 g/50 mL D5W IVPB      -- IV Every 24 hours (non-standard times) 11/10/20 0429    11/10/20 0900  mupirocin 2 % ointment      11/15 0859 Nasl 2 times daily 11/10/20 0430    11/10/20 0530  azithromycin 500 mg in dextrose 5 % 250 mL IVPB (ready to mix system)      -- IV Every 24 hours (non-standard times) 11/10/20 0429      . Cultures currently pending.  Will monitor patient closely and continue current treatment plan unchanged.

## 2020-11-10 NOTE — PLAN OF CARE
Care plan reviewed. Safety maintained. Restraints monitored per policy. ROM performed by nurse, and then patient became more awake and performed herself safely. Blood sugar monitored Q6 hours. No sliding scale insulin given per PRN order.   Patient remains on ventilator SIMV 10, , Peep 14, O2 50%. Patient following commands, nods head yes and no appropriately. Pillows placed under legs for knee comfort, heels floated. Patient is on her period, one pad change needed today. No BM today. Will consult dietician for nutrition. Family called for update today. No visitors. Patient seen by Dr. Gabriella Dr. and Dr. Gentile.

## 2020-11-11 PROBLEM — E44.0 MALNUTRITION OF MODERATE DEGREE: Status: ACTIVE | Noted: 2020-11-11

## 2020-11-11 LAB
ALBUMIN SERPL BCP-MCNC: 2.6 G/DL (ref 3.5–5.2)
ALLENS TEST: ABNORMAL
ALLENS TEST: ABNORMAL
ALP SERPL-CCNC: 64 U/L (ref 55–135)
ALT SERPL W/O P-5'-P-CCNC: 12 U/L (ref 10–44)
ANION GAP SERPL CALC-SCNC: 11 MMOL/L (ref 8–16)
AST SERPL-CCNC: 15 U/L (ref 10–40)
BACTERIA SPEC AEROBE CULT: NORMAL
BACTERIA SPEC AEROBE CULT: NORMAL
BASOPHILS # BLD AUTO: 0.02 K/UL (ref 0–0.2)
BASOPHILS NFR BLD: 0.1 % (ref 0–1.9)
BILIRUB SERPL-MCNC: 0.3 MG/DL (ref 0.1–1)
BUN SERPL-MCNC: 22 MG/DL (ref 6–20)
CALCIUM SERPL-MCNC: 8.9 MG/DL (ref 8.7–10.5)
CHLORIDE SERPL-SCNC: 97 MMOL/L (ref 95–110)
CO2 SERPL-SCNC: 33 MMOL/L (ref 23–29)
CREAT SERPL-MCNC: 0.8 MG/DL (ref 0.5–1.4)
DELSYS: ABNORMAL
DELSYS: ABNORMAL
DIFFERENTIAL METHOD: ABNORMAL
EOSINOPHIL # BLD AUTO: 0 K/UL (ref 0–0.5)
EOSINOPHIL NFR BLD: 0 % (ref 0–8)
ERYTHROCYTE [DISTWIDTH] IN BLOOD BY AUTOMATED COUNT: 17.6 % (ref 11.5–14.5)
ERYTHROCYTE [SEDIMENTATION RATE] IN BLOOD BY WESTERGREN METHOD: 10 MM/H
EST. GFR  (AFRICAN AMERICAN): >60 ML/MIN/1.73 M^2
EST. GFR  (NON AFRICAN AMERICAN): >60 ML/MIN/1.73 M^2
ETCO2: 39
FIO2: 60
FIO2: 60
FLOW: 60
GLUCOSE SERPL-MCNC: 144 MG/DL (ref 70–110)
GRAM STN SPEC: NORMAL
GRAM STN SPEC: NORMAL
HCO3 UR-SCNC: 33.2 MMOL/L (ref 24–28)
HCO3 UR-SCNC: 33.9 MMOL/L (ref 24–28)
HCT VFR BLD AUTO: 44.2 % (ref 37–48.5)
HGB BLD-MCNC: 13 G/DL (ref 12–16)
IMM GRANULOCYTES # BLD AUTO: 0.11 K/UL (ref 0–0.04)
IMM GRANULOCYTES NFR BLD AUTO: 0.5 % (ref 0–0.5)
LYMPHOCYTES # BLD AUTO: 0.8 K/UL (ref 1–4.8)
LYMPHOCYTES NFR BLD: 3.9 % (ref 18–48)
MAGNESIUM SERPL-MCNC: 2.3 MG/DL (ref 1.6–2.6)
MCH RBC QN AUTO: 22.5 PG (ref 27–31)
MCHC RBC AUTO-ENTMCNC: 29.4 G/DL (ref 32–36)
MCV RBC AUTO: 76 FL (ref 82–98)
MIN VOL: 10.2
MODE: ABNORMAL
MODE: ABNORMAL
MONOCYTES # BLD AUTO: 1.6 K/UL (ref 0.3–1)
MONOCYTES NFR BLD: 8.1 % (ref 4–15)
NEUTROPHILS # BLD AUTO: 17.6 K/UL (ref 1.8–7.7)
NEUTROPHILS NFR BLD: 87.4 % (ref 38–73)
NRBC BLD-RTO: 1 /100 WBC
PCO2 BLDA: 45.7 MMHG (ref 35–45)
PCO2 BLDA: 48.4 MMHG (ref 35–45)
PEEP: 14
PEEP: 14
PH SMN: 7.45 [PH] (ref 7.35–7.45)
PH SMN: 7.47 [PH] (ref 7.35–7.45)
PHOSPHATE SERPL-MCNC: 4.5 MG/DL (ref 2.7–4.5)
PIP: 32
PLATELET # BLD AUTO: 368 K/UL (ref 150–350)
PMV BLD AUTO: 10.4 FL (ref 9.2–12.9)
PO2 BLDA: 59 MMHG (ref 80–100)
PO2 BLDA: 75 MMHG (ref 80–100)
POC BE: 10 MMOL/L
POC BE: 9 MMOL/L
POC SATURATED O2: 91 % (ref 95–100)
POC SATURATED O2: 95 % (ref 95–100)
POC TCO2: 35 MMOL/L (ref 23–27)
POC TCO2: 35 MMOL/L (ref 23–27)
POCT GLUCOSE: 151 MG/DL (ref 70–110)
POCT GLUCOSE: 168 MG/DL (ref 70–110)
POCT GLUCOSE: 176 MG/DL (ref 70–110)
POTASSIUM SERPL-SCNC: 4.2 MMOL/L (ref 3.5–5.1)
PROT SERPL-MCNC: 6.5 G/DL (ref 6–8.4)
PS: 25
PS: 25
RBC # BLD AUTO: 5.79 M/UL (ref 4–5.4)
SAMPLE: ABNORMAL
SAMPLE: ABNORMAL
SITE: ABNORMAL
SITE: ABNORMAL
SODIUM SERPL-SCNC: 141 MMOL/L (ref 136–145)
SP02: 92
SP02: 95
SPONT RATE: 13
VT: 500
WBC # BLD AUTO: 20.15 K/UL (ref 3.9–12.7)

## 2020-11-11 PROCEDURE — 63600175 PHARM REV CODE 636 W HCPCS: Performed by: NURSE PRACTITIONER

## 2020-11-11 PROCEDURE — 27000221 HC OXYGEN, UP TO 24 HOURS

## 2020-11-11 PROCEDURE — 63600175 PHARM REV CODE 636 W HCPCS: Performed by: INTERNAL MEDICINE

## 2020-11-11 PROCEDURE — 36600 WITHDRAWAL OF ARTERIAL BLOOD: CPT

## 2020-11-11 PROCEDURE — 94640 AIRWAY INHALATION TREATMENT: CPT

## 2020-11-11 PROCEDURE — 84100 ASSAY OF PHOSPHORUS: CPT

## 2020-11-11 PROCEDURE — 63600175 PHARM REV CODE 636 W HCPCS: Performed by: HOSPITALIST

## 2020-11-11 PROCEDURE — 20000000 HC ICU ROOM

## 2020-11-11 PROCEDURE — 99900035 HC TECH TIME PER 15 MIN (STAT)

## 2020-11-11 PROCEDURE — 83735 ASSAY OF MAGNESIUM: CPT

## 2020-11-11 PROCEDURE — 82803 BLOOD GASES ANY COMBINATION: CPT

## 2020-11-11 PROCEDURE — C9113 INJ PANTOPRAZOLE SODIUM, VIA: HCPCS | Performed by: INTERNAL MEDICINE

## 2020-11-11 PROCEDURE — 36415 COLL VENOUS BLD VENIPUNCTURE: CPT

## 2020-11-11 PROCEDURE — 25000003 PHARM REV CODE 250: Performed by: NURSE PRACTITIONER

## 2020-11-11 PROCEDURE — 99291 CRITICAL CARE FIRST HOUR: CPT | Mod: ,,, | Performed by: INTERNAL MEDICINE

## 2020-11-11 PROCEDURE — 94761 N-INVAS EAR/PLS OXIMETRY MLT: CPT

## 2020-11-11 PROCEDURE — 63600175 PHARM REV CODE 636 W HCPCS: Performed by: STUDENT IN AN ORGANIZED HEALTH CARE EDUCATION/TRAINING PROGRAM

## 2020-11-11 PROCEDURE — 99900026 HC AIRWAY MAINTENANCE (STAT)

## 2020-11-11 PROCEDURE — 80053 COMPREHEN METABOLIC PANEL: CPT

## 2020-11-11 PROCEDURE — 99291 PR CRITICAL CARE, E/M 30-74 MINUTES: ICD-10-PCS | Mod: ,,, | Performed by: INTERNAL MEDICINE

## 2020-11-11 PROCEDURE — 85025 COMPLETE CBC W/AUTO DIFF WBC: CPT

## 2020-11-11 PROCEDURE — 25000242 PHARM REV CODE 250 ALT 637 W/ HCPCS: Performed by: INTERNAL MEDICINE

## 2020-11-11 PROCEDURE — 25000003 PHARM REV CODE 250: Performed by: INTERNAL MEDICINE

## 2020-11-11 PROCEDURE — 27200966 HC CLOSED SUCTION SYSTEM

## 2020-11-11 PROCEDURE — 94003 VENT MGMT INPAT SUBQ DAY: CPT

## 2020-11-11 PROCEDURE — 94770 HC EXHALED C02 TEST: CPT

## 2020-11-11 RX ORDER — DEXMEDETOMIDINE HYDROCHLORIDE 4 UG/ML
0.2 INJECTION, SOLUTION INTRAVENOUS CONTINUOUS
Status: DISCONTINUED | OUTPATIENT
Start: 2020-11-11 | End: 2020-11-12

## 2020-11-11 RX ADMIN — INSULIN ASPART 2 UNITS: 100 INJECTION, SOLUTION INTRAVENOUS; SUBCUTANEOUS at 05:11

## 2020-11-11 RX ADMIN — PANTOPRAZOLE SODIUM 40 MG: 40 INJECTION, POWDER, FOR SOLUTION INTRAVENOUS at 08:11

## 2020-11-11 RX ADMIN — INSULIN ASPART 2 UNITS: 100 INJECTION, SOLUTION INTRAVENOUS; SUBCUTANEOUS at 06:11

## 2020-11-11 RX ADMIN — IPRATROPIUM BROMIDE AND ALBUTEROL SULFATE 3 ML: .5; 2.5 SOLUTION RESPIRATORY (INHALATION) at 07:11

## 2020-11-11 RX ADMIN — IPRATROPIUM BROMIDE AND ALBUTEROL SULFATE 3 ML: .5; 2.5 SOLUTION RESPIRATORY (INHALATION) at 11:11

## 2020-11-11 RX ADMIN — METHYLPREDNISOLONE SODIUM SUCCINATE 80 MG: 40 INJECTION, POWDER, FOR SOLUTION INTRAMUSCULAR; INTRAVENOUS at 02:11

## 2020-11-11 RX ADMIN — PROPOFOL 50 MCG/KG/MIN: 10 INJECTION, EMULSION INTRAVENOUS at 12:11

## 2020-11-11 RX ADMIN — MONTELUKAST 10 MG: 10 TABLET, FILM COATED ORAL at 09:11

## 2020-11-11 RX ADMIN — PROPOFOL 50 MCG/KG/MIN: 10 INJECTION, EMULSION INTRAVENOUS at 03:11

## 2020-11-11 RX ADMIN — PROPOFOL 40 MCG/KG/MIN: 10 INJECTION, EMULSION INTRAVENOUS at 08:11

## 2020-11-11 RX ADMIN — METHYLPREDNISOLONE SODIUM SUCCINATE 80 MG: 40 INJECTION, POWDER, FOR SOLUTION INTRAMUSCULAR; INTRAVENOUS at 10:11

## 2020-11-11 RX ADMIN — AZITHROMYCIN MONOHYDRATE 500 MG: 500 INJECTION, POWDER, LYOPHILIZED, FOR SOLUTION INTRAVENOUS at 04:11

## 2020-11-11 RX ADMIN — DEXMEDETOMIDINE HYDROCHLORIDE 0.7 MCG/KG/HR: 100 INJECTION, SOLUTION, CONCENTRATE INTRAVENOUS at 10:11

## 2020-11-11 RX ADMIN — IPRATROPIUM BROMIDE AND ALBUTEROL SULFATE 3 ML: .5; 2.5 SOLUTION RESPIRATORY (INHALATION) at 04:11

## 2020-11-11 RX ADMIN — CEFTRIAXONE 2 G: 2 INJECTION, SOLUTION INTRAVENOUS at 09:11

## 2020-11-11 RX ADMIN — INSULIN ASPART 2 UNITS: 100 INJECTION, SOLUTION INTRAVENOUS; SUBCUTANEOUS at 11:11

## 2020-11-11 RX ADMIN — PROPOFOL 50 MCG/KG/MIN: 10 INJECTION, EMULSION INTRAVENOUS at 01:11

## 2020-11-11 RX ADMIN — MUPIROCIN: 20 OINTMENT TOPICAL at 08:11

## 2020-11-11 RX ADMIN — DEXMEDETOMIDINE HYDROCHLORIDE 0.7 MCG/KG/HR: 100 INJECTION, SOLUTION, CONCENTRATE INTRAVENOUS at 02:11

## 2020-11-11 RX ADMIN — ENOXAPARIN SODIUM 160 MG: 80 INJECTION SUBCUTANEOUS at 08:11

## 2020-11-11 RX ADMIN — DEXMEDETOMIDINE HYDROCHLORIDE 0.4 MCG/KG/HR: 100 INJECTION, SOLUTION, CONCENTRATE INTRAVENOUS at 10:11

## 2020-11-11 RX ADMIN — PROPOFOL 50 MCG/KG/MIN: 10 INJECTION, EMULSION INTRAVENOUS at 05:11

## 2020-11-11 RX ADMIN — IPRATROPIUM BROMIDE AND ALBUTEROL SULFATE 3 ML: .5; 2.5 SOLUTION RESPIRATORY (INHALATION) at 12:11

## 2020-11-11 RX ADMIN — MUPIROCIN: 20 OINTMENT TOPICAL at 09:11

## 2020-11-11 RX ADMIN — METHYLPREDNISOLONE SODIUM SUCCINATE 80 MG: 40 INJECTION, POWDER, FOR SOLUTION INTRAMUSCULAR; INTRAVENOUS at 05:11

## 2020-11-11 RX ADMIN — FUROSEMIDE 20 MG: 10 INJECTION, SOLUTION INTRAVENOUS at 08:11

## 2020-11-11 RX ADMIN — IPRATROPIUM BROMIDE AND ALBUTEROL SULFATE 3 ML: .5; 2.5 SOLUTION RESPIRATORY (INHALATION) at 03:11

## 2020-11-11 RX ADMIN — DEXMEDETOMIDINE HYDROCHLORIDE 0.7 MCG/KG/HR: 100 INJECTION, SOLUTION, CONCENTRATE INTRAVENOUS at 05:11

## 2020-11-11 NOTE — ASSESSMENT & PLAN NOTE
Body mass index is 60.77 kg/m². Morbid obesity complicates all aspects of disease management from diagnostic modalities to treatment. Will encourage weight loss explain health benefits when patient is not sedated.

## 2020-11-11 NOTE — ASSESSMENT & PLAN NOTE
Results for orders placed during the hospital encounter of 06/22/20   Echo Color Flow Doppler? Yes     Narrative · Concentric left ventricular remodeling.  · Normal left ventricular systolic function. The estimated ejection   fraction is 69%.  · Normal LV diastolic function.  · No wall motion abnormalities.  · Normal right ventricular systolic function.  · Mild right atrial enlargement.  · Trivial pericardial effusion.  · Mild left atrial enlargement.      Repeat TTE with no right heart strain and normal EF. BNP elevated on admission.  -Continue IV Lasix   -Continue to monitor I/O's and daily weights.  -Maintain oxygen sats >92%

## 2020-11-11 NOTE — ASSESSMENT & PLAN NOTE
Patient's FSGs are controlled on current hypoglycemics.   Last A1c reviewed-   Lab Results   Component Value Date    HGBA1C 6.2 06/23/2020     Most recent fingerstick glucose reviewed-   Recent Labs   Lab 11/10/20  1722 11/10/20  2302 11/11/20  0500 11/11/20  1127   POCTGLUCOSE 131* 145* 176* 151*     Current correctional scale  Medium  Maintain anti-hyperglycemic dose as follows-   Antihyperglycemics (From admission, onward)    Start     Stop Route Frequency Ordered    11/09/20 1825  insulin aspart U-100 pen 1-10 Units      -- SubQ Every 6 hours PRN 11/09/20 1825        -Hypoglycemic precautions  -Nutrition consulted, will institute tube feeding diet 11/12 if patient remains intubated  -Blood glucose checks Q6H

## 2020-11-11 NOTE — ASSESSMENT & PLAN NOTE
Patient with history of prolonged intubation. Failed BiPAP therapy at [place] requiring intubation. Increased respiratory rate and tachycardic on admission. Afebrile. WBC elevated, yet on steroids. Lactate rising as high to 3.4 that has since normalized. BNP also elevated to 261, yet recent TTE with no cardiac dysfunction; no right heart strain on repeat TTE. D-dimer elevated. CXR shows bilateral lower lobe infiltrates; history of MSSA in sputum. Likely secondary to PNA, asthma exacerbation, obesity hypoventilation syndrome, PE or some combination.  Vent Mode: Spont  Oxygen Concentration (%):  [50-60] 60  Resp Rate Total:  [10 br/min-32 br/min] 13 br/min  Vt Set:  [500 mL] 500 mL  PEEP/CPAP:  [14 cmH20] 14 cmH20  Pressure Support:  [20 vjK84-68 cmH20] 25 cmH20  Mean Airway Pressure:  [17 foC14-17 cmH20] 19 cmH20    ABG  Recent Labs   Lab 11/11/20  0532   PH 7.453*   PO2 75*   PCO2 48.4*   HCO3 33.9*   BE 10   -PPI for PPx  -Continue supplemental oxygen to keep oxygen saturation >92%  -Continue DuoNebs and IV steroids, Singulair  -Pulmonology consulted  -Continue Rocephin and azithromycin  -Continue therapeutic Lovenox SQ

## 2020-11-11 NOTE — PLAN OF CARE
CM unable to complete assessment with pt due to pt being intubated. CM called pt's sister in law, Nydia, [phone]. Nydia verified information on facesheet as correct. Reports that pt lives at listed address with her brother. Denies POA/LW. TESSK is her brother, Laine [phone]. Denies hh/hd. DME-cpap. Reports that pt had oxygen with [employer] , but returned it about 2 months ago. Stated pt was independent with ADLs prior to admit. Verified insurance as [place] medicaid. Pharm is [facility] in [place]. DC plan is home- will need home o2 eval prior. CM following.      11/10/20 1443   Discharge Assessment   Assessment Type Discharge Planning Assessment   Confirmed/corrected address and phone number on facesheet? Yes   Assessment information obtained from? Other   Communicated expected length of stay with patient/caregiver yes   Prior to hospitilization cognitive status: Alert/Oriented   Prior to hospitalization functional status: Independent   Current cognitive status: Coma/Sedated/Intubated   Current Functional Status: Needs Assistance   Lives With parent(s)   Able to Return to Prior Arrangements yes   Is patient able to care for self after discharge? Unable to determine at this time (comments)   Patient's perception of discharge disposition home or selfcare   Readmission Within the Last 30 Days no previous admission in last 30 days   Patient currently being followed by outpatient case management? No   Patient currently receives any other outside agency services? No   Equipment Currently Used at Home CPAP   Do you have any problems affording any of your prescribed medications? No   Is the patient taking medications as prescribed? yes   Does the patient have transportation home? Yes   Transportation Anticipated family or friend will provide   Does the patient receive services at the [facility]? No   Discharge Plan A Home with family   Discharge Plan B Home   DME Needed Upon Discharge  none   Patient/Family in  Agreement with Plan yes

## 2020-11-11 NOTE — PROGRESS NOTES
Pt awake reaching right hand to ETT oriented pt to situation place and time and continues to reach for tube each time she coughs.. NP notified for order for restraints. Propofol increased for sedation.

## 2020-11-11 NOTE — PLAN OF CARE
11/11/20 1516   Final Note   Assessment Type Final Discharge Note   Anticipated Discharge Disposition Short Term   Pt transferred to [facility] for higher level of care.

## 2020-11-11 NOTE — RESPIRATORY THERAPY
Patient remains  on SIMV rate 10, vt 500, Peep +14, PS +25 and 02 60%.  Patient intubated via 8 ET tube and secured at 24CM@ lip with [id number] cuff psi.  Hr 72, ETC02 43mmhg and 02 saturation 94%.  Patient receiving aerosol tx via duio now and q4hr.  Ambu bag and mask at [facility] e with alarms on and functioning properly at this time.

## 2020-11-11 NOTE — PLAN OF CARE
Remains intubated and sedated on vent with propofol and versed. Afebrile opens eyes to stimulation and follows simple commands. Bilateral wrist restraints for safety. Good urine output. Bath and linen care done. Safety measures in place.

## 2020-11-11 NOTE — ASSESSMENT & PLAN NOTE
Repeat TTE with PAP of 28.  -Continue to treat per Pulmonology recommendations.  -IV Lasix scheduled  -Monitor I's and O's

## 2020-11-11 NOTE — PROGRESS NOTES
Restraints removed safely, patient nodded yes to understanding about not pulling on lines. She has demonstrated that she will not.

## 2020-11-11 NOTE — PROGRESS NOTES
Pt hitting at side rails while in room sats 90% oxygen increased on vent. Pt's Versed drip was discontinued earlier. Propofol at max.  NP notified of no other sedation for pt. Versed drip reordered

## 2020-11-11 NOTE — SUBJECTIVE & OBJECTIVE
Interval History: Patient remains intubated and sedated, yet arousable. Dr. Gentile with Pulmonary following patient. Cultures negative thus far. This AM was on propofol and Versed; changed to Precedex and propofol per Dr. Gentile; also attempting to wean sedation in attempt to extubate. Spoke with family 11/10 to provide updates.    Review of Systems   Unable to perform ROS: Intubated     Objective:     Vital Signs (Most Recent):  Temp: 99.1 °F (37.3 °C) (11/11/20 1130)  Pulse: 60 (11/11/20 1200)  Resp: (!) 25 (11/11/20 1200)  BP: (!) 151/74 (11/11/20 1200)  SpO2: (!) 94 % (11/11/20 1200) Vital Signs (24h Range):  Temp:  [98.6 °F (37 °C)-99.1 °F (37.3 °C)] 99.1 °F (37.3 °C)  Pulse:  [59-94] 60  Resp:  [10-71] 25  SpO2:  [90 %-100 %] 94 %  BP: (116-207)/(55-84) 151/74     Weight: (!) 150.7 kg (332 lb 3.7 oz)  Body mass index is 60.77 kg/m².    Intake/Output Summary (Last 24 hours) at 11/11/2020 1211  Last data filed at 11/11/2020 1132  Gross per 24 hour   Intake 1154.49 ml   Output 3140 ml   Net -1985.51 ml      Physical Exam  Vitals signs and nursing note reviewed.   Constitutional:       General: She is not in acute distress.     Appearance: She is well-developed. She is obese. She is ill-appearing. She is not toxic-appearing or diaphoretic.      Interventions: She is sedated, intubated and restrained.   HENT:      Head: Normocephalic and atraumatic.      Right Ear: External ear normal.      Left Ear: External ear normal.      Nose: Nose normal.      Comments: NG tube noted to left nare     Mouth/Throat:      Mouth: Mucous membranes are moist.   Eyes:      Extraocular Movements: Extraocular movements intact.      Conjunctiva/sclera: Conjunctivae normal.   Neck:      Musculoskeletal: No neck rigidity.      Vascular: No JVD.      Comments: Central line to IJ noted; dressing CDI  Cardiovascular:      Rate and Rhythm: Normal rate and regular rhythm.      Pulses: Normal pulses.      Heart sounds: No murmur. No friction rub.  No gallop.    Pulmonary:      Effort: Pulmonary effort is normal. She is intubated.      Breath sounds: Normal breath sounds. No stridor. No wheezing, rhonchi or rales. Decreased breath sounds: Bilaterally.      Comments: ETT in place  Abdominal:      General: Abdomen is flat. Bowel sounds are normal. There is no distension.      Palpations: Abdomen is soft.      Tenderness: There is no abdominal tenderness. There is no guarding.   Genitourinary:     Vagina: Bleeding (small amount of old blood noted to sanitary napkin) present.      Comments: Vazquez in place draining hazy, yellow urine  Musculoskeletal: Normal range of motion.         General: No tenderness or deformity.      Right lower leg: No edema.      Left lower leg: No edema.   Skin:     General: Skin is warm and dry.      Comments: LEs with darkened, coarse skin         Significant Labs: All pertinent labs within the past 24 hours have been reviewed.    Significant Imaging: I have reviewed all pertinent imaging results/findings within the past 24 hours.

## 2020-11-11 NOTE — ASSESSMENT & PLAN NOTE
Tachycardiac and tachypneic on admission. Afebrile. WBC elevated, yet on steroids. Lactate normalized. Bilateral infiltrates on CXR. U/A concerning for UTI, yet UClx negative. Patient may have PNA.  -Intubated as above for respiratory failure  -Follow up all cultures  -Empiric Rocephin and azithromycin as above  -Caution with IVF repletion given elevated BNP

## 2020-11-11 NOTE — PROGRESS NOTES
Progress Note  PULMONARY    Admit Date: 11/9/2020 11/11/2020      SUBJECTIVE:     11/11 no new c/o, intubated and sedate    PFSH and Allergies reviewed.    OBJECTIVE:     Vitals (Most recent):  Vitals:    11/11/20 0720   BP: (!) 121/58   Pulse: 72   Resp: 16   Temp:        Vitals (24 hour range):  Temp:  [98.6 °F (37 °C)-99.1 °F (37.3 °C)]   Pulse:  [70-94]   Resp:  [10-71]   BP: (111-159)/(52-74)   SpO2:  [90 %-100 %]       Intake/Output Summary (Last 24 hours) at 11/11/2020 0809  Last data filed at 11/11/2020 0548  Gross per 24 hour   Intake 1317.83 ml   Output 2995 ml   Net -1677.17 ml          Physical Exam:  The patient's neuro status (alertness,orientation,cognitive function,motor skills,), pharyngeal exam (oral lesions, hygiene, abn dentition,), Neck (jvd,mass,thyroid,nodes in neck and above/below clavicle),RESPIRATORY(symmetry,effort,fremitus,percussion,auscultation),  Cor(rhythm,heart tones including gallops,perfusion,edema)ABD(distention,hepatic&splenomegaly,tenderness,masses), Skin(rash,cyanosis),Psyc(affect,judgement,).  Exam negative except for these pertinent findings:    Sedated, not arousing, chest is symmetric, no distress, normal percussion, normal fremitus and good normal breath sounds  Not obstructed on vol time curve.    Radiographs reviewed: view by direct vision  Atelectasis and increased markings 11/11  Results for orders placed during the hospital encounter of 11/09/20   X-Ray Chest 1 View    Narrative EXAMINATION:  XR CHEST 1 VIEW    CLINICAL HISTORY:  resp failure;    TECHNIQUE:  Single frontal view of the chest was performed.    COMPARISON:  11/10/2020 .    FINDINGS:  NG tube traverses the esophagus within the upper chest and is obscured in the lower chest and if its positioning is desired clinically suggest image centered at the level of the diaphragms. It is suggest to extend beneath the level the diaphragm. Right internal jugular venous catheter is present with the tip at the level  the proximal SVC/junction of the SVC and right brachiocephalic vein. Endotracheal tube appears in good position with the tip projecting approximately 4.5 cm above the cas.    The cardiomediastinal silhouette is stable.    Mild bilateral perihilar/bibasilar infiltrates with probable small left pleural effusion      Impression Continued bilateral infiltrates and probable small left pleural effusion not appearing significantly changed or if anything infiltrates slightly decreased compared to the prior exam.  Positions of lines and tubes described.      Electronically signed by: Manjula Schulz MD  Date:    11/11/2020  Time:    08:06   ]    Labs     Recent Labs   Lab 11/11/20 0338   WBC 20.15*   HGB 13.0   HCT 44.2   *     Recent Labs   Lab 11/10/20  2019 11/11/20  0338   NA  --  141   K  --  4.2   CL  --  97   CO2  --  33*   BUN  --  22*   CREATININE  --  0.8   GLU  --  144*   CALCIUM  --  8.9   MG  --  2.3   PHOS  --  4.5   AST  --  15   ALT  --  12   ALKPHOS  --  64   BILITOT  --  0.3   PROT  --  6.5   ALBUMIN  --  2.6*   LACTATE 2.0  --      Recent Labs   Lab 11/11/20  0532   PH 7.453*   PCO2 48.4*   PO2 75*   HCO3 33.9*     Microbiology Results (last 7 days)     Procedure Component Value Units Date/Time    Blood culture [[id number]] Collected: 11/09/20 2056    Order Status: Completed Specimen: Blood Updated: 11/11/20 0613     Blood Culture, Routine No Growth to date      No Growth to date          Impression:  Active Hospital Problems    Diagnosis  POA    *Acute on chronic respiratory failure with hypoxia and hypercapnia [J96.21, J96.22]  Yes    Acute cystitis without hematuria [N30.00]  Yes    Severe persistent asthma with acute exacerbation [J45.51]  Yes    Morbid obesity [E66.01]  Yes    Pulmonary hypertension [I27.20]  Yes     By pulmonary artery size on ct chest 6/30      Pneumonia due to infectious organism [J18.9]  Yes    Type 2 diabetes mellitus [E11.9]  Yes    Severe sepsis [A41.9, R65.20]   Unknown    Obesity hypoventilation syndrome [E66.2]  Yes    Cardiomegaly [I51.7]  Yes      Resolved Hospital Problems   No resolved problems to display.               Plan:   11/10-expect staph again,  Asthma exacerbation, morbid obese, obese hypovent.     Temp up 100.7    Needs 100% ox again.  abg looks worse this admit than last time. Will keep tidal vol up with somewhat high peep as had extreme difficulty last admit.    Not sure where problem that ppt recurrance would be - rx asthma, infection, resp failure, monitor.   Pt had angioedema and concerns with propofol infusion in June.  Pt had transient theraputic lovenox given with severe gas exchange problems [facility] admit  Pt was wheezing initially in June.    11/11t about 99, on azithro/rocephin, I/o 1417/3695, bun/creat 22/0.8,glu 144, k, wbc 20k, versed and propofol    abg 7.45/co2 48/02 75- on 60% peep 14 felt to be atelectasis  cxr hazey with morbid obese and atelectasis seen  Sputum nl estephanie    Discussed with brother Jaycob yesterday- pt moved out to cousin?  Brother not able to help.    11/11 too sedate , no apparent obstruction and min vol<8, need to get awake, wean peep then psv and consider extubate to niv.    The following were evaluated and adjusted as needed: mechanical ventilator settings and weaning status, intravenous fluids and nutritional status, sedation and neurologic status, antibiotics, hemodynamics, support tubes and access lines and invasive monitoring, acid base balance and oxygenation needs and input and output and renal status       Critical Care  - THE PATIENT HAS A HIGH PROBABILITY OF IMMINENT OR LIFE THREATENING DETERIORATION.  Over 50%time of encounter was in direct care at bedside.  Time was 30 to 74 minutes required for patient care.  Time needed for all of the above totaled 33 minutes.                  .

## 2020-11-11 NOTE — PROGRESS NOTES
"[facility] Medicine  Progress Note    Patient Name: Michelle Wren  MRN: [id number]  Patient Class: IP- Inpatient   Admission Date: 11/9/2020  Length of Stay: 2 days  Attending Physician: Alejandro Quiroz MD  Primary Care Provider: Teressa Saeed NP        Subjective:     Principal Problem:Acute on chronic respiratory failure with hypoxia and hypercapnia        HPI:  Michelle Wren is a 30 y.o. female with a PMHx of restrictive lung disease, morbid obesity, severe persistent asthma, hypertension, diabetes and obesity hypoventilation syndrome  who presented to the [place] ED last night around 5:00 p.m. with respiratory failure. Per  physician note she was "admitted and treated with BiPAP IV steroids, empiric antibiotics, DuoNebs IV Lasix with low threshold to intubate.  Tried to transfer to [facility] but she refused.  Around 9:00 p.m. she was only arousable to sternal rub but and intubated for airway protection.  AC rate 16, , peep 5, FiO2 40%.  Vitals within normal limits.  Diprivan at 50 mics per kg per minute, Nimbex at 3 mics per kg per minute - weaning nimbex and changing to versed.  Today, Dr. Arellano placed a right IJ triple-lumen central line. Seen by Dr Gentile, pulmonology, during last inpatient stay and then in clinic on 7/30 - essentially needs a Trilogy. Her family reported that her sats drop to the 70s with minimal exertion in her house."  The patient is being transferred [facility] for pulmonology services.    Upon arrival to [facility], patient is sedated and intubated.  The patient is unable to provide any history, so history was mainly taken from  physician note and previous hospital records. Per chart review patient has similar presentation in June that resulted in a prolonged intubation. The patient has followed up outpatient with pulmonology once since previous admission.  She is supposed to be on BiPAP, but unsure if she " is compliant.  The patient will be admitted to the intensive care unit under Hospital Medicine for further workup and evaluation.    Overview/Hospital Course:  -11/10 Patient sedated and intubated. On ceftriaxone/azithromycin. Remains on IV methylprednisolone. Also on therapeutic dose Lovenox per Pulmonary recommendations.  -11/11 Patient to change to PS mode and will try to extubate; on Precedex and Propofol infusions. All cultures negative.     Interval History: Patient remains intubated and sedated, yet arousable. Dr. Gentile with Pulmonary following patient. Cultures negative thus far. This AM was on propofol and Versed; changed to Precedex and propofol per Dr. Gentile; also attempting to wean sedation in attempt to extubate. Spoke with family 11/10 to provide updates.    Review of Systems   Unable to perform ROS: Intubated     Objective:     Vital Signs (Most Recent):  Temp: 99.1 °F (37.3 °C) (11/11/20 1130)  Pulse: 60 (11/11/20 1200)  Resp: (!) 25 (11/11/20 1200)  BP: (!) 151/74 (11/11/20 1200)  SpO2: (!) 94 % (11/11/20 1200) Vital Signs (24h Range):  Temp:  [98.6 °F (37 °C)-99.1 °F (37.3 °C)] 99.1 °F (37.3 °C)  Pulse:  [59-94] 60  Resp:  [10-71] 25  SpO2:  [90 %-100 %] 94 %  BP: (116-207)/(55-84) 151/74     Weight: (!) 150.7 kg (332 lb 3.7 oz)  Body mass index is 60.77 kg/m².    Intake/Output Summary (Last 24 hours) at 11/11/2020 1211  Last data filed at 11/11/2020 1132  Gross per 24 hour   Intake 1154.49 ml   Output 3140 ml   Net -1985.51 ml      Physical Exam  Vitals signs and nursing note reviewed.   Constitutional:       General: She is not in acute distress.     Appearance: She is well-developed. She is obese. She is ill-appearing. She is not toxic-appearing or diaphoretic.      Interventions: She is sedated, intubated and restrained.   HENT:      Head: Normocephalic and atraumatic.      Right Ear: External ear normal.      Left Ear: External ear normal.      Nose: Nose normal.      Comments: NG tube noted to  left nare     Mouth/Throat:      Mouth: Mucous membranes are moist.   Eyes:      Extraocular Movements: Extraocular movements intact.      Conjunctiva/sclera: Conjunctivae normal.   Neck:      Musculoskeletal: No neck rigidity.      Vascular: No JVD.      Comments: Central line to IJ noted; dressing CDI  Cardiovascular:      Rate and Rhythm: Normal rate and regular rhythm.      Pulses: Normal pulses.      Heart sounds: No murmur. No friction rub. No gallop.    Pulmonary:      Effort: Pulmonary effort is normal. She is intubated.      Breath sounds: Normal breath sounds. No stridor. No wheezing, rhonchi or rales. Decreased breath sounds: Bilaterally.      Comments: ETT in place  Abdominal:      General: Abdomen is flat. Bowel sounds are normal. There is no distension.      Palpations: Abdomen is soft.      Tenderness: There is no abdominal tenderness. There is no guarding.   Genitourinary:     Vagina: Bleeding (small amount of old blood noted to sanitary napkin) present.      Comments: Vazquez in place draining hazy, yellow urine  Musculoskeletal: Normal range of motion.         General: No tenderness or deformity.      Right lower leg: No edema.      Left lower leg: No edema.   Skin:     General: Skin is warm and dry.      Comments: LEs with darkened, coarse skin         Significant Labs: All pertinent labs within the past 24 hours have been reviewed.    Significant Imaging: I have reviewed all pertinent imaging results/findings within the past 24 hours.      Assessment/Plan:      * Acute on chronic respiratory failure with hypoxia and hypercapnia  Patient with history of prolonged intubation. Failed BiPAP therapy at [place] requiring intubation. Increased respiratory rate and tachycardic on admission. Afebrile. WBC elevated, yet on steroids. Lactate rising as high to 3.4 that has since normalized. BNP also elevated to 261, yet recent TTE with no cardiac dysfunction; no right heart strain on repeat TTE. D-dimer  elevated. CXR shows bilateral lower lobe infiltrates; history of MSSA in sputum. Likely secondary to PNA, asthma exacerbation, obesity hypoventilation syndrome, PE or some combination.  Vent Mode: Spont  Oxygen Concentration (%):  [50-60] 60  Resp Rate Total:  [10 br/min-32 br/min] 13 br/min  Vt Set:  [500 mL] 500 mL  PEEP/CPAP:  [14 cmH20] 14 cmH20  Pressure Support:  [20 wdQ18-88 cmH20] 25 cmH20  Mean Airway Pressure:  [17 taA03-57 cmH20] 19 cmH20    ABG  Recent Labs   Lab 11/11/20  0532   PH 7.453*   PO2 75*   PCO2 48.4*   HCO3 33.9*   BE 10   -PPI for PPx  -Continue supplemental oxygen to keep oxygen saturation >92%  -Continue DuoNebs and IV steroids, Singulair  -Pulmonology consulted  -Continue Rocephin and azithromycin  -Continue therapeutic Lovenox SQ    Severe sepsis  Tachycardiac and tachypneic on admission. Afebrile. WBC elevated, yet on steroids. Lactate normalized. Bilateral infiltrates on CXR. U/A concerning for UTI, yet UClx negative. Patient may have PNA.  -Intubated as above for respiratory failure  -Follow up all cultures  -Empiric Rocephin and azithromycin as above  -Caution with IVF repletion given elevated BNP    Obesity hypoventilation syndrome  Currently intubated.  -Appreciate Pulmonology recommendations  -Please see plan above    Cardiomegaly  Results for orders placed during the hospital encounter of 06/22/20   Echo Color Flow Doppler? Yes     Narrative · Concentric left ventricular remodeling.  · Normal left ventricular systolic function. The estimated ejection   fraction is 69%.  · Normal LV diastolic function.  · No wall motion abnormalities.  · Normal right ventricular systolic function.  · Mild right atrial enlargement.  · Trivial pericardial effusion.  · Mild left atrial enlargement.      Repeat TTE with no right heart strain and normal EF. BNP elevated on admission.  -Continue IV Lasix   -Continue to monitor I/O's and daily weights.  -Maintain oxygen sats >92%     Type 2 diabetes  mellitus  Patient's FSGs are controlled on current hypoglycemics.   Last A1c reviewed-   Lab Results   Component Value Date    HGBA1C 6.2 06/23/2020     Most recent fingerstick glucose reviewed-   Recent Labs   Lab 11/10/20  1722 11/10/20  2302 11/11/20  0500 11/11/20  1127   POCTGLUCOSE 131* 145* 176* 151*     Current correctional scale  Medium  Maintain anti-hyperglycemic dose as follows-   Antihyperglycemics (From admission, onward)    Start     Stop Route Frequency Ordered    11/09/20 1825  insulin aspart U-100 pen 1-10 Units      -- SubQ Every 6 hours PRN 11/09/20 1825        -Hypoglycemic precautions  -Nutrition consulted, will institute tube feeding diet 11/12 if patient remains intubated  -Blood glucose checks Q6H    Severe persistent asthma with acute exacerbation  See respiratory failure above.    Morbid obesity  Body mass index is 60.77 kg/m². Morbid obesity complicates all aspects of disease management from diagnostic modalities to treatment. Will encourage weight loss explain health benefits when patient is not sedated.    Pulmonary hypertension  Repeat TTE with PAP of 28.  -Continue to treat per Pulmonology recommendations.  -IV Lasix scheduled  -Monitor I's and O's    Pneumonia due to infectious organism  Patient with current diagnosis of possible pneumonia which is uncontrolled due to persistent hypoxia . Current antimicrobial regimen consists of   Antibiotics (From admission, onward)    Start     Stop Route Frequency Ordered    11/10/20 2043  cefTRIAXone (ROCEPHIN) 2 g/50 mL D5W IVPB      -- IV Every 24 hours (non-standard times) 11/10/20 0429    11/10/20 0900  mupirocin 2 % ointment      11/15 0859 Nasl 2 times daily 11/10/20 0430    11/10/20 0530  azithromycin 500 mg in dextrose 5 % 250 mL IVPB (ready to mix system)      -- IV Every 24 hours (non-standard times) 11/10/20 0429      . Cultures currently pending.  Will monitor patient closely and continue current treatment plan unchanged.      VTE  Risk Mitigation (From admission, onward)         Ordered     enoxaparin injection 160 mg  Every 24 hours (non-standard times)      11/10/20 0823                Discharge Planning   BAKARI: unclear at this time     Code Status: Full Code   Is the patient medically ready for discharge?:     Reason for patient still in hospital (select all that apply): Patient unstable  Discharge Plan A: Home with family            Critical care time spent on the evaluation and treatment of severe organ dysfunction, review of pertinent labs and imaging studies, discussions with consulting providers and discussions with patient/family: 32 minutes.      Alejandro Quiroz MD  Department of Hospital Medicine   [facility]

## 2020-11-12 LAB
ALBUMIN SERPL BCP-MCNC: 3 G/DL (ref 3.5–5.2)
ALLENS TEST: ABNORMAL
ALP SERPL-CCNC: 70 U/L (ref 55–135)
ALT SERPL W/O P-5'-P-CCNC: 11 U/L (ref 10–44)
ANION GAP SERPL CALC-SCNC: 14 MMOL/L (ref 8–16)
AST SERPL-CCNC: 15 U/L (ref 10–40)
BASOPHILS # BLD AUTO: 0.02 K/UL (ref 0–0.2)
BASOPHILS NFR BLD: 0.1 % (ref 0–1.9)
BILIRUB SERPL-MCNC: 0.7 MG/DL (ref 0.1–1)
BUN SERPL-MCNC: 31 MG/DL (ref 6–20)
CALCIUM SERPL-MCNC: 9.3 MG/DL (ref 8.7–10.5)
CHLORIDE SERPL-SCNC: 98 MMOL/L (ref 95–110)
CO2 SERPL-SCNC: 28 MMOL/L (ref 23–29)
CREAT SERPL-MCNC: 0.8 MG/DL (ref 0.5–1.4)
DELSYS: ABNORMAL
DIFFERENTIAL METHOD: ABNORMAL
EOSINOPHIL # BLD AUTO: 0 K/UL (ref 0–0.5)
EOSINOPHIL NFR BLD: 0.1 % (ref 0–8)
ERYTHROCYTE [DISTWIDTH] IN BLOOD BY AUTOMATED COUNT: 17.7 % (ref 11.5–14.5)
EST. GFR  (AFRICAN AMERICAN): >60 ML/MIN/1.73 M^2
EST. GFR  (NON AFRICAN AMERICAN): >60 ML/MIN/1.73 M^2
FIO2: 60
GLUCOSE SERPL-MCNC: 186 MG/DL (ref 70–110)
HCO3 UR-SCNC: 33.7 MMOL/L (ref 24–28)
HCT VFR BLD AUTO: 47.8 % (ref 37–48.5)
HGB BLD-MCNC: 14.5 G/DL (ref 12–16)
IMM GRANULOCYTES # BLD AUTO: 0.09 K/UL (ref 0–0.04)
IMM GRANULOCYTES NFR BLD AUTO: 0.5 % (ref 0–0.5)
LYMPHOCYTES # BLD AUTO: 1 K/UL (ref 1–4.8)
LYMPHOCYTES NFR BLD: 5.6 % (ref 18–48)
MAGNESIUM SERPL-MCNC: 2.5 MG/DL (ref 1.6–2.6)
MCH RBC QN AUTO: 23.2 PG (ref 27–31)
MCHC RBC AUTO-ENTMCNC: 30.3 G/DL (ref 32–36)
MCV RBC AUTO: 77 FL (ref 82–98)
MIN VOL: 8.9
MODE: ABNORMAL
MONOCYTES # BLD AUTO: 1.6 K/UL (ref 0.3–1)
MONOCYTES NFR BLD: 9.1 % (ref 4–15)
NEUTROPHILS # BLD AUTO: 14.4 K/UL (ref 1.8–7.7)
NEUTROPHILS NFR BLD: 84.6 % (ref 38–73)
NRBC BLD-RTO: 1 /100 WBC
PCO2 BLDA: 43.8 MMHG (ref 35–45)
PEEP: 10
PH SMN: 7.49 [PH] (ref 7.35–7.45)
PHOSPHATE SERPL-MCNC: 4.4 MG/DL (ref 2.7–4.5)
PLATELET # BLD AUTO: 373 K/UL (ref 150–350)
PMV BLD AUTO: 11 FL (ref 9.2–12.9)
PO2 BLDA: 70 MMHG (ref 80–100)
POC BE: 10 MMOL/L
POC SATURATED O2: 95 % (ref 95–100)
POC TCO2: 35 MMOL/L (ref 23–27)
POCT GLUCOSE: 121 MG/DL (ref 70–110)
POCT GLUCOSE: 137 MG/DL (ref 70–110)
POCT GLUCOSE: 147 MG/DL (ref 70–110)
POCT GLUCOSE: 198 MG/DL (ref 70–110)
POTASSIUM SERPL-SCNC: 4.6 MMOL/L (ref 3.5–5.1)
PROT SERPL-MCNC: 7.4 G/DL (ref 6–8.4)
PS: 25
RBC # BLD AUTO: 6.24 M/UL (ref 4–5.4)
SAMPLE: ABNORMAL
SITE: ABNORMAL
SODIUM SERPL-SCNC: 140 MMOL/L (ref 136–145)
SPONT RATE: 13
WBC # BLD AUTO: 17.02 K/UL (ref 3.9–12.7)

## 2020-11-12 PROCEDURE — 80053 COMPREHEN METABOLIC PANEL: CPT

## 2020-11-12 PROCEDURE — 83735 ASSAY OF MAGNESIUM: CPT

## 2020-11-12 PROCEDURE — 27200966 HC CLOSED SUCTION SYSTEM

## 2020-11-12 PROCEDURE — 25000003 PHARM REV CODE 250: Performed by: ANESTHESIOLOGY

## 2020-11-12 PROCEDURE — 25000242 PHARM REV CODE 250 ALT 637 W/ HCPCS: Performed by: INTERNAL MEDICINE

## 2020-11-12 PROCEDURE — 63600175 PHARM REV CODE 636 W HCPCS: Performed by: INTERNAL MEDICINE

## 2020-11-12 PROCEDURE — 36600 WITHDRAWAL OF ARTERIAL BLOOD: CPT

## 2020-11-12 PROCEDURE — 99900035 HC TECH TIME PER 15 MIN (STAT)

## 2020-11-12 PROCEDURE — 94003 VENT MGMT INPAT SUBQ DAY: CPT

## 2020-11-12 PROCEDURE — 94761 N-INVAS EAR/PLS OXIMETRY MLT: CPT

## 2020-11-12 PROCEDURE — 99900026 HC AIRWAY MAINTENANCE (STAT)

## 2020-11-12 PROCEDURE — 63600175 PHARM REV CODE 636 W HCPCS: Performed by: NURSE PRACTITIONER

## 2020-11-12 PROCEDURE — C9113 INJ PANTOPRAZOLE SODIUM, VIA: HCPCS | Performed by: INTERNAL MEDICINE

## 2020-11-12 PROCEDURE — 82803 BLOOD GASES ANY COMBINATION: CPT

## 2020-11-12 PROCEDURE — 27000221 HC OXYGEN, UP TO 24 HOURS

## 2020-11-12 PROCEDURE — 99233 PR SUBSEQUENT HOSPITAL CARE,LEVL III: ICD-10-PCS | Mod: ,,, | Performed by: INTERNAL MEDICINE

## 2020-11-12 PROCEDURE — 94770 HC EXHALED C02 TEST: CPT

## 2020-11-12 PROCEDURE — 84100 ASSAY OF PHOSPHORUS: CPT

## 2020-11-12 PROCEDURE — 25000003 PHARM REV CODE 250: Performed by: NURSE PRACTITIONER

## 2020-11-12 PROCEDURE — 99233 SBSQ HOSP IP/OBS HIGH 50: CPT | Mod: ,,, | Performed by: INTERNAL MEDICINE

## 2020-11-12 PROCEDURE — 20000000 HC ICU ROOM

## 2020-11-12 PROCEDURE — 36415 COLL VENOUS BLD VENIPUNCTURE: CPT

## 2020-11-12 PROCEDURE — 63600175 PHARM REV CODE 636 W HCPCS: Performed by: HOSPITALIST

## 2020-11-12 PROCEDURE — 25000003 PHARM REV CODE 250

## 2020-11-12 PROCEDURE — 25000003 PHARM REV CODE 250: Performed by: INTERNAL MEDICINE

## 2020-11-12 PROCEDURE — 94640 AIRWAY INHALATION TREATMENT: CPT

## 2020-11-12 PROCEDURE — 85025 COMPLETE CBC W/AUTO DIFF WBC: CPT

## 2020-11-12 RX ORDER — LIDOCAINE HYDROCHLORIDE 40 MG/ML
2 INJECTION, SOLUTION RETROBULBAR ONCE
Status: COMPLETED | OUTPATIENT
Start: 2020-11-12 | End: 2020-11-12

## 2020-11-12 RX ORDER — DEXMEDETOMIDINE HYDROCHLORIDE 4 UG/ML
0.2 INJECTION, SOLUTION INTRAVENOUS CONTINUOUS
Status: DISCONTINUED | OUTPATIENT
Start: 2020-11-12 | End: 2020-11-13

## 2020-11-12 RX ORDER — DEXMEDETOMIDINE HYDROCHLORIDE 4 UG/ML
INJECTION, SOLUTION INTRAVENOUS
Status: DISPENSED
Start: 2020-11-12 | End: 2020-11-13

## 2020-11-12 RX ORDER — GLYCOPYRROLATE 1 MG/1
1 TABLET ORAL 2 TIMES DAILY PRN
Status: DISCONTINUED | OUTPATIENT
Start: 2020-11-12 | End: 2020-11-13

## 2020-11-12 RX ADMIN — IPRATROPIUM BROMIDE AND ALBUTEROL SULFATE 3 ML: .5; 2.5 SOLUTION RESPIRATORY (INHALATION) at 03:11

## 2020-11-12 RX ADMIN — IPRATROPIUM BROMIDE AND ALBUTEROL SULFATE 3 ML: .5; 2.5 SOLUTION RESPIRATORY (INHALATION) at 07:11

## 2020-11-12 RX ADMIN — FUROSEMIDE 20 MG: 10 INJECTION, SOLUTION INTRAVENOUS at 08:11

## 2020-11-12 RX ADMIN — INSULIN ASPART 1 UNITS: 100 INJECTION, SOLUTION INTRAVENOUS; SUBCUTANEOUS at 03:11

## 2020-11-12 RX ADMIN — LIDOCAINE HYDROCHLORIDE 2 ML: 40 INJECTION, SOLUTION RETROBULBAR; TOPICAL at 03:11

## 2020-11-12 RX ADMIN — METHYLPREDNISOLONE SODIUM SUCCINATE 80 MG: 40 INJECTION, POWDER, FOR SOLUTION INTRAMUSCULAR; INTRAVENOUS at 06:11

## 2020-11-12 RX ADMIN — PANTOPRAZOLE SODIUM 40 MG: 40 INJECTION, POWDER, FOR SOLUTION INTRAVENOUS at 08:11

## 2020-11-12 RX ADMIN — CEFTRIAXONE 2 G: 2 INJECTION, SOLUTION INTRAVENOUS at 09:11

## 2020-11-12 RX ADMIN — DEXMEDETOMIDINE HYDROCHLORIDE 0.7 MCG/KG/HR: 100 INJECTION, SOLUTION, CONCENTRATE INTRAVENOUS at 03:11

## 2020-11-12 RX ADMIN — GLYCOPYRROLATE 1 MG: 1 TABLET ORAL at 06:11

## 2020-11-12 RX ADMIN — MUPIROCIN: 20 OINTMENT TOPICAL at 08:11

## 2020-11-12 RX ADMIN — ENOXAPARIN SODIUM 160 MG: 80 INJECTION SUBCUTANEOUS at 08:11

## 2020-11-12 RX ADMIN — MONTELUKAST 10 MG: 10 TABLET, FILM COATED ORAL at 09:11

## 2020-11-12 RX ADMIN — AZITHROMYCIN MONOHYDRATE 500 MG: 500 INJECTION, POWDER, LYOPHILIZED, FOR SOLUTION INTRAVENOUS at 06:11

## 2020-11-12 RX ADMIN — IPRATROPIUM BROMIDE AND ALBUTEROL SULFATE 3 ML: .5; 2.5 SOLUTION RESPIRATORY (INHALATION) at 11:11

## 2020-11-12 RX ADMIN — DEXMEDETOMIDINE HYDROCHLORIDE 0.2 MCG/KG/HR: 4 INJECTION, SOLUTION INTRAVENOUS at 03:11

## 2020-11-12 RX ADMIN — MUPIROCIN: 20 OINTMENT TOPICAL at 09:11

## 2020-11-12 NOTE — PLAN OF CARE
Patient aerosol Q4 given inline ETT tolerated well sats 91%/ SIMV VC 10/500/+14/25/60%FIO2. ETT size 8.0@ 23cm right, sx mod thin white/ oral care done

## 2020-11-12 NOTE — ASSESSMENT & PLAN NOTE
Patient's FSGs are controlled on current hypoglycemics.   Last A1c reviewed-   Lab Results   Component Value Date    HGBA1C 6.2 06/23/2020     Most recent fingerstick glucose reviewed-   Recent Labs   Lab 11/11/20  1803 11/12/20  0302 11/12/20  0616 11/12/20  1223   POCTGLUCOSE 168* 198* 147* 137*     Current correctional scale  Medium  Maintain anti-hyperglycemic dose as follows-   Antihyperglycemics (From admission, onward)    Start     Stop Route Frequency Ordered    11/09/20 1825  insulin aspart U-100 pen 1-10 Units      -- SubQ Every 6 hours PRN 11/09/20 1825        -Hypoglycemic precautions  -Nutrition consulted, will institute tube feeding diet 11/12 if patient remains intubated; may try diabetic diet if successfully extuabted  -Blood glucose checks Q6H

## 2020-11-12 NOTE — PROGRESS NOTES
Progress Note  PULMONARY    Admit Date: 11/9/2020 11/12/2020      SUBJECTIVE:     11/11 no new c/o, intubated and sedate  11/12 no new c/o        PFSH and Allergies reviewed.    OBJECTIVE:     Vitals (Most recent):  Vitals:    11/12/20 0606   BP:    Pulse: 65   Resp: 13   Temp:        Vitals (24 hour range):  Temp:  [97.7 °F (36.5 °C)-99.3 °F (37.4 °C)]   Pulse:  [51-79]   Resp:  [10-43]   BP: (115-207)/()   SpO2:  [87 %-95 %]       Intake/Output Summary (Last 24 hours) at 11/12/2020 0632  Last data filed at 11/12/2020 0606  Gross per 24 hour   Intake 565.11 ml   Output 2535 ml   Net -1969.89 ml          Physical Exam:  The patient's neuro status (alertness,orientation,cognitive function,motor skills,), pharyngeal exam (oral lesions, hygiene, abn dentition,), Neck (jvd,mass,thyroid,nodes in neck and above/below clavicle),RESPIRATORY(symmetry,effort,fremitus,percussion,auscultation),  Cor(rhythm,heart tones including gallops,perfusion,edema)ABD(distention,hepatic&splenomegaly,tenderness,masses), Skin(rash,cyanosis),Psyc(affect,judgement,).  Exam negative except for these pertinent findings:    Sedated, not arousing, chest is symmetric, no distress, normal percussion, normal fremitus and good normal breath sounds  Not obstructed on vol time curve.    Radiographs reviewed: view by direct vision  Atelectasis and increased markings 11/11  Results for orders placed during the hospital encounter of 11/09/20   X-Ray Chest 1 View    Narrative EXAMINATION:  XR CHEST 1 VIEW    CLINICAL HISTORY:  resp failure;    TECHNIQUE:  Single frontal view of the chest was performed.    COMPARISON:  11/10/2020 .    FINDINGS:  NG tube traverses the esophagus within the upper chest and is obscured in the lower chest and if its positioning is desired clinically suggest image centered at the level of the diaphragms. It is suggest to extend beneath the level the diaphragm. Right internal jugular venous catheter is present with the tip  at the level the proximal SVC/junction of the SVC and right brachiocephalic vein. Endotracheal tube appears in good position with the tip projecting approximately 4.5 cm above the cas.    The cardiomediastinal silhouette is stable.    Mild bilateral perihilar/bibasilar infiltrates with probable small left pleural effusion      Impression Continued bilateral infiltrates and probable small left pleural effusion not appearing significantly changed or if anything infiltrates slightly decreased compared to the prior exam.  Positions of lines and tubes described.      Electronically signed by: Manjula Schulz MD  Date:    11/11/2020  Time:    08:06   ]    Labs     Recent Labs   Lab 11/12/20  0325   WBC 17.02*   HGB 14.5   HCT 47.8   *     Recent Labs   Lab 11/12/20  0325      K 4.6   CL 98   CO2 28   BUN 31*   CREATININE 0.8   *   CALCIUM 9.3   MG 2.5   PHOS 4.4   AST 15   ALT 11   ALKPHOS 70   BILITOT 0.7   PROT 7.4   ALBUMIN 3.0*     Recent Labs   Lab 11/11/20  1402   PH 7.469*   PCO2 45.7*   PO2 59*   HCO3 33.2*     Microbiology Results (last 7 days)     Procedure Component Value Units Date/Time    Blood culture [[id number]] Collected: 11/09/20 2056    Order Status: Completed Specimen: Blood Updated: 11/12/20 0613     Blood Culture, Routine No Growth to date      No Growth to date      No Growth to date          Impression:  Active Hospital Problems    Diagnosis  POA    *Acute on chronic respiratory failure with hypoxia and hypercapnia [J96.21, J96.22]  Yes    Malnutrition of moderate degree [E44.0]  Yes    Severe persistent asthma with acute exacerbation [J45.51]  Yes    Morbid obesity [E66.01]  Yes    Pulmonary hypertension [I27.20]  Yes     By pulmonary artery size on ct chest 6/30      Pneumonia due to infectious organism [J18.9]  Yes    Type 2 diabetes mellitus [E11.9]  Yes    Severe sepsis [A41.9, R65.20]  Unknown    Obesity hypoventilation syndrome [E66.2]  Yes    Cardiomegaly  [I51.7]  Yes      Resolved Hospital Problems   No resolved problems to display.               Plan:   11/10-expect staph again,  Asthma exacerbation, morbid obese, obese hypovent.     Temp up 100.7    Needs 100% ox again.  abg looks worse this admit than last time. Will keep tidal vol up with somewhat high peep as had extreme difficulty last admit.    Not sure where problem that ppt recurrance would be - rx asthma, infection, resp failure, monitor.   Pt had angioedema and concerns with propofol infusion in June.  Pt had transient theraputic lovenox given with severe gas exchange problems [facility] admit  Pt was wheezing initially in June.    11/11t about 99, on azithro/rocephin, I/o 1417/3695, bun/creat 22/0.8,glu 144, k, wbc 20k, versed and propofol    abg 7.45/co2 48/02 75- on 60% peep 14 felt to be atelectasis  cxr hazey with morbid obese and atelectasis seen  Sputum nl estephanie    Discussed with brother Jaycob yesterday- pt moved out to cousin?  Brother not able to help.    11/11 too sedate , no apparent obstruction and min vol<8, need to get awake, wean peep then psv and consider extubate to niv.      11/12 tm 99.3 on rocephin/azithro, still on  60%, on cpap    Solumedrol 80 q8, precedex, I/o 565/2535= bun/crea 31/0.8    cxr 11/12 sm lungs  Doing well with cpap/psv- extubate epap 15/ipap 25, mobilize--needs to pass leak test.      .

## 2020-11-12 NOTE — ASSESSMENT & PLAN NOTE
Tachycardiac and tachypneic on admission. Afebrile. WBC elevated, yet on steroids. Lactate normalized. Respiratory cultures negative. Blood cultures negative. Bilateral infiltrates on CXR since improving. U/A concerning for UTI, yet UClx negative. Patient may have PNA.  -Intubated as above for respiratory failure  -Follow up all cultures  -Empiric Rocephin and azithromycin as above  -Caution with IVF repletion given elevated BNP

## 2020-11-12 NOTE — ASSESSMENT & PLAN NOTE
Patient with history of prolonged intubation. Failed BiPAP therapy at [place] requiring intubation. Increased respiratory rate and tachycardic on admission. Afebrile. WBC elevated, yet on steroids. Lactate rising as high to 3.4 that has since normalized. BNP also elevated to 261, yet recent TTE with no cardiac dysfunction; no right heart strain on repeat TTE. D-dimer elevated. ABGs improving throughout course. CXR shows bilateral lower lobe infiltrates; history of MSSA in sputum, yet cultures negative on this admission and CXR improving. Likely secondary to PNA, asthma exacerbation, obesity hypoventilation syndrome, PE or some combination.  Vent Mode: Spont  Oxygen Concentration (%):  [50-60] 60  Resp Rate Total:  [11 br/min-22 br/min] 16 br/min  Vt Set:  [500 mL] 500 mL  PEEP/CPAP:  [10 xaS59-82 cmH20] 10 cmH20  Pressure Support:  [15 mhG28-58 cmH20] 25 cmH20  Mean Airway Pressure:  [15 dgN73-51 cmH20] 17 cmH20    ABG  Recent Labs   Lab 11/12/20  0630   PH 7.494*   PO2 70*   PCO2 43.8   HCO3 33.7*   BE 10   -PPI for PPx  -Continue supplemental oxygen to keep oxygen saturation >92%  -Continue DuoNebs and IV steroids, Singulair   -Pulmonology consulted, will attempt extubation 11/12/2020  -Continue Rocephin and azithromycin  -Continue therapeutic Lovenox SQ

## 2020-11-12 NOTE — PLAN OF CARE
Continues ventilated at this time. Arouses to voice. Follows commands. Assists with turns. No signs or symptoms of pain or discomfort at this time. [place] to self and year at this time. Pt. Communicates per writing on paper. Sedation vacation began at 0258. SBT 0444. Pt. Tolerating at this time. Blood glucose monitored this shift. Note patient not on nutrition at this time. Note dietary recommendations present. Will report need for pt. Nutrition to be addressed. Safety intact

## 2020-11-12 NOTE — SUBJECTIVE & OBJECTIVE
Interval History: Patient off sedation on PS; spontaneously breathing; per Dr. Gentile, will attempt extubation this afternoon with Anesthesia at bedside as patient as patient has twice failed leak test.    Review of Systems   Unable to perform ROS: Intubated     Objective:     Vital Signs (Most Recent):  Temp: 98.3 °F (36.8 °C) (11/12/20 0715)  Pulse: 66 (11/12/20 1137)  Resp: 18 (11/12/20 1137)  BP: 135/60 (11/12/20 1137)  SpO2: 96 % (11/12/20 1137) Vital Signs (24h Range):  Temp:  [98 °F (36.7 °C)-99.3 °F (37.4 °C)] 98.3 °F (36.8 °C)  Pulse:  [51-71] 66  Resp:  [10-29] 18  SpO2:  [90 %-97 %] 96 %  BP: ()/() 135/60     Weight: (!) 149.1 kg (328 lb 11.3 oz)  Body mass index is 60.12 kg/m².    Intake/Output Summary (Last 24 hours) at 11/12/2020 1417  Last data filed at 11/12/2020 0700  Gross per 24 hour   Intake 1360.15 ml   Output 1190 ml   Net 170.15 ml      Physical Exam  Vitals signs and nursing note reviewed.   Constitutional:       General: She is not in acute distress.     Appearance: She is well-developed. She is obese. She is ill-appearing. She is not toxic-appearing or diaphoretic.      Interventions: She is sedated, intubated and restrained.   HENT:      Head: Normocephalic and atraumatic.      Right Ear: External ear normal.      Left Ear: External ear normal.      Nose: Nose normal.      Comments: NG tube noted to left nare     Mouth/Throat:      Mouth: Mucous membranes are moist.   Eyes:      Extraocular Movements: Extraocular movements intact.      Conjunctiva/sclera: Conjunctivae normal.   Neck:      Musculoskeletal: No neck rigidity.      Vascular: No JVD.      Comments: Central line to IJ noted; dressing CDI  Cardiovascular:      Rate and Rhythm: Normal rate and regular rhythm.      Pulses: Normal pulses.      Heart sounds: No murmur. No friction rub. No gallop.    Pulmonary:      Effort: Pulmonary effort is normal. She is intubated.      Breath sounds: Normal breath sounds. No stridor. No  wheezing, rhonchi or rales. Decreased breath sounds: Bilaterally.      Comments: ETT in place  Abdominal:      General: Abdomen is flat. Bowel sounds are normal. There is no distension.      Palpations: Abdomen is soft.      Tenderness: There is no abdominal tenderness. There is no guarding.   Genitourinary:     Vagina: Bleeding (small amount of old blood noted to sanitary napkin) present.      Comments: Vazquez in place draining hazy, yellow urine  Musculoskeletal: Normal range of motion.         General: No tenderness or deformity.      Right lower leg: No edema.      Left lower leg: No edema.   Skin:     General: Skin is warm and dry.      Comments: LEs with darkened, coarse skin         Significant Labs: All pertinent labs within the past 24 hours have been reviewed.    Significant Imaging: I have reviewed all pertinent imaging results/findings within the past 24 hours.

## 2020-11-12 NOTE — RESPIRATORY THERAPY
Patient remains on  on Cpap +10, PS +15 and 02 50%.  Patient intubated via 8 et tube and secured ast 24cm@ lip with 79fel59 cuff psi.  Patient receiving aerosol tx via duoneb now and q4hr.  Hr 60, ETC02 45mmhg and 02 saturation 97%.  Ambu bag and mask at bedside with alarms on and functioning properly at this time.

## 2020-11-12 NOTE — PROGRESS NOTES
"[facility]  Progress Note    Patient Name: Michelle Wren  MRN: [id number]  Patient Class: IP- Inpatient   Admission Date: 11/9/2020  Length of Stay: 3 days  Attending Physician: Alejandro Quiroz MD  Primary Care Provider: Teressa Saeed NP        Subjective:     Principal Problem:Acute on chronic respiratory failure with hypoxia and hypercapnia        HPI:  Michelle Wren is a 30 y.o. female with a PMHx of restrictive lung disease, morbid obesity, severe persistent asthma, hypertension, diabetes and obesity hypoventilation syndrome  who presented to the [place] ED last night around 5:00 p.m. with respiratory failure. Per  physician note she was "admitted and treated with BiPAP IV steroids, empiric antibiotics, DuoNebs IV Lasix with low threshold to intubate.  Tried to transfer to [facility] but she refused.  Around 9:00 p.m. she was only arousable to sternal rub but and intubated for airway protection.  AC rate 16, , peep 5, FiO2 40%.  Vitals within normal limits.  Diprivan at 50 mics per kg per minute, Nimbex at 3 mics per kg per minute - weaning nimbex and changing to versed.  Today, Dr. Arellano placed a right IJ triple-lumen central line. Seen by Dr Gentile, pulmonology, during last inpatient stay and then in clinic on 7/30 - essentially needs a Trilogy. Her family reported that her sats drop to the 70s with minimal exertion in her house."  The patient is being transferred [facility] for pulmonology services.    Upon arrival to [facility], patient is sedated and intubated.  The patient is unable to provide any history, so history was mainly taken from  physician note and previous hospital records. Per chart review patient has similar presentation in June that resulted in a prolonged intubation. The patient has followed up outpatient with pulmonology once since previous admission.  She is supposed to be on BiPAP, but unsure if she " is compliant.  The patient will be admitted to the intensive care unit under Hospital Medicine for further workup and evaluation.    Overview/Hospital Course:  -11/10 Patient sedated and intubated. On ceftriaxone/azithromycin. Remains on IV methylprednisolone. Also on therapeutic dose Lovenox per Pulmonary recommendations.  -11/11 Patient to change to PS mode and will try to extubate; on Precedex and Propofol infusions. All cultures negative.   -11/12 attempting extubation with Anesthesia ready; all cultures remain negative    Interval History: Patient off sedation on PS; spontaneously breathing; per Dr. Gentile, will attempt extubation this afternoon with Anesthesia at bedside as patient as patient has twice failed leak test.    Review of Systems   Unable to perform ROS: Intubated     Objective:     Vital Signs (Most Recent):  Temp: 98.3 °F (36.8 °C) (11/12/20 0715)  Pulse: 66 (11/12/20 1137)  Resp: 18 (11/12/20 1137)  BP: 135/60 (11/12/20 1137)  SpO2: 96 % (11/12/20 1137) Vital Signs (24h Range):  Temp:  [98 °F (36.7 °C)-99.3 °F (37.4 °C)] 98.3 °F (36.8 °C)  Pulse:  [51-71] 66  Resp:  [10-29] 18  SpO2:  [90 %-97 %] 96 %  BP: ()/() 135/60     Weight: (!) 149.1 kg (328 lb 11.3 oz)  Body mass index is 60.12 kg/m².    Intake/Output Summary (Last 24 hours) at 11/12/2020 1417  Last data filed at 11/12/2020 0700  Gross per 24 hour   Intake 1360.15 ml   Output 1190 ml   Net 170.15 ml      Physical Exam  Vitals signs and nursing note reviewed.   Constitutional:       General: She is not in acute distress.     Appearance: She is well-developed. She is obese. She is ill-appearing. She is not toxic-appearing or diaphoretic.      Interventions: She is sedated, intubated and restrained.   HENT:      Head: Normocephalic and atraumatic.      Right Ear: External ear normal.      Left Ear: External ear normal.      Nose: Nose normal.      Comments: NG tube noted to left nare     Mouth/Throat:      Mouth: Mucous membranes  are moist.   Eyes:      Extraocular Movements: Extraocular movements intact.      Conjunctiva/sclera: Conjunctivae normal.   Neck:      Musculoskeletal: No neck rigidity.      Vascular: No JVD.      Comments: Central line to IJ noted; dressing CDI  Cardiovascular:      Rate and Rhythm: Normal rate and regular rhythm.      Pulses: Normal pulses.      Heart sounds: No murmur. No friction rub. No gallop.    Pulmonary:      Effort: Pulmonary effort is normal. She is intubated.      Breath sounds: Normal breath sounds. No stridor. No wheezing, rhonchi or rales. Decreased breath sounds: Bilaterally.      Comments: ETT in place  Abdominal:      General: Abdomen is flat. Bowel sounds are normal. There is no distension.      Palpations: Abdomen is soft.      Tenderness: There is no abdominal tenderness. There is no guarding.   Genitourinary:     Vagina: Bleeding (small amount of old blood noted to sanitary napkin) present.      Comments: Vazquez in place draining hazy, yellow urine  Musculoskeletal: Normal range of motion.         General: No tenderness or deformity.      Right lower leg: No edema.      Left lower leg: No edema.   Skin:     General: Skin is warm and dry.      Comments: LEs with darkened, coarse skin         Significant Labs: All pertinent labs within the past 24 hours have been reviewed.    Significant Imaging: I have reviewed all pertinent imaging results/findings within the past 24 hours.      Assessment/Plan:      * Acute on chronic respiratory failure with hypoxia and hypercapnia  Patient with history of prolonged intubation. Failed BiPAP therapy at [place] requiring intubation. Increased respiratory rate and tachycardic on admission. Afebrile. WBC elevated, yet on steroids. Lactate rising as high to 3.4 that has since normalized. BNP also elevated to 261, yet recent TTE with no cardiac dysfunction; no right heart strain on repeat TTE. D-dimer elevated. ABGs improving throughout course. CXR shows bilateral  lower lobe infiltrates; history of MSSA in sputum, yet cultures negative on this admission and CXR improving. Likely secondary to PNA, asthma exacerbation, obesity hypoventilation syndrome, PE or some combination.  Vent Mode: Spont  Oxygen Concentration (%):  [50-60] 60  Resp Rate Total:  [11 br/min-22 br/min] 16 br/min  Vt Set:  [500 mL] 500 mL  PEEP/CPAP:  [10 cxO56-16 cmH20] 10 cmH20  Pressure Support:  [15 ilN27-04 cmH20] 25 cmH20  Mean Airway Pressure:  [15 yxR46-86 cmH20] 17 cmH20    ABG  Recent Labs   Lab 11/12/20  0630   PH 7.494*   PO2 70*   PCO2 43.8   HCO3 33.7*   BE 10   -PPI for PPx  -Continue supplemental oxygen to keep oxygen saturation >92%  -Continue DuoNebs and IV steroids, Singulair   -Pulmonology consulted, will attempt extubation 11/12/2020  -Continue Rocephin and azithromycin  -Continue therapeutic Lovenox SQ    Severe sepsis  Tachycardiac and tachypneic on admission. Afebrile. WBC elevated, yet on steroids. Lactate normalized. Respiratory cultures negative. Blood cultures negative. Bilateral infiltrates on CXR since improving. U/A concerning for UTI, yet UClx negative. Patient may have PNA.  -Intubated as above for respiratory failure  -Follow up all cultures  -Empiric Rocephin and azithromycin as above  -Caution with IVF repletion given elevated BNP    Obesity hypoventilation syndrome  Currently intubated.  -Appreciate Pulmonology recommendations  -Please see plan above    Cardiomegaly  Results for orders placed during the hospital encounter of 06/22/20   Echo Color Flow Doppler? Yes     Narrative · Concentric left ventricular remodeling.  · Normal left ventricular systolic function. The estimated ejection   fraction is 69%.  · Normal LV diastolic function.  · No wall motion abnormalities.  · Normal right ventricular systolic function.  · Mild right atrial enlargement.  · Trivial pericardial effusion.  · Mild left atrial enlargement.      Repeat TTE with no right heart strain and normal EF.  BNP elevated on admission.  -Continue IV Lasix   -Continue to monitor I/O's and daily weights.  -Maintain oxygen sats >92%     Type 2 diabetes mellitus  Patient's FSGs are controlled on current hypoglycemics.   Last A1c reviewed-   Lab Results   Component Value Date    HGBA1C 6.2 06/23/2020     Most recent fingerstick glucose reviewed-   Recent Labs   Lab 11/11/20  1803 11/12/20  0302 11/12/20  0616 11/12/20  1223   POCTGLUCOSE 168* 198* 147* 137*     Current correctional scale  Medium  Maintain anti-hyperglycemic dose as follows-   Antihyperglycemics (From admission, onward)    Start     Stop Route Frequency Ordered    11/09/20 1825  insulin aspart U-100 pen 1-10 Units      -- SubQ Every 6 hours PRN 11/09/20 1825        -Hypoglycemic precautions  -Nutrition consulted, will institute tube feeding diet 11/12 if patient remains intubated; may try diabetic diet if successfully extuabted  -Blood glucose checks Q6H    Severe persistent asthma with acute exacerbation  See respiratory failure above.    Morbid obesity  Body mass index is 60.12 kg/m². Morbid obesity complicates all aspects of disease management from diagnostic modalities to treatment. Will encourage weight loss explain health benefits when patient is not sedated.    Pulmonary hypertension  Repeat TTE with normal EF and concentric remodeling.  -Continue to treat per Pulmonology recommendations.  -IV Lasix scheduled  -Monitor I's and O's    Pneumonia due to infectious organism  Patient with current diagnosis of possible pneumonia which is uncontrolled due to persistent hypoxia . Current antimicrobial regimen consists of   Antibiotics (From admission, onward)    Start     Stop Route Frequency Ordered    11/10/20 2043  cefTRIAXone (ROCEPHIN) 2 g/50 mL D5W IVPB      -- IV Every 24 hours (non-standard times) 11/10/20 0429    11/10/20 0900  mupirocin 2 % ointment      11/15 0859 Nasl 2 times daily 11/10/20 0430    11/10/20 0530  azithromycin 500 mg in dextrose 5 %  250 mL IVPB (ready to mix system)      -- IV Every 24 hours (non-standard times) 11/10/20 0429      . Cultures currently  resulted. Negative.  Will monitor patient closely and continue current treatment plan unchanged.      VTE Risk Mitigation (From admission, onward)         Ordered     enoxaparin injection 160 mg  Every 24 hours (non-standard times)      11/10/20 0847                Discharge Planning   BAKARI: 11/15/2020    Code Status: Full Code   Is the patient medically ready for discharge?:     Reason for patient still in hospital (select all that apply): Patient unstable, Patient trending condition, Consult recommendations, PT / OT recommendations and Pending disposition  Discharge Plan A: Home with family            Critical care time spent on the evaluation and treatment of severe organ dysfunction, review of pertinent labs and imaging studies, discussions with consulting providers and discussions with patient/family: 36 minutes.      Alejandro Quiroz MD  Department of Hospital Medicine   [facility]

## 2020-11-12 NOTE — PLAN OF CARE
Pt remains in ICU.  Intubated.  Versed stopped per Dr Gentile.  Weaned off Prop and started precedex.  Pt drowsy.  Awakes easily and can nod yes/no.  Still with copious amounts of secretions.  Low grade temp through out shift.  KELECHI remain.  Tolerated CPAP but back on rate this evening.  Unable to wean PEEP and remains at 15.  Pt attempts to reach for ETT when awake.  Environment calmed.  Safety maintained.

## 2020-11-12 NOTE — ASSESSMENT & PLAN NOTE
Repeat TTE with normal EF and concentric remodeling.  -Continue to treat per Pulmonology recommendations.  -IV Lasix scheduled  -Monitor I's and O's

## 2020-11-12 NOTE — RESPIRATORY THERAPY
Patient place on CPAP trial@0445 +10/25 60%FIO2 sats 93-94% HR 68/ RR 12/ Vt 779 without sedation tolerating very well and alert with appropriate response. Presently on CPAP +10/25 60%FIO2 will continue to monitor and obtain an ABG.

## 2020-11-12 NOTE — ASSESSMENT & PLAN NOTE
Body mass index is 60.12 kg/m². Morbid obesity complicates all aspects of disease management from diagnostic modalities to treatment. Will encourage weight loss explain health benefits when patient is not sedated.

## 2020-11-12 NOTE — RESPIRATORY THERAPY
Results for MICH NASH (MRN [id number]) as of 11/12/2020 06:47   Ref. Range 11/12/2020 06:30   POC PH Latest Ref Range: 7.35 - 7.45  7.494 (H)   POC PCO2 Latest Ref Range: 35 - 45 mmHg 43.8   POC PO2 Latest Ref Range: 80 - 100 mmHg 70 (L)   POC BE Latest Ref Range: -2 to 2 mmol/L 10   POC HCO3 Latest Ref Range: 24 - 28 mmol/L 33.7 (H)   POC SATURATED O2 Latest Ref Range: 95 - 100 % 95   POC TCO2 Latest Ref Range: 23 - 27 mmol/L 35 (H)   FiO2 Unknown 60   PEEP Unknown 10   Sample Unknown ARTERIAL   DelSys Unknown Adult Vent   Allens Test Unknown Pass   Site Unknown RR   Mode Unknown CPAP

## 2020-11-12 NOTE — ASSESSMENT & PLAN NOTE
Patient with current diagnosis of possible pneumonia which is uncontrolled due to persistent hypoxia . Current antimicrobial regimen consists of   Antibiotics (From admission, onward)    Start     Stop Route Frequency Ordered    11/10/20 2043  cefTRIAXone (ROCEPHIN) 2 g/50 mL D5W IVPB      -- IV Every 24 hours (non-standard times) 11/10/20 0429    11/10/20 0900  mupirocin 2 % ointment      11/15 0859 Nasl 2 times daily 11/10/20 0430    11/10/20 0530  azithromycin 500 mg in dextrose 5 % 250 mL IVPB (ready to mix system)      -- IV Every 24 hours (non-standard times) 11/10/20 0429      . Cultures currently  resulted. Negative.  Will monitor patient closely and continue current treatment plan unchanged.

## 2020-11-12 NOTE — NURSING
Plan to extubate patient today- failed leak test x2 attempts, pulmonologist made aware. Consulted anesthesia to be on standby for potential difficult re-intubation. Discussed case with anesthesiologist, plan to attempt extubation this afternoon. Sandi- RT aware of plan.

## 2020-11-13 PROBLEM — J96.22 ACUTE AND CHRONIC RESPIRATORY FAILURE WITH HYPERCAPNIA: Status: ACTIVE | Noted: 2020-11-13

## 2020-11-13 PROBLEM — R00.1 BRADYCARDIA: Status: ACTIVE | Noted: 2020-11-13

## 2020-11-13 LAB
ALBUMIN SERPL BCP-MCNC: 2.8 G/DL (ref 3.5–5.2)
ALLENS TEST: ABNORMAL
ALP SERPL-CCNC: 61 U/L (ref 55–135)
ALT SERPL W/O P-5'-P-CCNC: 13 U/L (ref 10–44)
ANION GAP SERPL CALC-SCNC: 9 MMOL/L (ref 8–16)
AST SERPL-CCNC: 19 U/L (ref 10–40)
BASOPHILS # BLD AUTO: 0.01 K/UL (ref 0–0.2)
BASOPHILS NFR BLD: 0.1 % (ref 0–1.9)
BILIRUB SERPL-MCNC: 0.5 MG/DL (ref 0.1–1)
BUN SERPL-MCNC: 33 MG/DL (ref 6–20)
CALCIUM SERPL-MCNC: 8.9 MG/DL (ref 8.7–10.5)
CHLORIDE SERPL-SCNC: 101 MMOL/L (ref 95–110)
CO2 SERPL-SCNC: 33 MMOL/L (ref 23–29)
CREAT SERPL-MCNC: 0.8 MG/DL (ref 0.5–1.4)
DELSYS: ABNORMAL
DIFFERENTIAL METHOD: ABNORMAL
EOSINOPHIL # BLD AUTO: 0 K/UL (ref 0–0.5)
EOSINOPHIL NFR BLD: 0 % (ref 0–8)
ERYTHROCYTE [DISTWIDTH] IN BLOOD BY AUTOMATED COUNT: 17.2 % (ref 11.5–14.5)
EST. GFR  (AFRICAN AMERICAN): >60 ML/MIN/1.73 M^2
EST. GFR  (NON AFRICAN AMERICAN): >60 ML/MIN/1.73 M^2
GLUCOSE SERPL-MCNC: 102 MG/DL (ref 70–110)
HCO3 UR-SCNC: 32.6 MMOL/L (ref 24–28)
HCT VFR BLD AUTO: 46.9 % (ref 37–48.5)
HGB BLD-MCNC: 13.4 G/DL (ref 12–16)
IMM GRANULOCYTES # BLD AUTO: 0.06 K/UL (ref 0–0.04)
IMM GRANULOCYTES NFR BLD AUTO: 0.4 % (ref 0–0.5)
LYMPHOCYTES # BLD AUTO: 2.8 K/UL (ref 1–4.8)
LYMPHOCYTES NFR BLD: 20 % (ref 18–48)
MAGNESIUM SERPL-MCNC: 2.6 MG/DL (ref 1.6–2.6)
MCH RBC QN AUTO: 22.1 PG (ref 27–31)
MCHC RBC AUTO-ENTMCNC: 28.6 G/DL (ref 32–36)
MCV RBC AUTO: 77 FL (ref 82–98)
MODE: ABNORMAL
MONOCYTES # BLD AUTO: 1.9 K/UL (ref 0.3–1)
MONOCYTES NFR BLD: 13.4 % (ref 4–15)
NEUTROPHILS # BLD AUTO: 9.1 K/UL (ref 1.8–7.7)
NEUTROPHILS NFR BLD: 66.1 % (ref 38–73)
NRBC BLD-RTO: 0 /100 WBC
PCO2 BLDA: 58.1 MMHG (ref 35–45)
PH SMN: 7.36 [PH] (ref 7.35–7.45)
PHOSPHATE SERPL-MCNC: 4 MG/DL (ref 2.7–4.5)
PLATELET # BLD AUTO: 357 K/UL (ref 150–350)
PMV BLD AUTO: 10.8 FL (ref 9.2–12.9)
PO2 BLDA: 78 MMHG (ref 80–100)
POC BE: 7 MMOL/L
POC SATURATED O2: 94 % (ref 95–100)
POC TCO2: 34 MMOL/L (ref 23–27)
POCT GLUCOSE: 100 MG/DL (ref 70–110)
POCT GLUCOSE: 111 MG/DL (ref 70–110)
POCT GLUCOSE: 113 MG/DL (ref 70–110)
POCT GLUCOSE: 116 MG/DL (ref 70–110)
POCT GLUCOSE: 97 MG/DL (ref 70–110)
POTASSIUM SERPL-SCNC: 3.6 MMOL/L (ref 3.5–5.1)
PROT SERPL-MCNC: 6.6 G/DL (ref 6–8.4)
RBC # BLD AUTO: 6.06 M/UL (ref 4–5.4)
SAMPLE: ABNORMAL
SITE: ABNORMAL
SODIUM SERPL-SCNC: 143 MMOL/L (ref 136–145)
WBC # BLD AUTO: 13.82 K/UL (ref 3.9–12.7)

## 2020-11-13 PROCEDURE — 85025 COMPLETE CBC W/AUTO DIFF WBC: CPT

## 2020-11-13 PROCEDURE — 25000242 PHARM REV CODE 250 ALT 637 W/ HCPCS: Performed by: INTERNAL MEDICINE

## 2020-11-13 PROCEDURE — 94003 VENT MGMT INPAT SUBQ DAY: CPT

## 2020-11-13 PROCEDURE — 36415 COLL VENOUS BLD VENIPUNCTURE: CPT

## 2020-11-13 PROCEDURE — 63600175 PHARM REV CODE 636 W HCPCS: Performed by: NURSE PRACTITIONER

## 2020-11-13 PROCEDURE — 82803 BLOOD GASES ANY COMBINATION: CPT

## 2020-11-13 PROCEDURE — 99900035 HC TECH TIME PER 15 MIN (STAT)

## 2020-11-13 PROCEDURE — 63600175 PHARM REV CODE 636 W HCPCS: Performed by: INTERNAL MEDICINE

## 2020-11-13 PROCEDURE — 25000003 PHARM REV CODE 250: Performed by: NURSE PRACTITIONER

## 2020-11-13 PROCEDURE — 25000003 PHARM REV CODE 250: Performed by: INTERNAL MEDICINE

## 2020-11-13 PROCEDURE — 20000000 HC ICU ROOM

## 2020-11-13 PROCEDURE — 94761 N-INVAS EAR/PLS OXIMETRY MLT: CPT

## 2020-11-13 PROCEDURE — 80053 COMPREHEN METABOLIC PANEL: CPT

## 2020-11-13 PROCEDURE — 94799 UNLISTED PULMONARY SVC/PX: CPT

## 2020-11-13 PROCEDURE — 63600175 PHARM REV CODE 636 W HCPCS: Performed by: HOSPITALIST

## 2020-11-13 PROCEDURE — 27000190 HC CPAP FULL FACE MASK W/VALVE

## 2020-11-13 PROCEDURE — C9113 INJ PANTOPRAZOLE SODIUM, VIA: HCPCS | Performed by: INTERNAL MEDICINE

## 2020-11-13 PROCEDURE — 27000221 HC OXYGEN, UP TO 24 HOURS

## 2020-11-13 PROCEDURE — 94770 HC EXHALED C02 TEST: CPT

## 2020-11-13 PROCEDURE — 84100 ASSAY OF PHOSPHORUS: CPT

## 2020-11-13 PROCEDURE — 97803 MED NUTRITION INDIV SUBSEQ: CPT

## 2020-11-13 PROCEDURE — 99233 PR SUBSEQUENT HOSPITAL CARE,LEVL III: ICD-10-PCS | Mod: ,,, | Performed by: INTERNAL MEDICINE

## 2020-11-13 PROCEDURE — 94640 AIRWAY INHALATION TREATMENT: CPT

## 2020-11-13 PROCEDURE — 97161 PT EVAL LOW COMPLEX 20 MIN: CPT

## 2020-11-13 PROCEDURE — 99233 SBSQ HOSP IP/OBS HIGH 50: CPT | Mod: ,,, | Performed by: INTERNAL MEDICINE

## 2020-11-13 PROCEDURE — 92610 EVALUATE SWALLOWING FUNCTION: CPT

## 2020-11-13 PROCEDURE — 36600 WITHDRAWAL OF ARTERIAL BLOOD: CPT

## 2020-11-13 PROCEDURE — 83735 ASSAY OF MAGNESIUM: CPT

## 2020-11-13 PROCEDURE — 97530 THERAPEUTIC ACTIVITIES: CPT

## 2020-11-13 PROCEDURE — 63600175 PHARM REV CODE 636 W HCPCS: Performed by: STUDENT IN AN ORGANIZED HEALTH CARE EDUCATION/TRAINING PROGRAM

## 2020-11-13 PROCEDURE — 25000003 PHARM REV CODE 250: Performed by: STUDENT IN AN ORGANIZED HEALTH CARE EDUCATION/TRAINING PROGRAM

## 2020-11-13 PROCEDURE — 94660 CPAP INITIATION&MGMT: CPT

## 2020-11-13 RX ORDER — PROPOFOL 10 MG/ML
INJECTION, EMULSION INTRAVENOUS
Status: DISCONTINUED
Start: 2020-11-13 | End: 2020-11-13 | Stop reason: WASHOUT

## 2020-11-13 RX ORDER — FUROSEMIDE 20 MG/1
20 TABLET ORAL DAILY
Status: DISCONTINUED | OUTPATIENT
Start: 2020-11-14 | End: 2020-11-16 | Stop reason: HOSPADM

## 2020-11-13 RX ORDER — ENOXAPARIN SODIUM 100 MG/ML
40 INJECTION SUBCUTANEOUS EVERY 12 HOURS
Status: DISCONTINUED | OUTPATIENT
Start: 2020-11-13 | End: 2020-11-16 | Stop reason: HOSPADM

## 2020-11-13 RX ORDER — LIDOCAINE HYDROCHLORIDE 20 MG/ML
10 SOLUTION OROPHARYNGEAL EVERY 4 HOURS PRN
Status: DISCONTINUED | OUTPATIENT
Start: 2020-11-13 | End: 2020-11-16 | Stop reason: HOSPADM

## 2020-11-13 RX ORDER — KETOROLAC TROMETHAMINE 30 MG/ML
15 INJECTION, SOLUTION INTRAMUSCULAR; INTRAVENOUS ONCE
Status: COMPLETED | OUTPATIENT
Start: 2020-11-13 | End: 2020-11-13

## 2020-11-13 RX ORDER — PREDNISONE 20 MG/1
20 TABLET ORAL 2 TIMES DAILY
Status: DISCONTINUED | OUTPATIENT
Start: 2020-11-13 | End: 2020-11-16 | Stop reason: HOSPADM

## 2020-11-13 RX ORDER — SUCCINYLCHOLINE CHLORIDE 20 MG/ML
INJECTION INTRAMUSCULAR; INTRAVENOUS
Status: DISCONTINUED
Start: 2020-11-13 | End: 2020-11-13 | Stop reason: WASHOUT

## 2020-11-13 RX ORDER — ROCURONIUM BROMIDE 10 MG/ML
INJECTION, SOLUTION INTRAVENOUS
Status: DISCONTINUED
Start: 2020-11-13 | End: 2020-11-13 | Stop reason: WASHOUT

## 2020-11-13 RX ORDER — DEXMEDETOMIDINE HYDROCHLORIDE 4 UG/ML
0.2 INJECTION, SOLUTION INTRAVENOUS CONTINUOUS
Status: DISCONTINUED | OUTPATIENT
Start: 2020-11-13 | End: 2020-11-13

## 2020-11-13 RX ORDER — ETOMIDATE 2 MG/ML
INJECTION INTRAVENOUS
Status: DISCONTINUED
Start: 2020-11-13 | End: 2020-11-13 | Stop reason: WASHOUT

## 2020-11-13 RX ADMIN — AZITHROMYCIN MONOHYDRATE 500 MG: 500 INJECTION, POWDER, LYOPHILIZED, FOR SOLUTION INTRAVENOUS at 05:11

## 2020-11-13 RX ADMIN — ENOXAPARIN SODIUM 160 MG: 80 INJECTION SUBCUTANEOUS at 08:11

## 2020-11-13 RX ADMIN — ENOXAPARIN SODIUM 40 MG: 40 INJECTION SUBCUTANEOUS at 08:11

## 2020-11-13 RX ADMIN — IPRATROPIUM BROMIDE AND ALBUTEROL SULFATE 3 ML: .5; 2.5 SOLUTION RESPIRATORY (INHALATION) at 12:11

## 2020-11-13 RX ADMIN — LIDOCAINE HYDROCHLORIDE 10 ML: 20 SOLUTION ORAL; TOPICAL at 11:11

## 2020-11-13 RX ADMIN — PREDNISONE 20 MG: 20 TABLET ORAL at 08:11

## 2020-11-13 RX ADMIN — IPRATROPIUM BROMIDE AND ALBUTEROL SULFATE 3 ML: .5; 2.5 SOLUTION RESPIRATORY (INHALATION) at 03:11

## 2020-11-13 RX ADMIN — MONTELUKAST 10 MG: 10 TABLET, FILM COATED ORAL at 08:11

## 2020-11-13 RX ADMIN — IPRATROPIUM BROMIDE AND ALBUTEROL SULFATE 3 ML: .5; 2.5 SOLUTION RESPIRATORY (INHALATION) at 07:11

## 2020-11-13 RX ADMIN — IPRATROPIUM BROMIDE AND ALBUTEROL SULFATE 3 ML: .5; 2.5 SOLUTION RESPIRATORY (INHALATION) at 04:11

## 2020-11-13 RX ADMIN — PANTOPRAZOLE SODIUM 40 MG: 40 INJECTION, POWDER, FOR SOLUTION INTRAVENOUS at 08:11

## 2020-11-13 RX ADMIN — GLYCOPYRROLATE 1 MG: 1 TABLET ORAL at 08:11

## 2020-11-13 RX ADMIN — MUPIROCIN: 20 OINTMENT TOPICAL at 08:11

## 2020-11-13 RX ADMIN — FUROSEMIDE 20 MG: 10 INJECTION, SOLUTION INTRAVENOUS at 08:11

## 2020-11-13 RX ADMIN — KETOROLAC TROMETHAMINE 15 MG: 30 INJECTION, SOLUTION INTRAMUSCULAR at 03:11

## 2020-11-13 RX ADMIN — METHYLPREDNISOLONE SODIUM SUCCINATE 40 MG: 40 INJECTION, POWDER, FOR SOLUTION INTRAMUSCULAR; INTRAVENOUS at 08:11

## 2020-11-13 RX ADMIN — ACETAMINOPHEN 650 MG: 325 TABLET ORAL at 12:11

## 2020-11-13 NOTE — ASSESSMENT & PLAN NOTE
Tachycardiac and tachypneic on admission. Afebrile. WBC elevated, yet on steroids; decreasing. Lactate normalized. Respiratory cultures negative. Blood cultures negative. Bilateral infiltrates on CXR since improving. U/A concerning for UTI, yet UClx negative. Patient may have PNA. Sepsis resolved.  -Extubated 11/13  -Follow up all cultures  -Empiric Rocephin and azithromycin as above for five day course  -Caution with IVF repletion given elevated BNP

## 2020-11-13 NOTE — ASSESSMENT & PLAN NOTE
Patient's FSGs are controlled on current hypoglycemics.   Last A1c reviewed-   Lab Results   Component Value Date    HGBA1C 6.2 06/23/2020     Most recent fingerstick glucose reviewed-   Recent Labs   Lab 11/12/20  1223 11/12/20  1659 11/13/20  0001 11/13/20  0513   POCTGLUCOSE 137* 121* 113* 100     Current correctional scale  Medium  Maintain anti-hyperglycemic dose as follows-   Antihyperglycemics (From admission, onward)    Start     Stop Route Frequency Ordered    11/09/20 1825  insulin aspart U-100 pen 1-10 Units      -- SubQ Every 6 hours PRN 11/09/20 1825      -SSI  -Hypoglycemic precautions  -Nutrition consulted, will institute tube feeding diet 11/12 if patient remains intubated; may try diabetic diet if successfully extuabted  -Blood glucose checks Q6H

## 2020-11-13 NOTE — ASSESSMENT & PLAN NOTE
Patient with history of prolonged intubation. Failed BiPAP therapy at [facility] requiring intubation. Increased respiratory rate and tachycardic on admission. Extubated to BiPAP 11/13. Afebrile. WBC elevated, yet on steroids. Lactate rising as high to 3.4 that has since normalized. BNP also elevated to 261, yet recent TTE with no cardiac dysfunction; no right heart strain on repeat TTE. D-dimer elevated. ABGs improving throughout course. CXR shows bilateral lower lobe infiltrates; history of MSSA in sputum, yet cultures negative on this admission and CXR improving. Likely secondary to PNA, asthma exacerbation, obesity hypoventilation syndrome, PE or some combination.    ABG  Recent Labs   Lab 11/13/20  0816   PH 7.357   PO2 78*   PCO2 58.1*   HCO3 32.6*   BE 7   -PPI for PPx  -Continue supplemental oxygen to keep oxygen saturation >92%; BiPAP to be weaned as tolerated  -Continue DuoNebs and IV steroids, Singulair   -Pulmonology consulted, appreciate recommendations  -Continue Rocephin and azithromycin for five day course  -Continue Lovenox SQ at 40 mg Q12H

## 2020-11-13 NOTE — PLAN OF CARE
Pt remains in ICU- extubated to biPAP today and transitioned to NC 5 Liters humidified O2, patient eager to improve, ambulated with PT with standby assistance only, sat in chair for 5 hours this afternoon, BG monitored- did not require insulin coverage this shift, assessed by speech- passed swallow study, patient only complains of throat discomfort, administered lidocaine swish and swallow, chloraseptic, and ice chips for comfort, patient participating in care, urine output 1000 mls this shift, safety maintained, call light in reach.

## 2020-11-13 NOTE — ASSESSMENT & PLAN NOTE
Currently intubated.  -Appreciate Pulmonology recommendations  -Please see acute on chronic respiratory failure

## 2020-11-13 NOTE — RESPIRATORY THERAPY
11/13/20 0718   Patient Assessment/Suction   Level of Consciousness (AVPU) alert   All Lung Fields Breath Sounds diminished;coarse   PRE-TX-O2   O2 Device (Oxygen Therapy) ventilator   $ Is the patient on Low Flow Oxygen? Yes   Oxygen Concentration (%) 50   SpO2 98 %   Pulse Oximetry Type Continuous   $ Pulse Oximetry - Multiple Charge Pulse Oximetry - Multiple   Pulse (!) 51   Resp 18   ETCO2   $ ETCO2 Charge Exhaled CO2 Monitoring   $ ETCO2 Usage Currently wearing   ETCO2 Device Type Monitor:   Aerosol Therapy   $ Aerosol Therapy Charges Aerosol Treatment   Respiratory Treatment Status (SVN) given   Treatment Route (SVN) in-line   Patient Position (SVN) HOB elevated   Post Treatment Assessment (SVN) breath sounds unchanged   Signs of Intolerance (SVN) none   Breath Sounds Post-Respiratory Treatment   Throughout All Fields Post-Treatment All Fields   Throughout All Fields Post-Treatment no change   Post-treatment Heart Rate (beats/min) 56   Post-treatment Resp Rate (breaths/min) 18   Ready to Wean/Extubation Screen   FIO2<=50 (chart decimal) 0.5

## 2020-11-13 NOTE — PLAN OF CARE
Problem: Physical Therapy Goal  Goal: Physical Therapy Goal  Description: Goals to be met by: 2020     Patient will increase functional independence with mobility by performin. Supine to sit with Contact Guard Assistance  2. Sit to stand transfer with Contact Guard Assistance  3. Bed to chair transfer with Contact Guard Assistance   4. Gait  x 250 feet with Contact Guard Assistance using Rolling Walker.   5. Lower extremity exercise program x20 reps     Outcome: Ongoing, Progressing   PT eval and treat completed. Pt sat EOB/standing and few steps to bedside chair. SAT 91% on NC. Pt should progress well. HHPT

## 2020-11-13 NOTE — ASSESSMENT & PLAN NOTE
Body mass index is 59.92 kg/m². Morbid obesity complicates all aspects of disease management from diagnostic modalities to treatment. Will encourage weight loss explain health benefits when patient is not sedated.

## 2020-11-13 NOTE — PT/OT/SLP EVAL
Speech Language Pathology   Bedside Swallow Evaluation & DISCHARGE    Patient Name:  Michelle Wren   MRN:  [id number]  Admitting Diagnosis: Acute on chronic respiratory failure with hypoxia and hypercapnia    Recommendations:                 General Recommendations:  Follow-up not indicated  Diet recommendations:  Regular, Thin   Aspiration Precautions: Standard aspiration precautions   General Precautions: Standard, fall  Communication strategies:  none    History:     Past Medical History:   Diagnosis Date    Asthma     Back pain     Diabetes mellitus     Morbid obesity     Obesity        Past Surgical History:   Procedure Laterality Date    APPENDECTOMY       SECTION         Social History: Patient lives with cousin.    Prior Intubation HX:  2020-2020    Modified Barium Swallow: None in epic    Imaging:  X-Ray Chest 1 View   Final Result      Decreasing bibasilar atelectasis and infiltrate.  Hypoventilation.  Mild cardiomegaly.  Endotracheal tube, NG tube and central lines in position.         Electronically signed by: Cyrus Phillips MD   Date:    2020   Time:    08:07      X-Ray Chest 1 View   Final Result      Continued bilateral infiltrates and probable small left pleural effusion not appearing significantly changed or if anything infiltrates slightly decreased compared to the prior exam.  Positions of lines and tubes described.         Electronically signed by: Manjula Schulz MD   Date:    2020   Time:    08:06      X-Ray Chest 1 View   Final Result      Continued bilateral lower lobe infiltrates.  Borderline heart size.  Tubes in good position.         Electronically signed by: Cyrus Phillips MD   Date:    11/10/2020   Time:    08:40      X-Ray Chest 1 View   Final Result      Bilateral airspace opacities, unchanged.         Electronically signed by: Matias Franklin   Date:    2020   Time:    19:45           Prior diet:  Regular/thin.    Occupation/hobbies/homemaking: ambulatory.     Subjective     No c/o    Objective:   Pt seen in ICU for clinical swallow evaluation. Extubated this AM. She is alert, pleasant and cooperative. Follow commands and answers questions appropriately. Able to provide reliable history. Denies any h/o dysphagia.     Oral Musculature Evaluation  · Oral Musculature: WFL  · Dentition: present and adequate  · Secretion Management: adequate  · Mucosal Quality: good  · Mandibular Strength and Mobility: WFL  · Oral Labial Strength and Mobility: WFL  · Lingual Strength and Mobility: WFL  · Velar Elevation: WFL  · Buccal Strength and Mobility: WFL  · Voice Prior to PO Intake: slightly hoarse    Bedside Swallow Eval:   Consistencies Assessed:  · Thin liquids --via tsp, cup, straw, serial straw sips  · Puree --applesauce  · Mixed consistencies --diced peaches  · Solids --cookie     Oral Phase:   · WFL    Pharyngeal Phase:   · no overt clinical signs/symptoms of aspiration    Compensatory Strategies  · None    Assessment:   Clinical swallowing evaluation completed. All po trials tolerated with no overt s/s swallow dysfunction. REC Regular textures with thin liquids. No f/u indicated ATT. Please reconsult PRN.     Plan:     · Patient to be seen:      · Plan of Care expires:     · Plan of Care reviewed with:  patient   · SLP Follow-Up:  No       Discharge recommendations:      Barriers to Discharge:  None    Time Tracking:     SLP Treatment Date:   11/13/20  Speech Start Time:  1328  Speech Stop Time:  1336     Speech Total Time (min):  8 min    Billable Minutes: Eval Swallow and Oral Function 8 and Total Time 8    Alida Zavala CCC-SLP  11/13/2020

## 2020-11-13 NOTE — PROGRESS NOTES
Progress Note  PULMONARY    Admit Date: 11/9/2020 11/13/2020      SUBJECTIVE:     11/11 no new c/o, intubated and sedate  11/12 no new c/o  11/13  No new c/o, just extubated to niv.      PFSH and Allergies reviewed.    OBJECTIVE:     Vitals (Most recent):  Vitals:    11/13/20 0841   BP:    Pulse: 66   Resp: (!) 36   Temp:        Vitals (24 hour range):  Temp:  [98.2 °F (36.8 °C)-98.9 °F (37.2 °C)]   Pulse:  [50-98]   Resp:  [10-41]   BP: ()/()   SpO2:  [91 %-100 %]       Intake/Output Summary (Last 24 hours) at 11/13/2020 0853  Last data filed at 11/13/2020 0700  Gross per 24 hour   Intake 399.42 ml   Output 2630 ml   Net -2230.58 ml          Physical Exam:  The patient's neuro status (alertness,orientation,cognitive function,motor skills,), pharyngeal exam (oral lesions, hygiene, abn dentition,), Neck (jvd,mass,thyroid,nodes in neck and above/below clavicle),RESPIRATORY(symmetry,effort,fremitus,percussion,auscultation),  Cor(rhythm,heart tones including gallops,perfusion,edema)ABD(distention,hepatic&splenomegaly,tenderness,masses), Skin(rash,cyanosis),Psyc(affect,judgement,).  Exam negative except for these pertinent findings:    Alert on niv now, - chest is symmetric, no distress, normal percussion, normal fremitus and good normal breath sounds   .    Radiographs reviewed: view by direct vision  Atelectasis and increased markings 11/11  Results for orders placed during the hospital encounter of 11/09/20   X-Ray Chest 1 View    Narrative EXAMINATION:  XR CHEST 1 VIEW    CLINICAL HISTORY:  resp failure;    TECHNIQUE:  Single frontal view of the chest was performed.    COMPARISON:  11/10/2020 .    FINDINGS:  NG tube traverses the esophagus within the upper chest and is obscured in the lower chest and if its positioning is desired clinically suggest image centered at the level of the diaphragms. It is suggest to extend beneath the level the diaphragm. Right internal jugular venous catheter is present  with the tip at the level the proximal SVC/junction of the SVC and right brachiocephalic vein. Endotracheal tube appears in good position with the tip projecting approximately 4.5 cm above the cas.    The cardiomediastinal silhouette is stable.    Mild bilateral perihilar/bibasilar infiltrates with probable small left pleural effusion      Impression Continued bilateral infiltrates and probable small left pleural effusion not appearing significantly changed or if anything infiltrates slightly decreased compared to the prior exam.  Positions of lines and tubes described.      Electronically signed by: Manjula Schulz MD  Date:    11/11/2020  Time:    08:06   ]    Labs     Recent Labs   Lab 11/13/20  0406   WBC 13.82*   HGB 13.4   HCT 46.9   *     Recent Labs   Lab 11/13/20  0406      K 3.6      CO2 33*   BUN 33*   CREATININE 0.8      CALCIUM 8.9   MG 2.6   PHOS 4.0   AST 19   ALT 13   ALKPHOS 61   BILITOT 0.5   PROT 6.6   ALBUMIN 2.8*     Recent Labs   Lab 11/13/20  0816   PH 7.357   PCO2 58.1*   PO2 78*   HCO3 32.6*     Microbiology Results (last 7 days)     Procedure Component Value Units Date/Time    Blood culture [[id number]] Collected: 11/09/20 2056    Order Status: Completed Specimen: Blood Updated: 11/13/20 0612     Blood Culture, Routine No Growth to date      No Growth to date      No Growth to date      No Growth to date          Impression:  Active Hospital Problems    Diagnosis  POA    *Acute on chronic respiratory failure with hypoxia and hypercapnia [J96.21, J96.22]  Yes    Malnutrition of moderate degree [E44.0]  Yes    Severe persistent asthma with acute exacerbation [J45.51]  Yes    Morbid obesity [E66.01]  Yes    Pulmonary hypertension [I27.20]  Yes     By pulmonary artery size on ct chest 6/30      Pneumonia due to infectious organism [J18.9]  Yes    Type 2 diabetes mellitus [E11.9]  Yes    Severe sepsis [A41.9, R65.20]  Unknown    Obesity hypoventilation  syndrome [E66.2]  Yes    Cardiomegaly [I51.7]  Yes      Resolved Hospital Problems   No resolved problems to display.               Plan:   11/10-expect staph again,  Asthma exacerbation, morbid obese, obese hypovent.     Temp up 100.7    Needs 100% ox again.  abg looks worse this admit than last time. Will keep tidal vol up with somewhat high peep as had extreme difficulty last admit.    Not sure where problem that ppt recurrance would be - rx asthma, infection, resp failure, monitor.   Pt had angioedema and concerns with propofol infusion in June.  Pt had transient theraputic lovenox given with severe gas exchange problems [facility] admit  Pt was wheezing initially in June.    11/11t about 99, on azithro/rocephin, I/o 1417/3695, bun/creat 22/0.8,glu 144, k, wbc 20k, versed and propofol    abg 7.45/co2 48/02 75- on 60% peep 14 felt to be atelectasis  cxr hazey with morbid obese and atelectasis seen  Sputum nl estephanie    Discussed with brother Jaycob yesterday- pt moved out to cousin?  Brother not able to help.    11/11 too sedate , no apparent obstruction and min vol<8, need to get awake, wean peep then psv and consider extubate to niv.      11/12 tm 99.3 on rocephin/azithro, still on  60%, on cpap    Solumedrol 80 q8, precedex, I/o 565/2535= bun/crea 31/0.8    cxr 11/12 sm lungs  Doing well with cpap/psv- extubate epap 15/ipap 25, mobilize--needs to pass leak test.    11/13 extbated with very reasonable abg, bipap 20/10 seems ok for now.  .  Pt extubated, pt relates her cpap device did not seem to work effectively last wk.  She had been on steroids and using breo daily.      Pt felt to have severe chronic asthma and copd.     Would recommend non invasive ventilator - this is 2nd episode of severe life threatening respiratory failure in last 5 months.  Would recommend auto epap range 5-18, avaps mode tidal volume 350, back up rate 6, and inspiratory pressures 15- 25.       Due to patient's copd and chronic respiratory  failure, patient's conditioning is worsening and requires a non invasive home ventilator (nocturnal and daytime volume ventilation prn use) to prevent co2 retention and untimely hospital re admits. bipap is insufficient due to severity of condition.  Copd is primary cause of chronic respiratory failure.

## 2020-11-13 NOTE — PLAN OF CARE
Pt remains in ICU for treatment of respiratory failure, intubated on CPAP for most of shift- plan to extubate postponed until tomorrow (pt failed leak test, has copious secretions) patient placed back on SIMV with a rate this stanley- plan to reassess for extubation tomorrow AM, anesthesia prepared to be at bedside, precedex started @ 0.2 mcg/kg/hr for comfort, remained SR on heart monitor, urine output 1925 this shift, 400 mls out OG tube, glycopyrate started to help with copious secretions from mouth and nose, patient updated often on plan of care, able to communicate via writing, family updated, lateral rotation on bed, safety maintained.

## 2020-11-13 NOTE — ASSESSMENT & PLAN NOTE
Results for orders placed during the hospital encounter of 06/22/20   Echo Color Flow Doppler? Yes     Narrative · Concentric left ventricular remodeling.  · Normal left ventricular systolic function. The estimated ejection   fraction is 69%.  · Normal LV diastolic function.  · No wall motion abnormalities.  · Normal right ventricular systolic function.  · Mild right atrial enlargement.  · Trivial pericardial effusion.  · Mild left atrial enlargement.        Repeat TTE with no right heart strain and normal EF. BNP elevated on admission. Negative 6.7 L since admission.  -Continue to monitor I/O's and daily weights.  -Maintain oxygen sats >92%

## 2020-11-13 NOTE — PLAN OF CARE
Intubated.  For CPAP trial this am.  On precedex gtt at 0.3.  wakes easily and follows commands.  Communicates by writing on paper.  C/o throat pain unrelieved with prn tylenol.  Pt given once time dose of toradol 15 mg.  Plan is to extubate when anesthesia can be present.    Complete bed bath and linen change.  Currently menstruating.

## 2020-11-13 NOTE — PROGRESS NOTES
Pt currently on CPAP trial 10/+8 50%. Pt tolerating fairly well with infrequent apneic episodes, otherwise RR is 11-13. Vt mid 500s with minute ventilation ~5.8L. Pt sats maintaining at 98%, ETCO2 slightly increased ranging from 46-51. Will obtain ABG ~0530 ( 1 hour post CPAP initiation.)

## 2020-11-13 NOTE — SUBJECTIVE & OBJECTIVE
Interval History: patient successfully extubated to BiPAP; off all sedation; PT/OT/SLP consulted; states she feels well, yet complains of core throat; Nutrition consulted;     Review of Systems   Constitutional: Negative for chills and fever.   HENT: Positive for drooling and sore throat.    Eyes: Negative.    Respiratory: Negative for cough, choking, chest tightness and shortness of breath.    Cardiovascular: Negative for chest pain and palpitations.   Gastrointestinal: Negative for abdominal distention, abdominal pain, nausea and vomiting.   Endocrine: Negative.    Genitourinary: Negative.    Musculoskeletal: Negative.    Skin: Negative.    Allergic/Immunologic: Negative.    Neurological: Negative.    Hematological: Negative.    Psychiatric/Behavioral: Negative.      Objective:     Vital Signs (Most Recent):  Temp: 98.5 °F (36.9 °C) (11/13/20 0715)  Pulse: 66 (11/13/20 0841)  Resp: (!) 36 (11/13/20 0841)  BP: (!) 114/56 (11/13/20 0835)  SpO2: 99 % (11/13/20 0841) Vital Signs (24h Range):  Temp:  [98.2 °F (36.8 °C)-98.9 °F (37.2 °C)] 98.5 °F (36.9 °C)  Pulse:  [50-98] 66  Resp:  [10-41] 36  SpO2:  [91 %-100 %] 99 %  BP: ()/() 114/56     Weight: (!) 148.6 kg (327 lb 9.7 oz)  Body mass index is 59.92 kg/m².    Intake/Output Summary (Last 24 hours) at 11/13/2020 1045  Last data filed at 11/13/2020 1000  Gross per 24 hour   Intake 459.42 ml   Output 2580 ml   Net -2120.58 ml      Physical Exam  Vitals signs and nursing note reviewed.   Constitutional:       General: She is not in acute distress.     Appearance: She is well-developed. She is obese. She is ill-appearing. She is not toxic-appearing or diaphoretic.      Interventions: She is sedated, intubated and restrained.   HENT:      Head: Normocephalic and atraumatic.      Right Ear: External ear normal.      Left Ear: External ear normal.      Nose: Nose normal.      Mouth/Throat:      Mouth: Mucous membranes are moist.   Eyes:      Extraocular  Movements: Extraocular movements intact.      Conjunctiva/sclera: Conjunctivae normal.   Neck:      Musculoskeletal: No neck rigidity.      Vascular: No JVD.      Comments: Central line to IJ noted; dressing CDI  Cardiovascular:      Rate and Rhythm: Normal rate and regular rhythm.      Pulses: Normal pulses.      Heart sounds: No murmur. No friction rub. No gallop.    Pulmonary:      Effort: Pulmonary effort is normal. She is intubated.      Breath sounds: Normal breath sounds. No stridor. No wheezing, rhonchi or rales. Decreased breath sounds: Bilaterally.   Abdominal:      General: Abdomen is flat. Bowel sounds are normal. There is no distension.      Palpations: Abdomen is soft.      Tenderness: There is no abdominal tenderness. There is no guarding.   Genitourinary:     Vagina: Bleeding (small amount of old blood noted to sanitary napkin) present.      Comments: Vazquez in place draining hazy, yellow urine  Musculoskeletal: Normal range of motion.         General: No tenderness or deformity.      Right lower leg: No edema.      Left lower leg: No edema.   Skin:     General: Skin is warm and dry.      Comments: LEs with darkened, coarse skin   Neurological:      General: No focal deficit present.      Mental Status: She is alert.   Psychiatric:         Behavior: Behavior normal.         Significant Labs: All pertinent labs within the past 24 hours have been reviewed.    Significant Imaging: I have reviewed all pertinent imaging results/findings within the past 24 hours.

## 2020-11-13 NOTE — NURSING
Notified dr hamilton of most recent ABG's- patient passed leak test- notified anesthesia to be on standby for extubation possibly requiring re-intubation.

## 2020-11-13 NOTE — PT/OT/SLP EVAL
Physical Therapy Evaluation    Patient Name:  Michelle Wren   MRN:  [id number]    Recommendations:     Discharge Recommendations:  home health PT   Discharge Equipment Recommendations: none   Barriers to discharge: None    Assessment:     Michelle Wren is a 30 y.o. female admitted with a medical diagnosis of Acute on chronic respiratory failure with hypoxia and hypercapnia.  She presents with the following impairments/functional limitations:  weakness, impaired endurance, impaired functional mobilty, decreased lower extremity function, impaired cardiopulmonary response to activity . Pt seen at ICU, post extubation this am. Pt alert, pleasant, interactive, good voice. Pt is obese but mobilizing well in bed and to chair. Pt to benefit from continued therapies.    Rehab Prognosis: Good; patient would benefit from acute skilled PT services to address these deficits and reach maximum level of function.    Recent Surgery: * No surgery found *      Plan:     During this hospitalization, patient to be seen 6 x/week to address the identified rehab impairments via gait training, therapeutic activities, therapeutic exercises and progress toward the following goals:    · Plan of Care Expires:  11/14/20    Subjective   Pt stated has menses right now but should be over soon  Pt stated will work hard with goal of going home for son's 7th birthday on the 16th  Pt agreeable to get OOB  Chief Complaint: none  Patient/Family Comments/goals: get home soon  Pain/Comfort:  · Pain Rating 1: 0/10    Patients cultural, spiritual, Methodist conflicts given the current situation:      Living Environment:  Home with son/ cousin and her 3 children  Alternating home to stay with brother  Prior to admission, patients level of function was independent.  Equipment used at home: CPAP.  DME owned (not currently used): none.  Upon discharge, patient will have assistance from family.    Objective:     Communicated with nurse Whitney prior to  session.  Patient found HOB elevated with johnson catheter, oxygen, pulse ox (continuous), telemetry, central line  upon PT entry to room.    General Precautions: Standard, fall, respiratory   Orthopedic Precautions:N/A   Braces: N/A     Exams:  · Postural Exam:  Patient presented with the following abnormalities:    · -       Rounded shoulders  · -       Forward head  · -       obese  · RLE ROM: WFL  · RLE Strength: WFL  · LLE ROM: WFL  · LLE Strength: WFL    Functional Mobility:  · Bed Mobility:     · Rolling Left:  minimum assistance  · Scooting: minimum assistance  · Supine to Sit: minimum assistance  · Transfers:     · Sit to Stand:  minimum assistance with rolling walker  · Bed to Chair: minimum assistance with  rolling walker  using  Stand Pivot  · Gait: few steps with min assist, slow,small steps    Therapeutic Activities and Exercises:   Patient was educated on the importance of OOB activity and functional mobility to negate negative effects of prolonged bed rest during hospitalization, safe transfers and ambulation, and D/C planning   OOB to bariatric chair with min assist  Tray table in front  Pt eating peaches/cookies from swallow eval by [facility] 6 CLICK MOBILITY  Total Score:16     Patient left up in chair with all lines intact, call button in reach and nurse chadwick present.    GOALS:   Multidisciplinary Problems     Physical Therapy Goals        Problem: Physical Therapy Goal    Goal Priority Disciplines Outcome Goal Variances Interventions   Physical Therapy Goal     PT, PT/OT Ongoing, Progressing     Description: Goals to be met by: 2020     Patient will increase functional independence with mobility by performin. Supine to sit with Contact Guard Assistance  2. Sit to stand transfer with Contact Guard Assistance  3. Bed to chair transfer with Contact Guard Assistance   4. Gait  x 250 feet with Contact Guard Assistance using Rolling Walker.   5. Lower extremity exercise program  x20 reps                      History:     Past Medical History:   Diagnosis Date    Asthma     Back pain     Diabetes mellitus     Morbid obesity     Obesity        Past Surgical History:   Procedure Laterality Date    APPENDECTOMY       SECTION         Time Tracking:     PT Received On: 20  PT Start Time: 1336     PT Stop Time: 1357  PT Total Time (min): 21 min     Billable Minutes: Evaluation 10 and Therapeutic Activity 11      Yeni Granger, PT  2020

## 2020-11-13 NOTE — PROGRESS NOTES
[facility]  Adult Nutrition  Progress Note    SUMMARY      Intervention: collaboration with care providers and carbohydrate/sodium modified diet      Recommendation:   1) Continue DM 2000 kcal, low sodium diet ( 4 carb servings/meal)   2) Add boost glucose control BID     3) If unable to tolerate PO diet place NGT for TF   Peptamen intense VHP @ 10 ml/hr advancing to goal of 50 ml/hr while on propofol + 130 ml flush q 4 hr ( 60 ml/hr when propofol d/c'd)   ( provides 1440 kcal (66% EEN permissive underfeeding), 132 g protein ( 100% EPN), and 1209 ml free water)   -if needed PPN D 5 AA 4.25 @ 85 ml/hr + standard lipids appropriate     4) weigh pt weekly     Goals: 1) PO diet advance in < 4 days or TF initiated 2) PO intakes > 50% EEN at f/u  Nutrition Goal Status: met/ new  Communication of RD Recs: (POC, sticky note, reviewed with MD)     Reason for Assessment     Reason For Assessment: follow up  Diagnosis: (acute on chronic respiratory failure)  Relevant Medical History: asthma, DM2, obesity, HTN, restrictive lung disease  Interdisciplinary Rounds: attended     General Information Comments: 29 y/o female admitted with respiratory failure, multiple causes likely pneumonia/asthma/obesity hypoventilation syndrome per H&P. Arrived + vent from another hospital. NPO x at least 2 days. On high dose propofol no pressor. NFPE done 11/10/20, no wasting seen. Wt gain per chart review.  11/13/20 NPO x 4 days. Pt extubated. + nasal cannula. SLP rec. Regular textures. Diabetic diet advanced this afternoon. C/o throat pain.     Nutrition Discharge Planning: to be determined- DM 1500 kcal, low sodium diet     Nutrition Risk Screen     Nutrition Risk Screen: no indicators present     Nutrition/Diet History     Typical Food/Fluid Intake: unable to obtain- Nutrition note 7/10/20 pt was not following diabetic diet but received education. Skips 1-2 meals/day r/t busy schedule  Spiritual, Cultural Beliefs, Voodoo  "Practices, Values that Affect Care: no  Food Allergies: NKFA  Factors Affecting Nutritional Intake: decreased appetite     Anthropometrics  Height Method: Stated(office)  Height: 5' 2"  Height (inches): 62 in  Weight Method: Bed Scale  Weight: 148.6 kg ( 11/13), (!) 163 kg (359 lb 5.6 oz)  Weight (lb): (!) 327 lb ( on lasix)  Ideal Body Weight (IBW), Female: 110 lb  BMI (Calculated): 59 kg/m2  BMI Grade: greater than 40 - morbid obesity  Usual Body Weight (UBW), kg: (!) 161 kg(7/30/20)     Lab/Procedures/Meds     Pertinent Labs Reviewed: reviewed  BMP  Lab Results   Component Value Date     11/13/2020    K 3.6 11/13/2020     11/13/2020    CO2 33 (H) 11/13/2020    BUN 33 (H) 11/13/2020    CREATININE 0.8 11/13/2020    CALCIUM 8.9 11/13/2020    ANIONGAP 9 11/13/2020    ESTGFRAFRICA >60 11/13/2020    EGFRNONAA >60 11/13/2020     Recent Labs   Lab 11/13/20  1129   POCTGLUCOSE 116*     Lab Results   Component Value Date    CALCIUM 8.9 11/13/2020    PHOS 4.0 11/13/2020       Pertinent Medications Reviewed: reviewed  Lasix, zofran, KCl, methylprednisolone, Mg sulfate, insulin     Estimated/Assessed Needs     Weight Used For Calorie Calculations: 148.6 kg  Energy Calorie Requirements (kcal): [facility] modified: 2160 kcal  Energy Need Method: [place] Jeor ( no AF obesity)   Protein Requirements: 2.5 g protein/kg IBW ( critical care obese) = 130 g protein  Weight Used For Protein Calculations: 52 kg (114 lb 10.2 oz)(IBW)  Fluid Requirements (mL): 3383-3677 ml or per MD  Estimated Fluid Requirement Method: RDA Method  CHO Requirement: 243-270 g        Nutrition Prescription Ordered     Current Diet Order: DM 2000 kcal, low sodium diet     Evaluation of Received Nutrient/Fluid Intake     Energy Calories Required: not meeting needs  Protein Required: not meeting needs  Fluid Required: not meeting needs  Tolerance: other (see comments)(NPO)  % Intake of Estimated Energy Needs: 44% propofol  % Meal Intake: " 0%     Nutrition Risk     Level of Risk/Frequency of Follow-up: moderate 2 x weekly     Assessment and Plan     Inadequate energy intake  R/t NPO  As evidenced by PO intakes < 50% EEN x 2 days  Intervention: above  continues     Monitor and Evaluation     Food and Nutrient Intake: energy intake  Food and Nutrient Adminstration: diet order, enteral and parenteral nutrition administration  Anthropometric Measurements: weight  Biochemical Data, Medical Tests and Procedures: electrolyte and renal panel, gastrointestinal profile  Nutrition-Focused Physical Findings: overall appearance      Malnutrition Assessment  Skin (Micronutrient): (Maxi = 13)  Teeth (Micronutrient): (WDL)   Micronutrient Evaluation: suspected deficiency  Micronutrient Evaluation Comments: check iron, foalte, b12               Edema (Fluid Accumulation): 1-2+  Subcutaneous Fat Loss (Final Summary): well nourished  Muscle Loss Evaluation (Final Summary): well nourished       Nutrition Follow-Up

## 2020-11-13 NOTE — PROGRESS NOTES
"[facility] Medicine  Progress Note    Patient Name: Michelle Wren  MRN: [id number]  Patient Class: IP- Inpatient   Admission Date: 11/9/2020  Length of Stay: 4 days  Attending Physician: Alejandro Quiroz MD  Primary Care Provider: Teressa Saeed NP        Subjective:     Principal Problem:Acute on chronic respiratory failure with hypoxia and hypercapnia        HPI:  Michelle Wren is a 30 y.o. female with a PMHx of restrictive lung disease, morbid obesity, severe persistent asthma, hypertension, diabetes and obesity hypoventilation syndrome  who presented to the [place] ED last night around 5:00 p.m. with respiratory failure. Per  physician note she was "admitted and treated with BiPAP IV steroids, empiric antibiotics, DuoNebs IV Lasix with low threshold to intubate.  Tried to transfer to [facility] but she refused.  Around 9:00 p.m. she was only arousable to sternal rub but and intubated for airway protection.  AC rate 16, , peep 5, FiO2 40%.  Vitals within normal limits.  Diprivan at 50 mics per kg per minute, Nimbex at 3 mics per kg per minute - weaning nimbex and changing to versed.  Today, Dr. Arellano placed a right IJ triple-lumen central line. Seen by Dr Gentile, pulmonology, during last inpatient stay and then in clinic on 7/30 - essentially needs a Trilogy. Her family reported that her sats drop to the 70s with minimal exertion in her house."  The patient is being transferred [facility] for pulmonology services.    Upon arrival to [facility], patient is sedated and intubated.  The patient is unable to provide any history, so history was mainly taken from  physician note and previous hospital records. Per chart review patient has similar presentation in June that resulted in a prolonged intubation. The patient has followed up outpatient with pulmonology once since previous admission.  She is supposed to be on BiPAP, but unsure if she " is compliant.  The patient will be admitted to the intensive care unit under Hospital Medicine for further workup and evaluation.    Overview/Hospital Course:  -11/10 Patient sedated and intubated. On ceftriaxone/azithromycin. Remains on IV methylprednisolone. Also on therapeutic dose Lovenox per Pulmonary recommendations.  -11/11 Patient to change to PS mode and will try to extubate; on Precedex and Propofol infusions. All cultures negative.   -11/12 attempting extubation with Anesthesia ready; all cultures remain negative  -11/13 Patient successfully extubated to BiPAP; PT/OT/SLP consulted; Lasix discontinued; Lovenox dose adjusted; remains on steroids and and azithro/ceftrixone    Interval History: patient successfully extubated to BiPAP; off all sedation; PT/OT/SLP consulted; states she feels well, yet complains of core throat; Nutrition consulted;     Review of Systems   Constitutional: Negative for chills and fever.   HENT: Positive for drooling and sore throat.    Eyes: Negative.    Respiratory: Negative for cough, choking, chest tightness and shortness of breath.    Cardiovascular: Negative for chest pain and palpitations.   Gastrointestinal: Negative for abdominal distention, abdominal pain, nausea and vomiting.   Endocrine: Negative.    Genitourinary: Negative.    Musculoskeletal: Negative.    Skin: Negative.    Allergic/Immunologic: Negative.    Neurological: Negative.    Hematological: Negative.    Psychiatric/Behavioral: Negative.      Objective:     Vital Signs (Most Recent):  Temp: 98.5 °F (36.9 °C) (11/13/20 0715)  Pulse: 66 (11/13/20 0841)  Resp: (!) 36 (11/13/20 0841)  BP: (!) 114/56 (11/13/20 0835)  SpO2: 99 % (11/13/20 0841) Vital Signs (24h Range):  Temp:  [98.2 °F (36.8 °C)-98.9 °F (37.2 °C)] 98.5 °F (36.9 °C)  Pulse:  [50-98] 66  Resp:  [10-41] 36  SpO2:  [91 %-100 %] 99 %  BP: ()/() 114/56     Weight: (!) 148.6 kg (327 lb 9.7 oz)  Body mass index is 59.92 kg/m².    Intake/Output  Summary (Last 24 hours) at 11/13/2020 1045  Last data filed at 11/13/2020 1000  Gross per 24 hour   Intake 459.42 ml   Output 2580 ml   Net -2120.58 ml      Physical Exam  Vitals signs and nursing note reviewed.   Constitutional:       General: She is not in acute distress.     Appearance: She is well-developed. She is obese. She is ill-appearing. She is not toxic-appearing or diaphoretic.      Interventions: She is sedated, intubated and restrained.   HENT:      Head: Normocephalic and atraumatic.      Right Ear: External ear normal.      Left Ear: External ear normal.      Nose: Nose normal.      Mouth/Throat:      Mouth: Mucous membranes are moist.   Eyes:      Extraocular Movements: Extraocular movements intact.      Conjunctiva/sclera: Conjunctivae normal.   Neck:      Musculoskeletal: No neck rigidity.      Vascular: No JVD.      Comments: Central line to IJ noted; dressing CDI  Cardiovascular:      Rate and Rhythm: Normal rate and regular rhythm.      Pulses: Normal pulses.      Heart sounds: No murmur. No friction rub. No gallop.    Pulmonary:      Effort: Pulmonary effort is normal. She is intubated.      Breath sounds: Normal breath sounds. No stridor. No wheezing, rhonchi or rales. Decreased breath sounds: Bilaterally.   Abdominal:      General: Abdomen is flat. Bowel sounds are normal. There is no distension.      Palpations: Abdomen is soft.      Tenderness: There is no abdominal tenderness. There is no guarding.   Genitourinary:     Vagina: Bleeding (small amount of old blood noted to sanitary napkin) present.      Comments: Vazquez in place draining hazy, yellow urine  Musculoskeletal: Normal range of motion.         General: No tenderness or deformity.      Right lower leg: No edema.      Left lower leg: No edema.   Skin:     General: Skin is warm and dry.      Comments: LEs with darkened, coarse skin   Neurological:      General: No focal deficit present.      Mental Status: She is alert.    Psychiatric:         Behavior: Behavior normal.         Significant Labs: All pertinent labs within the past 24 hours have been reviewed.    Significant Imaging: I have reviewed all pertinent imaging results/findings within the past 24 hours.      Assessment/Plan:      * Acute on chronic respiratory failure with hypoxia and hypercapnia  Patient with history of prolonged intubation. Failed BiPAP therapy at [place] requiring intubation. Increased respiratory rate and tachycardic on admission. Extubated to BiPAP 11/13. Afebrile. WBC elevated, yet on steroids. Lactate rising as high to 3.4 that has since normalized. BNP also elevated to 261, yet recent TTE with no cardiac dysfunction; no right heart strain on repeat TTE. D-dimer elevated. ABGs improving throughout course. CXR shows bilateral lower lobe infiltrates; history of MSSA in sputum, yet cultures negative on this admission and CXR improving. Likely secondary to PNA, asthma exacerbation, obesity hypoventilation syndrome, PE or some combination.    ABG  Recent Labs   Lab 11/13/20  0816   PH 7.357   PO2 78*   PCO2 58.1*   HCO3 32.6*   BE 7   -PPI for PPx  -Continue supplemental oxygen to keep oxygen saturation >92%; BiPAP to be weaned as tolerated  -Continue DuoNebs and IV steroids, Singulair   -Pulmonology consulted, appreciate recommendations  -Continue Rocephin and azithromycin for five day course  -Continue Lovenox SQ at 40 mg Q12H    Severe sepsis  Tachycardiac and tachypneic on admission. Afebrile. WBC elevated, yet on steroids; decreasing. Lactate normalized. Respiratory cultures negative. Blood cultures negative. Bilateral infiltrates on CXR since improving. U/A concerning for UTI, yet UClx negative. Patient may have PNA. Sepsis resolved.  -Extubated 11/13  -Follow up all cultures  -Empiric Rocephin and azithromycin as above for five day course  -Caution with IVF repletion given elevated BNP    Obesity hypoventilation syndrome  Currently  intubated.  -Appreciate Pulmonology recommendations  -Please see acute on chronic respiratory failure    Cardiomegaly  Results for orders placed during the hospital encounter of 06/22/20   Echo Color Flow Doppler? Yes     Narrative · Concentric left ventricular remodeling.  · Normal left ventricular systolic function. The estimated ejection   fraction is 69%.  · Normal LV diastolic function.  · No wall motion abnormalities.  · Normal right ventricular systolic function.  · Mild right atrial enlargement.  · Trivial pericardial effusion.  · Mild left atrial enlargement.        Repeat TTE with no right heart strain and normal EF. BNP elevated on admission. Negative 6.7 L since admission.  -Continue to monitor I/O's and daily weights.  -Maintain oxygen sats >92%     Type 2 diabetes mellitus  Patient's FSGs are controlled on current hypoglycemics.   Last A1c reviewed-   Lab Results   Component Value Date    HGBA1C 6.2 06/23/2020     Most recent fingerstick glucose reviewed-   Recent Labs   Lab 11/12/20  1223 11/12/20  1659 11/13/20  0001 11/13/20  0513   POCTGLUCOSE 137* 121* 113* 100     Current correctional scale  Medium  Maintain anti-hyperglycemic dose as follows-   Antihyperglycemics (From admission, onward)    Start     Stop Route Frequency Ordered    11/09/20 1825  insulin aspart U-100 pen 1-10 Units      -- SubQ Every 6 hours PRN 11/09/20 1825      -SSI  -Hypoglycemic precautions  -Nutrition consulted, will institute tube feeding diet 11/12 if patient remains intubated; may try diabetic diet if successfully extuabted  -Blood glucose checks Q6H    Severe persistent asthma with acute exacerbation  See respiratory failure.    Bradycardia  Thought to be secondary to Precedex and Robinul.  -Both medications discontinued  -Continue to monitor      Malnutrition of moderate degree        Morbid obesity  Body mass index is 59.92 kg/m². Morbid obesity complicates all aspects of disease management from diagnostic modalities  to treatment. Will encourage weight loss explain health benefits when patient is not sedated.    Pulmonary hypertension  Repeat TTE with normal EF and concentric remodeling.  -Continue to treat per Pulmonology recommendations  -Monitor I's and O's    Pneumonia due to infectious organism  Patient with current diagnosis of possible pneumonia which is uncontrolled due to persistent hypoxia . Current antimicrobial regimen consists of   Antibiotics (From admission, onward)    Start     Stop Route Frequency Ordered    11/10/20 2043  cefTRIAXone (ROCEPHIN) 2 g/50 mL D5W IVPB      -- IV Every 24 hours (non-standard times) 11/10/20 0429    11/10/20 0900  mupirocin 2 % ointment      11/15 0859 Nasl 2 times daily 11/10/20 0430    11/10/20 0530  azithromycin 500 mg in dextrose 5 % 250 mL IVPB (ready to mix system)      -- IV Every 24 hours (non-standard times) 11/10/20 0429      . Cultures currently  resulted. Negative.  Will monitor patient closely and continue current treatment plan unchanged.      VTE Risk Mitigation (From admission, onward)         Ordered     enoxaparin injection 40 mg  Every 12 hours      11/13/20 1038                Discharge Planning   BAKARI: 11/16/2020    Code Status: Full Code   Is the patient medically ready for discharge?:     Reason for patient still in hospital (select all that apply): Patient trending condition, Consult recommendations, PT / OT recommendations and Pending disposition  Discharge Plan A: Home with family            Critical care time spent on the evaluation and treatment of severe organ dysfunction, review of pertinent labs and imaging studies, discussions with consulting providers and discussions with patient/family: 31 minutes.      Alejandro Quiroz MD  Department of Hospital Medicine   [facility]

## 2020-11-13 NOTE — RESPIRATORY THERAPY
11/13/20 0716   PRE-TX-O2   Oxygen Concentration (%) 50   SpO2 98 %   Pulse (!) 55   Resp (!) 36   ETCO2   ETCO2 (mmHg) 50 mmHg   Vent Select   Charged w/in last 24h YES   Preset Conventional Ventilator Settings   Vent Type    Ventilation Type VC   Vent Mode Spont   Humidity HME   Set Rate 0 BPM   Vt Set 500 mL   PEEP/CPAP 8 cmH20   Pressure Support 15 cmH20   Waveform RAMP   Peak Flow 60 L/min   Set Inspiratory Pressure 0 cmH20   Insp Time 0 Sec(s)   Plateau Set/Insp. Hold (sec) 0   Insp Rise Time  50 %   Trigger Sensitivity Flow/I-Trigger 1.5 L/min   P High 0 cm H2O   P Low 0 cm H2O   T High 0 sec   T Low 0 sec   Patient Ventilator Parameters   Resp Rate Total 9.5 br/min   Peak Airway Pressure 23 cmH2O   Mean Airway Pressure 12 cmH20   Plateau Pressure 33 cmH20   Exhaled Vt 616 mL   Total Ve 5.62 mL   Spont Ve 5.62 L   I:E Ratio Measured 1:3.10   Conventional Ventilator Alarms   Ve High Alarm 30 L/min   Resp Rate High Alarm 35 br/min   Press High Alarm 50 cmH2O   Apnea Rate 16   Apnea Volume (mL) 500 mL   Apnea Oxygen Concentration  100   Apnea Flow Rate (L/min) 60   T Apnea 20 sec(s)   Ready to Wean/Extubation Screen   FIO2<=50 (chart decimal) 0.5   MV<16L (chart vol.) 5.62   PEEP <=8 (chart #) 8   Ready to Wean Parameters   F/VT Ratio<105 (RSBI) (!) 58.44

## 2020-11-13 NOTE — ASSESSMENT & PLAN NOTE
Thought to be secondary to Precedex and Robinul.  -Both medications discontinued  -Continue to monitor

## 2020-11-13 NOTE — PLAN OF CARE
BAYLEE completed referral form for [facility] through [facility]. Patrice ( [phone])updated of referral and following.  Per Patrice, pt's insurance needs an auth first. CM updated Dr. Gentile of need for signature-asked me to leave on chart and he will sign in AM. CM will ask covering CM to fax to Patrice at [phone]       11/13/20 1637   Post-Acute Status   Post-Acute Authorization HME   E Status Referrals Sent

## 2020-11-13 NOTE — PLAN OF CARE
Intervention: collaboration with care providers and carbohydrate/sodium modified diet      Recommendation:   1) Continue DM 2000 kcal, low sodium diet ( 4 carb servings/meal)   2) Add boost glucose control BID     3) If unable to tolerate PO diet place NGT for TF   Peptamen intense VHP @ 10 ml/hr advancing to goal of 50 ml/hr while on propofol + 130 ml flush q 4 hr ( 60 ml/hr when propofol d/c'd)   ( provides 1440 kcal (66% EEN permissive underfeeding), 132 g protein ( 100% EPN), and 1209 ml free water)   -if needed PPN D 5 AA 4.25 @ 85 ml/hr + standard lipids appropriate     4) weigh pt weekly     Goals: 1) PO diet advance in < 4 days or TF initiated 2) PO intakes > 50% EEN at f/u  Nutrition Goal Status: met/ new  Communication of RD Recs: (POC, sticky note, reviewed with MD)

## 2020-11-14 LAB
ALBUMIN SERPL BCP-MCNC: 3 G/DL (ref 3.5–5.2)
ALP SERPL-CCNC: 55 U/L (ref 55–135)
ALT SERPL W/O P-5'-P-CCNC: 17 U/L (ref 10–44)
ANION GAP SERPL CALC-SCNC: 8 MMOL/L (ref 8–16)
AST SERPL-CCNC: 22 U/L (ref 10–40)
BACTERIA BLD CULT: NORMAL
BASOPHILS # BLD AUTO: 0.02 K/UL (ref 0–0.2)
BASOPHILS NFR BLD: 0.1 % (ref 0–1.9)
BILIRUB SERPL-MCNC: 0.7 MG/DL (ref 0.1–1)
BUN SERPL-MCNC: 27 MG/DL (ref 6–20)
CALCIUM SERPL-MCNC: 8.9 MG/DL (ref 8.7–10.5)
CHLORIDE SERPL-SCNC: 99 MMOL/L (ref 95–110)
CO2 SERPL-SCNC: 33 MMOL/L (ref 23–29)
CREAT SERPL-MCNC: 0.8 MG/DL (ref 0.5–1.4)
DIFFERENTIAL METHOD: ABNORMAL
EOSINOPHIL # BLD AUTO: 0 K/UL (ref 0–0.5)
EOSINOPHIL NFR BLD: 0.1 % (ref 0–8)
ERYTHROCYTE [DISTWIDTH] IN BLOOD BY AUTOMATED COUNT: 17.6 % (ref 11.5–14.5)
EST. GFR  (AFRICAN AMERICAN): >60 ML/MIN/1.73 M^2
EST. GFR  (NON AFRICAN AMERICAN): >60 ML/MIN/1.73 M^2
GLUCOSE SERPL-MCNC: 94 MG/DL (ref 70–110)
HCT VFR BLD AUTO: 46.3 % (ref 37–48.5)
HGB BLD-MCNC: 13.6 G/DL (ref 12–16)
IMM GRANULOCYTES # BLD AUTO: 0.08 K/UL (ref 0–0.04)
IMM GRANULOCYTES NFR BLD AUTO: 0.5 % (ref 0–0.5)
LYMPHOCYTES # BLD AUTO: 1.5 K/UL (ref 1–4.8)
LYMPHOCYTES NFR BLD: 9.7 % (ref 18–48)
MAGNESIUM SERPL-MCNC: 2.4 MG/DL (ref 1.6–2.6)
MCH RBC QN AUTO: 22.9 PG (ref 27–31)
MCHC RBC AUTO-ENTMCNC: 29.4 G/DL (ref 32–36)
MCV RBC AUTO: 78 FL (ref 82–98)
MONOCYTES # BLD AUTO: 2.3 K/UL (ref 0.3–1)
MONOCYTES NFR BLD: 14.9 % (ref 4–15)
NEUTROPHILS # BLD AUTO: 11.6 K/UL (ref 1.8–7.7)
NEUTROPHILS NFR BLD: 74.7 % (ref 38–73)
NRBC BLD-RTO: 0 /100 WBC
PHOSPHATE SERPL-MCNC: 3.8 MG/DL (ref 2.7–4.5)
PLATELET # BLD AUTO: 358 K/UL (ref 150–350)
PLATELET BLD QL SMEAR: ABNORMAL
PMV BLD AUTO: 11.4 FL (ref 9.2–12.9)
POCT GLUCOSE: 103 MG/DL (ref 70–110)
POCT GLUCOSE: 95 MG/DL (ref 70–110)
POCT GLUCOSE: 98 MG/DL (ref 70–110)
POTASSIUM SERPL-SCNC: 3.8 MMOL/L (ref 3.5–5.1)
PROT SERPL-MCNC: 6.8 G/DL (ref 6–8.4)
RBC # BLD AUTO: 5.94 M/UL (ref 4–5.4)
SODIUM SERPL-SCNC: 140 MMOL/L (ref 136–145)
WBC # BLD AUTO: 15.49 K/UL (ref 3.9–12.7)

## 2020-11-14 PROCEDURE — 25000003 PHARM REV CODE 250: Performed by: NURSE PRACTITIONER

## 2020-11-14 PROCEDURE — 25000242 PHARM REV CODE 250 ALT 637 W/ HCPCS: Performed by: INTERNAL MEDICINE

## 2020-11-14 PROCEDURE — 94640 AIRWAY INHALATION TREATMENT: CPT

## 2020-11-14 PROCEDURE — 83735 ASSAY OF MAGNESIUM: CPT

## 2020-11-14 PROCEDURE — 25000003 PHARM REV CODE 250: Performed by: STUDENT IN AN ORGANIZED HEALTH CARE EDUCATION/TRAINING PROGRAM

## 2020-11-14 PROCEDURE — 99232 SBSQ HOSP IP/OBS MODERATE 35: CPT | Mod: ,,, | Performed by: INTERNAL MEDICINE

## 2020-11-14 PROCEDURE — 97116 GAIT TRAINING THERAPY: CPT

## 2020-11-14 PROCEDURE — 94799 UNLISTED PULMONARY SVC/PX: CPT

## 2020-11-14 PROCEDURE — C9113 INJ PANTOPRAZOLE SODIUM, VIA: HCPCS | Performed by: INTERNAL MEDICINE

## 2020-11-14 PROCEDURE — 99232 PR SUBSEQUENT HOSPITAL CARE,LEVL II: ICD-10-PCS | Mod: ,,, | Performed by: INTERNAL MEDICINE

## 2020-11-14 PROCEDURE — 84100 ASSAY OF PHOSPHORUS: CPT

## 2020-11-14 PROCEDURE — 85025 COMPLETE CBC W/AUTO DIFF WBC: CPT

## 2020-11-14 PROCEDURE — 80053 COMPREHEN METABOLIC PANEL: CPT

## 2020-11-14 PROCEDURE — 94761 N-INVAS EAR/PLS OXIMETRY MLT: CPT

## 2020-11-14 PROCEDURE — 63600175 PHARM REV CODE 636 W HCPCS: Performed by: INTERNAL MEDICINE

## 2020-11-14 PROCEDURE — 36415 COLL VENOUS BLD VENIPUNCTURE: CPT

## 2020-11-14 PROCEDURE — 63600175 PHARM REV CODE 636 W HCPCS: Performed by: STUDENT IN AN ORGANIZED HEALTH CARE EDUCATION/TRAINING PROGRAM

## 2020-11-14 PROCEDURE — 11000001 HC ACUTE MED/SURG PRIVATE ROOM

## 2020-11-14 PROCEDURE — 63600175 PHARM REV CODE 636 W HCPCS: Performed by: NURSE PRACTITIONER

## 2020-11-14 PROCEDURE — 99900035 HC TECH TIME PER 15 MIN (STAT)

## 2020-11-14 PROCEDURE — 27000221 HC OXYGEN, UP TO 24 HOURS

## 2020-11-14 PROCEDURE — 25000242 PHARM REV CODE 250 ALT 637 W/ HCPCS: Performed by: NURSE PRACTITIONER

## 2020-11-14 PROCEDURE — 94660 CPAP INITIATION&MGMT: CPT

## 2020-11-14 RX ADMIN — ENOXAPARIN SODIUM 40 MG: 40 INJECTION SUBCUTANEOUS at 08:11

## 2020-11-14 RX ADMIN — MUPIROCIN: 20 OINTMENT TOPICAL at 08:11

## 2020-11-14 RX ADMIN — IPRATROPIUM BROMIDE AND ALBUTEROL SULFATE 3 ML: .5; 2.5 SOLUTION RESPIRATORY (INHALATION) at 08:11

## 2020-11-14 RX ADMIN — PREDNISONE 20 MG: 20 TABLET ORAL at 08:11

## 2020-11-14 RX ADMIN — PANTOPRAZOLE SODIUM 40 MG: 40 INJECTION, POWDER, FOR SOLUTION INTRAVENOUS at 08:11

## 2020-11-14 RX ADMIN — IPRATROPIUM BROMIDE AND ALBUTEROL SULFATE 3 ML: .5; 2.5 SOLUTION RESPIRATORY (INHALATION) at 03:11

## 2020-11-14 RX ADMIN — FUROSEMIDE 20 MG: 20 TABLET ORAL at 08:11

## 2020-11-14 RX ADMIN — IPRATROPIUM BROMIDE AND ALBUTEROL SULFATE 3 ML: .5; 2.5 SOLUTION RESPIRATORY (INHALATION) at 12:11

## 2020-11-14 RX ADMIN — MONTELUKAST 10 MG: 10 TABLET, FILM COATED ORAL at 08:11

## 2020-11-14 NOTE — PLAN OF CARE
Problem: Physical Therapy Goal  Goal: Physical Therapy Goal  Description: Goals to be met by: 2020     Patient will increase functional independence with mobility by performin. Supine to sit with Contact Guard Assistance  2. Sit to stand transfer with Contact Guard Assistance  3. Bed to chair transfer with Contact Guard Assistance   4. Gait  x 250 feet with Contact Guard Assistance using Rolling Walker.   5. Lower extremity exercise program x20 reps     Outcome: Ongoing, Progressing   Pt ambulated 100ft with RW min assist with O2, SAT 93%. OOB to chair. Completed LE's thera ex

## 2020-11-14 NOTE — ASSESSMENT & PLAN NOTE
Weaned to O2 4L per NC, Cont to have BIPAP at noc. Cont prednisone 20 BID, Nebs q4hr, O2 sat, IS q4hr.  Per Pulmonary will need a non-invasive home ventilator to  Prevent CO2 retention and untimely hospital readmits. Bipap is insufficient due to severity of condition.

## 2020-11-14 NOTE — PT/OT/SLP PROGRESS
Physical Therapy Treatment    Patient Name:  Michelle Wren   MRN:  [id number]    Recommendations:     Discharge Recommendations:  home health PT   Discharge Equipment Recommendations: none   Barriers to discharge: None    Assessment:     Michelle Wren is a 30 y.o. female admitted with a medical diagnosis of Acute on chronic respiratory failure with hypoxia and hypercapnia.  She presents with the following impairments/functional limitations:  weakness, impaired endurance, impaired functional mobilty, gait instability, impaired cardiopulmonary response to activity . Pt ambulated 100ft with RW with O2. OOB to chair and completed LE's thera ex. Pt should progress well with therapies.    Rehab Prognosis: Fair; patient would benefit from acute skilled PT services to address these deficits and reach maximum level of function.    Recent Surgery: * No surgery found *      Plan:     During this hospitalization, patient to be seen 6 x/week to address the identified rehab impairments via gait training, therapeutic activities, therapeutic exercises and progress toward the following goals:    · Plan of Care Expires:  11/30/20    Subjective   Pt stated is going to move out of ICU today  Pt hoping to go home soon to prepare for son's birthday  Chief Complaint: did not sleep well  Patient/Family Comments/goals: get home soon  Pain/Comfort:  · Pain Rating 1: 0/10      Objective:     Communicated with nurse Hare prior to session.  Patient found HOB elevated with blood pressure cuff, pulse ox (continuous), oxygen upon PT entry to room.     General Precautions: Standard, fall, respiratory   Orthopedic Precautions:N/A   Braces: N/A     Functional Mobility:  · Bed Mobility:     · Rolling Left:  minimum assistance  · Scooting: minimum assistance  · Supine to Sit: minimum assistance  · Transfers:     · Sit to Stand:  minimum assistance with rolling walker  · Bed to Chair: minimum assistance with  rolling walker  using  Stand  Pivot  · Gait: 100ft with Rw min assist with O2 . SAT 93%      AM-PAC 6 CLICK MOBILITY          Therapeutic Activities and Exercises:   Patient was educated on the importance of OOB activity and functional mobility to negate negative effects of prolonged bed rest during hospitalization, safe transfers and ambulation, and D/C planning   OOB to chair post PT  thera ex x 10-20 reps with AP,LAQ, abd/add    Patient left up in chair with all lines intact, call button in reach and nurse Payam notified..    GOALS:   Multidisciplinary Problems     Physical Therapy Goals        Problem: Physical Therapy Goal    Goal Priority Disciplines Outcome Goal Variances Interventions   Physical Therapy Goal     PT, PT/OT Ongoing, Progressing     Description: Goals to be met by: 2020     Patient will increase functional independence with mobility by performin. Supine to sit with Contact Guard Assistance  2. Sit to stand transfer with Contact Guard Assistance  3. Bed to chair transfer with Contact Guard Assistance   4. Gait  x 250 feet with Contact Guard Assistance using Rolling Walker.   5. Lower extremity exercise program x20 reps                      Time Tracking:     PT Received On: 20  PT Start Time: 1017     PT Stop Time: 1035  PT Total Time (min): 18 min     Billable Minutes: Gait Training 18    Treatment Type: Treatment  PT/PTA: PT     PTA Visit Number: 0     Yeni Granger, PT  2020

## 2020-11-14 NOTE — ASSESSMENT & PLAN NOTE
Patient's FSGs are controlled on current hypoglycemics.   Last A1c reviewed-   Lab Results   Component Value Date    HGBA1C 6.2 06/23/2020     Most recent fingerstick glucose reviewed-   Recent Labs   Lab 11/13/20  1724 11/13/20 2042 11/14/20  1136   POCTGLUCOSE 111* 97 95     Current correctional scale  Medium  Maintain anti-hyperglycemic dose as follows-   Antihyperglycemics (From admission, onward)    Start     Stop Route Frequency Ordered    11/09/20 1825  insulin aspart U-100 pen 1-10 Units      -- SubQ Every 6 hours PRN 11/09/20 1825      -SSI  -Hypoglycemic precautions  -Nutrition consulted, will institute tube feeding diet 11/12 if patient remains intubated; may try diabetic diet if successfully extuabted  -Blood glucose checks Q6H

## 2020-11-14 NOTE — SUBJECTIVE & OBJECTIVE
Subjective: 11/14/2020  LOS: Day 2  FU: Acute on chronic Respiratory Failure with hypoxia and hypercapnia    Interval History: Ms VIOLETTA was seen and examined. All pertinent documents, labs and diagnostic reviewed.     TX:  Acute on chronic resp failure:  Weaned to O2 4L per NC, Cont to have BIPAP at noc. Cont prednisone 20 BID, Nebs q4hr, O2 sat, IS q4hr.  Per Pulmonary will need a non-invasive home ventilator to  Prevent CO2 retention and untimely hospital readmits. Bipap is insufficient due to severity of condition.     T2DM  POCT glucose, SSI, diabetic diet      Review of Systems  Objective:     Vital Signs (Most Recent):  Temp: 98.2 °F (36.8 °C) (11/14/20 0800)  Pulse: 90 (11/14/20 1030)  Resp: (!) 31 (11/14/20 1030)  BP: (!) 149/67 (11/14/20 1000)  SpO2: (!) 91 % (11/14/20 1030) Vital Signs (24h Range):  Temp:  [98.2 °F (36.8 °C)-98.8 °F (37.1 °C)] 98.2 °F (36.8 °C)  Pulse:  [76-96] 90  Resp:  [12-31] 31  SpO2:  [91 %-100 %] 91 %  BP: (118-161)/(55-98) 149/67     Weight: (!) 152.7 kg (336 lb 10.3 oz)  Body mass index is 61.57 kg/m².    Intake/Output Summary (Last 24 hours) at 11/14/2020 1442  Last data filed at 11/14/2020 0600  Gross per 24 hour   Intake 690 ml   Output 975 ml   Net -285 ml      Physical Exam  Vitals signs and nursing note reviewed.   Constitutional:       General: She is not in acute distress.     Appearance: She is obese. She is not ill-appearing or toxic-appearing.   HENT:      Head: Normocephalic and atraumatic.      Nose: Nose normal.      Mouth/Throat:      Mouth: Mucous membranes are moist.      Pharynx: Oropharynx is clear.   Eyes:      Conjunctiva/sclera: Conjunctivae normal.   Neck:      Musculoskeletal: Normal range of motion.   Cardiovascular:      Rate and Rhythm: Normal rate and regular rhythm.      Pulses: Normal pulses.      Heart sounds: Normal heart sounds.   Pulmonary:      Effort: Pulmonary effort is normal.      Breath sounds: No wheezing.      Comments: Breath sounds  diminished throughout due to body habitus, clear in upper lobes.  Abdominal:      Palpations: Abdomen is soft.      Tenderness: There is no abdominal tenderness. There is no guarding.      Hernia: No hernia is present.      Comments: obese   Musculoskeletal: Normal range of motion.         General: No swelling.      Right lower leg: No edema.      Left lower leg: No edema.   Skin:     General: Skin is warm and dry.      Capillary Refill: Capillary refill takes less than 2 seconds.   Neurological:      Mental Status: She is alert and oriented to person, place, and time.   Psychiatric:         Mood and Affect: Mood normal.         Behavior: Behavior normal.         Significant Labs:   CBC:   Recent Labs   Lab 11/13/20 0406 11/14/20  0343   WBC 13.82* 15.49*   HGB 13.4 13.6   HCT 46.9 46.3   * 358*     CMP:   Recent Labs   Lab 11/13/20 0406 11/14/20  0343    140   K 3.6 3.8    99   CO2 33* 33*    94   BUN 33* 27*   CREATININE 0.8 0.8   CALCIUM 8.9 8.9   PROT 6.6 6.8   ALBUMIN 2.8* 3.0*   BILITOT 0.5 0.7   ALKPHOS 61 55   AST 19 22   ALT 13 17   ANIONGAP 9 8   EGFRNONAA >60 >60     Magnesium:   Recent Labs   Lab 11/13/20  0406 11/14/20  0343   MG 2.6 2.4     POCT Glucose:   Recent Labs   Lab 11/13/20  1724 11/13/20  2042 11/14/20  1136   POCTGLUCOSE 111* 97 95       Significant Imaging: None in last 24 hours

## 2020-11-14 NOTE — RESPIRATORY THERAPY
11/13/20 1948   Patient Assessment/Suction   Level of Consciousness (AVPU) alert   Respiratory Effort Unlabored   Expansion/Accessory Muscles/Retractions no use of accessory muscles   All Lung Fields Breath Sounds diminished;clear   Rhythm/Pattern, Respiratory unlabored   Cough Frequency infrequent   Cough Type good;nonproductive   PRE-TX-O2   O2 Device (Oxygen Therapy) nasal cannula   Flow (L/min) 5   Oxygen Concentration (%) 40   SpO2 96 %   Pulse Oximetry Type Continuous   $ Pulse Oximetry - Multiple Charge Pulse Oximetry - Multiple   Pulse 86   Resp 20   /74   Aerosol Therapy   $ Aerosol Therapy Charges Aerosol Treatment   Respiratory Treatment Status (SVN) given   Treatment Route (SVN) mask   Patient Position (SVN) HOB elevated   Post Treatment Assessment (SVN) breath sounds unchanged   Signs of Intolerance (SVN) none   Breath Sounds Post-Respiratory Treatment   Throughout All Fields Post-Treatment All Fields   Throughout All Fields Post-Treatment no change   Post-treatment Heart Rate (beats/min) 88   Post-treatment Resp Rate (breaths/min) 20   Incentive Spirometer   $ Incentive Spirometer Charges done with encouragement   Incentive Spirometer Predicted Level (mL) 2200   Administration (IS) instruction provided, follow-up   Number of Repetitions (IS) 10   Level Incentive Spirometer (mL) 750   Patient Tolerance (IS) good   Wound Care   $ Wound Care Tech Time 15 min   Area of Concern Bridge of nose   Skin Color/Characteristics without discoloration   Skin Temperature warm   Ready to Wean/Extubation Screen   FIO2<=50 (chart decimal) 0.4   Preset CPAP/BiPAP Settings   Mode Of Delivery Standby

## 2020-11-14 NOTE — ASSESSMENT & PLAN NOTE
Patient with history of prolonged intubation. Failed BiPAP therapy at [place] requiring intubation. Increased respiratory rate and tachycardic on admission. Extubated to BiPAP 11/13. Afebrile. WBC elevated, yet on steroids. Lactate rising as high to 3.4 that has since normalized. BNP also elevated to 261, yet recent TTE with no cardiac dysfunction; no right heart strain on repeat TTE. D-dimer elevated. ABGs improving throughout course. CXR shows bilateral lower lobe infiltrates; history of MSSA in sputum, yet cultures negative on this admission and CXR improving. Likely secondary to PNA, asthma exacerbation, obesity hypoventilation syndrome, PE or some combination.    ABG  Recent Labs   Lab 11/13/20  0816   PH 7.357   PO2 78*   PCO2 58.1*   HCO3 32.6*   BE 7   -PPI for PPx  -Continue supplemental oxygen to keep oxygen saturation >92% BIPAP for night time  -Continue DuoNebs and IV steroids, Singulair   -Pulmonology consulted, appreciate recommendations  -Continue Lovenox SQ at 40 mg Q12H

## 2020-11-14 NOTE — NURSING TRANSFER
Nursing Transfer Note      11/14/2020     Transfer From room 515 to room 209    Transfer via wheel chair    Transfer with telemetry and O2 per N/C 4lpm    Transported by Payam Hdz    Medicines sent: yes    Chart send with patient: YES    Notified: report called to nurse Schmitt at 1043    Patient reassessed at: nurse Schmitt    Upon arrival to floor: In chair.  Oriented to room controls and O2 in use 4lpm. Call light in hand

## 2020-11-14 NOTE — PROGRESS NOTES
"[facility] Medicine  Progress Note    Patient Name: Michelle Wren  MRN: [id number]  Patient Class: IP- Inpatient   Admission Date: 11/9/2020  Length of Stay: 5 days  Attending Physician: Alejandro Quiroz MD  Primary Care Provider: Teressa Saeed NP        Subjective:     Principal Problem:Acute on chronic respiratory failure with hypoxia and hypercapnia        HPI:  Michelle Wren is a 30 y.o. female with a PMHx of restrictive lung disease, morbid obesity, severe persistent asthma, hypertension, diabetes and obesity hypoventilation syndrome  who presented to the [place] ED last night around 5:00 p.m. with respiratory failure. Per  physician note she was "admitted and treated with BiPAP IV steroids, empiric antibiotics, DuoNebs IV Lasix with low threshold to intubate.  Tried to transfer to [facility] but she refused.  Around 9:00 p.m. she was only arousable to sternal rub but and intubated for airway protection.  AC rate 16, , peep 5, FiO2 40%.  Vitals within normal limits.  Diprivan at 50 mics per kg per minute, Nimbex at 3 mics per kg per minute - weaning nimbex and changing to versed.  Today, Dr. Arellano placed a right IJ triple-lumen central line. Seen by Dr Gentile, pulmonology, during last inpatient stay and then in clinic on 7/30 - essentially needs a Trilogy. Her family reported that her sats drop to the 70s with minimal exertion in her house."  The patient is being transferred [facility] for pulmonology services.    Upon arrival to [facility], patient is sedated and intubated.  The patient is unable to provide any history, so history was mainly taken from  physician note and previous hospital records. Per chart review patient has similar presentation in June that resulted in a prolonged intubation. The patient has followed up outpatient with pulmonology once since previous admission.  She is supposed to be on BiPAP, but unsure if she " is compliant.  The patient will be admitted to the intensive care unit under Hospital Medicine for further workup and evaluation.    Overview/Hospital Course:  -11/10 Patient sedated and intubated. On ceftriaxone/azithromycin. Remains on IV methylprednisolone. Also on therapeutic dose Lovenox per Pulmonary recommendations.  -11/11 Patient to change to PS mode and will try to extubate; on Precedex and Propofol infusions. All cultures negative.   -11/12 attempting extubation with Anesthesia ready; all cultures remain negative  -11/13 Patient successfully extubated to BiPAP; PT/OT/SLP consulted; Lasix discontinued; Lovenox dose adjusted; remains on steroids and and azithro/ceftrixone    Subjective: 11/14/2020  LOS: Day 2  FU: Acute on chronic Respiratory Failure with hypoxia and hypercapnia    Interval History: Ms SHIPLEY was seen and examined. All pertinent documents, labs and diagnostic reviewed.     TX:  Acute on chronic resp failure:  Weaned to O2 4L per NC, Cont to have BIPAP at noc. Cont prednisone 20 BID, Nebs q4hr, O2 sat, IS q4hr.  Per Pulmonary will need a non-invasive home ventilator to  Prevent CO2 retention and untimely hospital readmits. Bipap is insufficient due to severity of condition.     T2DM  POCT glucose, SSI, diabetic diet      Review of Systems  Objective:     Vital Signs (Most Recent):  Temp: 98.2 °F (36.8 °C) (11/14/20 0800)  Pulse: 90 (11/14/20 1030)  Resp: (!) 31 (11/14/20 1030)  BP: (!) 149/67 (11/14/20 1000)  SpO2: (!) 91 % (11/14/20 1030) Vital Signs (24h Range):  Temp:  [98.2 °F (36.8 °C)-98.8 °F (37.1 °C)] 98.2 °F (36.8 °C)  Pulse:  [76-96] 90  Resp:  [12-31] 31  SpO2:  [91 %-100 %] 91 %  BP: (118-161)/(55-98) 149/67     Weight: (!) 152.7 kg (336 lb 10.3 oz)  Body mass index is 61.57 kg/m².    Intake/Output Summary (Last 24 hours) at 11/14/2020 1442  Last data filed at 11/14/2020 0600  Gross per 24 hour   Intake 690 ml   Output 975 ml   Net -285 ml      Physical Exam  Vitals signs and nursing  note reviewed.   Constitutional:       General: She is not in acute distress.     Appearance: She is obese. She is not ill-appearing or toxic-appearing.   HENT:      Head: Normocephalic and atraumatic.      Nose: Nose normal.      Mouth/Throat:      Mouth: Mucous membranes are moist.      Pharynx: Oropharynx is clear.   Eyes:      Conjunctiva/sclera: Conjunctivae normal.   Neck:      Musculoskeletal: Normal range of motion.   Cardiovascular:      Rate and Rhythm: Normal rate and regular rhythm.      Pulses: Normal pulses.      Heart sounds: Normal heart sounds.   Pulmonary:      Effort: Pulmonary effort is normal.      Breath sounds: No wheezing.      Comments: Breath sounds diminished throughout due to body habitus, clear in upper lobes.  Abdominal:      Palpations: Abdomen is soft.      Tenderness: There is no abdominal tenderness. There is no guarding.      Hernia: No hernia is present.      Comments: obese   Musculoskeletal: Normal range of motion.         General: No swelling.      Right lower leg: No edema.      Left lower leg: No edema.   Skin:     General: Skin is warm and dry.      Capillary Refill: Capillary refill takes less than 2 seconds.   Neurological:      Mental Status: She is alert and oriented to person, place, and time.   Psychiatric:         Mood and Affect: Mood normal.         Behavior: Behavior normal.         Significant Labs:   CBC:   Recent Labs   Lab 11/13/20  0406 11/14/20  0343   WBC 13.82* 15.49*   HGB 13.4 13.6   HCT 46.9 46.3   * 358*     CMP:   Recent Labs   Lab 11/13/20  0406 11/14/20  0343    140   K 3.6 3.8    99   CO2 33* 33*    94   BUN 33* 27*   CREATININE 0.8 0.8   CALCIUM 8.9 8.9   PROT 6.6 6.8   ALBUMIN 2.8* 3.0*   BILITOT 0.5 0.7   ALKPHOS 61 55   AST 19 22   ALT 13 17   ANIONGAP 9 8   EGFRNONAA >60 >60     Magnesium:   Recent Labs   Lab 11/13/20  0406 11/14/20  0343   MG 2.6 2.4     POCT Glucose:   Recent Labs   Lab 11/13/20  1724 11/13/20 2042  11/14/20  1136   POCTGLUCOSE 111* 97 95       Significant Imaging: None in last 24 hours      Assessment/Plan:      * Acute on chronic respiratory failure with hypoxia and hypercapnia  Patient with history of prolonged intubation. Failed BiPAP therapy at [place] requiring intubation. Increased respiratory rate and tachycardic on admission. Extubated to BiPAP 11/13. Afebrile. WBC elevated, yet on steroids. Lactate rising as high to 3.4 that has since normalized. BNP also elevated to 261, yet recent TTE with no cardiac dysfunction; no right heart strain on repeat TTE. D-dimer elevated. ABGs improving throughout course. CXR shows bilateral lower lobe infiltrates; history of MSSA in sputum, yet cultures negative on this admission and CXR improving. Likely secondary to PNA, asthma exacerbation, obesity hypoventilation syndrome, PE or some combination.    ABG  Recent Labs   Lab 11/13/20  0816   PH 7.357   PO2 78*   PCO2 58.1*   HCO3 32.6*   BE 7   -PPI for PPx  -Continue supplemental oxygen to keep oxygen saturation >92% BIPAP for night time  -Continue DuoNebs and IV steroids, Singulair   -Pulmonology consulted, appreciate recommendations  -Continue Lovenox SQ at 40 mg Q12H    Acute and chronic respiratory failure with hypercapnia    Weaned to O2 4L per NC, Cont to have BIPAP at noc. Cont prednisone 20 BID, Nebs q4hr, O2 sat, IS q4hr.  Per Pulmonary will need a non-invasive home ventilator to  Prevent CO2 retention and untimely hospital readmits. Bipap is insufficient due to severity of condition.         Bradycardia  Thought to be secondary to Precedex and Robinul.  -Both medications discontinued  -Continue to monitor      Malnutrition of moderate degree        Severe persistent asthma with acute exacerbation  See respiratory failure.    Morbid obesity  Body mass index is 59.92 kg/m². Morbid obesity complicates all aspects of disease management from diagnostic modalities to treatment. Will encourage weight loss explain  health benefits when patient is not sedated.    Pulmonary hypertension  Repeat TTE with normal EF and concentric remodeling.  -Continue to treat per Pulmonology recommendations  -Monitor I's and O's    Pneumonia due to infectious organism  Patient with current diagnosis of possible pneumonia which is uncontrolled due to persistent hypoxia . Current antimicrobial regimen consists of   Antibiotics (From admission, onward)    Start     Stop Route Frequency Ordered    11/10/20 2043  cefTRIAXone (ROCEPHIN) 2 g/50 mL D5W IVPB      -- IV Every 24 hours (non-standard times) 11/10/20 0429    11/10/20 0900  mupirocin 2 % ointment      11/15 0859 Nasl 2 times daily 11/10/20 0430    11/10/20 0530  azithromycin 500 mg in dextrose 5 % 250 mL IVPB (ready to mix system)      -- IV Every 24 hours (non-standard times) 11/10/20 0429      . Cultures currently  resulted. Negative.  Will monitor patient closely and continue current treatment plan unchanged.    Type 2 diabetes mellitus  Patient's FSGs are controlled on current hypoglycemics.   Last A1c reviewed-   Lab Results   Component Value Date    HGBA1C 6.2 06/23/2020     Most recent fingerstick glucose reviewed-   Recent Labs   Lab 11/13/20  1724 11/13/20  2042 11/14/20  1136   POCTGLUCOSE 111* 97 95     Current correctional scale  Medium  Maintain anti-hyperglycemic dose as follows-   Antihyperglycemics (From admission, onward)    Start     Stop Route Frequency Ordered    11/09/20 1825  insulin aspart U-100 pen 1-10 Units      -- SubQ Every 6 hours PRN 11/09/20 1825      -SSI  -Hypoglycemic precautions  -Nutrition consulted, will institute tube feeding diet 11/12 if patient remains intubated; may try diabetic diet if successfully extuabted  -Blood glucose checks Q6H    Severe sepsis  Tachycardiac and tachypneic on admission. Afebrile. WBC elevated, yet on steroids; decreasing. Lactate normalized. Respiratory cultures negative. Blood cultures negative. Bilateral infiltrates on CXR  since improving. U/A concerning for UTI, yet UClx negative. Patient may have PNA. Sepsis resolved.  -Extubated 11/13  -Follow up all cultures  -Empiric Rocephin and azithromycin as above for five day course  -Caution with IVF repletion given elevated BNP    Cardiomegaly  Results for orders placed during the hospital encounter of 06/22/20   Echo Color Flow Doppler? Yes     Narrative · Concentric left ventricular remodeling.  · Normal left ventricular systolic function. The estimated ejection   fraction is 69%.  · Normal LV diastolic function.  · No wall motion abnormalities.  · Normal right ventricular systolic function.  · Mild right atrial enlargement.  · Trivial pericardial effusion.  · Mild left atrial enlargement.        Repeat TTE with no right heart strain and normal EF. BNP elevated on admission. Negative 6.7 L since admission.  -Continue to monitor I/O's and daily weights.  -Maintain oxygen sats >92%     Obesity hypoventilation syndrome  Currently intubated.  -Appreciate Pulmonology recommendations  -Please see acute on chronic respiratory failure      VTE Risk Mitigation (From admission, onward)         Ordered     enoxaparin injection 40 mg  Every 12 hours      11/13/20 1038                Discharge Planning   BAKARI:      Code Status: Full Code   Is the patient medically ready for discharge?:     Reason for patient still in hospital (select all that apply): Patient trending condition, Laboratory test, Treatment and Consult recommendations  Discharge Plan A: Home with family                  Suyapa Bloom NP  Department of Hospital Medicine   [facility]

## 2020-11-14 NOTE — PROGRESS NOTES
2100--During medication administration, intracan catheter tegaderm noted to be no longer secure to patient's skin with catheter partly removed.  Floated back in place using aseptic technique and redressed.  Attempted to flush catheter.  Catheter no longer patent.  Intracan catheter removed with catheter tip intact.  Bleeding controlled at site.  R IJ TLC remains in place with blood return noted to the distal port only.    Emma Cooksey, RN, CCRN

## 2020-11-14 NOTE — PROGRESS NOTES
Progress Note  PULMONARY    Admit Date: 11/9/2020 11/14/2020      SUBJECTIVE:     11/11 no new c/o, intubated and sedate  11/12 no new c/o  11/13  No new c/o, just extubated to niv.  11/14 no new c/o    PFSH and Allergies reviewed.    OBJECTIVE:     Vitals (Most recent):  Vitals:    11/14/20 0601   BP: (!) 137/98   Pulse: 81   Resp: (!) 28   Temp:        Vitals (24 hour range):  Temp:  [98.4 °F (36.9 °C)-98.8 °F (37.1 °C)]   Pulse:  [58-96]   Resp:  [12-36]   BP: (111-161)/(55-98)   SpO2:  [90 %-100 %]       Intake/Output Summary (Last 24 hours) at 11/14/2020 0727  Last data filed at 11/14/2020 0600  Gross per 24 hour   Intake 1070 ml   Output 1600 ml   Net -530 ml          Physical Exam:  The patient's neuro status (alertness,orientation,cognitive function,motor skills,), pharyngeal exam (oral lesions, hygiene, abn dentition,), Neck (jvd,mass,thyroid,nodes in neck and above/below clavicle),RESPIRATORY(symmetry,effort,fremitus,percussion,auscultation),  Cor(rhythm,heart tones including gallops,perfusion,edema)ABD(distention,hepatic&splenomegaly,tenderness,masses), Skin(rash,cyanosis),Psyc(affect,judgement,).  Exam negative except for these pertinent findings:    Alert - using niv sleep, - chest is symmetric, no distress, normal percussion, normal fremitus and good normal breath sounds   .    Radiographs reviewed: view by direct vision  Atelectasis and increased markings 11/11  Results for orders placed during the hospital encounter of 11/09/20   X-Ray Chest 1 View    Narrative EXAMINATION:  XR CHEST 1 VIEW    CLINICAL HISTORY:  resp failure;    TECHNIQUE:  Single frontal view of the chest was performed.    COMPARISON:  11/10/2020 .    FINDINGS:  NG tube traverses the esophagus within the upper chest and is obscured in the lower chest and if its positioning is desired clinically suggest image centered at the level of the diaphragms. It is suggest to extend beneath the level the diaphragm. Right internal jugular  venous catheter is present with the tip at the level the proximal SVC/junction of the SVC and right brachiocephalic vein. Endotracheal tube appears in good position with the tip projecting approximately 4.5 cm above the cas.    The cardiomediastinal silhouette is stable.    Mild bilateral perihilar/bibasilar infiltrates with probable small left pleural effusion      Impression Continued bilateral infiltrates and probable small left pleural effusion not appearing significantly changed or if anything infiltrates slightly decreased compared to the prior exam.  Positions of lines and tubes described.      Electronically signed by: Manjula Schulz MD  Date:    11/11/2020  Time:    08:06   ]    Labs     Recent Labs   Lab 11/14/20  0343   WBC 15.49*   HGB 13.6   HCT 46.3   *     Recent Labs   Lab 11/14/20  0343      K 3.8   CL 99   CO2 33*   BUN 27*   CREATININE 0.8   GLU 94   CALCIUM 8.9   MG 2.4   PHOS 3.8   AST 22   ALT 17   ALKPHOS 55   BILITOT 0.7   PROT 6.8   ALBUMIN 3.0*     Recent Labs   Lab 11/13/20  0816   PH 7.357   PCO2 58.1*   PO2 78*   HCO3 32.6*     Microbiology Results (last 7 days)     Procedure Component Value Units Date/Time    Blood culture [[id number]] Collected: 11/09/20 2056    Order Status: Completed Specimen: Blood Updated: 11/14/20 0612     Blood Culture, Routine No Growth to date      No Growth to date      No Growth to date      No Growth to date      No Growth to date          Impression:  Active Hospital Problems    Diagnosis  POA    *Acute on chronic respiratory failure with hypoxia and hypercapnia [J96.21, J96.22]  Yes    Bradycardia [R00.1]  No    Acute and chronic respiratory failure with hypercapnia [J96.22]  Yes    Malnutrition of moderate degree [E44.0]  Yes    Severe persistent asthma with acute exacerbation [J45.51]  Yes    Morbid obesity [E66.01]  Yes    Pulmonary hypertension [I27.20]  Yes     By pulmonary artery size on ct chest 6/30      Pneumonia due to  infectious organism [J18.9]  Yes    Type 2 diabetes mellitus [E11.9]  Yes    Severe sepsis [A41.9, R65.20]  Unknown    Obesity hypoventilation syndrome [E66.2]  Yes    Cardiomegaly [I51.7]  Yes      Resolved Hospital Problems   No resolved problems to display.               Plan:   11/10-expect staph again,  Asthma exacerbation, morbid obese, obese hypovent.     Temp up 100.7    Needs 100% ox again.  abg looks worse this admit than last time. Will keep tidal vol up with somewhat high peep as had extreme difficulty last admit.    Not sure where problem that ppt recurrance would be - rx asthma, infection, resp failure, monitor.   Pt had angioedema and concerns with propofol infusion in June.  Pt had transient theraputic lovenox given with severe gas exchange problems June admit  Pt was wheezing initially in June.    11/11t about 99, on azithro/rocephin, I/o 1417/3695, bun/creat 22/0.8,glu 144, k, wbc 20k, versed and propofol    abg 7.45/co2 48/02 75- on 60% peep 14 felt to be atelectasis  cxr hazey with morbid obese and atelectasis seen  Sputum nl estephanie    Discussed with brother Jaycob yesterday- pt moved out to cousin?  Brother not able to help.    11/11 too sedate , no apparent obstruction and min vol<8, need to get awake, wean peep then psv and consider extubate to niv.      11/12 tm 99.3 on rocephin/azithro, still on  60%, on cpap    Solumedrol 80 q8, precedex, I/o 565/2535= bun/crea 31/0.8    cxr 11/12 sm lungs  Doing well with cpap/psv- extubate epap 15/ipap 25, mobilize--needs to pass leak test.    11/13 extbated with very reasonable abg, bipap 20/10 seems ok for now.  .  Pt extubated, pt relates her cpap device did not seem to work effectively last wk.  She had been on steroids and using breo daily.      Pt felt to have severe chronic asthma and copd.     Would recommend non invasive ventilator - this is 2nd episode of severe life threatening respiratory failure in last 5 months.  Would recommend auto  epap range 5-18, avaps mode tidal volume 350, back up rate 6, and inspiratory pressures 15- 25.       Due to patient's copd and chronic respiratory failure, patient's conditioning is worsening and requires a non invasive home ventilator (nocturnal and daytime volume ventilation prn use) to prevent co2 retention and untimely hospital re admits. bipap is insufficient due to severity of condition.  Copd is primary cause of chronic respiratory failure.    11/14   I/o 1070/605, creat 0.8, afebrile  Nc at 5lpm- needs to wean. outpt once rm air, needs better rx for resp failure.  Looks like cpap is not near adequate here

## 2020-11-14 NOTE — PLAN OF CARE
Problem: Noninvasive Ventilation Acute  Goal: Effective Unassisted Ventilation and Oxygenation  Outcome: Ongoing, Progressing   Bipap for HS  Nc 4 lpm in use with aerosol txs Q4 hrs tolerates well

## 2020-11-14 NOTE — NURSING
Pt arrived to unit via wheelchair to unit. Assumed care of patient at this time. Pt up to chair. 4L of oxygen applied via NC. Oriented patient to unit. No needs at this time. Keshia Merino RN

## 2020-11-15 PROBLEM — R65.20 SEVERE SEPSIS: Status: RESOLVED | Noted: 2020-06-23 | Resolved: 2020-11-15

## 2020-11-15 PROBLEM — R00.1 BRADYCARDIA: Status: RESOLVED | Noted: 2020-11-13 | Resolved: 2020-11-15

## 2020-11-15 PROBLEM — A41.9 SEVERE SEPSIS: Status: RESOLVED | Noted: 2020-06-23 | Resolved: 2020-11-15

## 2020-11-15 LAB
ANION GAP SERPL CALC-SCNC: 11 MMOL/L (ref 8–16)
BACTERIA BLD CULT: NORMAL
BASOPHILS # BLD AUTO: 0.02 K/UL (ref 0–0.2)
BASOPHILS NFR BLD: 0.2 % (ref 0–1.9)
BUN SERPL-MCNC: 20 MG/DL (ref 6–20)
CALCIUM SERPL-MCNC: 8.6 MG/DL (ref 8.7–10.5)
CHLORIDE SERPL-SCNC: 100 MMOL/L (ref 95–110)
CO2 SERPL-SCNC: 30 MMOL/L (ref 23–29)
CREAT SERPL-MCNC: 0.7 MG/DL (ref 0.5–1.4)
DIFFERENTIAL METHOD: ABNORMAL
EOSINOPHIL # BLD AUTO: 0 K/UL (ref 0–0.5)
EOSINOPHIL NFR BLD: 0.3 % (ref 0–8)
ERYTHROCYTE [DISTWIDTH] IN BLOOD BY AUTOMATED COUNT: 17.8 % (ref 11.5–14.5)
EST. GFR  (AFRICAN AMERICAN): >60 ML/MIN/1.73 M^2
EST. GFR  (NON AFRICAN AMERICAN): >60 ML/MIN/1.73 M^2
GLUCOSE SERPL-MCNC: 91 MG/DL (ref 70–110)
HCT VFR BLD AUTO: 46.2 % (ref 37–48.5)
HGB BLD-MCNC: 13.1 G/DL (ref 12–16)
IMM GRANULOCYTES # BLD AUTO: 0.06 K/UL (ref 0–0.04)
IMM GRANULOCYTES NFR BLD AUTO: 0.5 % (ref 0–0.5)
LYMPHOCYTES # BLD AUTO: 2.1 K/UL (ref 1–4.8)
LYMPHOCYTES NFR BLD: 16.8 % (ref 18–48)
MCH RBC QN AUTO: 23.3 PG (ref 27–31)
MCHC RBC AUTO-ENTMCNC: 28.4 G/DL (ref 32–36)
MCV RBC AUTO: 82 FL (ref 82–98)
MONOCYTES # BLD AUTO: 1.8 K/UL (ref 0.3–1)
MONOCYTES NFR BLD: 14.4 % (ref 4–15)
NEUTROPHILS # BLD AUTO: 8.3 K/UL (ref 1.8–7.7)
NEUTROPHILS NFR BLD: 67.8 % (ref 38–73)
NRBC BLD-RTO: 0 /100 WBC
PLATELET # BLD AUTO: 322 K/UL (ref 150–350)
PMV BLD AUTO: 12.3 FL (ref 9.2–12.9)
POCT GLUCOSE: 117 MG/DL (ref 70–110)
POCT GLUCOSE: 125 MG/DL (ref 70–110)
POCT GLUCOSE: 132 MG/DL (ref 70–110)
POCT GLUCOSE: 90 MG/DL (ref 70–110)
POTASSIUM SERPL-SCNC: 3.8 MMOL/L (ref 3.5–5.1)
RBC # BLD AUTO: 5.63 M/UL (ref 4–5.4)
SODIUM SERPL-SCNC: 141 MMOL/L (ref 136–145)
WBC # BLD AUTO: 12.18 K/UL (ref 3.9–12.7)

## 2020-11-15 PROCEDURE — 99232 PR SUBSEQUENT HOSPITAL CARE,LEVL II: ICD-10-PCS | Mod: ,,, | Performed by: INTERNAL MEDICINE

## 2020-11-15 PROCEDURE — 11000001 HC ACUTE MED/SURG PRIVATE ROOM

## 2020-11-15 PROCEDURE — 80048 BASIC METABOLIC PNL TOTAL CA: CPT

## 2020-11-15 PROCEDURE — 99232 SBSQ HOSP IP/OBS MODERATE 35: CPT | Mod: ,,, | Performed by: INTERNAL MEDICINE

## 2020-11-15 PROCEDURE — 94660 CPAP INITIATION&MGMT: CPT

## 2020-11-15 PROCEDURE — C9113 INJ PANTOPRAZOLE SODIUM, VIA: HCPCS | Performed by: NURSE PRACTITIONER

## 2020-11-15 PROCEDURE — 25000242 PHARM REV CODE 250 ALT 637 W/ HCPCS: Performed by: NURSE PRACTITIONER

## 2020-11-15 PROCEDURE — 99900035 HC TECH TIME PER 15 MIN (STAT)

## 2020-11-15 PROCEDURE — 63600175 PHARM REV CODE 636 W HCPCS: Performed by: NURSE PRACTITIONER

## 2020-11-15 PROCEDURE — 94761 N-INVAS EAR/PLS OXIMETRY MLT: CPT

## 2020-11-15 PROCEDURE — 27000221 HC OXYGEN, UP TO 24 HOURS

## 2020-11-15 PROCEDURE — 25000003 PHARM REV CODE 250: Performed by: NURSE PRACTITIONER

## 2020-11-15 PROCEDURE — 94799 UNLISTED PULMONARY SVC/PX: CPT

## 2020-11-15 PROCEDURE — 97165 OT EVAL LOW COMPLEX 30 MIN: CPT

## 2020-11-15 PROCEDURE — 94640 AIRWAY INHALATION TREATMENT: CPT

## 2020-11-15 PROCEDURE — 85025 COMPLETE CBC W/AUTO DIFF WBC: CPT

## 2020-11-15 PROCEDURE — 36415 COLL VENOUS BLD VENIPUNCTURE: CPT

## 2020-11-15 RX ADMIN — IPRATROPIUM BROMIDE AND ALBUTEROL SULFATE 3 ML: .5; 2.5 SOLUTION RESPIRATORY (INHALATION) at 12:11

## 2020-11-15 RX ADMIN — IPRATROPIUM BROMIDE AND ALBUTEROL SULFATE 3 ML: .5; 2.5 SOLUTION RESPIRATORY (INHALATION) at 06:11

## 2020-11-15 RX ADMIN — FUROSEMIDE 20 MG: 20 TABLET ORAL at 09:11

## 2020-11-15 RX ADMIN — PREDNISONE 20 MG: 20 TABLET ORAL at 09:11

## 2020-11-15 RX ADMIN — IPRATROPIUM BROMIDE AND ALBUTEROL SULFATE 3 ML: .5; 2.5 SOLUTION RESPIRATORY (INHALATION) at 08:11

## 2020-11-15 RX ADMIN — PANTOPRAZOLE SODIUM 40 MG: 40 INJECTION, POWDER, FOR SOLUTION INTRAVENOUS at 09:11

## 2020-11-15 RX ADMIN — ENOXAPARIN SODIUM 40 MG: 40 INJECTION SUBCUTANEOUS at 09:11

## 2020-11-15 RX ADMIN — MONTELUKAST 10 MG: 10 TABLET, FILM COATED ORAL at 09:11

## 2020-11-15 RX ADMIN — IPRATROPIUM BROMIDE AND ALBUTEROL SULFATE 3 ML: .5; 2.5 SOLUTION RESPIRATORY (INHALATION) at 11:11

## 2020-11-15 RX ADMIN — IPRATROPIUM BROMIDE AND ALBUTEROL SULFATE 3 ML: .5; 2.5 SOLUTION RESPIRATORY (INHALATION) at 04:11

## 2020-11-15 RX ADMIN — IPRATROPIUM BROMIDE AND ALBUTEROL SULFATE 3 ML: .5; 2.5 SOLUTION RESPIRATORY (INHALATION) at 03:11

## 2020-11-15 NOTE — SUBJECTIVE & OBJECTIVE
Interval History:  Pt seen and examined.  Reports continued subjective improvement SOB.  Continues with nonproductive cough, no chest pain.  Afebrile.  Currently on 4 L nasal cannula.  Discussed with pulmonology-reasonable for discharge home once tolerating 2 L nasal cannula.  Needs NIPPV at home.    Review of Systems   Constitutional: Negative for chills and fever.   HENT: Negative for congestion and postnasal drip.    Respiratory: Positive for cough ( improving) and shortness of breath (Improving).    Cardiovascular: Negative for chest pain.   Gastrointestinal: Negative for abdominal pain, nausea and vomiting.   Psychiatric/Behavioral: Negative for confusion.     Objective:     Vital Signs (Most Recent):  Temp: 97.7 °F (36.5 °C) (11/15/20 0751)  Pulse: 80 (11/15/20 1133)  Resp: 18 (11/15/20 1133)  BP: 127/66 (11/15/20 0751)  SpO2: 97 % (11/15/20 1133) Vital Signs (24h Range):  Temp:  [97.2 °F (36.2 °C)-99.5 °F (37.5 °C)] 97.7 °F (36.5 °C)  Pulse:  [74-88] 80  Resp:  [14-20] 18  SpO2:  [93 %-98 %] 97 %  BP: (108-135)/(53-71) 127/66     Weight: (!) 152.7 kg (336 lb 10.3 oz)  Body mass index is 61.57 kg/m².    Intake/Output Summary (Last 24 hours) at 11/15/2020 1137  Last data filed at 11/15/2020 0200  Gross per 24 hour   Intake 380 ml   Output --   Net 380 ml      Physical Exam  Vitals signs and nursing note reviewed.   Constitutional:       Appearance: Normal appearance. She is obese.      Comments: Up in chair, appears comfortable     HENT:      Head: Normocephalic and atraumatic.   Eyes:      Extraocular Movements: Extraocular movements intact.      Conjunctiva/sclera: Conjunctivae normal.      Pupils: Pupils are equal, round, and reactive to light.   Cardiovascular:      Rate and Rhythm: Normal rate and regular rhythm.   Pulmonary:      Effort: Pulmonary effort is normal. No respiratory distress.      Breath sounds: Normal breath sounds.      Comments: Comfortable on 4 L NC.  Abdominal:      General: Bowel  sounds are normal.      Palpations: Abdomen is soft.      Tenderness: There is no abdominal tenderness.      Comments: obese   Musculoskeletal: Normal range of motion.   Skin:     General: Skin is warm and dry.      Capillary Refill: Capillary refill takes less than 2 seconds.   Neurological:      Mental Status: She is alert.         Significant Labs:   CBC:   Recent Labs   Lab 11/14/20  0343   WBC 15.49*   HGB 13.6   HCT 46.3   *     CMP:   Recent Labs   Lab 11/14/20 0343 11/15/20  0745    141   K 3.8 3.8   CL 99 100   CO2 33* 30*   GLU 94 91   BUN 27* 20   CREATININE 0.8 0.7   CALCIUM 8.9 8.6*   PROT 6.8  --    ALBUMIN 3.0*  --    BILITOT 0.7  --    ALKPHOS 55  --    AST 22  --    ALT 17  --    ANIONGAP 8 11   EGFRNONAA >60 >60

## 2020-11-15 NOTE — ASSESSMENT & PLAN NOTE
-Appreciate Pulmonology recommendations  -Please see acute on chronic respiratory failure  - would benefit from NIPPV on discharge.  Will work with case management tomorrow.

## 2020-11-15 NOTE — PROGRESS NOTES
"[facility] Medicine  Progress Note    Patient Name: Michelle Wren  MRN: [id number]  Patient Class: IP- Inpatient   Admission Date: 11/9/2020  Length of Stay: 6 days  Attending Physician: Alejandro Quiroz MD  Primary Care Provider: Teressa Saeed NP        Subjective:     Principal Problem:Acute on chronic respiratory failure with hypoxia and hypercapnia        HPI:  Michelle Wren is a 30 y.o. female with a PMHx of restrictive lung disease, morbid obesity, severe persistent asthma, hypertension, diabetes and obesity hypoventilation syndrome  who presented to the [facility] ED last night around 5:00 p.m. with respiratory failure. Per  physician note she was "admitted and treated with BiPAP IV steroids, empiric antibiotics, DuoNebs IV Lasix with low threshold to intubate.  Tried to transfer to [facility] but she refused.  Around 9:00 p.m. she was only arousable to sternal rub but and intubated for airway protection.  AC rate 16, , peep 5, FiO2 40%.  Vitals within normal limits.  Diprivan at 50 mics per kg per minute, Nimbex at 3 mics per kg per minute - weaning nimbex and changing to versed.  Today, Dr. Arellano placed a right IJ triple-lumen central line. Seen by Dr Gentile, pulmonology, during last inpatient stay and then in clinic on 7/30 - essentially needs a Trilogy. Her family reported that her sats drop to the 70s with minimal exertion in her house."  The patient is being transferred [facility] for pulmonology services.    Upon arrival to [facility], patient is sedated and intubated.  The patient is unable to provide any history, so history was mainly taken from  physician note and previous hospital records. Per chart review patient has similar presentation in June that resulted in a prolonged intubation. The patient has followed up outpatient with pulmonology once since previous admission.  She is supposed to be on BiPAP, but unsure if she " is compliant.  The patient will be admitted to the intensive care unit under Hospital Medicine for further workup and evaluation.    Overview/Hospital Course:  -11/10 Patient sedated and intubated. On ceftriaxone/azithromycin. Remains on IV methylprednisolone. Also on therapeutic dose Lovenox per Pulmonary recommendations.  -11/11 Patient to change to PS mode and will try to extubate; on Precedex and Propofol infusions. All cultures negative.   -11/12 attempting extubation with Anesthesia ready; all cultures remain negative  -11/13 Patient successfully extubated to BiPAP; PT/OT/SLP consulted; Lasix discontinued; Lovenox dose adjusted; remains on steroids and and azithro/ceftrixone  -11/14 Patient transferred to PCU on 4 L NC- requiring bipap at night      Interval History:  Pt seen and examined.  Reports continued subjective improvement SOB.  Continues with nonproductive cough, no chest pain.  Afebrile.  Currently on 4 L nasal cannula.  Discussed with pulmonology-reasonable for discharge home once tolerating 2 L nasal cannula.  Needs NIPPV at home.    Review of Systems   Constitutional: Negative for chills and fever.   HENT: Negative for congestion and postnasal drip.    Respiratory: Positive for cough ( improving) and shortness of breath (Improving).    Cardiovascular: Negative for chest pain.   Gastrointestinal: Negative for abdominal pain, nausea and vomiting.   Psychiatric/Behavioral: Negative for confusion.     Objective:     Vital Signs (Most Recent):  Temp: 97.7 °F (36.5 °C) (11/15/20 0751)  Pulse: 80 (11/15/20 1133)  Resp: 18 (11/15/20 1133)  BP: 127/66 (11/15/20 0751)  SpO2: 97 % (11/15/20 1133) Vital Signs (24h Range):  Temp:  [97.2 °F (36.2 °C)-99.5 °F (37.5 °C)] 97.7 °F (36.5 °C)  Pulse:  [74-88] 80  Resp:  [14-20] 18  SpO2:  [93 %-98 %] 97 %  BP: (108-135)/(53-71) 127/66     Weight: (!) 152.7 kg (336 lb 10.3 oz)  Body mass index is 61.57 kg/m².    Intake/Output Summary (Last 24 hours) at 11/15/2020  1137  Last data filed at 11/15/2020 0200  Gross per 24 hour   Intake 380 ml   Output --   Net 380 ml      Physical Exam  Vitals signs and nursing note reviewed.   Constitutional:       Appearance: Normal appearance. She is obese.      Comments: Up in chair, appears comfortable     HENT:      Head: Normocephalic and atraumatic.   Eyes:      Extraocular Movements: Extraocular movements intact.      Conjunctiva/sclera: Conjunctivae normal.      Pupils: Pupils are equal, round, and reactive to light.   Cardiovascular:      Rate and Rhythm: Normal rate and regular rhythm.   Pulmonary:      Effort: Pulmonary effort is normal. No respiratory distress.      Breath sounds: Normal breath sounds.      Comments: Comfortable on 4 L NC.  Abdominal:      General: Bowel sounds are normal.      Palpations: Abdomen is soft.      Tenderness: There is no abdominal tenderness.      Comments: obese   Musculoskeletal: Normal range of motion.   Skin:     General: Skin is warm and dry.      Capillary Refill: Capillary refill takes less than 2 seconds.   Neurological:      Mental Status: She is alert.         Significant Labs:   CBC:   Recent Labs   Lab 11/14/20  0343   WBC 15.49*   HGB 13.6   HCT 46.3   *     CMP:   Recent Labs   Lab 11/14/20  0343 11/15/20  0745    141   K 3.8 3.8   CL 99 100   CO2 33* 30*   GLU 94 91   BUN 27* 20   CREATININE 0.8 0.7   CALCIUM 8.9 8.6*   PROT 6.8  --    ALBUMIN 3.0*  --    BILITOT 0.7  --    ALKPHOS 55  --    AST 22  --    ALT 17  --    ANIONGAP 8 11   EGFRNONAA >60 >60             Assessment/Plan:      * Acute on chronic respiratory failure with hypoxia and hypercapnia  Patient with history of prolonged intubation. Failed BiPAP therapy at [place] requiring intubation. Increased respiratory rate and tachycardic on admission. Extubated to BiPAP 11/13.  Known significant asthma with obesity hypoventilatory syndrome.  Discussed with pulmonology.  Would benefit from BiPAP upon discharge.  Will  discuss with case management tomorrow.  She is currently weaned to 4 L. Can discharge home once down to 2 L nasal cannula with home oxygen.  She has completed course of Abx.  Continue oral steroids per pulmonology recommendations.  ABG  Recent Labs   Lab 11/13/20  0816   PH 7.357   PO2 78*   PCO2 58.1*   HCO3 32.6*   BE 7   -PPI for PPx  -Continue supplemental oxygen to keep oxygen saturation >92% BIPAP for night time  -Continue DuoNebs and IV steroids, Singulair   -Pulmonology consulted, appreciate recommendations  -Continue Lovenox SQ at 40 mg Q12H    Malnutrition of moderate degree  Resolved-she is tolerating p.o. diet fine.      Severe persistent asthma with acute exacerbation  See respiratory failure.    Morbid obesity  Body mass index is 61.57 kg/m². Morbid obesity complicates all aspects of disease management from diagnostic modalities to treatment. Will encourage weight loss explain health benefits when patient is not sedated.    Pulmonary hypertension  Repeat TTE with normal EF and concentric remodeling.  -Continue to treat per Pulmonology recommendations  -Monitor I's and O's    Pneumonia due to infectious organism  Patient completed course of Abx and is clinically improved.    Type 2 diabetes mellitus  Patient's FSGs are controlled on current hypoglycemics.   Last A1c reviewed-   Lab Results   Component Value Date    HGBA1C 6.2 06/23/2020     Most recent fingerstick glucose reviewed-   Recent Labs   Lab 11/14/20  1724 11/14/20  2030 11/15/20  0526 11/15/20  1112   POCTGLUCOSE 103 98 90 125*     Current correctional scale  Medium  Maintain anti-hyperglycemic dose as follows-   Antihyperglycemics (From admission, onward)    Start     Stop Route Frequency Ordered    11/09/20 1825  insulin aspart U-100 pen 1-10 Units      -- SubQ Every 6 hours PRN 11/09/20 1825      -SSI  -Hypoglycemic precautions  -Diabetic diet  -Blood glucose checks Q6H    Cardiomegaly  Results for orders placed during the hospital  encounter of 06/22/20   Echo Color Flow Doppler? Yes     Narrative · Concentric left ventricular remodeling.  · Normal left ventricular systolic function. The estimated ejection   fraction is 69%.  · Normal LV diastolic function.  · No wall motion abnormalities.  · Normal right ventricular systolic function.  · Mild right atrial enlargement.  · Trivial pericardial effusion.  · Mild left atrial enlargement.        Repeat TTE with no right heart strain and normal EF. BNP elevated on admission. Negative 6.7 L since admission.  -Continue to monitor I/O's and daily weights.  -Maintain oxygen sats >92%     Obesity hypoventilation syndrome    -Appreciate Pulmonology recommendations  -Please see acute on chronic respiratory failure  - would benefit from NIPPV on discharge.  Will work with case management tomorrow.      VTE Risk Mitigation (From admission, onward)         Ordered     enoxaparin injection 40 mg  Every 12 hours      11/13/20 1038                Discharge Planning   BAKARI:      Code Status: Full Code   Is the patient medically ready for discharge?:     Reason for patient still in hospital (select all that apply): Patient trending condition, Treatment and Consult recommendations  Discharge Plan A: Home with family        Can discharge home once stable on 2 L nasal cannula with home O2.  Need to clarify with case management BiPAP therapy qualification.          Bruna Martinez NP  Department of Hospital Medicine   [facility]

## 2020-11-15 NOTE — PLAN OF CARE
Problem: Occupational Therapy Goal  Goal: Occupational Therapy Goal  Description: Goals to be met by: 11/29/2020     Patient will increase functional independence with ADLs by performing:    UE Dressing with [place].  LE Dressing with Modified [place].  Grooming while seated with Modified [place].  Toileting from toilet with Supervision for hygiene and clothing management.   Toilet transfer to toilet with Supervision.    Outcome: Ongoing, Progressing

## 2020-11-15 NOTE — PROGRESS NOTES
Progress Note  PULMONARY    Admit Date: 11/9/2020   11/15/2020      SUBJECTIVE:     11/11 no new c/o, intubated and sedate  11/12 no new c/o  11/13  No new c/o, just extubated to niv.  11/14 no new c/o  11/15 no new c/o      PFSH and Allergies reviewed.    OBJECTIVE:     Vitals (Most recent):  Vitals:    11/15/20 0751   BP: 127/66   Pulse: 76   Resp: 18   Temp: 97.7 °F (36.5 °C)       Vitals (24 hour range):  Temp:  [97.2 °F (36.2 °C)-99.5 °F (37.5 °C)]   Pulse:  [74-90]   Resp:  [14-31]   BP: (108-149)/(53-71)   SpO2:  [91 %-98 %]       Intake/Output Summary (Last 24 hours) at 11/15/2020 0948  Last data filed at 11/15/2020 0200  Gross per 24 hour   Intake 380 ml   Output --   Net 380 ml          Physical Exam:  The patient's neuro status (alertness,orientation,cognitive function,motor skills,), pharyngeal exam (oral lesions, hygiene, abn dentition,), Neck (jvd,mass,thyroid,nodes in neck and above/below clavicle),RESPIRATORY(symmetry,effort,fremitus,percussion,auscultation),  Cor(rhythm,heart tones including gallops,perfusion,edema)ABD(distention,hepatic&splenomegaly,tenderness,masses), Skin(rash,cyanosis),Psyc(affect,judgement,).  Exam negative except for these pertinent findings:    Alert - using niv sleep, - chest is symmetric, no distress, normal percussion, normal fremitus and good normal breath sounds   .nc 4 now    Radiographs reviewed: view by direct vision  Atelectasis and increased markings 11/11  Results for orders placed during the hospital encounter of 11/09/20   X-Ray Chest 1 View    Narrative EXAMINATION:  XR CHEST 1 VIEW    CLINICAL HISTORY:  resp failure;    TECHNIQUE:  Single frontal view of the chest was performed.    COMPARISON:  11/10/2020 .    FINDINGS:  NG tube traverses the esophagus within the upper chest and is obscured in the lower chest and if its positioning is desired clinically suggest image centered at the level of the diaphragms. It is suggest to extend beneath the level the  diaphragm. Right internal jugular venous catheter is present with the tip at the level the proximal SVC/junction of the SVC and right brachiocephalic vein. Endotracheal tube appears in good position with the tip projecting approximately 4.5 cm above the cas.    The cardiomediastinal silhouette is stable.    Mild bilateral perihilar/bibasilar infiltrates with probable small left pleural effusion      Impression Continued bilateral infiltrates and probable small left pleural effusion not appearing significantly changed or if anything infiltrates slightly decreased compared to the prior exam.  Positions of lines and tubes described.      Electronically signed by: Manjula Schulz MD  Date:    11/11/2020  Time:    08:06   ]    Labs     No results for input(s): WBC, HGB, HCT, PLT, BAND, METAMYELOCYT, MYELOPCT, HGBA1C in the last 24 hours.  Recent Labs   Lab 11/15/20  0745      K 3.8      CO2 30*   BUN 20   CREATININE 0.7   GLU 91   CALCIUM 8.6*     No results for input(s): PH, PCO2, PO2, HCO3 in the last 24 hours.  Microbiology Results (last 7 days)     Procedure Component Value Units Date/Time    Blood culture [[id number]] Collected: 11/09/20 2056    Order Status: Completed Specimen: Blood Updated: 11/15/20 0612     Blood Culture, Routine No growth after 5 days.          Impression:  Active Hospital Problems    Diagnosis  POA    *Acute on chronic respiratory failure with hypoxia and hypercapnia [J96.21, J96.22]  Yes    Bradycardia [R00.1]  No    Acute and chronic respiratory failure with hypercapnia [J96.22]  Yes    Malnutrition of moderate degree [E44.0]  Yes    Respiratory failure [J96.90]  Yes    Severe persistent asthma with acute exacerbation [J45.51]  Yes    Morbid obesity [E66.01]  Yes    Pulmonary hypertension [I27.20]  Yes     By pulmonary artery size on ct chest 6/30      Pneumonia due to infectious organism [J18.9]  Yes    Type 2 diabetes mellitus [E11.9]  Yes    Severe sepsis [A41.9,  R65.20]  Unknown    Obesity hypoventilation syndrome [E66.2]  Yes    Cardiomegaly [I51.7]  Yes      Resolved Hospital Problems   No resolved problems to display.               Plan:   11/10-expect staph again,  Asthma exacerbation, morbid obese, obese hypovent.     Temp up 100.7    Needs 100% ox again.  abg looks worse this admit than last time. Will keep tidal vol up with somewhat high peep as had extreme difficulty last admit.    Not sure where problem that ppt recurrance would be - rx asthma, infection, resp failure, monitor.   Pt had angioedema and concerns with propofol infusion in June.  Pt had transient theraputic lovenox given with severe gas exchange problems [facility] admit  Pt was wheezing initially in June.    11/11t about 99, on azithro/rocephin, I/o 1417/3695, bun/creat 22/0.8,glu 144, k, wbc 20k, versed and propofol    abg 7.45/co2 48/02 75- on 60% peep 14 felt to be atelectasis  cxr hazey with morbid obese and atelectasis seen  Sputum nl estephanie    Discussed with brother Jaycob yesterday- pt moved out to cousin?  Brother not able to help.    11/11 too sedate , no apparent obstruction and min vol<8, need to get awake, wean peep then psv and consider extubate to niv.      11/12 tm 99.3 on rocephin/azithro, still on  60%, on cpap    Solumedrol 80 q8, precedex, I/o 565/2535= bun/crea 31/0.8    cxr 11/12 sm lungs  Doing well with cpap/psv- extubate epap 15/ipap 25, mobilize--needs to pass leak test.    11/13 extbated with very reasonable abg, bipap 20/10 seems ok for now.  .  Pt extubated, pt relates her cpap device did not seem to work effectively last wk.  She had been on steroids and using breo daily.      Pt felt to have severe chronic asthma and copd.     Would recommend non invasive ventilator - this is 2nd episode of severe life threatening respiratory failure in last 5 months.  Would recommend auto epap range 5-18, avaps mode tidal volume 350, back up rate 6, and inspiratory pressures 15- 25.        Due to patient's copd and chronic respiratory failure, patient's conditioning is worsening and requires a non invasive home ventilator (nocturnal and daytime volume ventilation prn use) to prevent co2 retention and untimely hospital re admits. bipap is insufficient due to severity of condition.  Copd is primary cause of chronic respiratory failure.    11/14   I/o 1070/605, creat 0.8, afebrile  Nc at 5lpm- needs to wean. outpt once rm air, needs better rx for resp failure.  Looks like cpap is not near adequate here      11/15 I/o not accurate, 4lpm, could go home when 2lpm or less. Went home on 2lpm last admit but got to rm air quickly with mobilization post dc.    Suspect had poor access to needed steroids along with inadequate nocturnal ventilation due to copd ppt resp  failure

## 2020-11-15 NOTE — ASSESSMENT & PLAN NOTE
Repeat TTE with normal EF and concentric remodeling.  -Continue to treat per Pulmonology recommendations  -Monitor I's and O's

## 2020-11-15 NOTE — ASSESSMENT & PLAN NOTE
Body mass index is 61.57 kg/m². Morbid obesity complicates all aspects of disease management from diagnostic modalities to treatment. Will encourage weight loss explain health benefits when patient is not sedated.

## 2020-11-15 NOTE — PLAN OF CARE
Problem: Diabetes Comorbidity  Goal: Blood Glucose Level Within Desired Range  Outcome: Ongoing, Progressing  Glucose 98.     Problem: Fall Injury Risk  Goal: Absence of Fall and Fall-Related Injury  Outcome: Ongoing, Progressing  Bed alarm set for her safety.

## 2020-11-15 NOTE — PT/OT/SLP EVAL
Occupational Therapy   Evaluation    Name: Michelle Wren  MRN: [id number]  Admitting Diagnosis:  Acute on chronic respiratory failure with hypoxia and hypercapnia      Recommendations:     Discharge Recommendations: home health OT  Discharge Equipment Recommendations:  None  Barriers to discharge:  None    Assessment:     Michelle Wren is a 30 y.o. female with a medical diagnosis of Acute on chronic respiratory failure with hypoxia and hypercapnia.  She presents with performance deficits affecting function: impaired endurance, impaired self care skills and impaired functional mobilty.      Rehab Prognosis: Good; patient would benefit from acute skilled OT services to address these deficits and reach maximum level of function.       Plan:     Patient to be seen 3 x/week to address the above listed problems via self-care/home management, therapeutic activities, therapeutic exercises  · Plan of Care Expires: 11/29/2020  · Plan of Care Reviewed with: patient    Subjective     Chief Complaint: Poor endurance  Patient/Family Comments/goals: To go home    Occupational Profile:  Living Environment: Pt lives in an apartment with her cousin and son.  Previous level of function: Independent with ADLs  Roles and Routines: Independent adult  Equipment Used at Home:  None  Assistance upon Discharge: t will have assistance from family.    Pain/Comfort:  Pain Rating 1: 0/10    Patients cultural, spiritual, [employer] conflicts given the current situation:      Objective:     Communicated with: nurse Pascal prior to session.  Patient found up in chair with oxygen, peripheral IV upon OT entry to room.    General Precautions: Standard, fall, respiratory    Occupational Performance:    Functional Mobility/Transfers:  · Sit <> Stand Transfer with minimum assistance  with  rolling walker     Activities of Daily Living:  · Feeding:  independence  · Grooming: modified independence  · Upper Body Dressing: supervision  · Lower Body  Dressing: minimum assistance  · Toileting: minimum assistance    Cognitive/Visual Perceptual:  Cognitive/Psychosocial Skills:  -       Oriented to: Person, Place, Time and Situation   -       Follows Commands/attention:Follows multistep  commands  -       Communication: clear/fluent    Physical Exam:  Balance:  good  Upper Extremity Range of Motion:  -       Right Upper Extremity: WNL  -       Left Upper Extremity: WNL  Upper Extremity Strength: -       Right Upper Extremity: WNL  -       Left Upper Extremity: WNL    AMPAC 6 Click ADL:  AMPAC Total Score: 21    Treatment & Education:  Pt was given education on role of OT, POC and calling for assistance for OOB mobility. Pt was given education on the importance of functional mobility to improve her independence with ADLs and endurance. Pt verbalized understanding.  Education:    Patient left up in chair with all lines intact, call button in reach and nurse notified    GOALS:   Multidisciplinary Problems     Occupational Therapy Goals        Problem: Occupational Therapy Goal    Goal Priority Disciplines Outcome Interventions   Occupational Therapy Goal     OT, PT/OT Ongoing, Progressing    Description: Goals to be met by: 2020     Patient will increase functional independence with ADLs by performing:    UE Dressing with [place].  LE Dressing with Modified [place].  Grooming while seated with Modified [place].  Toileting from toilet with Supervision for hygiene and clothing management.   Toilet transfer to toilet with Supervision.                     History:     Past Medical History:   Diagnosis Date    Asthma     Back pain     Diabetes mellitus     Morbid obesity     Obesity          Past Surgical History:   Procedure Laterality Date    APPENDECTOMY       SECTION         Time Tracking:     OT Date of Treatment: 11/15/20  OT Start Time: 1055  OT Stop Time: 1110  OT Total Time (min): 15 min    Billable Minutes:Evaluation  15    Mita Gabriel, OTR  11/15/2020

## 2020-11-15 NOTE — ASSESSMENT & PLAN NOTE
Patient's FSGs are controlled on current hypoglycemics.   Last A1c reviewed-   Lab Results   Component Value Date    HGBA1C 6.2 06/23/2020     Most recent fingerstick glucose reviewed-   Recent Labs   Lab 11/14/20  1724 11/14/20  2030 11/15/20  0526 11/15/20  1112   POCTGLUCOSE 103 98 90 125*     Current correctional scale  Medium  Maintain anti-hyperglycemic dose as follows-   Antihyperglycemics (From admission, onward)    Start     Stop Route Frequency Ordered    11/09/20 1825  insulin aspart U-100 pen 1-10 Units      -- SubQ Every 6 hours PRN 11/09/20 1825      -SSI  -Hypoglycemic precautions  -Diabetic diet  -Blood glucose checks Q6H

## 2020-11-15 NOTE — ASSESSMENT & PLAN NOTE
Patient with history of prolonged intubation. Failed BiPAP therapy at [facility] requiring intubation. Increased respiratory rate and tachycardic on admission. Extubated to BiPAP 11/13.  Known significant asthma with obesity hypoventilatory syndrome.  Discussed with pulmonology.  Would benefit from BiPAP upon discharge.  Will discuss with case management tomorrow.  She is currently weaned to 4 L. Can discharge home once down to 2 L nasal cannula with home oxygen.  She has completed course of Abx.  Continue oral steroids per pulmonology recommendations.  ABG  Recent Labs   Lab 11/13/20  0816   PH 7.357   PO2 78*   PCO2 58.1*   HCO3 32.6*   BE 7   -PPI for PPx  -Continue supplemental oxygen to keep oxygen saturation >92% BIPAP for night time  -Continue DuoNebs and IV steroids, Singulair   -Pulmonology consulted, appreciate recommendations  -Continue Lovenox SQ at 40 mg Q12H

## 2020-11-15 NOTE — RESPIRATORY THERAPY
11/15/20 0651   Patient Assessment/Suction   Level of Consciousness (AVPU) alert   All Lung Fields Breath Sounds diminished   PRE-TX-O2   O2 Device (Oxygen Therapy) nasal cannula   $ Is the patient on Low Flow Oxygen? Yes   Flow (L/min) 4   Oxygen Concentration (%) 36   SpO2 (!) 93 %   Pulse Oximetry Type Intermittent   $ Pulse Oximetry - Multiple Charge Pulse Oximetry - Multiple   Pulse 88   Resp 17   Aerosol Therapy   $ Aerosol Therapy Charges Aerosol Treatment   Respiratory Treatment Status (SVN) given   Treatment Route (SVN) mask   Patient Position (SVN) HOB elevated   Post Treatment Assessment (SVN) breath sounds unchanged   Signs of Intolerance (SVN) none   Breath Sounds Post-Respiratory Treatment   Throughout All Fields Post-Treatment All Fields   Throughout All Fields Post-Treatment no change   Post-treatment Heart Rate (beats/min) 90   Post-treatment Resp Rate (breaths/min) 17   Incentive Spirometer   $ Incentive Spirometer Charges done with encouragement   Administration (IS) proper technique demonstrated   Number of Repetitions (IS) 10   Level Incentive Spirometer (mL) 750   Patient Tolerance (IS) good   Ready to Wean/Extubation Screen   FIO2<=50 (chart decimal) 0.36

## 2020-11-16 VITALS
SYSTOLIC BLOOD PRESSURE: 112 MMHG | BODY MASS INDEX: 53.92 KG/M2 | RESPIRATION RATE: 18 BRPM | WEIGHT: 293 LBS | HEIGHT: 62 IN | OXYGEN SATURATION: 99 % | HEART RATE: 72 BPM | DIASTOLIC BLOOD PRESSURE: 57 MMHG | TEMPERATURE: 97 F

## 2020-11-16 LAB
ANION GAP SERPL CALC-SCNC: 10 MMOL/L (ref 8–16)
BUN SERPL-MCNC: 15 MG/DL (ref 6–20)
CALCIUM SERPL-MCNC: 8.8 MG/DL (ref 8.7–10.5)
CHLORIDE SERPL-SCNC: 97 MMOL/L (ref 95–110)
CO2 SERPL-SCNC: 32 MMOL/L (ref 23–29)
CREAT SERPL-MCNC: 0.8 MG/DL (ref 0.5–1.4)
EST. GFR  (AFRICAN AMERICAN): >60 ML/MIN/1.73 M^2
EST. GFR  (NON AFRICAN AMERICAN): >60 ML/MIN/1.73 M^2
GLUCOSE SERPL-MCNC: 114 MG/DL (ref 70–110)
POCT GLUCOSE: 117 MG/DL (ref 70–110)
POCT GLUCOSE: 120 MG/DL (ref 70–110)
POTASSIUM SERPL-SCNC: 3.8 MMOL/L (ref 3.5–5.1)
SODIUM SERPL-SCNC: 139 MMOL/L (ref 136–145)

## 2020-11-16 PROCEDURE — 63600175 PHARM REV CODE 636 W HCPCS: Performed by: NURSE PRACTITIONER

## 2020-11-16 PROCEDURE — 97110 THERAPEUTIC EXERCISES: CPT

## 2020-11-16 PROCEDURE — 99900035 HC TECH TIME PER 15 MIN (STAT)

## 2020-11-16 PROCEDURE — 99233 SBSQ HOSP IP/OBS HIGH 50: CPT | Mod: ,,, | Performed by: INTERNAL MEDICINE

## 2020-11-16 PROCEDURE — 94660 CPAP INITIATION&MGMT: CPT

## 2020-11-16 PROCEDURE — 99233 PR SUBSEQUENT HOSPITAL CARE,LEVL III: ICD-10-PCS | Mod: ,,, | Performed by: INTERNAL MEDICINE

## 2020-11-16 PROCEDURE — 80048 BASIC METABOLIC PNL TOTAL CA: CPT

## 2020-11-16 PROCEDURE — 25000242 PHARM REV CODE 250 ALT 637 W/ HCPCS: Performed by: NURSE PRACTITIONER

## 2020-11-16 PROCEDURE — 94640 AIRWAY INHALATION TREATMENT: CPT

## 2020-11-16 PROCEDURE — 36415 COLL VENOUS BLD VENIPUNCTURE: CPT

## 2020-11-16 PROCEDURE — 97530 THERAPEUTIC ACTIVITIES: CPT

## 2020-11-16 PROCEDURE — 97116 GAIT TRAINING THERAPY: CPT

## 2020-11-16 PROCEDURE — 25000003 PHARM REV CODE 250: Performed by: NURSE PRACTITIONER

## 2020-11-16 PROCEDURE — C9113 INJ PANTOPRAZOLE SODIUM, VIA: HCPCS | Performed by: NURSE PRACTITIONER

## 2020-11-16 PROCEDURE — 94761 N-INVAS EAR/PLS OXIMETRY MLT: CPT

## 2020-11-16 PROCEDURE — 27000221 HC OXYGEN, UP TO 24 HOURS

## 2020-11-16 RX ORDER — MONTELUKAST SODIUM 10 MG/1
10 TABLET ORAL NIGHTLY
Qty: 30 TABLET | Refills: 11 | Status: SHIPPED | OUTPATIENT
Start: 2020-11-16 | End: 2020-12-16

## 2020-11-16 RX ORDER — ALBUTEROL SULFATE 1.25 MG/3ML
1.25 SOLUTION RESPIRATORY (INHALATION) EVERY 6 HOURS PRN
Qty: 1 BOX | Refills: 0 | Status: SHIPPED | OUTPATIENT
Start: 2020-11-16 | End: 2021-11-16

## 2020-11-16 RX ORDER — FUROSEMIDE 20 MG/1
20 TABLET ORAL DAILY
Qty: 30 TABLET | Refills: 1 | Status: SHIPPED | OUTPATIENT
Start: 2020-11-16 | End: 2021-11-16

## 2020-11-16 RX ORDER — PREDNISONE 20 MG/1
TABLET ORAL
Qty: 36 TABLET | Refills: 1 | Status: SHIPPED | OUTPATIENT
Start: 2020-11-16 | End: 2023-10-05

## 2020-11-16 RX ADMIN — FUROSEMIDE 20 MG: 20 TABLET ORAL at 07:11

## 2020-11-16 RX ADMIN — IPRATROPIUM BROMIDE AND ALBUTEROL SULFATE 3 ML: .5; 2.5 SOLUTION RESPIRATORY (INHALATION) at 02:11

## 2020-11-16 RX ADMIN — ENOXAPARIN SODIUM 40 MG: 40 INJECTION SUBCUTANEOUS at 07:11

## 2020-11-16 RX ADMIN — PANTOPRAZOLE SODIUM 40 MG: 40 INJECTION, POWDER, FOR SOLUTION INTRAVENOUS at 07:11

## 2020-11-16 RX ADMIN — IPRATROPIUM BROMIDE AND ALBUTEROL SULFATE 3 ML: .5; 2.5 SOLUTION RESPIRATORY (INHALATION) at 12:11

## 2020-11-16 RX ADMIN — IPRATROPIUM BROMIDE AND ALBUTEROL SULFATE 3 ML: .5; 2.5 SOLUTION RESPIRATORY (INHALATION) at 11:11

## 2020-11-16 RX ADMIN — IPRATROPIUM BROMIDE AND ALBUTEROL SULFATE 3 ML: .5; 2.5 SOLUTION RESPIRATORY (INHALATION) at 04:11

## 2020-11-16 RX ADMIN — IPRATROPIUM BROMIDE AND ALBUTEROL SULFATE 3 ML: .5; 2.5 SOLUTION RESPIRATORY (INHALATION) at 06:11

## 2020-11-16 RX ADMIN — PREDNISONE 20 MG: 20 TABLET ORAL at 07:11

## 2020-11-16 NOTE — PLAN OF CARE
Problem: Occupational Therapy Goal  Goal: Occupational Therapy Goal  Description: Goals to be met by: 11/29/2020     Patient will increase functional independence with ADLs by performing:    UE Dressing with [place].  LE Dressing with Modified [place].  Grooming while seated with Modified [place].  Toileting from toilet with Supervision for hygiene and clothing management.   Toilet transfer to toilet with Supervision.    Outcome: Ongoing, Progressing  Pt progressing.

## 2020-11-16 NOTE — PLAN OF CARE
Spoke with Karen at [facility] (ph#[phone]), she is the rep/RT for the pt's area and could confirm that the pt has a home Tr[employer]. She is going to get the home settings for the pt and will fax them to me.  I updated care team of the above....MYRNA Castillo    11/16/20 1410   Discharge Reassessment   Assessment Type Discharge Planning Reassessment

## 2020-11-16 NOTE — PLAN OF CARE
Per Qian , RN CM Director- she spoke to Faiza at [facility] and they are faxing the CMN to [phone]. She also stated that a  is on the way now with the tanks. I updated the pts nurse that the pt is cleared after delivery and education. Olivia Dunlap, JOSELYNW     4:27 pm- Certificate of medical necessity signed by Dr. Stewart and returned to [facility] at fax [phone].      11/16/20 1620   Post-Acute Status   Post-Acute Authorization HME   Post-Acute Placement Status Set-up Complete

## 2020-11-16 NOTE — RESPIRATORY THERAPY
11/16/20 0637   Patient Assessment/Suction   Level of Consciousness (AVPU) alert   All Lung Fields Breath Sounds diminished   Cough Frequency no cough   PRE-TX-O2   O2 Device (Oxygen Therapy) BiPAP  (placed on NC 2L @ this time)   $ Is the patient on Low Flow Oxygen? Yes   SpO2 98 %   Pulse Oximetry Type Intermittent   $ Pulse Oximetry - Multiple Charge Pulse Oximetry - Multiple   Pulse 67   Resp 14   Aerosol Therapy   $ Aerosol Therapy Charges Aerosol Treatment   Respiratory Treatment Status (SVN) given   Treatment Route (SVN) mask   Patient Position (SVN) HOB elevated   Post Treatment Assessment (SVN) breath sounds unchanged   Signs of Intolerance (SVN) none   Breath Sounds Post-Respiratory Treatment   Post-treatment Heart Rate (beats/min) 70   Post-treatment Resp Rate (breaths/min) 16   Wound Care   $ Wound Care Tech Time 15 min   Area of Concern Bridge of nose   Skin Color/Characteristics without discoloration   Skin Temperature warm   Preset CPAP/BiPAP Settings   Mode Of Delivery BiPAP   Size of Mask Small   Equipment Type V60   Ipap 20   EPAP (cm H2O) 10   Pressure Support (cm H2O) 10   Set Rate (Breaths/Min) 10   ITime (sec) 1   Rise Time (sec) 0   Patient CPAP/BiPAP Settings   RR Total (Breaths/Min) 14   Tidal Volume (mL) 611   VE Minute Ventilation (L/min) 10 L/min   Peak Inspiratory Pressure (cm H2O) 19   TiTOT (%) 23   Total Leak (L/Min) 44   Patient Trigger - ST Mode Only (%) 78

## 2020-11-16 NOTE — PT/OT/SLP PROGRESS
Occupational Therapy   Treatment    Name: Michelle Wren  MRN: [id number]  Admitting Diagnosis:  Acute on chronic respiratory failure with hypoxia and hypercapnia       Recommendations:     Discharge Recommendations: home, home health OT  Discharge Equipment Recommendations:  oxygen  Barriers to discharge:  None    Assessment:     Michelle Wren is a 30 y.o. female with a medical diagnosis of Acute on chronic respiratory failure with hypoxia and hypercapnia.  She presents with the following performance deficits affecting function are impaired endurance, impaired cardiopulmonary response to activity.   Collaborating with patient regarding her plan of care and goals of therapy - pt reports that she does feel weaker at times and per pt, back pain is her biggest barrier as it leads to inactivity (to manage pain). Patient agreeable to HEP using red thera-band from standing position > 10 minutes without seated rest break required to facilitate endurance. Respiratory therapy arriving midway through therex and requesting patient remove supplemental O2 and RT will return to measure SpO2.    Rehab Prognosis:  Good; patient would benefit from acute skilled OT services to address these deficits and reach maximum level of function.       Plan:     Patient to be seen 3 x/week to address the above listed problems via self-care/home management, therapeutic activities, therapeutic exercises  · Plan of Care Expires: 11/29/20  · Plan of Care Reviewed with: patient    Subjective     Pain/Comfort:  · Pain Rating 1: 0/10    Objective:     Communicated with: Nurse prior to session.  Patient found up in bedside chair with telemetry, oxygen upon OT entry to room.    General Precautions: Standard, fall, respiratory   Orthopedic Precautions:N/A   Braces: N/A     Occupational Performance:     Functional Mobility/Transfers:  · Patient completed Sit <> Stand Transfer with independence and supervision  with  no assistive device   · Functional  Mobility:      Activities of Daily Living:  · Pt reports she has already toileted from toilet level this morning with employee standing by only to turn off bed alarm (denies requiring assistance with toileting tasks); pt reports she has also already groomed this morning      [facility] 6 Click ADL: 21    Treatment & Education:  - OTR reinforcing education/instruction regarding safety awareness/fall prevention in hospital/home environment, use of call button for mobility, importance of frequent mobility, and instructing on HEP using red thera-band to facilitate strength and endurance. In standing with Supervision, patient performing the following exercises x 10 reps each: PNF, biceps/triceps/deltoids; midway through therex, respiratory therapy arriving and requesting patient remove supplemental O2 for duration of therex and will return to check      Patient left up in bedside chair with all lines intact, call button in reach, chair alarm on and nurse notifiedEducation:      GOALS:   Multidisciplinary Problems     Occupational Therapy Goals        Problem: Occupational Therapy Goal    Goal Priority Disciplines Outcome Interventions   Occupational Therapy Goal     OT, PT/OT Ongoing, Progressing    Description: Goals to be met by: 11/29/2020     Patient will increase functional independence with ADLs by performing:    UE Dressing with [place].  LE Dressing with Modified [place].  Grooming while seated with Modified [place].  Toileting from toilet with Supervision for hygiene and clothing management.   Toilet transfer to toilet with Supervision.                     Time Tracking:     OT Date of Treatment: 11/16/20  OT Start Time: 0914  OT Stop Time: 0938  OT Total Time (min): 24 min    Billable Minutes:Therapeutic Activity 10  Therapeutic Exercise 14    RACHELLE Moran LOTR  11/16/2020

## 2020-11-16 NOTE — PLAN OF CARE
Guzman spoke with Dulce, RN, she informed me that pt still did not receive her oxygen and she was told it was delivered to her home.  Guzman called [facility], spoke with Landy-Answering service- at [phone].  She informed me that the  [facility] is on her way to the hospital to deliver the oxygen.  Landy called [facility] she said the  is 2 min away from [facility].  Guzman updated MYRNA Cardona.         11/16/20 1430   Final Note   Assessment Type Discharge Planning Reassessment

## 2020-11-16 NOTE — DISCHARGE INSTRUCTIONS
Use your noninvasive ventilator for sleep or rest periods during the day.  Utilize your oxygen continuously at home.  Keep follow-up appointments.  Fill and take steroids as directed per Dr. Gentile.    Thank you for choosing [facility] for your medical care. The primary doctor who is taking care of you at the time of your discharge is Peg Stewart MD.     You were admitted to the hospital with Acute on chronic respiratory failure with hypoxia and hypercapnia.     Please note your discharge instructions, including diet/activity restrictions, follow-up appointments, and medication changes.  If you have any questions about your medical issues, prescriptions, or any other questions, please feel free to contact the [facility] Medicine Dept at [phone] and we will help.    If you are previously with Home health, outpatient PT/OT or under a therapy program, you are cleared to return to those programs.    Please direct all long term medication refills and follow up to your primary care provider, Teressa Saeed NP. Thank you again for letting us take care of your health care needs.    Please note the following discharge instructions per your discharging physician-  Bruna Martinez NP

## 2020-11-16 NOTE — PLAN OF CARE
Per Rocio with [facility]- she is outsourcing the home O2 back to [facility] . CM following. Olivia Dunlap LCSW     Faxed to [facility] and contact is Faiza at [phone]    2:38- I spoke to Carmen at [phone]  - she is going to fax the medical necessity form to me for completion. I updated her that the pt already has an NIV machine.     3:15 Per Lalo at [facility]- he does not have any info on this pt and will call the [place] office.        11/16/20 1246   Post-Acute Status   Post-Acute Authorization HME   HME Status Referrals Sent

## 2020-11-16 NOTE — MED STUDENT SUBJECTIVE & OBJECTIVE
"Medical Student Subjective & Objective     Principal Problem: Acute on chronic respiratory failure with hypoxia and hypercapnia    Chief Complaint:   Chief Complaint   Patient presents with    Shortness of Breath     respiratory failure       HPI: Michelle Wren is a 30 y.o. female with a PMHx of restrictive lung disease, morbid obesity, severe persistent asthma, hypertension, diabetes and obesity hypoventilation syndrome  who presented to the [place] ED last night around 5:00 p.m. with respiratory failure. Per  physician note she was "admitted and treated with BiPAP IV steroids, empiric antibiotics, DuoNebs IV Lasix with low threshold to intubate.  Tried to transfer to [facility] but she refused.  Around 9:00 p.m. she was only arousable to sternal rub but and intubated for airway protection.  AC rate 16, , peep 5, FiO2 40%.  Vitals within normal limits.  Diprivan at 50 mics per kg per minute, Nimbex at 3 mics per kg per minute - weaning nimbex and changing to versed.  Today, Dr. Arellano placed a right IJ triple-lumen central line. Seen by Dr Gentile, pulmonology, during last inpatient stay and then in clinic on 7/30 - essentially needs a Trilogy. Her family reported that her sats drop to the 70s with minimal exertion in her house."  The patient is being transferred [facility] for pulmonology services.    Upon arrival to [facility], patient is sedated and intubated.  The patient is unable to provide any history, so history was mainly taken from  physician note and previous hospital records. Per chart review patient has similar presentation in June that resulted in a prolonged intubation. The patient has followed up outpatient with pulmonology once since previous admission.  She is supposed to be on BiPAP, but unsure if she is compliant.  The patient will be admitted to the intensive care unit under Hospital Medicine for further workup and evaluation.    Hospital Course: " -11/10 Patient sedated and intubated. On ceftriaxone/azithromycin. Remains on IV methylprednisolone. Also on therapeutic dose Lovenox per Pulmonary recommendations.  - Patient to change to PS mode and will try to extubate; on Precedex and Propofol infusions. All cultures negative.   - attempting extubation with Anesthesia ready; all cultures remain negative  - Patient successfully extubated to BiPAP; PT/OT/SLP consulted; Lasix discontinued; Lovenox dose adjusted; remains on steroids and and azithro/ceftrixone  - Patient transferred to PCU on 4 L NC- requiring bipap at night      Interval History: Notes/labs reviewed. No acute events overnight. Pt stable and without complaints. Notes she is breathing without difficulty on nasal cannula. Ambulated around hallway yesterday and this AM with assistance and without severe fatigue on O2. Tolerating PO diet. Awaiting home O2 evaluation. Denies other sx, concerns/complaints.     Past Medical History:   Diagnosis Date    Asthma     Back pain     Diabetes mellitus     Morbid obesity     Obesity        Past Surgical History:   Procedure Laterality Date    APPENDECTOMY       SECTION         Review of patient's allergies indicates:  No Known Allergies    No current facility-administered medications on file prior to encounter.      Current Outpatient Medications on File Prior to Encounter   Medication Sig    albuterol (PROVENTIL/VENTOLIN HFA) 90 mcg/actuation inhaler 2 puffs every 4 hours as needed for cough, wheeze, or shortness of breath    doxycycline (VIBRA-TABS) 100 MG tablet     fluticasone furoate-vilanteroL (BREO ELLIPTA) 200-25 mcg/dose DsDv diskus inhaler Inhale 1 puff into the lungs once daily. Controller    fluticasone-umeclidin-vilanter (TRELEGY ELLIPTA) 100-62.5-25 mcg DsDv Inhale 1 puff into the lungs once daily.    furosemide (LASIX) 20 MG tablet     metFORMIN (GLUCOPHAGE) 500 MG tablet Take 2 tablets (1,000 mg total) by  mouth 2 (two) times daily with meals.    pantoprazole (PROTONIX) 40 MG tablet Take 1 tablet (40 mg total) by mouth once daily.    [DISCONTINUED] montelukast (SINGULAIR) 10 mg tablet Take 1 tablet (10 mg total) by mouth every evening.    [DISCONTINUED] predniSONE (DELTASONE) 20 MG tablet Take 2 daily for 5 days then take one daily for 5 days and may repeat for shortness of breath.     Family History     Family history is unknown by patient.        Tobacco Use    Smoking status: Former Smoker   Substance and Sexual Activity    Alcohol use: Yes     Frequency: Never     Comment: occ    Drug use: No    Sexual activity: Yes     Birth control/protection: None     Review of Systems  Objective:     Vital Signs (Most Recent):  Temp: 97.6 °F (36.4 °C) (11/16/20 0725)  Pulse: 68 (11/16/20 0725)  Resp: 18 (11/16/20 0725)  BP: (!) 120/56 (11/16/20 0725)  SpO2: (!) 94 % (11/16/20 0725) Vital Signs (24h Range):  Temp:  [96.8 °F (36 °C)-98.8 °F (37.1 °C)] 97.6 °F (36.4 °C)  Pulse:  [67-84] 68  Resp:  [14-19] 18  SpO2:  [94 %-98 %] 94 %  BP: (111-147)/(51-79) 120/56     Weight: (!) 152.7 kg (336 lb 10.3 oz)  Body mass index is 61.57 kg/m².    Intake/Output Summary (Last 24 hours) at 11/16/2020 1023  Last data filed at 11/16/2020 0500  Gross per 24 hour   Intake 480 ml   Output --   Net 480 ml      Physical Exam    Significant Labs:   Blood Culture: No results for input(s): LABBLOO in the last 48 hours.  BMP:   Recent Labs   Lab 11/16/20 0450   *      K 3.8   CL 97   CO2 32*   BUN 15   CREATININE 0.8   CALCIUM 8.8     CBC:   Recent Labs   Lab 11/15/20  0745   WBC 12.18   HGB 13.1   HCT 46.2        CMP:   Recent Labs   Lab 11/15/20  0745 11/16/20  0450    139   K 3.8 3.8    97   CO2 30* 32*   GLU 91 114*   BUN 20 15   CREATININE 0.7 0.8   CALCIUM 8.6* 8.8   ANIONGAP 11 10   EGFRNONAA >60 >60     All pertinent labs within the past 24 hours have been reviewed.    Significant Imaging: I have  reviewed all pertinent imaging results/findings within the past 24 hours.    Medical Student Subjective & Objective

## 2020-11-16 NOTE — PLAN OF CARE
Guzman called [facility] to find out if they have any record on file with pt having a CPAP or [facility]. Guzman spoke with Pili at [phone].  Cm updated BELKYS Mcfarland via secure chat.  2:00pm  Cm spoke with MYRNA Schmitt-Supervisor. Pt has a Non-Invasive Pressure Support Ventilation with [facility].  Cm tried calling light at [phone]. Left a message.         11/16/20 1212   Discharge Reassessment   Assessment Type Discharge Planning Reassessment

## 2020-11-16 NOTE — PLAN OF CARE
Pt is cleared from  for D/C.  Oxygen is in route to the hospital.       11/16/20 1750   Final Note   Assessment Type Final Discharge Note   Anticipated Discharge Disposition Home   Hospital Follow Up  Appt(s) scheduled? Yes

## 2020-11-16 NOTE — PLAN OF CARE
Pt will be discharging home to [address] [place] , [place]. I did not update this address in Epic as it is not the pts permanent residence.I will updated [facility] of delivery address. BELKYS Mcfarland updated that the pt qualifies for home O2.  Olivia Dunlap, JOSELYNW     11/16/20 1038   Discharge Assessment   Assessment Type Discharge Planning Reassessment

## 2020-11-16 NOTE — RESPIRATORY THERAPY
11/16/20 0945   Home Oxygen Qualification   Room Air SpO2 At Rest 94 %   Room Air SpO2 on Exertion (!) 82 %   SpO2 During Exertion on O2 94 %   Heart Rate on O2 77 bpm   Exertion O2 LPM 2 LPM   SpO2 on Recovery 100 %   Recovery Heart Rate 71 bpm   Recovery O2 LPM 2 LPM   Home O2 Eval Comments pt qualifies for home O2

## 2020-11-16 NOTE — PLAN OF CARE
Pt has been resting comfortably tonight, currently on her Bipap, eyes closed, breaths deep and even. Voiced confirmation about PoC and willingness to participate in it. Pt is now down to 2L NC when not on Bipap, breathing easily on it, no c/o SOB. NAD noted, no c/o pain, will continue to monitor.

## 2020-11-16 NOTE — PLAN OF CARE
The pt is discharging home and is clear for discharge once home O2 is delivered. Pts nurse Dulce updated.  Olivia Dunlap, FAWAD     11/16/20 2242   Final Note   Assessment Type Final Discharge Note   Anticipated Discharge Disposition Home

## 2020-11-16 NOTE — PT/OT/SLP PROGRESS
Physical Therapy Treatment    Patient Name:  Michelle Wren   MRN:  [id number]    Recommendations:     Discharge Recommendations:  home health PT   Discharge Equipment Recommendations: oxygen   Barriers to discharge: None    Assessment:     Michelle Wren is a 30 y.o. female admitted with a medical diagnosis of Acute on chronic respiratory failure with hypoxia and hypercapnia.  She presents with the following impairments/functional limitations:  impaired endurance, impaired cardiopulmonary response to activity . Pt ambulated 250ft x2 with CGA initially on RA with desaturation 84%. O2 at 2 liters donned and ambulation continued with SAT 92% at end of therapy.    Rehab Prognosis: Good; patient would benefit from acute skilled PT services to address these deficits and reach maximum level of function.    Recent Surgery: * No surgery found *      Plan:     During this hospitalization, patient to be seen 6 x/week to address the identified rehab impairments via gait training, therapeutic activities, therapeutic exercises and progress toward the following goals:    · Plan of Care Expires:  11/30/20    Subjective   Stated that she ambulated yesterday with nursing  Is ready to go home  Chief Complaint: bored  Patient/Family Comments/goals: get home for son's birthday  Pain/Comfort:  · Pain Rating 1: 0/10      Objective:     Communicated with Quin prior to session.  Patient found up in chair with telemetry, oxygen upon PT entry to room.     General Precautions: Standard, fall, respiratory   Orthopedic Precautions:N/A   Braces: N/A     Functional Mobility:  · Transfers:     · Sit to Stand:  supervision with no AD  · Gait: 250ft x2 with O2 at 2 liters CGA/SBA- walking and talking      AM-PAC 6 CLICK MOBILITY          Therapeutic Activities and Exercises:   Patient was educated on the importance of OOB activity and functional mobility to negate negative effects of prolonged bed rest during hospitalization, safe  transfers and ambulation, and D/C planning   thera ex encouraged  Back to chair post PT    Patient left up in chair with all lines intact and call button in reach..    GOALS:   Multidisciplinary Problems     Physical Therapy Goals        Problem: Physical Therapy Goal    Goal Priority Disciplines Outcome Goal Variances Interventions   Physical Therapy Goal     PT, PT/OT Ongoing, Progressing     Description: Goals to be met by: 2020     Patient will increase functional independence with mobility by performin. Supine to sit with Contact Guard Assistance  2. Sit to stand transfer with Contact Guard Assistance  3. Bed to chair transfer with Contact Guard Assistance   4. Gait  x 250 feet with Contact Guard Assistance using Rolling Walker.   5. Lower extremity exercise program x20 reps                      Time Tracking:     PT Received On: 20  PT Start Time: 933     PT Stop Time: 956  PT Total Time (min): 23 min     Billable Minutes: Gait Training 23    Treatment Type: Treatment  PT/PTA: PT     PTA Visit Number: 0     Yeni Granger, PT  2020

## 2020-11-16 NOTE — PROGRESS NOTES
Progress Note  PULMONARY    Admit Date: 11/9/2020 11/16/2020      SUBJECTIVE:     11/11 no new c/o, intubated and sedate  11/12 no new c/o  11/13  No new c/o, just extubated to niv.  11/14 no new c/o  11/15 no new c/o  11/16 no new c/o -    PFSH and Allergies reviewed.    OBJECTIVE:     Vitals (Most recent):  Vitals:    11/16/20 0725   BP: (!) 120/56   Pulse: 68   Resp: 18   Temp: 97.6 °F (36.4 °C)       Vitals (24 hour range):  Temp:  [96.8 °F (36 °C)-98.8 °F (37.1 °C)]   Pulse:  [67-84]   Resp:  [14-19]   BP: (111-147)/(51-79)   SpO2:  [94 %-98 %]       Intake/Output Summary (Last 24 hours) at 11/16/2020 0816  Last data filed at 11/16/2020 0500  Gross per 24 hour   Intake 480 ml   Output --   Net 480 ml          Physical Exam:  The patient's neuro status (alertness,orientation,cognitive function,motor skills,), pharyngeal exam (oral lesions, hygiene, abn dentition,), Neck (jvd,mass,thyroid,nodes in neck and above/below clavicle),RESPIRATORY(symmetry,effort,fremitus,percussion,auscultation),  Cor(rhythm,heart tones including gallops,perfusion,edema)ABD(distention,hepatic&splenomegaly,tenderness,masses), Skin(rash,cyanosis),Psyc(affect,judgement,).  Exam negative except for these pertinent findings:    Alert - using niv sleep, - chest is symmetric, no distress, normal percussion, normal fremitus and good normal breath sounds   .nc 2now    Radiographs reviewed: view by direct vision  Atelectasis and increased markings 11/11  Results for orders placed during the hospital encounter of 11/09/20   X-Ray Chest 1 View    Narrative EXAMINATION:  XR CHEST 1 VIEW    CLINICAL HISTORY:  resp failure;    TECHNIQUE:  Single frontal view of the chest was performed.    COMPARISON:  11/10/2020 .    FINDINGS:  NG tube traverses the esophagus within the upper chest and is obscured in the lower chest and if its positioning is desired clinically suggest image centered at the level of the diaphragms. It is suggest to extend beneath the  level the diaphragm. Right internal jugular venous catheter is present with the tip at the level the proximal SVC/junction of the SVC and right brachiocephalic vein. Endotracheal tube appears in good position with the tip projecting approximately 4.5 cm above the cas.    The cardiomediastinal silhouette is stable.    Mild bilateral perihilar/bibasilar infiltrates with probable small left pleural effusion      Impression Continued bilateral infiltrates and probable small left pleural effusion not appearing significantly changed or if anything infiltrates slightly decreased compared to the prior exam.  Positions of lines and tubes described.      Electronically signed by: Manjula Schulz MD  Date:    11/11/2020  Time:    08:06   ]    Labs     No results for input(s): WBC, HGB, HCT, PLT, BAND, METAMYELOCYT, MYELOPCT, HGBA1C in the last 24 hours.  Recent Labs   Lab 11/16/20  0450      K 3.8   CL 97   CO2 32*   BUN 15   CREATININE 0.8   *   CALCIUM 8.8     No results for input(s): PH, PCO2, PO2, HCO3 in the last 24 hours.  Microbiology Results (last 7 days)     Procedure Component Value Units Date/Time    Blood culture [[id number]] Collected: 11/09/20 2056    Order Status: Completed Specimen: Blood Updated: 11/15/20 0612     Blood Culture, Routine No growth after 5 days.          Impression:  Active Hospital Problems    Diagnosis  POA    *Acute on chronic respiratory failure with hypoxia and hypercapnia [J96.21, J96.22]  Yes    Malnutrition of moderate degree [E44.0]  Yes    Severe persistent asthma with acute exacerbation [J45.51]  Yes    Morbid obesity [E66.01]  Yes    Pulmonary hypertension [I27.20]  Yes     By pulmonary artery size on ct chest 6/30      Pneumonia due to infectious organism [J18.9]  Yes    Type 2 diabetes mellitus [E11.9]  Yes    Obesity hypoventilation syndrome [E66.2]  Yes    Cardiomegaly [I51.7]  Yes      Resolved Hospital Problems    Diagnosis Date Resolved POA     Bradycardia [R00.1] 11/15/2020 No    Severe sepsis [A41.9, R65.20] 11/15/2020 Unknown               Plan:   11/10-expect staph again,  Asthma exacerbation, morbid obese, obese hypovent.     Temp up 100.7    Needs 100% ox again.  abg looks worse this admit than last time. Will keep tidal vol up with somewhat high peep as had extreme difficulty last admit.    Not sure where problem that ppt recurrance would be - rx asthma, infection, resp failure, monitor.   Pt had angioedema and concerns with propofol infusion in June.  Pt had transient theraputic lovenox given with severe gas exchange problems Camille admit  Pt was wheezing initially in June.    11/11t about 99, on azithro/rocephin, I/o 1417/3695, bun/creat 22/0.8,glu 144, k, wbc 20k, versed and propofol    abg 7.45/co2 48/02 75- on 60% peep 14 felt to be atelectasis  cxr hazey with morbid obese and atelectasis seen  Sputum nl estephanie    Discussed with brother Jaycob yesterday- pt moved out to cousin?  Brother not able to help.    11/11 too sedate , no apparent obstruction and min vol<8, need to get awake, wean peep then psv and consider extubate to niv.      11/12 tm 99.3 on rocephin/azithro, still on  60%, on cpap    Solumedrol 80 q8, precedex, I/o 565/2535= bun/crea 31/0.8    cxr 11/12  lungs  Doing well with cpap/psv- extubate epap 15/ipap 25, mobilize--needs to pass leak test.    11/13 extbated with very reasonable abg, bipap 20/10 seems ok for now.  .  Pt extubated, pt relates her cpap device did not seem to work effectively last wk.  She had been on steroids and using breo daily.      Pt felt to have severe chronic asthma and copd.     Would recommend non invasive ventilator - this is 2nd episode of severe life threatening respiratory failure in last 5 months.  Would recommend auto epap range 5-18, avaps mode tidal volume 350, back up rate 6, and inspiratory pressures 15- 25.       Due to patient's copd and chronic respiratory failure, patient's conditioning  is worsening and requires a non invasive home ventilator (nocturnal and daytime volume ventilation prn use) to prevent co2 retention and untimely hospital re admits. bipap is insufficient due to severity of condition.  Copd is primary cause of chronic respiratory failure.    11/14   I/o 1070/605, creat 0.8, afebrile  Nc at 5lpm- needs to wean. outpt once rm air, needs better rx for resp failure.  Looks like cpap is not near adequate here      11/15 I/o not accurate, 4lpm, could go home when 2lpm or less. Went home on 2lpm last admit but got to rm air quickly with mobilization post dc.    Suspect had poor access to needed steroids along with inadequate nocturnal ventilation due to copd ppt resp  Failure    11/16 on 2lpm now - given copd reasonable to go outpt , niv identified as trilogy but settings na.  Could adjust settings if sent to office.    Will send in prednsione,  Needs pcp and f/u for more prednisone, use 20 mg- 3x3,2x3,1x3.  asj for amb sat check and ox if qualifies.  Will ask for f/u in a month

## 2020-11-16 NOTE — MEDICAL/APP STUDENT
"[facility]  Progress Note    Patient Name: Michelle Wren  MRN: [id number]  Patient Class: IP- Inpatient   Admission Date: 11/9/2020  Length of Stay: 7 days  Attending Physician: Peg Stewart MD  Primary Care Provider: Teressa Saeed NP    Subjective:     Principal Problem: Acute on chronic respiratory failure with hypoxia and hypercapnia    Chief Complaint:   Chief Complaint   Patient presents with    Shortness of Breath     respiratory failure       HPI: Michelle Wren is a 30 y.o. female with a PMHx of restrictive lung disease, morbid obesity, severe persistent asthma, hypertension, diabetes and obesity hypoventilation syndrome  who presented to the [facility] ED last night around 5:00 p.m. with respiratory failure. Per  physician note she was "admitted and treated with BiPAP IV steroids, empiric antibiotics, DuoNebs IV Lasix with low threshold to intubate.  Tried to transfer to [facility] but she refused.  Around 9:00 p.m. she was only arousable to sternal rub but and intubated for airway protection.  AC rate 16, , peep 5, FiO2 40%.  Vitals within normal limits.  Diprivan at 50 mics per kg per minute, Nimbex at 3 mics per kg per minute - weaning nimbex and changing to versed.  Today, Dr. Arellano placed a right IJ triple-lumen central line. Seen by Dr Gentile, pulmonology, during last inpatient stay and then in clinic on 7/30 - essentially needs a Trilogy. Her family reported that her sats drop to the 70s with minimal exertion in her house."  The patient is being transferred [facility] for pulmonology services.    Upon arrival to [facility], patient is sedated and intubated.  The patient is unable to provide any history, so history was mainly taken from  physician note and previous hospital records. Per chart review patient has similar presentation in June that resulted in a prolonged intubation. The patient has followed up outpatient with pulmonology " once since previous admission.  She is supposed to be on BiPAP, but unsure if she is compliant.  The patient will be admitted to the intensive care unit under Hospital Medicine for further workup and evaluation.    Hospital Course: -11/10 Patient sedated and intubated. On ceftriaxone/azithromycin. Remains on IV methylprednisolone. Also on therapeutic dose Lovenox per Pulmonary recommendations.  - Patient to change to PS mode and will try to extubate; on Precedex and Propofol infusions. All cultures negative.   - attempting extubation with Anesthesia ready; all cultures remain negative  - Patient successfully extubated to BiPAP; PT/OT/SLP consulted; Lasix discontinued; Lovenox dose adjusted; remains on steroids and and azithro/ceftrixone  - Patient transferred to PCU on 4 L NC- requiring bipap at night      Interval History: Notes/labs reviewed. No acute events overnight. Pt stable and without complaints. Notes she is breathing without difficulty on nasal cannula. Ambulated around hallway yesterday and this AM with assistance and without severe fatigue on O2. Tolerating PO diet. Awaiting home O2 evaluation. Denies other sx, concerns/complaints.     Past Medical History:   Diagnosis Date    Asthma     Back pain     Diabetes mellitus     Morbid obesity     Obesity        Past Surgical History:   Procedure Laterality Date    APPENDECTOMY       SECTION         Review of patient's allergies indicates:  No Known Allergies    No current facility-administered medications on file prior to encounter.      Current Outpatient Medications on File Prior to Encounter   Medication Sig    albuterol (PROVENTIL/VENTOLIN HFA) 90 mcg/actuation inhaler 2 puffs every 4 hours as needed for cough, wheeze, or shortness of breath    doxycycline (VIBRA-TABS) 100 MG tablet     fluticasone furoate-vilanteroL (BREO ELLIPTA) 200-25 mcg/dose DsDv diskus inhaler Inhale 1 puff into the lungs once daily. Controller     fluticasone-umeclidin-vilanter (TRELEGY ELLIPTA) 100-62.5-25 mcg DsDv Inhale 1 puff into the lungs once daily.    furosemide (LASIX) 20 MG tablet     metFORMIN (GLUCOPHAGE) 500 MG tablet Take 2 tablets (1,000 mg total) by mouth 2 (two) times daily with meals.    pantoprazole (PROTONIX) 40 MG tablet Take 1 tablet (40 mg total) by mouth once daily.    [DISCONTINUED] montelukast (SINGULAIR) 10 mg tablet Take 1 tablet (10 mg total) by mouth every evening.    [DISCONTINUED] predniSONE (DELTASONE) 20 MG tablet Take 2 daily for 5 days then take one daily for 5 days and may repeat for shortness of breath.     Family History     Family history is unknown by patient.        Tobacco Use    Smoking status: Former Smoker   Substance and Sexual Activity    Alcohol use: Yes     Frequency: Never     Comment: occ    Drug use: No    Sexual activity: Yes     Birth control/protection: None     Review of Systems  Objective:     Vital Signs (Most Recent):  Temp: 97.6 °F (36.4 °C) (11/16/20 0725)  Pulse: 68 (11/16/20 0725)  Resp: 18 (11/16/20 0725)  BP: (!) 120/56 (11/16/20 0725)  SpO2: (!) 94 % (11/16/20 0725) Vital Signs (24h Range):  Temp:  [96.8 °F (36 °C)-98.8 °F (37.1 °C)] 97.6 °F (36.4 °C)  Pulse:  [67-84] 68  Resp:  [14-19] 18  SpO2:  [94 %-98 %] 94 %  BP: (111-147)/(51-79) 120/56     Weight: (!) 152.7 kg (336 lb 10.3 oz)  Body mass index is 61.57 kg/m².    Intake/Output Summary (Last 24 hours) at 11/16/2020 1023  Last data filed at 11/16/2020 0500  Gross per 24 hour   Intake 480 ml   Output --   Net 480 ml      Physical Exam    Significant Labs:   Blood Culture: No results for input(s): LABBLOO in the last 48 hours.  BMP:   Recent Labs   Lab 11/16/20  0450   *      K 3.8   CL 97   CO2 32*   BUN 15   CREATININE 0.8   CALCIUM 8.8     CBC:   Recent Labs   Lab 11/15/20  0745   WBC 12.18   HGB 13.1   HCT 46.2        CMP:   Recent Labs   Lab 11/15/20  0745 11/16/20  0450    139   K 3.8 3.8   CL  100 97   CO2 30* 32*   GLU 91 114*   BUN 20 15   CREATININE 0.7 0.8   CALCIUM 8.6* 8.8   ANIONGAP 11 10   EGFRNONAA >60 >60     All pertinent labs within the past 24 hours have been reviewed.    Significant Imaging: I have reviewed all pertinent imaging results/findings within the past 24 hours.        Assessment/Plan:      Acute on chronic respiratory failure with hypoxia and hypercapnia  Patient with history of prolonged intubation. Failed BiPAP therapy at [facility] requiring intubation. Increased respiratory rate and tachycardic on admission. Extubated to BiPAP 11/13.  Known significant asthma with obesity hypoventilatory syndrome.  Discussed with pulmonology.  Would benefit from BiPAP upon discharge.  Awaiting home O2 eval.  Can discharge home once down to 2 L nasal cannula with home oxygen.  She has completed course of Abx.  Continue oral steroids per pulmonology recommendations.  ABG      Recent Labs   Lab 11/13/20  0816   PH 7.357   PO2 78*   PCO2 58.1*   HCO3 32.6*   BE 7   -PPI for PPx  -Continue supplemental oxygen to keep oxygen saturation >92% BIPAP for night time  -Continue DuoNebs and IV steroids, Singulair   -Pulmonology consulted, appreciate recommendations  -Continue Lovenox SQ at 40 mg Q12H     Malnutrition of moderate degree  Resolved-she is tolerating p.o. diet fine.     Severe persistent asthma with acute exacerbation  See respiratory failure.     Morbid obesity  Body mass index is 61.57 kg/m². Morbid obesity complicates all aspects of disease management from diagnostic modalities to treatment. Will encourage weight loss explain health benefits when patient is not sedated.     Pulmonary hypertension  Repeat TTE with normal EF and concentric remodeling.  -Continue to treat per Pulmonology recommendations  -Monitor I's and O's     Pneumonia due to infectious organism  Patient completed course of Abx and is clinically improved.     Type 2 diabetes mellitus  Patient's FSGs are controlled on current  hypoglycemics.   Last A1c reviewed-         Lab Results   Component Value Date     HGBA1C 6.2 06/23/2020      Most recent fingerstick glucose reviewed-          Recent Labs   Lab 11/14/20  1724 11/14/20  2030 11/15/20  0526 11/15/20  1112   POCTGLUCOSE 103 98 90 125*      Current correctional scale  Medium  Maintain anti-hyperglycemic dose as follows-              Antihyperglycemics (From admission, onward)     Start     Stop Route Frequency Ordered     11/09/20 1825   insulin aspart U-100 pen 1-10 Units      -- SubQ Every 6 hours PRN 11/09/20 1825       -SSI  -Hypoglycemic precautions  -Diabetic diet  -Blood glucose checks Q6H     Cardiomegaly       Results for orders placed during the hospital encounter of 06/22/20   Echo Color Flow Doppler? Yes     Narrative · Concentric left ventricular remodeling.  · Normal left ventricular systolic function. The estimated ejection   fraction is 69%.  · Normal LV diastolic function.  · No wall motion abnormalities.  · Normal right ventricular systolic function.  · Mild right atrial enlargement.  · Trivial pericardial effusion.  · Mild left atrial enlargement.         Repeat TTE with no right heart strain and normal EF. BNP elevated on admission. Negative 6.7 L since admission.  -Continue to monitor I/O's and daily weights.  -Maintain oxygen sats >92%      Obesity hypoventilation syndrome     -Appreciate Pulmonology recommendations  -Please see acute on chronic respiratory failure     -would benefit from NIPPV on discharge.  Awaiting eval from case mgmt    Active Diagnoses:    Diagnosis Date Noted POA    PRINCIPAL PROBLEM:  Acute on chronic respiratory failure with hypoxia and hypercapnia [J96.21, J96.22] 06/22/2020 Yes    Malnutrition of moderate degree [E44.0] 11/11/2020 Yes    Severe persistent asthma with acute exacerbation [J45.51] 07/30/2020 Yes    Morbid obesity [E66.01] 07/05/2020 Yes    Pulmonary hypertension [I27.20] 07/01/2020 Yes    Pneumonia due to infectious  organism [J18.9] 06/26/2020 Yes    Type 2 diabetes mellitus [E11.9] 06/24/2020 Yes    Obesity hypoventilation syndrome [E66.2] 06/22/2020 Yes    Cardiomegaly [I51.7] 06/22/2020 Yes      Problems Resolved During this Admission:    Diagnosis Date Noted Date Resolved POA    Bradycardia [R00.1] 11/13/2020 11/15/2020 No    Severe sepsis [A41.9, R65.20] 06/23/2020 11/15/2020 Unknown     VTE Risk Mitigation (From admission, onward)         Ordered     enoxaparin injection 40 mg  Every 12 hours      11/13/20 1038                   REJI Cordova  [facility]

## 2020-11-16 NOTE — PLAN OF CARE
Problem: Physical Therapy Goal  Goal: Physical Therapy Goal  Description: Goals to be met by: 2020     Patient will increase functional independence with mobility by performin. Supine to sit with Contact Guard Assistance  2. Sit to stand transfer with Contact Guard Assistance  3. Bed to chair transfer with Contact Guard Assistance   4. Gait  x 250 feet with Contact Guard Assistance using Rolling Walker.   5. Lower extremity exercise program x20 reps     Outcome: Ongoing, Progressing   Pt seen up in chair. Ambulated 250ft x2 with 2 liters O2 with SAT 92%- attempted RA 84%

## 2020-11-16 NOTE — PLAN OF CARE
Per Faiza with [facility] [phone]- she is awaiting the certificate of medical necessity. I updated her that we had not received it AND that I had called earlier to update her with MY fax. She stated that she called Rocio with [facility] for the fax number of [phone] which is my work phone number however I have not received any fax like calls. I asked her to re-fax ASAP for a signature as the pts ride is here and Lalo at the [place] office needs approval to deliver the tanks . CM still awaiting certificate of medical necessity. Olivia Dunlap, [facility]       3:55- Received a fax from [facility] regarding online ordering instructions. BAYLEE called them back at [phone] requesting Certificate of medical necessity ASAP.      11/16/20 1535   Post-Acute Status   Post-Acute Authorization E   E Status Referrals Sent

## 2020-11-16 NOTE — MED STUDENT ASSESSMENT & PLAN
Acute on chronic respiratory failure with hypoxia and hypercapnia  Patient with history of prolonged intubation. Failed BiPAP therapy at [place] requiring intubation. Increased respiratory rate and tachycardic on admission. Extubated to BiPAP 11/13.  Known significant asthma with obesity hypoventilatory syndrome.  Discussed with pulmonology.  Would benefit from BiPAP upon discharge.  Awaiting home O2 eval.  Can discharge home once down to 2 L nasal cannula with home oxygen.  She has completed course of Abx.  Continue oral steroids per pulmonology recommendations.  ABG      Recent Labs   Lab 11/13/20  0816   PH 7.357   PO2 78*   PCO2 58.1*   HCO3 32.6*   BE 7   -PPI for PPx  -Continue supplemental oxygen to keep oxygen saturation >92% BIPAP for night time  -Continue DuoNebs and IV steroids, Singulair   -Pulmonology consulted, appreciate recommendations  -Continue Lovenox SQ at 40 mg Q12H     Malnutrition of moderate degree  Resolved-she is tolerating p.o. diet fine.     Severe persistent asthma with acute exacerbation  See respiratory failure.     Morbid obesity  Body mass index is 61.57 kg/m². Morbid obesity complicates all aspects of disease management from diagnostic modalities to treatment. Will encourage weight loss explain health benefits when patient is not sedated.     Pulmonary hypertension  Repeat TTE with normal EF and concentric remodeling.  -Continue to treat per Pulmonology recommendations  -Monitor I's and O's     Pneumonia due to infectious organism  Patient completed course of Abx and is clinically improved.     Type 2 diabetes mellitus  Patient's FSGs are controlled on current hypoglycemics.   Last A1c reviewed-         Lab Results   Component Value Date     HGBA1C 6.2 06/23/2020      Most recent fingerstick glucose reviewed-          Recent Labs   Lab 11/14/20  1724 11/14/20  2030 11/15/20  0526 11/15/20  1112   POCTGLUCOSE 103 98 90 125*      Current correctional scale  Medium  Maintain  anti-hyperglycemic dose as follows-              Antihyperglycemics (From admission, onward)     Start     Stop Route Frequency Ordered     11/09/20 1825   insulin aspart U-100 pen 1-10 Units      -- SubQ Every 6 hours PRN 11/09/20 1825       -SSI  -Hypoglycemic precautions  -Diabetic diet  -Blood glucose checks Q6H     Cardiomegaly       Results for orders placed during the hospital encounter of 06/22/20   Echo Color Flow Doppler? Yes     Narrative · Concentric left ventricular remodeling.  · Normal left ventricular systolic function. The estimated ejection   fraction is 69%.  · Normal LV diastolic function.  · No wall motion abnormalities.  · Normal right ventricular systolic function.  · Mild right atrial enlargement.  · Trivial pericardial effusion.  · Mild left atrial enlargement.         Repeat TTE with no right heart strain and normal EF. BNP elevated on admission. Negative 6.7 L since admission.  -Continue to monitor I/O's and daily weights.  -Maintain oxygen sats >92%      Obesity hypoventilation syndrome     -Appreciate Pulmonology recommendations  -Please see acute on chronic respiratory failure     -would benefit from NIPPV on discharge.  Awaiting eval from case mgmt

## 2020-11-17 ENCOUNTER — TELEPHONE (OUTPATIENT)
Dept: MEDSURG UNIT | Facility: HOSPITAL | Age: 30
End: 2020-11-17

## 2020-11-17 NOTE — NURSING
Discharge instructions explained to patient, verbalized understanding. IJ triple lumen removed, pt tolerated well, catheter intact. Telemetry monitor removed, returned to monitor unit. Vital Signs stable,denies complaints. Patient off unit via WC.

## 2020-11-17 NOTE — DISCHARGE SUMMARY
"[facility] Medicine  Discharge Summary      Patient Name: Michelle Wren  MRN: [id number]  Admission Date: 11/9/2020  Hospital Length of Stay: 7 days  Discharge Date and Time:  11/17/2020 7:45 AM  Attending Physician: Taty att. providers found   Discharging Provider: Bruna Martinez NP  Primary Care Provider: Teressa Saeed NP      HPI:   Michelle Wren is a 30 y.o. female with a PMHx of restrictive lung disease, morbid obesity, severe persistent asthma, hypertension, diabetes and obesity hypoventilation syndrome  who presented to the [place] ED last night around 5:00 p.m. with respiratory failure. Per  physician note she was "admitted and treated with BiPAP IV steroids, empiric antibiotics, DuoNebs IV Lasix with low threshold to intubate.  Tried to transfer to [facility] but she refused.  Around 9:00 p.m. she was only arousable to sternal rub but and intubated for airway protection.  AC rate 16, , peep 5, FiO2 40%.  Vitals within normal limits.  Diprivan at 50 mics per kg per minute, Nimbex at 3 mics per kg per minute - weaning nimbex and changing to versed.  Today, Dr. Arellano placed a right IJ triple-lumen central line. Seen by Dr Gentile, pulmonology, during last inpatient stay and then in clinic on 7/30 - essentially needs a Trilogy. Her family reported that her sats drop to the 70s with minimal exertion in her house."  The patient is being transferred [facility] for pulmonology services.    Upon arrival to [facility], patient is sedated and intubated.  The patient is unable to provide any history, so history was mainly taken from  physician note and previous hospital records. Per chart review patient has similar presentation in June that resulted in a prolonged intubation. The patient has followed up outpatient with pulmonology once since previous admission.  She is supposed to be on BiPAP, but unsure if she is compliant.  The patient " will be admitted to the intensive care unit under Hospital Medicine for further workup and evaluation.    * No surgery found *      Hospital Course:   -11/10 Patient sedated and intubated. On ceftriaxone/azithromycin. Remains on IV methylprednisolone. Also on therapeutic dose Lovenox per Pulmonary recommendations.  -11/11 Patient to change to PS mode and will try to extubate; on Precedex and Propofol infusions. All cultures negative.   -11/12 attempting extubation with Anesthesia ready; all cultures remain negative  -11/13 Patient successfully extubated to BiPAP; PT/OT/SLP consulted; Lasix discontinued; Lovenox dose adjusted; remains on steroids and and azithro/ceftrixone  -11/14 Patient transferred to PCU on 4 L NC- requiring bipap at night    Patient's oxygen was gradually weaned to 2 L nasal cannula.  Her SOB gradually improved.  She was maintained on oral steroids.  She completed a course of Abx while hospitalized.  She continued to utilize noninvasive ventilator at nighttime.  She was followed closely by pulmonology.  She were with PT/OT ambulating well without difficulty.  They recommended home health which Pt declined stating not needed.  She was D/C home on 11/16 with supplemental oxygen at 2 L. She will continue a course of oral steroids.  She did bring and her noninvasive ventilator to the hospital which was checked by pulmonology.  Her settings were reviewed.  She was instructed to continue with noninvasive ventilator for sleep, as well as supplemental oxygen during the day.  Return precautions were discussed at length with Pt who verbalized understanding and readiness for discharge.  She will maintain close follow-up with pulmonology.    Physical Exam:  Morbidly obese  Resp even, unlabored.      Consults:   Consults (From admission, onward)        Status Ordering Provider     Inpatient consult to Pulmonology  Once     Provider:  Keegan Gentile MD    Completed VICKI RESTREPO     Inpatient consult to  Registered Dietitian/Nutritionist  Once     Provider:  (Not yet assigned)    Completed MANDA OAKLEY     Inpatient consult to Registered Dietitian/Nutritionist  Once     Provider:  (Not yet assigned)    Completed YANN CASTRO     Inpatient consult to Registered Dietitian/Nutritionist  Once     Provider:  (Not yet assigned)    Completed YANN CASTRO     Inpatient consult to Social Work/Case Management  Once     Provider:  (Not yet assigned)    Completed SENG CRAWLEY     Inpatient consult to Social Work/Case Management  Once     Provider:  (Not yet assigned)    Completed DMITRY SUNG          No new Assessment & Plan notes have been filed under this hospital service since the last note was generated.  Service: Hospital Medicine    Final Active Diagnoses:    Diagnosis Date Noted POA    PRINCIPAL PROBLEM:  Acute on chronic respiratory failure with hypoxia and hypercapnia [J96.21, J96.22] 06/22/2020 Yes    Malnutrition of moderate degree [E44.0] 11/11/2020 Yes    Severe persistent asthma with acute exacerbation [J45.51] 07/30/2020 Yes    Morbid obesity [E66.01] 07/05/2020 Yes    Pulmonary hypertension [I27.20] 07/01/2020 Yes    Pneumonia due to infectious organism [J18.9] 06/26/2020 Yes    Type 2 diabetes mellitus [E11.9] 06/24/2020 Yes    Obesity hypoventilation syndrome [E66.2] 06/22/2020 Yes    Cardiomegaly [I51.7] 06/22/2020 Yes      Problems Resolved During this Admission:    Diagnosis Date Noted Date Resolved POA    Bradycardia [R00.1] 11/13/2020 11/15/2020 No    Severe sepsis [A41.9, R65.20] 06/23/2020 11/15/2020 Unknown       Discharged Condition: good    Disposition: Home or Self Care    Follow Up:  Follow-up Information     Gisella Reynoso NP On 12/14/2020.    Specialties: Pulmonary Disease, Critical Care Medicine  Why: f/u appt on Monday, 12/14/2020 @ 2pm  Contact information:  [address] [facility]  [address] [place] [address]  [phone]             Teressa Saeed NP.   "  Specialty: Family Medicine  Why: call office .imable to leave a message.  Contact information:  [address] [place] [address]   [place] [address] [phone]             Ying.    Contact information:  [address]  [facility] [address] [phone]               Patient Instructions:      OXYGEN FOR HOME USE     Order Specific Question Answer Comments   Liter Flow 2    Duration Continuous    Qualifying SpO2: 82    Testing done at: Exercise/Activity    Route nasal cannula    Device home concentrator with portable unit    Length of need (in months): 99 mos    Patient condition with qualifying saturation other chronic pulmonary condition asthma, severe   Height: 5' 2" (1.575 m)    Weight: 152.7 kg (336 lb 10.3 oz)    Does patient have medical equipment at home? CPAP    Alternative treatment measures have been tried or considered and deemed clinically ineffective. Yes      Diet Cardiac     Notify your health care provider if you experience any of the following:  temperature >100.4     Notify your health care provider if you experience any of the following:  persistent nausea and vomiting or diarrhea     Notify your health care provider if you experience any of the following:  severe uncontrolled pain     Notify your health care provider if you experience any of the following:  redness, tenderness, or signs of infection (pain, swelling, redness, odor or green/yellow discharge around incision site)     Notify your health care provider if you experience any of the following:  difficulty breathing or increased cough     Notify your health care provider if you experience any of the following:  increased confusion or weakness     Notify your health care provider if you experience any of the following:  persistent dizziness, light-headedness, or visual disturbances     Activity as tolerated       Significant Diagnostic Studies: Labs:   CMP   Recent Labs   Lab 11/16/20  0450      K 3.8   CL 97   CO2 32* "   *   BUN 15   CREATININE 0.8   CALCIUM 8.8   ANIONGAP 10   ESTGFRAFRICA >60   EGFRNONAA >60    and CBC No results for input(s): WBC, HGB, HCT, PLT in the last 48 hours.  X-Ray Chest 1 View [[id number]] Resulted: 11/12/20 0807   Order Status: Completed Updated: 11/12/20 0810   Narrative:     EXAMINATION:   XR CHEST 1 VIEW     CLINICAL HISTORY:   resp failure;     TECHNIQUE:   Single frontal view of the chest was performed.     COMPARISON:   Chest portable of November 11, 2020.     FINDINGS:   An endotracheal tube ends in the trachea above the level the cas.  An NG tube traverses the esophagus to the stomach.  A central line enters at the right neck and ends in the superior vena cava.  There is hypoventilation.  There is mild cardiomegaly.  Areas of infiltrate and atelectasis are seen at both lung bases mildly decreased on both sides compared to the prior study.  No pneumothorax is seen.    Impression:       Decreasing bibasilar atelectasis and infiltrate.  Hypoventilation.  Mild cardiomegaly.  Endotracheal tube, NG tube and central lines in position.       Electronically signed by: Cyrus Phillips MD   Date: 11/12/2020   Time: 08:07   X-Ray Chest 1 View [[id number]] Resulted: 11/11/20 0806   Order Status: Completed Updated: 11/11/20 0809   Narrative:     EXAMINATION:   XR CHEST 1 VIEW     CLINICAL HISTORY:   resp failure;     TECHNIQUE:   Single frontal view of the chest was performed.     COMPARISON:   11/10/2020 .     FINDINGS:   NG tube traverses the esophagus within the upper chest and is obscured in the lower chest and if its positioning is desired clinically suggest image centered at the level of the diaphragms. It is suggest to extend beneath the level the diaphragm. Right internal jugular venous catheter is present with the tip at the level the proximal SVC/junction of the SVC and right brachiocephalic vein. Endotracheal tube appears in good position with the tip projecting approximately 4.5 cm  above the cas.     The cardiomediastinal silhouette is stable.     Mild bilateral perihilar/bibasilar infiltrates with probable small left pleural effusion    Impression:       Continued bilateral infiltrates and probable small left pleural effusion not appearing significantly changed or if anything infiltrates slightly decreased compared to the prior exam.  Positions of lines and tubes described.       Electronically signed by: Manjula Schulz MD   Date: 11/11/2020   Time: 08:06   X-Ray Chest 1 View [[id number]] Resulted: 11/10/20 0840   Order Status: Completed Updated: 11/10/20 0843   Narrative:     EXAMINATION:   XR CHEST 1 VIEW     CLINICAL HISTORY:   resp failure;     TECHNIQUE:   Single frontal view of the chest was performed.     COMPARISON:   Chest portable form 9 November 2020.     FINDINGS:   An endotracheal tube ends in the trachea above the level the cas.  An NG tube traverses the esophagus to the stomach.  A central line enters at the right neck and ends in the superior vena cava.  The cardiac size is borderline and improvement from prior study.  There is however continued bilateral lower lobe infiltrates unchanged.  No pneumothorax or pleural effusion is noted.    Impression:       Continued bilateral lower lobe infiltrates.  Borderline heart size.  Tubes in good position.       Electronically signed by: Cyrus Phillips MD   Date: 11/10/2020   Time: 08:40   X-Ray Chest 1 View [[id number]] Resulted: 11/09/20 1945   Order Status: Completed Updated: 11/09/20 1947   Narrative:     EXAMINATION:   XR CHEST 1 VIEW     CLINICAL HISTORY:   Verify ETT and central line placement.     TECHNIQUE:   Single frontal view of the chest was performed.     COMPARISON:   Multiple prior radiographs of the chest, most recent from earlier the same date.     FINDINGS:   Endotracheal tube terminates approximately 4 cm proximal to the cas.  There is a nasogastric tube coursing into the abdomen.  There is a right internal  jugular central venous catheter, unchanged.  The lungs are well expanded.  There are bilateral patchy airspace opacities, unchanged.  There is a probable small left pleural effusion.  There is no pneumothorax.  The visualized osseous structures are intact.    Impression:       Bilateral airspace opacities, unchanged.       Electronically signed by: Matias Franklin   Date: 11/09/2020   Time: 19:45       Pending Diagnostic Studies:     None         Medications:  Reconciled Home Medications:      Medication List      CHANGE how you take these medications    * albuterol 90 mcg/actuation inhaler  Commonly known as: PROVENTIL/VENTOLIN HFA  2 puffs every 4 hours as needed for cough, wheeze, or shortness of breath  What changed: Another medication with the same name was added. Make sure you understand how and when to take each.     * albuterol 1.25 mg/3 mL Nebu  Commonly known as: ACCUNEB  Take 3 mLs (1.25 mg total) by nebulization every 6 (six) hours as needed. Rescue  What changed: You were already taking a medication with the same name, and this prescription was added. Make sure you understand how and when to take each.     furosemide 20 MG tablet  Commonly known as: LASIX  Take 1 tablet (20 mg total) by mouth once daily.  What changed:   · how much to take  · how to take this  · when to take this     predniSONE 20 MG tablet  Commonly known as: DELTASONE  Take 3 daily for 3 days, Take 2 daily for 3 days then take one daily for 3 days and may repeat for shortness of breath.  What changed: additional instructions         * This list has 2 medication(s) that are the same as other medications prescribed for you. Read the directions carefully, and ask your doctor or other care provider to review them with you.            CONTINUE taking these medications    BREO ELLIPTA 200-25 mcg/dose Dsdv diskus inhaler  Generic drug: fluticasone furoate-vilanteroL  Inhale 1 puff into the lungs once daily. Controller      fluticasone-umeclidin-vilanter 100-62.5-25 mcg Dsdv  Commonly known as: TRELEGY ELLIPTA  Inhale 1 puff into the lungs once daily.     metFORMIN 500 MG tablet  Commonly known as: GLUCOPHAGE  Take 2 tablets (1,000 mg total) by mouth 2 (two) times daily with meals.     montelukast 10 mg tablet  Commonly known as: SINGULAIR  Take 1 tablet (10 mg total) by mouth every evening.     pantoprazole 40 MG tablet  Commonly known as: PROTONIX  Take 1 tablet (40 mg total) by mouth once daily.        STOP taking these medications    doxycycline 100 MG tablet  Commonly known as: VIBRA-TABS            Indwelling Lines/Drains at time of discharge:   Lines/Drains/Airways     None                 Time spent on the discharge of patient: 36 minutes  Patient was seen and examined on the date of discharge and determined to be suitable for discharge.         Bruna Martinez NP  Department of Hospital Medicine  [facility]

## 2021-02-12 ENCOUNTER — HOSPITAL ENCOUNTER (INPATIENT)
Facility: HOSPITAL | Age: 31
LOS: 4 days | Discharge: HOME HEALTH | End: 2021-02-17
Attending: INTERNAL MEDICINE | Admitting: INTERNAL MEDICINE
Payer: MEDICAID

## 2021-02-12 ENCOUNTER — HOSPITAL ENCOUNTER (EMERGENCY)
Facility: HOSPITAL | Age: 31
Discharge: ANOTHER HOSPITAL | End: 2021-02-12
Attending: EMERGENCY MEDICINE
Payer: MEDICAID

## 2021-02-12 VITALS
OXYGEN SATURATION: 95 % | WEIGHT: 293 LBS | BODY MASS INDEX: 53.92 KG/M2 | TEMPERATURE: 97 F | HEART RATE: 108 BPM | SYSTOLIC BLOOD PRESSURE: 123 MMHG | HEIGHT: 62 IN | DIASTOLIC BLOOD PRESSURE: 79 MMHG | RESPIRATION RATE: 20 BRPM

## 2021-02-12 DIAGNOSIS — J96.22 ACUTE ON CHRONIC RESPIRATORY FAILURE WITH HYPOXIA AND HYPERCAPNIA: ICD-10-CM

## 2021-02-12 DIAGNOSIS — J44.9 CHRONIC OBSTRUCTIVE PULMONARY DISEASE, UNSPECIFIED COPD TYPE: ICD-10-CM

## 2021-02-12 DIAGNOSIS — I26.99 PULMONARY EMBOLISM, UNSPECIFIED CHRONICITY, UNSPECIFIED PULMONARY EMBOLISM TYPE, UNSPECIFIED WHETHER ACUTE COR PULMONALE PRESENT: Primary | ICD-10-CM

## 2021-02-12 DIAGNOSIS — J96.11 CHRONIC RESPIRATORY FAILURE WITH HYPOXIA AND HYPERCAPNIA: ICD-10-CM

## 2021-02-12 DIAGNOSIS — J96.21 ACUTE ON CHRONIC RESPIRATORY FAILURE WITH HYPOXIA AND HYPERCAPNIA: ICD-10-CM

## 2021-02-12 DIAGNOSIS — I26.99 PULMONARY EMBOLI: ICD-10-CM

## 2021-02-12 DIAGNOSIS — J96.12 CHRONIC RESPIRATORY FAILURE WITH HYPOXIA AND HYPERCAPNIA: ICD-10-CM

## 2021-02-12 DIAGNOSIS — I26.99 PULMONARY EMBOLISM: ICD-10-CM

## 2021-02-12 DIAGNOSIS — J45.909 ASTHMA, UNSPECIFIED ASTHMA SEVERITY, UNSPECIFIED WHETHER COMPLICATED, UNSPECIFIED WHETHER PERSISTENT: ICD-10-CM

## 2021-02-12 DIAGNOSIS — I82.409 DEEP VEIN THROMBOSIS (DVT) WITH PULMONARY EMBOLISM PRESENT ON ADMISSION: ICD-10-CM

## 2021-02-12 DIAGNOSIS — I26.99 PULMONARY EMBOLUS: ICD-10-CM

## 2021-02-12 DIAGNOSIS — I26.99 PULMONARY EMBOLUS, LEFT: ICD-10-CM

## 2021-02-12 DIAGNOSIS — E66.01 MORBID OBESITY: ICD-10-CM

## 2021-02-12 DIAGNOSIS — I82.621 ARM DVT (DEEP VENOUS THROMBOEMBOLISM), ACUTE, RIGHT: Primary | ICD-10-CM

## 2021-02-12 DIAGNOSIS — I26.99 DEEP VEIN THROMBOSIS (DVT) WITH PULMONARY EMBOLISM PRESENT ON ADMISSION: ICD-10-CM

## 2021-02-12 LAB
ALBUMIN SERPL BCP-MCNC: 3.7 G/DL (ref 3.5–5.2)
ALLENS TEST: ABNORMAL
ALP SERPL-CCNC: 69 U/L (ref 55–135)
ALT SERPL W/O P-5'-P-CCNC: 20 U/L (ref 10–44)
ANION GAP SERPL CALC-SCNC: 10 MMOL/L (ref 8–16)
AST SERPL-CCNC: 21 U/L (ref 10–40)
BASOPHILS # BLD AUTO: 0.03 K/UL (ref 0–0.2)
BASOPHILS NFR BLD: 0.3 % (ref 0–1.9)
BILIRUB SERPL-MCNC: 0.9 MG/DL (ref 0.1–1)
BNP SERPL-MCNC: 167 PG/ML (ref 0–99)
BUN SERPL-MCNC: 9 MG/DL (ref 6–20)
CALCIUM SERPL-MCNC: 9.1 MG/DL (ref 8.7–10.5)
CHLORIDE SERPL-SCNC: 95 MMOL/L (ref 95–110)
CO2 SERPL-SCNC: 35 MMOL/L (ref 23–29)
CREAT SERPL-MCNC: 0.6 MG/DL (ref 0.5–1.4)
D DIMER PPP IA.FEU-MCNC: 0.83 MG/L FEU
DELSYS: ABNORMAL
DIFFERENTIAL METHOD: ABNORMAL
EOSINOPHIL # BLD AUTO: 0.1 K/UL (ref 0–0.5)
EOSINOPHIL NFR BLD: 0.8 % (ref 0–8)
ERYTHROCYTE [DISTWIDTH] IN BLOOD BY AUTOMATED COUNT: 19.7 % (ref 11.5–14.5)
EST. GFR  (AFRICAN AMERICAN): >60 ML/MIN/1.73 M^2
EST. GFR  (NON AFRICAN AMERICAN): >60 ML/MIN/1.73 M^2
FIO2: 28
FLOW: 2
GLUCOSE SERPL-MCNC: 118 MG/DL (ref 70–110)
HCO3 UR-SCNC: 38.2 MMOL/L (ref 24–28)
HCT VFR BLD AUTO: 45.9 % (ref 37–48.5)
HGB BLD-MCNC: 13.3 G/DL (ref 12–16)
IMM GRANULOCYTES # BLD AUTO: 0.09 K/UL (ref 0–0.04)
IMM GRANULOCYTES NFR BLD AUTO: 0.8 % (ref 0–0.5)
LYMPHOCYTES # BLD AUTO: 2.2 K/UL (ref 1–4.8)
LYMPHOCYTES NFR BLD: 18.2 % (ref 18–48)
MCH RBC QN AUTO: 23.5 PG (ref 27–31)
MCHC RBC AUTO-ENTMCNC: 29 G/DL (ref 32–36)
MCV RBC AUTO: 81 FL (ref 82–98)
MODE: ABNORMAL
MONOCYTES # BLD AUTO: 1.3 K/UL (ref 0.3–1)
MONOCYTES NFR BLD: 11 % (ref 4–15)
NEUTROPHILS # BLD AUTO: 8.2 K/UL (ref 1.8–7.7)
NEUTROPHILS NFR BLD: 68.9 % (ref 38–73)
NRBC BLD-RTO: 0 /100 WBC
PCO2 BLDA: 78.2 MMHG (ref 35–45)
PH SMN: 7.3 [PH] (ref 7.35–7.45)
PLATELET # BLD AUTO: 359 K/UL (ref 150–350)
PMV BLD AUTO: 10.5 FL (ref 9.2–12.9)
PO2 BLDA: 74 MMHG (ref 80–100)
POC BE: 12 MMOL/L
POC SATURATED O2: 92 % (ref 95–100)
POC TCO2: 41 MMOL/L (ref 23–27)
POCT GLUCOSE: 127 MG/DL (ref 70–110)
POTASSIUM SERPL-SCNC: 4.3 MMOL/L (ref 3.5–5.1)
PROT SERPL-MCNC: 7.8 G/DL (ref 6–8.4)
RBC # BLD AUTO: 5.66 M/UL (ref 4–5.4)
SAMPLE: ABNORMAL
SARS-COV-2 RDRP RESP QL NAA+PROBE: NEGATIVE
SITE: ABNORMAL
SODIUM SERPL-SCNC: 140 MMOL/L (ref 136–145)
WBC # BLD AUTO: 11.9 K/UL (ref 3.9–12.7)

## 2021-02-12 PROCEDURE — 80053 COMPREHEN METABOLIC PANEL: CPT

## 2021-02-12 PROCEDURE — G0379 DIRECT REFER HOSPITAL OBSERV: HCPCS

## 2021-02-12 PROCEDURE — 71275 CTA CHEST NON CORONARY: ICD-10-PCS | Mod: 26,,, | Performed by: RADIOLOGY

## 2021-02-12 PROCEDURE — 82803 BLOOD GASES ANY COMBINATION: CPT

## 2021-02-12 PROCEDURE — 82962 GLUCOSE BLOOD TEST: CPT

## 2021-02-12 PROCEDURE — 85379 FIBRIN DEGRADATION QUANT: CPT

## 2021-02-12 PROCEDURE — 85025 COMPLETE CBC W/AUTO DIFF WBC: CPT

## 2021-02-12 PROCEDURE — 71275 CT ANGIOGRAPHY CHEST: CPT | Mod: 26,,, | Performed by: RADIOLOGY

## 2021-02-12 PROCEDURE — 71275 CT ANGIOGRAPHY CHEST: CPT | Mod: TC

## 2021-02-12 PROCEDURE — G0378 HOSPITAL OBSERVATION PER HR: HCPCS

## 2021-02-12 PROCEDURE — 93971 EXTREMITY STUDY: CPT | Mod: 26,RT,, | Performed by: RADIOLOGY

## 2021-02-12 PROCEDURE — 83036 HEMOGLOBIN GLYCOSYLATED A1C: CPT

## 2021-02-12 PROCEDURE — U0002 COVID-19 LAB TEST NON-CDC: HCPCS

## 2021-02-12 PROCEDURE — 99285 EMERGENCY DEPT VISIT HI MDM: CPT | Mod: 25

## 2021-02-12 PROCEDURE — 36415 COLL VENOUS BLD VENIPUNCTURE: CPT

## 2021-02-12 PROCEDURE — 83880 ASSAY OF NATRIURETIC PEPTIDE: CPT

## 2021-02-12 PROCEDURE — 96372 THER/PROPH/DIAG INJ SC/IM: CPT | Mod: 59

## 2021-02-12 PROCEDURE — 93005 ELECTROCARDIOGRAM TRACING: CPT

## 2021-02-12 PROCEDURE — 36600 WITHDRAWAL OF ARTERIAL BLOOD: CPT

## 2021-02-12 PROCEDURE — 93971 EXTREMITY STUDY: CPT | Mod: TC,RT

## 2021-02-12 PROCEDURE — 63600175 PHARM REV CODE 636 W HCPCS: Performed by: EMERGENCY MEDICINE

## 2021-02-12 PROCEDURE — 93971 US UPPER EXTREMITY VEINS RIGHT: ICD-10-PCS | Mod: 26,RT,, | Performed by: RADIOLOGY

## 2021-02-12 PROCEDURE — 25500020 PHARM REV CODE 255: Performed by: EMERGENCY MEDICINE

## 2021-02-12 PROCEDURE — 99900035 HC TECH TIME PER 15 MIN (STAT)

## 2021-02-12 RX ORDER — FUROSEMIDE 20 MG/1
20 TABLET ORAL DAILY
Status: DISCONTINUED | OUTPATIENT
Start: 2021-02-13 | End: 2021-02-13

## 2021-02-12 RX ORDER — FLUTICASONE FUROATE AND VILANTEROL 200; 25 UG/1; UG/1
1 POWDER RESPIRATORY (INHALATION) DAILY
Status: DISCONTINUED | OUTPATIENT
Start: 2021-02-13 | End: 2021-02-17 | Stop reason: HOSPADM

## 2021-02-12 RX ORDER — INSULIN ASPART 100 [IU]/ML
0-5 INJECTION, SOLUTION INTRAVENOUS; SUBCUTANEOUS
Status: DISCONTINUED | OUTPATIENT
Start: 2021-02-12 | End: 2021-02-17 | Stop reason: HOSPADM

## 2021-02-12 RX ORDER — ONDANSETRON 2 MG/ML
8 INJECTION INTRAMUSCULAR; INTRAVENOUS EVERY 8 HOURS PRN
Status: DISCONTINUED | OUTPATIENT
Start: 2021-02-12 | End: 2021-02-17 | Stop reason: HOSPADM

## 2021-02-12 RX ORDER — ACETAMINOPHEN 500 MG
1000 TABLET ORAL EVERY 6 HOURS PRN
Status: DISCONTINUED | OUTPATIENT
Start: 2021-02-12 | End: 2021-02-17 | Stop reason: HOSPADM

## 2021-02-12 RX ORDER — LANOLIN ALCOHOL/MO/W.PET/CERES
800 CREAM (GRAM) TOPICAL
Status: DISCONTINUED | OUTPATIENT
Start: 2021-02-12 | End: 2021-02-17 | Stop reason: HOSPADM

## 2021-02-12 RX ORDER — TALC
9 POWDER (GRAM) TOPICAL NIGHTLY PRN
Status: DISCONTINUED | OUTPATIENT
Start: 2021-02-12 | End: 2021-02-17 | Stop reason: HOSPADM

## 2021-02-12 RX ORDER — FLUTICASONE FUROATE AND VILANTEROL 100; 25 UG/1; UG/1
1 POWDER RESPIRATORY (INHALATION) DAILY
Status: DISCONTINUED | OUTPATIENT
Start: 2021-02-13 | End: 2021-02-12 | Stop reason: SDUPTHER

## 2021-02-12 RX ORDER — AMOXICILLIN 250 MG
1 CAPSULE ORAL 2 TIMES DAILY
Status: DISCONTINUED | OUTPATIENT
Start: 2021-02-12 | End: 2021-02-17 | Stop reason: HOSPADM

## 2021-02-12 RX ORDER — ONDANSETRON 2 MG/ML
8 INJECTION INTRAMUSCULAR; INTRAVENOUS EVERY 8 HOURS PRN
Status: DISCONTINUED | OUTPATIENT
Start: 2021-02-12 | End: 2021-02-12 | Stop reason: SDUPTHER

## 2021-02-12 RX ORDER — SODIUM CHLORIDE 0.9 % (FLUSH) 0.9 %
10 SYRINGE (ML) INJECTION
Status: DISCONTINUED | OUTPATIENT
Start: 2021-02-12 | End: 2021-02-17 | Stop reason: HOSPADM

## 2021-02-12 RX ORDER — GLUCAGON 1 MG
1 KIT INJECTION
Status: DISCONTINUED | OUTPATIENT
Start: 2021-02-12 | End: 2021-02-12 | Stop reason: SDUPTHER

## 2021-02-12 RX ORDER — IBUPROFEN 200 MG
24 TABLET ORAL
Status: DISCONTINUED | OUTPATIENT
Start: 2021-02-12 | End: 2021-02-12 | Stop reason: SDUPTHER

## 2021-02-12 RX ORDER — ALBUTEROL SULFATE 2.5 MG/.5ML
1.25 SOLUTION RESPIRATORY (INHALATION) EVERY 6 HOURS PRN
Status: DISCONTINUED | OUTPATIENT
Start: 2021-02-12 | End: 2021-02-13

## 2021-02-12 RX ORDER — GLUCAGON 1 MG
1 KIT INJECTION
Status: DISCONTINUED | OUTPATIENT
Start: 2021-02-12 | End: 2021-02-17 | Stop reason: HOSPADM

## 2021-02-12 RX ORDER — IBUPROFEN 200 MG
24 TABLET ORAL
Status: DISCONTINUED | OUTPATIENT
Start: 2021-02-12 | End: 2021-02-17 | Stop reason: HOSPADM

## 2021-02-12 RX ORDER — IBUPROFEN 200 MG
16 TABLET ORAL
Status: DISCONTINUED | OUTPATIENT
Start: 2021-02-12 | End: 2021-02-12 | Stop reason: SDUPTHER

## 2021-02-12 RX ORDER — FLUTICASONE FUROATE AND VILANTEROL 200; 25 UG/1; UG/1
1 POWDER RESPIRATORY (INHALATION) DAILY
Status: DISCONTINUED | OUTPATIENT
Start: 2021-02-13 | End: 2021-02-12 | Stop reason: SDUPTHER

## 2021-02-12 RX ORDER — ENOXAPARIN SODIUM 100 MG/ML
1 INJECTION SUBCUTANEOUS
Status: COMPLETED | OUTPATIENT
Start: 2021-02-12 | End: 2021-02-12

## 2021-02-12 RX ORDER — IBUPROFEN 200 MG
16 TABLET ORAL
Status: DISCONTINUED | OUTPATIENT
Start: 2021-02-12 | End: 2021-02-17 | Stop reason: HOSPADM

## 2021-02-12 RX ORDER — ALBUTEROL SULFATE 2.5 MG/.5ML
1.25 SOLUTION RESPIRATORY (INHALATION) EVERY 6 HOURS PRN
Status: DISCONTINUED | OUTPATIENT
Start: 2021-02-12 | End: 2021-02-12 | Stop reason: SDUPTHER

## 2021-02-12 RX ORDER — ENOXAPARIN SODIUM 100 MG/ML
1 INJECTION SUBCUTANEOUS
Status: DISCONTINUED | OUTPATIENT
Start: 2021-02-12 | End: 2021-02-17

## 2021-02-12 RX ORDER — BUDESONIDE AND FORMOTEROL FUMARATE DIHYDRATE 160; 4.5 UG/1; UG/1
2 AEROSOL RESPIRATORY (INHALATION) EVERY 12 HOURS
COMMUNITY

## 2021-02-12 RX ADMIN — ENOXAPARIN SODIUM 150 MG: 100 INJECTION SUBCUTANEOUS at 11:02

## 2021-02-12 RX ADMIN — IOHEXOL 100 ML: 350 INJECTION, SOLUTION INTRAVENOUS at 01:02

## 2021-02-13 PROBLEM — I82.409 DEEP VEIN THROMBOSIS (DVT) WITH PULMONARY EMBOLISM PRESENT ON ADMISSION: Status: ACTIVE | Noted: 2021-02-12

## 2021-02-13 PROBLEM — J45.901 STEROID-DEPENDENT ASTHMA WITH ACUTE EXACERBATION: Status: ACTIVE | Noted: 2021-02-13

## 2021-02-13 LAB
ALBUMIN SERPL BCP-MCNC: 3.7 G/DL (ref 3.5–5.2)
ALLENS TEST: ABNORMAL
ALP SERPL-CCNC: 82 U/L (ref 55–135)
ALT SERPL W/O P-5'-P-CCNC: 21 U/L (ref 10–44)
ANION GAP SERPL CALC-SCNC: 11 MMOL/L (ref 8–16)
ANION GAP SERPL CALC-SCNC: 12 MMOL/L (ref 8–16)
AST SERPL-CCNC: 19 U/L (ref 10–40)
AV INDEX (PROSTH): 0.66
AV MEAN GRADIENT: 7 MMHG
AV PEAK GRADIENT: 12 MMHG
AV VELOCITY RATIO: 0.62
BASOPHILS # BLD AUTO: 0.03 K/UL (ref 0–0.2)
BASOPHILS NFR BLD: 0.3 % (ref 0–1.9)
BILIRUB SERPL-MCNC: 0.5 MG/DL (ref 0.1–1)
BSA FOR ECHO PROCEDURE: 2.73 M2
BUN SERPL-MCNC: 8 MG/DL (ref 6–20)
BUN SERPL-MCNC: 9 MG/DL (ref 6–20)
CALCIUM SERPL-MCNC: 10.1 MG/DL (ref 8.7–10.5)
CALCIUM SERPL-MCNC: 10.2 MG/DL (ref 8.7–10.5)
CHLORIDE SERPL-SCNC: 91 MMOL/L (ref 95–110)
CHLORIDE SERPL-SCNC: 93 MMOL/L (ref 95–110)
CO2 SERPL-SCNC: 32 MMOL/L (ref 23–29)
CO2 SERPL-SCNC: 33 MMOL/L (ref 23–29)
CREAT SERPL-MCNC: 0.8 MG/DL (ref 0.5–1.4)
CREAT SERPL-MCNC: 0.8 MG/DL (ref 0.5–1.4)
DELSYS: ABNORMAL
DIFFERENTIAL METHOD: ABNORMAL
DOP CALC AO PEAK VEL: 1.71 M/S
DOP CALC AO VTI: 33.81 CM
DOP CALC LVOT PEAK VEL: 1.06 M/S
DOP CALCLVOT PEAK VEL VTI: 22.44 CM
E WAVE DECELERATION TIME: 241.32 MSEC
E/A RATIO: 1.32
EOSINOPHIL # BLD AUTO: 0 K/UL (ref 0–0.5)
EOSINOPHIL NFR BLD: 0.1 % (ref 0–8)
EP: 10
ERYTHROCYTE [DISTWIDTH] IN BLOOD BY AUTOMATED COUNT: 19.7 % (ref 11.5–14.5)
ERYTHROCYTE [SEDIMENTATION RATE] IN BLOOD BY WESTERGREN METHOD: 15 MM/H
EST. GFR  (AFRICAN AMERICAN): >60 ML/MIN/1.73 M^2
EST. GFR  (AFRICAN AMERICAN): >60 ML/MIN/1.73 M^2
EST. GFR  (NON AFRICAN AMERICAN): >60 ML/MIN/1.73 M^2
EST. GFR  (NON AFRICAN AMERICAN): >60 ML/MIN/1.73 M^2
ESTIMATED AVG GLUCOSE: 148 MG/DL (ref 68–131)
FIO2: 40
GLUCOSE SERPL-MCNC: 133 MG/DL (ref 70–110)
GLUCOSE SERPL-MCNC: 240 MG/DL (ref 70–110)
HBA1C MFR BLD: 6.8 % (ref 4–5.6)
HCO3 UR-SCNC: 41 MMOL/L (ref 24–28)
HCT VFR BLD AUTO: 49.6 % (ref 37–48.5)
HGB BLD-MCNC: 14.1 G/DL (ref 12–16)
IMM GRANULOCYTES # BLD AUTO: 0.07 K/UL (ref 0–0.04)
IMM GRANULOCYTES NFR BLD AUTO: 0.6 % (ref 0–0.5)
IP: 20
LYMPHOCYTES # BLD AUTO: 1 K/UL (ref 1–4.8)
LYMPHOCYTES NFR BLD: 8.8 % (ref 18–48)
MAGNESIUM SERPL-MCNC: 2 MG/DL (ref 1.6–2.6)
MCH RBC QN AUTO: 23.9 PG (ref 27–31)
MCHC RBC AUTO-ENTMCNC: 28.4 G/DL (ref 32–36)
MCV RBC AUTO: 84 FL (ref 82–98)
MODE: ABNORMAL
MONOCYTES # BLD AUTO: 0.5 K/UL (ref 0.3–1)
MONOCYTES NFR BLD: 4.6 % (ref 4–15)
MV PEAK A VEL: 0.66 M/S
MV PEAK E VEL: 0.87 M/S
NEUTROPHILS # BLD AUTO: 9.9 K/UL (ref 1.8–7.7)
NEUTROPHILS NFR BLD: 85.6 % (ref 38–73)
NRBC BLD-RTO: 0 /100 WBC
PCO2 BLDA: 95.3 MMHG (ref 35–45)
PH SMN: 7.24 [PH] (ref 7.35–7.45)
PHOSPHATE SERPL-MCNC: 3.2 MG/DL (ref 2.7–4.5)
PLATELET # BLD AUTO: 310 K/UL (ref 150–350)
PMV BLD AUTO: 9.9 FL (ref 9.2–12.9)
PO2 BLDA: 80 MMHG (ref 80–100)
POC BE: 14 MMOL/L
POC SATURATED O2: 92 % (ref 95–100)
POC TCO2: 44 MMOL/L (ref 23–27)
POCT GLUCOSE: 138 MG/DL (ref 70–110)
POCT GLUCOSE: 147 MG/DL (ref 70–110)
POCT GLUCOSE: 172 MG/DL (ref 70–110)
POCT GLUCOSE: 292 MG/DL (ref 70–110)
POTASSIUM SERPL-SCNC: 5.4 MMOL/L (ref 3.5–5.1)
POTASSIUM SERPL-SCNC: 6 MMOL/L (ref 3.5–5.1)
PROCALCITONIN SERPL IA-MCNC: 0.03 NG/ML
PROT SERPL-MCNC: 8.4 G/DL (ref 6–8.4)
RBC # BLD AUTO: 5.9 M/UL (ref 4–5.4)
SAMPLE: ABNORMAL
SITE: ABNORMAL
SODIUM SERPL-SCNC: 135 MMOL/L (ref 136–145)
SODIUM SERPL-SCNC: 137 MMOL/L (ref 136–145)
WBC # BLD AUTO: 11.53 K/UL (ref 3.9–12.7)

## 2021-02-13 PROCEDURE — 94640 AIRWAY INHALATION TREATMENT: CPT

## 2021-02-13 PROCEDURE — 36415 COLL VENOUS BLD VENIPUNCTURE: CPT

## 2021-02-13 PROCEDURE — 99900035 HC TECH TIME PER 15 MIN (STAT)

## 2021-02-13 PROCEDURE — 63600175 PHARM REV CODE 636 W HCPCS: Performed by: NURSE PRACTITIONER

## 2021-02-13 PROCEDURE — 25000242 PHARM REV CODE 250 ALT 637 W/ HCPCS: Performed by: INTERNAL MEDICINE

## 2021-02-13 PROCEDURE — 63600175 PHARM REV CODE 636 W HCPCS: Performed by: INTERNAL MEDICINE

## 2021-02-13 PROCEDURE — 84145 PROCALCITONIN (PCT): CPT

## 2021-02-13 PROCEDURE — 99223 1ST HOSP IP/OBS HIGH 75: CPT | Mod: ,,, | Performed by: INTERNAL MEDICINE

## 2021-02-13 PROCEDURE — C9399 UNCLASSIFIED DRUGS OR BIOLOG: HCPCS | Performed by: STUDENT IN AN ORGANIZED HEALTH CARE EDUCATION/TRAINING PROGRAM

## 2021-02-13 PROCEDURE — 83735 ASSAY OF MAGNESIUM: CPT

## 2021-02-13 PROCEDURE — 80048 BASIC METABOLIC PNL TOTAL CA: CPT

## 2021-02-13 PROCEDURE — 82803 BLOOD GASES ANY COMBINATION: CPT

## 2021-02-13 PROCEDURE — 99223 PR INITIAL HOSPITAL CARE,LEVL III: ICD-10-PCS | Mod: ,,, | Performed by: INTERNAL MEDICINE

## 2021-02-13 PROCEDURE — 25000003 PHARM REV CODE 250: Performed by: STUDENT IN AN ORGANIZED HEALTH CARE EDUCATION/TRAINING PROGRAM

## 2021-02-13 PROCEDURE — 80053 COMPREHEN METABOLIC PANEL: CPT

## 2021-02-13 PROCEDURE — 94660 CPAP INITIATION&MGMT: CPT

## 2021-02-13 PROCEDURE — 63600175 PHARM REV CODE 636 W HCPCS: Performed by: STUDENT IN AN ORGANIZED HEALTH CARE EDUCATION/TRAINING PROGRAM

## 2021-02-13 PROCEDURE — 25500020 PHARM REV CODE 255: Performed by: STUDENT IN AN ORGANIZED HEALTH CARE EDUCATION/TRAINING PROGRAM

## 2021-02-13 PROCEDURE — 94761 N-INVAS EAR/PLS OXIMETRY MLT: CPT

## 2021-02-13 PROCEDURE — 20000000 HC ICU ROOM

## 2021-02-13 PROCEDURE — 85025 COMPLETE CBC W/AUTO DIFF WBC: CPT

## 2021-02-13 PROCEDURE — 96374 THER/PROPH/DIAG INJ IV PUSH: CPT

## 2021-02-13 PROCEDURE — 27000221 HC OXYGEN, UP TO 24 HOURS

## 2021-02-13 PROCEDURE — 36600 WITHDRAWAL OF ARTERIAL BLOOD: CPT

## 2021-02-13 PROCEDURE — 27000190 HC CPAP FULL FACE MASK W/VALVE

## 2021-02-13 PROCEDURE — 96372 THER/PROPH/DIAG INJ SC/IM: CPT | Mod: 59

## 2021-02-13 PROCEDURE — 96375 TX/PRO/DX INJ NEW DRUG ADDON: CPT

## 2021-02-13 PROCEDURE — 84100 ASSAY OF PHOSPHORUS: CPT

## 2021-02-13 PROCEDURE — 25000003 PHARM REV CODE 250: Performed by: INTERNAL MEDICINE

## 2021-02-13 RX ORDER — FUROSEMIDE 10 MG/ML
20 INJECTION INTRAMUSCULAR; INTRAVENOUS DAILY
Status: DISCONTINUED | OUTPATIENT
Start: 2021-02-13 | End: 2021-02-14

## 2021-02-13 RX ORDER — MUPIROCIN 20 MG/G
OINTMENT TOPICAL 2 TIMES DAILY
Status: DISCONTINUED | OUTPATIENT
Start: 2021-02-13 | End: 2021-02-17 | Stop reason: HOSPADM

## 2021-02-13 RX ORDER — ALBUTEROL SULFATE 2.5 MG/.5ML
2.5 SOLUTION RESPIRATORY (INHALATION) EVERY 4 HOURS
Status: DISCONTINUED | OUTPATIENT
Start: 2021-02-13 | End: 2021-02-17 | Stop reason: HOSPADM

## 2021-02-13 RX ADMIN — FLUTICASONE FUROATE AND VILANTEROL TRIFENATATE 1 PUFF: 200; 25 POWDER RESPIRATORY (INHALATION) at 09:02

## 2021-02-13 RX ADMIN — INSULIN HUMAN 10 UNITS: 100 INJECTION, SOLUTION PARENTERAL at 08:02

## 2021-02-13 RX ADMIN — MUPIROCIN: 20 OINTMENT TOPICAL at 08:02

## 2021-02-13 RX ADMIN — ENOXAPARIN SODIUM 170 MG: 100 INJECTION SUBCUTANEOUS at 11:02

## 2021-02-13 RX ADMIN — ACETAMINOPHEN 1000 MG: 500 TABLET ORAL at 04:02

## 2021-02-13 RX ADMIN — CALCIUM GLUCONATE 1 G: 98 INJECTION, SOLUTION INTRAVENOUS at 08:02

## 2021-02-13 RX ADMIN — ALBUTEROL SULFATE 2.5 MG: 2.5 SOLUTION RESPIRATORY (INHALATION) at 04:02

## 2021-02-13 RX ADMIN — METHYLPREDNISOLONE SODIUM SUCCINATE 40 MG: 40 INJECTION, POWDER, FOR SOLUTION INTRAMUSCULAR; INTRAVENOUS at 09:02

## 2021-02-13 RX ADMIN — CALCIUM GLUCONATE 1 G: 98 INJECTION, SOLUTION INTRAVENOUS at 05:02

## 2021-02-13 RX ADMIN — INSULIN HUMAN 10 UNITS: 100 INJECTION, SOLUTION PARENTERAL at 05:02

## 2021-02-13 RX ADMIN — FUROSEMIDE 20 MG: 10 INJECTION, SOLUTION INTRAMUSCULAR; INTRAVENOUS at 09:02

## 2021-02-13 RX ADMIN — METHYLPREDNISOLONE SODIUM SUCCINATE 80 MG: 40 INJECTION, POWDER, FOR SOLUTION INTRAMUSCULAR; INTRAVENOUS at 04:02

## 2021-02-13 RX ADMIN — INSULIN DETEMIR 10 UNITS: 100 INJECTION, SOLUTION SUBCUTANEOUS at 08:02

## 2021-02-13 RX ADMIN — DEXTROSE MONOHYDRATE 25 G: 25 INJECTION, SOLUTION INTRAVENOUS at 08:02

## 2021-02-13 RX ADMIN — ALBUTEROL SULFATE 2.5 MG: 2.5 SOLUTION RESPIRATORY (INHALATION) at 07:02

## 2021-02-13 RX ADMIN — DEXTROSE MONOHYDRATE 25 G: 25 INJECTION, SOLUTION INTRAVENOUS at 05:02

## 2021-02-13 RX ADMIN — ENOXAPARIN SODIUM 170 MG: 100 INJECTION SUBCUTANEOUS at 12:02

## 2021-02-13 RX ADMIN — SULFUR HEXAFLUORIDE 2.4 ML: KIT at 10:02

## 2021-02-14 PROBLEM — E87.5 HYPERKALEMIA: Status: ACTIVE | Noted: 2021-02-14

## 2021-02-14 LAB
ALBUMIN SERPL BCP-MCNC: 3.3 G/DL (ref 3.5–5.2)
ALP SERPL-CCNC: 74 U/L (ref 55–135)
ALT SERPL W/O P-5'-P-CCNC: 19 U/L (ref 10–44)
ANION GAP SERPL CALC-SCNC: 10 MMOL/L (ref 8–16)
AST SERPL-CCNC: 17 U/L (ref 10–40)
BASOPHILS # BLD AUTO: 0.02 K/UL (ref 0–0.2)
BASOPHILS NFR BLD: 0.1 % (ref 0–1.9)
BILIRUB SERPL-MCNC: 0.5 MG/DL (ref 0.1–1)
BUN SERPL-MCNC: 11 MG/DL (ref 6–20)
CALCIUM SERPL-MCNC: 9.9 MG/DL (ref 8.7–10.5)
CHLORIDE SERPL-SCNC: 92 MMOL/L (ref 95–110)
CO2 SERPL-SCNC: 33 MMOL/L (ref 23–29)
CREAT SERPL-MCNC: 0.8 MG/DL (ref 0.5–1.4)
DIFFERENTIAL METHOD: ABNORMAL
EOSINOPHIL # BLD AUTO: 0 K/UL (ref 0–0.5)
EOSINOPHIL NFR BLD: 0 % (ref 0–8)
ERYTHROCYTE [DISTWIDTH] IN BLOOD BY AUTOMATED COUNT: 19.2 % (ref 11.5–14.5)
EST. GFR  (AFRICAN AMERICAN): >60 ML/MIN/1.73 M^2
EST. GFR  (NON AFRICAN AMERICAN): >60 ML/MIN/1.73 M^2
GLUCOSE SERPL-MCNC: 230 MG/DL (ref 70–110)
HCT VFR BLD AUTO: 46.9 % (ref 37–48.5)
HGB BLD-MCNC: 13.6 G/DL (ref 12–16)
IMM GRANULOCYTES # BLD AUTO: 0.17 K/UL (ref 0–0.04)
IMM GRANULOCYTES NFR BLD AUTO: 0.9 % (ref 0–0.5)
LYMPHOCYTES # BLD AUTO: 1 K/UL (ref 1–4.8)
LYMPHOCYTES NFR BLD: 5 % (ref 18–48)
MAGNESIUM SERPL-MCNC: 1.9 MG/DL (ref 1.6–2.6)
MCH RBC QN AUTO: 24 PG (ref 27–31)
MCHC RBC AUTO-ENTMCNC: 29 G/DL (ref 32–36)
MCV RBC AUTO: 83 FL (ref 82–98)
MONOCYTES # BLD AUTO: 1.2 K/UL (ref 0.3–1)
MONOCYTES NFR BLD: 6.2 % (ref 4–15)
NEUTROPHILS # BLD AUTO: 17.5 K/UL (ref 1.8–7.7)
NEUTROPHILS NFR BLD: 87.8 % (ref 38–73)
NRBC BLD-RTO: 0 /100 WBC
PHOSPHATE SERPL-MCNC: 2.6 MG/DL (ref 2.7–4.5)
PLATELET # BLD AUTO: 312 K/UL (ref 150–350)
PMV BLD AUTO: 10 FL (ref 9.2–12.9)
POCT GLUCOSE: 134 MG/DL (ref 70–110)
POCT GLUCOSE: 164 MG/DL (ref 70–110)
POCT GLUCOSE: 235 MG/DL (ref 70–110)
POCT GLUCOSE: 267 MG/DL (ref 70–110)
POTASSIUM SERPL-SCNC: 4.7 MMOL/L (ref 3.5–5.1)
POTASSIUM SERPL-SCNC: 5.6 MMOL/L (ref 3.5–5.1)
PROT SERPL-MCNC: 7.8 G/DL (ref 6–8.4)
RBC # BLD AUTO: 5.66 M/UL (ref 4–5.4)
SODIUM SERPL-SCNC: 135 MMOL/L (ref 136–145)
WBC # BLD AUTO: 19.9 K/UL (ref 3.9–12.7)

## 2021-02-14 PROCEDURE — 63600175 PHARM REV CODE 636 W HCPCS: Performed by: STUDENT IN AN ORGANIZED HEALTH CARE EDUCATION/TRAINING PROGRAM

## 2021-02-14 PROCEDURE — 80053 COMPREHEN METABOLIC PANEL: CPT

## 2021-02-14 PROCEDURE — 36415 COLL VENOUS BLD VENIPUNCTURE: CPT

## 2021-02-14 PROCEDURE — 63600175 PHARM REV CODE 636 W HCPCS: Performed by: INTERNAL MEDICINE

## 2021-02-14 PROCEDURE — 25000003 PHARM REV CODE 250: Performed by: STUDENT IN AN ORGANIZED HEALTH CARE EDUCATION/TRAINING PROGRAM

## 2021-02-14 PROCEDURE — 83735 ASSAY OF MAGNESIUM: CPT

## 2021-02-14 PROCEDURE — 63600175 PHARM REV CODE 636 W HCPCS: Performed by: NURSE PRACTITIONER

## 2021-02-14 PROCEDURE — 84100 ASSAY OF PHOSPHORUS: CPT

## 2021-02-14 PROCEDURE — 94660 CPAP INITIATION&MGMT: CPT

## 2021-02-14 PROCEDURE — 25000242 PHARM REV CODE 250 ALT 637 W/ HCPCS: Performed by: INTERNAL MEDICINE

## 2021-02-14 PROCEDURE — C9399 UNCLASSIFIED DRUGS OR BIOLOG: HCPCS | Performed by: STUDENT IN AN ORGANIZED HEALTH CARE EDUCATION/TRAINING PROGRAM

## 2021-02-14 PROCEDURE — 94640 AIRWAY INHALATION TREATMENT: CPT

## 2021-02-14 PROCEDURE — 94761 N-INVAS EAR/PLS OXIMETRY MLT: CPT

## 2021-02-14 PROCEDURE — 20000000 HC ICU ROOM

## 2021-02-14 PROCEDURE — 99233 PR SUBSEQUENT HOSPITAL CARE,LEVL III: ICD-10-PCS | Mod: ,,, | Performed by: INTERNAL MEDICINE

## 2021-02-14 PROCEDURE — 25000003 PHARM REV CODE 250: Performed by: NURSE PRACTITIONER

## 2021-02-14 PROCEDURE — 84132 ASSAY OF SERUM POTASSIUM: CPT

## 2021-02-14 PROCEDURE — 27000221 HC OXYGEN, UP TO 24 HOURS

## 2021-02-14 PROCEDURE — 99233 SBSQ HOSP IP/OBS HIGH 50: CPT | Mod: ,,, | Performed by: INTERNAL MEDICINE

## 2021-02-14 PROCEDURE — 99900035 HC TECH TIME PER 15 MIN (STAT)

## 2021-02-14 PROCEDURE — 85025 COMPLETE CBC W/AUTO DIFF WBC: CPT

## 2021-02-14 PROCEDURE — 25000003 PHARM REV CODE 250: Performed by: INTERNAL MEDICINE

## 2021-02-14 RX ORDER — CYCLOBENZAPRINE HCL 5 MG
10 TABLET ORAL 3 TIMES DAILY PRN
Status: DISCONTINUED | OUTPATIENT
Start: 2021-02-14 | End: 2021-02-17 | Stop reason: HOSPADM

## 2021-02-14 RX ORDER — PREDNISONE 20 MG/1
60 TABLET ORAL 2 TIMES DAILY
Status: DISCONTINUED | OUTPATIENT
Start: 2021-02-14 | End: 2021-02-16

## 2021-02-14 RX ORDER — FUROSEMIDE 10 MG/ML
20 INJECTION INTRAMUSCULAR; INTRAVENOUS
Status: DISCONTINUED | OUTPATIENT
Start: 2021-02-14 | End: 2021-02-14

## 2021-02-14 RX ORDER — FUROSEMIDE 40 MG/1
40 TABLET ORAL DAILY
Status: DISCONTINUED | OUTPATIENT
Start: 2021-02-14 | End: 2021-02-17 | Stop reason: HOSPADM

## 2021-02-14 RX ADMIN — ALBUTEROL SULFATE 2.5 MG: 2.5 SOLUTION RESPIRATORY (INHALATION) at 07:02

## 2021-02-14 RX ADMIN — INSULIN DETEMIR 10 UNITS: 100 INJECTION, SOLUTION SUBCUTANEOUS at 08:02

## 2021-02-14 RX ADMIN — ALBUTEROL SULFATE 2.5 MG: 2.5 SOLUTION RESPIRATORY (INHALATION) at 12:02

## 2021-02-14 RX ADMIN — ALBUTEROL SULFATE 2.5 MG: 2.5 SOLUTION RESPIRATORY (INHALATION) at 11:02

## 2021-02-14 RX ADMIN — ENOXAPARIN SODIUM 170 MG: 100 INJECTION SUBCUTANEOUS at 12:02

## 2021-02-14 RX ADMIN — SODIUM PHOSPHATE, MONOBASIC, MONOHYDRATE 20.01 MMOL: 276; 142 INJECTION, SOLUTION INTRAVENOUS at 12:02

## 2021-02-14 RX ADMIN — INSULIN ASPART 1 UNITS: 100 INJECTION, SOLUTION INTRAVENOUS; SUBCUTANEOUS at 08:02

## 2021-02-14 RX ADMIN — ALBUTEROL SULFATE 2.5 MG: 2.5 SOLUTION RESPIRATORY (INHALATION) at 04:02

## 2021-02-14 RX ADMIN — CALCIUM GLUCONATE 1 G: 98 INJECTION, SOLUTION INTRAVENOUS at 08:02

## 2021-02-14 RX ADMIN — MUPIROCIN: 20 OINTMENT TOPICAL at 08:02

## 2021-02-14 RX ADMIN — ALBUTEROL SULFATE 2.5 MG: 2.5 SOLUTION RESPIRATORY (INHALATION) at 03:02

## 2021-02-14 RX ADMIN — DOCUSATE SODIUM - SENNOSIDES 1 TABLET: 50; 8.6 TABLET, FILM COATED ORAL at 08:02

## 2021-02-14 RX ADMIN — METHYLPREDNISOLONE SODIUM SUCCINATE 80 MG: 40 INJECTION, POWDER, FOR SOLUTION INTRAMUSCULAR; INTRAVENOUS at 08:02

## 2021-02-14 RX ADMIN — ENOXAPARIN SODIUM 170 MG: 100 INJECTION SUBCUTANEOUS at 11:02

## 2021-02-14 RX ADMIN — PREDNISONE 60 MG: 20 TABLET ORAL at 08:02

## 2021-02-14 RX ADMIN — INSULIN ASPART 2 UNITS: 100 INJECTION, SOLUTION INTRAVENOUS; SUBCUTANEOUS at 04:02

## 2021-02-14 RX ADMIN — CYCLOBENZAPRINE 10 MG: 10 TABLET, FILM COATED ORAL at 10:02

## 2021-02-14 RX ADMIN — FLUTICASONE FUROATE AND VILANTEROL TRIFENATATE 1 PUFF: 200; 25 POWDER RESPIRATORY (INHALATION) at 07:02

## 2021-02-14 RX ADMIN — ACETAMINOPHEN 1000 MG: 500 TABLET ORAL at 04:02

## 2021-02-14 RX ADMIN — FUROSEMIDE 20 MG: 10 INJECTION, SOLUTION INTRAMUSCULAR; INTRAVENOUS at 08:02

## 2021-02-15 DIAGNOSIS — J45.50 STEROID-DEPENDENT ASTHMA, SEVERE PERSISTENT, UNCOMPLICATED: Primary | ICD-10-CM

## 2021-02-15 DIAGNOSIS — J82.83 EOSINOPHILIC ASTHMA: ICD-10-CM

## 2021-02-15 LAB
ALBUMIN SERPL BCP-MCNC: 3.2 G/DL (ref 3.5–5.2)
ALP SERPL-CCNC: 75 U/L (ref 55–135)
ALT SERPL W/O P-5'-P-CCNC: 17 U/L (ref 10–44)
ANION GAP SERPL CALC-SCNC: 9 MMOL/L (ref 8–16)
AST SERPL-CCNC: 14 U/L (ref 10–40)
BASOPHILS # BLD AUTO: 0.04 K/UL (ref 0–0.2)
BASOPHILS NFR BLD: 0.2 % (ref 0–1.9)
BILIRUB SERPL-MCNC: 0.4 MG/DL (ref 0.1–1)
BUN SERPL-MCNC: 15 MG/DL (ref 6–20)
CALCIUM SERPL-MCNC: 9.5 MG/DL (ref 8.7–10.5)
CHLORIDE SERPL-SCNC: 93 MMOL/L (ref 95–110)
CO2 SERPL-SCNC: 35 MMOL/L (ref 23–29)
CREAT SERPL-MCNC: 0.8 MG/DL (ref 0.5–1.4)
DIFFERENTIAL METHOD: ABNORMAL
EOSINOPHIL # BLD AUTO: 0 K/UL (ref 0–0.5)
EOSINOPHIL NFR BLD: 0 % (ref 0–8)
ERYTHROCYTE [DISTWIDTH] IN BLOOD BY AUTOMATED COUNT: 19.1 % (ref 11.5–14.5)
EST. GFR  (AFRICAN AMERICAN): >60 ML/MIN/1.73 M^2
EST. GFR  (NON AFRICAN AMERICAN): >60 ML/MIN/1.73 M^2
GLUCOSE SERPL-MCNC: 172 MG/DL (ref 70–110)
HCT VFR BLD AUTO: 45.6 % (ref 37–48.5)
HGB BLD-MCNC: 13.2 G/DL (ref 12–16)
IMM GRANULOCYTES # BLD AUTO: 0.18 K/UL (ref 0–0.04)
IMM GRANULOCYTES NFR BLD AUTO: 0.8 % (ref 0–0.5)
LYMPHOCYTES # BLD AUTO: 1.5 K/UL (ref 1–4.8)
LYMPHOCYTES NFR BLD: 6.9 % (ref 18–48)
MAGNESIUM SERPL-MCNC: 2 MG/DL (ref 1.6–2.6)
MCH RBC QN AUTO: 23.7 PG (ref 27–31)
MCHC RBC AUTO-ENTMCNC: 28.9 G/DL (ref 32–36)
MCV RBC AUTO: 82 FL (ref 82–98)
MONOCYTES # BLD AUTO: 1.7 K/UL (ref 0.3–1)
MONOCYTES NFR BLD: 7.7 % (ref 4–15)
NEUTROPHILS # BLD AUTO: 18.1 K/UL (ref 1.8–7.7)
NEUTROPHILS NFR BLD: 84.4 % (ref 38–73)
NRBC BLD-RTO: 0 /100 WBC
PHOSPHATE SERPL-MCNC: 4.1 MG/DL (ref 2.7–4.5)
PLATELET # BLD AUTO: 299 K/UL (ref 150–350)
PMV BLD AUTO: 10.6 FL (ref 9.2–12.9)
POCT GLUCOSE: 160 MG/DL (ref 70–110)
POCT GLUCOSE: 175 MG/DL (ref 70–110)
POCT GLUCOSE: 261 MG/DL (ref 70–110)
POCT GLUCOSE: 277 MG/DL (ref 70–110)
POTASSIUM SERPL-SCNC: 4.8 MMOL/L (ref 3.5–5.1)
PROT SERPL-MCNC: 7.2 G/DL (ref 6–8.4)
RBC # BLD AUTO: 5.57 M/UL (ref 4–5.4)
SODIUM SERPL-SCNC: 137 MMOL/L (ref 136–145)
WBC # BLD AUTO: 21.5 K/UL (ref 3.9–12.7)

## 2021-02-15 PROCEDURE — 25000242 PHARM REV CODE 250 ALT 637 W/ HCPCS: Performed by: INTERNAL MEDICINE

## 2021-02-15 PROCEDURE — 94640 AIRWAY INHALATION TREATMENT: CPT

## 2021-02-15 PROCEDURE — 84100 ASSAY OF PHOSPHORUS: CPT

## 2021-02-15 PROCEDURE — 27000221 HC OXYGEN, UP TO 24 HOURS

## 2021-02-15 PROCEDURE — 63600175 PHARM REV CODE 636 W HCPCS: Performed by: INTERNAL MEDICINE

## 2021-02-15 PROCEDURE — 63600175 PHARM REV CODE 636 W HCPCS: Performed by: NURSE PRACTITIONER

## 2021-02-15 PROCEDURE — 99291 PR CRITICAL CARE, E/M 30-74 MINUTES: ICD-10-PCS | Mod: ,,, | Performed by: INTERNAL MEDICINE

## 2021-02-15 PROCEDURE — 99291 CRITICAL CARE FIRST HOUR: CPT | Mod: ,,, | Performed by: INTERNAL MEDICINE

## 2021-02-15 PROCEDURE — 25000003 PHARM REV CODE 250: Performed by: INTERNAL MEDICINE

## 2021-02-15 PROCEDURE — 25000003 PHARM REV CODE 250: Performed by: NURSE PRACTITIONER

## 2021-02-15 PROCEDURE — 36415 COLL VENOUS BLD VENIPUNCTURE: CPT

## 2021-02-15 PROCEDURE — 83735 ASSAY OF MAGNESIUM: CPT

## 2021-02-15 PROCEDURE — 99900035 HC TECH TIME PER 15 MIN (STAT)

## 2021-02-15 PROCEDURE — 80053 COMPREHEN METABOLIC PANEL: CPT

## 2021-02-15 PROCEDURE — 99222 1ST HOSP IP/OBS MODERATE 55: CPT | Mod: ,,, | Performed by: PHYSICIAN ASSISTANT

## 2021-02-15 PROCEDURE — 94761 N-INVAS EAR/PLS OXIMETRY MLT: CPT

## 2021-02-15 PROCEDURE — 85025 COMPLETE CBC W/AUTO DIFF WBC: CPT

## 2021-02-15 PROCEDURE — 12000002 HC ACUTE/MED SURGE SEMI-PRIVATE ROOM

## 2021-02-15 PROCEDURE — 99222 PR INITIAL HOSPITAL CARE,LEVL II: ICD-10-PCS | Mod: ,,, | Performed by: PHYSICIAN ASSISTANT

## 2021-02-15 PROCEDURE — 94660 CPAP INITIATION&MGMT: CPT

## 2021-02-15 RX ORDER — BENRALIZUMAB 30 MG/ML
30 INJECTION, SOLUTION SUBCUTANEOUS
Qty: 1 SYRINGE | Refills: 3 | OUTPATIENT
Start: 2021-02-15 | End: 2023-10-05

## 2021-02-15 RX ADMIN — INSULIN DETEMIR 10 UNITS: 100 INJECTION, SOLUTION SUBCUTANEOUS at 08:02

## 2021-02-15 RX ADMIN — INSULIN ASPART 1 UNITS: 100 INJECTION, SOLUTION INTRAVENOUS; SUBCUTANEOUS at 08:02

## 2021-02-15 RX ADMIN — ALBUTEROL SULFATE 2.5 MG: 2.5 SOLUTION RESPIRATORY (INHALATION) at 08:02

## 2021-02-15 RX ADMIN — PREDNISONE 60 MG: 20 TABLET ORAL at 08:02

## 2021-02-15 RX ADMIN — ALBUTEROL SULFATE 2.5 MG: 2.5 SOLUTION RESPIRATORY (INHALATION) at 11:02

## 2021-02-15 RX ADMIN — FUROSEMIDE 40 MG: 40 TABLET ORAL at 08:02

## 2021-02-15 RX ADMIN — MUPIROCIN: 20 OINTMENT TOPICAL at 08:02

## 2021-02-15 RX ADMIN — FLUTICASONE FUROATE AND VILANTEROL TRIFENATATE 1 PUFF: 200; 25 POWDER RESPIRATORY (INHALATION) at 07:02

## 2021-02-15 RX ADMIN — CYCLOBENZAPRINE 10 MG: 10 TABLET, FILM COATED ORAL at 02:02

## 2021-02-15 RX ADMIN — ALBUTEROL SULFATE 2.5 MG: 2.5 SOLUTION RESPIRATORY (INHALATION) at 04:02

## 2021-02-15 RX ADMIN — ENOXAPARIN SODIUM 170 MG: 100 INJECTION SUBCUTANEOUS at 11:02

## 2021-02-15 RX ADMIN — ALBUTEROL SULFATE 2.5 MG: 2.5 SOLUTION RESPIRATORY (INHALATION) at 12:02

## 2021-02-15 RX ADMIN — INSULIN ASPART 3 UNITS: 100 INJECTION, SOLUTION INTRAVENOUS; SUBCUTANEOUS at 04:02

## 2021-02-15 RX ADMIN — ALBUTEROL SULFATE 2.5 MG: 2.5 SOLUTION RESPIRATORY (INHALATION) at 07:02

## 2021-02-16 LAB
ALBUMIN SERPL BCP-MCNC: 3.3 G/DL (ref 3.5–5.2)
ALP SERPL-CCNC: 68 U/L (ref 55–135)
ALT SERPL W/O P-5'-P-CCNC: 13 U/L (ref 10–44)
ANION GAP SERPL CALC-SCNC: 10 MMOL/L (ref 8–16)
AST SERPL-CCNC: 10 U/L (ref 10–40)
BASOPHILS # BLD AUTO: 0.03 K/UL (ref 0–0.2)
BASOPHILS NFR BLD: 0.2 % (ref 0–1.9)
BILIRUB SERPL-MCNC: 0.5 MG/DL (ref 0.1–1)
BUN SERPL-MCNC: 16 MG/DL (ref 6–20)
CALCIUM SERPL-MCNC: 9.5 MG/DL (ref 8.7–10.5)
CHLORIDE SERPL-SCNC: 93 MMOL/L (ref 95–110)
CO2 SERPL-SCNC: 35 MMOL/L (ref 23–29)
CREAT SERPL-MCNC: 0.7 MG/DL (ref 0.5–1.4)
DIFFERENTIAL METHOD: ABNORMAL
EOSINOPHIL # BLD AUTO: 0 K/UL (ref 0–0.5)
EOSINOPHIL NFR BLD: 0 % (ref 0–8)
ERYTHROCYTE [DISTWIDTH] IN BLOOD BY AUTOMATED COUNT: 19 % (ref 11.5–14.5)
EST. GFR  (AFRICAN AMERICAN): >60 ML/MIN/1.73 M^2
EST. GFR  (NON AFRICAN AMERICAN): >60 ML/MIN/1.73 M^2
GLUCOSE SERPL-MCNC: 151 MG/DL (ref 70–110)
HCT VFR BLD AUTO: 46.2 % (ref 37–48.5)
HGB BLD-MCNC: 13.6 G/DL (ref 12–16)
IMM GRANULOCYTES # BLD AUTO: 0.11 K/UL (ref 0–0.04)
IMM GRANULOCYTES NFR BLD AUTO: 0.6 % (ref 0–0.5)
INR PPP: 1 (ref 0.8–1.2)
LYMPHOCYTES # BLD AUTO: 1.6 K/UL (ref 1–4.8)
LYMPHOCYTES NFR BLD: 8.8 % (ref 18–48)
MAGNESIUM SERPL-MCNC: 2 MG/DL (ref 1.6–2.6)
MCH RBC QN AUTO: 24.1 PG (ref 27–31)
MCHC RBC AUTO-ENTMCNC: 29.4 G/DL (ref 32–36)
MCV RBC AUTO: 82 FL (ref 82–98)
MONOCYTES # BLD AUTO: 1.6 K/UL (ref 0.3–1)
MONOCYTES NFR BLD: 8.7 % (ref 4–15)
NEUTROPHILS # BLD AUTO: 14.6 K/UL (ref 1.8–7.7)
NEUTROPHILS NFR BLD: 81.7 % (ref 38–73)
NRBC BLD-RTO: 0 /100 WBC
PHOSPHATE SERPL-MCNC: 4.3 MG/DL (ref 2.7–4.5)
PLATELET # BLD AUTO: 325 K/UL (ref 150–350)
PMV BLD AUTO: 10.6 FL (ref 9.2–12.9)
POCT GLUCOSE: 157 MG/DL (ref 70–110)
POCT GLUCOSE: 167 MG/DL (ref 70–110)
POCT GLUCOSE: 227 MG/DL (ref 70–110)
POCT GLUCOSE: 278 MG/DL (ref 70–110)
POTASSIUM SERPL-SCNC: 4.6 MMOL/L (ref 3.5–5.1)
PROT SERPL-MCNC: 7.3 G/DL (ref 6–8.4)
PROTHROMBIN TIME: 10.7 SEC (ref 9–12.5)
RBC # BLD AUTO: 5.65 M/UL (ref 4–5.4)
SODIUM SERPL-SCNC: 138 MMOL/L (ref 136–145)
WBC # BLD AUTO: 17.87 K/UL (ref 3.9–12.7)

## 2021-02-16 PROCEDURE — 25000003 PHARM REV CODE 250: Performed by: NURSE PRACTITIONER

## 2021-02-16 PROCEDURE — 27000221 HC OXYGEN, UP TO 24 HOURS

## 2021-02-16 PROCEDURE — 85520 HEPARIN ASSAY: CPT

## 2021-02-16 PROCEDURE — 99233 SBSQ HOSP IP/OBS HIGH 50: CPT | Mod: ,,, | Performed by: INTERNAL MEDICINE

## 2021-02-16 PROCEDURE — 25000242 PHARM REV CODE 250 ALT 637 W/ HCPCS: Performed by: INTERNAL MEDICINE

## 2021-02-16 PROCEDURE — 36415 COLL VENOUS BLD VENIPUNCTURE: CPT

## 2021-02-16 PROCEDURE — 12000002 HC ACUTE/MED SURGE SEMI-PRIVATE ROOM

## 2021-02-16 PROCEDURE — 94660 CPAP INITIATION&MGMT: CPT

## 2021-02-16 PROCEDURE — 25000003 PHARM REV CODE 250: Performed by: INTERNAL MEDICINE

## 2021-02-16 PROCEDURE — 83735 ASSAY OF MAGNESIUM: CPT

## 2021-02-16 PROCEDURE — 94640 AIRWAY INHALATION TREATMENT: CPT

## 2021-02-16 PROCEDURE — 63600175 PHARM REV CODE 636 W HCPCS: Performed by: NURSE PRACTITIONER

## 2021-02-16 PROCEDURE — 85610 PROTHROMBIN TIME: CPT

## 2021-02-16 PROCEDURE — 99900035 HC TECH TIME PER 15 MIN (STAT)

## 2021-02-16 PROCEDURE — 94761 N-INVAS EAR/PLS OXIMETRY MLT: CPT

## 2021-02-16 PROCEDURE — 63600175 PHARM REV CODE 636 W HCPCS: Performed by: INTERNAL MEDICINE

## 2021-02-16 PROCEDURE — 84100 ASSAY OF PHOSPHORUS: CPT

## 2021-02-16 PROCEDURE — 85025 COMPLETE CBC W/AUTO DIFF WBC: CPT

## 2021-02-16 PROCEDURE — 99233 PR SUBSEQUENT HOSPITAL CARE,LEVL III: ICD-10-PCS | Mod: ,,, | Performed by: INTERNAL MEDICINE

## 2021-02-16 PROCEDURE — 80053 COMPREHEN METABOLIC PANEL: CPT

## 2021-02-16 RX ORDER — PREDNISONE 20 MG/1
60 TABLET ORAL DAILY
Status: DISCONTINUED | OUTPATIENT
Start: 2021-02-17 | End: 2021-02-17 | Stop reason: HOSPADM

## 2021-02-16 RX ADMIN — ALBUTEROL SULFATE 2.5 MG: 2.5 SOLUTION RESPIRATORY (INHALATION) at 07:02

## 2021-02-16 RX ADMIN — ALBUTEROL SULFATE 2.5 MG: 2.5 SOLUTION RESPIRATORY (INHALATION) at 03:02

## 2021-02-16 RX ADMIN — ALBUTEROL SULFATE 2.5 MG: 2.5 SOLUTION RESPIRATORY (INHALATION) at 11:02

## 2021-02-16 RX ADMIN — ENOXAPARIN SODIUM 170 MG: 100 INJECTION SUBCUTANEOUS at 11:02

## 2021-02-16 RX ADMIN — INSULIN DETEMIR 10 UNITS: 100 INJECTION, SOLUTION SUBCUTANEOUS at 08:02

## 2021-02-16 RX ADMIN — PREDNISONE 60 MG: 20 TABLET ORAL at 08:02

## 2021-02-16 RX ADMIN — CYCLOBENZAPRINE 10 MG: 10 TABLET, FILM COATED ORAL at 08:02

## 2021-02-16 RX ADMIN — WARFARIN SODIUM 3 MG: 2 TABLET ORAL at 04:02

## 2021-02-16 RX ADMIN — FUROSEMIDE 40 MG: 40 TABLET ORAL at 08:02

## 2021-02-16 RX ADMIN — INSULIN ASPART 1 UNITS: 100 INJECTION, SOLUTION INTRAVENOUS; SUBCUTANEOUS at 08:02

## 2021-02-16 RX ADMIN — ALBUTEROL SULFATE 2.5 MG: 2.5 SOLUTION RESPIRATORY (INHALATION) at 04:02

## 2021-02-16 RX ADMIN — MUPIROCIN: 20 OINTMENT TOPICAL at 08:02

## 2021-02-16 RX ADMIN — MELATONIN TAB 3 MG 9 MG: 3 TAB at 08:02

## 2021-02-16 RX ADMIN — INSULIN ASPART 3 UNITS: 100 INJECTION, SOLUTION INTRAVENOUS; SUBCUTANEOUS at 04:02

## 2021-02-16 RX ADMIN — FLUTICASONE FUROATE AND VILANTEROL TRIFENATATE 1 PUFF: 200; 25 POWDER RESPIRATORY (INHALATION) at 07:02

## 2021-02-17 ENCOUNTER — SPECIALTY PHARMACY (OUTPATIENT)
Dept: PHARMACY | Facility: CLINIC | Age: 31
End: 2021-02-17

## 2021-02-17 VITALS
WEIGHT: 293 LBS | TEMPERATURE: 98 F | SYSTOLIC BLOOD PRESSURE: 119 MMHG | OXYGEN SATURATION: 96 % | DIASTOLIC BLOOD PRESSURE: 62 MMHG | BODY MASS INDEX: 53.92 KG/M2 | HEART RATE: 69 BPM | HEIGHT: 62 IN | RESPIRATION RATE: 18 BRPM

## 2021-02-17 LAB
ALBUMIN SERPL BCP-MCNC: 3.2 G/DL (ref 3.5–5.2)
ALP SERPL-CCNC: 61 U/L (ref 55–135)
ALT SERPL W/O P-5'-P-CCNC: 18 U/L (ref 10–44)
ANION GAP SERPL CALC-SCNC: 12 MMOL/L (ref 8–16)
AST SERPL-CCNC: 20 U/L (ref 10–40)
BASOPHILS # BLD AUTO: 0.03 K/UL (ref 0–0.2)
BASOPHILS NFR BLD: 0.2 % (ref 0–1.9)
BILIRUB SERPL-MCNC: 0.5 MG/DL (ref 0.1–1)
BUN SERPL-MCNC: 22 MG/DL (ref 6–20)
CALCIUM SERPL-MCNC: 8.7 MG/DL (ref 8.7–10.5)
CHLORIDE SERPL-SCNC: 95 MMOL/L (ref 95–110)
CO2 SERPL-SCNC: 31 MMOL/L (ref 23–29)
CREAT SERPL-MCNC: 0.8 MG/DL (ref 0.5–1.4)
DIFFERENTIAL METHOD: ABNORMAL
EOSINOPHIL # BLD AUTO: 0 K/UL (ref 0–0.5)
EOSINOPHIL NFR BLD: 0.1 % (ref 0–8)
ERYTHROCYTE [DISTWIDTH] IN BLOOD BY AUTOMATED COUNT: 19.2 % (ref 11.5–14.5)
EST. GFR  (AFRICAN AMERICAN): >60 ML/MIN/1.73 M^2
EST. GFR  (NON AFRICAN AMERICAN): >60 ML/MIN/1.73 M^2
FACT X PPP CHRO-ACNC: >1.5 IU/ML (ref 0.3–0.7)
GLUCOSE SERPL-MCNC: 90 MG/DL (ref 70–110)
HCT VFR BLD AUTO: 46.3 % (ref 37–48.5)
HGB BLD-MCNC: 13.1 G/DL (ref 12–16)
IMM GRANULOCYTES # BLD AUTO: 0.08 K/UL (ref 0–0.04)
IMM GRANULOCYTES NFR BLD AUTO: 0.6 % (ref 0–0.5)
INR PPP: 1 (ref 0.8–1.2)
LYMPHOCYTES # BLD AUTO: 3.9 K/UL (ref 1–4.8)
LYMPHOCYTES NFR BLD: 27.1 % (ref 18–48)
MAGNESIUM SERPL-MCNC: 2 MG/DL (ref 1.6–2.6)
MCH RBC QN AUTO: 23.4 PG (ref 27–31)
MCHC RBC AUTO-ENTMCNC: 28.3 G/DL (ref 32–36)
MCV RBC AUTO: 83 FL (ref 82–98)
MONOCYTES # BLD AUTO: 1.8 K/UL (ref 0.3–1)
MONOCYTES NFR BLD: 12.4 % (ref 4–15)
NEUTROPHILS # BLD AUTO: 8.7 K/UL (ref 1.8–7.7)
NEUTROPHILS NFR BLD: 59.6 % (ref 38–73)
NRBC BLD-RTO: 0 /100 WBC
PHOSPHATE SERPL-MCNC: 4.3 MG/DL (ref 2.7–4.5)
PLATELET # BLD AUTO: 316 K/UL (ref 150–350)
PMV BLD AUTO: 10.1 FL (ref 9.2–12.9)
POCT GLUCOSE: 89 MG/DL (ref 70–110)
POCT GLUCOSE: 90 MG/DL (ref 70–110)
POTASSIUM SERPL-SCNC: 4 MMOL/L (ref 3.5–5.1)
PROT SERPL-MCNC: 7 G/DL (ref 6–8.4)
PROTHROMBIN TIME: 10.8 SEC (ref 9–12.5)
RBC # BLD AUTO: 5.6 M/UL (ref 4–5.4)
SODIUM SERPL-SCNC: 138 MMOL/L (ref 136–145)
WBC # BLD AUTO: 14.49 K/UL (ref 3.9–12.7)

## 2021-02-17 PROCEDURE — 94660 CPAP INITIATION&MGMT: CPT

## 2021-02-17 PROCEDURE — 99900035 HC TECH TIME PER 15 MIN (STAT)

## 2021-02-17 PROCEDURE — C9399 UNCLASSIFIED DRUGS OR BIOLOG: HCPCS | Performed by: INTERNAL MEDICINE

## 2021-02-17 PROCEDURE — 27000221 HC OXYGEN, UP TO 24 HOURS

## 2021-02-17 PROCEDURE — 84100 ASSAY OF PHOSPHORUS: CPT

## 2021-02-17 PROCEDURE — 63600175 PHARM REV CODE 636 W HCPCS: Performed by: INTERNAL MEDICINE

## 2021-02-17 PROCEDURE — 97803 MED NUTRITION INDIV SUBSEQ: CPT

## 2021-02-17 PROCEDURE — 85610 PROTHROMBIN TIME: CPT

## 2021-02-17 PROCEDURE — 80053 COMPREHEN METABOLIC PANEL: CPT

## 2021-02-17 PROCEDURE — 94640 AIRWAY INHALATION TREATMENT: CPT

## 2021-02-17 PROCEDURE — 25000003 PHARM REV CODE 250: Performed by: INTERNAL MEDICINE

## 2021-02-17 PROCEDURE — 94761 N-INVAS EAR/PLS OXIMETRY MLT: CPT

## 2021-02-17 PROCEDURE — 25000242 PHARM REV CODE 250 ALT 637 W/ HCPCS: Performed by: INTERNAL MEDICINE

## 2021-02-17 PROCEDURE — 99231 PR SUBSEQUENT HOSPITAL CARE,LEVL I: ICD-10-PCS | Mod: ,,, | Performed by: INTERNAL MEDICINE

## 2021-02-17 PROCEDURE — 99231 SBSQ HOSP IP/OBS SF/LOW 25: CPT | Mod: ,,, | Performed by: INTERNAL MEDICINE

## 2021-02-17 PROCEDURE — 25000003 PHARM REV CODE 250: Performed by: NURSE PRACTITIONER

## 2021-02-17 PROCEDURE — 85025 COMPLETE CBC W/AUTO DIFF WBC: CPT

## 2021-02-17 PROCEDURE — 83735 ASSAY OF MAGNESIUM: CPT

## 2021-02-17 RX ORDER — ENOXAPARIN SODIUM 100 MG/ML
100 INJECTION SUBCUTANEOUS EVERY 12 HOURS
Qty: 14 ML | Refills: 0 | Status: SHIPPED | OUTPATIENT
Start: 2021-02-17 | End: 2021-02-24

## 2021-02-17 RX ORDER — PREDNISONE 20 MG/1
TABLET ORAL
Qty: 25 TABLET | Refills: 0 | Status: SHIPPED | OUTPATIENT
Start: 2021-02-18 | End: 2021-02-28

## 2021-02-17 RX ORDER — FUROSEMIDE 40 MG/1
40 TABLET ORAL DAILY
Qty: 30 TABLET | Refills: 11 | Status: SHIPPED | OUTPATIENT
Start: 2021-02-18 | End: 2023-10-05

## 2021-02-17 RX ORDER — ENOXAPARIN SODIUM 100 MG/ML
100 INJECTION SUBCUTANEOUS
Status: DISCONTINUED | OUTPATIENT
Start: 2021-02-17 | End: 2021-02-17 | Stop reason: HOSPADM

## 2021-02-17 RX ORDER — WARFARIN 3 MG/1
3 TABLET ORAL DAILY
Qty: 30 TABLET | Refills: 11 | Status: SHIPPED | OUTPATIENT
Start: 2021-02-17 | End: 2022-02-17

## 2021-02-17 RX ADMIN — INSULIN DETEMIR 10 UNITS: 100 INJECTION, SOLUTION SUBCUTANEOUS at 08:02

## 2021-02-17 RX ADMIN — ALBUTEROL SULFATE 2.5 MG: 2.5 SOLUTION RESPIRATORY (INHALATION) at 03:02

## 2021-02-17 RX ADMIN — CYCLOBENZAPRINE 10 MG: 10 TABLET, FILM COATED ORAL at 05:02

## 2021-02-17 RX ADMIN — PREDNISONE 60 MG: 20 TABLET ORAL at 08:02

## 2021-02-17 RX ADMIN — MUPIROCIN: 20 OINTMENT TOPICAL at 08:02

## 2021-02-17 RX ADMIN — ENOXAPARIN SODIUM 100 MG: 100 INJECTION SUBCUTANEOUS at 11:02

## 2021-02-17 RX ADMIN — ENOXAPARIN SODIUM 170 MG: 100 INJECTION SUBCUTANEOUS at 12:02

## 2021-02-17 RX ADMIN — ALBUTEROL SULFATE 2.5 MG: 2.5 SOLUTION RESPIRATORY (INHALATION) at 12:02

## 2021-02-17 RX ADMIN — FLUTICASONE FUROATE AND VILANTEROL TRIFENATATE 1 PUFF: 200; 25 POWDER RESPIRATORY (INHALATION) at 07:02

## 2021-02-17 RX ADMIN — ALBUTEROL SULFATE 2.5 MG: 2.5 SOLUTION RESPIRATORY (INHALATION) at 07:02

## 2021-02-17 RX ADMIN — FUROSEMIDE 40 MG: 40 TABLET ORAL at 08:02

## 2021-02-18 ENCOUNTER — PATIENT OUTREACH (OUTPATIENT)
Dept: ADMINISTRATIVE | Facility: CLINIC | Age: 31
End: 2021-02-18

## 2021-02-22 ENCOUNTER — TELEPHONE (OUTPATIENT)
Dept: MEDSURG UNIT | Facility: HOSPITAL | Age: 31
End: 2021-02-22

## 2021-02-23 NOTE — SUBJECTIVE & OBJECTIVE
Past Medical History:   Diagnosis Date    Asthma     Back pain     Diabetes mellitus     Morbid obesity     Obesity        Past Surgical History:   Procedure Laterality Date    APPENDECTOMY       SECTION         Review of patient's allergies indicates:  No Known Allergies    No current facility-administered medications on file prior to encounter.      Current Outpatient Medications on File Prior to Encounter   Medication Sig    albuterol (PROVENTIL/VENTOLIN HFA) 90 mcg/actuation inhaler 2 puffs every 4 hours as needed for cough, wheeze, or shortness of breath    fluticasone furoate-vilanteroL (BREO ELLIPTA) 200-25 mcg/dose DsDv diskus inhaler Inhale 1 puff into the lungs once daily. Controller    fluticasone-umeclidin-vilanter (TRELEGY ELLIPTA) 100-62.5-25 mcg DsDv Inhale 1 puff into the lungs once daily.    furosemide (LASIX) 20 MG tablet     metFORMIN (GLUCOPHAGE) 500 MG tablet Take 2 tablets (1,000 mg total) by mouth 2 (two) times daily with meals.    montelukast (SINGULAIR) 10 mg tablet Take 1 tablet (10 mg total) by mouth every evening.    pantoprazole (PROTONIX) 40 MG tablet Take 1 tablet (40 mg total) by mouth once daily.    doxycycline (VIBRA-TABS) 100 MG tablet     predniSONE (DELTASONE) 20 MG tablet Take 2 daily for 5 days then take one daily for 5 days and may repeat for shortness of breath.     Family History     Family history is unknown by patient.        Tobacco Use    Smoking status: Former Smoker   Substance and Sexual Activity    Alcohol use: Yes     Frequency: Never     Comment: occ    Drug use: No    Sexual activity: Yes     Birth control/protection: None     Review of Systems   Unable to perform ROS: Mental status change     Objective:     Vital Signs (Most Recent):  Temp: 99 °F (37.2 °C) (20 1505)  Pulse: 87 (20 184)  Resp: (!) 22 (20)  BP: 130/84 (20 1832)  SpO2: 96 % (20) Vital Signs (24h Range):  Temp:  [99 °F (37.2 °C)] 99  SUBJECTIVE:   The patient is a 37 year old female coming today for follow-up of her weight and blood pressure.  She has been having significant pain in her left trigeminal nerve area.  She did see an oral surgeon who is going to do a root canal as they feel that the nerve is dying.  She notes that with the pain her blood pressure has been extremely high.  She has not been taking her phentermine because of the elevated blood pressure.  She denies chest pain or unusual headache.      MEDICATIONS: Reviewed in Epic.     ALLERGIES: Reviewed in Epic.    OBJECTIVE:  Visit Vitals  BP (!) 160/120   Pulse 93   Temp 97.8 °F (36.6 °C) (Temporal)   Ht 5' 5\" (1.651 m)   Wt 121.9 kg   LMP 02/23/2021 (Exact Date)   SpO2 98%   BMI 44.70 kg/m²     Cardiovascular:  Normal rate, normal rhythm. No murmurs, no gallops, no rubs.  Respiratory:  No respiratory distress. Normal breath sounds. No rales. No wheezing.      ASSESSMENT:   1. Elevated blood pressure reading without diagnosis of hypertension    2. Obesity, morbid, BMI 40.0-49.9 (CMS/HCC)    3. Trigeminal neuralgia pain         PLAN:  She will be having her root canal done tomorrow.  We will place her on Zestoretic given the fact that her blood pressure is elevated.  Hopefully once the pain is under control we can remove the medication.  She will hold off on her phentermine until her blood pressure is under normal control.  Orders Placed This Encounter   • phentermine (ADIPEX-P) 37.5 MG tablet   • lisinopril-hydroCHLOROthiazide (Zestoretic) 10-12.5 MG per tablet            °F (37.2 °C)  Pulse:  [] 87  Resp:  [15-31] 22  SpO2:  [74 %-99 %] 96 %  BP: (114-181)/() 130/84     Weight: (!) 161 kg (355 lb)  Body mass index is 64.93 kg/m².    Physical Exam  Constitutional:       General: She is in acute distress.      Appearance: She is morbidly obese. She is ill-appearing.      Interventions: Face mask in place.      Comments: Lying in bed lethargic and difficult to arouse   HENT:      Head: Normocephalic and atraumatic.   Cardiovascular:      Rate and Rhythm: Normal rate.   Pulmonary:      Effort: Respiratory distress present.   Musculoskeletal:      Right lower leg: Edema present.      Left lower leg: Edema present.   Neurological:      Mental Status: She is lethargic.             Significant Labs: All pertinent labs within the past 24 hours have been reviewed.    Significant Imaging: I have reviewed all pertinent imaging results/findings within the past 24 hours.

## 2021-03-02 ENCOUNTER — TELEPHONE (OUTPATIENT)
Dept: PHARMACY | Facility: AMBULARY SURGERY CENTER | Age: 31
End: 2021-03-02

## 2021-03-08 ENCOUNTER — HOSPITAL ENCOUNTER (EMERGENCY)
Facility: HOSPITAL | Age: 31
Discharge: HOME | End: 2021-03-08
Attending: FAMILY MEDICINE
Payer: MEDICAID

## 2021-03-08 VITALS
TEMPERATURE: 98 F | OXYGEN SATURATION: 96 % | BODY MASS INDEX: 53.92 KG/M2 | WEIGHT: 293 LBS | HEART RATE: 97 BPM | DIASTOLIC BLOOD PRESSURE: 98 MMHG | RESPIRATION RATE: 16 BRPM | HEIGHT: 62 IN | SYSTOLIC BLOOD PRESSURE: 132 MMHG

## 2021-03-08 DIAGNOSIS — I82.721 CHRONIC DEEP VEIN THROMBOSIS (DVT) OF RIGHT UPPER EXTREMITY, UNSPECIFIED VEIN: Primary | ICD-10-CM

## 2021-03-08 LAB
ALBUMIN SERPL BCP-MCNC: 3.6 G/DL (ref 3.5–5.2)
ALP SERPL-CCNC: 70 U/L (ref 55–135)
ALT SERPL W/O P-5'-P-CCNC: 23 U/L (ref 10–44)
ANION GAP SERPL CALC-SCNC: 9 MMOL/L (ref 8–16)
AST SERPL-CCNC: 20 U/L (ref 10–40)
BASOPHILS # BLD AUTO: 0.02 K/UL (ref 0–0.2)
BASOPHILS NFR BLD: 0.2 % (ref 0–1.9)
BILIRUB SERPL-MCNC: 0.5 MG/DL (ref 0.1–1)
BNP SERPL-MCNC: 48 PG/ML (ref 0–99)
BUN SERPL-MCNC: 10 MG/DL (ref 6–20)
CALCIUM SERPL-MCNC: 8.9 MG/DL (ref 8.7–10.5)
CHLORIDE SERPL-SCNC: 97 MMOL/L (ref 95–110)
CO2 SERPL-SCNC: 31 MMOL/L (ref 23–29)
CREAT SERPL-MCNC: 0.7 MG/DL (ref 0.5–1.4)
D DIMER PPP IA.FEU-MCNC: 0.75 MG/L FEU
DIFFERENTIAL METHOD: ABNORMAL
EOSINOPHIL # BLD AUTO: 0.1 K/UL (ref 0–0.5)
EOSINOPHIL NFR BLD: 1.5 % (ref 0–8)
ERYTHROCYTE [DISTWIDTH] IN BLOOD BY AUTOMATED COUNT: 17.9 % (ref 11.5–14.5)
EST. GFR  (AFRICAN AMERICAN): >60 ML/MIN/1.73 M^2
EST. GFR  (NON AFRICAN AMERICAN): >60 ML/MIN/1.73 M^2
GLUCOSE SERPL-MCNC: 124 MG/DL (ref 70–110)
HCT VFR BLD AUTO: 43.9 % (ref 37–48.5)
HGB BLD-MCNC: 12.8 G/DL (ref 12–16)
IMM GRANULOCYTES # BLD AUTO: 0.05 K/UL (ref 0–0.04)
IMM GRANULOCYTES NFR BLD AUTO: 0.6 % (ref 0–0.5)
INR PPP: 1 (ref 0.8–1.2)
LYMPHOCYTES # BLD AUTO: 1.5 K/UL (ref 1–4.8)
LYMPHOCYTES NFR BLD: 18.1 % (ref 18–48)
MCH RBC QN AUTO: 24 PG (ref 27–31)
MCHC RBC AUTO-ENTMCNC: 29.2 G/DL (ref 32–36)
MCV RBC AUTO: 82 FL (ref 82–98)
MONOCYTES # BLD AUTO: 1.4 K/UL (ref 0.3–1)
MONOCYTES NFR BLD: 16.5 % (ref 4–15)
NEUTROPHILS # BLD AUTO: 5.2 K/UL (ref 1.8–7.7)
NEUTROPHILS NFR BLD: 63.1 % (ref 38–73)
NRBC BLD-RTO: 0 /100 WBC
PLATELET # BLD AUTO: 243 K/UL (ref 150–350)
PMV BLD AUTO: 10.2 FL (ref 9.2–12.9)
POTASSIUM SERPL-SCNC: 4 MMOL/L (ref 3.5–5.1)
PROT SERPL-MCNC: 7.3 G/DL (ref 6–8.4)
PROTHROMBIN TIME: 10.9 SEC (ref 9–12.5)
RBC # BLD AUTO: 5.34 M/UL (ref 4–5.4)
SARS-COV-2 RDRP RESP QL NAA+PROBE: NEGATIVE
SODIUM SERPL-SCNC: 137 MMOL/L (ref 136–145)
WBC # BLD AUTO: 8.22 K/UL (ref 3.9–12.7)

## 2021-03-08 PROCEDURE — 83880 ASSAY OF NATRIURETIC PEPTIDE: CPT | Performed by: FAMILY MEDICINE

## 2021-03-08 PROCEDURE — 99284 EMERGENCY DEPT VISIT MOD MDM: CPT | Mod: 25

## 2021-03-08 PROCEDURE — 85025 COMPLETE CBC W/AUTO DIFF WBC: CPT | Performed by: FAMILY MEDICINE

## 2021-03-08 PROCEDURE — 80053 COMPREHEN METABOLIC PANEL: CPT | Performed by: FAMILY MEDICINE

## 2021-03-08 PROCEDURE — 96372 THER/PROPH/DIAG INJ SC/IM: CPT

## 2021-03-08 PROCEDURE — U0002 COVID-19 LAB TEST NON-CDC: HCPCS | Performed by: FAMILY MEDICINE

## 2021-03-08 PROCEDURE — 71045 XR CHEST AP PORTABLE: ICD-10-PCS | Mod: 26,,, | Performed by: RADIOLOGY

## 2021-03-08 PROCEDURE — 63600175 PHARM REV CODE 636 W HCPCS: Performed by: FAMILY MEDICINE

## 2021-03-08 PROCEDURE — 85379 FIBRIN DEGRADATION QUANT: CPT | Performed by: FAMILY MEDICINE

## 2021-03-08 PROCEDURE — 71045 X-RAY EXAM CHEST 1 VIEW: CPT | Mod: TC,FY

## 2021-03-08 PROCEDURE — 71045 X-RAY EXAM CHEST 1 VIEW: CPT | Mod: 26,,, | Performed by: RADIOLOGY

## 2021-03-08 PROCEDURE — 36415 COLL VENOUS BLD VENIPUNCTURE: CPT | Performed by: FAMILY MEDICINE

## 2021-03-08 PROCEDURE — 85610 PROTHROMBIN TIME: CPT | Performed by: FAMILY MEDICINE

## 2021-03-08 RX ORDER — ENOXAPARIN SODIUM 100 MG/ML
160 INJECTION SUBCUTANEOUS
Status: COMPLETED | OUTPATIENT
Start: 2021-03-08 | End: 2021-03-08

## 2021-03-08 RX ORDER — ENOXAPARIN SODIUM 100 MG/ML
1 INJECTION SUBCUTANEOUS
Status: DISCONTINUED | OUTPATIENT
Start: 2021-03-08 | End: 2021-03-08 | Stop reason: CLARIF

## 2021-03-08 RX ORDER — WARFARIN SODIUM 5 MG/1
5 TABLET ORAL DAILY
Qty: 30 TABLET | Refills: 11 | Status: SHIPPED | OUTPATIENT
Start: 2021-03-08 | End: 2023-10-05

## 2021-03-08 RX ADMIN — ENOXAPARIN SODIUM 160 MG: 100 INJECTION SUBCUTANEOUS at 06:03

## 2021-03-23 ENCOUNTER — TELEPHONE (OUTPATIENT)
Dept: HEMATOLOGY/ONCOLOGY | Facility: CLINIC | Age: 31
End: 2021-03-23

## 2021-05-21 ENCOUNTER — HOSPITAL ENCOUNTER (OUTPATIENT)
Dept: RADIOLOGY | Facility: HOSPITAL | Age: 31
Discharge: HOME | End: 2021-05-21
Attending: NURSE PRACTITIONER
Payer: MEDICAID

## 2021-05-21 DIAGNOSIS — M54.9 DORSALGIA: ICD-10-CM

## 2021-05-21 DIAGNOSIS — M54.9 DORSALGIA, UNSPECIFIED: ICD-10-CM

## 2021-05-21 DIAGNOSIS — M54.9 DORSALGIA: Primary | ICD-10-CM

## 2021-05-21 PROCEDURE — 72110 XR LUMBAR SPINE COMPLETE 5 VIEW: ICD-10-PCS | Mod: 26,,, | Performed by: RADIOLOGY

## 2021-05-21 PROCEDURE — 72110 X-RAY EXAM L-2 SPINE 4/>VWS: CPT | Mod: 26,,, | Performed by: RADIOLOGY

## 2021-05-21 PROCEDURE — 72110 X-RAY EXAM L-2 SPINE 4/>VWS: CPT | Mod: TC,FY

## 2022-04-06 NOTE — SUBJECTIVE & OBJECTIVE
Chronic respiratory failure with hypoxia, on home O2 therapy    Clinically-stable.  Afebrile. No Elevation of WBC, Productive Cough.  --Continue Solumedrol 125 mg IV every 12 hours and taper when moving air better  --Will continue Breo   --DuoNeb breathing Treatments every 4 hours while awake  --PRN Oxygen per Nasal Cannula for SpO2 <88%  --Pulse-Ox Monitoring every 4 hours  --Monitor respiratory status closely     Interval History:  Patient is in intensive care unit with respiratory failure, on mechanical ventilation after getting transferred from [place], [place].  Presently requiring PEEP 12 cm of water.  T-max 99.7 degree F. white blood cell count improving.  Patient is afebrile.  No acute distress noted.    Review of Systems   Unable to perform ROS: Intubated     Objective:     Vital Signs (Most Recent):  Temp: 98.4 °F (36.9 °C) (06/25/20 0615)  Pulse: 60 (06/25/20 0721)  Resp: (!) 30 (06/25/20 0721)  BP: (!) 121/56 (06/24/20 1430)  SpO2: 100 % (06/25/20 0721) Vital Signs (24h Range):  Temp:  [98.2 °F (36.8 °C)-99.7 °F (37.6 °C)] 98.4 °F (36.9 °C)  Pulse:  [59-93] 60  Resp:  [29-34] 30  SpO2:  [96 %-100 %] 100 %  BP: (118-140)/(56-67) 121/56  Arterial Line BP: (0-148)/(0-100) 114/69     Weight: (!) 162.3 kg (357 lb 12.9 oz)  Body mass index is 65.44 kg/m².    Intake/Output Summary (Last 24 hours) at 6/25/2020 1005  Last data filed at 6/25/2020 0555  Gross per 24 hour   Intake 4476.38 ml   Output 2160 ml   Net 2316.38 ml      Physical Exam  Vitals signs and nursing note reviewed.   Constitutional:       Appearance: She is well-developed. She is obese.      Interventions: She is sedated and intubated.   HENT:      Head: Normocephalic and atraumatic.   Eyes:      Conjunctiva/sclera: Conjunctivae normal.      Pupils: Pupils are equal, round, and reactive to light.   Neck:      Musculoskeletal: Normal range of motion and neck supple.   Cardiovascular:      Rate and Rhythm: Normal rate and regular rhythm.      Pulses: Normal pulses.      Heart sounds: Normal heart sounds.   Pulmonary:      Effort: Pulmonary effort is normal. She is intubated.      Breath sounds: Decreased breath sounds present.   Abdominal:      General: Bowel sounds are normal.      Palpations: Abdomen is soft. There is no mass.      Tenderness: There is no abdominal tenderness.   Genitourinary:     Comments: Vazquez catheter in  place    Musculoskeletal:         General: No swelling or deformity.   Skin:     General: Skin is warm and dry.      Capillary Refill: Capillary refill takes 2 to 3 seconds.   Neurological:      Comments: Unable to assess as pt is on mechanical ventilation with sedation     Psychiatric:      Comments: Sedated          Significant Labs:   CBC:   Recent Labs   Lab 06/24/20  0423 06/25/20  0330   WBC 26.11* 17.19*   HGB 12.7 12.2   HCT 41.6 39.1   * 333     CMP:   Recent Labs   Lab 06/24/20  0423 06/25/20  0330    139   K 4.4 3.7   CL 99 102   CO2 31* 29   * 122*   BUN 17 20   CREATININE 0.9 0.8   CALCIUM 9.0 8.3*   PROT 6.6 6.4   ALBUMIN 2.7* 2.6*   BILITOT 0.3 0.2   ALKPHOS 59 50*   AST 17 17   ALT 15 12   ANIONGAP 10 8   EGFRNONAA >60 >60     Lactic Acid:   Recent Labs   Lab 06/23/20  1246 06/23/20 2201   LACTATE 2.4* 2.0     Microbiology Results (last 7 days)     Procedure Component Value Units Date/Time    Blood culture [[id number]] Collected: 06/23/20 2201    Order Status: Completed Specimen: Blood Updated: 06/25/20 0613     Blood Culture, Routine No Growth to date      No Growth to date    Culture, Respiratory with Gram Stain [[id number]] Collected: 06/23/20 2152    Order Status: Completed Specimen: Respiratory from Tracheal Aspirate Updated: 06/24/20 0626     Gram Stain (Respiratory) <10 epithelial cells per low power field.     Gram Stain (Respiratory) Few WBC's     Gram Stain (Respiratory) Rare Gram positive cocci        Significant Imaging:   ECHO:  · Concentric left ventricular remodeling.  · Normal left ventricular systolic function. The estimated ejection fraction is 69%.  · Normal LV diastolic function.  · No wall motion abnormalities.  · Normal right ventricular systolic function.  · Mild right atrial enlargement.  · Trivial pericardial effusion.  · Mild left atrial enlargement.    CXR: Mild cardiomegaly.  Hypoventilation.    CXR:   1. Interval placement of endotracheal tube with the  tip in the proximal right mainstem bronchus.  This should be withdrawn several cm.  2. The study is motion degraded.  Indistinctness of the left hemidiaphragm could be related to patient motion with atelectasis and/or pleural effusion not completely excluded.    CXR:  1. Limited evaluation secondary to technique.  2. Findings suspicious for congestive heart failure which does not appear significantly changed over the interval.  3. Small bilateral pleural effusions with bibasilar dependent atelectasis.  4. Endotracheal tube.    CXR:  Stable positioning of support devices.  Unchanged bibasilar consolidation/atelectasis.    CXR: Bibasilar opacification not significantly changed compared to the prior exam.  Positions of lines and tubes described

## 2022-08-11 NOTE — CONSULTS
"  [facility]  Adult Nutrition  Consult Note    SUMMARY    Intervention: collaboration with care providers    Recommendation:   1) Advance PO diet to goal of DM 2000 kcal, low sodium diet ( 4 carb servings/meal) when extubated     2) If diet not advanced in < 4 days consider intiate TF Peptamen intense VHP @ 10 ml/hr advancing to goal of 50 ml/hr while on propofol + 130 ml flush q 4 hr ( 60 ml/hr when propofol d/c'd)   ( provides 1440 kcal + propofol (61% EEN + propofol permissive underfeeding), 132 g protein ( 100% EPN), and 1209 ml free water)     3) weigh pt weekly    Goals: 1) PO diet advance in < 4 days or TF intiated  Nutrition Goal Status: new  Communication of RD Recs: (POC, sticky note)    Reason for Assessment    Reason For Assessment: consult  Diagnosis: (acute on chronic respiratory failure)  Relevant Medical History: asthma, DM2, obesity, HTN, restrictive lung disease  Interdisciplinary Rounds: attended    General Information Comments: 29 y/o female admitted with respiratory failure, multiple causes likely pneumonia/asthma/obesity hypoventilation syndrome per H&P. Arrived + vent from another hospital. NPO x at least 2 days. On high dose propofol no pressor. NFPE done 11/10/20, no wasting seen. Wt gain per chart review.    Nutrition Discharge Planning: to be determined- DM 1500 kcal, low sodium diet    Nutrition Risk Screen    Nutrition Risk Screen: no indicators present    Nutrition/Diet History    Typical Food/Fluid Intake: unable to obtain- Nutrition note 7/10/20 pt was not following diabetic diet but received education. Skips 1-2 meals/day r/t busy schedule  Spiritual, Cultural Beliefs, Oriental orthodox Practices, Values that Affect Care: no  Food Allergies: NKFA  Factors Affecting Nutritional Intake: NPO, on mechanical ventilation    Anthropometrics    Temp: 98.7 °F (37.1 °C)  Height Method: Stated(office)  Height: 5' 2"  Height (inches): 62 in  Weight Method: Bed Scale  Weight: (!) 163 " kg (359 lb 5.6 oz)  Weight (lb): (!) 359.35 lb  Ideal Body Weight (IBW), Female: 110 lb  % Ideal Body Weight, Female (lb): 326.68 %  BMI (Calculated): 65.7  BMI Grade: greater than 40 - morbid obesity  Usual Body Weight (UBW), kg: (!) 161 kg(7/30/20)  % Usual Body Weight: 101.45  % Weight Change From Usual Weight: 1.24 %       Lab/Procedures/Meds    Pertinent Labs Reviewed: reviewed  BMP  Lab Results   Component Value Date     11/10/2020    K 4.4 11/10/2020    CL 97 11/10/2020    CO2 34 (H) 11/10/2020    BUN 13 11/10/2020    CREATININE 0.8 11/10/2020    CALCIUM 9.3 11/10/2020    ANIONGAP 11 11/10/2020    ESTGFRAFRICA >60 11/10/2020    EGFRNONAA >60 11/10/2020     Lab Results   Component Value Date    CALCIUM 9.3 11/10/2020    PHOS 3.4 11/10/2020     POCT Glucose   Date Value Ref Range Status   11/10/2020 182 (H) 70 - 110 mg/dL Final   11/10/2020 159 (H) 70 - 110 mg/dL Final   11/09/2020 181 (H) 70 - 110 mg/dL Final   11/09/2020 155 (H) 70 - 110 mg/dL Final   11/09/2020 175 (H) 70 - 110 mg/dL Final   11/09/2020 162 (H) 70 - 110 mg/dL Final     Lab Results   Component Value Date    HGBA1C 6.2 06/23/2020     Lab Results   Component Value Date    FERRITIN 394 (H) 06/30/2020     No results found for: FOLATE  No results found for: [id number]    Pertinent Medications Reviewed: reviewed  Pertinent Medications Comments: propofol 39.1 ml/hr, insulin, lasix, methylprednisolone, Mg sulfate, zofran, KCl    Estimated/Assessed Needs    Weight Used For Calorie Calculations: (!) 163 kg (359 lb 5.6 oz)  Energy Calorie Requirements (kcal): [place] state modified: 2330 kcal  Energy Need Method: [facility] (modified)  Protein Requirements: 2.5 g protein/kg IBW ( critical care obese) = 130 g protein  Weight Used For Protein Calculations: 52 kg (114 lb 10.2 oz)(IBW)  Fluid Requirements (mL): 1881-9564 ml or per MD  Estimated Fluid Requirement Method: RDA Method  CHO Requirement: 262-291 g      Nutrition Prescription Ordered    Current  Diet Order: NPO x at least 2 days    Evaluation of Received Nutrient/Fluid Intake    Energy Calories Required: not meeting needs  Protein Required: not meeting needs  Fluid Required: not meeting needs  Tolerance: other (see comments)(NPO)  % Intake of Estimated Energy Needs: 44% propofol  % Meal Intake: 0%    Nutrition Risk    Level of Risk/Frequency of Follow-up: moderate  2 x weekly    Assessment and Plan    Inadequate energy intake  R/t NPO  As evidenced by PO intakes < 50% EEN x 2 days  Intervention: above  new     Monitor and Evaluation    Food and Nutrient Intake: energy intake  Food and Nutrient Adminstration: diet order, enteral and parenteral nutrition administration  Anthropometric Measurements: weight  Biochemical Data, Medical Tests and Procedures: electrolyte and renal panel, gastrointestinal profile  Nutrition-Focused Physical Findings: overall appearance     Malnutrition Assessment     Skin (Micronutrient): (Maxi = 12 + NGT)  Teeth (Micronutrient): (WDL)   Micronutrient Evaluation: suspected deficiency  Micronutrient Evaluation Comments: check iron, foalte, b12               Edema (Fluid Accumulation): 0-->no edema present   Subcutaneous Fat Loss (Final Summary): well nourished  Muscle Loss Evaluation (Final Summary): well nourished         Nutrition Follow-Up        Nephrology

## 2023-01-01 NOTE — NURSING
Dr Greer, RT and RN at bedside, patient extubated to BiPAP - tolerating well. Dr Gentile arrived to bedside, BiPAP settings 20/10, R-6 ,FiO2 40%- restraints d/c'd    Past few days constipation noted, recent formula change to soy based.. Fussy, stool is hard formed.     Onset: 3 days.     Location/description: per mother, patient has been having harder stools. Last BM was one hour ago. Patient has been drinking. It was reported from  patient was more fussy than normal. Mother feels maybe because she is having a hard time passing stool. Mother denies fever or vomiting. She was changed to a soy based formula one week ago.     Associated Symptoms: increased fussiness today    What improves/worsens symptoms: NA    Symptom specific medications: NA    Intake and Output: normal for patient, except stool is harder and less than usual. Mother states patient still has been drinking her bottles and has had \"plenty of wet diapers\".     Activity level: Normal    Temperature (route and time): No temp taken    Weight:   Wt Readings from Last 1 Encounters:   06/22/23 6.591 kg (14 lb 8.5 oz) (38 %, Z= -0.29)*     * Growth percentiles are based on WHO (Girls, 0-2 years) data.          Recent Care: Not for this    PLAN: Mother is taking patient to see PCP on July 5th. Was told to call back if patient became worse or started vomiting.     See Provider within 24 hours    Patient/Caller agrees to follow recommendations.    Reason for Disposition  • [1] Mild constipation associated with recent change in infant's diet (change in milk, adding solids, etc) AND [2] present > 1 week    Protocols used: CONSTIPATION-P-

## 2023-02-04 ENCOUNTER — NURSE TRIAGE (OUTPATIENT)
Dept: ADMINISTRATIVE | Facility: CLINIC | Age: 33
End: 2023-02-04
Payer: MEDICAID

## 2023-02-04 NOTE — TELEPHONE ENCOUNTER
"OOC NT return call -  Pt reports she had a cyst removed by non [facility] dermatologist yesterday but 2 of the sutures have come out. Pt does report "on and off" bleeding from site and she currently takes coumadin. Post op protocol followed and pt advised  to go to ED. Unable to route encounter to non [facility] provider.   Reason for Disposition   [1] Bleeding from incision AND [2] won't stop after 10 minutes of direct pressure    Additional Information   Negative: [1] Major abdominal surgical incision AND [2] wound gaping open AND [3] visible internal organs   Negative: Sounds like a life-threatening emergency to the triager    Protocols used: Post-Op Incision Symptoms-A-AH    "

## 2023-02-05 ENCOUNTER — HOSPITAL ENCOUNTER (EMERGENCY)
Facility: HOSPITAL | Age: 33
Discharge: HOME | End: 2023-02-05
Attending: INTERNAL MEDICINE
Payer: MEDICAID

## 2023-02-05 VITALS
RESPIRATION RATE: 20 BRPM | TEMPERATURE: 99 F | WEIGHT: 293 LBS | HEIGHT: 62 IN | OXYGEN SATURATION: 98 % | HEART RATE: 78 BPM | BODY MASS INDEX: 53.92 KG/M2 | DIASTOLIC BLOOD PRESSURE: 72 MMHG | SYSTOLIC BLOOD PRESSURE: 148 MMHG

## 2023-02-05 DIAGNOSIS — T81.31XA WOUND DEHISCENCE, SURGICAL, INITIAL ENCOUNTER: Primary | ICD-10-CM

## 2023-02-05 DIAGNOSIS — Z98.890 HX OF REMOVAL OF NECK CYST: ICD-10-CM

## 2023-02-05 LAB
HCV AB SERPL QL IA: NORMAL
HIV 1+2 AB+HIV1 P24 AG SERPL QL IA: NORMAL
POCT GLUCOSE: 90 MG/DL (ref 70–110)

## 2023-02-05 PROCEDURE — 86803 HEPATITIS C AB TEST: CPT | Performed by: EMERGENCY MEDICINE

## 2023-02-05 PROCEDURE — 87389 HIV-1 AG W/HIV-1&-2 AB AG IA: CPT | Performed by: EMERGENCY MEDICINE

## 2023-02-05 PROCEDURE — 82962 GLUCOSE BLOOD TEST: CPT

## 2023-02-05 PROCEDURE — 99283 EMERGENCY DEPT VISIT LOW MDM: CPT

## 2023-02-05 PROCEDURE — 25000003 PHARM REV CODE 250: Performed by: INTERNAL MEDICINE

## 2023-02-05 RX ORDER — CLINDAMYCIN HYDROCHLORIDE 150 MG/1
150 CAPSULE ORAL EVERY 8 HOURS
Qty: 21 CAPSULE | Refills: 0 | Status: SHIPPED | OUTPATIENT
Start: 2023-02-05 | End: 2023-02-12

## 2023-02-05 RX ADMIN — BACITRACIN ZINC, NEOMYCIN, POLYMYXIN B 1 EACH: 400; 3.5; 5 OINTMENT TOPICAL at 09:02

## 2023-02-05 NOTE — ED PROVIDER NOTES
Encounter Date: 2023       History     Chief Complaint   Patient presents with    Wound Dehiscence     Pt had [facility] and some of the sutures have come out.     Patient comes in having a cyst removed by her dermatologist on Tuesday, it was sutured back up, and the wound has, loose and draining.  Patient denies any neck pain, fever chills.  The patient is on chronic Coumadin for factor 5 deficiency, also she is on metformin for diabetes mellitus type 2.  Patient had a cyst removed on her neck 2 months ago without any difficulty.  She has 2 more deep says that she is been referred to General surgery to have those removed on the back of her neck as well.  Dermatologist said etiology cyst is unknown.      Review of patient's allergies indicates:  No Known Allergies  Past Medical History:   Diagnosis Date    Asthma     Back pain     COPD (chronic obstructive pulmonary disease)     Diabetes mellitus     DVT (deep venous thrombosis)     Morbid obesity     Obesity     PE (pulmonary thromboembolism)      Past Surgical History:   Procedure Laterality Date    APPENDECTOMY       SECTION       Family History   Family history unknown: Yes     Social History     Tobacco Use    Smoking status: Former   Substance Use Topics    Alcohol use: Yes     Comment: occ    Drug use: No     Review of Systems   Constitutional:  Negative for fever.   HENT:  Negative for sore throat.    Respiratory:  Negative for shortness of breath.    Cardiovascular:  Negative for chest pain.   Gastrointestinal:  Negative for nausea.   Genitourinary:  Negative for dysuria.   Musculoskeletal:  Negative for back pain.   Skin:  Negative for rash.   Neurological:  Negative for weakness.   Hematological:  Does not bruise/bleed easily.     Physical Exam     Initial Vitals   BP Pulse Resp Temp SpO2   23 0912 23 0909 23 0909 23 0909 23 0909   (!) 148/72 78 20 98.8 °F (37.1 °C)  98 %      MAP       --                Physical Exam    Vitals reviewed.  Constitutional: She appears well-developed.   Eyes: Pupils are equal, round, and reactive to light.   Neck:   Normal range of motion.  Cardiovascular:  Normal rate, regular rhythm, normal heart sounds and normal pulses.           Pulmonary/Chest: Breath sounds normal.   Abdominal: Abdomen is soft.   Musculoskeletal:         General: Normal range of motion.      Cervical back: Normal range of motion.     Neurological: She is alert.   Skin: Skin is warm.        Small 1 cm opening dehiscence wound on the back of the neck.  Slight serial mucus drainage.   Psychiatric: She has a normal mood and affect.       ED Course   Procedures  Labs Reviewed   HIV 1 / 2 ANTIBODY   HEPATITIS C ANTIBODY   POCT GLUCOSE          Imaging Results    None          Medications   neomycin-bacitracnZn-polymyxnB packet (has no administration in time range)     Medical Decision Making:   Initial Assessment:   Patient with dermoid cyst on neck removed tooth by dermatologist, sutured in place, and now the sutures come out in the wound dehisced.  Differential Diagnosis:   Wound dehiscence with possible infection most likely staph, likely recurrence of the cyst.  ED Management:  Discussed with the patient, will leave open for secondary closure, was started on antibiotics most likely risk factors for diabetes with staph infection.  And follow with a dermatologist on tomorrow to see if she wants to revisit or reclosed that or refer to surgery.                        Clinical Impression:   Final diagnoses:  [T81.31XA] Wound dehiscence, surgical, initial encounter (Primary)  [Z98.890] Hx of removal of neck cyst        ED Disposition Condition    Discharge Stable          ED Prescriptions       Medication Sig Dispense Start Date End Date Auth. Provider    clindamycin (CLEOCIN) 150 MG capsule Take 1 capsule (150 mg total) by mouth every 8 (eight) hours. for 7 days 21 capsule 2/5/2023  2/12/2023 Brannon Lee MD          Follow-up Information       Follow up With Specialties Details Why Contact Info    Teressa Saeed NP Family Medicine In 2 days  [address] [place] [address] [address]  [place] [address] [phone]               Brannon Lee MD  02/05/23 0955

## 2023-11-30 ENCOUNTER — HOSPITAL ENCOUNTER (EMERGENCY)
Facility: HOSPITAL | Age: 33
Discharge: HOME | End: 2023-11-30
Attending: EMERGENCY MEDICINE
Payer: MEDICARE

## 2023-11-30 VITALS
DIASTOLIC BLOOD PRESSURE: 90 MMHG | HEART RATE: 89 BPM | RESPIRATION RATE: 18 BRPM | HEIGHT: 62 IN | OXYGEN SATURATION: 98 % | BODY MASS INDEX: 53.92 KG/M2 | WEIGHT: 293 LBS | SYSTOLIC BLOOD PRESSURE: 140 MMHG | TEMPERATURE: 99 F

## 2023-11-30 DIAGNOSIS — R52 PAIN: ICD-10-CM

## 2023-11-30 DIAGNOSIS — S93.602A SPRAIN OF LEFT FOOT, INITIAL ENCOUNTER: Primary | ICD-10-CM

## 2023-11-30 LAB — POCT GLUCOSE: 90 MG/DL (ref 70–110)

## 2023-11-30 PROCEDURE — 73630 X-RAY EXAM OF FOOT: CPT | Mod: TC,LT

## 2023-11-30 PROCEDURE — 82962 GLUCOSE BLOOD TEST: CPT

## 2023-11-30 PROCEDURE — 73630 XR FOOT COMPLETE 3 VIEW LEFT: ICD-10-PCS | Mod: 26,LT,, | Performed by: RADIOLOGY

## 2023-11-30 PROCEDURE — 25000003 PHARM REV CODE 250: Performed by: NURSE PRACTITIONER

## 2023-11-30 PROCEDURE — 73630 X-RAY EXAM OF FOOT: CPT | Mod: 26,LT,, | Performed by: RADIOLOGY

## 2023-11-30 PROCEDURE — 99283 EMERGENCY DEPT VISIT LOW MDM: CPT

## 2023-11-30 RX ORDER — TRAMADOL HYDROCHLORIDE 50 MG/1
50 TABLET ORAL
Status: COMPLETED | OUTPATIENT
Start: 2023-11-30 | End: 2023-11-30

## 2023-11-30 RX ORDER — TRAMADOL HYDROCHLORIDE 50 MG/1
50 TABLET ORAL EVERY 6 HOURS PRN
Qty: 12 TABLET | Refills: 0 | Status: SHIPPED | OUTPATIENT
Start: 2023-11-30

## 2023-11-30 RX ADMIN — TRAMADOL HYDROCHLORIDE 50 MG: 50 TABLET, COATED ORAL at 07:11

## 2023-11-30 NOTE — Clinical Note
"Michelle Toddtom Wren was seen and treated in our emergency department on 11/30/2023.  She may return to work on 12/02/2023.       If you have any questions or concerns, please don't hesitate to call.      Wen Villar NP"

## 2023-12-01 NOTE — ED PROVIDER NOTES
Encounter Date: 2023       History     Chief Complaint   Patient presents with    Foot Pain     L pain and swelling x 3 days. Denies injury.      POV to ED with child.  Patient complains of left foot pain for 3 days.  Indicates the top of her foot.  She denies fall or injury.  Thinks that maybe she was walking and twisted the foot.  She bears weight but has pain.  No other complaints.  No concerns for pregnancy    The history is provided by the patient.     Review of patient's allergies indicates:  No Known Allergies  Past Medical History:   Diagnosis Date    Asthma     Back pain     COPD (chronic obstructive pulmonary disease)     Diabetes mellitus     DVT (deep venous thrombosis)     Morbid obesity     Obesity     PE (pulmonary thromboembolism)      Past Surgical History:   Procedure Laterality Date    APPENDECTOMY       SECTION       Family History   Problem Relation Age of Onset    Hypertension Mother     Diabetes Mother     Hyperlipidemia Mother     Kidney failure Mother      Social History     Tobacco Use    Smoking status: Every Day     Types: Cigarettes    Smokeless tobacco: Never   Substance Use Topics    Alcohol use: Yes     Comment: occ    Drug use: No     Review of Systems   Constitutional:  Negative for chills and fever.   Musculoskeletal:         Right foot pain   All other systems reviewed and are negative.      Physical Exam     Initial Vitals   BP Pulse Resp Temp SpO2   23 1911 23 1909 23 1909 23 1909 23 1909   (!) 140/90 89 20 98.9 °F (37.2 °C) 98 %      MAP       --                Physical Exam    Nursing note and vitals reviewed.  Constitutional: She appears well-developed and well-nourished. No distress.   obese   HENT:   Head: Normocephalic and atraumatic.   Mouth/Throat: Oropharynx is clear and moist.   Eyes: Pupils are equal, round, and reactive to light.   Neck:   Normal range of motion.  Cardiovascular:  Normal rate and regular rhythm.            Pulmonary/Chest: Breath sounds normal. No respiratory distress.   Abdominal: Abdomen is soft. There is no abdominal tenderness.   Musculoskeletal:         General: Normal range of motion.      Cervical back: Normal range of motion.      Comments: Tenderness to the dorsum of the left foot.  When compared to the right foot, both feet are puffy.  No appreciated difference in the two. Strong pedal pulse.  No crepitus.  No redness or discoloration.  No sign of tendon injury     Neurological: She is alert and oriented to person, place, and time. She has normal strength. GCS score is 15. GCS eye subscore is 4. GCS verbal subscore is 5. GCS motor subscore is 6.   Skin: Skin is warm and dry. Capillary refill takes 2 to 3 seconds.   Psychiatric: She has a normal mood and affect. Thought content normal.         ED Course   Splint Application    Date/Time: 2023 9:48 PM    Performed by: eWn Villar NP  Authorized by: Ling Le MD  Consent Done: Yes  Consent: Verbal consent obtained.  Consent given by: patient  Patient identity confirmed: , name and verbally with patient  Location: Left foot.  Splint type: ortho 3 D Boot.  Post-procedure: The splinted body part was neurovascularly unchanged following the procedure.  Patient tolerance: Patient tolerated the procedure well with no immediate complications        Labs Reviewed   POCT GLUCOSE          Imaging Results              X-Ray Foot Complete Left (Preliminary result)  Result time 23 21:44:29      Wet Read by Wen Villar NP (23 21:44:29, [facility], Emergency Medicine)    Negative                                  X-Rays:   Independently Interpreted Readings:   Other Readings:  Negative, discussed with Dr Le, pending radiology read    XR FOOT COMPLETE 3 VIEW LEFT     CLINICAL HISTORY:  .  Pain, unspecified     TECHNIQUE:  AP, lateral and oblique views of the left foot were performed.      COMPARISON:  None     FINDINGS:  There is no fracture, dislocation or radiopaque foreign body.  There is scattered degenerative changes including a heel spur.  There is soft tissue swelling over the dorsum of the foot.     Impression:     Soft tissue swelling with no acute bony abnormality.      Medications   traMADoL tablet 50 mg (50 mg Oral Given 11/30/23 1948)     Medical Decision Making  Presents for evaluation of left foot pain.  X-rays ordered.  Disposition pending  Differentials including but not limited to sprain, strain, fracture, dislocation  @ 2028 awaiting x-ray results  Patient will be discharged home, diagnosis sprain of the left foot.  Medicated in the ED.  Prescriptions for home use.  Splint application in the ED.  To follow up with [facility]opedic Clinic, Dr. Quintana.  Patient agrees with plan of care.  Work note given    Amount and/or Complexity of Data Reviewed  Radiology: ordered and independent interpretation performed. Decision-making details documented in ED Course.    Risk  OTC drugs.  Prescription drug management.               ED Course as of 11/30/23 2150   Thu Nov 30, 2023 2106 POCT glucose    Final result 11/30 1913  POCT Glucose 90     [CP]      ED Course User Index  [CP] Wen Villar NP                           Clinical Impression:  Final diagnoses:  [R52] Pain  [S93.602A] Sprain of left foot, initial encounter (Primary)          ED Disposition Condition    Discharge Stable          ED Prescriptions       Medication Sig Dispense Start Date End Date Auth. Provider    traMADoL (ULTRAM) 50 mg tablet Take 1 tablet (50 mg total) by mouth every 6 (six) hours as needed for Pain. 12 tablet 11/30/2023 -- Wen Villar NP          Follow-up Information       Follow up With Specialties Details Why Contact Info    Sumit Quintana MD Orthopedic Surgery Call in 3 days  [address] [facility] [place] [address] [phone]               Wen Villar  NP  11/30/23 2028       Wne Villar, BELKYS  11/30/23 2124       Wen Villar, BELKYS  11/30/23 2150       Wen Villar, NP  12/03/23 [id number]

## 2023-12-01 NOTE — DISCHARGE INSTRUCTIONS
Rest, increase fluids, lots of water and liquids.  Call orthopedic clinic for recheck, Dr. Quintana.  Splint for comfort and support.  Pending radiology over-read.  Return as needed, elevate as needed.

## 2023-12-07 ENCOUNTER — OFFICE VISIT (OUTPATIENT)
Dept: ORTHOPEDICS | Facility: CLINIC | Age: 33
End: 2023-12-07
Payer: MEDICARE

## 2023-12-07 VITALS — WEIGHT: 293 LBS | RESPIRATION RATE: 19 BRPM | BODY MASS INDEX: 53.92 KG/M2 | HEIGHT: 62 IN

## 2023-12-07 DIAGNOSIS — M79.672 ACUTE FOOT PAIN, LEFT: Primary | ICD-10-CM

## 2023-12-07 PROCEDURE — 99999 PR PBB SHADOW E&M-EST. PATIENT-LVL III: CPT | Mod: PBBFAC,,, | Performed by: ORTHOPAEDIC SURGERY

## 2023-12-07 PROCEDURE — 3066F NEPHROPATHY DOC TX: CPT | Mod: CPTII,S$GLB,, | Performed by: ORTHOPAEDIC SURGERY

## 2023-12-07 PROCEDURE — 3008F BODY MASS INDEX DOCD: CPT | Mod: CPTII,S$GLB,, | Performed by: ORTHOPAEDIC SURGERY

## 2023-12-07 PROCEDURE — 99999 PR PBB SHADOW E&M-EST. PATIENT-LVL III: ICD-10-PCS | Mod: PBBFAC,,, | Performed by: ORTHOPAEDIC SURGERY

## 2023-12-07 PROCEDURE — 1159F PR MEDICATION LIST DOCUMENTED IN MEDICAL RECORD: ICD-10-PCS | Mod: CPTII,S$GLB,, | Performed by: ORTHOPAEDIC SURGERY

## 2023-12-07 PROCEDURE — 1160F PR REVIEW ALL MEDS BY PRESCRIBER/CLIN PHARMACIST DOCUMENTED: ICD-10-PCS | Mod: CPTII,S$GLB,, | Performed by: ORTHOPAEDIC SURGERY

## 2023-12-07 PROCEDURE — 99203 PR OFFICE/OUTPT VISIT, NEW, LEVL III, 30-44 MIN: ICD-10-PCS | Mod: S$GLB,,, | Performed by: ORTHOPAEDIC SURGERY

## 2023-12-07 PROCEDURE — 3008F PR BODY MASS INDEX (BMI) DOCUMENTED: ICD-10-PCS | Mod: CPTII,S$GLB,, | Performed by: ORTHOPAEDIC SURGERY

## 2023-12-07 PROCEDURE — 3066F PR DOCUMENTATION OF TREATMENT FOR NEPHROPATHY: ICD-10-PCS | Mod: CPTII,S$GLB,, | Performed by: ORTHOPAEDIC SURGERY

## 2023-12-07 PROCEDURE — 1160F RVW MEDS BY RX/DR IN RCRD: CPT | Mod: CPTII,S$GLB,, | Performed by: ORTHOPAEDIC SURGERY

## 2023-12-07 PROCEDURE — 3061F PR NEG MICROALBUMINURIA RESULT DOCUMENTED/REVIEW: ICD-10-PCS | Mod: CPTII,S$GLB,, | Performed by: ORTHOPAEDIC SURGERY

## 2023-12-07 PROCEDURE — 3044F HG A1C LEVEL LT 7.0%: CPT | Mod: CPTII,S$GLB,, | Performed by: ORTHOPAEDIC SURGERY

## 2023-12-07 PROCEDURE — 3061F NEG MICROALBUMINURIA REV: CPT | Mod: CPTII,S$GLB,, | Performed by: ORTHOPAEDIC SURGERY

## 2023-12-07 PROCEDURE — 1159F MED LIST DOCD IN RCRD: CPT | Mod: CPTII,S$GLB,, | Performed by: ORTHOPAEDIC SURGERY

## 2023-12-07 PROCEDURE — 3044F PR MOST RECENT HEMOGLOBIN A1C LEVEL <7.0%: ICD-10-PCS | Mod: CPTII,S$GLB,, | Performed by: ORTHOPAEDIC SURGERY

## 2023-12-07 PROCEDURE — 99203 OFFICE O/P NEW LOW 30 MIN: CPT | Mod: S$GLB,,, | Performed by: ORTHOPAEDIC SURGERY

## 2023-12-07 NOTE — PROGRESS NOTES
Subjective:      Patient ID: Michelle Wren is a 33 y.o. female.    Chief Complaint: Pain of the Left Foot    HPI  33-year-old female with a several week history of pain swelling in her left foot.  Denies any specific trauma.  She states she has been a bit more active late and feels like that may be the source of her pain.  Was seen in the emergency department placed into a walking boot and referred for further evaluation.  Pain has improved with relative rest and immobilization.  ROS      Objective:    Ortho Exam     Constitutional:   Patient is alert  and oriented in no acute distress  HEENT:  normocephalic atraumatic; PERRL EOMI  Neck:  Supple without adenopathy  Cardiovascular:  Normal rate and rhythm  Pulmonary:  Normal respiratory effort normal chest wall expansion  Abdominal:  Nonprotuberant nondistended  Musculoskeletal:  Patient has a minimally antalgic gait she is in a walking boot  Mild diffuse mid and forefoot edema  She is adequate foot and ankle range of motion no focal tenderness  Neurological:  No focal defect; cranial nerves 2-12 grossly intact  Psychiatric/behavioral:  Mood and behavior normal      X-Ray Foot Complete Left  Narrative: EXAMINATION:  XR FOOT COMPLETE 3 VIEW LEFT    CLINICAL HISTORY:  .  Pain, unspecified    TECHNIQUE:  AP, lateral and oblique views of the left foot were performed.    COMPARISON:  None    FINDINGS:  There is no fracture, dislocation or radiopaque foreign body.  There is scattered degenerative changes including a heel spur.  There is soft tissue swelling over the dorsum of the foot.  Impression: Soft tissue swelling with no acute bony abnormality.    Electronically signed by: Keiko Solorzano  Date:    12/01/2023  Time:    08:32       My Radiographs Findings:    I have personally reviewed radiographs and concur with above findings    Assessment:       Encounter Diagnosis   Name Primary?    Acute foot pain, left Yes         Plan:       I have discussed medical  condition and treatment options with her at length.  I have told her I see no cause for concern I have suggested elevation of the extremity compression stockings as needed NSAIDs if approved by her PCP P. she may slowly advance activities and wean from her boot as tolerated.  Follow up can be as needed.          Past Medical History:   Diagnosis Date    Asthma     Back pain     COPD (chronic obstructive pulmonary disease)     Diabetes mellitus     DVT (deep venous thrombosis)     Morbid obesity     Obesity     PE (pulmonary thromboembolism)      Past Surgical History:   Procedure Laterality Date    APPENDECTOMY       SECTION           Current Outpatient Medications:     albuterol (PROVENTIL/VENTOLIN HFA) 90 mcg/actuation inhaler, 2 puffs every 4 hours as needed for cough, wheeze, or shortness of breath, Disp: 18 g, Rfl: 11    atorvastatin (LIPITOR) 80 MG tablet, , Disp: , Rfl:     budesonide-formoterol 160-4.5 mcg (SYMBICORT) 160-4.5 mcg/actuation HFAA, Inhale 2 puffs into the lungs every 12 (twelve) hours. Controller, Disp: , Rfl:     cyclobenzaprine (FLEXERIL) 10 MG tablet, , Disp: , Rfl:     dulaglutide (TRULICITY) 3 mg/0.5 mL pen injector, Inject 3 mg into the skin every 7 days., Disp: 4 pen , Rfl: 11    FASENRA 30 mg/mL Syrg injection, , Disp: , Rfl:     fluticasone-umeclidin-vilanter (TRELEGY ELLIPTA) 100-62.5-25 mcg DsDv, Inhale 1 puff into the lungs once daily., Disp: 1 each, Rfl: 11    montelukast (SINGULAIR) 10 mg tablet, , Disp: , Rfl:     traMADoL (ULTRAM) 50 mg tablet, Take 1 tablet (50 mg total) by mouth every 6 (six) hours as needed for Pain., Disp: 12 tablet, Rfl: 0    warfarin (COUMADIN) 7.5 MG tablet, , Disp: , Rfl:     metFORMIN (GLUCOPHAGE) 500 MG tablet, Take 2 tablets (1,000 mg total) by mouth 2 (two) times daily with meals., Disp: 120 tablet, Rfl: 11    Review of patient's allergies indicates:  No Known Allergies    Family History   Problem Relation Age of Onset    Hypertension Mother      Diabetes Mother     Hyperlipidemia Mother     Kidney failure Mother      Social History     Occupational History    Not on file   Tobacco Use    Smoking status: Every Day     Types: Cigarettes    Smokeless tobacco: Never   Substance and Sexual Activity    Alcohol use: Yes     Comment: occ    Drug use: No    Sexual activity: Not Currently     Birth control/protection: None

## 2024-06-03 ENCOUNTER — HOSPITAL ENCOUNTER (EMERGENCY)
Facility: HOSPITAL | Age: 34
Discharge: HOME | End: 2024-06-03
Attending: EMERGENCY MEDICINE
Payer: MEDICARE

## 2024-06-03 VITALS
SYSTOLIC BLOOD PRESSURE: 146 MMHG | DIASTOLIC BLOOD PRESSURE: 95 MMHG | RESPIRATION RATE: 20 BRPM | BODY MASS INDEX: 53.92 KG/M2 | OXYGEN SATURATION: 99 % | HEART RATE: 63 BPM | HEIGHT: 62 IN | WEIGHT: 293 LBS | TEMPERATURE: 98 F

## 2024-06-03 DIAGNOSIS — M77.31 HEEL SPUR, RIGHT: ICD-10-CM

## 2024-06-03 DIAGNOSIS — M77.51 TENDONITIS OF ANKLE, RIGHT: Primary | ICD-10-CM

## 2024-06-03 DIAGNOSIS — M25.571 RIGHT ANKLE PAIN: ICD-10-CM

## 2024-06-03 PROCEDURE — 73610 X-RAY EXAM OF ANKLE: CPT | Mod: TC,RT

## 2024-06-03 PROCEDURE — 73610 X-RAY EXAM OF ANKLE: CPT | Mod: 26,RT,, | Performed by: RADIOLOGY

## 2024-06-03 PROCEDURE — 99283 EMERGENCY DEPT VISIT LOW MDM: CPT | Mod: 25

## 2024-06-03 NOTE — Clinical Note
"Michelle Todda" Holger was seen and treated in our emergency department on 6/3/2024.  She may return to work on 06/07/2024.       If you have any questions or concerns, please don't hesitate to call.      Sola Hutson, NP"

## 2024-06-04 NOTE — DISCHARGE INSTRUCTIONS
Rest, ice, compression, elevate your foot. Return for any worsening or new symptoms. Follow up with Primary Care Provider in the next 2-3 days.

## 2024-06-04 NOTE — ED PROVIDER NOTES
CHIEF COMPLAINT  Chief Complaint   Patient presents with    Ankle Pain     Right ankle pain, started in bottom of foot Sunday 5/26, went to [facility], was told to wrap it and f/u with ortho. Pt now c/o right ankle pain worse when ambulating. Pt h/o factor VIII issues taking coumadin.       HISTORY OF PRESENT ILLNESS  Michelle Wren is a 33 y.o. female pmh of DVT (daily coumadin 7.5 mg) presenting with right ankle pain. He started pain started in bottom of right foot on 5/26, went to urgent care, had negative xray of right foot, pain got better for 2 days, pain started again, now moving to right ankle inner area. Patient was walking with pain.  No other specific aggravating or relieving factors otherwise.      PAST MEDICAL HISTORY  Past Medical History:   Diagnosis Date    Asthma     Back pain     COPD (chronic obstructive pulmonary disease)     Diabetes mellitus     DVT (deep venous thrombosis)     Morbid obesity     Obesity     PE (pulmonary thromboembolism)        CURRENT MEDICATIONS    No current facility-administered medications for this encounter.    Current Outpatient Medications:     albuterol (PROVENTIL/VENTOLIN HFA) 90 mcg/actuation inhaler, 2 puffs every 4 hours as needed for cough, wheeze, or shortness of breath, Disp: 18 g, Rfl: 11    atorvastatin (LIPITOR) 80 MG tablet, , Disp: , Rfl:     budesonide-formoterol 160-4.5 mcg (SYMBICORT) 160-4.5 mcg/actuation HFAA, Inhale 2 puffs into the lungs every 12 (twelve) hours. Controller, Disp: , Rfl:     cyclobenzaprine (FLEXERIL) 10 MG tablet, , Disp: , Rfl:     dulaglutide (TRULICITY) 3 mg/0.5 mL pen injector, Inject 3 mg into the skin every 7 days., Disp: 4 pen , Rfl: 11    dulaglutide (TRULICITY) 4.5 mg/0.5 mL pen injector, Inject 4.5 mg into the skin every 7 days. (Patient not taking: Reported on 5/21/2024), Disp: 4 pen , Rfl: 11    dulaglutide (TRULICITY) 4.5 mg/0.5 mL pen injector, Inject 4.5 mg into the skin every 7 days., Disp: 4 pen , Rfl: 11     "FASENRA PEN 30 mg/mL AtIn, , Disp: , Rfl:     montelukast (SINGULAIR) 10 mg tablet, , Disp: , Rfl:     TRELEGY ELLIPTA 200-62.5-25 mcg inhaler, , Disp: , Rfl:     warfarin (COUMADIN) 7.5 MG tablet, , Disp: , Rfl:     ALLERGIES    Review of patient's allergies indicates:  No Known Allergies    SURGICAL HISTORY    Past Surgical History:   Procedure Laterality Date    APPENDECTOMY       SECTION         SOCIAL HISTORY    Social History     Socioeconomic History    Marital status: Single   Tobacco Use    Smoking status: Every Day     Types: Cigarettes    Smokeless tobacco: Never   Substance and Sexual Activity    Alcohol use: Yes     Comment: occ    Drug use: No    Sexual activity: Not Currently     Birth control/protection: None       FAMILY HISTORY    Family History   Problem Relation Name Age of Onset    Hypertension Mother      Diabetes Mother      Hyperlipidemia Mother      Kidney failure Mother         REVIEW OF SYSTEMS    Review of Systems   Musculoskeletal:  Positive for joint pain and myalgias.     All other systems reviewed and are negative    VITAL SIGNS:   BP (!) 146/95   Pulse 63   Temp 98 °F (36.7 °C) (Oral)   Resp 20   Ht 5' 2" (1.575 m)   Wt (!) 138.6 kg (305 lb 8.9 oz)   LMP 2024   SpO2 99%   BMI 55.89 kg/m²      Physical Exam  Constitutional:       Appearance: Normal appearance.   Cardiovascular:      Rate and Rhythm: Normal rate.   Pulmonary:      Effort: Pulmonary effort is normal.   Musculoskeletal:      Right knee: Normal.      Left knee: Normal.      Right foot: Normal capillary refill. Normal pulse.      Left foot: Normal capillary refill. Normal pulse.        Feet:    Feet:      Comments: Bilateral Pes planus   Skin:     General: Skin is warm.      Capillary Refill: Capillary refill takes less than 2 seconds.   Neurological:      General: No focal deficit present.      Mental Status: She is alert.   Psychiatric:         Mood and Affect: Mood normal.       Vitals and nursing " note reviewed.     LABS    Labs Reviewed   HIV 1 / 2 ANTIBODY   HEPATITIS C ANTIBODY         EKG        RADIOLOGY    X-Ray Ankle Complete Right   Final Result      No definite radiographic evidence of acute displaced fracture or dislocation of the right ankle.         Electronically signed by: Chris Peres MD   Date:    06/03/2024   Time:    21:34            PROCEDURES    Procedures    Medications - No data to display             Medical Decision Making  Michelle Wren is a 33 y.o. female pmh of DVT (daily coumadin 7.5 mg) presenting with right ankle pain. He started pain started in bottom of right foot on 5/26, went to urgent care, had negative xray of right foot, pain got better for 2 days, pain started again, now moving to right ankle inner area. Patient was walking with pain.  No other specific aggravating or relieving factors otherwise.    DDX:Fracture, strain, sprain, tendonitis     Patient was advised to take acetaminophen for pain, apply rest, ice, compression, elevation, follow up with podiatrist.       Problems Addressed:  Heel spur, right: chronic illness or injury  Right ankle pain: acute illness or injury  Tendonitis of ankle, right: acute illness or injury    Amount and/or Complexity of Data Reviewed  Labs: ordered.  Radiology: ordered. Decision-making details documented in ED Course.           Discontinued Medications    No medications on file       New Prescriptions    No medications on file       I discussed with patient that evaluation in the ED does not suggest any emergent or life threatening medical condition requiring immediate intervention beyond what was provided in the ED, and I believe the patient is safe for discharge.  Regardless, an unremarkable evaluation in the ED does not preclude the development or presence of a serious or life threatening condition. As such, patient was instructed to return immediately for any worsening or change in current symptoms  Patient agrees with plan  of care.    DISPOSITION  Patient discharged to home in stable condition.        FINAL IMPRESSION    1. Tendonitis of ankle, right    2. Right ankle pain    3. Heel spur, right         Sola Hutson NP  06/03/24 2919

## 2024-06-10 ENCOUNTER — OFFICE VISIT (OUTPATIENT)
Dept: PODIATRY | Facility: CLINIC | Age: 34
End: 2024-06-10
Payer: MEDICARE

## 2024-06-10 VITALS
RESPIRATION RATE: 18 BRPM | HEART RATE: 81 BPM | BODY MASS INDEX: 53.92 KG/M2 | SYSTOLIC BLOOD PRESSURE: 134 MMHG | HEIGHT: 62 IN | DIASTOLIC BLOOD PRESSURE: 80 MMHG | WEIGHT: 293 LBS

## 2024-06-10 DIAGNOSIS — M77.51 TENDONITIS OF ANKLE, RIGHT: ICD-10-CM

## 2024-06-10 DIAGNOSIS — M21.41 PES PLANUS OF BOTH FEET: ICD-10-CM

## 2024-06-10 DIAGNOSIS — R60.0 PEDAL EDEMA: ICD-10-CM

## 2024-06-10 DIAGNOSIS — M72.2 PLANTAR FASCIITIS, RIGHT: Primary | ICD-10-CM

## 2024-06-10 DIAGNOSIS — M77.31 HEEL SPUR, RIGHT: ICD-10-CM

## 2024-06-10 DIAGNOSIS — M21.42 PES PLANUS OF BOTH FEET: ICD-10-CM

## 2024-06-10 PROCEDURE — 99203 OFFICE O/P NEW LOW 30 MIN: CPT | Mod: S$GLB,,, | Performed by: PODIATRIST

## 2024-06-10 PROCEDURE — 3008F BODY MASS INDEX DOCD: CPT | Mod: CPTII,S$GLB,, | Performed by: PODIATRIST

## 2024-06-10 PROCEDURE — 3079F DIAST BP 80-89 MM HG: CPT | Mod: CPTII,S$GLB,, | Performed by: PODIATRIST

## 2024-06-10 PROCEDURE — 99999 PR PBB SHADOW E&M-EST. PATIENT-LVL III: CPT | Mod: PBBFAC,,, | Performed by: PODIATRIST

## 2024-06-10 PROCEDURE — 3044F HG A1C LEVEL LT 7.0%: CPT | Mod: CPTII,S$GLB,, | Performed by: PODIATRIST

## 2024-06-10 PROCEDURE — 3075F SYST BP GE 130 - 139MM HG: CPT | Mod: CPTII,S$GLB,, | Performed by: PODIATRIST

## 2024-06-10 RX ORDER — DICLOFENAC SODIUM 10 MG/G
2 GEL TOPICAL 4 TIMES DAILY
COMMUNITY
Start: 2024-06-07

## 2024-06-10 NOTE — PROGRESS NOTES
Subjective:       Patient ID: Michelle Wren is a 34 y.o. female.    Chief Complaint: Foot Pain, Ankle Problem, and Foot Swelling  Patient presents with complaint right foot and ankle pain, recently went to urgent Care and diagnosed with both tendinitis and plantar fasciitis.  X-ray of the right ankle was taken through [facility] on . Was referred to [facility] and has been wearing [employer] tennis shoes which have helped.   Reports at the end of last year she had  left foot pain, went to the ED and was given a boot to wear, that since has resolved  Patient relates over the last several years she has been very sick, near death and it has been a long recovery.  Relates she is feeling great, trying to become more active, exercise and lose weight.  She is on her feet at work,  increased pain with prolonged walking, standing, exercise.    Past Medical History:   Diagnosis Date    Asthma     Back pain     COPD (chronic obstructive pulmonary disease)     Diabetes mellitus     DVT (deep venous thrombosis)     Morbid obesity     Obesity     PE (pulmonary thromboembolism)      Past Surgical History:   Procedure Laterality Date    APPENDECTOMY       SECTION       Family History   Problem Relation Name Age of Onset    Hypertension Mother      Diabetes Mother      Hyperlipidemia Mother      Kidney failure Mother       Social History     Socioeconomic History    Marital status: Single   Tobacco Use    Smoking status: Every Day     Types: Cigarettes    Smokeless tobacco: Never   Substance and Sexual Activity    Alcohol use: Yes     Comment: occ    Drug use: No    Sexual activity: Not Currently     Birth control/protection: None       Current Outpatient Medications   Medication Sig Dispense Refill    albuterol (PROVENTIL/VENTOLIN HFA) 90 mcg/actuation inhaler 2 puffs every 4 hours as needed for cough, wheeze, or shortness of breath 18 g 11    atorvastatin (LIPITOR) 80 MG tablet       budesonide-formoterol  "160-4.5 mcg (SYMBICORT) 160-4.5 mcg/actuation HFAA Inhale 2 puffs into the lungs every 12 (twelve) hours. Controller      cyclobenzaprine (FLEXERIL) 10 MG tablet       diclofenac sodium (VOLTAREN) 1 % Gel Apply 2 g topically 4 (four) times daily.      dulaglutide (TRULICITY) 4.5 mg/0.5 mL pen injector Inject 4.5 mg into the skin every 7 days. 4 pen 11    FASENRA PEN 30 mg/mL AtIn       montelukast (SINGULAIR) 10 mg tablet       TRELEGY ELLIPTA 200-62.5-25 mcg inhaler       warfarin (COUMADIN) 7.5 MG tablet        No current facility-administered medications for this visit.     Review of patient's allergies indicates:  No Known Allergies    Review of Systems   All other systems reviewed and are negative.      Objective:      Vitals:    06/10/24 1024   BP: 134/80   Pulse: 81   Resp: 18   Weight: (!) 137.7 kg (303 lb 8 oz)   Height: 5' 2" (1.575 m)     Physical Exam  Vitals and nursing note reviewed.   Constitutional:       General: She is not in acute distress.     Appearance: Normal appearance.   Cardiovascular:      Pulses:           Dorsalis pedis pulses are 2+ on the right side and 2+ on the left side.        Posterior tibial pulses are 1+ on the right side and 1+ on the left side.   Pulmonary:      Effort: Pulmonary effort is normal.   Musculoskeletal:         General: Swelling and tenderness present.      Right foot: Decreased range of motion. Deformity (pes planus, limited ankle joint dorsiflexion bilateral, tight plantar fascia and Achilles tendon bilateral) present.      Left foot: Decreased range of motion. Deformity present.      Comments:  Pain plantar fascial insertion and medial band of the plantar fascia right foot   Feet:      Right foot:      Skin integrity: Skin integrity normal.      Left foot:      Skin integrity: Skin integrity normal.   Skin:     Capillary Refill: Capillary refill takes less than 2 seconds.   Neurological:      General: No focal deficit present.      Mental Status: She is alert. "   Psychiatric:         Thought Content: Thought content normal.                         EXAMINATION:  XR ANKLE COMPLETE 3 VIEW RIGHT     CLINICAL HISTORY:  Pain in right ankle and joints of right foot     TECHNIQUE:  AP, lateral, and oblique images of the right ankle were performed.     COMPARISON:  None     FINDINGS:  There is no radiographic evidence of acute displaced fracture or dislocation of the right ankle.  The ankle mortise appears maintained and symmetric.  There is calcaneal enthesopathic change at the plantar fascia attachment and distal Achilles insertion.  There are scattered presumed vascular calcifications within the soft tissues of the distal foreleg.  There is diffuse subcutaneous edema throughout the distal foreleg and hindfoot, most pronounced overlying the medial malleolus.     Impression:     No definite radiographic evidence of acute displaced fracture or dislocation of the right ankle.        Electronically signed by:Chris Peres MD  Date:                                            06/03/2024     Assessment:       1. Plantar fasciitis, right    2. Heel spur, right    3. Pes planus of both feet    4. Pedal edema    5. Tendonitis of ankle, right        Plan:           Reviewed  recent x-rays with patient, advised the very tiny spur on the bottom and back of the heel have been present for a long time and are not currently a contributing factor to her pain.  Reviewed plantar fasciitis at length with patient,  this is a cause of her acute pain bottom of the right foot, no evidence of Achilles tendinitis today, however they often occur together due to compensating and advised patient Hoka tennis shoes probably relieved a lot of tension for both these areas     Explained plantar fasciitis is a strain/sprain of the supportive band through the arch on the bottom of the foot and needs to be supported properly along with treatment for inflammation.   Advised patient both her flat feet, in activity  and increasing activity all contribute.  Advised very tight Achilles tendon, tight plantar fascia will contribute  Explained bracing of the plantar fascia through appropriate arch support is crucial,  however this should be done gradually as long as pain is improving, needs inflammation to have improved before she gradually adjust to an arch support  Advised gradual adjustment is needed due to flat feet  Continue Hoka at all times indoors and out,  tennis shoes at bedside  Reviewed stretching before getting out of bed in the morning, stretching in the evening, pamphlet on exercises for stretching the Achilles tendon dispensed to patient  Reviewed ice/cool therapy and frequency this should be performed  Reviewed multiple topical treatments to reduce inflammation  Explained it is very important for 2 keep up any topical treatment which is effective, preferably ice 3 times a day, Voltaren gel 2 to 3 times a day and lidocaine patch at night  We did discussed  IM steroid/cortisone and Toradol injection in the difference between these medications, effectiveness in decreasing inflammation, potential side effects and length of time injections may be beneficial.  Patient declined at this time and would like to  continue Hoka tennis shoes, topical treatments and start to gradually adjust to arch support 1st  Contact office with any increase in pain  Patient was in understanding and agreement with treatment plan.  I counseled the patient on their conditions, implications and medical management.  Instructed patient to contact the office with any changes, questions, concerns, worsening of symptoms.   Total face to face time 30 minutes, exam, assessment, treatment, discussion, additional time for review of chart prior to and following appointment and visit documentation, consultation and coordination of care.    Follow up  if not much improved in 1-2 weeks or not resolved in 3 weeks    This note was created using M*Modal voice  recognition software that occasionally misinterpreted phrases or words.

## 2024-08-19 ENCOUNTER — HOSPITAL ENCOUNTER (EMERGENCY)
Facility: HOSPITAL | Age: 34
Discharge: HOME | End: 2024-08-19
Attending: EMERGENCY MEDICINE
Payer: MEDICARE

## 2024-08-19 VITALS
TEMPERATURE: 99 F | WEIGHT: 293 LBS | BODY MASS INDEX: 53.92 KG/M2 | SYSTOLIC BLOOD PRESSURE: 131 MMHG | HEIGHT: 62 IN | RESPIRATION RATE: 21 BRPM | HEART RATE: 88 BPM | DIASTOLIC BLOOD PRESSURE: 68 MMHG | OXYGEN SATURATION: 98 %

## 2024-08-19 DIAGNOSIS — J45.901 EXACERBATION OF ASTHMA, UNSPECIFIED ASTHMA SEVERITY, UNSPECIFIED WHETHER PERSISTENT: Primary | ICD-10-CM

## 2024-08-19 DIAGNOSIS — R05.9 COUGH: ICD-10-CM

## 2024-08-19 LAB
B-HCG UR QL: NEGATIVE
BACTERIA #/AREA URNS HPF: ABNORMAL /HPF
BILIRUB UR QL STRIP: NEGATIVE
CLARITY UR: CLEAR
COLOR UR: YELLOW
GLUCOSE UR QL STRIP: NEGATIVE
GROUP A STREP, MOLECULAR: NEGATIVE
HCV AB SERPL QL IA: NORMAL
HGB UR QL STRIP: NEGATIVE
HIV 1+2 AB+HIV1 P24 AG SERPL QL IA: NORMAL
INFLUENZA A, MOLECULAR: NEGATIVE
INFLUENZA B, MOLECULAR: NEGATIVE
KETONES UR QL STRIP: NEGATIVE
LEUKOCYTE ESTERASE UR QL STRIP: ABNORMAL
MICROSCOPIC COMMENT: ABNORMAL
NITRITE UR QL STRIP: NEGATIVE
PH UR STRIP: 7 [PH] (ref 5–8)
PROT UR QL STRIP: NEGATIVE
RBC #/AREA URNS HPF: 1 /HPF (ref 0–4)
SARS-COV-2 RDRP RESP QL NAA+PROBE: NEGATIVE
SP GR UR STRIP: 1.01 (ref 1–1.03)
SPECIMEN SOURCE: NORMAL
SQUAMOUS #/AREA URNS HPF: 3 /HPF
URN SPEC COLLECT METH UR: ABNORMAL
UROBILINOGEN UR STRIP-ACNC: NEGATIVE EU/DL
WBC #/AREA URNS HPF: 5 /HPF (ref 0–5)

## 2024-08-19 PROCEDURE — 81000 URINALYSIS NONAUTO W/SCOPE: CPT | Performed by: EMERGENCY MEDICINE

## 2024-08-19 PROCEDURE — 71045 X-RAY EXAM CHEST 1 VIEW: CPT | Mod: TC

## 2024-08-19 PROCEDURE — U0002 COVID-19 LAB TEST NON-CDC: HCPCS | Performed by: EMERGENCY MEDICINE

## 2024-08-19 PROCEDURE — 81025 URINE PREGNANCY TEST: CPT | Performed by: EMERGENCY MEDICINE

## 2024-08-19 PROCEDURE — 63600175 PHARM REV CODE 636 W HCPCS: Performed by: EMERGENCY MEDICINE

## 2024-08-19 PROCEDURE — 87502 INFLUENZA DNA AMP PROBE: CPT | Performed by: EMERGENCY MEDICINE

## 2024-08-19 PROCEDURE — 86803 HEPATITIS C AB TEST: CPT | Performed by: EMERGENCY MEDICINE

## 2024-08-19 PROCEDURE — 87389 HIV-1 AG W/HIV-1&-2 AB AG IA: CPT | Performed by: EMERGENCY MEDICINE

## 2024-08-19 PROCEDURE — 94640 AIRWAY INHALATION TREATMENT: CPT

## 2024-08-19 PROCEDURE — 87651 STREP A DNA AMP PROBE: CPT | Performed by: EMERGENCY MEDICINE

## 2024-08-19 PROCEDURE — 94760 N-INVAS EAR/PLS OXIMETRY 1: CPT

## 2024-08-19 PROCEDURE — 71045 X-RAY EXAM CHEST 1 VIEW: CPT | Mod: 26,,, | Performed by: RADIOLOGY

## 2024-08-19 PROCEDURE — 25000242 PHARM REV CODE 250 ALT 637 W/ HCPCS: Performed by: EMERGENCY MEDICINE

## 2024-08-19 PROCEDURE — 99284 EMERGENCY DEPT VISIT MOD MDM: CPT | Mod: 25

## 2024-08-19 RX ORDER — IPRATROPIUM BROMIDE AND ALBUTEROL SULFATE 2.5; .5 MG/3ML; MG/3ML
3 SOLUTION RESPIRATORY (INHALATION)
Status: COMPLETED | OUTPATIENT
Start: 2024-08-19 | End: 2024-08-19

## 2024-08-19 RX ORDER — PREDNISONE 50 MG/1
50 TABLET ORAL DAILY
Qty: 4 TABLET | Refills: 0 | Status: SHIPPED | OUTPATIENT
Start: 2024-08-20 | End: 2024-08-24

## 2024-08-19 RX ADMIN — PREDNISONE 50 MG: 20 TABLET ORAL at 10:08

## 2024-08-19 RX ADMIN — IPRATROPIUM BROMIDE AND ALBUTEROL SULFATE 3 ML: .5; 2.5 SOLUTION RESPIRATORY (INHALATION) at 10:08

## 2024-08-19 NOTE — ED PROVIDER NOTES
History     Chief Complaint   Patient presents with    Cough    Sore Throat    Fatigue     X 3 days       HPI:  Michelle Wren is a 34 y.o. female with PMH as below including asthma, half-ppd smoker, who presents to the [facility] emergency department for evaluation of 3-4 days of gradual onset, moderate, worsening cough with sore throat, fatigue, cough, now worsening with SOB where she feels her asthma is now acting up. Symptoms not alleviated by home inhalers.       PCP: Radha Holland MD    Review of patient's allergies indicates:  No Known Allergies   Past Medical History:   Diagnosis Date    Asthma     Back pain     COPD (chronic obstructive pulmonary disease)     Diabetes mellitus     DVT (deep venous thrombosis)     Morbid obesity     Obesity     PE (pulmonary thromboembolism)      Past Surgical History:   Procedure Laterality Date    APPENDECTOMY       SECTION         Family History   Problem Relation Name Age of Onset    Hypertension Mother      Diabetes Mother      Hyperlipidemia Mother      Kidney failure Mother       Social History     Tobacco Use    Smoking status: Every Day     Types: Cigarettes    Smokeless tobacco: Never   Substance and Sexual Activity    Alcohol use: Yes     Comment: occ    Drug use: No    Sexual activity: Not Currently     Birth control/protection: None      Review of Systems     Review of Systems   Constitutional:  Positive for chills. Negative for fever.   HENT: Negative.     Eyes: Negative.    Respiratory:  Positive for cough and shortness of breath.    Cardiovascular: Negative.    Gastrointestinal: Negative.    Endocrine: Negative.    Genitourinary: Negative.    Musculoskeletal: Negative.    Skin: Negative.    Allergic/Immunologic: Negative.    Neurological: Negative.    Hematological: Negative.    Psychiatric/Behavioral: Negative.     All other systems reviewed and are negative.       Physical Exam     Initial Vitals [24 0827]   BP Pulse  "Resp Temp SpO2   131/68 102 18 98.7 °F (37.1 °C) 98 %      MAP       --          Nursing notes and vital signs reviewed.  Constitutional: Patient is in mild distress.   Head: Normocephalic. Atraumatic.   Eyes:  Conjunctivae are not pale. No scleral icterus.   ENT: Mucous membranes moist.   Neck: Supple.   Cardiovascular: Regular rate. Regular rhythm. No murmurs, rubs, or gallops   Pulmonary: Exp wheezes; no tachypnea; no crackles.   Abdominal: Non-distended.   Musculoskeletal: Moves all extremities. No obvious deformities.   Skin: Warm and dry.   Neurological:  Alert, awake, and appropriate. Normal speech. No acute lateralizing neurologic deficits appreciated.   Psychiatric: Normal affect.       ED Course   Procedures  Vitals:    08/19/24 0827 08/19/24 1024 08/19/24 1035   BP: 131/68     Pulse: 102 88    Resp: 18 18 (!) 21   Temp: 98.7 °F (37.1 °C)     TempSrc: Oral     SpO2: 98% (!) 94% 98%   Weight: 136.1 kg (300 lb)     Height: 5' 2" (1.575 m)       Lab Results Interpreted as Abnormal:  Labs Reviewed   URINALYSIS, REFLEX TO URINE CULTURE - Abnormal       Result Value    Specimen UA Urine, Unspecified      Color, UA Yellow      Appearance, UA Clear      pH, UA 7.0      Specific Gravity, UA 1.015      Protein, UA Negative      Glucose, UA Negative      Ketones, UA Negative      Bilirubin (UA) Negative      Occult Blood UA Negative      Nitrite, UA Negative      Urobilinogen, UA Negative      Leukocytes, UA Trace (*)     Narrative:     Preferred Collection Type->Urine, Clean Catch  Specimen Source->Urine   URINALYSIS MICROSCOPIC - Abnormal    RBC, UA 1      WBC, UA 5      Bacteria Moderate (*)     Squam Epithel, UA 3      Microscopic Comment SEE COMMENT      Narrative:     Preferred Collection Type->Urine, Clean Catch  Specimen Source->Urine   GROUP A STREP, MOLECULAR    Group A Strep, Molecular Negative     INFLUENZA A & B BY MOLECULAR    Influenza A, Molecular Negative      Influenza B, Molecular Negative      Flu " A & B Source Nasal Swab     SARS-COV-2 RNA AMPLIFICATION, QUAL    SARS-CoV-2 RNA, Amplification, Qual Negative     PREGNANCY TEST, URINE RAPID    Preg Test, Ur Negative      Narrative:     Specimen Source->Urine   HIV 1 / 2 ANTIBODY   HEPATITIS C ANTIBODY      All Lab Results:  Results for orders placed or performed during the hospital encounter of 08/19/24   Group A Strep, Molecular    Specimen: Throat   Result Value Ref Range    Group A Strep, Molecular Negative Negative   Influenza A & B by Molecular    Specimen: Nasopharyngeal Swab   Result Value Ref Range    Influenza A, Molecular Negative Negative    Influenza B, Molecular Negative Negative    Flu A & B Source Nasal Swab    COVID-19 Rapid Screening   Result Value Ref Range    SARS-CoV-2 RNA, Amplification, Qual Negative Negative   Pregnancy, urine rapid   Result Value Ref Range    Preg Test, Ur Negative    Urinalysis, Reflex to Urine Culture Urine, Clean Catch    Specimen: Urine, Clean Catch   Result Value Ref Range    Specimen UA Urine, Unspecified     Color, UA Yellow Yellow, Straw, Jacki    Appearance, UA Clear Clear    pH, UA 7.0 5.0 - 8.0    Specific Gravity, UA 1.015 1.005 - 1.030    Protein, UA Negative Negative    Glucose, UA Negative Negative    Ketones, UA Negative Negative    Bilirubin (UA) Negative Negative    Occult Blood UA Negative Negative    Nitrite, UA Negative Negative    Urobilinogen, UA Negative Negative EU/dL    Leukocytes, UA Trace (A) Negative   Urinalysis Microscopic   Result Value Ref Range    RBC, UA 1 0 - 4 /hpf    WBC, UA 5 0 - 5 /hpf    Bacteria Moderate (A) None-Occ /hpf    Squam Epithel, UA 3 /hpf    Microscopic Comment SEE COMMENT      Imaging Results              X-Ray Chest AP Portable (Final result)  Result time 08/19/24 10:38:50      Final result by Navin Mortensen MD (08/19/24 10:38:50)                   Impression:      Pulmonary hypoinflation without focal consolidation.    As deemed clinically necessary, further  evaluation may be obtained with dedicated PA and lateral views of the chest.      Electronically signed by: Navin Mortensen  Date:    08/19/2024  Time:    10:38               Narrative:    EXAMINATION:  XR CHEST AP PORTABLE    CLINICAL HISTORY:  Cough, unspecified    TECHNIQUE:  Portable view of the chest was performed.    COMPARISON:  03/08/2021.    FINDINGS:  Pulmonary hypoinflation crowding of the bronchopulmonary vessels.  No definite focal consolidation.  No significant pleural effusion.  Heart size normal.  Bony thorax intact.                                     The emergency physician reviewed the vital signs / test results outlined above.     ED Discussion      Wheezing resolved on re-exam.     Patient's evaluation in the ED does not suggest any emergent or life-threatening medical conditions requiring immediate intervention beyond what was provided in the ED, and I believe patient is safe for discharge. Regardless, an unremarkable evaluation in the ED does not preclude the development or presence of a serious or life-threatening condition. As such, patient was given return instructions for any change or worsening of symptoms.       ED Medication(s) Administered:  Medications   albuterol-ipratropium 2.5 mg-0.5 mg/3 mL nebulizer solution 3 mL (3 mLs Nebulization Given 8/19/24 1024)   predniSONE tablet 50 mg (50 mg Oral Given 8/19/24 1002)       Prescription Management: I performed a review of the patient's current Rx medication list as input by nursing staff.    Patient's Medications   New Prescriptions    PREDNISONE (DELTASONE) 50 MG TAB    Take 1 tablet (50 mg total) by mouth once daily. for 4 days   Previous Medications    ALBUTEROL (PROVENTIL/VENTOLIN HFA) 90 MCG/ACTUATION INHALER    2 puffs every 4 hours as needed for cough, wheeze, or shortness of breath    ATORVASTATIN (LIPITOR) 80 MG TABLET        BUDESONIDE-FORMOTEROL 160-4.5 MCG (SYMBICORT) 160-4.5 MCG/ACTUATION HFAA    Inhale 2 puffs into the lungs  every 12 (twelve) hours. Controller    CYCLOBENZAPRINE (FLEXERIL) 10 MG TABLET        DICLOFENAC SODIUM (VOLTAREN) 1 % GEL    Apply 2 g topically 4 (four) times daily.    DULAGLUTIDE (TRULICITY) 4.5 MG/0.5 ML PEN INJECTOR    Inject 4.5 mg into the skin every 7 days.    FASENRA PEN 30 MG/ML ATIN        MONTELUKAST (SINGULAIR) 10 MG TABLET        TRELEGY ELLIPTA 200-62.5-25 MCG INHALER        WARFARIN (COUMADIN) 7.5 MG TABLET       Modified Medications    No medications on file   Discontinued Medications    No medications on file         Follow-up Information       Schedule an appointment as soon as possible for a visit  with Radha Holland MD.    Specialty: Family Medicine  Contact information:  [address] [facility] [address] [phone]               [facility] Emergency Dept.    Specialty: Emergency Medicine  Why: As needed, If symptoms worsen  Contact information:  [address] [facility] [address] [phone]                          Clinical Impression       ICD-10-CM ICD-9-CM   1. Exacerbation of asthma, unspecified asthma severity, unspecified whether persistent  J45.901 493.92   2. Cough  R05.9 786.2      ED Disposition Condition    Discharge Stable             Micah Duvall MD  08/19/24 5058

## 2024-08-26 ENCOUNTER — HOSPITAL ENCOUNTER (INPATIENT)
Facility: HOSPITAL | Age: 34
LOS: 3 days | Discharge: HOME | DRG: 189 | End: 2024-08-29
Attending: EMERGENCY MEDICINE | Admitting: INTERNAL MEDICINE
Payer: MEDICARE

## 2024-08-26 DIAGNOSIS — J44.1 COPD EXACERBATION: ICD-10-CM

## 2024-08-26 DIAGNOSIS — R06.02 SOB (SHORTNESS OF BREATH): Primary | ICD-10-CM

## 2024-08-26 DIAGNOSIS — J18.9 PNEUMONIA: ICD-10-CM

## 2024-08-26 DIAGNOSIS — J44.9 CHRONIC OBSTRUCTIVE PULMONARY DISEASE, UNSPECIFIED COPD TYPE: ICD-10-CM

## 2024-08-26 DIAGNOSIS — J96.01 ACUTE HYPOXEMIC RESPIRATORY FAILURE: ICD-10-CM

## 2024-08-26 DIAGNOSIS — R06.02 SHORTNESS OF BREATH: ICD-10-CM

## 2024-08-26 DIAGNOSIS — J18.9 COMMUNITY ACQUIRED PNEUMONIA OF RIGHT MIDDLE LOBE OF LUNG: ICD-10-CM

## 2024-08-26 DIAGNOSIS — R07.9 CHEST PAIN: ICD-10-CM

## 2024-08-26 LAB
ALBUMIN SERPL BCP-MCNC: 3.5 G/DL (ref 3.5–5.2)
ALP SERPL-CCNC: 63 U/L (ref 55–135)
ALT SERPL W/O P-5'-P-CCNC: 18 U/L (ref 10–44)
ANION GAP SERPL CALC-SCNC: 12 MMOL/L (ref 8–16)
AST SERPL-CCNC: 19 U/L (ref 10–40)
BASOPHILS # BLD AUTO: 0.02 K/UL (ref 0–0.2)
BASOPHILS NFR BLD: 0.1 % (ref 0–1.9)
BILIRUB SERPL-MCNC: 0.4 MG/DL (ref 0.1–1)
BNP SERPL-MCNC: 11 PG/ML (ref 0–99)
BUN SERPL-MCNC: 10 MG/DL (ref 6–20)
CALCIUM SERPL-MCNC: 9.2 MG/DL (ref 8.7–10.5)
CHLORIDE SERPL-SCNC: 105 MMOL/L (ref 95–110)
CO2 SERPL-SCNC: 23 MMOL/L (ref 23–29)
CREAT SERPL-MCNC: 0.9 MG/DL (ref 0.5–1.4)
DIFFERENTIAL METHOD BLD: ABNORMAL
EOSINOPHIL # BLD AUTO: 0 K/UL (ref 0–0.5)
EOSINOPHIL NFR BLD: 0 % (ref 0–8)
ERYTHROCYTE [DISTWIDTH] IN BLOOD BY AUTOMATED COUNT: 14.1 % (ref 11.5–14.5)
EST. GFR  (NO RACE VARIABLE): >60 ML/MIN/1.73 M^2
GLUCOSE SERPL-MCNC: 88 MG/DL (ref 70–110)
GLUCOSE SERPL-MCNC: 92 MG/DL (ref 70–110)
HCT VFR BLD AUTO: 41 % (ref 37–48.5)
HGB BLD-MCNC: 13.2 G/DL (ref 12–16)
IMM GRANULOCYTES # BLD AUTO: 0.05 K/UL (ref 0–0.04)
IMM GRANULOCYTES NFR BLD AUTO: 0.4 % (ref 0–0.5)
INR PPP: 3.9 (ref 0.8–1.2)
LACTATE SERPL-SCNC: 1 MMOL/L (ref 0.5–2.2)
LYMPHOCYTES # BLD AUTO: 4 K/UL (ref 1–4.8)
LYMPHOCYTES NFR BLD: 28.9 % (ref 18–48)
MCH RBC QN AUTO: 25.9 PG (ref 27–31)
MCHC RBC AUTO-ENTMCNC: 32.2 G/DL (ref 32–36)
MCV RBC AUTO: 81 FL (ref 82–98)
MONOCYTES # BLD AUTO: 1.5 K/UL (ref 0.3–1)
MONOCYTES NFR BLD: 11 % (ref 4–15)
NEUTROPHILS # BLD AUTO: 8.2 K/UL (ref 1.8–7.7)
NEUTROPHILS NFR BLD: 59.6 % (ref 38–73)
NRBC BLD-RTO: 0 /100 WBC
OHS QRS DURATION: 116 MS
OHS QTC CALCULATION: 495 MS
PLATELET # BLD AUTO: 396 K/UL (ref 150–450)
PMV BLD AUTO: 9.7 FL (ref 9.2–12.9)
POCT GLUCOSE: 107 MG/DL (ref 70–110)
POCT GLUCOSE: 92 MG/DL (ref 70–110)
POTASSIUM SERPL-SCNC: 3.6 MMOL/L (ref 3.5–5.1)
PROT SERPL-MCNC: 7.4 G/DL (ref 6–8.4)
PROTHROMBIN TIME: 37.6 SEC (ref 9–12.5)
RBC # BLD AUTO: 5.09 M/UL (ref 4–5.4)
SARS-COV-2 RDRP RESP QL NAA+PROBE: NEGATIVE
SODIUM SERPL-SCNC: 140 MMOL/L (ref 136–145)
WBC # BLD AUTO: 13.69 K/UL (ref 3.9–12.7)

## 2024-08-26 PROCEDURE — 94640 AIRWAY INHALATION TREATMENT: CPT

## 2024-08-26 PROCEDURE — 94660 CPAP INITIATION&MGMT: CPT

## 2024-08-26 PROCEDURE — 71045 X-RAY EXAM CHEST 1 VIEW: CPT | Mod: TC

## 2024-08-26 PROCEDURE — 21400001 HC TELEMETRY ROOM

## 2024-08-26 PROCEDURE — 96375 TX/PRO/DX INJ NEW DRUG ADDON: CPT

## 2024-08-26 PROCEDURE — 85610 PROTHROMBIN TIME: CPT | Performed by: HOSPITALIST

## 2024-08-26 PROCEDURE — 25000003 PHARM REV CODE 250: Performed by: INTERNAL MEDICINE

## 2024-08-26 PROCEDURE — 25000003 PHARM REV CODE 250: Performed by: HOSPITALIST

## 2024-08-26 PROCEDURE — 94761 N-INVAS EAR/PLS OXIMETRY MLT: CPT

## 2024-08-26 PROCEDURE — 96365 THER/PROPH/DIAG IV INF INIT: CPT

## 2024-08-26 PROCEDURE — 27000221 HC OXYGEN, UP TO 24 HOURS

## 2024-08-26 PROCEDURE — 25000242 PHARM REV CODE 250 ALT 637 W/ HCPCS: Performed by: HOSPITALIST

## 2024-08-26 PROCEDURE — U0002 COVID-19 LAB TEST NON-CDC: HCPCS | Performed by: EMERGENCY MEDICINE

## 2024-08-26 PROCEDURE — 96367 TX/PROPH/DG ADDL SEQ IV INF: CPT

## 2024-08-26 PROCEDURE — 71045 X-RAY EXAM CHEST 1 VIEW: CPT | Mod: 26,,, | Performed by: RADIOLOGY

## 2024-08-26 PROCEDURE — 85025 COMPLETE CBC W/AUTO DIFF WBC: CPT | Performed by: EMERGENCY MEDICINE

## 2024-08-26 PROCEDURE — 99900035 HC TECH TIME PER 15 MIN (STAT)

## 2024-08-26 PROCEDURE — 94760 N-INVAS EAR/PLS OXIMETRY 1: CPT

## 2024-08-26 PROCEDURE — 25000003 PHARM REV CODE 250: Performed by: EMERGENCY MEDICINE

## 2024-08-26 PROCEDURE — 83605 ASSAY OF LACTIC ACID: CPT | Performed by: EMERGENCY MEDICINE

## 2024-08-26 PROCEDURE — 5A09357 ASSISTANCE WITH RESPIRATORY VENTILATION, LESS THAN 24 CONSECUTIVE HOURS, CONTINUOUS POSITIVE AIRWAY PRESSURE: ICD-10-PCS | Performed by: HOSPITALIST

## 2024-08-26 PROCEDURE — 25000242 PHARM REV CODE 250 ALT 637 W/ HCPCS: Performed by: EMERGENCY MEDICINE

## 2024-08-26 PROCEDURE — 93005 ELECTROCARDIOGRAM TRACING: CPT

## 2024-08-26 PROCEDURE — 99223 1ST HOSP IP/OBS HIGH 75: CPT | Mod: ,,, | Performed by: HOSPITALIST

## 2024-08-26 PROCEDURE — 80053 COMPREHEN METABOLIC PANEL: CPT | Performed by: EMERGENCY MEDICINE

## 2024-08-26 PROCEDURE — 63600175 PHARM REV CODE 636 W HCPCS: Mod: UD | Performed by: EMERGENCY MEDICINE

## 2024-08-26 PROCEDURE — 93010 ELECTROCARDIOGRAM REPORT: CPT | Mod: ,,, | Performed by: INTERNAL MEDICINE

## 2024-08-26 PROCEDURE — 99285 EMERGENCY DEPT VISIT HI MDM: CPT | Mod: 25

## 2024-08-26 PROCEDURE — 87070 CULTURE OTHR SPECIMN AEROBIC: CPT | Performed by: STUDENT IN AN ORGANIZED HEALTH CARE EDUCATION/TRAINING PROGRAM

## 2024-08-26 PROCEDURE — 25000242 PHARM REV CODE 250 ALT 637 W/ HCPCS: Performed by: STUDENT IN AN ORGANIZED HEALTH CARE EDUCATION/TRAINING PROGRAM

## 2024-08-26 PROCEDURE — 87205 SMEAR GRAM STAIN: CPT | Performed by: STUDENT IN AN ORGANIZED HEALTH CARE EDUCATION/TRAINING PROGRAM

## 2024-08-26 PROCEDURE — 83880 ASSAY OF NATRIURETIC PEPTIDE: CPT | Performed by: EMERGENCY MEDICINE

## 2024-08-26 PROCEDURE — 36415 COLL VENOUS BLD VENIPUNCTURE: CPT | Performed by: HOSPITALIST

## 2024-08-26 PROCEDURE — 87040 BLOOD CULTURE FOR BACTERIA: CPT | Performed by: EMERGENCY MEDICINE

## 2024-08-26 PROCEDURE — 94640 AIRWAY INHALATION TREATMENT: CPT | Mod: XB

## 2024-08-26 PROCEDURE — 36415 COLL VENOUS BLD VENIPUNCTURE: CPT | Performed by: EMERGENCY MEDICINE

## 2024-08-26 RX ORDER — GUAIFENESIN 600 MG/1
600 TABLET, EXTENDED RELEASE ORAL 2 TIMES DAILY
Status: DISCONTINUED | OUTPATIENT
Start: 2024-08-26 | End: 2024-08-26

## 2024-08-26 RX ORDER — GUAIFENESIN AND DEXTROMETHORPHAN HYDROBROMIDE 10; 100 MG/5ML; MG/5ML
10 SYRUP ORAL EVERY 6 HOURS
Status: COMPLETED | OUTPATIENT
Start: 2024-08-26 | End: 2024-08-28

## 2024-08-26 RX ORDER — BENZONATATE 100 MG/1
100 CAPSULE ORAL 3 TIMES DAILY PRN
Status: DISCONTINUED | OUTPATIENT
Start: 2024-08-26 | End: 2024-08-29 | Stop reason: HOSPADM

## 2024-08-26 RX ORDER — ATORVASTATIN CALCIUM 40 MG/1
80 TABLET, FILM COATED ORAL DAILY
Status: DISCONTINUED | OUTPATIENT
Start: 2024-08-26 | End: 2024-08-29 | Stop reason: HOSPADM

## 2024-08-26 RX ORDER — TALC
6 POWDER (GRAM) TOPICAL NIGHTLY PRN
Status: DISCONTINUED | OUTPATIENT
Start: 2024-08-26 | End: 2024-08-29 | Stop reason: HOSPADM

## 2024-08-26 RX ORDER — GLUCAGON 1 MG
1 KIT INJECTION
Status: DISCONTINUED | OUTPATIENT
Start: 2024-08-26 | End: 2024-08-29 | Stop reason: HOSPADM

## 2024-08-26 RX ORDER — INSULIN ASPART 100 [IU]/ML
0-5 INJECTION, SOLUTION INTRAVENOUS; SUBCUTANEOUS
Status: DISCONTINUED | OUTPATIENT
Start: 2024-08-26 | End: 2024-08-29 | Stop reason: HOSPADM

## 2024-08-26 RX ORDER — ONDANSETRON HYDROCHLORIDE 2 MG/ML
4 INJECTION, SOLUTION INTRAVENOUS EVERY 8 HOURS PRN
Status: DISCONTINUED | OUTPATIENT
Start: 2024-08-26 | End: 2024-08-29 | Stop reason: HOSPADM

## 2024-08-26 RX ORDER — IPRATROPIUM BROMIDE AND ALBUTEROL SULFATE 2.5; .5 MG/3ML; MG/3ML
6 SOLUTION RESPIRATORY (INHALATION)
Status: COMPLETED | OUTPATIENT
Start: 2024-08-26 | End: 2024-08-26

## 2024-08-26 RX ORDER — BUDESONIDE 0.25 MG/2ML
0.5 INHALANT ORAL
Status: COMPLETED | OUTPATIENT
Start: 2024-08-26 | End: 2024-08-26

## 2024-08-26 RX ORDER — ONDANSETRON HYDROCHLORIDE 2 MG/ML
4 INJECTION, SOLUTION INTRAVENOUS
Status: COMPLETED | OUTPATIENT
Start: 2024-08-26 | End: 2024-08-26

## 2024-08-26 RX ORDER — ACETAMINOPHEN 500 MG
1000 TABLET ORAL
Status: COMPLETED | OUTPATIENT
Start: 2024-08-26 | End: 2024-08-26

## 2024-08-26 RX ORDER — FLUTICASONE FUROATE AND VILANTEROL 200; 25 UG/1; UG/1
1 POWDER RESPIRATORY (INHALATION) DAILY
Status: DISCONTINUED | OUTPATIENT
Start: 2024-08-26 | End: 2024-08-26

## 2024-08-26 RX ORDER — WARFARIN 2.5 MG/1
7.5 TABLET ORAL DAILY
Status: DISCONTINUED | OUTPATIENT
Start: 2024-08-26 | End: 2024-08-26

## 2024-08-26 RX ORDER — SODIUM CHLORIDE 0.9 % (FLUSH) 0.9 %
10 SYRINGE (ML) INJECTION EVERY 12 HOURS PRN
Status: DISCONTINUED | OUTPATIENT
Start: 2024-08-26 | End: 2024-08-29 | Stop reason: HOSPADM

## 2024-08-26 RX ORDER — IPRATROPIUM BROMIDE AND ALBUTEROL SULFATE 2.5; .5 MG/3ML; MG/3ML
3 SOLUTION RESPIRATORY (INHALATION) EVERY 4 HOURS
Status: DISCONTINUED | OUTPATIENT
Start: 2024-08-26 | End: 2024-08-29 | Stop reason: HOSPADM

## 2024-08-26 RX ORDER — CYCLOBENZAPRINE HCL 5 MG
10 TABLET ORAL 3 TIMES DAILY PRN
Status: DISCONTINUED | OUTPATIENT
Start: 2024-08-26 | End: 2024-08-29 | Stop reason: HOSPADM

## 2024-08-26 RX ORDER — NALOXONE HCL 0.4 MG/ML
0.02 VIAL (ML) INJECTION
Status: DISCONTINUED | OUTPATIENT
Start: 2024-08-26 | End: 2024-08-29 | Stop reason: HOSPADM

## 2024-08-26 RX ORDER — MONTELUKAST SODIUM 10 MG/1
10 TABLET ORAL DAILY
Status: DISCONTINUED | OUTPATIENT
Start: 2024-08-26 | End: 2024-08-29 | Stop reason: HOSPADM

## 2024-08-26 RX ORDER — ACETAMINOPHEN 325 MG/1
650 TABLET ORAL EVERY 4 HOURS PRN
Status: DISCONTINUED | OUTPATIENT
Start: 2024-08-26 | End: 2024-08-29 | Stop reason: HOSPADM

## 2024-08-26 RX ADMIN — ONDANSETRON 4 MG: 2 INJECTION INTRAMUSCULAR; INTRAVENOUS at 03:08

## 2024-08-26 RX ADMIN — IPRATROPIUM BROMIDE AND ALBUTEROL SULFATE 3 ML: .5; 2.5 SOLUTION RESPIRATORY (INHALATION) at 03:08

## 2024-08-26 RX ADMIN — IPRATROPIUM BROMIDE AND ALBUTEROL SULFATE 3 ML: .5; 2.5 SOLUTION RESPIRATORY (INHALATION) at 05:08

## 2024-08-26 RX ADMIN — AZITHROMYCIN MONOHYDRATE 500 MG: 500 INJECTION, POWDER, LYOPHILIZED, FOR SOLUTION INTRAVENOUS at 03:08

## 2024-08-26 RX ADMIN — FLUTICASONE FUROATE, UMECLIDINIUM BROMIDE AND VILANTEROL TRIFENATATE 1 PUFF: 200; 62.5; 25 POWDER RESPIRATORY (INHALATION) at 04:08

## 2024-08-26 RX ADMIN — BENZONATATE 100 MG: 100 CAPSULE ORAL at 10:08

## 2024-08-26 RX ADMIN — IPRATROPIUM BROMIDE AND ALBUTEROL SULFATE 6 ML: .5; 2.5 SOLUTION RESPIRATORY (INHALATION) at 01:08

## 2024-08-26 RX ADMIN — IPRATROPIUM BROMIDE AND ALBUTEROL SULFATE 3 ML: .5; 2.5 SOLUTION RESPIRATORY (INHALATION) at 07:08

## 2024-08-26 RX ADMIN — MONTELUKAST 10 MG: 10 TABLET, FILM COATED ORAL at 08:08

## 2024-08-26 RX ADMIN — IPRATROPIUM BROMIDE AND ALBUTEROL SULFATE 3 ML: .5; 2.5 SOLUTION RESPIRATORY (INHALATION) at 11:08

## 2024-08-26 RX ADMIN — GUAIFENESIN 600 MG: 600 TABLET, EXTENDED RELEASE ORAL at 08:08

## 2024-08-26 RX ADMIN — ATORVASTATIN CALCIUM 80 MG: 40 TABLET, FILM COATED ORAL at 08:08

## 2024-08-26 RX ADMIN — ACETAMINOPHEN 650 MG: 325 TABLET ORAL at 12:08

## 2024-08-26 RX ADMIN — ACETAMINOPHEN 1000 MG: 500 TABLET ORAL at 03:08

## 2024-08-26 RX ADMIN — GUAIFENESIN AND DEXTROMETHORPHAN 10 ML: 100; 10 SYRUP ORAL at 10:08

## 2024-08-26 RX ADMIN — BUDESONIDE 0.5 MG: 0.25 INHALANT RESPIRATORY (INHALATION) at 01:08

## 2024-08-26 RX ADMIN — CEFTRIAXONE SODIUM 2 G: 2 INJECTION, POWDER, FOR SOLUTION INTRAMUSCULAR; INTRAVENOUS at 02:08

## 2024-08-26 NOTE — ED PROVIDER NOTES
Encounter Date: 2024       History     Chief Complaint   Patient presents with    Shortness of Breath     34-year-old female, morbidly obese, history of asthma, history of COPD, here tonight complaining of shortness of breath despite using her inhalers and previously prescribed prednisolone.  She was seen here on , and diagnosed with a an asthma exacerbation.  She was discharged home after breathing treatments and medications.  She states she has been taking the steroids, and using her albuterol at home, but her condition has worsened.  Denies any knowledge of fever.  Does have a coarse cough.  O2 saturations were 90% on room air upon arrival.      Review of patient's allergies indicates:  No Known Allergies  Past Medical History:   Diagnosis Date    Asthma     Back pain     COPD (chronic obstructive pulmonary disease)     Diabetes mellitus     DVT (deep venous thrombosis)     Morbid obesity     Obesity     PE (pulmonary thromboembolism)      Past Surgical History:   Procedure Laterality Date    APPENDECTOMY       SECTION       Family History   Problem Relation Name Age of Onset    Hypertension Mother      Diabetes Mother      Hyperlipidemia Mother      Kidney failure Mother       Social History     Tobacco Use    Smoking status: Every Day     Types: Cigarettes    Smokeless tobacco: Never   Substance Use Topics    Alcohol use: Yes     Comment: occ    Drug use: No     Review of Systems   Constitutional: Negative.  Negative for chills and fever.   HENT:  Positive for congestion. Negative for sore throat.    Eyes: Negative.    Respiratory:  Positive for cough, shortness of breath and wheezing.    Gastrointestinal: Negative.    Endocrine: Negative.    Genitourinary: Negative.    Musculoskeletal: Negative.    Neurological: Negative.    Psychiatric/Behavioral: Negative.         Physical Exam     Initial Vitals [24 0120]   BP Pulse Resp Temp SpO2   139/87 96 (!) 28 98.2 °F (36.8 °C) (!) 90 %       MAP       --         Physical Exam    Nursing note and vitals reviewed.  Constitutional: She appears well-developed and well-nourished. She is not diaphoretic. No distress.   HENT:   Head: Normocephalic and atraumatic.   Nose: Nose normal.   Mouth/Throat: Oropharynx is clear and moist. No oropharyngeal exudate.   Eyes: Conjunctivae and EOM are normal. Pupils are equal, round, and reactive to light. No scleral icterus.   Neck: Neck supple. No JVD present.   Normal range of motion.  Cardiovascular:  Normal rate, regular rhythm, normal heart sounds and intact distal pulses.           No murmur heard.  Pulmonary/Chest: Breath sounds normal. No stridor. No respiratory distress. She has no wheezes. She has no rhonchi. She has no rales.   Abdominal: Abdomen is soft. Bowel sounds are normal. She exhibits no distension. There is no abdominal tenderness.   Musculoskeletal:         General: No tenderness or edema. Normal range of motion.      Cervical back: Normal range of motion and neck supple.     Neurological: She is alert and oriented to person, place, and time. She has normal strength. No cranial nerve deficit or sensory deficit. GCS score is 15. GCS eye subscore is 4. GCS verbal subscore is 5. GCS motor subscore is 6.   Skin: Skin is warm and dry. Capillary refill takes less than 2 seconds. No rash noted. No erythema.   Psychiatric: She has a normal mood and affect. Her behavior is normal.         ED Course   Critical Care    Date/Time: 8/26/2024 2:30 AM    Performed by: Rhett Reich MD  Authorized by: Rhett Reich MD  Direct patient critical care time: 24 minutes  Additional history critical care time: 6 minutes  Ordering / reviewing critical care time: 5 minutes  Documentation critical care time: 16 minutes  Consulting other physicians critical care time: 4 minutes  Total critical care time (exclusive of procedural time) : 55 minutes  Critical care time was exclusive of separately billable procedures and  treating other patients and teaching time.  Critical care was necessary to treat or prevent imminent or life-threatening deterioration of the following conditions: respiratory failure.  Critical care was time spent personally by me on the following activities: development of treatment plan with patient or surrogate, discussions with consultants, interpretation of cardiac output measurements, evaluation of patient's response to treatment, examination of patient, obtaining history from patient or surrogate, ordering and performing treatments and interventions, ordering and review of laboratory studies, ordering and review of radiographic studies, pulse oximetry, re-evaluation of patient's condition and review of old charts.        Labs Reviewed   CBC W/ AUTO DIFFERENTIAL - Abnormal       Result Value    WBC 13.69 (*)     RBC 5.09      Hemoglobin 13.2      Hematocrit 41.0      MCV 81 (*)     MCH 25.9 (*)     MCHC 32.2      RDW 14.1      Platelets 396      MPV 9.7      Immature Granulocytes 0.4      Gran # (ANC) 8.2 (*)     Immature Grans (Abs) 0.05 (*)     Lymph # 4.0      Mono # 1.5 (*)     Eos # 0.0      Baso # 0.02      nRBC 0      Gran % 59.6      Lymph % 28.9      Mono % 11.0      Eosinophil % 0.0      Basophil % 0.1      Differential Method Automated     CULTURE, BLOOD   CULTURE, BLOOD   COMPREHENSIVE METABOLIC PANEL    Sodium 140      Potassium 3.6      Chloride 105      CO2 23      Glucose 88      BUN 10      Creatinine 0.9      Calcium 9.2      Total Protein 7.4      Albumin 3.5      Total Bilirubin 0.4      Alkaline Phosphatase 63      AST 19      ALT 18      eGFR >60.0      Anion Gap 12     SARS-COV-2 RNA AMPLIFICATION, QUAL    SARS-CoV-2 RNA, Amplification, Qual Negative     B-TYPE NATRIURETIC PEPTIDE    BNP 11     LACTIC ACID, PLASMA     EKG Readings: (Independently Interpreted)   EKG personally reviewed by me shows normal sinus rhythm, low voltage, incomplete right bundle-branch block, nonspecific T-wave  changes.  99 beats per minute.  FL interval 164, .  No obvious ischemic change or arrhythmia       Imaging Results              X-Ray Chest 1 View (Final result)  Result time 08/26/24 01:33:45      Final result by Chris Peres MD (08/26/24 01:33:45)                   Impression:      New ill-defined airspace opacity projecting over the right midlung zone which may reflect developing infection/pneumonia in the appropriate clinical setting.      Electronically signed by: Chris Peres MD  Date:    08/26/2024  Time:    01:33               Narrative:    EXAMINATION:  XR CHEST 1 VIEW    CLINICAL HISTORY:  shortness of breath;    TECHNIQUE:  Single frontal view of the chest was performed.    COMPARISON:  08/19/2024    FINDINGS:  Cardiac silhouette is mildly prominent.  Lungs demonstrate accentuated bilateral perihilar and peribronchial interstitial lung markings, similar to prior examination.  New ill-defined airspace opacity projects over the right midlung zone which may reflect developing infection/pneumonia in the appropriate clinical setting.  No significant volume of pleural fluid or pneumothorax appreciated.  Osseous structures appear intact.                                    X-Rays:   Independently Interpreted Readings:   Other Readings:  Chest x-ray personally reviewed by me shows what appears to be an increased opacity in the right mid lung.  Left lung normal, normal cardiac silhouette, normal skeletal structures    Medications   cefTRIAXone (ROCEPHIN) 2 g in D5W 100 mL IVPB (MB+) (has no administration in time range)   azithromycin (ZITHROMAX) 500 mg in D5W 250 mL IVPB (Vial-Mate) (has no administration in time range)   albuterol-ipratropium 2.5 mg-0.5 mg/3 mL nebulizer solution 6 mL (6 mLs Nebulization Given 8/26/24 0133)   budesonide nebulizer solution 0.5 mg (0.5 mg Nebulization Given 8/26/24 0151)     Medical Decision Making  Differential includes asthma, COPD, pneumonia, etc.    Patient is  white blood cell count is elevated at 13.69.  This may be secondary to infection, but could also be secondary to her current steroid use.  Chemistry panel unremarkable.  On x-ray, there is a slight opacity in the right mid lung which may be an early pneumonia.  While here, patient has been given several breathing treatments including Pulmicort, but she still continues to wheeze and cough.  O2 saturations have remained in the low 90s.  I believe patient needs admission for further evaluation and treatment.  She has required intubation in the past.  Patient agrees to admission.    Amount and/or Complexity of Data Reviewed  Labs: ordered.  Radiology: ordered.    Risk  Prescription drug management.                                      Clinical Impression:  Final diagnoses:  [R06.02] Shortness of breath  [R06.02] SOB (shortness of breath) (Primary)  [J44.1] COPD exacerbation  [J18.9] Community acquired pneumonia of right middle lobe of lung          ED Disposition Condition    Observation Stable                Rhett Reich MD  08/26/24 0224       Rhett Reich MD  08/26/24 0230

## 2024-08-26 NOTE — SUBJECTIVE & OBJECTIVE
Interval History: NAEON. Off O2 but remains tachypneic. Continue current treatment plan for today.    Review of Systems   All other systems reviewed and are negative.    Objective:     Vital Signs (Most Recent):  Temp: 98.5 °F (36.9 °C) (08/26/24 0815)  Pulse: 83 (08/26/24 0900)  Resp: (!) 26 (08/26/24 0900)  BP: (!) 111/58 (08/26/24 0815)  SpO2: (!) 93 % (08/26/24 0934) Vital Signs (24h Range):  Temp:  [98.2 °F (36.8 °C)-98.6 °F (37 °C)] 98.5 °F (36.9 °C)  Pulse:  [] 83  Resp:  [15-40] 26  SpO2:  [90 %-98 %] 93 %  BP: (111-142)/(58-87) 111/58     Weight: (!) 136.2 kg (300 lb 4.3 oz)  Body mass index is 54.91 kg/m².    Intake/Output Summary (Last 24 hours) at 8/26/2024 1137  Last data filed at 8/26/2024 0325  Gross per 24 hour   Intake 99.03 ml   Output --   Net 99.03 ml         Physical Exam      Vitals reviewed  General: NAD, Well developed, Well Nourished  Head: NC/AT  Eyes: EOMI, NOHELIA  Cardiovascular: Pulses intact distally, Regular Rate and rhythm  Pulmonary: increased Respiratory Rate, No respiratory distress  Gi: Soft, Non-tender  Extremities: Warm, No edema present  Skin: Warm, dry  Neuro: Alert, Oriented x3, No focal Deficit  Psych: Appropriate mood and affect    Significant Labs: All pertinent labs within the past 24 hours have been reviewed.    Significant Imaging: I have reviewed all pertinent imaging results/findings within the past 24 hours.

## 2024-08-26 NOTE — ASSESSMENT & PLAN NOTE
Body mass index is 54.91 kg/m². Morbid obesity complicates all aspects of disease management from diagnostic modalities to treatment. Weight loss encouraged and health benefits explained to patient.   Continue nightly BiPAP.

## 2024-08-26 NOTE — PROGRESS NOTES
[facility] Medicine  Progress Note    Patient Name: Michelle Wren  MRN: [id number]  Patient Class: IP- Inpatient   Admission Date: 8/26/2024  Length of Stay: 0 days  Attending Physician: Ariel Engel MD  Primary Care Provider: Radha Holland MD        Subjective:     Principal Problem:Acute hypoxemic respiratory failure        HPI:  Ms. Wren is a 33 yo female with asthma, obesity hypoventilation syndrome who presented to [place] ED for evaluation of increased shortness of breath and productive cough. She reports starting with cough, wheezing and shortness of breath last week. She came to the ED and was given steroids which she completed on Saturday. She continued with coughing and shortness of breath despite that treatment. She started having thick sputum with her productive cough. She denies any fever or chills. She did have pleuritic chest pain previously but that resolved with the steroids.     Overview/Hospital Course:  No notes on file    Interval History: NAEON. Off O2 but remains tachypneic. Continue current treatment plan for today.    Review of Systems   All other systems reviewed and are negative.    Objective:     Vital Signs (Most Recent):  Temp: 98.5 °F (36.9 °C) (08/26/24 0815)  Pulse: 83 (08/26/24 0900)  Resp: (!) 26 (08/26/24 0900)  BP: (!) 111/58 (08/26/24 0815)  SpO2: (!) 93 % (08/26/24 0934) Vital Signs (24h Range):  Temp:  [98.2 °F (36.8 °C)-98.6 °F (37 °C)] 98.5 °F (36.9 °C)  Pulse:  [] 83  Resp:  [15-40] 26  SpO2:  [90 %-98 %] 93 %  BP: (111-142)/(58-87) 111/58     Weight: (!) 136.2 kg (300 lb 4.3 oz)  Body mass index is 54.91 kg/m².    Intake/Output Summary (Last 24 hours) at 8/26/2024 1137  Last data filed at 8/26/2024 0325  Gross per 24 hour   Intake 99.03 ml   Output --   Net 99.03 ml         Physical Exam      Vitals reviewed  General: NAD, Well developed, Well Nourished  Head: NC/AT  Eyes: EOMI, NOHELIA  Cardiovascular: Pulses  intact distally, Regular Rate and rhythm  Pulmonary: increased Respiratory Rate, No respiratory distress  Gi: Soft, Non-tender  Extremities: Warm, No edema present  Skin: Warm, dry  Neuro: Alert, Oriented x3, No focal Deficit  Psych: Appropriate mood and affect    Significant Labs: All pertinent labs within the past 24 hours have been reviewed.    Significant Imaging: I have reviewed all pertinent imaging results/findings within the past 24 hours.    Assessment/Plan:      * Acute hypoxemic respiratory failure  Pneumonia due to infectious organism  Patient with Hypoxic Respiratory failure which is Acute.  she is not on home oxygen. Supplemental oxygen was provided and noted- Oxygen Concentration (%):  [28] 28    .   Signs/symptoms of respiratory failure include- tachypnea, respiratory distress, and wheezing. Contributing diagnoses includes - Pneumonia Labs and images were reviewed. Patient Has not had a recent ABG. Will treat underlying causes and adjust management of respiratory failure as follows- IV ceftriaxone/azithromycin for the community acquired bacterial pneumonia, A/A nebs q4 hrs, mucinex BID,     Pulmonary emboli  Chronic anticoagulation with warfarin  INR is a little elevated at 3.9.  Monitor INR daily.   Will continue warfarin daily.      Chronic obstructive pulmonary disease  Severe persistent asthma with acute exacerbation  Continue A/A nebs q4 hrs.   Give breo in place of home Trellegy  Continue montelukast 10 mg daily.   Holding off on further steroids for now as just completed a week course.    Type 2 diabetes mellitus  Patient's FSGs are controlled on current medication regimen.  Last A1c reviewed-   Lab Results   Component Value Date    HGBA1C 5.7 (H) 02/02/2024     Most recent fingerstick glucose reviewed-   Recent Labs   Lab 08/26/24  0522   POCTGLUCOSE 92     Current correctional scale  Low  Maintain anti-hyperglycemic dose as follows-   Antihyperglycemics (From admission, onward)      Start      Stop Route Frequency Ordered    08/26/24 0510  insulin aspart U-100 pen 0-5 Units         -- SubQ Before meals & nightly PRN 08/26/24 0436          Hold Oral hypoglycemics and her home Trulicity while patient is in the hospital.    Nicotine dependence  Down to just a couple cigarettes a day. No nicotine patch required.     Obesity hypoventilation syndrome  Body mass index is 54.91 kg/m². Morbid obesity complicates all aspects of disease management from diagnostic modalities to treatment. Weight loss encouraged and health benefits explained to patient.   Continue nightly BiPAP.           VTE Risk Mitigation (From admission, onward)           Ordered     IP VTE HIGH RISK PATIENT  Once         08/26/24 0436                    Discharge Planning   BAKARI: 8/27/2024     Code Status: Full Code   Is the patient medically ready for discharge?:     Reason for patient still in hospital (select all that apply): Treatment                     Ariel Engel MD  Department of Hospital Medicine   [facility]

## 2024-08-26 NOTE — H&P
[facility] Medicine  History & Physical    Patient Name: Michelle Wren  MRN: [id number]  Admission Date: 2024  Attending Physician: Ariel Engel MD   Primary Care Provider: Radha Holland MD         Patient information was obtained from patient, past medical records, and ER records.       Subjective:     Principal Problem:Acute hypoxemic respiratory failure    Chief Complaint:   Chief Complaint   Patient presents with    Shortness of Breath        HPI: Ms. Wren is a 33 yo female with asthma, obesity hypoventilation syndrome who presented to [place] ED for evaluation of increased shortness of breath and productive cough. She reports starting with cough, wheezing and shortness of breath last week. She came to the ED and was given steroids which she completed on Saturday. She continued with coughing and shortness of breath despite that treatment. She started having thick sputum with her productive cough. She denies any fever or chills. She did have pleuritic chest pain previously but that resolved with the steroids.     Past Medical History:   Diagnosis Date    Asthma     Back pain     COPD (chronic obstructive pulmonary disease)     Diabetes mellitus     DVT (deep venous thrombosis)     Morbid obesity     Obesity     PE (pulmonary thromboembolism)        Past Surgical History:   Procedure Laterality Date    APPENDECTOMY       SECTION         Review of patient's allergies indicates:  No Known Allergies    No current facility-administered medications on file prior to encounter.     Current Outpatient Medications on File Prior to Encounter   Medication Sig    albuterol (PROVENTIL/VENTOLIN HFA) 90 mcg/actuation inhaler 2 puffs every 4 hours as needed for cough, wheeze, or shortness of breath    atorvastatin (LIPITOR) 80 MG tablet Take 80 mg by mouth once daily.    cyclobenzaprine (FLEXERIL) 10 MG tablet Take 10 mg by mouth 3 (three) times daily as  needed.    diclofenac sodium (VOLTAREN) 1 % Gel Apply 2 g topically 4 (four) times daily.    dulaglutide (TRULICITY) 4.5 mg/0.5 mL pen injector Inject 4.5 mg into the skin every 7 days.    FASENRA PEN 30 mg/mL AtIn Inject 30 mg into the skin every 8 weeks.    montelukast (SINGULAIR) 10 mg tablet Take 10 mg by mouth once daily.    TRELEGY ELLIPTA 200-62.5-25 mcg inhaler     warfarin (COUMADIN) 7.5 MG tablet     [DISCONTINUED] budesonide-formoterol 160-4.5 mcg (SYMBICORT) 160-4.5 mcg/actuation HFAA Inhale 2 puffs into the lungs every 12 (twelve) hours. Controller     Family History       Problem Relation (Age of Onset)    Diabetes Mother    Hyperlipidemia Mother    Hypertension Mother    Kidney failure Mother          Tobacco Use    Smoking status: Every Day     Current packs/day: 0.25     Types: Cigarettes    Smokeless tobacco: Never   Substance and Sexual Activity    Alcohol use: Yes     Comment: occ    Drug use: No    Sexual activity: Not Currently     Birth control/protection: None     Review of Systems   Constitutional:  Negative for appetite change and fever.   Respiratory:  Positive for cough, chest tightness, shortness of breath and wheezing.    Cardiovascular:  Negative for chest pain and palpitations.   Gastrointestinal:  Negative for abdominal pain, nausea and vomiting.   Genitourinary:  Negative for difficulty urinating and dysuria.   Musculoskeletal:  Negative for arthralgias and back pain.   Neurological:  Positive for weakness. Negative for dizziness and light-headedness.   Psychiatric/Behavioral:  Negative for sleep disturbance. The patient is not nervous/anxious.      Objective:     Vital Signs (Most Recent):  Temp: 98.6 °F (37 °C) (08/26/24 0505)  Pulse: 86 (08/26/24 0505)  Resp: 15 (08/26/24 0505)  BP: (!) 111/58 (08/26/24 0505)  SpO2: (!) 94 % (08/26/24 0505) Vital Signs (24h Range):  Temp:  [98.2 °F (36.8 °C)-98.6 °F (37 °C)] 98.6 °F (37 °C)  Pulse:  [] 86  Resp:  [15-28] 15  SpO2:  [90 %-98  %] 94 %  BP: (111-142)/(58-87) 111/58     Weight: (!) 136.2 kg (300 lb 4.3 oz)  Body mass index is 54.91 kg/m².     Physical Exam      Significant Labs: CBC:   Recent Labs   Lab 08/26/24  0127   WBC 13.69*   HGB 13.2   HCT 41.0        CMP:   Recent Labs   Lab 08/26/24  0127      K 3.6      CO2 23   GLU 88   BUN 10   CREATININE 0.9   CALCIUM 9.2   PROT 7.4   ALBUMIN 3.5   BILITOT 0.4   ALKPHOS 63   AST 19   ALT 18   ANIONGAP 12     Coagulation:   Recent Labs   Lab 08/26/24  0422   INR 3.9*       Significant Imaging: I have reviewed all pertinent imaging results/findings within the past 24 hours.  Assessment/Plan:     * Acute hypoxemic respiratory failure  Pneumonia due to infectious organism  Patient with Hypoxic Respiratory failure which is Acute.  she is not on home oxygen. Supplemental oxygen was provided and noted- Oxygen Concentration (%):  [28] 28    .   Signs/symptoms of respiratory failure include- tachypnea, respiratory distress, and wheezing. Contributing diagnoses includes - Pneumonia Labs and images were reviewed. Patient Has not had a recent ABG. Will treat underlying causes and adjust management of respiratory failure as follows- IV ceftriaxone/azithromycin for the community acquired bacterial pneumonia, A/A nebs q4 hrs, mucinex BID,     Pulmonary emboli  Chronic anticoagulation with warfarin  INR is a little elevated at 3.9.  Monitor INR daily.   Will continue warfarin daily.      Chronic obstructive pulmonary disease  Severe persistent asthma with acute exacerbation  Continue A/A nebs q4 hrs.   Give breo in place of home Trellegy  Continue montelukast 10 mg daily.   Holding off on further steroids for now as just completed a week course.    Type 2 diabetes mellitus  Patient's FSGs are controlled on current medication regimen.  Last A1c reviewed-   Lab Results   Component Value Date    HGBA1C 5.7 (H) 02/02/2024     Most recent fingerstick glucose reviewed-   Recent Labs   Lab  08/26/24  0522   POCTGLUCOSE 92     Current correctional scale  Low  Maintain anti-hyperglycemic dose as follows-   Antihyperglycemics (From admission, onward)      Start     Stop Route Frequency Ordered    08/26/24 0510  insulin aspart U-100 pen 0-5 Units         -- SubQ Before meals & nightly PRN 08/26/24 0436          Hold Oral hypoglycemics and her home Trulicity while patient is in the hospital.    Nicotine dependence  Down to just a couple cigarettes a day. No nicotine patch required.     Obesity hypoventilation syndrome  Body mass index is 54.91 kg/m². Morbid obesity complicates all aspects of disease management from diagnostic modalities to treatment. Weight loss encouraged and health benefits explained to patient.   Continue nightly BiPAP.           VTE Risk Mitigation (From admission, onward)           Ordered     warfarin (COUMADIN) tablet 7.5 mg  Daily         08/26/24 0436     IP VTE HIGH RISK PATIENT  Once         08/26/24 0436                       The attending portion of this evaluation, treatment, and documentation was performed per Gage Barton MD via Telemedicine AudioVisual using the secure [employer] software platform with 2 way audio/video. The provider was located off-site and the patient is located in the hospital. The aforementioned video software was utilized to document the relevant history and physical exam      Gage Barton MD  Department of Hospital Medicine   [facility]

## 2024-08-26 NOTE — ASSESSMENT & PLAN NOTE
Pneumonia due to infectious organism  Patient with Hypoxic Respiratory failure which is Acute.  she is not on home oxygen. Supplemental oxygen was provided and noted- Oxygen Concentration (%):  [28] 28    .   Signs/symptoms of respiratory failure include- tachypnea, respiratory distress, and wheezing. Contributing diagnoses includes - Pneumonia Labs and images were reviewed. Patient Has not had a recent ABG. Will treat underlying causes and adjust management of respiratory failure as follows- IV ceftriaxone/azithromycin for the community acquired bacterial pneumonia, A/A nebs q4 hrs, mucinex BID,

## 2024-08-26 NOTE — ASSESSMENT & PLAN NOTE
Severe persistent asthma with acute exacerbation  Continue A/A nebs q4 hrs.   Give breo in place of home Trellegy  Continue montelukast 10 mg daily.   Holding off on further steroids for now as just completed a week course.

## 2024-08-26 NOTE — LETTER
August 29, 2024         [address] [address]  Phone: [phone]  Fax: [phone]       Patient: Michelle Wren   YOB: 1990  Date of Visit: 08/29/2024    To Whom It May Concern:    Kimmy Wren  was at [facility] on 08/26/2024-08/29/2024. The patient may return to work/school on Tuesday, 09/03/2024, with no restrictions. If you have any questions or concerns, or if I can be of further assistance, please do not hesitate to contact me.    Sincerely,        Alida Wood RN

## 2024-08-26 NOTE — NURSING
Nurses Note -- 4 Eyes      8/26/2024   6:05 AM      Skin assessed during: Admit      [x] No Altered Skin Integrity Present    []Prevention Measures Documented      [] Yes- Altered Skin Integrity Present or Discovered   [] LDA Added if Not in Epic (Describe Wound)   [] New Altered Skin Integrity was Present on Admit and Documented in LDA   [] Wound Image Taken    Wound Care Consulted? No    Attending Nurse:  Olegario Arcos RN/Staff Member:   Hannah

## 2024-08-26 NOTE — HPI
Ms. Wren is a 33 yo female with asthma, obesity hypoventilation syndrome who presented to [place] ED for evaluation of increased shortness of breath and productive cough. She reports starting with cough, wheezing and shortness of breath last week. She came to the ED and was given steroids which she completed on Saturday. She continued with coughing and shortness of breath despite that treatment. She started having thick sputum with her productive cough. She denies any fever or chills. She did have pleuritic chest pain previously but that resolved with the steroids.

## 2024-08-26 NOTE — PLAN OF CARE
08/26/24 1031   Medicare Message   Important Message from Medicare regarding Discharge Appeal Rights Given to patient/caregiver;Explained to patient/caregiver;Signed/date by patient/caregiver   Date IMM was signed 08/26/24   Time IMM was signed 1031

## 2024-08-26 NOTE — ASSESSMENT & PLAN NOTE
Patient's FSGs are controlled on current medication regimen.  Last A1c reviewed-   Lab Results   Component Value Date    HGBA1C 5.7 (H) 02/02/2024     Most recent fingerstick glucose reviewed-   Recent Labs   Lab 08/26/24  0522   POCTGLUCOSE 92     Current correctional scale  Low  Maintain anti-hyperglycemic dose as follows-   Antihyperglycemics (From admission, onward)      Start     Stop Route Frequency Ordered    08/26/24 0510  insulin aspart U-100 pen 0-5 Units         -- SubQ Before meals & nightly PRN 08/26/24 0436          Hold Oral hypoglycemics and her home Trulicity while patient is in the hospital.

## 2024-08-26 NOTE — SUBJECTIVE & OBJECTIVE
Past Medical History:   Diagnosis Date    Asthma     Back pain     COPD (chronic obstructive pulmonary disease)     Diabetes mellitus     DVT (deep venous thrombosis)     Morbid obesity     Obesity     PE (pulmonary thromboembolism)        Past Surgical History:   Procedure Laterality Date    APPENDECTOMY       SECTION         Review of patient's allergies indicates:  No Known Allergies    No current facility-administered medications on file prior to encounter.     Current Outpatient Medications on File Prior to Encounter   Medication Sig    albuterol (PROVENTIL/VENTOLIN HFA) 90 mcg/actuation inhaler 2 puffs every 4 hours as needed for cough, wheeze, or shortness of breath    atorvastatin (LIPITOR) 80 MG tablet Take 80 mg by mouth once daily.    cyclobenzaprine (FLEXERIL) 10 MG tablet Take 10 mg by mouth 3 (three) times daily as needed.    diclofenac sodium (VOLTAREN) 1 % Gel Apply 2 g topically 4 (four) times daily.    dulaglutide (TRULICITY) 4.5 mg/0.5 mL pen injector Inject 4.5 mg into the skin every 7 days.    FASENRA PEN 30 mg/mL AtIn Inject 30 mg into the skin every 8 weeks.    montelukast (SINGULAIR) 10 mg tablet Take 10 mg by mouth once daily.    TRELEGY ELLIPTA 200-62.5-25 mcg inhaler     warfarin (COUMADIN) 7.5 MG tablet     [DISCONTINUED] budesonide-formoterol 160-4.5 mcg (SYMBICORT) 160-4.5 mcg/actuation HFAA Inhale 2 puffs into the lungs every 12 (twelve) hours. Controller     Family History       Problem Relation (Age of Onset)    Diabetes Mother    Hyperlipidemia Mother    Hypertension Mother    Kidney failure Mother          Tobacco Use    Smoking status: Every Day     Current packs/day: 0.25     Types: Cigarettes    Smokeless tobacco: Never   Substance and Sexual Activity    Alcohol use: Yes     Comment: occ    Drug use: No    Sexual activity: Not Currently     Birth control/protection: None     Review of Systems   Constitutional:  Negative for appetite change and fever.   Respiratory:   Positive for cough, chest tightness, shortness of breath and wheezing.    Cardiovascular:  Negative for chest pain and palpitations.   Gastrointestinal:  Negative for abdominal pain, nausea and vomiting.   Genitourinary:  Negative for difficulty urinating and dysuria.   Musculoskeletal:  Negative for arthralgias and back pain.   Neurological:  Positive for weakness. Negative for dizziness and light-headedness.   Psychiatric/Behavioral:  Negative for sleep disturbance. The patient is not nervous/anxious.      Objective:     Vital Signs (Most Recent):  Temp: 98.6 °F (37 °C) (08/26/24 0505)  Pulse: 86 (08/26/24 0505)  Resp: 15 (08/26/24 0505)  BP: (!) 111/58 (08/26/24 0505)  SpO2: (!) 94 % (08/26/24 0505) Vital Signs (24h Range):  Temp:  [98.2 °F (36.8 °C)-98.6 °F (37 °C)] 98.6 °F (37 °C)  Pulse:  [] 86  Resp:  [15-28] 15  SpO2:  [90 %-98 %] 94 %  BP: (111-142)/(58-87) 111/58     Weight: (!) 136.2 kg (300 lb 4.3 oz)  Body mass index is 54.91 kg/m².     Physical Exam      Significant Labs: CBC:   Recent Labs   Lab 08/26/24  0127   WBC 13.69*   HGB 13.2   HCT 41.0        CMP:   Recent Labs   Lab 08/26/24  0127      K 3.6      CO2 23   GLU 88   BUN 10   CREATININE 0.9   CALCIUM 9.2   PROT 7.4   ALBUMIN 3.5   BILITOT 0.4   ALKPHOS 63   AST 19   ALT 18   ANIONGAP 12     Coagulation:   Recent Labs   Lab 08/26/24  0422   INR 3.9*       Significant Imaging: I have reviewed all pertinent imaging results/findings within the past 24 hours.

## 2024-08-26 NOTE — ASSESSMENT & PLAN NOTE
Chronic anticoagulation with warfarin  INR is a little elevated at 3.9.  Monitor INR daily.   Will continue warfarin daily.

## 2024-08-26 NOTE — PLAN OF CARE
[facility]  Initial Discharge Assessment       Primary Care Provider: Radha Holland MD    Admission Diagnosis: Shortness of breath [R06.02]  Pneumonia [J18.9]  SOB (shortness of breath) [R06.02]  COPD exacerbation [J44.1]  Chest pain [R07.9]  Community acquired pneumonia of right middle lobe of lung [J18.9]    Admission Date: 8/26/2024  Expected Discharge Date: 8/27/2024    Transition of Care Barriers: None    Patient alert &oriented lives at home with her 10year old son. She has a friend assisting with her son while she is in the hospital. She is independent at home. She uses Bipap & nebulizer at home. She owns a rollator, wheelchair, tub transfer bench & shower chair but doesn't use currently. She uses Dr Holland for PCP but he recently has retired.  She is open to using anyone in their office. She uses [facility] for prescriptions. Plan will be to retunr home at time of discharge.     Payor: [employer] MANAGED MEDICARE / Plan: [employer] PPO SPECIAL NEEDS / Product Type: Medicare Advantage /     Extended Emergency Contact Information  Primary Emergency Contact: Jaycob Elena   [place]  Mobile Phone: [phone]  Relation: Brother  Secondary Emergency Contact: Bijal Elena  Mobile Phone: [phone]  Relation: Relative  Preferred language: English   needed? No    Discharge Plan A: Home with family  Discharge Plan B: Home with family      [id number] - [place], [place] - [address] [address] [facility]  [place] [address]  Phone: [phone] Fax: [phone]    [id number] - [address] [address] [facility] [address]  [address] [place] [address]  Phone: [phone] Fax: [phone]    [facility] ([facility]) - [address] [address]  Phone: [phone] Fax: [phone]    [facility] - [place], [place] - [address]  [address]  [place] [address]  Phone: [phone] Fax: [phone]    [facility] - [place] - [address]  [address]  [place] [address]  Phone: [phone] Fax: [phone]    [id number] - [place] - [address]  [address]  [place] [address]  Phone: [phone] Fax: [phone]      Initial Assessment (most recent)       Adult Discharge Assessment - 08/26/24 1031          Discharge Assessment    Assessment Type Discharge Planning Assessment     Confirmed/corrected address, phone number and insurance Yes     Confirmed Demographics Correct on Facesheet     Source of Information patient     When was your last doctors appointment? --   2 months ago    Communicated BAKARI with patient/caregiver Yes     Reason For Admission shortness of breath     People in Home child(selena), dependent     Do you expect to return to your current living situation? Yes     Do you have help at home or someone to help you manage your care at home? Yes     Who are your caregiver(s) and their phone number(s)? her dad or friend assists as needed     Prior to hospitilization cognitive status: Alert/Oriented     Current cognitive status: Alert/Oriented     Walking or Climbing Stairs Difficulty no     Dressing/Bathing Difficulty no     Home Layout Able to live on 1st floor     Equipment Currently Used at Home BIPAP;nebulizer     Readmission within 30 days? No     Patient currently being followed by outpatient case management? No     Do you currently have service(s) that help you manage your care at home? No     Do you take prescription medications? Yes     Do you have prescription coverage? Yes     Coverage Medicaid     Do you have any problems affording any of your prescribed medications? No     Is the patient taking medications as prescribed? yes     Who is going to help you get home at discharge? car is in  ER parking lot     How do you get to doctors appointments? car, drives self     Are you on dialysis? No     Do you take coumadin? No     Discharge Plan A Home with family     Discharge Plan B Home with family     DME Needed Upon Discharge  none     Discharge Plan discussed with: Patient     Transition of Care Barriers None        Physical Activity    On average, how many days per week do you engage in moderate to strenuous exercise (like a brisk walk)? 3 days     On average, how many minutes do you engage in exercise at this level? 30 min        Financial Resource Strain    How hard is it for you to pay for the very basics like food, housing, medical care, and heating? Somewhat hard        Housing Stability    In the last 12 months, was there a time when you were not able to pay the mortgage or rent on time? No     At any time in the past 12 months, were you homeless or living in a shelter (including now)? No        Transportation Needs    Has the lack of transportation kept you from medical appointments, meetings, work or from getting things needed for daily living? No        Food Insecurity    Within the past 12 months, you worried that your food would run out before you got the money to buy more. Sometimes true     Within the past 12 months, the food you bought just didn't last and you didn't have money to get more. Never true        Stress    Do you feel stress - tense, restless, nervous, or anxious, or unable to sleep at night because your mind is troubled all the time - these days? To some extent        Social Isolation    How often do you feel lonely or isolated from those around you?  Sometimes        Alcohol Use    Q1: How often do you have a drink containing alcohol? Monthly or less     Q2: How many drinks containing alcohol do you have on a typical day when you are drinking? 1 or 2     Q3: How often do you have six or more drinks on one occasion? Less than monthly        Utilities    In the past 12 months  has the electric, gas, oil, or water company threatened to shut off services in your home? No        Health Literacy    How often do you need to have someone help you when you read instructions, pamphlets, or other written material from your doctor or pharmacy? Never        OTHER    Name(s) of People in Home her 10 year old son

## 2024-08-26 NOTE — ED NOTES
Pt having severe vomiting. Azithromycin was recently started. Infusion paused. Will obtain order from MD for antiemetic.

## 2024-08-26 NOTE — ED NOTES
Ambulated pt to restroom with standby assist. Pt had no difficulties.   Include Pregnancy/Lactation Warning?: No Spironolactone Pregnancy And Lactation Text: This medication can cause feminization of the male fetus and should be avoided during pregnancy. The active metabolite is also found in breast milk. Birth Control Pills Pregnancy And Lactation Text: This medication should be avoided if pregnant and for the first 30 days post-partum. Isotretinoin Counseling: Patient should get monthly blood tests, not donate blood, not drive at night if vision affected, not share medication, and not undergo elective surgery for 6 months after tx completed. Side effects reviewed, pt to contact office should one occur. Isotretinoin Pregnancy And Lactation Text: This medication is Pregnancy Category X and is considered extremely dangerous during pregnancy. It is unknown if it is excreted in breast milk. Topical Retinoid counseling:  Patient advised to apply a pea-sized amount only at bedtime and wait 30 minutes after washing their face before applying.  If too drying, patient may add a non-comedogenic moisturizer. The patient verbalized understanding of the proper use and possible adverse effects of retinoids.  All of the patient's questions and concerns were addressed. Topical Clindamycin Counseling: Patient counseled that this medication may cause skin irritation or allergic reactions.  In the event of skin irritation, the patient was advised to reduce the amount of the drug applied or use it less frequently.   The patient verbalized understanding of the proper use and possible adverse effects of clindamycin.  All of the patient's questions and concerns were addressed. Topical Retinoid Pregnancy And Lactation Text: This medication is Pregnancy Category C. It is unknown if this medication is excreted in breast milk. Doxycycline Counseling:  Patient counseled regarding possible photosensitivity and increased risk for sunburn.  Patient instructed to avoid sunlight, if possible.  When exposed to sunlight, patients should wear protective clothing, sunglasses, and sunscreen.  The patient was instructed to call the office immediately if the following severe adverse effects occur:  hearing changes, easy bruising/bleeding, severe headache, or vision changes.  The patient verbalized understanding of the proper use and possible adverse effects of doxycycline.  All of the patient's questions and concerns were addressed. Bactrim Counseling:  I discussed with the patient the risks of sulfa antibiotics including but not limited to GI upset, allergic reaction, drug rash, diarrhea, dizziness, photosensitivity, and yeast infections.  Rarely, more serious reactions can occur including but not limited to aplastic anemia, agranulocytosis, methemoglobinemia, blood dyscrasias, liver or kidney failure, lung infiltrates or desquamative/blistering drug rashes. Topical Sulfur Applications Counseling: Topical Sulfur Counseling: Patient counseled that this medication may cause skin irritation or allergic reactions.  In the event of skin irritation, the patient was advised to reduce the amount of the drug applied or use it less frequently.   The patient verbalized understanding of the proper use and possible adverse effects of topical sulfur application.  All of the patient's questions and concerns were addressed. Doxycycline Pregnancy And Lactation Text: This medication is Pregnancy Category D and not consider safe during pregnancy. It is also excreted in breast milk but is considered safe for shorter treatment courses. Topical Clindamycin Pregnancy And Lactation Text: This medication is Pregnancy Category B and is considered safe during pregnancy. It is unknown if it is excreted in breast milk. Benzoyl Peroxide Pregnancy And Lactation Text: This medication is Pregnancy Category C. It is unknown if benzoyl peroxide is excreted in breast milk. Benzoyl Peroxide Counseling: Patient counseled that medicine may cause skin irritation and bleach clothing.  In the event of skin irritation, the patient was advised to reduce the amount of the drug applied or use it less frequently.   The patient verbalized understanding of the proper use and possible adverse effects of benzoyl peroxide.  All of the patient's questions and concerns were addressed. Erythromycin Pregnancy And Lactation Text: This medication is Pregnancy Category B and is considered safe during pregnancy. It is also excreted in breast milk. Spironolactone Counseling: Patient advised regarding risks of diarrhea, abdominal pain, hyperkalemia, birth defects (for female patients), liver toxicity and renal toxicity. The patient may need blood work to monitor liver and kidney function and potassium levels while on therapy. The patient verbalized understanding of the proper use and possible adverse effects of spironolactone.  All of the patient's questions and concerns were addressed. Topical Sulfur Applications Pregnancy And Lactation Text: This medication is Pregnancy Category C and has an unknown safety profile during pregnancy. It is unknown if this topical medication is excreted in breast milk. Erythromycin Counseling:  I discussed with the patient the risks of erythromycin including but not limited to GI upset, allergic reaction, drug rash, diarrhea, increase in liver enzymes, and yeast infections. Azithromycin Counseling:  I discussed with the patient the risks of azithromycin including but not limited to GI upset, allergic reaction, drug rash, diarrhea, and yeast infections. Azithromycin Pregnancy And Lactation Text: This medication is considered safe during pregnancy and is also secreted in breast milk. Tetracycline Pregnancy And Lactation Text: This medication is Pregnancy Category D and not consider safe during pregnancy. It is also excreted in breast milk. Dapsone Pregnancy And Lactation Text: This medication is Pregnancy Category C and is not considered safe during pregnancy or breast feeding. Tazorac Pregnancy And Lactation Text: This medication is not safe during pregnancy. It is unknown if this medication is excreted in breast milk. Tazorac Counseling:  Patient advised that medication is irritating and drying.  Patient may need to apply sparingly and wash off after an hour before eventually leaving it on overnight.  The patient verbalized understanding of the proper use and possible adverse effects of tazorac.  All of the patient's questions and concerns were addressed. High Dose Vitamin A Counseling: Side effects reviewed, pt to contact office should one occur. Detail Level: Zone Bactrim Pregnancy And Lactation Text: This medication is Pregnancy Category D and is known to cause fetal risk.  It is also excreted in breast milk. Tetracycline Counseling: Patient counseled regarding possible photosensitivity and increased risk for sunburn.  Patient instructed to avoid sunlight, if possible.  When exposed to sunlight, patients should wear protective clothing, sunglasses, and sunscreen.  The patient was instructed to call the office immediately if the following severe adverse effects occur:  hearing changes, easy bruising/bleeding, severe headache, or vision changes.  The patient verbalized understanding of the proper use and possible adverse effects of tetracycline.  All of the patient's questions and concerns were addressed. Patient understands to avoid pregnancy while on therapy due to potential birth defects. Minocycline Counseling: Patient advised regarding possible photosensitivity and discoloration of the teeth, skin, lips, tongue and gums.  Patient instructed to avoid sunlight, if possible.  When exposed to sunlight, patients should wear protective clothing, sunglasses, and sunscreen.  The patient was instructed to call the office immediately if the following severe adverse effects occur:  hearing changes, easy bruising/bleeding, severe headache, or vision changes.  The patient verbalized understanding of the proper use and possible adverse effects of minocycline.  All of the patient's questions and concerns were addressed. Birth Control Pills Counseling: Birth Control Pill Counseling: I discussed with the patient the potential side effects of OCPs including but not limited to increased risk of stroke, heart attack, thrombophlebitis, deep venous thrombosis, hepatic adenomas, breast changes, GI upset, headaches, and depression.  The patient verbalized understanding of the proper use and possible adverse effects of OCPs. All of the patient's questions and concerns were addressed. High Dose Vitamin A Pregnancy And Lactation Text: High dose vitamin A therapy is contraindicated during pregnancy and breast feeding. Dapsone Counseling: I discussed with the patient the risks of dapsone including but not limited to hemolytic anemia, agranulocytosis, rashes, methemoglobinemia, kidney failure, peripheral neuropathy, headaches, GI upset, and liver toxicity.  Patients who start dapsone require monitoring including baseline LFTs and weekly CBCs for the first month, then every month thereafter.  The patient verbalized understanding of the proper use and possible adverse effects of dapsone.  All of the patient's questions and concerns were addressed.

## 2024-08-26 NOTE — PLAN OF CARE
Inpatient Upgrade Note    Michelle Wren has warranted treatment spanning two or more midnights of hospital level care for the management of pneumonia and Acute hypoxemic respiratory failure . She continues to require IV antibiotics, daily labs, supplemental oxygen, and monitoring of vital signs. Her condition is also complicated by the following comorbidities: Diabetes, Chronic respiratory disease, Obesity, and Asthma, DVT, PE .

## 2024-08-27 LAB
ALBUMIN SERPL BCP-MCNC: 3.2 G/DL (ref 3.5–5.2)
ALP SERPL-CCNC: 57 U/L (ref 55–135)
ALT SERPL W/O P-5'-P-CCNC: 21 U/L (ref 10–44)
ANION GAP SERPL CALC-SCNC: 12 MMOL/L (ref 8–16)
AST SERPL-CCNC: 24 U/L (ref 10–40)
BASOPHILS # BLD AUTO: 0.02 K/UL (ref 0–0.2)
BASOPHILS NFR BLD: 0.2 % (ref 0–1.9)
BILIRUB SERPL-MCNC: 0.4 MG/DL (ref 0.1–1)
BUN SERPL-MCNC: 8 MG/DL (ref 6–20)
CALCIUM SERPL-MCNC: 8.7 MG/DL (ref 8.7–10.5)
CHLORIDE SERPL-SCNC: 104 MMOL/L (ref 95–110)
CO2 SERPL-SCNC: 23 MMOL/L (ref 23–29)
CREAT SERPL-MCNC: 0.9 MG/DL (ref 0.5–1.4)
DIFFERENTIAL METHOD BLD: ABNORMAL
EOSINOPHIL # BLD AUTO: 0 K/UL (ref 0–0.5)
EOSINOPHIL NFR BLD: 0 % (ref 0–8)
ERYTHROCYTE [DISTWIDTH] IN BLOOD BY AUTOMATED COUNT: 14.5 % (ref 11.5–14.5)
EST. GFR  (NO RACE VARIABLE): >60 ML/MIN/1.73 M^2
ESTIMATED AVG GLUCOSE: 114 MG/DL (ref 68–131)
GLUCOSE SERPL-MCNC: 116 MG/DL (ref 70–110)
GLUCOSE SERPL-MCNC: 128 MG/DL (ref 70–110)
HBA1C MFR BLD: 5.6 % (ref 4–5.6)
HCT VFR BLD AUTO: 40.1 % (ref 37–48.5)
HGB BLD-MCNC: 12.8 G/DL (ref 12–16)
IMM GRANULOCYTES # BLD AUTO: 0.1 K/UL (ref 0–0.04)
IMM GRANULOCYTES NFR BLD AUTO: 0.9 % (ref 0–0.5)
INR PPP: 2.7 (ref 0.8–1.2)
LYMPHOCYTES # BLD AUTO: 2.7 K/UL (ref 1–4.8)
LYMPHOCYTES NFR BLD: 23 % (ref 18–48)
MAGNESIUM SERPL-MCNC: 2 MG/DL (ref 1.6–2.6)
MCH RBC QN AUTO: 26.4 PG (ref 27–31)
MCHC RBC AUTO-ENTMCNC: 31.9 G/DL (ref 32–36)
MCV RBC AUTO: 83 FL (ref 82–98)
MONOCYTES # BLD AUTO: 1.1 K/UL (ref 0.3–1)
MONOCYTES NFR BLD: 9.1 % (ref 4–15)
NEUTROPHILS # BLD AUTO: 7.7 K/UL (ref 1.8–7.7)
NEUTROPHILS NFR BLD: 66.8 % (ref 38–73)
NRBC BLD-RTO: 0 /100 WBC
PHOSPHATE SERPL-MCNC: 3.2 MG/DL (ref 2.7–4.5)
PLATELET # BLD AUTO: 321 K/UL (ref 150–450)
PMV BLD AUTO: 9.7 FL (ref 9.2–12.9)
POCT GLUCOSE: 128 MG/DL (ref 70–110)
POCT GLUCOSE: 76 MG/DL (ref 70–110)
POCT GLUCOSE: 87 MG/DL (ref 70–110)
POCT GLUCOSE: 87 MG/DL (ref 70–110)
POTASSIUM SERPL-SCNC: 3.6 MMOL/L (ref 3.5–5.1)
PROT SERPL-MCNC: 7 G/DL (ref 6–8.4)
PROTHROMBIN TIME: 27.1 SEC (ref 9–12.5)
RBC # BLD AUTO: 4.84 M/UL (ref 4–5.4)
SODIUM SERPL-SCNC: 139 MMOL/L (ref 136–145)
WBC # BLD AUTO: 11.59 K/UL (ref 3.9–12.7)

## 2024-08-27 PROCEDURE — 85610 PROTHROMBIN TIME: CPT | Performed by: HOSPITALIST

## 2024-08-27 PROCEDURE — 94618 PULMONARY STRESS TESTING: CPT

## 2024-08-27 PROCEDURE — 25000242 PHARM REV CODE 250 ALT 637 W/ HCPCS: Performed by: STUDENT IN AN ORGANIZED HEALTH CARE EDUCATION/TRAINING PROGRAM

## 2024-08-27 PROCEDURE — 85025 COMPLETE CBC W/AUTO DIFF WBC: CPT | Performed by: HOSPITALIST

## 2024-08-27 PROCEDURE — 63600175 PHARM REV CODE 636 W HCPCS: Performed by: STUDENT IN AN ORGANIZED HEALTH CARE EDUCATION/TRAINING PROGRAM

## 2024-08-27 PROCEDURE — 83036 HEMOGLOBIN GLYCOSYLATED A1C: CPT | Performed by: HOSPITALIST

## 2024-08-27 PROCEDURE — 27000646 HC AEROBIKA DEVICE

## 2024-08-27 PROCEDURE — 99900035 HC TECH TIME PER 15 MIN (STAT)

## 2024-08-27 PROCEDURE — 25000003 PHARM REV CODE 250: Performed by: INTERNAL MEDICINE

## 2024-08-27 PROCEDURE — 21400001 HC TELEMETRY ROOM

## 2024-08-27 PROCEDURE — 84100 ASSAY OF PHOSPHORUS: CPT | Performed by: HOSPITALIST

## 2024-08-27 PROCEDURE — 27100171 HC OXYGEN HIGH FLOW UP TO 24 HOURS

## 2024-08-27 PROCEDURE — 94640 AIRWAY INHALATION TREATMENT: CPT

## 2024-08-27 PROCEDURE — 36415 COLL VENOUS BLD VENIPUNCTURE: CPT | Performed by: HOSPITALIST

## 2024-08-27 PROCEDURE — 63600175 PHARM REV CODE 636 W HCPCS: Mod: UD | Performed by: HOSPITALIST

## 2024-08-27 PROCEDURE — 80053 COMPREHEN METABOLIC PANEL: CPT | Performed by: HOSPITALIST

## 2024-08-27 PROCEDURE — 27000221 HC OXYGEN, UP TO 24 HOURS

## 2024-08-27 PROCEDURE — 94761 N-INVAS EAR/PLS OXIMETRY MLT: CPT

## 2024-08-27 PROCEDURE — 94660 CPAP INITIATION&MGMT: CPT

## 2024-08-27 PROCEDURE — 99233 SBSQ HOSP IP/OBS HIGH 50: CPT | Mod: ,,, | Performed by: STUDENT IN AN ORGANIZED HEALTH CARE EDUCATION/TRAINING PROGRAM

## 2024-08-27 PROCEDURE — 83735 ASSAY OF MAGNESIUM: CPT | Performed by: HOSPITALIST

## 2024-08-27 PROCEDURE — 25000242 PHARM REV CODE 250 ALT 637 W/ HCPCS: Performed by: HOSPITALIST

## 2024-08-27 PROCEDURE — 25000003 PHARM REV CODE 250: Performed by: HOSPITALIST

## 2024-08-27 PROCEDURE — 94664 DEMO&/EVAL PT USE INHALER: CPT

## 2024-08-27 PROCEDURE — 25000003 PHARM REV CODE 250: Performed by: STUDENT IN AN ORGANIZED HEALTH CARE EDUCATION/TRAINING PROGRAM

## 2024-08-27 PROCEDURE — 99900031 HC PATIENT EDUCATION (STAT)

## 2024-08-27 RX ORDER — PREDNISONE 20 MG/1
60 TABLET ORAL DAILY
Status: DISCONTINUED | OUTPATIENT
Start: 2024-08-27 | End: 2024-08-29 | Stop reason: HOSPADM

## 2024-08-27 RX ORDER — WARFARIN 2.5 MG/1
5 TABLET ORAL DAILY
Status: DISCONTINUED | OUTPATIENT
Start: 2024-08-27 | End: 2024-08-29 | Stop reason: HOSPADM

## 2024-08-27 RX ADMIN — GUAIFENESIN AND DEXTROMETHORPHAN 10 ML: 100; 10 SYRUP ORAL at 11:08

## 2024-08-27 RX ADMIN — IPRATROPIUM BROMIDE AND ALBUTEROL SULFATE 3 ML: .5; 2.5 SOLUTION RESPIRATORY (INHALATION) at 04:08

## 2024-08-27 RX ADMIN — GUAIFENESIN AND DEXTROMETHORPHAN 10 ML: 100; 10 SYRUP ORAL at 05:08

## 2024-08-27 RX ADMIN — PREDNISONE 60 MG: 20 TABLET ORAL at 12:08

## 2024-08-27 RX ADMIN — WARFARIN SODIUM 5 MG: 2.5 TABLET ORAL at 05:08

## 2024-08-27 RX ADMIN — CEFTRIAXONE SODIUM 2 G: 2 INJECTION, POWDER, FOR SOLUTION INTRAMUSCULAR; INTRAVENOUS at 04:08

## 2024-08-27 RX ADMIN — IPRATROPIUM BROMIDE AND ALBUTEROL SULFATE 3 ML: .5; 2.5 SOLUTION RESPIRATORY (INHALATION) at 12:08

## 2024-08-27 RX ADMIN — GUAIFENESIN AND DEXTROMETHORPHAN 10 ML: 100; 10 SYRUP ORAL at 06:08

## 2024-08-27 RX ADMIN — BENZONATATE 100 MG: 100 CAPSULE ORAL at 08:08

## 2024-08-27 RX ADMIN — ATORVASTATIN CALCIUM 80 MG: 40 TABLET, FILM COATED ORAL at 08:08

## 2024-08-27 RX ADMIN — IPRATROPIUM BROMIDE AND ALBUTEROL SULFATE 3 ML: .5; 2.5 SOLUTION RESPIRATORY (INHALATION) at 07:08

## 2024-08-27 RX ADMIN — GUAIFENESIN AND DEXTROMETHORPHAN 10 ML: 100; 10 SYRUP ORAL at 12:08

## 2024-08-27 RX ADMIN — MONTELUKAST 10 MG: 10 TABLET, FILM COATED ORAL at 08:08

## 2024-08-27 RX ADMIN — AZITHROMYCIN MONOHYDRATE 500 MG: 500 INJECTION, POWDER, LYOPHILIZED, FOR SOLUTION INTRAVENOUS at 04:08

## 2024-08-27 RX ADMIN — IPRATROPIUM BROMIDE AND ALBUTEROL SULFATE 3 ML: .5; 2.5 SOLUTION RESPIRATORY (INHALATION) at 11:08

## 2024-08-27 RX ADMIN — ONDANSETRON 4 MG: 2 INJECTION INTRAMUSCULAR; INTRAVENOUS at 06:08

## 2024-08-27 RX ADMIN — FLUTICASONE FUROATE, UMECLIDINIUM BROMIDE AND VILANTEROL TRIFENATATE 1 PUFF: 200; 62.5; 25 POWDER RESPIRATORY (INHALATION) at 07:08

## 2024-08-27 RX ADMIN — IPRATROPIUM BROMIDE AND ALBUTEROL SULFATE 3 ML: .5; 2.5 SOLUTION RESPIRATORY (INHALATION) at 03:08

## 2024-08-27 NOTE — ASSESSMENT & PLAN NOTE
Severe persistent asthma with acute exacerbation  Continue A/A nebs q4 hrs.   Give breo in place of home Trellegy  Continue montelukast 10 mg daily.   Restart steroids  Acapella

## 2024-08-27 NOTE — SUBJECTIVE & OBJECTIVE
Interval History: NAEON. Requiring O2 with ambulation. Continue current treatment plan. Add steroids.    Review of Systems   All other systems reviewed and are negative.    Objective:     Vital Signs (Most Recent):  Temp: 98 °F (36.7 °C) (08/27/24 1115)  Pulse: 93 (08/27/24 1115)  Resp: (!) 35 (08/27/24 1115)  BP: 133/60 (08/27/24 1115)  SpO2: 95 % (08/27/24 1115) Vital Signs (24h Range):  Temp:  [97.2 °F (36.2 °C)-98.4 °F (36.9 °C)] 98 °F (36.7 °C)  Pulse:  [] 93  Resp:  [14-37] 35  SpO2:  [88 %-100 %] 95 %  BP: (123-152)/(60-85) 133/60     Weight: (!) 136.2 kg (300 lb 4.3 oz)  Body mass index is 54.91 kg/m².  No intake or output data in the 24 hours ending 08/27/24 1218        Physical Exam      Vitals reviewed  General: NAD, Well developed, Well Nourished  Head: NC/AT  Eyes: EOMI, NOHELIA  Cardiovascular: Pulses intact distally, Regular Rate and rhythm  Pulmonary: increased Respiratory Rate, No respiratory distress  Gi: Soft, Non-tender  Extremities: Warm, No edema present  Skin: Warm, dry  Neuro: Alert, Oriented x3, No focal Deficit  Psych: Appropriate mood and affect    Significant Labs: All pertinent labs within the past 24 hours have been reviewed.    Significant Imaging: I have reviewed all pertinent imaging results/findings within the past 24 hours.

## 2024-08-27 NOTE — CARE UPDATE
08/27/24 0950   Home Oxygen Qualification   $ Home O2 Qualification Pulmonary Stress Test/6 min walk;Tech time 15 minutes   Room Air SpO2 At Rest (!) 89 %   Room Air SpO2 During Ambulation (!) 87 %   SpO2 During Ambulation on O2 90 %   Heart Rate on O2 86 bpm   Ambulation O2 LPM 2 LPM   SpO2 Post Ambulation 92 %   Post Ambulation Heart Rate 69 bpm   Post Ambulation O2 LPM 2 LPM

## 2024-08-27 NOTE — PLAN OF CARE
Problem: Pneumonia  Goal: Effective Oxygenation and Ventilation  Outcome: Progressing  Intervention: Promote Airway Secretion Clearance  Flowsheets (Taken 8/27/2024 1540)  Breathing Techniques/Airway Clearance: deep/controlled cough encouraged  Cough And Deep Breathing: done independently per patient  Intervention: Optimize Oxygenation and Ventilation  Flowsheets (Taken 8/27/2024 1540)  Airway/Ventilation Management: airway patency maintained  Head of Bed (HOB) Positioning: HOB elevated   Patient in no apparent distress. On 2 lpm after 6 min walk done. Aerosol treatments given Q 4. Aerobika started and done with treatments. Home Trelegy inhaler done daily. . Will continue to monitor.

## 2024-08-27 NOTE — PROGRESS NOTES
[facility] Medicine  Progress Note    Patient Name: Michelle Wren  MRN: [id number]  Patient Class: IP- Inpatient   Admission Date: 8/26/2024  Length of Stay: 1 days  Attending Physician: Ariel Engel MD  Primary Care Provider: Radha Holland MD        Subjective:     Principal Problem:Acute hypoxemic respiratory failure        HPI:  Ms. Wren is a 35 yo female with asthma, obesity hypoventilation syndrome who presented to [facility] for evaluation of increased shortness of breath and productive cough. She reports starting with cough, wheezing and shortness of breath last week. She came to the ED and was given steroids which she completed on Saturday. She continued with coughing and shortness of breath despite that treatment. She started having thick sputum with her productive cough. She denies any fever or chills. She did have pleuritic chest pain previously but that resolved with the steroids.     Overview/Hospital Course:  Patient admitted for acute hypoxic respiratory failure 2/2 asthma exacerbation caused by bacterial CAP (unknown organism). On IV abx, steroids, duonebs. Having some improvement but continues to have O2 requirement so continue current level of care.    Interval History: NAEON. Requiring O2 with ambulation. Continue current treatment plan. Add steroids.    Review of Systems   All other systems reviewed and are negative.    Objective:     Vital Signs (Most Recent):  Temp: 98 °F (36.7 °C) (08/27/24 1115)  Pulse: 93 (08/27/24 1115)  Resp: (!) 35 (08/27/24 1115)  BP: 133/60 (08/27/24 1115)  SpO2: 95 % (08/27/24 1115) Vital Signs (24h Range):  Temp:  [97.2 °F (36.2 °C)-98.4 °F (36.9 °C)] 98 °F (36.7 °C)  Pulse:  [] 93  Resp:  [14-37] 35  SpO2:  [88 %-100 %] 95 %  BP: (123-152)/(60-85) 133/60     Weight: (!) 136.2 kg (300 lb 4.3 oz)  Body mass index is 54.91 kg/m².  No intake or output data in the 24 hours ending 08/27/24 1218        Physical  Exam      Vitals reviewed  General: NAD, Well developed, Well Nourished  Head: NC/AT  Eyes: EOMI, NOHELIA  Cardiovascular: Pulses intact distally, Regular Rate and rhythm  Pulmonary: increased Respiratory Rate, No respiratory distress  Gi: Soft, Non-tender  Extremities: Warm, No edema present  Skin: Warm, dry  Neuro: Alert, Oriented x3, No focal Deficit  Psych: Appropriate mood and affect    Significant Labs: All pertinent labs within the past 24 hours have been reviewed.    Significant Imaging: I have reviewed all pertinent imaging results/findings within the past 24 hours.    Assessment/Plan:      * Acute hypoxemic respiratory failure  Pneumonia due to infectious organism  Patient with Hypoxic Respiratory failure which is Acute.  she is not on home oxygen. Supplemental oxygen was provided and noted- Oxygen Concentration (%):  [28] 28    .   Signs/symptoms of respiratory failure include- tachypnea, respiratory distress, and wheezing. Contributing diagnoses includes - Pneumonia Labs and images were reviewed. Patient Has not had a recent ABG. Will treat underlying causes and adjust management of respiratory failure as follows- IV ceftriaxone/azithromycin for the community acquired bacterial pneumonia, A/A nebs q4 hrs, mucinex BID,     Pulmonary emboli  Chronic anticoagulation with warfarin  INR at goal at 2.7  Monitor INR daily.   Will continue warfarin daily at decreased dose      Chronic obstructive pulmonary disease  Severe persistent asthma with acute exacerbation  Continue A/A nebs q4 hrs.   Give breo in place of home Trellegy  Continue montelukast 10 mg daily.   Restart steroids  Acapella    Type 2 diabetes mellitus  Patient's FSGs are controlled on current medication regimen.  Last A1c reviewed-   Lab Results   Component Value Date    HGBA1C 5.7 (H) 02/02/2024     Most recent fingerstick glucose reviewed-   Recent Labs   Lab 08/26/24  0522   POCTGLUCOSE 92     Current correctional scale  Low  Maintain  anti-hyperglycemic dose as follows-   Antihyperglycemics (From admission, onward)      Start     Stop Route Frequency Ordered    08/26/24 0510  insulin aspart U-100 pen 0-5 Units         -- SubQ Before meals & nightly PRN 08/26/24 0436          Hold Oral hypoglycemics and her home Trulicity while patient is in the hospital.    Nicotine dependence  Down to just a couple cigarettes a day. No nicotine patch required.     Obesity hypoventilation syndrome  Body mass index is 54.91 kg/m². Morbid obesity complicates all aspects of disease management from diagnostic modalities to treatment. Weight loss encouraged and health benefits explained to patient.   Continue nightly BiPAP.           VTE Risk Mitigation (From admission, onward)           Ordered     warfarin (COUMADIN) tablet 5 mg  Daily         08/27/24 1219     IP VTE HIGH RISK PATIENT  Once         08/26/24 0436                    Discharge Planning   BAKARI: 8/28/2024     Code Status: Full Code   Is the patient medically ready for discharge?:     Reason for patient still in hospital (select all that apply): Treatment  Discharge Plan A: Home with family                  Ariel Engel MD  Department of [facility] Medicine   [facility]

## 2024-08-27 NOTE — HOSPITAL COURSE
Patient admitted for acute hypoxic respiratory failure 2/2 asthma exacerbation caused by bacterial CAP (unknown organism). On IV abx, steroids, duonebs. Having some improvement but continues to have O2 requirement so continue current level of care.

## 2024-08-27 NOTE — ASSESSMENT & PLAN NOTE
Chronic anticoagulation with warfarin  INR at goal at 2.7  Monitor INR daily.   Will continue warfarin daily at decreased dose

## 2024-08-28 LAB
ALBUMIN SERPL BCP-MCNC: 3.2 G/DL (ref 3.5–5.2)
ALP SERPL-CCNC: 57 U/L (ref 55–135)
ALT SERPL W/O P-5'-P-CCNC: 19 U/L (ref 10–44)
ANION GAP SERPL CALC-SCNC: 10 MMOL/L (ref 8–16)
AST SERPL-CCNC: 22 U/L (ref 10–40)
BACTERIA SPEC AEROBE CULT: NORMAL
BASOPHILS # BLD AUTO: 0.02 K/UL (ref 0–0.2)
BASOPHILS NFR BLD: 0.1 % (ref 0–1.9)
BILIRUB SERPL-MCNC: 0.2 MG/DL (ref 0.1–1)
BUN SERPL-MCNC: 8 MG/DL (ref 6–20)
CALCIUM SERPL-MCNC: 9.2 MG/DL (ref 8.7–10.5)
CHLORIDE SERPL-SCNC: 104 MMOL/L (ref 95–110)
CO2 SERPL-SCNC: 24 MMOL/L (ref 23–29)
CREAT SERPL-MCNC: 0.8 MG/DL (ref 0.5–1.4)
DIFFERENTIAL METHOD BLD: ABNORMAL
EOSINOPHIL # BLD AUTO: 0 K/UL (ref 0–0.5)
EOSINOPHIL NFR BLD: 0 % (ref 0–8)
ERYTHROCYTE [DISTWIDTH] IN BLOOD BY AUTOMATED COUNT: 14.6 % (ref 11.5–14.5)
EST. GFR  (NO RACE VARIABLE): >60 ML/MIN/1.73 M^2
GLUCOSE SERPL-MCNC: 127 MG/DL (ref 70–110)
GRAM STN SPEC: NORMAL
HCT VFR BLD AUTO: 40.9 % (ref 37–48.5)
HGB BLD-MCNC: 13 G/DL (ref 12–16)
IMM GRANULOCYTES # BLD AUTO: 0.21 K/UL (ref 0–0.04)
IMM GRANULOCYTES NFR BLD AUTO: 1.2 % (ref 0–0.5)
INR PPP: 1.9 (ref 0.8–1.2)
LYMPHOCYTES # BLD AUTO: 2.4 K/UL (ref 1–4.8)
LYMPHOCYTES NFR BLD: 13 % (ref 18–48)
MAGNESIUM SERPL-MCNC: 2.2 MG/DL (ref 1.6–2.6)
MCH RBC QN AUTO: 26.6 PG (ref 27–31)
MCHC RBC AUTO-ENTMCNC: 31.8 G/DL (ref 32–36)
MCV RBC AUTO: 84 FL (ref 82–98)
MONOCYTES # BLD AUTO: 1.2 K/UL (ref 0.3–1)
MONOCYTES NFR BLD: 6.8 % (ref 4–15)
NEUTROPHILS # BLD AUTO: 14.3 K/UL (ref 1.8–7.7)
NEUTROPHILS NFR BLD: 78.9 % (ref 38–73)
NRBC BLD-RTO: 0 /100 WBC
PHOSPHATE SERPL-MCNC: 2.9 MG/DL (ref 2.7–4.5)
PLATELET # BLD AUTO: 359 K/UL (ref 150–450)
PMV BLD AUTO: 10 FL (ref 9.2–12.9)
POCT GLUCOSE: 101 MG/DL (ref 70–110)
POCT GLUCOSE: 142 MG/DL (ref 70–110)
POCT GLUCOSE: 73 MG/DL (ref 70–110)
POTASSIUM SERPL-SCNC: 4.1 MMOL/L (ref 3.5–5.1)
PROT SERPL-MCNC: 7.2 G/DL (ref 6–8.4)
PROTHROMBIN TIME: 18.8 SEC (ref 9–12.5)
RBC # BLD AUTO: 4.89 M/UL (ref 4–5.4)
SODIUM SERPL-SCNC: 138 MMOL/L (ref 136–145)
WBC # BLD AUTO: 18.09 K/UL (ref 3.9–12.7)

## 2024-08-28 PROCEDURE — 85025 COMPLETE CBC W/AUTO DIFF WBC: CPT | Performed by: HOSPITALIST

## 2024-08-28 PROCEDURE — 84100 ASSAY OF PHOSPHORUS: CPT | Performed by: HOSPITALIST

## 2024-08-28 PROCEDURE — 36415 COLL VENOUS BLD VENIPUNCTURE: CPT | Performed by: HOSPITALIST

## 2024-08-28 PROCEDURE — 94664 DEMO&/EVAL PT USE INHALER: CPT

## 2024-08-28 PROCEDURE — 83735 ASSAY OF MAGNESIUM: CPT | Performed by: HOSPITALIST

## 2024-08-28 PROCEDURE — 25000003 PHARM REV CODE 250: Performed by: HOSPITALIST

## 2024-08-28 PROCEDURE — 94640 AIRWAY INHALATION TREATMENT: CPT

## 2024-08-28 PROCEDURE — 85610 PROTHROMBIN TIME: CPT | Performed by: HOSPITALIST

## 2024-08-28 PROCEDURE — 99900035 HC TECH TIME PER 15 MIN (STAT)

## 2024-08-28 PROCEDURE — 80053 COMPREHEN METABOLIC PANEL: CPT | Performed by: HOSPITALIST

## 2024-08-28 PROCEDURE — 63600175 PHARM REV CODE 636 W HCPCS: Performed by: STUDENT IN AN ORGANIZED HEALTH CARE EDUCATION/TRAINING PROGRAM

## 2024-08-28 PROCEDURE — 25000242 PHARM REV CODE 250 ALT 637 W/ HCPCS: Performed by: STUDENT IN AN ORGANIZED HEALTH CARE EDUCATION/TRAINING PROGRAM

## 2024-08-28 PROCEDURE — 94761 N-INVAS EAR/PLS OXIMETRY MLT: CPT

## 2024-08-28 PROCEDURE — 25000242 PHARM REV CODE 250 ALT 637 W/ HCPCS: Performed by: HOSPITALIST

## 2024-08-28 PROCEDURE — 94660 CPAP INITIATION&MGMT: CPT

## 2024-08-28 PROCEDURE — 63600175 PHARM REV CODE 636 W HCPCS: Mod: UD | Performed by: HOSPITALIST

## 2024-08-28 PROCEDURE — 27100171 HC OXYGEN HIGH FLOW UP TO 24 HOURS

## 2024-08-28 PROCEDURE — 25000003 PHARM REV CODE 250: Performed by: STUDENT IN AN ORGANIZED HEALTH CARE EDUCATION/TRAINING PROGRAM

## 2024-08-28 PROCEDURE — 99232 SBSQ HOSP IP/OBS MODERATE 35: CPT | Mod: ,,, | Performed by: FAMILY MEDICINE

## 2024-08-28 PROCEDURE — 11000001 HC ACUTE MED/SURG PRIVATE ROOM

## 2024-08-28 PROCEDURE — 25000003 PHARM REV CODE 250: Performed by: INTERNAL MEDICINE

## 2024-08-28 RX ADMIN — CEFTRIAXONE SODIUM 2 G: 2 INJECTION, POWDER, FOR SOLUTION INTRAMUSCULAR; INTRAVENOUS at 04:08

## 2024-08-28 RX ADMIN — IPRATROPIUM BROMIDE AND ALBUTEROL SULFATE 3 ML: .5; 2.5 SOLUTION RESPIRATORY (INHALATION) at 11:08

## 2024-08-28 RX ADMIN — IPRATROPIUM BROMIDE AND ALBUTEROL SULFATE 3 ML: .5; 2.5 SOLUTION RESPIRATORY (INHALATION) at 07:08

## 2024-08-28 RX ADMIN — ACETAMINOPHEN 650 MG: 325 TABLET ORAL at 05:08

## 2024-08-28 RX ADMIN — FLUTICASONE FUROATE, UMECLIDINIUM BROMIDE AND VILANTEROL TRIFENATATE 1 PUFF: 200; 62.5; 25 POWDER RESPIRATORY (INHALATION) at 07:08

## 2024-08-28 RX ADMIN — ATORVASTATIN CALCIUM 80 MG: 40 TABLET, FILM COATED ORAL at 10:08

## 2024-08-28 RX ADMIN — AZITHROMYCIN MONOHYDRATE 500 MG: 500 INJECTION, POWDER, LYOPHILIZED, FOR SOLUTION INTRAVENOUS at 05:08

## 2024-08-28 RX ADMIN — CYCLOBENZAPRINE HYDROCHLORIDE 10 MG: 5 TABLET, FILM COATED ORAL at 05:08

## 2024-08-28 RX ADMIN — IPRATROPIUM BROMIDE AND ALBUTEROL SULFATE 3 ML: .5; 2.5 SOLUTION RESPIRATORY (INHALATION) at 03:08

## 2024-08-28 RX ADMIN — PREDNISONE 60 MG: 20 TABLET ORAL at 10:08

## 2024-08-28 RX ADMIN — GUAIFENESIN AND DEXTROMETHORPHAN 10 ML: 100; 10 SYRUP ORAL at 04:08

## 2024-08-28 RX ADMIN — MONTELUKAST 10 MG: 10 TABLET, FILM COATED ORAL at 10:08

## 2024-08-28 RX ADMIN — ONDANSETRON 4 MG: 2 INJECTION INTRAMUSCULAR; INTRAVENOUS at 04:08

## 2024-08-28 RX ADMIN — BENZONATATE 100 MG: 100 CAPSULE ORAL at 09:08

## 2024-08-28 RX ADMIN — WARFARIN SODIUM 5 MG: 2.5 TABLET ORAL at 05:08

## 2024-08-28 NOTE — PROGRESS NOTES
[facility] Medicine  Progress Note    Patient Name: Michelle Wren  MRN: [id number]  Patient Class: IP- Inpatient   Admission Date: 8/26/2024  Length of Stay: 2 days  Attending Physician: Mary Hurst*  Primary Care Provider: Radha Holland MD        Subjective:     Principal Problem:Acute hypoxemic respiratory failure        HPI:  Ms. Wren is a 35 yo female with asthma, obesity hypoventilation syndrome who presented to [place] ED for evaluation of increased shortness of breath and productive cough. She reports starting with cough, wheezing and shortness of breath last week. She came to the ED and was given steroids which she completed on Saturday. She continued with coughing and shortness of breath despite that treatment. She started having thick sputum with her productive cough. She denies any fever or chills. She did have pleuritic chest pain previously but that resolved with the steroids.     Overview/Hospital Course:  Patient admitted for acute hypoxic respiratory failure 2/2 asthma exacerbation caused by bacterial CAP (unknown organism). On IV abx, steroids, duonebs. Having some improvement but continues to have O2 requirement so continue current level of care.    Interval History:  Awake, alert, on supplemental oxygen  On exam still wheezing.  Continue antibiotics, oxygen and steroid  Patient may likely need home oxygen at discharge  Leukocytosis likely due to steroid, blood culture with NGTD.  Continue azithromycin and ceftriaxone    Review of Systems   Constitutional:  Positive for activity change.   Respiratory:  Positive for shortness of breath and wheezing.    Gastrointestinal:  Negative for abdominal pain, nausea and vomiting.   Genitourinary:  Negative for dysuria.     Objective:     Vital Signs (Most Recent):  Temp: 97.8 °F (36.6 °C) (08/28/24 0437)  Pulse: 84 (08/28/24 0704)  Resp: 18 (08/28/24 0704)  BP: (!) 115/55 (nurse made awear)  "(08/28/24 0436)  SpO2: (!) 94 % (08/28/24 0704) Vital Signs (24h Range):  Temp:  [97.2 °F (36.2 °C)-98.1 °F (36.7 °C)] 97.8 °F (36.6 °C)  Pulse:  [60-93] 84  Resp:  [13-38] 18  SpO2:  [91 %-100 %] 94 %  BP: (115-138)/(55-79) 115/55     Weight: (!) 136.2 kg (300 lb 4.3 oz)  Body mass index is 54.91 kg/m².  No intake or output data in the 24 hours ending 08/28/24 1039      Physical Exam  Constitutional:       Appearance: She is obese.   Cardiovascular:      Rate and Rhythm: Normal rate.   Pulmonary:      Breath sounds: Wheezing and rales present.   Abdominal:      General: Bowel sounds are normal.      Palpations: Abdomen is soft.      Tenderness: There is no abdominal tenderness.   Musculoskeletal:      Cervical back: Normal range of motion.      Right lower leg: Edema present.      Left lower leg: Edema present.   Skin:     Capillary Refill: Capillary refill takes less than 2 seconds.   Neurological:      Mental Status: She is alert and oriented to person, place, and time.   Psychiatric:         Mood and Affect: Mood normal.         Behavior: Behavior normal.             Significant Labs: A1C:   Recent Labs   Lab 08/27/24  0648   HGBA1C 5.6     ABGs: No results for input(s): "PH", "PCO2", "HCO3", "POCSATURATED", "BE", "TOTALHB", "COHB", "METHB", "O2HB", "POCFIO2", "PO2" in the last 48 hours.  Blood Culture: No results for input(s): "LABBLOO" in the last 48 hours.  CBC:   Recent Labs   Lab 08/27/24  0647 08/28/24  0544   WBC 11.59 18.09*   HGB 12.8 13.0   HCT 40.1 40.9    359     CMP:   Recent Labs   Lab 08/27/24  0647 08/28/24  0544    138   K 3.6 4.1    104   CO2 23 24   * 127*   BUN 8 8   CREATININE 0.9 0.8   CALCIUM 8.7 9.2   PROT 7.0 7.2   ALBUMIN 3.2* 3.2*   BILITOT 0.4 0.2   ALKPHOS 57 57   AST 24 22   ALT 21 19   ANIONGAP 12 10     Cardiac Markers: No results for input(s): "CKMB", "MYOGLOBIN", "BNP", "TROPISTAT" in the last 48 hours.  Lactic Acid: No results for input(s): "LACTATE" " "in the last 48 hours.  Lipase: No results for input(s): "LIPASE" in the last 48 hours.  Lipid Panel: No results for input(s): "CHOL", "HDL", "LDLCALC", "TRIG", "CHOLHDL" in the last 48 hours.  Magnesium:   Recent Labs   Lab 08/27/24  0647 08/28/24  0544   MG 2.0 2.2     Respiratory Culture:   Recent Labs   Lab 08/26/24  1203   GSRESP <10 epithelial cells per low power field.  Rare WBC's  No organisms seen   RESPIRATORYC Insufficient incubation, culture in progress     Troponin: No results for input(s): "TROPONINI", "TROPONINIHS" in the last 48 hours.  TSH: No results for input(s): "TSH" in the last 4320 hours.  Urine Culture: No results for input(s): "LABURIN" in the last 48 hours.  Urine Studies: No results for input(s): "COLORU", "APPEARANCEUA", "PHUR", "SPECGRAV", "PROTEINUA", "GLUCUA", "KETONESU", "BILIRUBINUA", "OCCULTUA", "NITRITE", "UROBILINOGEN", "LEUKOCYTESUR", "RBCUA", "WBCUA", "BACTERIA", "SQUAMEPITHEL", "HYALINECASTS" in the last 48 hours.    Invalid input(s): "WRIGHTSUR"    Significant Imaging: I have reviewed all pertinent imaging results/findings within the past 24 hours.    Assessment/Plan:      * Acute hypoxemic respiratory failure  Pneumonia due to infectious organism  Patient with Hypoxic Respiratory failure which is Acute.  she is not on home oxygen. Supplemental oxygen was provided and noted- Oxygen Concentration (%):  [28] 28    .   Signs/symptoms of respiratory failure include- tachypnea, respiratory distress, and wheezing. Contributing diagnoses includes - Pneumonia Labs and images were reviewed. Patient Has not had a recent ABG. Will treat underlying causes and adjust management of respiratory failure as follows- IV ceftriaxone/azithromycin for the community acquired bacterial pneumonia, A/A nebs q4 hrs, mucinex BID,     Pulmonary emboli  Chronic anticoagulation with warfarin  INR at goal at 2.7  Monitor INR daily.   Will continue warfarin daily at decreased dose      Chronic obstructive " pulmonary disease  Severe persistent asthma with acute exacerbation  Continue A/A nebs q4 hrs.   Give breo in place of home Trellegysabel  Continue montelukast 10 mg daily.   Restart steroids  Acapella    Type 2 diabetes mellitus  Patient's FSGs are controlled on current medication regimen.  Last A1c reviewed-   Lab Results   Component Value Date    HGBA1C 5.7 (H) 02/02/2024     Most recent fingerstick glucose reviewed-   Recent Labs   Lab 08/26/24  0522   POCTGLUCOSE 92     Current correctional scale  Low  Maintain anti-hyperglycemic dose as follows-   Antihyperglycemics (From admission, onward)      Start     Stop Route Frequency Ordered    08/26/24 0510  insulin aspart U-100 pen 0-5 Units         -- SubQ Before meals & nightly PRN 08/26/24 0436          Hold Oral hypoglycemics and her home Trulicity while patient is in the hospital.    Nicotine dependence  Down to just a couple cigarettes a day. No nicotine patch required.     Obesity hypoventilation syndrome  Body mass index is 54.91 kg/m². Morbid obesity complicates all aspects of disease management from diagnostic modalities to treatment. Weight loss encouraged and health benefits explained to patient.   Continue nightly BiPAP.           VTE Risk Mitigation (From admission, onward)           Ordered     warfarin (COUMADIN) tablet 5 mg  Daily         08/27/24 1219     IP VTE HIGH RISK PATIENT  Once         08/26/24 0436                    Discharge Planning   BAKARI: 8/28/2024     Code Status: Full Code   Is the patient medically ready for discharge?:     Reason for patient still in hospital (select all that apply): Patient trending condition  Discharge Plan A: Home with family                  Mary Hurst MD  Department of [facility] Medicine   [place] [facility]

## 2024-08-28 NOTE — PLAN OF CARE
Problem: Pneumonia  Goal: Effective Oxygenation and Ventilation  Outcome: Progressing  Intervention: Promote Airway Secretion Clearance  Flowsheets (Taken 8/28/2024 1507)  Breathing Techniques/Airway Clearance:   deep/controlled cough encouraged   diaphragmatic breathing promoted  Cough And Deep Breathing: done independently per patient  Intervention: Optimize Oxygenation and Ventilation  Flowsheets (Taken 8/28/2024 1507)  Airway/Ventilation Management:   airway patency maintained   pulmonary hygiene promoted  Head of Bed (HOB) Positioning: HOB elevated   Patient in no apparent distress. Patient on 2 lpm. Aerosol treatments and aerobika done Q 4. Home trelegy given daily. BIPAP for use Q HS . Will continue to monitor.

## 2024-08-28 NOTE — PLAN OF CARE
Problem: Violence Risk or Actual  Goal: Anger and Impulse Control  Outcome: Progressing     Problem: Adult Inpatient Plan of Care  Goal: Plan of Care Review  Outcome: Progressing  Goal: Patient-Specific Goal (Individualized)  Outcome: Progressing  Goal: Absence of Hospital-Acquired Illness or Injury  Outcome: Progressing  Goal: Optimal Comfort and Wellbeing  Outcome: Progressing  Goal: Readiness for Transition of Care  Outcome: Progressing     Problem: Diabetes Comorbidity  Goal: Blood Glucose Level Within Targeted Range  Outcome: Progressing     Problem: Bariatric Environmental Safety  Goal: Safety Maintained with Care  Outcome: Progressing     Problem: Pneumonia  Goal: Fluid Balance  Outcome: Progressing  Goal: Resolution of Infection Signs and Symptoms  Outcome: Progressing  Goal: Effective Oxygenation and Ventilation  Outcome: Progressing

## 2024-08-28 NOTE — SUBJECTIVE & OBJECTIVE
"Interval History:  Awake, alert, on supplemental oxygen  On exam still wheezing.  Continue antibiotics, oxygen and steroid  Patient may likely need home oxygen at discharge  Leukocytosis likely due to steroid, blood culture with NGTD.  Continue azithromycin and ceftriaxone    Review of Systems   Constitutional:  Positive for activity change.   Respiratory:  Positive for shortness of breath and wheezing.    Gastrointestinal:  Negative for abdominal pain, nausea and vomiting.   Genitourinary:  Negative for dysuria.     Objective:     Vital Signs (Most Recent):  Temp: 97.8 °F (36.6 °C) (08/28/24 0437)  Pulse: 84 (08/28/24 0704)  Resp: 18 (08/28/24 0704)  BP: (!) 115/55 (nurse made awear) (08/28/24 0436)  SpO2: (!) 94 % (08/28/24 0704) Vital Signs (24h Range):  Temp:  [97.2 °F (36.2 °C)-98.1 °F (36.7 °C)] 97.8 °F (36.6 °C)  Pulse:  [60-93] 84  Resp:  [13-38] 18  SpO2:  [91 %-100 %] 94 %  BP: (115-138)/(55-79) 115/55     Weight: (!) 136.2 kg (300 lb 4.3 oz)  Body mass index is 54.91 kg/m².  No intake or output data in the 24 hours ending 08/28/24 1039      Physical Exam  Constitutional:       Appearance: She is obese.   Cardiovascular:      Rate and Rhythm: Normal rate.   Pulmonary:      Breath sounds: Wheezing and rales present.   Abdominal:      General: Bowel sounds are normal.      Palpations: Abdomen is soft.      Tenderness: There is no abdominal tenderness.   Musculoskeletal:      Cervical back: Normal range of motion.      Right lower leg: Edema present.      Left lower leg: Edema present.   Skin:     Capillary Refill: Capillary refill takes less than 2 seconds.   Neurological:      Mental Status: She is alert and oriented to person, place, and time.   Psychiatric:         Mood and Affect: Mood normal.         Behavior: Behavior normal.             Significant Labs: A1C:   Recent Labs   Lab 08/27/24  0648   HGBA1C 5.6     ABGs: No results for input(s): "PH", "PCO2", "HCO3", "POCSATURATED", "BE", "TOTALHB", " ""COHB", "METHB", "O2HB", "POCFIO2", "PO2" in the last 48 hours.  Blood Culture: No results for input(s): "LABBLOO" in the last 48 hours.  CBC:   Recent Labs   Lab 08/27/24  0647 08/28/24  0544   WBC 11.59 18.09*   HGB 12.8 13.0   HCT 40.1 40.9    359     CMP:   Recent Labs   Lab 08/27/24  0647 08/28/24  0544    138   K 3.6 4.1    104   CO2 23 24   * 127*   BUN 8 8   CREATININE 0.9 0.8   CALCIUM 8.7 9.2   PROT 7.0 7.2   ALBUMIN 3.2* 3.2*   BILITOT 0.4 0.2   ALKPHOS 57 57   AST 24 22   ALT 21 19   ANIONGAP 12 10     Cardiac Markers: No results for input(s): "CKMB", "MYOGLOBIN", "BNP", "TROPISTAT" in the last 48 hours.  Lactic Acid: No results for input(s): "LACTATE" in the last 48 hours.  Lipase: No results for input(s): "LIPASE" in the last 48 hours.  Lipid Panel: No results for input(s): "CHOL", "HDL", "LDLCALC", "TRIG", "CHOLHDL" in the last 48 hours.  Magnesium:   Recent Labs   Lab 08/27/24  0647 08/28/24  0544   MG 2.0 2.2     Respiratory Culture:   Recent Labs   Lab 08/26/24  1203   GSRESP <10 epithelial cells per low power field.  Rare WBC's  No organisms seen   RESPIRATORYC Insufficient incubation, culture in progress     Troponin: No results for input(s): "TROPONINI", "TROPONINIHS" in the last 48 hours.  TSH: No results for input(s): "TSH" in the last 4320 hours.  Urine Culture: No results for input(s): "LABURIN" in the last 48 hours.  Urine Studies: No results for input(s): "COLORU", "APPEARANCEUA", "PHUR", "SPECGRAV", "PROTEINUA", "GLUCUA", "KETONESU", "BILIRUBINUA", "OCCULTUA", "NITRITE", "UROBILINOGEN", "LEUKOCYTESUR", "RBCUA", "WBCUA", "BACTERIA", "SQUAMEPITHEL", "HYALINECASTS" in the last 48 hours.    Invalid input(s): "GT"    Significant Imaging: I have reviewed all pertinent imaging results/findings within the past 24 hours.  "

## 2024-08-29 VITALS
BODY MASS INDEX: 53.92 KG/M2 | OXYGEN SATURATION: 92 % | SYSTOLIC BLOOD PRESSURE: 118 MMHG | HEART RATE: 83 BPM | WEIGHT: 293 LBS | TEMPERATURE: 98 F | HEIGHT: 62 IN | RESPIRATION RATE: 24 BRPM | DIASTOLIC BLOOD PRESSURE: 62 MMHG

## 2024-08-29 LAB
ALBUMIN SERPL BCP-MCNC: 3.1 G/DL (ref 3.5–5.2)
ALP SERPL-CCNC: 61 U/L (ref 55–135)
ALT SERPL W/O P-5'-P-CCNC: 19 U/L (ref 10–44)
ANION GAP SERPL CALC-SCNC: 8 MMOL/L (ref 8–16)
AST SERPL-CCNC: 17 U/L (ref 10–40)
BASOPHILS # BLD AUTO: 0.03 K/UL (ref 0–0.2)
BASOPHILS NFR BLD: 0.2 % (ref 0–1.9)
BILIRUB SERPL-MCNC: 0.2 MG/DL (ref 0.1–1)
BUN SERPL-MCNC: 11 MG/DL (ref 6–20)
CALCIUM SERPL-MCNC: 9.1 MG/DL (ref 8.7–10.5)
CHLORIDE SERPL-SCNC: 105 MMOL/L (ref 95–110)
CO2 SERPL-SCNC: 25 MMOL/L (ref 23–29)
CREAT SERPL-MCNC: 0.8 MG/DL (ref 0.5–1.4)
DIFFERENTIAL METHOD BLD: ABNORMAL
EOSINOPHIL # BLD AUTO: 0 K/UL (ref 0–0.5)
EOSINOPHIL NFR BLD: 0 % (ref 0–8)
ERYTHROCYTE [DISTWIDTH] IN BLOOD BY AUTOMATED COUNT: 14.6 % (ref 11.5–14.5)
EST. GFR  (NO RACE VARIABLE): >60 ML/MIN/1.73 M^2
GLUCOSE SERPL-MCNC: 94 MG/DL (ref 70–110)
HCT VFR BLD AUTO: 39.6 % (ref 37–48.5)
HGB BLD-MCNC: 12.6 G/DL (ref 12–16)
IMM GRANULOCYTES # BLD AUTO: 0.2 K/UL (ref 0–0.04)
IMM GRANULOCYTES NFR BLD AUTO: 1.1 % (ref 0–0.5)
INR PPP: 2.1 (ref 0.8–1.2)
LYMPHOCYTES # BLD AUTO: 3.8 K/UL (ref 1–4.8)
LYMPHOCYTES NFR BLD: 19.8 % (ref 18–48)
MAGNESIUM SERPL-MCNC: 1.9 MG/DL (ref 1.6–2.6)
MCH RBC QN AUTO: 26.4 PG (ref 27–31)
MCHC RBC AUTO-ENTMCNC: 31.8 G/DL (ref 32–36)
MCV RBC AUTO: 83 FL (ref 82–98)
MONOCYTES # BLD AUTO: 2 K/UL (ref 0.3–1)
MONOCYTES NFR BLD: 10.5 % (ref 4–15)
NEUTROPHILS # BLD AUTO: 13 K/UL (ref 1.8–7.7)
NEUTROPHILS NFR BLD: 68.4 % (ref 38–73)
NRBC BLD-RTO: 0 /100 WBC
PHOSPHATE SERPL-MCNC: 3.3 MG/DL (ref 2.7–4.5)
PLATELET # BLD AUTO: 364 K/UL (ref 150–450)
PMV BLD AUTO: 10 FL (ref 9.2–12.9)
POCT GLUCOSE: 125 MG/DL (ref 70–110)
POCT GLUCOSE: 74 MG/DL (ref 70–110)
POTASSIUM SERPL-SCNC: 3.7 MMOL/L (ref 3.5–5.1)
PROT SERPL-MCNC: 6.7 G/DL (ref 6–8.4)
PROTHROMBIN TIME: 20.9 SEC (ref 9–12.5)
RBC # BLD AUTO: 4.77 M/UL (ref 4–5.4)
SODIUM SERPL-SCNC: 138 MMOL/L (ref 136–145)
WBC # BLD AUTO: 19.02 K/UL (ref 3.9–12.7)

## 2024-08-29 PROCEDURE — 36415 COLL VENOUS BLD VENIPUNCTURE: CPT | Performed by: HOSPITALIST

## 2024-08-29 PROCEDURE — 99900035 HC TECH TIME PER 15 MIN (STAT)

## 2024-08-29 PROCEDURE — 85025 COMPLETE CBC W/AUTO DIFF WBC: CPT | Performed by: HOSPITALIST

## 2024-08-29 PROCEDURE — 25000242 PHARM REV CODE 250 ALT 637 W/ HCPCS: Performed by: HOSPITALIST

## 2024-08-29 PROCEDURE — 1111F DSCHRG MED/CURRENT MED MERGE: CPT | Mod: CPTII,,, | Performed by: FAMILY MEDICINE

## 2024-08-29 PROCEDURE — 25000003 PHARM REV CODE 250: Performed by: HOSPITALIST

## 2024-08-29 PROCEDURE — 63600175 PHARM REV CODE 636 W HCPCS: Mod: UD | Performed by: HOSPITALIST

## 2024-08-29 PROCEDURE — 94660 CPAP INITIATION&MGMT: CPT

## 2024-08-29 PROCEDURE — 25000242 PHARM REV CODE 250 ALT 637 W/ HCPCS: Performed by: STUDENT IN AN ORGANIZED HEALTH CARE EDUCATION/TRAINING PROGRAM

## 2024-08-29 PROCEDURE — 80053 COMPREHEN METABOLIC PANEL: CPT | Performed by: HOSPITALIST

## 2024-08-29 PROCEDURE — 94640 AIRWAY INHALATION TREATMENT: CPT

## 2024-08-29 PROCEDURE — 84100 ASSAY OF PHOSPHORUS: CPT | Performed by: HOSPITALIST

## 2024-08-29 PROCEDURE — 25000003 PHARM REV CODE 250: Performed by: INTERNAL MEDICINE

## 2024-08-29 PROCEDURE — 99239 HOSP IP/OBS DSCHRG MGMT >30: CPT | Mod: ,,, | Performed by: FAMILY MEDICINE

## 2024-08-29 PROCEDURE — 63600175 PHARM REV CODE 636 W HCPCS: Performed by: STUDENT IN AN ORGANIZED HEALTH CARE EDUCATION/TRAINING PROGRAM

## 2024-08-29 PROCEDURE — 83735 ASSAY OF MAGNESIUM: CPT | Performed by: HOSPITALIST

## 2024-08-29 PROCEDURE — 27100171 HC OXYGEN HIGH FLOW UP TO 24 HOURS

## 2024-08-29 PROCEDURE — 94664 DEMO&/EVAL PT USE INHALER: CPT

## 2024-08-29 PROCEDURE — 85610 PROTHROMBIN TIME: CPT | Performed by: HOSPITALIST

## 2024-08-29 PROCEDURE — 94761 N-INVAS EAR/PLS OXIMETRY MLT: CPT

## 2024-08-29 RX ORDER — WARFARIN SODIUM 5 MG/1
5 TABLET ORAL DAILY
Qty: 30 TABLET | Refills: 11 | Status: SHIPPED | OUTPATIENT
Start: 2024-08-29 | End: 2025-08-29

## 2024-08-29 RX ORDER — IBUPROFEN 200 MG
1 TABLET ORAL DAILY
Qty: 30 PATCH | Refills: 2 | Status: SHIPPED | OUTPATIENT
Start: 2024-08-29

## 2024-08-29 RX ORDER — IPRATROPIUM BROMIDE AND ALBUTEROL SULFATE 2.5; .5 MG/3ML; MG/3ML
3 SOLUTION RESPIRATORY (INHALATION) EVERY 4 HOURS
Qty: 75 ML | Refills: 0 | Status: SHIPPED | OUTPATIENT
Start: 2024-08-29 | End: 2025-08-29

## 2024-08-29 RX ORDER — AZITHROMYCIN 250 MG/1
TABLET, FILM COATED ORAL
Qty: 4 TABLET | Refills: 0 | Status: SHIPPED | OUTPATIENT
Start: 2024-08-29 | End: 2024-09-03

## 2024-08-29 RX ORDER — BENZONATATE 100 MG/1
100 CAPSULE ORAL 3 TIMES DAILY PRN
Qty: 30 CAPSULE | Refills: 0 | Status: SHIPPED | OUTPATIENT
Start: 2024-08-29 | End: 2024-09-08

## 2024-08-29 RX ORDER — PREDNISONE 20 MG/1
40 TABLET ORAL DAILY
Qty: 8 TABLET | Refills: 0 | Status: SHIPPED | OUTPATIENT
Start: 2024-08-30 | End: 2024-09-03

## 2024-08-29 RX ADMIN — ATORVASTATIN CALCIUM 80 MG: 40 TABLET, FILM COATED ORAL at 09:08

## 2024-08-29 RX ADMIN — ONDANSETRON 4 MG: 2 INJECTION INTRAMUSCULAR; INTRAVENOUS at 04:08

## 2024-08-29 RX ADMIN — CEFTRIAXONE SODIUM 2 G: 2 INJECTION, POWDER, FOR SOLUTION INTRAMUSCULAR; INTRAVENOUS at 04:08

## 2024-08-29 RX ADMIN — PREDNISONE 60 MG: 20 TABLET ORAL at 09:08

## 2024-08-29 RX ADMIN — AZITHROMYCIN MONOHYDRATE 500 MG: 500 INJECTION, POWDER, LYOPHILIZED, FOR SOLUTION INTRAVENOUS at 04:08

## 2024-08-29 RX ADMIN — IPRATROPIUM BROMIDE AND ALBUTEROL SULFATE 3 ML: .5; 2.5 SOLUTION RESPIRATORY (INHALATION) at 03:08

## 2024-08-29 RX ADMIN — IPRATROPIUM BROMIDE AND ALBUTEROL SULFATE 3 ML: .5; 2.5 SOLUTION RESPIRATORY (INHALATION) at 07:08

## 2024-08-29 RX ADMIN — FLUTICASONE FUROATE, UMECLIDINIUM BROMIDE AND VILANTEROL TRIFENATATE 1 PUFF: 200; 62.5; 25 POWDER RESPIRATORY (INHALATION) at 07:08

## 2024-08-29 RX ADMIN — MONTELUKAST 10 MG: 10 TABLET, FILM COATED ORAL at 09:08

## 2024-08-29 RX ADMIN — BENZONATATE 100 MG: 100 CAPSULE ORAL at 09:08

## 2024-08-29 NOTE — SUBJECTIVE & OBJECTIVE
"Interval History:  Awake, alert, on supplemental oxygen- discharge on oxygen  Stable for discharge on abc and steroid.         Review of Systems   Constitutional:  Positive for activity change.   Respiratory:  Negative for shortness of breath and wheezing.    Gastrointestinal:  Negative for abdominal pain, nausea and vomiting.   Genitourinary:  Negative for dysuria.     Objective:     Vital Signs (Most Recent):  Temp: 98.1 °F (36.7 °C) (08/29/24 0805)  Pulse: 83 (08/29/24 0805)  Resp: (!) 24 (08/29/24 0805)  BP: 118/62 (08/29/24 0805)  SpO2: (!) 92 % (08/29/24 0805) Vital Signs (24h Range):  Temp:  [97.8 °F (36.6 °C)-98.6 °F (37 °C)] 98.1 °F (36.7 °C)  Pulse:  [] 83  Resp:  [13-47] 24  SpO2:  [91 %-100 %] 92 %  BP: (105-118)/(56-71) 118/62     Weight: (!) 136.2 kg (300 lb 4.3 oz)  Body mass index is 54.91 kg/m².  No intake or output data in the 24 hours ending 08/29/24 0957      Physical Exam  Constitutional:       Appearance: She is obese.   Cardiovascular:      Rate and Rhythm: Normal rate.   Pulmonary:      Breath sounds: No wheezing or rales.   Abdominal:      General: Bowel sounds are normal.      Palpations: Abdomen is soft.      Tenderness: There is no abdominal tenderness.   Musculoskeletal:      Cervical back: Normal range of motion.      Right lower leg: No edema.      Left lower leg: No edema.   Skin:     Capillary Refill: Capillary refill takes less than 2 seconds.   Neurological:      Mental Status: She is alert and oriented to person, place, and time.   Psychiatric:         Mood and Affect: Mood normal.         Behavior: Behavior normal.             Significant Labs: A1C:   Recent Labs   Lab 08/27/24  0648   HGBA1C 5.6     ABGs: No results for input(s): "PH", "PCO2", "HCO3", "POCSATURATED", "BE", "TOTALHB", "COHB", "METHB", "O2HB", "POCFIO2", "PO2" in the last 48 hours.  Blood Culture: No results for input(s): "LABBLOO" in the last 48 hours.  CBC:   Recent Labs   Lab 08/28/24  0544 08/29/24  0546 " "  WBC 18.09* 19.02*   HGB 13.0 12.6   HCT 40.9 39.6    364     CMP:   Recent Labs   Lab 08/28/24  0544 08/29/24  0546    138   K 4.1 3.7    105   CO2 24 25   * 94   BUN 8 11   CREATININE 0.8 0.8   CALCIUM 9.2 9.1   PROT 7.2 6.7   ALBUMIN 3.2* 3.1*   BILITOT 0.2 0.2   ALKPHOS 57 61   AST 22 17   ALT 19 19   ANIONGAP 10 8     Cardiac Markers: No results for input(s): "CKMB", "MYOGLOBIN", "BNP", "TROPISTAT" in the last 48 hours.  Lactic Acid: No results for input(s): "LACTATE" in the last 48 hours.  Lipase: No results for input(s): "LIPASE" in the last 48 hours.  Lipid Panel: No results for input(s): "CHOL", "HDL", "LDLCALC", "TRIG", "CHOLHDL" in the last 48 hours.  Magnesium:   Recent Labs   Lab 08/28/24  0544 08/29/24  0546   MG 2.2 1.9     Respiratory Culture:   No results for input(s): "GSRESP", "RESPIRATORYC" in the last 48 hours.    Troponin: No results for input(s): "TROPONINI", "TROPONINIHS" in the last 48 hours.  TSH: No results for input(s): "TSH" in the last 4320 hours.  Urine Culture: No results for input(s): "LABURIN" in the last 48 hours.  Urine Studies: No results for input(s): "COLORU", "APPEARANCEUA", "PHUR", "SPECGRAV", "PROTEINUA", "GLUCUA", "KETONESU", "BILIRUBINUA", "OCCULTUA", "NITRITE", "UROBILINOGEN", "LEUKOCYTESUR", "RBCUA", "WBCUA", "BACTERIA", "SQUAMEPITHEL", "HYALINECASTS" in the last 48 hours.    Invalid input(s): "WRIGHTSUR"    Significant Imaging: I have reviewed all pertinent imaging results/findings within the past 24 hours.  "

## 2024-08-29 NOTE — ASSESSMENT & PLAN NOTE
Patient's FSGs are controlled on current medication regimen.  Last A1c reviewed-   Lab Results   Component Value Date    HGBA1C 5.6 08/27/2024     Most recent fingerstick glucose reviewed-   Recent Labs   Lab 08/28/24  1133 08/28/24  1704 08/28/24  2103   POCTGLUCOSE 101 142* 125*       Current correctional scale  Low  Maintain anti-hyperglycemic dose as follows-   Antihyperglycemics (From admission, onward)    Start     Stop Route Frequency Ordered    08/26/24 0510  insulin aspart U-100 pen 0-5 Units         -- SubQ Before meals & nightly PRN 08/26/24 0436        Hold Oral hypoglycemics and her home Trulicity while patient is in the hospital.

## 2024-08-29 NOTE — PLAN OF CARE
Re:  Michelle Wren, WORK / SCHOOL EXCUSE    Date: 08/29/2024           [facility] [place] [facility] [address] [place] [address]  Office: [phone]  Fax: [phone]     To whom it may concern:    Ms. Michelle Wren has been hospitalized at the [facility] [place] since 8/26/2024.  Please excuse the patient from duties.  Patient may return on 9/3/2024.  No restrictions.     Please contact me if you have any questions.                __________________________  Mary Hurst MD

## 2024-08-29 NOTE — DISCHARGE SUMMARY
[facility]  Discharge Summary      Patient Name: Michelle Wren  MRN: [id number]  RORY: [id number]  Patient Class: IP- Inpatient  Admission Date: 8/26/2024  Hospital Length of Stay: 3 days  Discharge Date and Time: {IP DISCHARGE DATE:82061139}  Attending Physician: Mary Hurst*   Discharging Provider: Mary Hurst MD  Primary Care Provider: Radha Holland MD    Primary Care Team: Networked reference to record PCT     HPI:   Ms. Wren is a 35 yo female with asthma, obesity hypoventilation syndrome who presented to [facility] ED for evaluation of increased shortness of breath and productive cough. She reports starting with cough, wheezing and shortness of breath last week. She came to the ED and was given steroids which she completed on Saturday. She continued with coughing and shortness of breath despite that treatment. She started having thick sputum with her productive cough. She denies any fever or chills. She did have pleuritic chest pain previously but that resolved with the steroids.     * No surgery found *      Hospital Course:   Patient admitted for acute hypoxic respiratory failure 2/2 asthma exacerbation caused by bacterial CAP (unknown organism). On IV abx, steroids, duonebs. Having some improvement but continues to have O2 requirement so continue current level of care.     Goals of Care Treatment Preferences:  Code Status: Full Code      SDOH Screening:  The patient was screened for food insecurity, housing instability, transportation needs, utility difficulties, and interpersonal safety. The social determinant(s) of health identified as a concern this admission are:  Food insecurity    The plan to address these concerns is: ***    Social Determinants of Health with Concerns     Food Insecurity: Food Insecurity Present (8/26/2024)        Consults:     No new Assessment & Plan notes have been filed under this hospital service  "since the last note was generated.  Service: Hospital Medicine    Final Active Diagnoses:    Diagnosis Date Noted POA    PRINCIPAL PROBLEM:  Acute hypoxemic respiratory failure [J96.01] 07/10/2020 Yes    Pulmonary emboli [I26.99] 02/12/2021 Yes    Chronic obstructive pulmonary disease [J44.9] 07/30/2020 Yes    Severe persistent asthma with acute exacerbation [J45.51] 07/30/2020 Yes    Pulmonary hypertension [I27.20] 07/01/2020 Yes    Pneumonia due to infectious organism [J18.9] 06/26/2020 Yes    Type 2 diabetes mellitus [E11.9] 06/24/2020 Yes    Nicotine dependence [F17.200] 06/23/2020 Yes    Obesity hypoventilation syndrome [E66.2] 06/22/2020 Yes      Problems Resolved During this Admission:       Discharged Condition: {condition:54129}    Disposition: Home or Self Care    Follow Up:   Follow-up Information       Gwendolyn Mehta FNP Follow up on 9/6/2024.    Specialty: Internal Medicine  Why: @10:00am; if no show $40 fee  Contact information:  [address] [place] [id number]  [phone]                           Patient Instructions:      OXYGEN FOR HOME USE     Order Specific Question Answer Comments   Liter Flow 2    Duration Continuous    Qualifying Test Performed at: Activity    Oxygen saturation at rest 89    Oxygen saturation with activity 87    Oxygen saturation with activity on oxygen 90    Portable mode: continuous    Route nasal cannula    Device: home concentrator with portable tanks    Length of need (in months): 12 mos    Patient condition with qualifying saturation COPD    Height: 5' 2.01" (1.575 m)    Weight: 136.2 kg (300 lb 4.3 oz)    Alternative treatment measures have been tried or considered and deemed clinically ineffective. Yes      Ambulatory referral/consult to Smoking Cessation Program   Standing Status: Future   Referral Priority: Routine Referral Type: Consultation   Referral Reason: Specialty Services Required   Requested Specialty: CTTS   Number of Visits " Requested: 1     Activity as tolerated       Significant Diagnostic Studies: {diagnostics:02428}    Pending Diagnostic Studies:       None           Medications:  {DISCHARGE MEDS OHS:72767}    Indwelling Lines/Drains at time of discharge:   Lines/Drains/Airways       None                   Time spent on the discharge of patient: *** minutes         Mary Hurst MD  Department of [facility] Medicine  [facility]

## 2024-08-29 NOTE — ASSESSMENT & PLAN NOTE
Severe persistent asthma with acute exacerbation  Continue A/A nebs q4 hrs.   Give breo in place of home Trellegy  Continue montelukast 10 mg daily.   Restart steroids  Acapella  Oxygen

## 2024-08-29 NOTE — PLAN OF CARE
08/29/24 0958   Medicare Message   Important Message from Medicare regarding Discharge Appeal Rights Explained to patient/caregiver;Signed/date by patient/caregiver   Date IMM was signed 08/29/24   Time IMM was signed 0958

## 2024-08-29 NOTE — DISCHARGE SUMMARY
[facility] Medicine  Discharge Summary      Patient Name: Michelle Wren  MRN: [id number]  RORY: [id number]  Patient Class: IP- Inpatient  Admission Date: 8/26/2024  Hospital Length of Stay: 3 days  Discharge Date and Time: 8/29/2024 12:25 PM  Attending Physician: Mary Hurst*   Discharging Provider: Mary Hurst MD  Primary Care Provider: Radha Holland MD    Primary Care Team: Networked reference to record PCT     HPI:   Ms. Wren is a 35 yo female with asthma, obesity hypoventilation syndrome who presented to [place] ED for evaluation of increased shortness of breath and productive cough. She reports starting with cough, wheezing and shortness of breath last week. She came to the ED and was given steroids which she completed on Saturday. She continued with coughing and shortness of breath despite that treatment. She started having thick sputum with her productive cough. She denies any fever or chills. She did have pleuritic chest pain previously but that resolved with the steroids.     * No surgery found *      Hospital Course:   Patient admitted for acute hypoxic respiratory failure 2/2 asthma exacerbation caused by bacterial CAP (unknown organism). On IV abx, steroids, duonebs. Having some improvement but continues to have O2 requirement so continue current level of care.     Goals of Care Treatment Preferences:  Code Status: Full Code      SDOH Screening:  The patient was screened for food insecurity, housing instability, transportation needs, utility difficulties, and interpersonal safety. The social determinant(s) of health identified as a concern this admission are:  Food insecurity    The plan to address these concerns is:     Social Determinants of Health with Concerns     Food Insecurity: Food Insecurity Present (8/26/2024)        Consults:     Pulmonary  Chronic obstructive pulmonary disease  Severe persistent asthma with acute  exacerbation  Continue A/A nebs q4 hrs.   Give breo in place of home Trellegy  Continue montelukast 10 mg daily.   Restart steroids  Acapella  Oxygen    Endocrine  Type 2 diabetes mellitus  Patient's FSGs are controlled on current medication regimen.  Last A1c reviewed-   Lab Results   Component Value Date    HGBA1C 5.6 08/27/2024     Most recent fingerstick glucose reviewed-   Recent Labs   Lab 08/28/24  1133 08/28/24  1704 08/28/24  2103   POCTGLUCOSE 101 142* 125*       Current correctional scale  Low  Maintain anti-hyperglycemic dose as follows-   Antihyperglycemics (From admission, onward)      Start     Stop Route Frequency Ordered    08/26/24 0510  insulin aspart U-100 pen 0-5 Units         -- SubQ Before meals & nightly PRN 08/26/24 0436          Hold Oral hypoglycemics and her home Trulicity while patient is in the hospital.    Other  Pulmonary emboli  Chronic anticoagulation with warfarin  INR at goal at 2.7  Monitor INR daily.   Will continue warfarin daily at decreased dose      Nicotine dependence  Down to just a couple cigarettes a day. No nicotine patch required.   Counseling on quitting      Final Active Diagnoses:    Diagnosis Date Noted POA    PRINCIPAL PROBLEM:  Acute hypoxemic respiratory failure [J96.01] 07/10/2020 Yes    Pulmonary emboli [I26.99] 02/12/2021 Yes    Chronic obstructive pulmonary disease [J44.9] 07/30/2020 Yes    Severe persistent asthma with acute exacerbation [J45.51] 07/30/2020 Yes    Pulmonary hypertension [I27.20] 07/01/2020 Yes    Pneumonia due to infectious organism [J18.9] 06/26/2020 Yes    Type 2 diabetes mellitus [E11.9] 06/24/2020 Yes    Nicotine dependence [F17.200] 06/23/2020 Yes    Obesity hypoventilation syndrome [E66.2] 06/22/2020 Yes      Problems Resolved During this Admission:       Discharged Condition: stable    Disposition: Home or Self Care    Follow Up:   Follow-up Information       Gwendolyn Mehta FNP Follow up on 9/6/2024.    Specialty: Internal  "Medicine  Why: @10:00am; if no show $40 fee  Contact information:  [address] [facility] [place] [address] [phone]                           Patient Instructions:      OXYGEN FOR HOME USE     Order Specific Question Answer Comments   Liter Flow 2    Duration Continuous    Qualifying Test Performed at: Activity    Oxygen saturation at rest 89    Oxygen saturation with activity 87    Oxygen saturation with activity on oxygen 90    Portable mode: continuous    Route nasal cannula    Device: home concentrator with portable tanks    Length of need (in months): 12 mos    Patient condition with qualifying saturation COPD    Height: 5' 2.01" (1.575 m)    Weight: 136.2 kg (300 lb 4.3 oz)    Alternative treatment measures have been tried or considered and deemed clinically ineffective. Yes      Ambulatory referral/consult to Smoking Cessation Program   Standing Status: Future   Referral Priority: Routine Referral Type: Consultation   Referral Reason: Specialty Services Required   Requested Specialty: CTTS   Number of Visits Requested: 1     Activity as tolerated       Significant Diagnostic Studies: N/A    Pending Diagnostic Studies:       None           Medications:  Reconciled Home Medications:      Medication List        START taking these medications      albuterol-ipratropium 2.5 mg-0.5 mg/3 mL nebulizer solution  Commonly known as: DUO-NEB  Take 3 mLs by nebulization every 4 (four) hours. Rescue     azithromycin 250 MG tablet  Commonly known as: Z-JOSE ALBERTO  Take 2 tablets by mouth on day 1; Take 1 tablet by mouth on days 2-5     benzonatate 100 MG capsule  Commonly known as: TESSALON  Take 1 capsule (100 mg total) by mouth 3 (three) times daily as needed for Cough.     nicotine 21 mg/24 hr  Commonly known as: NICODERM CQ  Place 1 patch onto the skin once daily.     predniSONE 20 MG tablet  Commonly known as: DELTASONE  Take 2 tablets (40 mg total) by mouth once daily. for 4 days  Start taking on: " August 30, 2024            CHANGE how you take these medications      warfarin 5 MG tablet  Commonly known as: COUMADIN  Take 1 tablet (5 mg total) by mouth Daily.  What changed:   medication strength  how much to take  how to take this  when to take this            CONTINUE taking these medications      albuterol 90 mcg/actuation inhaler  Commonly known as: PROVENTIL/VENTOLIN HFA  2 puffs every 4 hours as needed for cough, wheeze, or shortness of breath     atorvastatin 80 MG tablet  Commonly known as: LIPITOR  Take 80 mg by mouth once daily.     cyclobenzaprine 10 MG tablet  Commonly known as: FLEXERIL  Take 10 mg by mouth 3 (three) times daily as needed.     diclofenac sodium 1 % Gel  Commonly known as: VOLTAREN  Apply 2 g topically 4 (four) times daily.     FASENRA PEN 30 mg/mL Atin  Generic drug: benralizumab  Inject 30 mg into the skin every 8 weeks.     montelukast 10 mg tablet  Commonly known as: SINGULAIR  Take 10 mg by mouth once daily.     TRELEGY ELLIPTA 200-62.5-25 mcg inhaler  Generic drug: fluticasone-umeclidin-vilanter     TRULICITY 4.5 mg/0.5 mL pen injector  Generic drug: dulaglutide  Inject 4.5 mg into the skin every 7 days.              Indwelling Lines/Drains at time of discharge:   Lines/Drains/Airways       None                   Time spent on the discharge of patient: 35 minutes         Mary Hurst MD  Department of Hospital Medicine  [facility]

## 2024-08-29 NOTE — PLAN OF CARE
Sent orders for home O2 to [facility], pt has home nebulizer and bipap through them already.    08/29/24 9873   Post-Acute Status   Post-Acute Authorization E   E Status Referrals Sent

## 2024-08-29 NOTE — PLAN OF CARE
Pt cleared for DC to home with new home O2 by CM once delivered.     Per Tyler at [facility], ph#[phone] they will deliver portable tanks to the hospital between 11:30-12:00pm.     Patient will be driving herself home.    08/29/24 1018   Final Note   Assessment Type Final Discharge Note   Anticipated Discharge Disposition Home   Hospital Resources/Appts/Education Provided Appointments scheduled and added to AVS

## 2024-08-29 NOTE — PROGRESS NOTES
[facility] Medicine  Progress Note    Patient Name: Michelle Wren  MRN: [id number]  Patient Class: IP- Inpatient   Admission Date: 8/26/2024  Length of Stay: 3 days  Attending Physician: Mary Hurst*  Primary Care Provider: Radha Holland MD        Subjective:     Principal Problem:Acute hypoxemic respiratory failure        HPI:  Ms. Wren is a 33 yo female with asthma, obesity hypoventilation syndrome who presented to [place] ED for evaluation of increased shortness of breath and productive cough. She reports starting with cough, wheezing and shortness of breath last week. She came to the ED and was given steroids which she completed on Saturday. She continued with coughing and shortness of breath despite that treatment. She started having thick sputum with her productive cough. She denies any fever or chills. She did have pleuritic chest pain previously but that resolved with the steroids.     Overview/Hospital Course:  Patient admitted for acute hypoxic respiratory failure 2/2 asthma exacerbation caused by bacterial CAP (unknown organism). On IV abx, steroids, duonebs. Having some improvement but continues to have O2 requirement so continue current level of care.    Interval History:  Awake, alert, on supplemental oxygen- discharge on oxygen  Stable for discharge on abc and steroid.         Review of Systems   Constitutional:  Positive for activity change.   Respiratory:  Negative for shortness of breath and wheezing.    Gastrointestinal:  Negative for abdominal pain, nausea and vomiting.   Genitourinary:  Negative for dysuria.     Objective:     Vital Signs (Most Recent):  Temp: 98.1 °F (36.7 °C) (08/29/24 0805)  Pulse: 83 (08/29/24 0805)  Resp: (!) 24 (08/29/24 0805)  BP: 118/62 (08/29/24 0805)  SpO2: (!) 92 % (08/29/24 0805) Vital Signs (24h Range):  Temp:  [97.8 °F (36.6 °C)-98.6 °F (37 °C)] 98.1 °F (36.7 °C)  Pulse:  [] 83  Resp:  [13-47]  "24  SpO2:  [91 %-100 %] 92 %  BP: (105-118)/(56-71) 118/62     Weight: (!) 136.2 kg (300 lb 4.3 oz)  Body mass index is 54.91 kg/m².  No intake or output data in the 24 hours ending 08/29/24 0957      Physical Exam  Constitutional:       Appearance: She is obese.   Cardiovascular:      Rate and Rhythm: Normal rate.   Pulmonary:      Breath sounds: No wheezing or rales.   Abdominal:      General: Bowel sounds are normal.      Palpations: Abdomen is soft.      Tenderness: There is no abdominal tenderness.   Musculoskeletal:      Cervical back: Normal range of motion.      Right lower leg: No edema.      Left lower leg: No edema.   Skin:     Capillary Refill: Capillary refill takes less than 2 seconds.   Neurological:      Mental Status: She is alert and oriented to person, place, and time.   Psychiatric:         Mood and Affect: Mood normal.         Behavior: Behavior normal.             Significant Labs: A1C:   Recent Labs   Lab 08/27/24  0648   HGBA1C 5.6     ABGs: No results for input(s): "PH", "PCO2", "HCO3", "POCSATURATED", "BE", "TOTALHB", "COHB", "METHB", "O2HB", "POCFIO2", "PO2" in the last 48 hours.  Blood Culture: No results for input(s): "LABBLOO" in the last 48 hours.  CBC:   Recent Labs   Lab 08/28/24  0544 08/29/24  0546   WBC 18.09* 19.02*   HGB 13.0 12.6   HCT 40.9 39.6    364     CMP:   Recent Labs   Lab 08/28/24  0544 08/29/24  0546    138   K 4.1 3.7    105   CO2 24 25   * 94   BUN 8 11   CREATININE 0.8 0.8   CALCIUM 9.2 9.1   PROT 7.2 6.7   ALBUMIN 3.2* 3.1*   BILITOT 0.2 0.2   ALKPHOS 57 61   AST 22 17   ALT 19 19   ANIONGAP 10 8     Cardiac Markers: No results for input(s): "CKMB", "MYOGLOBIN", "BNP", "TROPISTAT" in the last 48 hours.  Lactic Acid: No results for input(s): "LACTATE" in the last 48 hours.  Lipase: No results for input(s): "LIPASE" in the last 48 hours.  Lipid Panel: No results for input(s): "CHOL", "HDL", "LDLCALC", "TRIG", "CHOLHDL" in the last 48 " "hours.  Magnesium:   Recent Labs   Lab 08/28/24  0544 08/29/24  0546   MG 2.2 1.9     Respiratory Culture:   No results for input(s): "GSRESP", "RESPIRATORYC" in the last 48 hours.    Troponin: No results for input(s): "TROPONINI", "TROPONINIHS" in the last 48 hours.  TSH: No results for input(s): "TSH" in the last 4320 hours.  Urine Culture: No results for input(s): "LABURIN" in the last 48 hours.  Urine Studies: No results for input(s): "COLORU", "APPEARANCEUA", "PHUR", "SPECGRAV", "PROTEINUA", "GLUCUA", "KETONESU", "BILIRUBINUA", "OCCULTUA", "NITRITE", "UROBILINOGEN", "LEUKOCYTESUR", "RBCUA", "WBCUA", "BACTERIA", "SQUAMEPITHEL", "HYALINECASTS" in the last 48 hours.    Invalid input(s): "WRIGHTSUR"    Significant Imaging: I have reviewed all pertinent imaging results/findings within the past 24 hours.    Assessment/Plan:      * Acute hypoxemic respiratory failure  Pneumonia due to infectious organism  Patient with Hypoxic Respiratory failure which is Acute.  she is not on home oxygen. Supplemental oxygen was provided and noted- Oxygen Concentration (%):  [28] 28    .   Signs/symptoms of respiratory failure include- tachypnea, respiratory distress, and wheezing. Contributing diagnoses includes - Pneumonia Labs and images were reviewed. Patient Has not had a recent ABG. Will treat underlying causes and adjust management of respiratory failure as follows- IV ceftriaxone/azithromycin for the community acquired bacterial pneumonia, A/A nebs q4 hrs, mucinex BID,     Pulmonary emboli  Chronic anticoagulation with warfarin  INR at goal at 2.7  Monitor INR daily.   Will continue warfarin daily at decreased dose      Chronic obstructive pulmonary disease  Severe persistent asthma with acute exacerbation  Continue A/A nebs q4 hrs.   Give breo in place of home Trellegy  Continue montelukast 10 mg daily.   Restart steroids  Acapella  Oxygen    Type 2 diabetes mellitus  Patient's FSGs are controlled on current medication " regimen.  Last A1c reviewed-   Lab Results   Component Value Date    HGBA1C 5.6 08/27/2024     Most recent fingerstick glucose reviewed-   Recent Labs   Lab 08/28/24  1133 08/28/24  1704 08/28/24  2103   POCTGLUCOSE 101 142* 125*       Current correctional scale  Low  Maintain anti-hyperglycemic dose as follows-   Antihyperglycemics (From admission, onward)      Start     Stop Route Frequency Ordered    08/26/24 0510  insulin aspart U-100 pen 0-5 Units         -- SubQ Before meals & nightly PRN 08/26/24 0436          Hold Oral hypoglycemics and her home Trulicity while patient is in the hospital.    Nicotine dependence  Down to just a couple cigarettes a day. No nicotine patch required.   Counseling on quitting    Obesity hypoventilation syndrome  Body mass index is 54.91 kg/m². Morbid obesity complicates all aspects of disease management from diagnostic modalities to treatment. Weight loss encouraged and health benefits explained to patient.   Continue nightly BiPAP.           VTE Risk Mitigation (From admission, onward)           Ordered     warfarin (COUMADIN) tablet 5 mg  Daily         08/27/24 1219     IP VTE HIGH RISK PATIENT  Once         08/26/24 0436                    Discharge Planning   BAKARI: 8/29/2024     Code Status: Full Code   Is the patient medically ready for discharge?:     Reason for patient still in hospital (select all that apply): Pending disposition  Discharge Plan A: Home with family                  Mary Hurst MD  Department of Hospital Medicine   [facility]

## 2024-08-29 NOTE — PLAN OF CARE
Spoke with Indira at [facility], she confirmed that received the order and will delivering to the pt's room.    08/29/24 0958   Post-Acute Status   Post-Acute Authorization HME   HME Status Set-up Complete/Auth obtained

## 2024-08-31 LAB
BACTERIA BLD CULT: NORMAL
BACTERIA BLD CULT: NORMAL

## 2024-12-11 ENCOUNTER — HOSPITAL ENCOUNTER (EMERGENCY)
Facility: HOSPITAL | Age: 34
Discharge: HOME | End: 2024-12-11
Attending: EMERGENCY MEDICINE
Payer: MEDICARE

## 2024-12-11 VITALS
DIASTOLIC BLOOD PRESSURE: 62 MMHG | TEMPERATURE: 98 F | BODY MASS INDEX: 53.92 KG/M2 | HEIGHT: 62 IN | SYSTOLIC BLOOD PRESSURE: 116 MMHG | OXYGEN SATURATION: 97 % | WEIGHT: 293 LBS | RESPIRATION RATE: 20 BRPM | HEART RATE: 81 BPM

## 2024-12-11 DIAGNOSIS — M25.512 ACUTE PAIN OF LEFT SHOULDER: Primary | ICD-10-CM

## 2024-12-11 PROCEDURE — 73030 X-RAY EXAM OF SHOULDER: CPT | Mod: 26,RT,, | Performed by: RADIOLOGY

## 2024-12-11 PROCEDURE — 99283 EMERGENCY DEPT VISIT LOW MDM: CPT | Mod: 25

## 2024-12-11 PROCEDURE — 73030 X-RAY EXAM OF SHOULDER: CPT | Mod: TC,RT

## 2024-12-11 PROCEDURE — 25000003 PHARM REV CODE 250: Performed by: EMERGENCY MEDICINE

## 2024-12-11 RX ORDER — TRAMADOL HYDROCHLORIDE 50 MG/1
50 TABLET ORAL
Status: COMPLETED | OUTPATIENT
Start: 2024-12-11 | End: 2024-12-11

## 2024-12-11 RX ORDER — NAPROXEN 375 MG/1
375 TABLET ORAL 2 TIMES DAILY WITH MEALS
Qty: 30 TABLET | Refills: 0 | Status: SHIPPED | OUTPATIENT
Start: 2024-12-11

## 2024-12-11 RX ADMIN — TRAMADOL HYDROCHLORIDE 50 MG: 50 TABLET, COATED ORAL at 01:12

## 2024-12-11 NOTE — ED PROVIDER NOTES
Encounter Date: 2024       History     Chief Complaint   Patient presents with    Shoulder Pain     Right shoulder pain after she fell in the shower.     Patient presents to the emergency department for evaluation of right shoulder pain.  Patient states she falling slipped while getting out of the bathtub and fell on her shoulder.  The fall occurred yesterday.  She states she has QT have pain since her fall.  She denies any numbness or tingling.  She denies any swelling or deformity.  She states her pain is worse whenever she tries to lift her arm.  She denies any other complaints.    The history is provided by the patient.     Review of patient's allergies indicates:  No Known Allergies  Past Medical History:   Diagnosis Date    Asthma     Back pain     COPD (chronic obstructive pulmonary disease)     Diabetes mellitus     DVT (deep venous thrombosis)     Morbid obesity     Obesity     PE (pulmonary thromboembolism)      Past Surgical History:   Procedure Laterality Date    APPENDECTOMY       SECTION       Family History   Problem Relation Name Age of Onset    Hypertension Mother      Diabetes Mother      Hyperlipidemia Mother      Kidney failure Mother       Social History     Tobacco Use    Smoking status: Every Day     Current packs/day: 0.25     Types: Cigarettes    Smokeless tobacco: Never   Substance Use Topics    Alcohol use: Yes     Comment: occ    Drug use: No     Review of Systems   Constitutional:  Negative for activity change, appetite change, chills and fever.   HENT:  Negative for congestion, ear discharge, ear pain, nosebleeds, sore throat and voice change.    Eyes:  Negative for photophobia, pain and discharge.   Respiratory:  Negative for cough, chest tightness, shortness of breath and wheezing.    Cardiovascular:  Negative for chest pain and palpitations.   Gastrointestinal:  Negative for abdominal pain, constipation, nausea and vomiting.   Genitourinary:  Negative for dysuria, flank  pain, frequency and urgency.   Musculoskeletal:  Positive for arthralgias. Negative for back pain and neck pain.   Skin:  Negative for rash and wound.   Neurological:  Negative for dizziness, seizures, weakness and headaches.   All other systems reviewed and are negative.      Physical Exam     Initial Vitals [12/11/24 0057]   BP Pulse Resp Temp SpO2   116/62 81 20 97.9 °F (36.6 °C) 97 %      MAP       --         Physical Exam    Nursing note and vitals reviewed.  Constitutional: She appears well-developed and well-nourished.   HENT:   Head: Normocephalic and atraumatic.   Right Ear: External ear normal.   Left Ear: External ear normal.   Nose: Nose normal. Mouth/Throat: Oropharynx is clear and moist.   Eyes: Conjunctivae and EOM are normal. Pupils are equal, round, and reactive to light.   Neck: Neck supple. No tracheal deviation present.   Normal range of motion.  Cardiovascular:  Normal rate, regular rhythm and normal heart sounds.           Pulmonary/Chest: Breath sounds normal. She has no wheezes.   Abdominal: Abdomen is soft. Bowel sounds are normal. There is no abdominal tenderness.   Musculoskeletal:         General: Tenderness present.      Cervical back: Normal range of motion and neck supple.      Comments: There is palpatory tenderness noted in the deltoid muscle region of the right shoulder.  There is no deformity noted.  There is no tenderness over the clavicle.  There is no AC tenderness noted.  Peripheral pulses and distal neuro are intact.     Neurological: She is alert and oriented to person, place, and time. She has normal reflexes.   Skin: Skin is warm and dry. Capillary refill takes less than 2 seconds. No rash noted.         ED Course   Procedures  Labs Reviewed - No data to display       Imaging Results              X-ray Shoulder 2 or More Views Right (Final result)  Result time 12/11/24 01:48:07   Procedure changed from X-Ray Shoulder 2 or More Views Left     Final result by David Chavira  MD (12/11/24 01:48:07)                   Impression:      No acute fracture or dislocation in the right shoulder.      Electronically signed by: David Chavira  Date:    12/11/2024  Time:    01:48               Narrative:    EXAMINATION:  XR SHOULDER COMPLETE 2 OR MORE VIEWS RIGHT    CLINICAL HISTORY:  trauma;    TECHNIQUE:  Two or three views of the right shoulder were performed.    COMPARISON:  AP chest x-ray 08/26/2024    FINDINGS:  No acute fracture deformity, AC joint separation, or glenohumeral joint dislocation is demonstrated radiographically.    Body habitus limits radiographic assessment for soft tissue swelling.                                       Medications   traMADoL tablet 50 mg (50 mg Oral Given 12/11/24 0137)     Medical Decision Making  History is obtained from the patient.  All labs and x-rays are reviewed by myself.  Differential diagnosis includes, but is not limited to, contusion/dislocation/fracture  Patient has commercial insurance and no decreased access to healthcare.    X-ray of the shoulder reveals no acute process.  We will treat the patient for her pain and provided with a sling and have her follow up with her primary care provider/ortho.    Amount and/or Complexity of Data Reviewed  Radiology: ordered and independent interpretation performed.     Details: X-ray of the shoulder reveals no evidence of fracture or dislocation.  There is no soft tissue swelling noted.    Risk  Prescription drug management.                                      Clinical Impression:  Final diagnoses:  [M25.512] Acute pain of left shoulder (Primary)          ED Disposition Condition    Discharge Stable          ED Prescriptions       Medication Sig Dispense Start Date End Date Auth. Provider    naproxen (NAPROSYN) 375 MG tablet Take 1 tablet (375 mg total) by mouth 2 (two) times daily with meals. 30 tablet 12/11/2024 -- Yaron Brunner, DO          Follow-up Information       Follow up With Specialties  Details Why Contact Info    Radha Holland MD Family Medicine Schedule an appointment as soon as possible for a visit   [address] [place] [address] [place] [address]  [phone]               Yaron Brunner, DO  12/11/24 0144       Yaron Brunner, DO  12/11/24 0157

## 2024-12-11 NOTE — DISCHARGE INSTRUCTIONS
Wear sling when you are up and about.  Follow up with your primary care provider.  Continue home medications.

## 2024-12-11 NOTE — Clinical Note
"Michelle Hacnock" Holger was seen and treated in our emergency department on 12/11/2024.  She may return to work on 12/12/2024.       If you have any questions or concerns, please don't hesitate to call.       RN    "

## 2025-04-10 ENCOUNTER — HOSPITAL ENCOUNTER (EMERGENCY)
Facility: HOSPITAL | Age: 35
Discharge: HOME | End: 2025-04-10
Attending: STUDENT IN AN ORGANIZED HEALTH CARE EDUCATION/TRAINING PROGRAM
Payer: MEDICARE

## 2025-04-10 VITALS
BODY MASS INDEX: 50.97 KG/M2 | RESPIRATION RATE: 16 BRPM | HEIGHT: 62 IN | HEART RATE: 65 BPM | WEIGHT: 277 LBS | OXYGEN SATURATION: 99 % | TEMPERATURE: 99 F | SYSTOLIC BLOOD PRESSURE: 137 MMHG | DIASTOLIC BLOOD PRESSURE: 79 MMHG

## 2025-04-10 DIAGNOSIS — M79.672 FOOT PAIN, LEFT: Primary | ICD-10-CM

## 2025-04-10 DIAGNOSIS — R52 PAIN: ICD-10-CM

## 2025-04-10 DIAGNOSIS — R79.1 LOW INTERNATIONAL NORMALIZED RATIO (INR): ICD-10-CM

## 2025-04-10 LAB
HOLD SPECIMEN: NORMAL
INR PPP: 1.7 (ref 0.8–1.2)
PROTHROMBIN TIME: 18.4 SECONDS (ref 9–12.5)

## 2025-04-10 PROCEDURE — 99284 EMERGENCY DEPT VISIT MOD MDM: CPT | Mod: 25

## 2025-04-10 PROCEDURE — 85610 PROTHROMBIN TIME: CPT | Performed by: STUDENT IN AN ORGANIZED HEALTH CARE EDUCATION/TRAINING PROGRAM

## 2025-04-10 PROCEDURE — 73630 X-RAY EXAM OF FOOT: CPT | Mod: TC,LT

## 2025-04-10 PROCEDURE — 73630 X-RAY EXAM OF FOOT: CPT | Mod: 26,LT,, | Performed by: RADIOLOGY

## 2025-04-10 PROCEDURE — 36415 COLL VENOUS BLD VENIPUNCTURE: CPT | Performed by: STUDENT IN AN ORGANIZED HEALTH CARE EDUCATION/TRAINING PROGRAM

## 2025-04-10 NOTE — ED PROVIDER NOTES
Encounter Date: 4/10/2025       History     Chief Complaint   Patient presents with    Foot Pain     Patient states left foot painful and swollen that started 3 days ago.  Patient denies injury.     34-year-old female presenting with pain and swelling to her left foot around the heel.  She does have a past medical history of diabetes, DVT, PE, and he is on warfarin.  States she has missed some doses of her warfarin occasionally but took it this morning.  States she has not had her INR checked in several weeks.  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  Does not feel like prior DVTs.  Denies any chest pain or shortness of breath.    The history is provided by the patient. No  was used.     Review of patient's allergies indicates:  No Known Allergies  Past Medical History:   Diagnosis Date    Asthma     Back pain     COPD (chronic obstructive pulmonary disease)     Diabetes mellitus     DVT (deep venous thrombosis)     Morbid obesity     Obesity     PE (pulmonary thromboembolism)      Past Surgical History:   Procedure Laterality Date    APPENDECTOMY       SECTION       Family History   Problem Relation Name Age of Onset    Hypertension Mother      Diabetes Mother      Hyperlipidemia Mother      Kidney failure Mother       Social History[1]  Review of Systems   Constitutional:  Negative for chills, fatigue and fever.   HENT:  Negative for congestion and sore throat.    Respiratory:  Negative for cough, chest tightness and shortness of breath.    Cardiovascular:  Positive for leg swelling. Negative for chest pain and palpitations.   Gastrointestinal:  Negative for abdominal pain, diarrhea, nausea and vomiting.   Genitourinary:  Negative for dysuria.   Musculoskeletal:  Negative for back pain and neck pain.   Skin:  Negative for rash.   Neurological:  Negative for dizziness, weakness  and headaches.   Hematological:  Does not bruise/bleed easily.       Physical Exam     Initial Vitals [04/10/25 0723]   BP Pulse Resp Temp SpO2   (!) 138/90 72 16 98.8 °F (37.1 °C) 99 %      MAP       --         Physical Exam    Constitutional: She appears well-developed and well-nourished. She is not diaphoretic. No distress.   HENT:   Head: Normocephalic and atraumatic.   Right Ear: External ear normal.   Left Ear: External ear normal.   Eyes: EOM are normal. Pupils are equal, round, and reactive to light. No scleral icterus.   Neck: Neck supple.   Normal range of motion.  Cardiovascular:  Normal rate, regular rhythm and normal heart sounds.           Pulmonary/Chest: Breath sounds normal. No stridor. No respiratory distress. She has no wheezes. She has no rales.   Abdominal: Abdomen is soft. She exhibits no distension. There is no abdominal tenderness.   Musculoskeletal:         General: No tenderness or edema. Normal range of motion.      Cervical back: Normal range of motion and neck supple.      Comments: Tenderness of the left foot around the heel and Achilles.  2+ PT and DP pulses.     Neurological: She is alert and oriented to person, place, and time. She has normal strength. No cranial nerve deficit. GCS score is 15. GCS eye subscore is 4. GCS verbal subscore is 5. GCS motor subscore is 6.   Skin: Skin is warm and dry. Capillary refill takes less than 2 seconds. No pallor.   Psychiatric: She has a normal mood and affect.         ED Course   Procedures  Labs Reviewed   PROTIME-INR - Abnormal       Result Value    PT 18.4 (*)     INR 1.7 (*)    EXTRA TUBES    Narrative:     The following orders were created for panel order EXTRA TUBES.  Procedure                               Abnormality         Status                     ---------                               -----------         ------                     Lavender Top Hold[[id number]]                               Final result                 Please view  results for these tests on the individual orders.   LAVENDER TOP HOLD    Extra Tube Hold for add-ons.            Imaging Results              X-Ray Foot Complete Left (Final result)  Result time 04/10/25 08:11:06      Final result by Keiko Solorzano MD (04/10/25 08:11:06)                   Impression:      No acute bony abnormality.      Electronically signed by: Keiko Solorzano  Date:    04/10/2025  Time:    08:11               Narrative:    EXAMINATION:  XR FOOT COMPLETE 3 VIEW LEFT    CLINICAL HISTORY:  .  Pain, unspecified    TECHNIQUE:  AP, lateral and oblique views of the left foot were performed.    COMPARISON:  11/30/2023    FINDINGS:  There is once again diffuse prominence of the soft tissues without fracture, dislocation or foreign body.  There is no bone destruction.  No gas in the soft tissues.  There are scattered degenerative changes including a large heel spur.                                       Medications - No data to display  Medical Decision Making  34-year-old female with past medical history of DVT and PE on warfarin is presenting with swelling in her left heel and foot.  It is tender to touch is slightly swollen on the medial aspect of her left foot.  No calf tenderness.  Able to ambulate.  Stable vitals.  Denies any chest pain or shortness of breath.  We will check an INR as she states she has not had it checked in several weeks and has been intermittently compliant with her warfarin in the past.    X-ray was negative.  Symptoms not consistent with DVT or superficial venous thrombus but patient is on warfarin.  Her INR was 1.7 which is subtherapeutic so recommended an additional dose of her warfarin today.  Instructed her to follow up with INR rechecked as instructed and to discuss her symptoms with the primary care.  Also discussed potentially being switched to Eliquis which I recommended she discuss with primary care as well.  At this time feel no additional imaging is indicated  and she is appropriate for outpatient follow up.    Amount and/or Complexity of Data Reviewed  Labs: ordered. Decision-making details documented in ED Course.  Radiology: ordered and independent interpretation performed. Decision-making details documented in ED Course.                                      Clinical Impression:  Final diagnoses:  [R52] Pain  [M79.672] Foot pain, left (Primary)  [R79.1] Low international normalized ratio (INR)          ED Disposition Condition    Discharge Stable          ED Prescriptions    None       Follow-up Information       Follow up With Specialties Details Why Contact Info    Radha Holland MD Family Medicine Schedule an appointment as soon as possible for a visit   [address] [place] [address] [place] [address]  [phone]                 [1]   Social History  Tobacco Use    Smoking status: Every Day     Current packs/day: 0.25     Types: Cigarettes    Smokeless tobacco: Never   Substance Use Topics    Alcohol use: Yes     Comment: occ    Drug use: No        Tom Dinh MD  04/10/25 [id number]

## 2025-04-10 NOTE — DISCHARGE INSTRUCTIONS
Please take an additional dose of your warfarin and continue to monitor INR.  Please follow up with primary care to discuss additional options for anticoagulation.  Return to the emergency department if your symptoms worsened, you have additional swelling, pain, or any other concerning symptoms.

## 2025-06-19 NOTE — ASSESSMENT & PLAN NOTE
"[facility]       Physical Therapy Discharge       Name: Meera LOCK Cordova  [facility] Number: [id number]    Patient with Hypercapnic and Hypoxic Respiratory failure which is Acute.  she is not on home oxygen. Supplemental ventilation was provided and noted- Vent Mode: A/C  Oxygen Concentration (%):  [] 70  Resp Rate Total:  [16 br/min-29 br/min] 18 br/min  Vt Set:  [500 mL] 500 mL  PEEP/CPAP:  [10 yqD32-03 cmH20] 20 cmH20  Pressure Support:  [0 cmH20] 0 cmH20  Mean Airway Pressure:  [15 dkV29-10 cmH20] 26 cmH20 and oxygen saturations 97%. Differential diagnosis includes - COPD, CHF, Obesity Hypoventilation, Interstitial lung disease and Pneumonia Labs and images were reviewed. Will treat underlying causes.   Follow pulmonary recommendations.      "Prabha Ross, PT, DPT

## 2025-07-29 ENCOUNTER — HOSPITAL ENCOUNTER (EMERGENCY)
Facility: HOSPITAL | Age: 35
Discharge: HOME | End: 2025-07-29
Attending: EMERGENCY MEDICINE
Payer: MEDICARE

## 2025-07-29 VITALS
OXYGEN SATURATION: 99 % | BODY MASS INDEX: 50.05 KG/M2 | SYSTOLIC BLOOD PRESSURE: 97 MMHG | HEART RATE: 68 BPM | DIASTOLIC BLOOD PRESSURE: 61 MMHG | WEIGHT: 272 LBS | TEMPERATURE: 99 F | HEIGHT: 62 IN | RESPIRATION RATE: 18 BRPM

## 2025-07-29 DIAGNOSIS — M25.511 RIGHT SHOULDER PAIN, UNSPECIFIED CHRONICITY: Primary | ICD-10-CM

## 2025-07-29 DIAGNOSIS — R52 PAIN: ICD-10-CM

## 2025-07-29 LAB
ABSOLUTE EOSINOPHIL (OHS): 0 K/UL
ABSOLUTE MONOCYTE (OHS): 1.06 K/UL (ref 0.3–1)
ABSOLUTE NEUTROPHIL COUNT (OHS): 3.12 K/UL (ref 1.8–7.7)
ANION GAP (OHS): 7 MMOL/L (ref 8–16)
BASOPHILS # BLD AUTO: 0.02 K/UL
BASOPHILS NFR BLD AUTO: 0.3 %
BUN SERPL-MCNC: 12 MG/DL (ref 6–20)
CALCIUM SERPL-MCNC: 8.7 MG/DL (ref 8.7–10.5)
CHLORIDE SERPL-SCNC: 107 MMOL/L (ref 95–110)
CO2 SERPL-SCNC: 25 MMOL/L (ref 23–29)
CREAT SERPL-MCNC: 0.9 MG/DL (ref 0.5–1.4)
ERYTHROCYTE [DISTWIDTH] IN BLOOD BY AUTOMATED COUNT: 14.7 % (ref 11.5–14.5)
GFR SERPLBLD CREATININE-BSD FMLA CKD-EPI: >60 ML/MIN/1.73/M2
GLUCOSE SERPL-MCNC: 82 MG/DL (ref 70–110)
HCT VFR BLD AUTO: 40.7 % (ref 37–48.5)
HGB BLD-MCNC: 13.4 GM/DL (ref 12–16)
IMM GRANULOCYTES # BLD AUTO: 0.01 K/UL (ref 0–0.04)
IMM GRANULOCYTES NFR BLD AUTO: 0.1 % (ref 0–0.5)
INR PPP: 0.9 (ref 0.8–1.2)
LYMPHOCYTES # BLD AUTO: 3.54 K/UL (ref 1–4.8)
MCH RBC QN AUTO: 27.3 PG (ref 27–31)
MCHC RBC AUTO-ENTMCNC: 32.9 G/DL (ref 32–36)
MCV RBC AUTO: 83 FL (ref 82–98)
NUCLEATED RBC (/100WBC) (OHS): 0 /100 WBC
PLATELET # BLD AUTO: 332 K/UL (ref 150–450)
PMV BLD AUTO: 10 FL (ref 9.2–12.9)
POTASSIUM SERPL-SCNC: 4.2 MMOL/L (ref 3.5–5.1)
PROTHROMBIN TIME: 10.6 SECONDS (ref 9–12.5)
RBC # BLD AUTO: 4.9 M/UL (ref 4–5.4)
RELATIVE EOSINOPHIL (OHS): 0 %
RELATIVE LYMPHOCYTE (OHS): 45.7 % (ref 18–48)
RELATIVE MONOCYTE (OHS): 13.7 % (ref 4–15)
RELATIVE NEUTROPHIL (OHS): 40.2 % (ref 38–73)
SODIUM SERPL-SCNC: 139 MMOL/L (ref 136–145)
WBC # BLD AUTO: 7.75 K/UL (ref 3.9–12.7)

## 2025-07-29 PROCEDURE — 25000003 PHARM REV CODE 250: Performed by: EMERGENCY MEDICINE

## 2025-07-29 PROCEDURE — 73030 X-RAY EXAM OF SHOULDER: CPT | Mod: 26,RT,, | Performed by: RADIOLOGY

## 2025-07-29 PROCEDURE — 80048 BASIC METABOLIC PNL TOTAL CA: CPT | Performed by: EMERGENCY MEDICINE

## 2025-07-29 PROCEDURE — 85610 PROTHROMBIN TIME: CPT | Performed by: EMERGENCY MEDICINE

## 2025-07-29 PROCEDURE — 99284 EMERGENCY DEPT VISIT MOD MDM: CPT | Mod: 25

## 2025-07-29 PROCEDURE — 85025 COMPLETE CBC W/AUTO DIFF WBC: CPT | Performed by: EMERGENCY MEDICINE

## 2025-07-29 PROCEDURE — 73030 X-RAY EXAM OF SHOULDER: CPT | Mod: TC,RT

## 2025-07-29 RX ORDER — OXYCODONE AND ACETAMINOPHEN 5; 325 MG/1; MG/1
1 TABLET ORAL
Refills: 0 | Status: COMPLETED | OUTPATIENT
Start: 2025-07-29 | End: 2025-07-29

## 2025-07-29 RX ORDER — PREDNISONE 50 MG/1
50 TABLET ORAL DAILY
Qty: 5 TABLET | Refills: 0 | Status: SHIPPED | OUTPATIENT
Start: 2025-07-29

## 2025-07-29 RX ORDER — OXYCODONE AND ACETAMINOPHEN 5; 325 MG/1; MG/1
1 TABLET ORAL EVERY 8 HOURS PRN
Qty: 7 TABLET | Refills: 0 | Status: SHIPPED | OUTPATIENT
Start: 2025-07-29

## 2025-07-29 RX ADMIN — OXYCODONE HYDROCHLORIDE AND ACETAMINOPHEN 1 TABLET: 5; 325 TABLET ORAL at 10:07

## 2025-07-30 NOTE — ED PROVIDER NOTES
Encounter Date: 2025       History     Chief Complaint   Patient presents with    Shoulder Pain     Pt reports right shoulder pain x 3 days. Pt denies injury or trauma. Pt c/o pain with ROM and lifting right arm. Pt reports she lifts things on her job but denies injury. Pt reports she has been taking ibuprofen and flexeril 10 for the pain with no relief.      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  She states she does use her arms frequently at work but she denies any falls.  She has right shoulder pain that is worse when she raises it or extends it.  Relieved by holding it still.  No fever.  No history of IVDA.  No distal weakness or numbness.  No discrete neck pain.  No other exacerbating relieving factors identified.      Review of patient's allergies indicates:  No Known Allergies  Past Medical History:   Diagnosis Date    Asthma     Back pain     COPD (chronic obstructive pulmonary disease)     Diabetes mellitus     DVT (deep venous thrombosis)     Morbid obesity     Obesity     PE (pulmonary thromboembolism)      Past Surgical History:   Procedure Laterality Date    APPENDECTOMY       SECTION       Family History   Problem Relation Name Age of Onset    Hypertension Mother      Diabetes Mother      Hyperlipidemia Mother      Kidney failure Mother       Social History[1]  Review of Systems   Constitutional:  Negative for fever.   HENT:  Negative for sore throat.    Respiratory:  Negative for shortness of breath.    Cardiovascular:  Negative for chest pain.   Gastrointestinal:  Negative for nausea.   Genitourinary:  Negative for dysuria.   Musculoskeletal:  Positive for arthralgias. Negative for back pain.   Skin:  Negative for rash.   Neurological:  Negative for weakness.   Hematological:  Does not bruise/bleed easily.       Physical Exam     Initial Vitals [25 2205]   BP Pulse  Resp Temp SpO2   119/66 75 14 98.7 °F (37.1 °C) 97 %      MAP       --         Physical Exam    Nursing note and vitals reviewed.  Constitutional: She appears well-developed and well-nourished.   HENT:   Head: Normocephalic and atraumatic.   Eyes: EOM are normal. Pupils are equal, round, and reactive to light.   Neck: Neck supple.   Normal range of motion.  Cardiovascular:  Normal rate, regular rhythm and normal heart sounds.           Pulmonary/Chest: Breath sounds normal. No respiratory distress.   Abdominal: Abdomen is soft. Bowel sounds are normal. She exhibits no distension. There is no abdominal tenderness.   Musculoskeletal:      Cervical back: Normal range of motion and neck supple.      Comments: Tenderness to palpation right shoulder slightly limited range of motion external and internal rotation.  Compartments soft distally.  No deformity or bruising.  2+ radial pulse.  Sensation and motor intact distally.     Neurological: She is alert and oriented to person, place, and time. She has normal strength. No cranial nerve deficit or sensory deficit.   Skin: Skin is warm and dry. Capillary refill takes less than 2 seconds.   Psychiatric: She has a normal mood and affect.         ED Course   Procedures  Labs Reviewed   BASIC METABOLIC PANEL - Abnormal       Result Value    Sodium 139      Potassium 4.2      Chloride 107      CO2 25      Glucose 82      BUN 12      Creatinine 0.9      Calcium 8.7      Anion Gap 7 (*)     eGFR >60     CBC WITH DIFFERENTIAL - Abnormal    WBC 7.75      RBC 4.90      HGB 13.4      HCT 40.7      MCV 83      MCH 27.3      MCHC 32.9      RDW 14.7 (*)     Platelet Count 332      MPV 10.0      Nucleated RBC 0      Neut % 40.2      Lymph % 45.7      Mono % 13.7      Eos % 0.0      Basophil % 0.3      Imm Grans % 0.1      Neut # 3.12      Lymph # 3.54      Mono # 1.06 (*)     Eos # 0.00      Baso # 0.02      Imm Grans # 0.01     PROTIME-INR - Normal    PT 10.6      INR 0.9     CBC W/ AUTO  DIFFERENTIAL    Narrative:     The following orders were created for panel order CBC auto differential.  Procedure                               Abnormality         Status                     ---------                               -----------         ------                     CBC with Differential[[id number]]       Abnormal            Final result                 Please view results for these tests on the individual orders.          Imaging Results              X-Ray Shoulder Complete 2 View Right (Final result)  Result time 07/29/25 23:09:59      Final result by Vishnu Carlin MD (07/29/25 23:09:59)                   Impression:      No acute fracture.      Electronically signed by: Vishnu Carlin MD  Date:    07/29/2025  Time:    23:09               Narrative:    EXAMINATION:  XR SHOULDER COMPLETE 2 OR MORE VIEWS RIGHT    CLINICAL HISTORY:  Pain, unspecified    TECHNIQUE:  Two or three views of the right shoulder were performed.    COMPARISON:  12/11/2024.    FINDINGS:  Bones: No fracture.  No lytic or blastic lesion.    Joints: No evidence for glenohumeral dislocation.  Acromioclavicular joint is unremarkable.    Soft tissues: Unremarkable.                                       Medications   oxyCODONE-acetaminophen 5-325 mg per tablet 1 tablet (1 tablet Oral Given 7/29/25 1726)     Medical Decision Making  Differential diagnosis; arthritis, hematoma, septic joint, muscle strain, sprain, occult injury/fracture    Plan; follow up labs, x-ray, reassess    Normal CBC, electrolytes.  INR subtherapeutic and in normal range.  X-ray negative.  Discharge with pain control, outpatient follow up with Orthopedics.  Patient encouraged to take her Coumadin as prescribed.  Stable for discharge    Amount and/or Complexity of Data Reviewed  Labs: ordered.  Radiology: ordered.    Risk  Prescription drug management.                                          Clinical Impression:  Final diagnoses:  [R52] Pain  [M25.511] Right  shoulder pain, unspecified chronicity (Primary)          ED Disposition Condition    Discharge Stable          ED Prescriptions       Medication Sig Dispense Start Date End Date Auth. Provider    oxyCODONE-acetaminophen (PERCOCET) 5-325 mg per tablet Take 1 tablet by mouth every 8 (eight) hours as needed for Pain. 7 tablet 7/29/2025 -- Clark Barrera MD    predniSONE (DELTASONE) 50 MG Tab Take 1 tablet (50 mg total) by mouth once daily. 5 tablet 7/29/2025 -- Clark Barrera MD          Follow-up Information       Follow up With Specialties Details Why Contact Info    Radha Holland MD Family Medicine In 3 days  [address] [facility] [place] [address]  [phone]      Sumit Quintana MD Orthopedic Surgery In 1 week  [address] [facility] [place] [address]  [phone]                     [1]   Social History  Tobacco Use    Smoking status: Every Day     Current packs/day: 0.25     Types: Cigarettes    Smokeless tobacco: Never   Vaping Use    Vaping status: Never Used   Substance Use Topics    Alcohol use: Yes     Comment: occ    Drug use: Yes     Types: Marijuana        Clark Barrera MD  07/30/25 1793

## 2025-07-30 NOTE — ED TRIAGE NOTES
Michelle HANS Wren, a 35 y.o. female presents to the ED w/ complaint of right shoulder pain x 3 days. Pt denies recent trauma or injury. No deformity noted on assessment. Reports pain is exacerbated with movement of arm. Pain relieved by holding arm still. Pt reports taking ibuprofen and flexeril with no relief. Pt is AAOX4 and NADN.